# Patient Record
Sex: FEMALE | Race: WHITE | NOT HISPANIC OR LATINO | Employment: FULL TIME | URBAN - METROPOLITAN AREA
[De-identification: names, ages, dates, MRNs, and addresses within clinical notes are randomized per-mention and may not be internally consistent; named-entity substitution may affect disease eponyms.]

---

## 2023-04-28 ENCOUNTER — HOSPITAL ENCOUNTER (INPATIENT)
Facility: HOSPITAL | Age: 43
LOS: 7 days | Discharge: HOME | DRG: 674 | End: 2023-05-05
Attending: EMERGENCY MEDICINE | Admitting: STUDENT IN AN ORGANIZED HEALTH CARE EDUCATION/TRAINING PROGRAM
Payer: MEDICAID

## 2023-04-28 DIAGNOSIS — N17.9 ACUTE RENAL FAILURE SUPERIMPOSED ON STAGE 5 CHRONIC KIDNEY DISEASE, NOT ON CHRONIC DIALYSIS: ICD-10-CM

## 2023-04-28 DIAGNOSIS — D64.9 SYMPTOMATIC ANEMIA: ICD-10-CM

## 2023-04-28 DIAGNOSIS — N18.5 ACUTE RENAL FAILURE SUPERIMPOSED ON STAGE 5 CHRONIC KIDNEY DISEASE, NOT ON CHRONIC DIALYSIS: ICD-10-CM

## 2023-04-28 DIAGNOSIS — N19 UREMIA: Primary | ICD-10-CM

## 2023-04-28 DIAGNOSIS — R07.9 CHEST PAIN: ICD-10-CM

## 2023-04-28 DIAGNOSIS — E87.20 ACIDOSIS: ICD-10-CM

## 2023-04-28 DIAGNOSIS — R55 SYNCOPE: ICD-10-CM

## 2023-04-28 DIAGNOSIS — R31.0 GROSS HEMATURIA: ICD-10-CM

## 2023-04-28 DIAGNOSIS — D64.9 ANEMIA: ICD-10-CM

## 2023-04-28 PROBLEM — N18.9 CHRONIC KIDNEY DISEASE-MINERAL AND BONE DISORDER: Status: ACTIVE | Noted: 2023-04-28

## 2023-04-28 PROBLEM — E83.9 CHRONIC KIDNEY DISEASE-MINERAL AND BONE DISORDER: Status: ACTIVE | Noted: 2023-04-28

## 2023-04-28 PROBLEM — Q61.3 POLYCYSTIC KIDNEY DISEASE: Status: ACTIVE | Noted: 2023-04-28

## 2023-04-28 PROBLEM — I10 HYPERTENSION: Status: ACTIVE | Noted: 2023-04-28

## 2023-04-28 PROBLEM — R31.9 URINARY TRACT INFECTION WITH HEMATURIA: Status: ACTIVE | Noted: 2023-04-28

## 2023-04-28 PROBLEM — R31.9 URINARY TRACT INFECTION WITH HEMATURIA: Status: RESOLVED | Noted: 2023-04-28 | Resolved: 2023-04-28

## 2023-04-28 PROBLEM — N39.0 URINARY TRACT INFECTION WITH HEMATURIA: Status: ACTIVE | Noted: 2023-04-28

## 2023-04-28 PROBLEM — N39.0 URINARY TRACT INFECTION WITH HEMATURIA: Status: RESOLVED | Noted: 2023-04-28 | Resolved: 2023-04-28

## 2023-04-28 PROBLEM — M89.9 CHRONIC KIDNEY DISEASE-MINERAL AND BONE DISORDER: Status: ACTIVE | Noted: 2023-04-28

## 2023-04-28 PROBLEM — Q61.2 AUTOSOMAL DOMINANT POLYCYSTIC KIDNEY DISEASE: Status: ACTIVE | Noted: 2023-04-28

## 2023-04-28 LAB
ABO + RH BLD: NORMAL
ALBUMIN SERPL BCP-MCNC: 2.7 G/DL (ref 3.5–5.2)
ALBUMIN SERPL BCP-MCNC: 2.9 G/DL (ref 3.5–5.2)
ALBUMIN SERPL BCP-MCNC: 3.1 G/DL (ref 3.5–5.2)
ALBUMIN/CREAT UR: 6773.3 UG/MG (ref 0–30)
ALLENS TEST: ABNORMAL
ALP SERPL-CCNC: 65 U/L (ref 55–135)
ALP SERPL-CCNC: 67 U/L (ref 55–135)
ALP SERPL-CCNC: 71 U/L (ref 55–135)
ALT SERPL W/O P-5'-P-CCNC: 6 U/L (ref 10–44)
ALT SERPL W/O P-5'-P-CCNC: 7 U/L (ref 10–44)
ALT SERPL W/O P-5'-P-CCNC: 8 U/L (ref 10–44)
ANION GAP SERPL CALC-SCNC: 23 MMOL/L (ref 8–16)
ANION GAP SERPL CALC-SCNC: 24 MMOL/L (ref 8–16)
ANION GAP SERPL CALC-SCNC: 26 MMOL/L (ref 8–16)
ANISOCYTOSIS BLD QL SMEAR: SLIGHT
AST SERPL-CCNC: 11 U/L (ref 10–40)
AST SERPL-CCNC: 8 U/L (ref 10–40)
AST SERPL-CCNC: 9 U/L (ref 10–40)
B-HCG UR QL: NEGATIVE
BACTERIA #/AREA URNS AUTO: ABNORMAL /HPF
BASOPHILS # BLD AUTO: 0.02 K/UL (ref 0–0.2)
BASOPHILS NFR BLD: 0.2 % (ref 0–1.9)
BILIRUB SERPL-MCNC: 0.4 MG/DL (ref 0.1–1)
BILIRUB SERPL-MCNC: 0.4 MG/DL (ref 0.1–1)
BILIRUB SERPL-MCNC: 0.5 MG/DL (ref 0.1–1)
BILIRUB UR QL STRIP: NEGATIVE
BLD GP AB SCN CELLS X3 SERPL QL: NORMAL
BNP SERPL-MCNC: 108 PG/ML (ref 0–99)
BUN SERPL-MCNC: 179 MG/DL (ref 6–20)
BUN SERPL-MCNC: 181 MG/DL (ref 6–20)
BUN SERPL-MCNC: 191 MG/DL (ref 6–20)
CALCIUM SERPL-MCNC: 7.1 MG/DL (ref 8.7–10.5)
CALCIUM SERPL-MCNC: 7.3 MG/DL (ref 8.7–10.5)
CALCIUM SERPL-MCNC: 7.3 MG/DL (ref 8.7–10.5)
CHLORIDE SERPL-SCNC: 103 MMOL/L (ref 95–110)
CHLORIDE SERPL-SCNC: 103 MMOL/L (ref 95–110)
CHLORIDE SERPL-SCNC: 99 MMOL/L (ref 95–110)
CLARITY UR REFRACT.AUTO: ABNORMAL
CO2 SERPL-SCNC: 10 MMOL/L (ref 23–29)
CO2 SERPL-SCNC: 9 MMOL/L (ref 23–29)
CO2 SERPL-SCNC: 9 MMOL/L (ref 23–29)
COLOR UR AUTO: ABNORMAL
CREAT SERPL-MCNC: 16.3 MG/DL (ref 0.5–1.4)
CREAT SERPL-MCNC: 16.5 MG/DL (ref 0.5–1.4)
CREAT SERPL-MCNC: 17.8 MG/DL (ref 0.5–1.4)
CREAT UR-MCNC: 45 MG/DL (ref 15–325)
CTP QC/QA: YES
DELSYS: ABNORMAL
DIFFERENTIAL METHOD: ABNORMAL
EOSINOPHIL # BLD AUTO: 0 K/UL (ref 0–0.5)
EOSINOPHIL NFR BLD: 0 % (ref 0–8)
EOSINOPHIL NFR BLD: 0.1 % (ref 0–8)
EOSINOPHIL NFR BLD: 0.3 % (ref 0–8)
ERYTHROCYTE [DISTWIDTH] IN BLOOD BY AUTOMATED COUNT: 15.4 % (ref 11.5–14.5)
ERYTHROCYTE [DISTWIDTH] IN BLOOD BY AUTOMATED COUNT: 16 % (ref 11.5–14.5)
ERYTHROCYTE [DISTWIDTH] IN BLOOD BY AUTOMATED COUNT: 16.7 % (ref 11.5–14.5)
EST. GFR  (NO RACE VARIABLE): 2.3 ML/MIN/1.73 M^2
EST. GFR  (NO RACE VARIABLE): 2.5 ML/MIN/1.73 M^2
EST. GFR  (NO RACE VARIABLE): 2.5 ML/MIN/1.73 M^2
FERRITIN SERPL-MCNC: 81 NG/ML (ref 20–300)
FOLATE SERPL-MCNC: 5.3 NG/ML (ref 4–24)
GLUCOSE SERPL-MCNC: 158 MG/DL (ref 70–110)
GLUCOSE SERPL-MCNC: 82 MG/DL (ref 70–110)
GLUCOSE SERPL-MCNC: 85 MG/DL (ref 70–110)
GLUCOSE UR QL STRIP: NEGATIVE
GRAM STN SPEC: NORMAL
GRAM STN SPEC: NORMAL
HCO3 UR-SCNC: 9.2 MMOL/L (ref 24–28)
HCT VFR BLD AUTO: 14.5 % (ref 37–48.5)
HCT VFR BLD AUTO: 17.4 % (ref 37–48.5)
HCT VFR BLD AUTO: 19 % (ref 37–48.5)
HCV AB SERPL QL IA: NORMAL
HGB BLD-MCNC: 4.6 G/DL (ref 12–16)
HGB BLD-MCNC: 5.4 G/DL (ref 12–16)
HGB BLD-MCNC: 6.1 G/DL (ref 12–16)
HGB UR QL STRIP: ABNORMAL
HIV 1+2 AB+HIV1 P24 AG SERPL QL IA: NORMAL
HYALINE CASTS UR QL AUTO: 0 /LPF
HYPOCHROMIA BLD QL SMEAR: ABNORMAL
IMM GRANULOCYTES # BLD AUTO: 0.05 K/UL (ref 0–0.04)
IMM GRANULOCYTES # BLD AUTO: 0.1 K/UL (ref 0–0.04)
IMM GRANULOCYTES # BLD AUTO: 0.11 K/UL (ref 0–0.04)
IMM GRANULOCYTES NFR BLD AUTO: 0.4 % (ref 0–0.5)
IMM GRANULOCYTES NFR BLD AUTO: 1 % (ref 0–0.5)
IMM GRANULOCYTES NFR BLD AUTO: 1.1 % (ref 0–0.5)
INR PPP: 1 (ref 0.8–1.2)
IRON SERPL-MCNC: 28 UG/DL (ref 30–160)
KETONES UR QL STRIP: NEGATIVE
LACTATE SERPL-SCNC: 0.6 MMOL/L (ref 0.5–2.2)
LEUKOCYTE ESTERASE UR QL STRIP: ABNORMAL
LIPASE SERPL-CCNC: 107 U/L (ref 4–60)
LYMPHOCYTES # BLD AUTO: 0.9 K/UL (ref 1–4.8)
LYMPHOCYTES # BLD AUTO: 1 K/UL (ref 1–4.8)
LYMPHOCYTES # BLD AUTO: 1 K/UL (ref 1–4.8)
LYMPHOCYTES NFR BLD: 10 % (ref 18–48)
LYMPHOCYTES NFR BLD: 7.5 % (ref 18–48)
LYMPHOCYTES NFR BLD: 9.3 % (ref 18–48)
MAGNESIUM SERPL-MCNC: 2 MG/DL (ref 1.6–2.6)
MAGNESIUM SERPL-MCNC: 2.1 MG/DL (ref 1.6–2.6)
MCH RBC QN AUTO: 28 PG (ref 27–31)
MCH RBC QN AUTO: 28.9 PG (ref 27–31)
MCH RBC QN AUTO: 29.3 PG (ref 27–31)
MCHC RBC AUTO-ENTMCNC: 31 G/DL (ref 32–36)
MCHC RBC AUTO-ENTMCNC: 31.7 G/DL (ref 32–36)
MCHC RBC AUTO-ENTMCNC: 32.1 G/DL (ref 32–36)
MCV RBC AUTO: 88 FL (ref 82–98)
MCV RBC AUTO: 91 FL (ref 82–98)
MCV RBC AUTO: 93 FL (ref 82–98)
MICROALBUMIN UR DL<=1MG/L-MCNC: 3048 UG/ML
MICROSCOPIC COMMENT: ABNORMAL
MODE: ABNORMAL
MONOCYTES # BLD AUTO: 0.4 K/UL (ref 0.3–1)
MONOCYTES # BLD AUTO: 0.5 K/UL (ref 0.3–1)
MONOCYTES # BLD AUTO: 0.6 K/UL (ref 0.3–1)
MONOCYTES NFR BLD: 3.9 % (ref 4–15)
MONOCYTES NFR BLD: 5 % (ref 4–15)
MONOCYTES NFR BLD: 5.2 % (ref 4–15)
NEUTROPHILS # BLD AUTO: 10.3 K/UL (ref 1.8–7.7)
NEUTROPHILS # BLD AUTO: 8.1 K/UL (ref 1.8–7.7)
NEUTROPHILS # BLD AUTO: 8.8 K/UL (ref 1.8–7.7)
NEUTROPHILS NFR BLD: 83.7 % (ref 38–73)
NEUTROPHILS NFR BLD: 85.5 % (ref 38–73)
NEUTROPHILS NFR BLD: 86.4 % (ref 38–73)
NITRITE UR QL STRIP: NEGATIVE
NRBC BLD-RTO: 0 /100 WBC
OVALOCYTES BLD QL SMEAR: ABNORMAL
PCO2 BLDA: 25.7 MMHG (ref 35–45)
PH SMN: 7.16 [PH] (ref 7.35–7.45)
PH UR STRIP: 7 [PH] (ref 5–8)
PHOSPHATE SERPL-MCNC: >15 MG/DL (ref 2.7–4.5)
PLATELET # BLD AUTO: 230 K/UL (ref 150–450)
PLATELET # BLD AUTO: 236 K/UL (ref 150–450)
PLATELET # BLD AUTO: 264 K/UL (ref 150–450)
PMV BLD AUTO: 10.5 FL (ref 9.2–12.9)
PMV BLD AUTO: 10.5 FL (ref 9.2–12.9)
PMV BLD AUTO: 10.6 FL (ref 9.2–12.9)
PO2 BLDA: 29 MMHG (ref 40–60)
POC BE: -19 MMOL/L
POC SATURATED O2: 41 % (ref 95–100)
POC TCO2: 10 MMOL/L (ref 24–29)
POIKILOCYTOSIS BLD QL SMEAR: SLIGHT
POLYCHROMASIA BLD QL SMEAR: ABNORMAL
POTASSIUM SERPL-SCNC: 4.2 MMOL/L (ref 3.5–5.1)
POTASSIUM SERPL-SCNC: 4.3 MMOL/L (ref 3.5–5.1)
POTASSIUM SERPL-SCNC: 4.6 MMOL/L (ref 3.5–5.1)
PROT SERPL-MCNC: 6.2 G/DL (ref 6–8.4)
PROT SERPL-MCNC: 6.4 G/DL (ref 6–8.4)
PROT SERPL-MCNC: 7 G/DL (ref 6–8.4)
PROT UR QL STRIP: ABNORMAL
PROTHROMBIN TIME: 10.6 SEC (ref 9–12.5)
PTH-INTACT SERPL-MCNC: 1348.9 PG/ML (ref 9–77)
RBC # BLD AUTO: 1.64 M/UL (ref 4–5.4)
RBC # BLD AUTO: 1.87 M/UL (ref 4–5.4)
RBC # BLD AUTO: 2.08 M/UL (ref 4–5.4)
RBC #/AREA URNS AUTO: >100 /HPF (ref 0–4)
RH BLD: NORMAL
SAMPLE: ABNORMAL
SATURATED IRON: 15 % (ref 20–50)
SITE: ABNORMAL
SODIUM SERPL-SCNC: 134 MMOL/L (ref 136–145)
SODIUM SERPL-SCNC: 136 MMOL/L (ref 136–145)
SODIUM SERPL-SCNC: 136 MMOL/L (ref 136–145)
SP GR UR STRIP: 1.01 (ref 1–1.03)
SPECIMEN OUTDATE: NORMAL
TOTAL IRON BINDING CAPACITY: 191 UG/DL (ref 250–450)
TRANSFERRIN SERPL-MCNC: 129 MG/DL (ref 200–375)
TROPONIN I SERPL DL<=0.01 NG/ML-MCNC: 0.02 NG/ML (ref 0–0.03)
URN SPEC COLLECT METH UR: ABNORMAL
VIT B12 SERPL-MCNC: 378 PG/ML (ref 210–950)
WBC # BLD AUTO: 10.28 K/UL (ref 3.9–12.7)
WBC # BLD AUTO: 11.87 K/UL (ref 3.9–12.7)
WBC # BLD AUTO: 9.72 K/UL (ref 3.9–12.7)
WBC #/AREA URNS AUTO: 0 /HPF (ref 0–5)

## 2023-04-28 PROCEDURE — 25000003 PHARM REV CODE 250: Performed by: STUDENT IN AN ORGANIZED HEALTH CARE EDUCATION/TRAINING PROGRAM

## 2023-04-28 PROCEDURE — 86803 HEPATITIS C AB TEST: CPT | Performed by: PHYSICIAN ASSISTANT

## 2023-04-28 PROCEDURE — 99223 PR INITIAL HOSPITAL CARE,LEVL III: ICD-10-PCS | Mod: ,,, | Performed by: STUDENT IN AN ORGANIZED HEALTH CARE EDUCATION/TRAINING PROGRAM

## 2023-04-28 PROCEDURE — 81001 URINALYSIS AUTO W/SCOPE: CPT | Performed by: STUDENT IN AN ORGANIZED HEALTH CARE EDUCATION/TRAINING PROGRAM

## 2023-04-28 PROCEDURE — 86901 BLOOD TYPING SEROLOGIC RH(D): CPT | Performed by: STUDENT IN AN ORGANIZED HEALTH CARE EDUCATION/TRAINING PROGRAM

## 2023-04-28 PROCEDURE — 87040 BLOOD CULTURE FOR BACTERIA: CPT | Mod: 59

## 2023-04-28 PROCEDURE — 80100014 HC HEMODIALYSIS 1:1

## 2023-04-28 PROCEDURE — 82746 ASSAY OF FOLIC ACID SERUM: CPT

## 2023-04-28 PROCEDURE — 85025 COMPLETE CBC W/AUTO DIFF WBC: CPT | Mod: 91 | Performed by: STUDENT IN AN ORGANIZED HEALTH CARE EDUCATION/TRAINING PROGRAM

## 2023-04-28 PROCEDURE — 99900035 HC TECH TIME PER 15 MIN (STAT)

## 2023-04-28 PROCEDURE — 80053 COMPREHEN METABOLIC PANEL: CPT | Mod: 91

## 2023-04-28 PROCEDURE — 83735 ASSAY OF MAGNESIUM: CPT | Mod: 91 | Performed by: STUDENT IN AN ORGANIZED HEALTH CARE EDUCATION/TRAINING PROGRAM

## 2023-04-28 PROCEDURE — 93010 EKG 12-LEAD: ICD-10-PCS | Mod: ,,, | Performed by: INTERNAL MEDICINE

## 2023-04-28 PROCEDURE — 93010 ELECTROCARDIOGRAM REPORT: CPT | Mod: ,,, | Performed by: INTERNAL MEDICINE

## 2023-04-28 PROCEDURE — 86920 COMPATIBILITY TEST SPIN: CPT | Performed by: STUDENT IN AN ORGANIZED HEALTH CARE EDUCATION/TRAINING PROGRAM

## 2023-04-28 PROCEDURE — 20600001 HC STEP DOWN PRIVATE ROOM

## 2023-04-28 PROCEDURE — 83880 ASSAY OF NATRIURETIC PEPTIDE: CPT | Performed by: STUDENT IN AN ORGANIZED HEALTH CARE EDUCATION/TRAINING PROGRAM

## 2023-04-28 PROCEDURE — 82803 BLOOD GASES ANY COMBINATION: CPT

## 2023-04-28 PROCEDURE — 82728 ASSAY OF FERRITIN: CPT

## 2023-04-28 PROCEDURE — 84484 ASSAY OF TROPONIN QUANT: CPT | Performed by: STUDENT IN AN ORGANIZED HEALTH CARE EDUCATION/TRAINING PROGRAM

## 2023-04-28 PROCEDURE — 82570 ASSAY OF URINE CREATININE: CPT | Performed by: STUDENT IN AN ORGANIZED HEALTH CARE EDUCATION/TRAINING PROGRAM

## 2023-04-28 PROCEDURE — 99223 1ST HOSP IP/OBS HIGH 75: CPT | Mod: ,,, | Performed by: STUDENT IN AN ORGANIZED HEALTH CARE EDUCATION/TRAINING PROGRAM

## 2023-04-28 PROCEDURE — 86900 BLOOD TYPING SEROLOGIC ABO: CPT | Performed by: STUDENT IN AN ORGANIZED HEALTH CARE EDUCATION/TRAINING PROGRAM

## 2023-04-28 PROCEDURE — 36556 PR INSERT NON-TUNNEL CV CATH 5+ YRS OLD: ICD-10-PCS | Mod: ,,, | Performed by: EMERGENCY MEDICINE

## 2023-04-28 PROCEDURE — 83735 ASSAY OF MAGNESIUM: CPT

## 2023-04-28 PROCEDURE — 93005 ELECTROCARDIOGRAM TRACING: CPT

## 2023-04-28 PROCEDURE — 96375 TX/PRO/DX INJ NEW DRUG ADDON: CPT

## 2023-04-28 PROCEDURE — 87205 SMEAR GRAM STAIN: CPT | Performed by: STUDENT IN AN ORGANIZED HEALTH CARE EDUCATION/TRAINING PROGRAM

## 2023-04-28 PROCEDURE — 99291 CRITICAL CARE FIRST HOUR: CPT | Mod: 25,,, | Performed by: EMERGENCY MEDICINE

## 2023-04-28 PROCEDURE — 25000003 PHARM REV CODE 250

## 2023-04-28 PROCEDURE — 36556 INSERT NON-TUNNEL CV CATH: CPT | Mod: ,,, | Performed by: EMERGENCY MEDICINE

## 2023-04-28 PROCEDURE — 96361 HYDRATE IV INFUSION ADD-ON: CPT

## 2023-04-28 PROCEDURE — 83605 ASSAY OF LACTIC ACID: CPT

## 2023-04-28 PROCEDURE — 83970 ASSAY OF PARATHORMONE: CPT | Performed by: STUDENT IN AN ORGANIZED HEALTH CARE EDUCATION/TRAINING PROGRAM

## 2023-04-28 PROCEDURE — 85025 COMPLETE CBC W/AUTO DIFF WBC: CPT | Performed by: STUDENT IN AN ORGANIZED HEALTH CARE EDUCATION/TRAINING PROGRAM

## 2023-04-28 PROCEDURE — 99291 PR CRITICAL CARE, E/M 30-74 MINUTES: ICD-10-PCS | Mod: 25,,, | Performed by: EMERGENCY MEDICINE

## 2023-04-28 PROCEDURE — P9016 RBC LEUKOCYTES REDUCED: HCPCS | Performed by: STUDENT IN AN ORGANIZED HEALTH CARE EDUCATION/TRAINING PROGRAM

## 2023-04-28 PROCEDURE — 84100 ASSAY OF PHOSPHORUS: CPT

## 2023-04-28 PROCEDURE — 99291 CRITICAL CARE FIRST HOUR: CPT

## 2023-04-28 PROCEDURE — 80053 COMPREHEN METABOLIC PANEL: CPT | Mod: 91 | Performed by: STUDENT IN AN ORGANIZED HEALTH CARE EDUCATION/TRAINING PROGRAM

## 2023-04-28 PROCEDURE — 96374 THER/PROPH/DIAG INJ IV PUSH: CPT

## 2023-04-28 PROCEDURE — 94761 N-INVAS EAR/PLS OXIMETRY MLT: CPT

## 2023-04-28 PROCEDURE — 82607 VITAMIN B-12: CPT

## 2023-04-28 PROCEDURE — 99223 PR INITIAL HOSPITAL CARE,LEVL III: ICD-10-PCS | Mod: ,,, | Performed by: INTERNAL MEDICINE

## 2023-04-28 PROCEDURE — 99223 1ST HOSP IP/OBS HIGH 75: CPT | Mod: ,,, | Performed by: INTERNAL MEDICINE

## 2023-04-28 PROCEDURE — 76937 PR  US GUIDE, VASCULAR ACCESS: ICD-10-PCS | Mod: 26,,, | Performed by: EMERGENCY MEDICINE

## 2023-04-28 PROCEDURE — P9021 RED BLOOD CELLS UNIT: HCPCS | Performed by: STUDENT IN AN ORGANIZED HEALTH CARE EDUCATION/TRAINING PROGRAM

## 2023-04-28 PROCEDURE — 63600175 PHARM REV CODE 636 W HCPCS: Performed by: EMERGENCY MEDICINE

## 2023-04-28 PROCEDURE — 83690 ASSAY OF LIPASE: CPT | Performed by: STUDENT IN AN ORGANIZED HEALTH CARE EDUCATION/TRAINING PROGRAM

## 2023-04-28 PROCEDURE — 87389 HIV-1 AG W/HIV-1&-2 AB AG IA: CPT | Performed by: PHYSICIAN ASSISTANT

## 2023-04-28 PROCEDURE — 36430 TRANSFUSION BLD/BLD COMPNT: CPT

## 2023-04-28 PROCEDURE — 85610 PROTHROMBIN TIME: CPT

## 2023-04-28 PROCEDURE — 81025 URINE PREGNANCY TEST: CPT | Performed by: STUDENT IN AN ORGANIZED HEALTH CARE EDUCATION/TRAINING PROGRAM

## 2023-04-28 PROCEDURE — 76937 US GUIDE VASCULAR ACCESS: CPT | Mod: 26,,, | Performed by: EMERGENCY MEDICINE

## 2023-04-28 PROCEDURE — 84466 ASSAY OF TRANSFERRIN: CPT

## 2023-04-28 PROCEDURE — 63600175 PHARM REV CODE 636 W HCPCS: Performed by: STUDENT IN AN ORGANIZED HEALTH CARE EDUCATION/TRAINING PROGRAM

## 2023-04-28 RX ORDER — TALC
6 POWDER (GRAM) TOPICAL NIGHTLY PRN
Status: DISCONTINUED | OUTPATIENT
Start: 2023-04-28 | End: 2023-05-05 | Stop reason: HOSPADM

## 2023-04-28 RX ORDER — NALOXONE HCL 0.4 MG/ML
0.02 VIAL (ML) INJECTION
Status: DISCONTINUED | OUTPATIENT
Start: 2023-04-28 | End: 2023-05-05 | Stop reason: HOSPADM

## 2023-04-28 RX ORDER — FENTANYL CITRATE 50 UG/ML
25 INJECTION, SOLUTION INTRAMUSCULAR; INTRAVENOUS ONCE AS NEEDED
Status: COMPLETED | OUTPATIENT
Start: 2023-04-28 | End: 2023-04-28

## 2023-04-28 RX ORDER — ACETAMINOPHEN 325 MG/1
650 TABLET ORAL EVERY 6 HOURS PRN
Status: CANCELLED | OUTPATIENT
Start: 2023-04-28

## 2023-04-28 RX ORDER — MUPIROCIN 20 MG/G
OINTMENT TOPICAL 2 TIMES DAILY
Status: DISPENSED | OUTPATIENT
Start: 2023-04-28 | End: 2023-05-03

## 2023-04-28 RX ORDER — LIDOCAINE 50 MG/G
2 PATCH TOPICAL
Status: DISCONTINUED | OUTPATIENT
Start: 2023-04-28 | End: 2023-05-05 | Stop reason: HOSPADM

## 2023-04-28 RX ORDER — ACETAMINOPHEN 325 MG/1
650 TABLET ORAL EVERY 4 HOURS PRN
Status: DISCONTINUED | OUTPATIENT
Start: 2023-04-28 | End: 2023-04-28

## 2023-04-28 RX ORDER — HYDROCODONE BITARTRATE AND ACETAMINOPHEN 500; 5 MG/1; MG/1
TABLET ORAL
Status: DISCONTINUED | OUTPATIENT
Start: 2023-04-28 | End: 2023-05-01

## 2023-04-28 RX ORDER — ACETAMINOPHEN 325 MG/1
650 TABLET ORAL EVERY 6 HOURS PRN
Status: DISCONTINUED | OUTPATIENT
Start: 2023-04-28 | End: 2023-05-05 | Stop reason: HOSPADM

## 2023-04-28 RX ORDER — ACETAMINOPHEN 500 MG
TABLET ORAL DAILY PRN
COMMUNITY
End: 2023-10-06

## 2023-04-28 RX ORDER — DEXTROSE 40 %
15 GEL (GRAM) ORAL
Status: DISCONTINUED | OUTPATIENT
Start: 2023-04-28 | End: 2023-05-05 | Stop reason: HOSPADM

## 2023-04-28 RX ORDER — ONDANSETRON HYDROCHLORIDE 4 MG/5ML
4 SOLUTION ORAL ONCE
Status: DISCONTINUED | OUTPATIENT
Start: 2023-04-28 | End: 2023-04-28

## 2023-04-28 RX ORDER — DEXTROSE 40 %
30 GEL (GRAM) ORAL
Status: DISCONTINUED | OUTPATIENT
Start: 2023-04-28 | End: 2023-05-05 | Stop reason: HOSPADM

## 2023-04-28 RX ORDER — SODIUM CHLORIDE 9 MG/ML
INJECTION, SOLUTION INTRAVENOUS ONCE
Status: DISCONTINUED | OUTPATIENT
Start: 2023-04-28 | End: 2023-05-01

## 2023-04-28 RX ORDER — FENTANYL CITRATE 50 UG/ML
25 INJECTION, SOLUTION INTRAMUSCULAR; INTRAVENOUS
Status: COMPLETED | OUTPATIENT
Start: 2023-04-28 | End: 2023-04-28

## 2023-04-28 RX ORDER — LIDOCAINE HYDROCHLORIDE 10 MG/ML
10 INJECTION, SOLUTION EPIDURAL; INFILTRATION; INTRACAUDAL; PERINEURAL
Status: COMPLETED | OUTPATIENT
Start: 2023-04-28 | End: 2023-04-28

## 2023-04-28 RX ORDER — ONDANSETRON 2 MG/ML
4 INJECTION INTRAMUSCULAR; INTRAVENOUS
Status: COMPLETED | OUTPATIENT
Start: 2023-04-28 | End: 2023-04-28

## 2023-04-28 RX ORDER — FAMOTIDINE 10 MG/ML
20 INJECTION INTRAVENOUS
Status: COMPLETED | OUTPATIENT
Start: 2023-04-28 | End: 2023-04-28

## 2023-04-28 RX ORDER — GLUCAGON 1 MG
1 KIT INJECTION
Status: DISCONTINUED | OUTPATIENT
Start: 2023-04-28 | End: 2023-05-05 | Stop reason: HOSPADM

## 2023-04-28 RX ORDER — SEVELAMER CARBONATE 800 MG/1
1600 TABLET, FILM COATED ORAL
Status: DISCONTINUED | OUTPATIENT
Start: 2023-04-28 | End: 2023-05-05 | Stop reason: HOSPADM

## 2023-04-28 RX ORDER — SODIUM CHLORIDE 0.9 % (FLUSH) 0.9 %
10 SYRINGE (ML) INJECTION EVERY 12 HOURS PRN
Status: DISCONTINUED | OUTPATIENT
Start: 2023-04-28 | End: 2023-05-05 | Stop reason: HOSPADM

## 2023-04-28 RX ADMIN — ACETAMINOPHEN 650 MG: 325 TABLET ORAL at 06:04

## 2023-04-28 RX ADMIN — FENTANYL CITRATE 25 MCG: 50 INJECTION INTRAMUSCULAR; INTRAVENOUS at 01:04

## 2023-04-28 RX ADMIN — MUPIROCIN: 20 OINTMENT TOPICAL at 06:04

## 2023-04-28 RX ADMIN — SEVELAMER CARBONATE 1600 MG: 800 TABLET, FILM COATED ORAL at 06:04

## 2023-04-28 RX ADMIN — MUPIROCIN: 20 OINTMENT TOPICAL at 09:04

## 2023-04-28 RX ADMIN — LIDOCAINE 2 PATCH: 50 PATCH TOPICAL at 06:04

## 2023-04-28 RX ADMIN — SODIUM CHLORIDE 1000 ML: 9 INJECTION, SOLUTION INTRAVENOUS at 06:04

## 2023-04-28 RX ADMIN — FENTANYL CITRATE 25 MCG: 50 INJECTION, SOLUTION INTRAMUSCULAR; INTRAVENOUS at 10:04

## 2023-04-28 RX ADMIN — LIDOCAINE HYDROCHLORIDE 100 MG: 10 INJECTION, SOLUTION EPIDURAL; INFILTRATION; INTRACAUDAL at 09:04

## 2023-04-28 RX ADMIN — FAMOTIDINE 20 MG: 10 INJECTION, SOLUTION INTRAVENOUS at 09:04

## 2023-04-28 RX ADMIN — ONDANSETRON 4 MG: 2 INJECTION INTRAMUSCULAR; INTRAVENOUS at 08:04

## 2023-04-28 NOTE — SUBJECTIVE & OBJECTIVE
No past medical history on file.    No past surgical history on file.    Review of patient's allergies indicates:   Allergen Reactions    Aspirin Hives    Penicillins Hives       No current facility-administered medications on file prior to encounter.     No current outpatient medications on file prior to encounter.     Family History    None       Tobacco Use    Smoking status: Not on file    Smokeless tobacco: Not on file   Substance and Sexual Activity    Alcohol use: Not on file    Drug use: Not on file    Sexual activity: Not on file     Review of Systems   Constitutional:  Positive for activity change and fatigue. Negative for chills and fever.   HENT:  Negative for hearing loss, mouth sores, nosebleeds, sneezing and trouble swallowing.    Eyes:  Negative for photophobia and visual disturbance.   Respiratory:  Positive for shortness of breath. Negative for cough.    Cardiovascular:  Positive for chest pain and leg swelling. Negative for palpitations.   Gastrointestinal:  Positive for abdominal distention, abdominal pain, nausea and vomiting. Negative for blood in stool and constipation.   Genitourinary:  Positive for flank pain, genital sores and hematuria. Negative for decreased urine volume, difficulty urinating, dysuria and frequency.   Musculoskeletal:  Positive for neck pain.   Skin:  Negative for wound.   Neurological:  Positive for weakness. Negative for dizziness, speech difficulty and headaches.   Psychiatric/Behavioral:  Negative for agitation, confusion and sleep disturbance.    Objective:     Vital Signs (Most Recent):  Temp: 97.9 °F (36.6 °C) (04/28/23 1336)  Pulse: 75 (04/28/23 1336)  Resp: 18 (04/28/23 1336)  BP: 121/69 (04/28/23 1336)  SpO2: 100 % (04/28/23 1336) Vital Signs (24h Range):  Temp:  [97.6 °F (36.4 °C)-98.4 °F (36.9 °C)] 97.9 °F (36.6 °C)  Pulse:  [69-98] 75  Resp:  [12-26] 18  SpO2:  [96 %-100 %] 100 %  BP: (119-133)/(67-89) 121/69     Weight: 90.7 kg (200 lb)  Body mass index is  27.89 kg/m².    Physical Exam  Vitals and nursing note reviewed. Exam conducted with a chaperone present.   Constitutional:       General: She is not in acute distress.     Appearance: Normal appearance. She is ill-appearing.   HENT:      Head: Normocephalic and atraumatic.      Nose: Nose normal.      Mouth/Throat:      Mouth: Mucous membranes are moist.   Eyes:      General: No scleral icterus.     Extraocular Movements: Extraocular movements intact.      Conjunctiva/sclera: Conjunctivae normal.   Cardiovascular:      Rate and Rhythm: Normal rate and regular rhythm.      Pulses: Normal pulses.      Heart sounds: Normal heart sounds.   Pulmonary:      Effort: Pulmonary effort is normal.      Breath sounds: Wheezing present.   Abdominal:      General: There is distension.      Tenderness: There is abdominal tenderness. There is right CVA tenderness and left CVA tenderness. There is no guarding.   Genitourinary:     Labia:         Left: Lesion (small nonbleeding cyst) present.    Musculoskeletal:      Cervical back: Normal range of motion.      Right lower leg: No edema.      Left lower leg: No edema.   Skin:     General: Skin is warm.   Neurological:      General: No focal deficit present.      Mental Status: She is alert and oriented to person, place, and time.      Motor: Weakness present.   Psychiatric:         Mood and Affect: Mood normal.         Behavior: Behavior normal.         Thought Content: Thought content normal.         Judgment: Judgment normal.           Significant Labs: All pertinent labs within the past 24 hours have been reviewed.  Recent Lab Results  (Last 5 results in the past 24 hours)        04/28/23  0937   04/28/23  0932   04/28/23  0752   04/28/23  0750   04/28/23  0647        Unit Blood Type Code       1700  [P]                1700  [P]                1700  [P]                1700  [P]         Unit Expiration       [id number]  [P]                [id number]  [P]                 [id number]  [P]                [id number]  [P]         Unit Blood Type       B NEG  [P]                B NEG  [P]                B NEG  [P]                B NEG  [P]         Albumin         3.1       Alkaline Phosphatase         71       Allens Test     N/A           ALT         8       Anion Gap         26       Aniso         Slight       Appearance, UA   Ex.Turbid             AST         9       Bacteria, UA   Occasional             Baso #         0.02       Basophil %         0.2       Bilirubin (UA)   Negative             BILIRUBIN TOTAL         0.4  Comment: For infants and newborns, interpretation of results should be based  on gestational age, weight and in agreement with clinical  observations.    Premature Infant recommended reference ranges:  Up to 24 hours.............<8.0 mg/dL  Up to 48 hours............<12.0 mg/dL  3-5 days..................<15.0 mg/dL  6-29 days.................<15.0 mg/dL         BNP         108  Comment: Values of less than 100 pg/ml are consistent with non-CHF populations.       Site     Other           BUN         191       Calcium         7.3       Chloride         99       CO2         9  Comment: *Critical value notification by [facility] with confirmation of receipt to   Patrick HARRINGTON  Date 4-28-23Time 7:33AM         CODING SYSTEM       [id number]  [P]                HOTD332  [P]                [id number]  [P]                [id number]  [P]         Color, UA   Red             Creatinine         17.8       CROSSMATCH INTERPRETATION       Compatible  [P]                Compatible  [P]                Compatible  [P]                Compatible  [P]         DelSys     Room Air           Differential Method         Automated       DISPENSE STATUS       ISSUED  [P]                ISSUED  [P]                CROSSMATCHED  [P]                CROSSMATCHED  [P]         eGFR         2.3       Eos #         0.0       Eosinophil %         0.3       Glucose         85       Glucose, UA   Negative              Gran # (ANC)         10.3       Gran %         86.4       Group & Rh       B NEG         Hematocrit         14.5  Comment: Syed De Oliveira MD acknowledged and accepted results on test(s) HGB/HCT   via secure chat.   by [facility] 04/28/2023 07:49         Hemoglobin         4.6  Comment: Syed De Oliveira MD acknowledged and accepted results on test(s) HGB/HCT   via secure chat.   by [facility] 04/28/2023 07:49         Hepatitis C Ab         Non-reactive       HIV 1/2 Ag/Ab         Non-reactive       Hyaline Casts, UA   0             Hypo         Occasional       Immature Grans (Abs)         0.05  Comment: Mild elevation in immature granulocytes is non specific and   can be seen in a variety of conditions including stress response,   acute inflammation, trauma and pregnancy. Correlation with other   laboratory and clinical findings is essential.         Immature Granulocytes         0.4       INDIRECT DWIGHT       NEG         Ketones, UA   Negative             Leukocytes, UA   2+             Lipase         107       Lymph #         0.9       Lymph %         7.5       Magnesium         2.1       MCH         28.0       MCHC         31.7       MCV         88       Microscopic Comment   SEE COMMENT  Comment: Other formed elements not mentioned in the report are not   present in the microscopic examination.                Mode     SPONT           Mono #         0.6       Mono %         5.2       MPV         10.6       NITRITE UA   Negative             nRBC         0       Occult Blood UA   3+             Ovalocytes         Occasional       pH, UA   7.0             Platelets         264       POC BE     -19           POC HCO3     9.2           POC PCO2     25.7           POC PH     7.161           POC PO2     29           POC SATURATED O2     41           POC TCO2     10           Poikilocytosis         Slight       Poly         Occasional       Potassium         4.2       Preg Test, Ur Negative               Product Code        Y8979C43  [P]                W2439T13  [P]                R7997P81  [P]                P8614J56  [P]         PROTEIN TOTAL         7.0       Protein, UA   2+  Comment: Recommend a 24 hour urine protein or a urine   protein/creatinine ratio if globulin induced proteinuria is  clinically suspected.                Acceptable Yes               RBC         1.64       RBC, UA   >100             RDW         15.4       Rh Type       NEG         Sample     VENOUS           Sodium         134       Specific [place], UA   1.010             Specimen Outdate       05/01/2023 23:59         Specimen UA   Urine, Clean Catch             Troponin I         0.018  Comment: The reference interval for Troponin I represents the 99th percentile   cutoff   for our facility and is consistent with 3rd generation assay   performance.         UNIT NUMBER       [id number]  [P]                [id number]  [P]                [id number]  [P]                [id number]  [P]         WBC, UA   0             WBC         11.87                               [P] - Preliminary Result               Significant Imaging: I have reviewed all pertinent imaging results/findings within the past 24 hours.

## 2023-04-28 NOTE — H&P
[facility] Dept  Hospital Medicine  History & Physical    Patient Name: Ade Silva  MRN: [id number]  Patient Class: IP- Inpatient  Admission Date: 4/28/2023  Attending Physician: Leo Quiroz MD   Primary Care Provider: No primary care provider on file.         Patient information was obtained from patient, past medical records and ER records.     Subjective:     Principal Problem:Anemia    Chief Complaint:   Chief Complaint   Patient presents with    Hematuria     X 2 weeks with chest pain, nausea, leg cramps, and weakness. PMH CKD, not on dialysis yet.         HPI: Ade Silva is a 43 yr old female with autosomal dominant polycystic kidney disease, CKD V, recurrent UTIs presented with hematuria and nausea/vomiting. Patient is new to [place], recently moved from [place] to live with her mother here a few weeks ago. She followed with Nephrology at [facility] and PCP (records in care everywhere). She initially started having hematuria 2 weeks ago, which is not new for her. Been told in the past when one of her renal cysts ruptures she has intermittent hematuria. Typically resolves in a few days, however this time it did not. She is still able to make a good amount of urine with her kidney disease. This morning presented to ED because she was having persistent nausea and vomiting. She endorses increased warmth when she urinates recently, which is consistent with past UTI that she has had. She also has emesis with the nausea/vomiting that is associated with chest discomfort she also reports exertional weakness, she is unable to walk across room. Has been consistently smoking cigarettes for about 28 years, has not used alcohol since age 27, smokes marijuana, but denies any other drug use. Only home medication she takes is metoprolol, but recently stopped taking it.    She presented to the ED afebrile and HDS on RA. CBC notable for Hgb 4.6 (10.5 in 2022 per care everywhere), Hct 14.5 with unknown baseline. CMP  notable for Na 134, Agap 26, , Cr 17.8, GFR 2.3. Lipase 107. . Troponin wnl. UA with 2+ protein, 3+ occult blood, 2+ leukocytes, and >100 RBC. VBG pH 7.16 and pCO2 25.7, BE -19. EKG NSR with prolonged qtc (516). CT Abd Pelvis showed enlarged kidneys consistent with findings of polycystic kidney disease.    Admitted to hospital medicine acute on chronic renal failure and anemia       Past Medical History: Autosomal Dominant Polycystic Kidney Disease, CKD 5, Basilar artery aneurysm, Hypertension     Past Surgical History: No significant past surgical history    Review of patient's allergies indicates:   Allergen Reactions    Aspirin Hives    Penicillins Hives       Family History    No significant family history       Tobacco Use    Smoking status: 28 pack years    Smokeless tobacco: No   Substance and Sexual Activity    Alcohol use: No    Drug use: Marijuana    Sexual activity: No       Review of Systems   Constitutional:  Positive for activity change and fatigue. Negative for chills and fever.   HENT:  Negative for hearing loss, mouth sores, nosebleeds, sneezing and trouble swallowing.    Eyes:  Negative for photophobia and visual disturbance.   Respiratory:  Positive for shortness of breath. Negative for cough.    Cardiovascular:  Positive for chest pain and leg swelling. Negative for palpitations.   Gastrointestinal:  Positive for abdominal distention, abdominal pain, nausea and vomiting. Negative for blood in stool and constipation.   Genitourinary:  Positive for flank pain, genital sores and hematuria. Negative for decreased urine volume, difficulty urinating, dysuria and frequency.   Musculoskeletal:  Positive for neck pain.   Skin:  Negative for wound.   Neurological:  Positive for weakness. Negative for dizziness, speech difficulty and headaches.   Psychiatric/Behavioral:  Negative for agitation, confusion and sleep disturbance.    Objective:     Vital Signs (Most Recent):  Temp: 97.9 °F  (36.6 °C) (04/28/23 1336)  Pulse: 75 (04/28/23 1336)  Resp: 18 (04/28/23 1336)  BP: 121/69 (04/28/23 1336)  SpO2: 100 % (04/28/23 1336) Vital Signs (24h Range):  Temp:  [97.6 °F (36.4 °C)-98.4 °F (36.9 °C)] 97.9 °F (36.6 °C)  Pulse:  [69-98] 75  Resp:  [12-26] 18  SpO2:  [96 %-100 %] 100 %  BP: (119-133)/(67-89) 121/69     Weight: 90.7 kg (200 lb)  Body mass index is 27.89 kg/m².    Physical Exam  Vitals and nursing note reviewed. Exam conducted with a chaperone present.   Constitutional:       General: She is not in acute distress.     Appearance: Normal appearance. She is ill-appearing.   HENT:      Head: Normocephalic and atraumatic.      Nose: Nose normal.      Mouth/Throat:      Mouth: Mucous membranes are moist.   Eyes:      General: No scleral icterus.     Extraocular Movements: Extraocular movements intact.      Conjunctiva/sclera: Conjunctivae normal.   Cardiovascular:      Rate and Rhythm: Normal rate and regular rhythm.      Pulses: Normal pulses.      Heart sounds: Normal heart sounds.   Pulmonary:      Effort: Pulmonary effort is normal.      Breath sounds: Wheezing present.   Abdominal:      General: There is distension.      Tenderness: There is abdominal tenderness. There is right CVA tenderness and left CVA tenderness. There is no guarding.   Genitourinary:     Labia:         Left: Lesion (small nonbleeding cyst) present.    Musculoskeletal:      Cervical back: Normal range of motion.      Right lower leg: No edema.      Left lower leg: No edema.   Skin:     General: Skin is warm.   Neurological:      General: No focal deficit present.      Mental Status: She is alert and oriented to person, place, and time.      Motor: Weakness present.   Psychiatric:         Mood and Affect: Mood normal.         Behavior: Behavior normal.         Thought Content: Thought content normal.         Judgment: Judgment normal.           Significant Labs: All pertinent labs within the past 24 hours have been  reviewed.  Recent Lab Results  (Last 5 results in the past 24 hours)        04/28/23  0937   04/28/23  0932   04/28/23  0752   04/28/23  0750   04/28/23  0647        Unit Blood Type Code       1700  [P]                1700  [P]                1700  [P]                1700  [P]         Unit Expiration       [id number]  [P]                [id number]  [P]                [id number]  [P]                [id number]  [P]         Unit Blood Type       B NEG  [P]                B NEG  [P]                B NEG  [P]                B NEG  [P]         Albumin         3.1       Alkaline Phosphatase         71       Allens Test     N/A           ALT         8       Anion Gap         26       Aniso         Slight       Appearance, UA   Ex.Turbid             AST         9       Bacteria, UA   Occasional             Baso #         0.02       Basophil %         0.2       Bilirubin (UA)   Negative             BILIRUBIN TOTAL         0.4  Comment: For infants and newborns, interpretation of results should be based  on gestational age, weight and in agreement with clinical  observations.    Premature Infant recommended reference ranges:  Up to 24 hours.............<8.0 mg/dL  Up to 48 hours............<12.0 mg/dL  3-5 days..................<15.0 mg/dL  6-29 days.................<15.0 mg/dL         BNP         108  Comment: Values of less than 100 pg/ml are consistent with non-CHF populations.       Site     Other           BUN         191       Calcium         7.3       Chloride         99       CO2         9  Comment: *Critical value notification by [facility] with confirmation of receipt to   Patrick HARRINGTON  Date 4-28-23Time 7:33AM         CODING SYSTEM       [id number]  [P]                [id number]  [P]                [id number]  [P]                [id number]  [P]         Color, UA   Red             Creatinine         17.8       CROSSMATCH INTERPRETATION       Compatible  [P]                Compatible  [P]                Compatible  [P]                 Compatible  [P]         DelSys     Room Air           Differential Method         Automated       DISPENSE STATUS       ISSUED  [P]                ISSUED  [P]                CROSSMATCHED  [P]                CROSSMATCHED  [P]         eGFR         2.3       Eos #         0.0       Eosinophil %         0.3       Glucose         85       Glucose, UA   Negative             Gran # (ANC)         10.3       Gran %         86.4       Group & Rh       B NEG         Hematocrit         14.5  Comment: Syed De Oliveira MD acknowledged and accepted results on test(s) HGB/HCT   via secure chat.   by [facility] 04/28/2023 07:49         Hemoglobin         4.6  Comment: Syed De Oliveira MD acknowledged and accepted results on test(s) HGB/HCT   via secure chat.   by [facility] 04/28/2023 07:49         Hepatitis C Ab         Non-reactive       HIV 1/2 Ag/Ab         Non-reactive       Hyaline Casts, UA   0             Hypo         Occasional       Immature Grans (Abs)         0.05  Comment: Mild elevation in immature granulocytes is non specific and   can be seen in a variety of conditions including stress response,   acute inflammation, trauma and pregnancy. Correlation with other   laboratory and clinical findings is essential.         Immature Granulocytes         0.4       INDIRECT DWIGHT       NEG         Ketones, UA   Negative             Leukocytes, UA   2+             Lipase         107       Lymph #         0.9       Lymph %         7.5       Magnesium         2.1       MCH         28.0       MCHC         31.7       MCV         88       Microscopic Comment   SEE COMMENT  Comment: Other formed elements not mentioned in the report are not   present in the microscopic examination.                Mode     SPONT           Mono #         0.6       Mono %         5.2       MPV         10.6       NITRITE UA   Negative             nRBC         0       Occult Blood UA   3+             Ovalocytes         Occasional       pH, UA   7.0              Platelets         264       POC BE     -19           POC HCO3     9.2           POC PCO2     25.7           POC PH     7.161           POC PO2     29           POC SATURATED O2     41           POC TCO2     10           Poikilocytosis         Slight       Poly         Occasional       Potassium         4.2       Preg Test, Ur Negative               Product Code       X0777A50  [P]                [id number]  [P]                A0314M60  [P]                W2635C89  [P]         PROTEIN TOTAL         7.0       Protein, UA   2+  Comment: Recommend a 24 hour urine protein or a urine   protein/creatinine ratio if globulin induced proteinuria is  clinically suspected.                Acceptable Yes               RBC         1.64       RBC, UA   >100             RDW         15.4       Rh Type       NEG         Sample     VENOUS           Sodium         134       Specific [place], UA   1.010             Specimen Outdate       05/01/2023 23:59         Specimen UA   Urine, Clean Catch             Troponin I         0.018  Comment: The reference interval for Troponin I represents the 99th percentile   cutoff   for our facility and is consistent with 3rd generation assay   performance.         UNIT NUMBER       [id number]  [P]                [id number]  [P]                [id number]  [P]                [id number]  [P]         WBC, UA   0             WBC         11.87                               [P] - Preliminary Result               Significant Imaging: I have reviewed all pertinent imaging results/findings within the past 24 hours.    Assessment/Plan:     * Anemia  Admit with hemoglobin 4.6 Baseline unknown, last Hgb per care everywhere in 5/2022 10.3  3u pRBC ordered in ED    Likely secondary to CKD; CT abdomen in ED without signs of bleeding.   Patient has chronic intermittent hematuria from PCKD  Denies melena, hematochezia    Lab Results   Component Value Date    HGB 4.6 (LL) 04/28/2023     -  Nephrology following with plan to start EPO; appreciate recs  - CBC q8hr  - Iron studies, vitamin b12, folate, peripheral smear pending  - Transfuse hgb <7          Basilar artery aneurysm  Due to hx of Polycystic kidney disease   Followed with Neurosurgery at [facility] prior to moving to [place]. Last noted 8 mm in April 2016      Hypertension  Patient reports taking metoprolol and being off/on antihypertensives for some time  Normotensive on admission  Will consider anti hypertensive's if clinically indicated      Chest pain  Patient reports few days of chest pain and LUEVANO. Denies radiation, diaphoresis. Resolved in ED after famotidine and fentanyl.     - Troponin in ED wnl  - EKG without signs of acute ischemia in ED      CKD (chronic kidney disease) stage 5, GFR less than 15 ml/min  Creatinine 17.8 on admit, last was 8 in May 2022 via care everywhere.     Plan:   Lab Results   Component Value Date    CREATININE 17.8 (H) 04/28/2023     - Nephrology following; appreciate recs  - Nephrology planning for HD tonight; trialysis line placed in ED  - Avoid nephrotoxic agents such as NSAIDs, gadolinium and IV radiocontrast.  - Renally dose meds to current GFR.  - Maintain MAP > 65.        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 Was previously followed by Nephrology at [facility] in [place]. Findings consistent with diagnosis on CT abdomen pelvis in ED.    - Nephrology following; appreciate recs  - RP u/s to further characterize findings on CT Abd/Pelvis        VTE Risk Mitigation (From admission, onward)         Ordered     IP VTE LOW RISK PATIENT  Once         04/28/23 1248     Place sequential compression device  Until discontinued         04/28/23 1248                           Ish Veronica DO  Department of Hospital Medicine  [facility] Dept

## 2023-04-28 NOTE — HPI
Ade Silva is a 43 yr old female with autosomal dominant polycystic kidney disease, CKD V, recurrent UTIs presented with hematuria and nausea/vomiting. Patient is new to [place], recently moved from [place] to live with her mother here a few weeks ago. She followed with Nephrology at [facility] and PCP (records in care everywhere). She initially started having hematuria 2 weeks ago, which is not new for her. Been told in the past when one of her renal cysts ruptures she has intermittent hematuria. Typically resolves in a few days, however this time it did not. She is still able to make a good amount of urine with her kidney disease. This morning presented to ED because she was having persistent nausea and vomiting. She endorses increased warmth when she urinates recently, which is consistent with past UTI that she has had. She also has emesis with the nausea/vomiting that is associated with chest discomfort she also reports exertional weakness, she is unable to walk across room. Has been consistently smoking cigarettes for about 28 years, has not used alcohol since age 27, smokes marijuana, but denies any other drug use. Only home medication she takes is metoprolol, but recently stopped taking it.    She presented to the ED afebrile and HDS on RA. CBC notable for Hgb 4.6 (10.5 in 2022 per care everywhere), Hct 14.5 with unknown baseline. CMP notable for Na 134, Agap 26, , Cr 17.8, GFR 2.3. Lipase 107. . Troponin wnl. UA with 2+ protein, 3+ occult blood, 2+ leukocytes, and >100 RBC. VBG pH 7.16 and pCO2 25.7, BE -19. EKG NSR with prolonged qtc (516). CT Abd Pelvis showed enlarged kidneys consistent with findings of polycystic kidney disease.    Admitted to hospital medicine acute on chronic renal failure and anemia

## 2023-04-28 NOTE — Clinical Note
Diagnosis: Chest pain [912942]   Admitting Provider:: MIK OROSCO [[id number]]   Future Attending Provider: MIK OROSCO [[id number]]   Reason for IP Medical Treatment  (Clinical interventions that can only be accomplished in the IP setting? ) :: Uremia   I certify that Inpatient services for greater than or equal to 2 midnights are medically necessary:: Yes   Plans for Post-Acute care--if anticipated (pick the single best option):: A. No post acute care anticipated at this time   Special Needs:: Dialysis - Hemodialysis [12]

## 2023-04-28 NOTE — ED TRIAGE NOTES
Pt c/o hematuria and weakness that began around a week ago. Pt states she normally ambulates without assistance at home. Pt also reporting nausea and vomitting. Pt states she took Zofran at home yesterday. Hx CKD without dialysis.

## 2023-04-28 NOTE — ASSESSMENT & PLAN NOTE
Calcium   Date Value Ref Range Status   04/28/2023 7.3 (L) 8.7 - 10.5 mg/dL Final     Please obtain a PTH, phos

## 2023-04-28 NOTE — LETTER
May 5, 2023                 [facility]  [address] [place]  [address]  Phone: [phone]  Fax: [phone] May 5, 2023     Patient: Ade Silva   YOB: 1980       To Whom It May Concern:    Ade Silva was admitted to the hospital on 4/28/2023  5:55 AM and discharged on 5/5/2023 18:14 .  She may return to work on 5/06/2023 . If you have any questions or concerns, please don't hesitate to call Dr. Bencel office at [phone].      Sincerely,        Iggy Bear RN  Department of Hospital Medicine

## 2023-04-28 NOTE — ASSESSMENT & PLAN NOTE
On admission hemoglobin of 4.6  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 CT abdomen without acute findings and on admission received 3 PRBCs    Iron stores adequate from PRBCs  Will need EPO initiation, 8k to start

## 2023-04-28 NOTE — ED NOTES
Assumed care of pt. Lying comfortably in bed w/ eyes open. Connected to monitor. NS infusing. Side rails up x2. AAOX4. NADN.

## 2023-04-28 NOTE — PROVIDER PROGRESS NOTES - EMERGENCY DEPT.
"Encounter Date: 4/28/2023    ED Physician Progress Notes          ED Resident HAND-OFF NOTE:  2:11 PM 4/28/2023  Ade Silva is a 43 y.o. female who presented to the ED on 4/28/2023 and has been managed by Dr. De Oliveira, who reports patient C/O hematuria. I assumed care of patient from off-going ED physician team at 2:11 PM pending repeat CBC.      On my exam, I appreciate:  /69 (BP Location: Right arm, Patient Position: Lying)   Pulse 75   Temp 97.9 °F (36.6 °C) (Oral)   Resp 18   Ht 5' 11" (1.803 m)   Wt 90.7 kg (200 lb)   SpO2 100%   BMI 27.89 kg/m²         Disposition: I anticipate patient will be admitted  I have discussed and counseled Ade Silva regarding exam, results, diagnosis, treatment, and plan.  ______________________  Dilan Ambrose MD   Emergency Medicine Resident  2:11 PM 4/28/2023      UPDATE:         Clinical Impression  :  Chest pain    "

## 2023-04-28 NOTE — MEDICAL/APP STUDENT
[facility] Medicine Student   History and Physical  Networked reference to record PCT   04/28/2023  2:35 PM    SUBJECTIVE:     Chief Complaint:  Hematuria    History of Present Illness:  Ms. Ade Silva is a 43 y.o. female with a PMH of Autosomal Dominant PCKD with brain aneurysm, and recurrent UTI's who presents with fatigue, weakness, nausea/vomiting, and leg cramps, as well as noting blood in her urine. Pt had been experiencing these symptoms for 2 weeks. Came to ED after work today after symptoms progressed and claims she almost passed out at work.     She denies any fevers. She also reports lower abdominal pain. She reports several episodes of nonbloody emesis associated with chest discomfort. She also reports exertional weakness and trouble walking across a room. Also reports trouble emptying her bladder and occasional leakage. She reports that she has had past episodes of blood in her urine that usually resolve after a few days. Believes they were due to rupture of renal cysts. Reports previously taking Metoprolol for hypertension but stop ~6 months ago.     Social hx - lives with her mother, recently moved to [facility] in early April from [place]. Previously had PCP and nephrology at [facility] in [place]. 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 Denies alcohol use. Smokes 1 ppd for 28 years. Denies illicit drug use.     ED course - Ill-appearing and uncomfortable on arrival. Labs notable for Hb 4.6, , Cr 17.8, pH 7.161, CO2 9, Lipase 107, Phos > 15. Nephrology consulted. Patient will undergo dialysis this evening.     Review of Systems   Constitutional:  Positive for malaise/fatigue.   HENT: Negative.     Eyes: Negative.    Respiratory:  Positive for wheezing.    Cardiovascular: Negative.    Gastrointestinal:  Positive for nausea and vomiting.   Genitourinary:  Positive for dysuria, flank pain and hematuria.   Musculoskeletal:  Positive for myalgias.   Skin: Negative.     Neurological: Negative.    Endo/Heme/Allergies: Negative.    Psychiatric/Behavioral: Negative.         HISTORY:     Past Medical History:   Diagnosis Date    Autosomal dominant polycystic kidney disease     Hypertension        History reviewed. No pertinent surgical history.    History reviewed. No pertinent family history.    Social History     Socioeconomic History    Marital status: Single   Tobacco Use    Smoking status: Every Day     Packs/day: 1.00     Years: 28.00     Pack years: 28.00     Types: Cigarettes   Substance and Sexual Activity    Alcohol use: Not Currently         MEDICATIONS & ALLERGIES:     No current facility-administered medications on file prior to encounter.     No current outpatient medications on file prior to encounter.       Review of patient's allergies indicates:   Allergen Reactions    Aspirin Hives    Penicillins Hives       OBJECTIVE:     Vital Signs Recent:  Temp: 97.7 °F (36.5 °C) (04/28/23 1401)  Pulse: 72 (04/28/23 1401)  Resp: 18 (04/28/23 1401)  BP: 125/68 (04/28/23 1401)  SpO2: 100 % (04/28/23 1401)  Oxygen Documentation:                Device (Oxygen Therapy): room air         Vital Signs Range (Last 24H):  Temp:  [97.6 °F (36.4 °C)-98.4 °F (36.9 °C)]   Pulse:  [69-98]   Resp:  [12-26]   BP: (119-133)/(67-89)   SpO2:  [96 %-100 %]        Weight:  Body mass index is 27.89 kg/m².  Wt Readings from Last 3 Encounters:   04/28/23 90.7 kg (200 lb)        Physical Exam  Constitutional:       Appearance: She is ill-appearing.   HENT:      Right Ear: External ear normal.      Left Ear: External ear normal.      Nose: Nose normal.   Cardiovascular:      Rate and Rhythm: Normal rate and regular rhythm.      Pulses: Normal pulses.      Heart sounds: Normal heart sounds.   Pulmonary:      Effort: Pulmonary effort is normal.      Breath sounds: Wheezing present.   Abdominal:      General: There is distension.      Tenderness: There is abdominal tenderness.   Musculoskeletal:          General: Normal range of motion.      Cervical back: Normal range of motion.   Skin:     General: Skin is warm and dry.   Neurological:      Mental Status: She is alert and oriented to person, place, and time. Mental status is at baseline.   Psychiatric:         Mood and Affect: Mood normal.         Behavior: Behavior normal.          Sodium (mmol/L)   Date Value   04/28/2023 134 (L)     Potassium (mmol/L)   Date Value   04/28/2023 4.2     Chloride (mmol/L)   Date Value   04/28/2023 99     CO2 (mmol/L)   Date Value   04/28/2023 9 (LL)     BUN (mg/dL)   Date Value   04/28/2023 191 (H)     Creatinine (mg/dL)   Date Value   04/28/2023 17.8 (H)     Glucose (mg/dL)   Date Value   04/28/2023 85     Calcium (mg/dL)   Date Value   04/28/2023 7.3 (L)     Magnesium (mg/dL)   Date Value   04/28/2023 2.1     Alkaline Phosphatase (U/L)   Date Value   04/28/2023 71     ALT (U/L)   Date Value   04/28/2023 8 (L)     AST (U/L)   Date Value   04/28/2023 9 (L)     Albumin (g/dL)   Date Value   04/28/2023 3.1 (L)     Total Protein (g/dL)   Date Value   04/28/2023 7.0     Total Bilirubin (mg/dL)   Date Value   04/28/2023 0.4       WBC (K/uL)   Date Value   04/28/2023 10.28   04/28/2023 11.87     Hemoglobin (g/dL)   Date Value   04/28/2023 5.4 (LL)   04/28/2023 4.6 (LL)     Platelets (K/uL)   Date Value   04/28/2023 236   04/28/2023 264       Diagnostic Results:      ASSESSMENT -- PLAN:   Ms. Ade Silva is a 43 y.o. female with a PMH of Autosomal Dominant PCKD with brain aneurysm, and recurrent UTI's who presents with fatigue, weakness, nausea/vomiting, and leg cramps, and hematuria, being treated for acute renal failure.    Active Hospital Problems    Diagnosis  POA    *Anemia [D64.9]  Yes    Polycystic kidney disease [Q61.3]  Not Applicable    CKD (chronic kidney disease) stage 5, GFR less than 15 ml/min [N18.5]  Yes    Chronic kidney disease-mineral and bone disorder [N18.9, E83.9, M89.9]  Yes    Chest pain [R07.9]  Unknown       Resolved Hospital Problems    Diagnosis Date Resolved POA    Urinary tract infection with hematuria [N39.0, R31.9] 04/28/2023 Unknown      PCKD  Presented with acute renal failure, , Cr 17.8, pH 7.161.    - iHD will start this evening 4/28 for 2 hours, second session tomorrow 4/29.  - Maintain MAP > 65, Hb > 7  - Monitor input/output  - CMP daily  - Avoid nephrotoxic agents, renally dose meds    Severe metabolic acidosis  - Consult nephrology, possibly start bicarb    Hyperphosphatemia  - Start sevalmer     Anemia  Hemoglobin of 4.6 on admission. Likely anemia of chronic disease and secondary to CKD.     - 2 units of PRBCs  - Nephrology consulted  - Need EPO initiation per nephrology, 4k to start    Dysuria  Pt reported on admission having difficulty emptying her bladder as well a noticing occasional leakage.    -US of bladder

## 2023-04-28 NOTE — ED NOTES
Dialysis contacted for ETA on when pt will be dialyzed; report she is scheduled for after 6 this evening.

## 2023-04-28 NOTE — HPI
43 year old female with a history of polycystic kidney disease, HTN, berry aneurysm sp coiling presents with nausea/fatigue and found to have a hemoglobin of 4.6, BUN of 191 with a CO2 of 6 with pH 7.161, alert and oriented. She recently moved from [place] where she last saw her nephrology prior a year ago. During that visit, she states that they had talked about dialysis, but she admits that she did not seek further care afterwards. Having come to [place] to be closer to family, she has applied for medicare and was seeking to establish here, however developed nausea/fatigue and presented to the ED.    She has been off of any medications for the last 6 months. She states that her whole paternal family had polycystic kidney disease and that her father had a kidney transplant that had failed in the past    Nephrology consulted for metabolic derangement

## 2023-04-28 NOTE — Clinical Note
The catheter was repositioned into the right atrium.  And catheter was tunneled under the skin in the right upper chest.

## 2023-04-28 NOTE — ASSESSMENT & PLAN NOTE
43 year old female presents with severe metabolic derangements in setting of CKD 5 from polycystic kidney disease    Consented for iHD and will start HD today 2 hours with R300 filter  Plan for iHD second session 4/29  Will need permacath placement  -Keep MAP > 65  -Keep hemoglobin > 7  -Strict ins and outs  -Avoid nephrotoxic agents if possible  -Avoid drastic hemodynamic changes if possible

## 2023-04-28 NOTE — PHARMACY MED REC
"  Admission Medication History     The home medication history was taken by Mert Kern.    You may go to "Admission" then "Reconcile Home Medications" tabs to review and/or act upon these items.     The home medication list has been updated by the Pharmacy department.   Please read ALL comments highlighted in yellow.   Please address this information as you see fit.    Feel free to contact us if you have any questions or require assistance.      Medications listed below were obtained from: Patient/family  Current Outpatient Medications on File Prior to Encounter   Medication Sig    acetaminophen (TYLENOL) 500 MG tablet Take 1,000-1,500 mg by mouth daily as needed for Pain.      calcium carbonate (TUMS ORAL)   Take 2 tablets by mouth daily as needed (Heartburn).    loratadine (CLARITIN ORAL) Take 1 tablet by mouth daily as needed (Allergies).      phenazopyridine HCl (AZO STANDARD ORAL) Take 2 tablets by mouth 2 (two) times daily as needed (UTI prevention).               Mert Kern  EXT [id number]                .        "

## 2023-04-28 NOTE — CARE UPDATE
RAPID RESPONSE NURSE PROACTIVE ROUNDING NOTE       Time of Visit: 13:15    Admit Date: 2023  LOS: 0  Code Status: Full Code   Date of Visit: 2023  : 1980  Age: 43 y.o.  Sex: female  Race: White  Bed: ED :   MRN: [id number]  Was the patient discharged from an ICU this admission? No   Was the patient discharged from a PACU within last 24 hours? No   Did the patient receive conscious sedation/general anesthesia in last 24 hours? No  Was the patient in the ED within the past 24 hours? No  Was the patient on NIPPV within the past 24 hours? No   Attending Physician: Leo Quiroz MD  Primary Service: Hospitalist   Time spent at the bedside: < 15 min    SITUATION    Notified by charge RN via phone call.  Reason for alert: anemia  Called to evaluate the patient for Circulatory    BACKGROUND     Why is the patient in the hospital?: Anemia    Patient has a past medical history of Autosomal dominant polycystic kidney disease and Hypertension.    Last Vitals:  Temp: 97.7 °F (36.5 °C) ( 1401)  Pulse: 72 ( 1401)  Resp: 18 ( 1401)  BP: 125/68 ( 1401)  SpO2: 100 % ( 1401)    24 Hours Vitals Range:  Temp:  [97.6 °F (36.4 °C)-98.4 °F (36.9 °C)]   Pulse:  [69-98]   Resp:  [12-26]   BP: (119-133)/(67-89)   SpO2:  [96 %-100 %]     Labs:  Recent Labs     23  0647 23  1329   WBC 11.87 10.   HGB 4.6* 5.4*   HCT 14.5* 17.4*    236       Recent Labs     23  0647   *   K 4.2   CL 99   CO2 9*   CREATININE 17.8*   GLU 85   MG 2.1        Recent Labs     23  0752   PH 7.161*   PCO2 25.7*   PO2 29*   HCO3 9.2*   POCSATURATED 41*   BE -19        ASSESSMENT    Physical Exam  Vitals and nursing note reviewed.   Constitutional:       General: She is awake.      Appearance: She is ill-appearing.      Comments: No overt signs of bleeding. VSS. /81. Receiving 2nd RBC   Cardiovascular:      Rate and Rhythm: Normal rate.   Pulmonary:      Effort: Pulmonary effort is  normal.   Skin:     Coloration: Skin is pale.   Neurological:      Mental Status: She is alert. Mental status is at baseline.       INTERVENTIONS    The patient was seen for Medical problem. Staff concerns included critical lab abnormality. The following interventions were performed: continuous pulse ox monitoring continued, continuous cardiac monitoring continued, and blood transfusion.    RECOMMENDATIONS    Patient is hemodynamically stable to transfer to SD unit.   Continue to monitor per unit protocol. Trend CBC.   Nephrology following.     PROVIDER ESCALATION    Yes/No  No    Orders received and case discussed with NA.    Disposition: Remain in room ED7.    FOLLOW-UP    Charge Dulce MOE  updated on plan of care. Instructed to call the Rapid Response Nurse, Rocio Gibbons RN at [phone] for additional questions or concerns.

## 2023-04-28 NOTE — ASSESSMENT & PLAN NOTE
Due to hx of Polycystic kidney disease   [place] [facility] [place]. Last noted 8 mm in April 2016

## 2023-04-28 NOTE — ASSESSMENT & PLAN NOTE
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 Was previously followed by Nephrology at [facility] in [place]. Findings consistent with diagnosis on CT abdomen pelvis in ED.    - Nephrology following; appreciate recs  - RP u/s to further characterize findings on CT Abd/Pelvis

## 2023-04-28 NOTE — CONSULTS
[facility] - Emergency Dept  Nephrology  Consult Note    Patient Name: Ade Silva  MRN: [id number]  Admission Date: 4/28/2023  Hospital Length of Stay: 0 days  Attending Provider: Leo Quiroz MD   Primary Care Physician: No primary care provider on file.  Principal Problem:<principal problem not specified>    Inpatient consult to Nephrology  Consult performed by: Babatunde Palacios MD  Consult ordered by: Syed De Oliveira MD        Subjective:     HPI: 43 year old female with a history of polycystic kidney disease, HTN, berry aneurysm sp coiling presents with nausea/fatigue and found to have a hemoglobin of 4.6, BUN of 191 with a CO2 of 6 with pH 7.161, alert and oriented. She recently moved from [place] where she last saw her nephrology prior a year ago. During that visit, she states that they had talked about dialysis, but she admits that she did not seek further care afterwards. Having come to [place] to be closer to family, she has applied for medicare and was seeking to establish here, however developed nausea/fatigue and presented to the ED.    She has been off of any medications for the last 6 months. She states that her whole paternal family had polycystic kidney disease and that her father had a kidney transplant that had failed in the past    Nephrology consulted for metabolic derangement      No past medical history on file.    No past surgical history on file.    Review of patient's allergies indicates:   Allergen Reactions    Aspirin Hives    Penicillins Hives     Current Facility-Administered Medications   Medication Frequency    0.9%  NaCl infusion (for blood administration) Q24H PRN    0.9%  NaCl infusion Once    acetaminophen tablet 650 mg Q4H PRN    dextrose 10% bolus 125 mL 125 mL PRN    dextrose 10% bolus 250 mL 250 mL PRN    dextrose 40 % gel 15,000 mg PRN    dextrose 40 % gel 30,000 mg PRN    glucagon (human recombinant) injection 1 mg PRN    LIDOcaine (PF) 10 mg/ml (1%) injection 100 mg  ED 1 Time    melatonin tablet 6 mg Nightly PRN    mupirocin 2 % ointment BID    naloxone 0.4 mg/mL injection 0.02 mg PRN    sodium chloride 0.9% bolus 250 mL 250 mL PRN    sodium chloride 0.9% flush 10 mL Q12H PRN     No current outpatient medications on file.     Family History    None       Tobacco Use    Smoking status: Not on file    Smokeless tobacco: Not on file   Substance and Sexual Activity    Alcohol use: Not on file    Drug use: Not on file    Sexual activity: Not on file     Review of Systems   Constitutional:  Positive for fatigue. Negative for chills and fever.   HENT:  Negative for rhinorrhea and sore throat.    Eyes:  Negative for pain and visual disturbance.   Respiratory:  Negative for cough and shortness of breath.    Cardiovascular:  Negative for chest pain and palpitations.   Gastrointestinal:  Positive for nausea. Negative for abdominal pain, constipation and diarrhea.   Endocrine: Negative for cold intolerance, heat intolerance and polyuria.   Genitourinary:  Negative for dysuria and flank pain.   Musculoskeletal:  Negative for back pain, gait problem and joint swelling.   Allergic/Immunologic: Negative for immunocompromised state.   Neurological:  Negative for light-headedness, numbness and headaches.   Objective:     Vital Signs (Most Recent):  Temp: 97.6 °F (36.4 °C) (04/28/23 1036)  Pulse: 76 (04/28/23 1236)  Resp: 18 (04/28/23 1314)  BP: 133/81 (04/28/23 1236)  SpO2: 100 % (04/28/23 1236) Vital Signs (24h Range):  Temp:  [97.6 °F (36.4 °C)-98.4 °F (36.9 °C)] 97.6 °F (36.4 °C)  Pulse:  [69-98] 76  Resp:  [12-26] 18  SpO2:  [96 %-100 %] 100 %  BP: (119-133)/(67-89) 133/81     Weight: 90.7 kg (200 lb) (04/28/23 0546)  Body mass index is 27.89 kg/m².  Body surface area is 2.13 meters squared.    No intake/output data recorded.    Physical Exam  Constitutional:       Appearance: She is well-developed.   HENT:      Head: Normocephalic and atraumatic.   Eyes:      Pupils: Pupils are equal,  round, and reactive to light.   Cardiovascular:      Rate and Rhythm: Normal rate and regular rhythm.      Heart sounds: Normal heart sounds.   Pulmonary:      Effort: Pulmonary effort is normal.      Breath sounds: Normal breath sounds.   Abdominal:      General: Bowel sounds are normal.      Palpations: Abdomen is soft. There is mass.      Tenderness: There is no abdominal tenderness.   Musculoskeletal:         General: Normal range of motion.      Cervical back: Normal range of motion and neck supple.   Skin:     General: Skin is warm and dry.      Capillary Refill: Capillary refill takes less than 2 seconds.   Neurological:      Mental Status: She is alert and oriented to person, place, and time.       Significant Labs:  All labs within the past 24 hours have been reviewed.    Significant Imaging:  Labs: Reviewed    Assessment/Plan:     Renal/  Chronic kidney disease-mineral and bone disorder  Calcium   Date Value Ref Range Status   04/28/2023 7.3 (L) 8.7 - 10.5 mg/dL Final     Please obtain a PTH, phos    CKD (chronic kidney disease) stage 5, GFR less than 15 ml/min  43 year old female presents with severe metabolic derangements in setting of CKD 5 from polycystic kidney disease    Consented for iHD and will start HD today 2 hours with R300 filter  Plan for iHD second session 4/29  Will need permacath placement  -Keep MAP > 65  -Keep hemoglobin > 7  -Strict ins and outs  -Avoid nephrotoxic agents if possible  -Avoid drastic hemodynamic changes if possible      Polycystic kidney disease  History of polycystic kidney disease with hx of berry aneurysm        Oncology  Anemia  On admission hemoglobin of 4.6  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 CT abdomen without acute findings and on admission received 3 PRBCs    Iron stores adequate from PRBCs  Will need EPO initiation, 4k to start        Thank you for your consult. I will follow-up with patient. Please contact us if you  have any additional questions.    Babatunde Palacios MD  Nephrology  [facility] - Emergency Dept

## 2023-04-28 NOTE — ASSESSMENT & PLAN NOTE
Admit with hemoglobin 4.6 Baseline unknown, last Hgb per care everywhere in 5/2022 10.3  3u pRBC ordered in ED    Likely secondary to CKD; CT abdomen in ED without signs of bleeding.   Patient has chronic intermittent hematuria from PCKD  Denies melena, hematochezia    Lab Results   Component Value Date    HGB 4.6 (LL) 04/28/2023     - Nephrology following with plan to start EPO; appreciate recs  - CBC q8hr  - Iron studies, vitamin b12, folate, peripheral smear pending  - Transfuse hgb <7

## 2023-04-28 NOTE — Clinical Note
Pre-existing dual lumen catheter was removed from the right internal jugular vein and manual pressure was applied.

## 2023-04-28 NOTE — ED PROVIDER NOTES
Encounter Date: 4/28/2023       History     Chief Complaint   Patient presents with    Hematuria     X 2 weeks with chest pain, nausea, leg cramps, and weakness. PMH CKD, not on dialysis yet.      43 y.o. female with polycystic kidney disease, CKD, recurrent UTIs presents for blood in her urine, nausea/vomiting, and weakness.  Patient's symptoms have been present for the past 2 weeks.  She has noticed dark red hematuria and has also had lower abdominal pain.  Patient has had several episodes of nonbloody emesis.  Her emesis is associated with chest discomfort she also reports exertional weakness, she is unable to walk across room.    The history is provided by the patient and medical records.   Review of patient's allergies indicates:   Allergen Reactions    Aspirin Hives    Penicillins Hives     Past Medical History:   Diagnosis Date    Autosomal dominant polycystic kidney disease     Hypertension      Past Surgical History:   Procedure Laterality Date    NO PAST SURGERIES       History reviewed. No pertinent family history.  Social History     Tobacco Use    Smoking status: Every Day     Packs/day: 1.00     Years: 28.00     Pack years: 28.00     Types: Cigarettes   Substance Use Topics    Alcohol use: Not Currently     Review of Systems   Reason unable to perform ROS: See HPI for relevant ROS.     Physical Exam     Initial Vitals [04/28/23 0546]   BP Pulse Resp Temp SpO2   120/72 98 16 98.4 °F (36.9 °C) 97 %      MAP       --         Physical Exam    Nursing note and vitals reviewed.  Constitutional:   Alert, uncomfortable appearing, speaking full sentences   HENT:   Head: Normocephalic and atraumatic.   Eyes: Conjunctivae are normal. No scleral icterus.   Cardiovascular:  Normal rate, regular rhythm and intact distal pulses.           Pulmonary/Chest: Breath sounds normal. No stridor. No respiratory distress.   Abdominal:   Abdomen soft and nondistended, moderate lower abdominal tenderness and tenderness to  percussion   Musculoskeletal:         General: No tenderness or edema.     Neurological: She is alert and oriented to person, place, and time.   Skin: Skin is warm and dry. No rash noted.       ED Course   Central Line    Date/Time: 4/28/2023 2:45 PM  Performed by: Syed De Oliveira MD  Authorized by: Aleyda Mccord MD     Location procedure was performed:  [facility] EMERGENCY DEPARTMENT  Consent Done ?:  Yes  Time out complete?: Verified correct patient, procedure, equipment, staff, and site/side    Indications:  Hemodialysis  Anesthesia:  Local infiltration  Local anesthetic:  Lidocaine 1% without epinephrine  Anesthetic total (ml):  8  Preparation:  Skin prepped with ChloraPrep  Skin prep agent dried: Skin prep agent completely dried prior to procedure    Sterile barriers: All five maximal sterile barriers used - gloves, gown, cap, mask and large sterile sheet    Hand hygiene: Hand hygiene performed immediately prior to central venous catheter insertion    Location:  Right internal jugular  Catheter type:  Dialysis  Catheter size:  13 Fr  Ultrasound guidance: Yes    Vessel Caliber:  Large   patent  Comprressibility:  Normal  Needle advanced into vessel with real time ultrasound guidance.    Guidewire confirmed in vessel.    Image recorded and saved.    Steril sheath on probe.    Sterile gel used.  Manometry: No    Number of attempts:  1  Securement:  Line sutured, chlorhexidine patch, sterile dressing applied and blood return through all ports  XRay:  Placement verified by x-ray, no pneumothorax on x-ray, tip termination and successful placement  Adverse Events:  NoneTermination Site: superior vena cava  Labs Reviewed   CBC W/ AUTO DIFFERENTIAL - Abnormal; Notable for the following components:       Result Value    RBC 1.64 (*)     Hemoglobin 4.6 (*)     Hematocrit 14.5 (*)     MCHC 31.7 (*)     RDW 15.4 (*)     Gran # (ANC) 10.3 (*)     Immature Grans (Abs) 0.05 (*)     Lymph # 0.9 (*)     Gran % 86.4 (*)     Lymph % 7.5  (*)     All other components within normal limits    Narrative:     Syed De Oliveira MD acknowledged and accepted results on test(s) HGB/HCT   via secure chat.   by TLA 04/28/2023 07:49   COMPREHENSIVE METABOLIC PANEL - Abnormal; Notable for the following components:    Sodium 134 (*)     CO2 9 (*)      (*)     Creatinine 17.8 (*)     Calcium 7.3 (*)     Albumin 3.1 (*)     AST 9 (*)     ALT 8 (*)     Anion Gap 26 (*)     eGFR 2.3 (*)     All other components within normal limits   LIPASE - Abnormal; Notable for the following components:    Lipase 107 (*)     All other components within normal limits   URINALYSIS, REFLEX TO URINE CULTURE - Abnormal; Notable for the following components:    Color, UA Red (*)     Protein, UA 2+ (*)     Occult Blood UA 3+ (*)     Leukocytes, UA 2+ (*)     All other components within normal limits    Narrative:     Specimen Source->Urine   B-TYPE NATRIURETIC PEPTIDE - Abnormal; Notable for the following components:     (*)     All other components within normal limits   URINALYSIS MICROSCOPIC - Abnormal; Notable for the following components:    RBC, UA >100 (*)     All other components within normal limits    Narrative:     Specimen Source->Urine   CBC W/ AUTO DIFFERENTIAL - Abnormal; Notable for the following components:    RBC 1.87 (*)     Hemoglobin 5.4 (*)     Hematocrit 17.4 (*)     MCHC 31.0 (*)     RDW 16.7 (*)     Immature Granulocytes 1.1 (*)     Gran # (ANC) 8.8 (*)     Immature Grans (Abs) 0.11 (*)     Gran % 85.5 (*)     Lymph % 9.3 (*)     Mono % 3.9 (*)     All other components within normal limits    Narrative:     H&H critical result(s) called and verbal readback obtained from Marie Nolan RN  by JC8 04/28/2023 14:14   ISTAT PROCEDURE - Abnormal; Notable for the following components:    POC PH 7.161 (*)     POC PCO2 25.7 (*)     POC PO2 29 (*)     POC HCO3 9.2 (*)     POC SATURATED O2 41 (*)     POC TCO2 10 (*)     All other components within normal  limits   GRAM STAIN   GRAM STAIN    Narrative:     ADD ON GRAM AS PER DR. KENISHA JACOBS 12:55  04/28/2023    CULTURE, BLOOD   CULTURE, BLOOD   HIV 1 / 2 ANTIBODY    Narrative:     Release to patient->Immediate   HEPATITIS C ANTIBODY    Narrative:     Release to patient->Immediate   TROPONIN I   MAGNESIUM   LACTIC ACID, PLASMA   PROTIME-INR   PTH, INTACT   MICROALBUMIN / CREATININE RATIO URINE   COMPREHENSIVE METABOLIC PANEL   MAGNESIUM   PHOSPHORUS   IRON AND TIBC   FERRITIN   VITAMIN B12   FOLATE   CBC W/ AUTO DIFFERENTIAL   POCT URINE PREGNANCY   TYPE & SCREEN   RH TYPING   ISTAT CHEM8   PREPARE RBC SOFT     EKG Readings: (Independently Interpreted)   Sinus rhythm, regular, narrow complex, rate of 96, prolonged QT (500), no STEMI, no previous available for comparison   ECG Results              EKG 12-lead (Final result)  Result time 04/28/23 11:55:00      Final result by Interface, Lab In [facility] (04/28/23 11:55:00)                   Narrative:    Test Reason : R07.9,    Vent. Rate : 086 BPM     Atrial Rate : 086 BPM     P-R Int : 140 ms          QRS Dur : 078 ms      QT Int : 432 ms       P-R-T Axes : 084 047 064 degrees     QTc Int : 516 ms    Normal sinus rhythm  Possible Left atrial enlargement  Prolonged QT  Abnormal ECG  When compared with ECG of 28-APR-2023 05:49,  No significant change was found  Confirmed by Dorothea Olivera MD ([id number]) on 4/28/2023 11:54:55 AM    Referred By: CHATO   SELF           Confirmed By:Dorothea Olivera MD                                     EKG 12-lead (Final result)  Result time 04/28/23 11:55:40      Final result by Interface, Lab In [facility] (04/28/23 11:55:40)                   Narrative:    Test Reason : R07.9,    Vent. Rate : 096 BPM     Atrial Rate : 096 BPM     P-R Int : 134 ms          QRS Dur : 080 ms      QT Int : 396 ms       P-R-T Axes : 082 041 053 degrees     QTc Int : 500 ms    Normal sinus rhythm  Biatrial enlargement  Prolonged QT  Abnormal ECG  When compared  with ECG of 09-JAN-2007 05:13,  Sinus rhythm has replaced Electronic ventricular pacemaker  Confirmed by Dorothea Olivera MD ([id number]) on 4/28/2023 11:55:28 AM    Referred By: [email or web address]   SELF           Confirmed By:Dorothea Olivera MD                                  Imaging Results              X-Ray Chest AP Portable (Final result)  Result time 04/28/23 12:46:10      Final result by George Gupta MD (04/28/23 12:46:10)                   Impression:      No significant intrathoracic abnormality, with no detrimental interval change in the appearance of the chest since 04/28/2023 at 07:36 observed.  No post procedure pneumothorax, with vascular catheter placement as above.      Electronically signed by: George Gupta MD  Date:    04/28/2023  Time:    12:46               Narrative:    EXAMINATION:  XR CHEST AP PORTABLE    CLINICAL HISTORY:  s/p R IJ trialysis line placement;    TECHNIQUE:  One view    COMPARISON:  Comparison is made to 04/28/2023 at 07:36.    FINDINGS:  Vascular catheter is now seen, entering from a right jugular approach and having its tip in the superior vena cava.  No pneumothorax.  Heart size remains normal, as does the appearance of the pulmonary vascularity.  Lung zones continue clear, and are free of significant airspace consolidation or volume loss.  No pleural fluid.  No hilar or mediastinal mass lesion.                                       CT Abdomen Pelvis  Without Contrast (Final result)  Result time 04/28/23 10:24:54      Final result by Partha Flood Jr., MD (04/28/23 10:24:54)                   Impression:      1. Enlarged kidneys with innumerable cystic foci throughout, nearly all of which demonstrate fluid-level attenuation however several demonstrate intermediate-level attenuation and/or punctate calcifications.  Findings are consistent with reported polycystic kidney disease.  Recommend retroperitoneal ultrasound for additional characterization of intermediate-attenuating foci.  2.  Renal parenchyma is incompletely evaluated secondary to lack of intravenous contrast and innumerable cystic foci. Bladder is incompletely evaluated secondary to lack of intravenous contrast and distension.  Retroperitoneal ultrasound will also help characterize in setting of concern for pyelonephritis/urinary tract infection.  3. Periaortic lymphadenopathy.  Finding is nonspecific and may be reactive.  4. Numerous hypodensities throughout the hepatic parenchyma consistent with polycystic disease.  5. Approximately 3.9 x 2.7 cm soft tissue attenuation left labia.  Correlation with physical exam will be helpful.    Electronically signed by resident: Washington Neri  Date:    04/28/2023  Time:    09:51    Electronically signed by: Partha Flood MD  Date:    04/28/2023  Time:    10:24               Narrative:    EXAMINATION:  CT ABDOMEN PELVIS WITHOUT CONTRAST    CLINICAL HISTORY:  Abdominal pain, acute, nonlocalized;    TECHNIQUE:  Low dose axial images, sagittal and coronal reformations were obtained from the lung bases to the pubic symphysis without intravenous contrast.   Oral contrast was not administered.    COMPARISON:  None.    FINDINGS:  LUNG BASES & MEDIASTINUM (limited):  Visualized heart is normal in size.  No pericardial effusion.  No pleural effusion.    HEPATOBILIARY: Liver is normal in size.  Numerous scattered well-demarcated hypodensities with the largest on the right measuring 3.7 cm (axial series 2, image 23) and the largest on the left measuring 4.6 cm (axial series 2, image 48).No biliary ductal dilatation.Normal gallbladder.    SPLEEN:  No splenomegaly.    PANCREAS:  No focal masses or ductal dilatation.    ADRENALS:  No adrenal nodules.    KIDNEYS/URETERS: Enlarged kidneys bilaterally measuring 21.3 cm on the right and 22.2 cm in the left with innumerable hypodense foci throughout the renal parenchyma nearly all of which demonstrate fluid-level attenuation however several demonstrate  "indeterminate-level attenuation (e.g., axial series 2, image 123) and/or contained punctate calcifications (e.g., axial series 2, image 92).  No hydronephrosis.  Ureters are difficult to completely visualize however the visualized portions are decompressed and unremarkable.    PELVIC ORGANS/BLADDER:  Bladder is decompressed.  Uterus is unremarkable.  No adnexal masses.    PERITONEUM / RETROPERITONEUM:  No free air. No free fluid.    LYMPH NODES:  Periaortic lymphadenopathy measuring up to 1.4 cm (axial series 2, image 79).    VESSELS:  Aorta tapers normally.    GI TRACT: Distal esophagus and stomach are unremarkable.  Small bowel is normal in caliber without inflammation or obstruction.  Normal appendix.  No colonic distension or wall thickening.    BONES AND SOFT TISSUES: Approximater 3.9 x 2.7 cm soft tissue attenuation in the left labia.  Levocurvature of the lumbar spine.  Degenerative change at L5-S1.  No acute fractures.  No aggressive osseous lesions.                                       X-Ray Chest AP Portable (Final result)  Result time 04/28/23 07:58:23      Final result by Asad Stokes MD (04/28/23 07:58:23)                   Impression:      No acute cardiopulmonary finding identified on this single view.      Electronically signed by: Asad Stokes MD  Date:    04/28/2023  Time:    07:58               Narrative:    EXAMINATION:  XR CHEST AP PORTABLE    CLINICAL HISTORY:  Provided history is "  Chest pain, unspecified".    TECHNIQUE:  One view of the chest.    COMPARISON:  None.    FINDINGS:  Cardiac wires overlie the chest.  Cardiomediastinal silhouette is magnified by portable technique but not felt to be significantly enlarged.  Lung volumes are relatively low, accentuating basilar markings.  No confluent area of consolidation.  No large pleural effusion.  No pneumothorax.                                       Medications   0.9%  NaCl infusion (for blood administration) (has no administration " in time range)   mupirocin 2 % ointment (has no administration in time range)   0.9%  NaCl infusion (has no administration in time range)   sodium chloride 0.9% bolus 250 mL 250 mL (has no administration in time range)   sodium chloride 0.9% flush 10 mL (has no administration in time range)   naloxone 0.4 mg/mL injection 0.02 mg (has no administration in time range)   glucagon (human recombinant) injection 1 mg (has no administration in time range)   dextrose 10% bolus 125 mL 125 mL (has no administration in time range)   dextrose 10% bolus 250 mL 250 mL (has no administration in time range)   dextrose 40 % gel 15,000 mg (has no administration in time range)   dextrose 40 % gel 30,000 mg (has no administration in time range)   acetaminophen tablet 650 mg (has no administration in time range)   melatonin tablet 6 mg (has no administration in time range)   epoetin kranthi-epbx injection 4,540 Units (has no administration in time range)   famotidine (PF) injection 20 mg (20 mg Intravenous Given 4/28/23 0900)   ondansetron injection 4 mg (4 mg Intravenous Given 4/28/23 0834)   LIDOcaine (PF) 10 mg/ml (1%) injection 100 mg (100 mg Infiltration Given by Provider 4/28/23 0945)   fentaNYL 50 mcg/mL injection 25 mcg (25 mcg Intravenous Given 4/28/23 1014)   fentaNYL 50 mcg/mL injection 25 mcg (25 mcg Intravenous Given 4/28/23 1314)     Medical Decision Making:   History:   Old Medical Records: I decided to obtain old medical records.  Old Records Summarized: records from clinic visits and records from previous admission(s).  Initial Assessment:   43 y.o. female with polycystic kidney disease, CKD, recurrent UTIs presents for blood in her urine, nausea/vomiting, and weakness  Patient is hemodynamically stable and alert on arrival  EKG without ischemic changes  Differentials include metabolic derangement, THEODORE, UTI, pyelonephritis, gastritis, esophagitis, pneumonia, ACS, kidney stone, symptomatic anemia  Patient with multiple  symptoms including vomiting, abdominal pain, generalized weakness, concerning for metabolic derangement in context of CKD  Patient does have a history of a cerebral aneurysm, Patient is alert and oriented, no focal deficits, no headache or neck pain  Patient reports some hematuria, however there is no evidence of hemodynamically significant bleeding on history and exam  Independently Interpreted Test(s):   I have ordered and independently interpreted EKG Reading(s) - see prior notes  Clinical Tests:   Lab Tests: Ordered and Reviewed  Radiological Study: Ordered and Reviewed  Medical Tests: Ordered and Reviewed  Other:   I have discussed this case with another health care provider.       <> Summary of the Discussion: Discussed with Nephrology and critical care           ED Course as of 04/28/23 1448   Fri Apr 28, 2023   0748 X-Ray Chest AP Portable [AA]   0814 Hemoglobin(!!): 4.6 [OK]   0814 BUN(!): 191 [OK]   0814 Creatinine(!): 17.8 [OK]   0814 CO2(!!): 9 [OK]   0814 POC PH(!): 7.161 [OK]   0815 Lipase(!): 107 [OK]   0843 Patient found to be markedly uremic and acidemic.  Discussed with Nephrology who will initiate dialysis [OK]   1406 Patient admitted to hospital medicine [OK]      ED Course User Index  [AA] Aleyda Mccord MD  [OK] Syed De Oliveira MD                 Clinical Impression:   Final diagnoses:  [R07.9] Chest pain  [N19] Uremia (Primary)  [D64.9] Symptomatic anemia  [E87.20] Acidosis        ED Disposition Condition    Admit Stable                Syed De Oliveira MD  Resident  04/28/23 1273

## 2023-04-28 NOTE — ASSESSMENT & PLAN NOTE
Patient reports taking metoprolol and being off/on antihypertensives for some time  Normotensive on admission  Will consider anti hypertensive's if clinically indicated     Z Plasty Text: The lesion was extirpated to the level of the fat with a #15 scalpel blade.  Given the location of the defect, shape of the defect and the proximity to free margins a Z-plasty was deemed most appropriate for repair.  Using a sterile surgical marker, the appropriate transposition arms of the Z-plasty were drawn incorporating the defect and placing the expected incisions within the relaxed skin tension lines where possible.    The area thus outlined was incised deep to adipose tissue with a #15 scalpel blade.  The skin margins were undermined to an appropriate distance in all directions utilizing iris scissors.  The opposing transposition arms were then transposed into place in opposite direction and anchored with interrupted buried subcutaneous sutures.

## 2023-04-28 NOTE — Clinical Note
The right chest and right neck was prepped. The site was prepped with ChloraPrep and Betadine. The patient was draped. The patient was positioned supine.

## 2023-04-28 NOTE — ASSESSMENT & PLAN NOTE
Creatinine 17.8 on admit, last was 8 in May 2022 via care everywhere.     Plan:   Lab Results   Component Value Date    CREATININE 17.8 (H) 04/28/2023     - Nephrology following; appreciate recs  - Nephrology planning for HD tonight; trialysis line placed in ED  - Avoid nephrotoxic agents such as NSAIDs, gadolinium and IV radiocontrast.  - Renally dose meds to current GFR.  - Maintain MAP > 65.

## 2023-04-28 NOTE — Clinical Note
The catheter was secured in place in the right atrium.  And sutured in the right internal jugular vein.

## 2023-04-28 NOTE — SUBJECTIVE & OBJECTIVE
No past medical history on file.    No past surgical history on file.    Review of patient's allergies indicates:   Allergen Reactions    Aspirin Hives    Penicillins Hives     Current Facility-Administered Medications   Medication Frequency    0.9%  NaCl infusion (for blood administration) Q24H PRN    0.9%  NaCl infusion Once    acetaminophen tablet 650 mg Q4H PRN    dextrose 10% bolus 125 mL 125 mL PRN    dextrose 10% bolus 250 mL 250 mL PRN    dextrose 40 % gel 15,000 mg PRN    dextrose 40 % gel 30,000 mg PRN    glucagon (human recombinant) injection 1 mg PRN    LIDOcaine (PF) 10 mg/ml (1%) injection 100 mg ED 1 Time    melatonin tablet 6 mg Nightly PRN    mupirocin 2 % ointment BID    naloxone 0.4 mg/mL injection 0.02 mg PRN    sodium chloride 0.9% bolus 250 mL 250 mL PRN    sodium chloride 0.9% flush 10 mL Q12H PRN     No current outpatient medications on file.     Family History    None       Tobacco Use    Smoking status: Not on file    Smokeless tobacco: Not on file   Substance and Sexual Activity    Alcohol use: Not on file    Drug use: Not on file    Sexual activity: Not on file     Review of Systems   Constitutional:  Positive for fatigue. Negative for chills and fever.   HENT:  Negative for rhinorrhea and sore throat.    Eyes:  Negative for pain and visual disturbance.   Respiratory:  Negative for cough and shortness of breath.    Cardiovascular:  Negative for chest pain and palpitations.   Gastrointestinal:  Positive for nausea. Negative for abdominal pain, constipation and diarrhea.   Endocrine: Negative for cold intolerance, heat intolerance and polyuria.   Genitourinary:  Negative for dysuria and flank pain.   Musculoskeletal:  Negative for back pain, gait problem and joint swelling.   Allergic/Immunologic: Negative for immunocompromised state.   Neurological:  Negative for light-headedness, numbness and headaches.   Objective:     Vital Signs (Most Recent):  Temp: 97.6 °F (36.4 °C) (04/28/23  1036)  Pulse: 76 (04/28/23 1236)  Resp: 18 (04/28/23 1314)  BP: 133/81 (04/28/23 1236)  SpO2: 100 % (04/28/23 1236) Vital Signs (24h Range):  Temp:  [97.6 °F (36.4 °C)-98.4 °F (36.9 °C)] 97.6 °F (36.4 °C)  Pulse:  [69-98] 76  Resp:  [12-26] 18  SpO2:  [96 %-100 %] 100 %  BP: (119-133)/(67-89) 133/81     Weight: 90.7 kg (200 lb) (04/28/23 0546)  Body mass index is 27.89 kg/m².  Body surface area is 2.13 meters squared.    No intake/output data recorded.    Physical Exam  Constitutional:       Appearance: She is well-developed.   HENT:      Head: Normocephalic and atraumatic.   Eyes:      Pupils: Pupils are equal, round, and reactive to light.   Cardiovascular:      Rate and Rhythm: Normal rate and regular rhythm.      Heart sounds: Normal heart sounds.   Pulmonary:      Effort: Pulmonary effort is normal.      Breath sounds: Normal breath sounds.   Abdominal:      General: Bowel sounds are normal.      Palpations: Abdomen is soft. There is mass.      Tenderness: There is no abdominal tenderness.   Musculoskeletal:         General: Normal range of motion.      Cervical back: Normal range of motion and neck supple.   Skin:     General: Skin is warm and dry.      Capillary Refill: Capillary refill takes less than 2 seconds.   Neurological:      Mental Status: She is alert and oriented to person, place, and time.       Significant Labs:  All labs within the past 24 hours have been reviewed.    Significant Imaging:  Labs: Reviewed

## 2023-04-28 NOTE — ASSESSMENT & PLAN NOTE
Patient reports few days of chest pain and LUEVANO. Denies radiation, diaphoresis. Resolved in ED after famotidine and fentanyl.     - Troponin in ED wnl  - EKG without signs of acute ischemia in ED

## 2023-04-28 NOTE — ASSESSMENT & PLAN NOTE
On admission hemoglobin of 4.6  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 CT abdomen without acute findings and on admission received 3 PRBCs    Iron stores adequate from PRBCs  Will need EPO initiation, 4k to start

## 2023-04-28 NOTE — PROVIDER PROGRESS NOTES - EMERGENCY DEPT.
Encounter Date: 4/28/2023    ED Physician Progress Notes          Physician Attestation Statement for Resident:  As the supervising MD   Physician Attestation Statement: I have personally seen and examined this patient.       I agree with the history unless otherwise noted.     As the supervising MD I agree with the PE unless otherwise noted.      I have reviewed and agree with the residents interpretation of the following unless otherwise noted:   I have personally reviewed and interpreted the patients laboratory studies:  Remarkable for metabolic acidemia, creatinine 17, anion gap 26, potassium within normal limits, hemoglobin for  I have personally reviewed and interpreted imaging studies:  Chest x-ray without evidence of pulmonary edema on my evaluation  I have personally reviewed and interpreted the patient's EKG:  Sinus tachycardia without peaked T-waves, no ischemia, no old for comparison      I have also reviewed the following:   old records at this facility:  Patient has not previously been treated in our system, patient states she has a history of polycystic kidney disease and was previously treated in [place]    As the supervising MD I agree with the treatment, course, plan, and disposition unless otherwise noted.    Critical Care   Date: 04/28/2023  Performed by: Aleyda Mccord MD   Authorized by: Aleyda Mccord MD      Total critical care time (exclusive of procedural time) : 45 minutes, exclusive of separately billable procedures and treating other patients and teaching time.    Critical care was necessary to treat or prevent imminent or life-threatening deterioration of the following conditions:  Acidemia, acute renal failure    Due to a high probability of clinically significant, life threatening deterioration, the patient required my highest level of preparedness to intervene emergently and I personally spent this critical care time directly and personally managing the patient. This critical care  time included obtaining a history; examining the patient; pulse oximetry; ordering and review of studies; arranging urgent treatment with development of a management plan; evaluation of patient's response to treatment; frequent reassessment, documentation, and, discussions with other providers, patient.

## 2023-04-29 PROBLEM — N17.9 ACUTE RENAL FAILURE SUPERIMPOSED ON STAGE 5 CHRONIC KIDNEY DISEASE, NOT ON CHRONIC DIALYSIS: Status: ACTIVE | Noted: 2023-04-29

## 2023-04-29 PROBLEM — R31.0 GROSS HEMATURIA: Status: ACTIVE | Noted: 2023-04-29

## 2023-04-29 PROBLEM — R31.9 HEMATURIA: Status: ACTIVE | Noted: 2023-04-29

## 2023-04-29 PROBLEM — N18.5 ACUTE RENAL FAILURE SUPERIMPOSED ON STAGE 5 CHRONIC KIDNEY DISEASE, NOT ON CHRONIC DIALYSIS: Status: ACTIVE | Noted: 2023-04-29

## 2023-04-29 LAB
ALBUMIN SERPL BCP-MCNC: 2.7 G/DL (ref 3.5–5.2)
ALP SERPL-CCNC: 67 U/L (ref 55–135)
ALP SERPL-CCNC: 72 U/L (ref 55–135)
ALP SERPL-CCNC: 77 U/L (ref 55–135)
ALT SERPL W/O P-5'-P-CCNC: 6 U/L (ref 10–44)
ALT SERPL W/O P-5'-P-CCNC: 6 U/L (ref 10–44)
ALT SERPL W/O P-5'-P-CCNC: 7 U/L (ref 10–44)
ANION GAP SERPL CALC-SCNC: 15 MMOL/L (ref 8–16)
ANION GAP SERPL CALC-SCNC: 15 MMOL/L (ref 8–16)
ANION GAP SERPL CALC-SCNC: 16 MMOL/L (ref 8–16)
AST SERPL-CCNC: 7 U/L (ref 10–40)
AST SERPL-CCNC: 7 U/L (ref 10–40)
AST SERPL-CCNC: 8 U/L (ref 10–40)
BASOPHILS # BLD AUTO: 0.02 K/UL (ref 0–0.2)
BASOPHILS # BLD AUTO: 0.02 K/UL (ref 0–0.2)
BASOPHILS # BLD AUTO: 0.04 K/UL (ref 0–0.2)
BASOPHILS NFR BLD: 0.3 % (ref 0–1.9)
BASOPHILS NFR BLD: 0.3 % (ref 0–1.9)
BASOPHILS NFR BLD: 0.4 % (ref 0–1.9)
BILIRUB SERPL-MCNC: 0.5 MG/DL (ref 0.1–1)
BILIRUB SERPL-MCNC: 0.6 MG/DL (ref 0.1–1)
BILIRUB SERPL-MCNC: 0.7 MG/DL (ref 0.1–1)
BUN SERPL-MCNC: 109 MG/DL (ref 6–20)
BUN SERPL-MCNC: 112 MG/DL (ref 6–20)
BUN SERPL-MCNC: 115 MG/DL (ref 6–20)
CALCIUM SERPL-MCNC: 7.4 MG/DL (ref 8.7–10.5)
CALCIUM SERPL-MCNC: 7.5 MG/DL (ref 8.7–10.5)
CALCIUM SERPL-MCNC: 7.5 MG/DL (ref 8.7–10.5)
CHLORIDE SERPL-SCNC: 106 MMOL/L (ref 95–110)
CHLORIDE SERPL-SCNC: 106 MMOL/L (ref 95–110)
CHLORIDE SERPL-SCNC: 107 MMOL/L (ref 95–110)
CO2 SERPL-SCNC: 16 MMOL/L (ref 23–29)
CO2 SERPL-SCNC: 16 MMOL/L (ref 23–29)
CO2 SERPL-SCNC: 18 MMOL/L (ref 23–29)
CREAT SERPL-MCNC: 10.6 MG/DL (ref 0.5–1.4)
CREAT SERPL-MCNC: 11.2 MG/DL (ref 0.5–1.4)
CREAT SERPL-MCNC: 9.9 MG/DL (ref 0.5–1.4)
DIFFERENTIAL METHOD: ABNORMAL
EOSINOPHIL # BLD AUTO: 0 K/UL (ref 0–0.5)
EOSINOPHIL # BLD AUTO: 0 K/UL (ref 0–0.5)
EOSINOPHIL # BLD AUTO: 0.1 K/UL (ref 0–0.5)
EOSINOPHIL NFR BLD: 0.3 % (ref 0–8)
EOSINOPHIL NFR BLD: 0.4 % (ref 0–8)
EOSINOPHIL NFR BLD: 0.7 % (ref 0–8)
ERYTHROCYTE [DISTWIDTH] IN BLOOD BY AUTOMATED COUNT: 15.4 % (ref 11.5–14.5)
ERYTHROCYTE [DISTWIDTH] IN BLOOD BY AUTOMATED COUNT: 15.5 % (ref 11.5–14.5)
ERYTHROCYTE [DISTWIDTH] IN BLOOD BY AUTOMATED COUNT: 15.6 % (ref 11.5–14.5)
EST. GFR  (NO RACE VARIABLE): 4 ML/MIN/1.73 M^2
EST. GFR  (NO RACE VARIABLE): 4.3 ML/MIN/1.73 M^2
EST. GFR  (NO RACE VARIABLE): 4.6 ML/MIN/1.73 M^2
GLUCOSE SERPL-MCNC: 109 MG/DL (ref 70–110)
GLUCOSE SERPL-MCNC: 112 MG/DL (ref 70–110)
GLUCOSE SERPL-MCNC: 86 MG/DL (ref 70–110)
HCT VFR BLD AUTO: 21.1 % (ref 37–48.5)
HCT VFR BLD AUTO: 22.9 % (ref 37–48.5)
HCT VFR BLD AUTO: 23.7 % (ref 37–48.5)
HGB BLD-MCNC: 7.1 G/DL (ref 12–16)
HGB BLD-MCNC: 7.5 G/DL (ref 12–16)
HGB BLD-MCNC: 7.9 G/DL (ref 12–16)
IMM GRANULOCYTES # BLD AUTO: 0.03 K/UL (ref 0–0.04)
IMM GRANULOCYTES # BLD AUTO: 0.04 K/UL (ref 0–0.04)
IMM GRANULOCYTES # BLD AUTO: 0.06 K/UL (ref 0–0.04)
IMM GRANULOCYTES NFR BLD AUTO: 0.4 % (ref 0–0.5)
IMM GRANULOCYTES NFR BLD AUTO: 0.6 % (ref 0–0.5)
IMM GRANULOCYTES NFR BLD AUTO: 0.7 % (ref 0–0.5)
LYMPHOCYTES # BLD AUTO: 0.4 K/UL (ref 1–4.8)
LYMPHOCYTES # BLD AUTO: 0.7 K/UL (ref 1–4.8)
LYMPHOCYTES # BLD AUTO: 0.8 K/UL (ref 1–4.8)
LYMPHOCYTES NFR BLD: 6.2 % (ref 18–48)
LYMPHOCYTES NFR BLD: 8.8 % (ref 18–48)
LYMPHOCYTES NFR BLD: 9.4 % (ref 18–48)
MAGNESIUM SERPL-MCNC: 1.8 MG/DL (ref 1.6–2.6)
MCH RBC QN AUTO: 28.3 PG (ref 27–31)
MCH RBC QN AUTO: 28.7 PG (ref 27–31)
MCH RBC QN AUTO: 29.3 PG (ref 27–31)
MCHC RBC AUTO-ENTMCNC: 32.8 G/DL (ref 32–36)
MCHC RBC AUTO-ENTMCNC: 33.3 G/DL (ref 32–36)
MCHC RBC AUTO-ENTMCNC: 33.6 G/DL (ref 32–36)
MCV RBC AUTO: 86 FL (ref 82–98)
MCV RBC AUTO: 86 FL (ref 82–98)
MCV RBC AUTO: 87 FL (ref 82–98)
MONOCYTES # BLD AUTO: 0.3 K/UL (ref 0.3–1)
MONOCYTES # BLD AUTO: 0.4 K/UL (ref 0.3–1)
MONOCYTES # BLD AUTO: 0.7 K/UL (ref 0.3–1)
MONOCYTES NFR BLD: 4.5 % (ref 4–15)
MONOCYTES NFR BLD: 6.5 % (ref 4–15)
MONOCYTES NFR BLD: 7.5 % (ref 4–15)
NEUTROPHILS # BLD AUTO: 5.9 K/UL (ref 1.8–7.7)
NEUTROPHILS # BLD AUTO: 6 K/UL (ref 1.8–7.7)
NEUTROPHILS # BLD AUTO: 7.4 K/UL (ref 1.8–7.7)
NEUTROPHILS NFR BLD: 82.2 % (ref 38–73)
NEUTROPHILS NFR BLD: 84.5 % (ref 38–73)
NEUTROPHILS NFR BLD: 86.3 % (ref 38–73)
NRBC BLD-RTO: 0 /100 WBC
PHOSPHATE SERPL-MCNC: 7.7 MG/DL (ref 2.7–4.5)
PLATELET # BLD AUTO: 197 K/UL (ref 150–450)
PLATELET # BLD AUTO: 205 K/UL (ref 150–450)
PLATELET # BLD AUTO: 216 K/UL (ref 150–450)
PMV BLD AUTO: 10.1 FL (ref 9.2–12.9)
PMV BLD AUTO: 10.3 FL (ref 9.2–12.9)
PMV BLD AUTO: 9.6 FL (ref 9.2–12.9)
POTASSIUM SERPL-SCNC: 3.3 MMOL/L (ref 3.5–5.1)
POTASSIUM SERPL-SCNC: 3.8 MMOL/L (ref 3.5–5.1)
POTASSIUM SERPL-SCNC: 4 MMOL/L (ref 3.5–5.1)
PROT SERPL-MCNC: 6 G/DL (ref 6–8.4)
PROT SERPL-MCNC: 6.1 G/DL (ref 6–8.4)
PROT SERPL-MCNC: 6.3 G/DL (ref 6–8.4)
RBC # BLD AUTO: 2.42 M/UL (ref 4–5.4)
RBC # BLD AUTO: 2.65 M/UL (ref 4–5.4)
RBC # BLD AUTO: 2.75 M/UL (ref 4–5.4)
SODIUM SERPL-SCNC: 138 MMOL/L (ref 136–145)
SODIUM SERPL-SCNC: 138 MMOL/L (ref 136–145)
SODIUM SERPL-SCNC: 139 MMOL/L (ref 136–145)
WBC # BLD AUTO: 6.82 K/UL (ref 3.9–12.7)
WBC # BLD AUTO: 7.05 K/UL (ref 3.9–12.7)
WBC # BLD AUTO: 8.96 K/UL (ref 3.9–12.7)

## 2023-04-29 PROCEDURE — 85025 COMPLETE CBC W/AUTO DIFF WBC: CPT | Mod: 91 | Performed by: STUDENT IN AN ORGANIZED HEALTH CARE EDUCATION/TRAINING PROGRAM

## 2023-04-29 PROCEDURE — 85025 COMPLETE CBC W/AUTO DIFF WBC: CPT | Mod: 91

## 2023-04-29 PROCEDURE — 25000003 PHARM REV CODE 250: Performed by: STUDENT IN AN ORGANIZED HEALTH CARE EDUCATION/TRAINING PROGRAM

## 2023-04-29 PROCEDURE — 80053 COMPREHEN METABOLIC PANEL: CPT | Mod: 91

## 2023-04-29 PROCEDURE — 84100 ASSAY OF PHOSPHORUS: CPT

## 2023-04-29 PROCEDURE — 80053 COMPREHEN METABOLIC PANEL: CPT

## 2023-04-29 PROCEDURE — 99233 PR SUBSEQUENT HOSPITAL CARE,LEVL III: ICD-10-PCS | Mod: ,,, | Performed by: STUDENT IN AN ORGANIZED HEALTH CARE EDUCATION/TRAINING PROGRAM

## 2023-04-29 PROCEDURE — 20600001 HC STEP DOWN PRIVATE ROOM

## 2023-04-29 PROCEDURE — 99222 1ST HOSP IP/OBS MODERATE 55: CPT | Mod: ,,, | Performed by: UROLOGY

## 2023-04-29 PROCEDURE — 25000003 PHARM REV CODE 250

## 2023-04-29 PROCEDURE — 99222 PR INITIAL HOSPITAL CARE,LEVL II: ICD-10-PCS | Mod: ,,, | Performed by: UROLOGY

## 2023-04-29 PROCEDURE — 83735 ASSAY OF MAGNESIUM: CPT

## 2023-04-29 PROCEDURE — 99233 SBSQ HOSP IP/OBS HIGH 50: CPT | Mod: ,,, | Performed by: STUDENT IN AN ORGANIZED HEALTH CARE EDUCATION/TRAINING PROGRAM

## 2023-04-29 PROCEDURE — 36415 COLL VENOUS BLD VENIPUNCTURE: CPT

## 2023-04-29 RX ORDER — POTASSIUM CHLORIDE 750 MG/1
30 CAPSULE, EXTENDED RELEASE ORAL ONCE
Status: COMPLETED | OUTPATIENT
Start: 2023-04-29 | End: 2023-04-29

## 2023-04-29 RX ORDER — SIMETHICONE 80 MG
1 TABLET,CHEWABLE ORAL 3 TIMES DAILY PRN
Status: DISCONTINUED | OUTPATIENT
Start: 2023-04-29 | End: 2023-05-05 | Stop reason: HOSPADM

## 2023-04-29 RX ADMIN — SIMETHICONE 80 MG: 80 TABLET, CHEWABLE ORAL at 08:04

## 2023-04-29 RX ADMIN — SEVELAMER CARBONATE 1600 MG: 800 TABLET, FILM COATED ORAL at 06:04

## 2023-04-29 RX ADMIN — SEVELAMER CARBONATE 1600 MG: 800 TABLET, FILM COATED ORAL at 08:04

## 2023-04-29 RX ADMIN — SEVELAMER CARBONATE 1600 MG: 800 TABLET, FILM COATED ORAL at 12:04

## 2023-04-29 RX ADMIN — POTASSIUM CHLORIDE 30 MEQ: 10 CAPSULE, COATED, EXTENDED RELEASE ORAL at 06:04

## 2023-04-29 RX ADMIN — Medication 6 MG: at 01:04

## 2023-04-29 RX ADMIN — ACETAMINOPHEN 650 MG: 325 TABLET ORAL at 01:04

## 2023-04-29 RX ADMIN — LIDOCAINE 2 PATCH: 50 PATCH TOPICAL at 06:04

## 2023-04-29 NOTE — ASSESSMENT & PLAN NOTE
Ade Silva is a 43 y.o. female with PCKD who presents with gross hematuria.     - Consult d/w Staff Urologist  - Strict I's/O's  - Trend Cr, avoid nephrotoxic agents, and dose medications to patient's current GFR  - Gross hematuria likely ruptured renal cysts, though patient will need a formal gross hematuria w/u and f/u of her renal cysts at [facility]  - No indications for urologic interventions at this time  - Will f/u PVR to ensure she is emptying her bladder well  - If her hgb continues to drop, recommend consideration of possible CTA and IR ablation of renal cyst if shown to have persistent bleeding, though not recommenced at this time given clinical stability   - All remaining care per primary team

## 2023-04-29 NOTE — ASSESSMENT & PLAN NOTE
Creatinine 17.8 on admit, last was 8 in May 2022 via care everywhere.     Plan:   Lab Results   Component Value Date    CREATININE 10.6 (H) 04/29/2023     - Nephrology following; appreciate recs  - Nephrology planning for HD tonight; trialysis line placed in ED  - Avoid nephrotoxic agents such as NSAIDs, gadolinium and IV radiocontrast.  - Renally dose meds to current GFR.  - Maintain MAP > 65.

## 2023-04-29 NOTE — ASSESSMENT & PLAN NOTE
Patient reports taking metoprolol and being off/on antihypertensives for some time  Normotensive on admission  Will consider anti hypertensive's if clinically indicated

## 2023-04-29 NOTE — ASSESSMENT & PLAN NOTE
Admit with hemoglobin 4.6 Baseline unknown, last Hgb per care everywhere in 5/2022 10.3  3u pRBC ordered in ED  Likely secondary to CKD and hematuria; CT abdomen in ED without signs of bleeding.   Patient has chronic intermittent hematuria from PCKD, although has not resolved the past 2 weeks  Gross hematuria continuing in hospital (photo in media tab)  Denies melena, hematochezia    Lab Results   Component Value Date    HGB 7.9 (L) 04/29/2023     - Nephrology following with plan to start EPO; appreciate recs  - Urology consulted for hematuria; appreciate recs  - CBC q8hr  - Transfuse hgb <7

## 2023-04-29 NOTE — ASSESSMENT & PLAN NOTE
"See "Acute renal failure superimposed on stage 5 chronic kidney disease, not on chronic dialysis"  "

## 2023-04-29 NOTE — ASSESSMENT & PLAN NOTE
Patient reports few days of chest pain and LUEVANO. Denies radiation, diaphoresis. Resolved in ED after famotidine and fentanyl. Further improved after blood transfusion complete.    - Troponin in ED wnl  - EKG without signs of acute ischemia in ED

## 2023-04-29 NOTE — MEDICAL/APP STUDENT
Hospital Medicine Student   Progress Note  Networked reference to record PCT     Admit Date: 4/28/2023  Hospital Day: 1 04/29/2023  1:00 PM    History of Present Illness:  Ms. Ade Silva is a 43 y.o. female with a PMH of Autosomal Dominant PCKD with brain aneurysm, and recurrent UTI's who presents with fatigue, weakness, nausea/vomiting, and leg cramps, as well as noting blood in her urine. Pt had been experiencing these symptoms for 2 weeks. Came to ED after work today after symptoms progressed and claims she almost passed out at work.      She denies any fevers. She also reports lower abdominal pain. She reports several episodes of nonbloody emesis associated with chest discomfort. She also reports exertional weakness and trouble walking across a room. Also reports trouble emptying her bladder and occasional leakage. She reports that she has had past episodes of blood in her urine that usually resolve after a few days. Believes they were due to rupture of renal cysts. Reports previously taking Metoprolol for hypertension but stop ~6 months ago.      Social hx - lives with her mother, recently moved to [facility] in early April from [place]. Previously had PCP and nephrology at [facility] in [place]. 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 Denies alcohol use. Smokes 1 ppd for 28 years. Denies illicit drug use.      ED course - Ill-appearing and uncomfortable on arrival. Labs notable for Hb 4.6, , Cr 17.8, pH 7.161, CO2 9, Lipase 107, Phos > 15. Nephrology consulted. Patient will undergo dialysis this evening.        SUBJECTIVE:   Ms. Ade Silva is a 43 y.o. female with a PMH of Autosomal Dominant PCKD with brain aneurysm, and recurrent UTI's who presents with fatigue, weakness, nausea/vomiting, and leg cramps, as well as noting blood in her urine.    Interval history:  NAEON. Admitted to hospital medicine on 4/28. Underwent hemodialysis on 4/28. Pt reports continuing hematuria  and severe left flank pain.     Review of Systems   Constitutional: Negative.    HENT: Negative.     Eyes: Negative.    Respiratory:  Positive for wheezing.    Cardiovascular: Negative.    Gastrointestinal:  Positive for nausea.   Genitourinary:  Positive for dysuria and hematuria.   Musculoskeletal: Negative.    Skin: Negative.    Neurological: Negative.    Endo/Heme/Allergies: Negative.    Psychiatric/Behavioral: Negative.       Please refer to the H&P for past medical, family, and social history.    OBJECTIVE:     Vital Signs Recent:  Temp: 99 °F (37.2 °C) (04/29/23 1237)  Pulse: 85 (04/29/23 1237)  Resp: 16 (04/29/23 1237)  BP: 117/72 (04/29/23 1237)  SpO2: (!) 93 % (04/29/23 1237)  Oxygen Documentation:                Device (Oxygen Therapy): room air         Vital Signs Range (Last 24H):  Temp:  [97.7 °F (36.5 °C)-99 °F (37.2 °C)]   Pulse:  [56-85]   Resp:  [16-20]   BP: ()/(58-80)   SpO2:  [93 %-100 %]        I & O (Last 24H):  Intake/Output Summary (Last 24 hours) at 4/29/2023 1300  Last data filed at 4/29/2023 0844  Gross per 24 hour   Intake 1427.58 ml   Output 1400 ml   Net 27.58 ml        Physical Exam:  Physical Exam  Constitutional:       Appearance: She is ill-appearing.   HENT:      Head: Normocephalic.      Right Ear: External ear normal.      Left Ear: External ear normal.      Mouth/Throat:      Mouth: Mucous membranes are moist.   Cardiovascular:      Rate and Rhythm: Normal rate and regular rhythm.      Pulses: Normal pulses.      Heart sounds: Normal heart sounds.   Pulmonary:      Effort: Pulmonary effort is normal.      Breath sounds: Wheezing present.   Abdominal:      General: There is distension.      Tenderness: There is abdominal tenderness.   Musculoskeletal:         General: Normal range of motion.   Skin:     General: Skin is warm and dry.   Neurological:      Mental Status: She is alert and oriented to person, place, and time. Mental status is at baseline.   Psychiatric:          Mood and Affect: Mood normal.         Behavior: Behavior normal.       Labs:   Recent Labs   Lab 04/28/23  0647 04/28/23  1329 04/28/23  1640 04/29/23  0040 04/29/23  0527 04/29/23  0903   * 136 136 139  --  138   K 4.2 4.6 4.3 3.3*  --  4.0   CL 99 103 103 106  --  107   CO2 9* 9* 10* 18*  --  16*   * 181* 179* 115*  --  109*   CREATININE 17.8* 16.3* 16.5* 9.9*  --  10.6*   GLU 85 82 158* 112*  --  86   CALCIUM 7.3* 7.3* 7.1* 7.5*  --  7.4*   MG 2.1 2.0  --   --  1.8  --    PHOS  --  >15.0*  --   --  7.7*  --      Recent Labs   Lab 04/28/23  1329 04/28/23  1640 04/29/23  0040 04/29/23  0903   ALKPHOS 67 65 77 67   ALT 6* 7* 6* 7*   AST 8* 11 7* 7*   ALBUMIN 2.9* 2.7* 2.7* 2.7*   PROT 6.4 6.2 6.1 6.0   BILITOT 0.4 0.5 0.7 0.6   INR 1.0  --   --   --      Recent Labs   Lab 04/28/23  [facility] 04/29/23  0040 04/29/23  1047   WBC 9.72 7.05 6.82   HGB 6.1* 7.1* 7.9*   HCT 19.0* 21.1* 23.7*    197 205       Diagnostic Results:  Bladder/Kidney US - Bilateral enlarged kidneys, most parenchyma replaced by cysts. 3 solid heterogenous masses in left kidney w/ internal vascularity concerning for renal masses.     Scheduled Meds:   sodium chloride 0.9%   Intravenous Once    epoetin [id number]  50 Units/kg Subcutaneous Once    LIDOcaine  2 patch Transdermal Q24H    mupirocin   Nasal BID    sevelamer carbonate  1,600 mg Oral TID WM     Continuous Infusions:  PRN Meds:sodium chloride, acetaminophen, dextrose 10%, dextrose 10%, dextrose, dextrose, glucagon (human recombinant), melatonin, naloxone, sodium chloride 0.9%, sodium chloride 0.9%    ASSESSMENT/PLAN:   Ms. Ade Silva is a 43 y.o. female with a PMH of Autosomal Dominant PCKD with brain aneurysm, and recurrent UTI's who presents with fatigue, weakness, nausea/vomiting, and leg cramps, as well as noting blood in her urine.    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  8 mm (4/19/2016)          Resolved Hospital Problems    Diagnosis Date Resolved POA    Urinary tract infection with hematuria [N39.0, R31.9] 04/28/2023 Unknown     Acute renal failure  Presented with acute renal failure, , Cr 17.8, pH 7.161.     - iHD will start this evening 4/28 for 2 hours, second session tomorrow 4/29.  - Maintain MAP > 65, Hb > 7  - Monitor input/output  - CMP daily  - Avoid nephrotoxic agents, renally dose meds     Hyperphosphatemia  - On sevalmer 1600 mg tid     Anemia  Hemoglobin of 4.6 on admission. Likely anemia of chronic disease and secondary to CKD. Improved to 7.1 on 4/29 am.      - 3 units of PRBCs on 4/28  - Nephrology consulted  - Initiate EPO 4540 units on 4/29  - CBC Q8H  - Will consult nephro/urology if Hb does not improve for possible bleeding     Renal masses  4/28 renal US showed 3 left sided heterogenous renal masses.     -Consulted urology   -Consider MRI per radiology recommendation for further investigation    Hypertension  Patient reports taking metoprolol and being off/on antihypertensives for some time  Normotensive on admission  Will consider anti hypertensive's if clinically indicated       Chest pain  Patient reports few days of chest pain and LUEVANO. Denies radiation, diaphoresis. Resolved in ED after famotidine and fentanyl.      - Troponin in ED wnl  - EKG without signs of acute ischemia in ED    Dysuria  Pt reported on admission having difficulty emptying her bladder as well a noticing occasional leakage.     -US 4/28 showed bladder partially distended but unremarkable  appearance    PCKD  Hx of autosomal dominant PCKD with berry aneurysm. Previously followed by nephrology at [facility] in [place]. Findings consistent with diagnoses on CT abdomen pelvis in ED.    -Nephrology following  -US results above

## 2023-04-29 NOTE — PLAN OF CARE
Problem: Adult Inpatient Plan of Care  Goal: Plan of Care Review  Outcome: Ongoing, Progressing  Goal: Patient-Specific Goal (Individualized)  Outcome: Ongoing, Progressing     Problem: Device-Related Complication Risk (Hemodialysis)  Goal: Safe, Effective Therapy Delivery  Outcome: Ongoing, Progressing     Problem: Hemodynamic Instability (Hemodialysis)  Goal: Effective Tissue Perfusion  Outcome: Ongoing, Progressing     Problem: Infection (Hemodialysis)  Goal: Absence of Infection Signs and Symptoms  Outcome: Ongoing, Progressing

## 2023-04-29 NOTE — PROGRESS NOTES
Hemodialysis treatment completed. Blood returned. Dialyzed patient for 2 hours with no fluid removal as per MD orders. Patient tolerated treatment well.    Right temporary IJ CVC saline locked, lumens capped.    Report given to primary nurse

## 2023-04-29 NOTE — HPI
Ade Silva is a 43 yr old female with autosomal dominant polycystic kidney disease, CKD V, recurrent UTIs presented with hematuria and nausea/vomiting. Patient is new to [place], recently moved from [place] to live with her mother. Urology is consulted for gross hematuria with concerning lesions of CT scan within the left kidney. At bedside, patient is AFVSS, WBC 6.8, Hgb 7.9 from 4 (s/p 3u pRBC), THEODORE (baseline Cr 4), now on HD. CT and WAYLON show severe PCKD bilaterally with concerning lesions for possible solid components on the left kidney, hydronephrosis not obvious. Patient denies fmhx of  malignancies and states she does have a 28py hx of smoking.

## 2023-04-29 NOTE — PROGRESS NOTES
[facility] - Intensive Care ([address] Medicine  Progress Note    Patient Name: Ade Silva  MRN: [id number]  Patient Class: IP- Inpatient   Admission Date: 4/28/2023  Length of Stay: 1 days  Attending Physician: Leo Quiroz MD  Primary Care Provider: Primary Doctor No        Subjective:     Principal Problem:Acute renal failure superimposed on stage 5 chronic kidney disease, not on chronic dialysis        HPI:  Ade Silva is a 43 yr old female with autosomal dominant polycystic kidney disease, CKD V, recurrent UTIs presented with hematuria and nausea/vomiting. Patient is new to [place], recently moved from [place] to live with her mother here a few weeks ago. She followed with Nephrology at [facility] and PCP (records in care everywhere). She initially started having hematuria 2 weeks ago, which is not new for her. Been told in the past when one of her renal cysts ruptures she has intermittent hematuria. Typically resolves in a few days, however this time it did not. She is still able to make a good amount of urine with her kidney disease. This morning presented to ED because she was having persistent nausea and vomiting. She endorses increased warmth when she urinates recently, which is consistent with past UTI that she has had. She also has emesis with the nausea/vomiting that is associated with chest discomfort she also reports exertional weakness, she is unable to walk across room. Has been consistently smoking cigarettes for about 28 years, has not used alcohol since age 27, smokes marijuana, but denies any other drug use. Only home medication she takes is metoprolol, but recently stopped taking it.    She presented to the ED afebrile and HDS on RA. CBC notable for Hgb 4.6 (10.5 in 2022 per care everywhere), Hct 14.5 with unknown baseline. CMP notable for Na 134, Agap 26, , Cr 17.8, GFR 2.3. Lipase 107. . Troponin wnl. UA with 2+ protein, 3+ occult blood, 2+ leukocytes, and >100  "RBC. VBG pH 7.16 and pCO2 25.7, BE -19. EKG NSR with prolonged qtc (516). CT Abd Pelvis showed enlarged kidneys consistent with findings of polycystic kidney disease.    Admitted to hospital medicine acute on chronic renal failure and anemia       Overview/Hospital Course:  Admitted to hospital medicine for acute kidney failure on CKD not on chronic dialysis. Hgb on admission 4.6 likely due to CKD and 2 weeks of gross hematuria prior to presentation. Received 3u pRBC with appropriate response on CBC. Right IJV trialysis placed. Nephrology consulted. Electrolytes and uremia improved. RPUS showed enlarged polycystic kidneys with majority of the normal renal parenchyma replaced by simple and minimally complex cysts as well as 3 solid heterogeneous appearing lesions within the left kidney with internal vascularity. Urology was consulted for findings on RPUS and continued gross dark hematuria while in hospital.       Past Medical History:   Diagnosis Date    Autosomal dominant polycystic kidney disease     Hypertension        Past Surgical History:   Procedure Laterality Date    NO PAST SURGERIES         Review of patient's allergies indicates:   Allergen Reactions    Aspirin Hives    Nickel     Penicillins Hives    Adhesive Rash     Adhesive/silk tape (type found on band aides). Can only use "Paper Tape"    Butalbital-acetaminophen-caff Nausea And Vomiting and Other (See Comments)     Dizziness (made pt feel sick)    Latex, natural rubber Rash       No current facility-administered medications on file prior to encounter.     Current Outpatient Medications on File Prior to Encounter   Medication Sig    acetaminophen (TYLENOL) 500 MG tablet Take 1,000-1,500 mg by mouth daily as needed for Pain.    calcium carbonate (TUMS ORAL) Take 2 tablets by mouth daily as needed (Heartburn).    loratadine (CLARITIN ORAL) Take 1 tablet by mouth daily as needed (Allergies).    phenazopyridine HCl (AZO STANDARD ORAL) Take 2 " tablets by mouth 2 (two) times daily as needed (UTI prevention).     Family History    None       Tobacco Use    Smoking status: Every Day     Packs/day: 1.00     Years: 28.00     Pack years: 28.00     Types: Cigarettes    Smokeless tobacco: Not on file   Substance and Sexual Activity    Alcohol use: Not Currently    Drug use: Not on file    Sexual activity: Not on file     Review of Systems   Constitutional:  Positive for activity change and fatigue. Negative for chills and fever.   HENT:  Negative for hearing loss, mouth sores, nosebleeds, sneezing and trouble swallowing.    Eyes:  Negative for photophobia and visual disturbance.   Respiratory:  Positive for shortness of breath. Negative for cough.    Cardiovascular:  Positive for chest pain and leg swelling. Negative for palpitations.   Gastrointestinal:  Positive for abdominal distention, abdominal pain, nausea and vomiting. Negative for blood in stool and constipation.   Genitourinary:  Positive for flank pain, genital sores and hematuria. Negative for decreased urine volume, difficulty urinating, dysuria and frequency.   Musculoskeletal:  Positive for neck pain.   Skin:  Negative for wound.   Neurological:  Positive for weakness. Negative for dizziness, speech difficulty and headaches.   Psychiatric/Behavioral:  Negative for agitation, confusion and sleep disturbance.    Objective:     Vital Signs (Most Recent):  Temp: 99 °F (37.2 °C) (04/29/23 1237)  Pulse: 85 (04/29/23 1237)  Resp: 16 (04/29/23 1237)  BP: 117/72 (04/29/23 1237)  SpO2: (!) 93 % (04/29/23 1237) Vital Signs (24h Range):  Temp:  [97.7 °F (36.5 °C)-99 °F (37.2 °C)] 99 °F (37.2 °C)  Pulse:  [56-85] 85  Resp:  [16-20] 16  SpO2:  [93 %-100 %] 93 %  BP: ()/(58-80) 117/72     Weight: 90.7 kg (200 lb)  Body mass index is 28.7 kg/m².    Physical Exam  Vitals and nursing note reviewed. Exam conducted with a chaperone present.   Constitutional:       General: She is not in acute distress.      Appearance: Normal appearance. She is ill-appearing.   HENT:      Head: Normocephalic and atraumatic.      Nose: Nose normal.      Mouth/Throat:      Mouth: Mucous membranes are moist.   Eyes:      General: No scleral icterus.     Extraocular Movements: Extraocular movements intact.      Conjunctiva/sclera: Conjunctivae normal.   Cardiovascular:      Rate and Rhythm: Normal rate and regular rhythm.      Pulses: Normal pulses.      Heart sounds: Normal heart sounds.   Pulmonary:      Effort: Pulmonary effort is normal.      Breath sounds: Wheezing present.   Abdominal:      General: There is distension.      Tenderness: There is abdominal tenderness. There is right CVA tenderness and left CVA tenderness. There is no guarding.   Genitourinary:     Labia:         Left: Lesion (small nonbleeding cyst) present.       Comments: Photo of hematuria in chart 4/28  Musculoskeletal:      Cervical back: Normal range of motion.      Right lower leg: No edema.      Left lower leg: No edema.   Skin:     General: Skin is warm.   Neurological:      General: No focal deficit present.      Mental Status: She is alert and oriented to person, place, and time.   Psychiatric:         Mood and Affect: Mood normal.         Behavior: Behavior normal.         Thought Content: Thought content normal.         Judgment: Judgment normal.           Significant Labs: All pertinent labs within the past 24 hours have been reviewed.  Recent Lab Results  (Last 5 results in the past 24 hours)        04/29/23  1047   04/29/23  0903   04/29/23  0527   04/29/23  0040   04/28/23  1644        Albumin   2.7     2.7         Alkaline Phosphatase   67     77         ALT   7     6         Anion Gap   15     15         AST   7     7         Baso # 0.02       0.02         Basophil % 0.3       0.3         BILIRUBIN TOTAL   0.6  Comment: For infants and newborns, interpretation of results should be based  on gestational age, weight and in agreement with  clinical  observations.    Premature Infant recommended reference ranges:  Up to 24 hours.............<8.0 mg/dL  Up to 48 hours............<12.0 mg/dL  3-5 days..................<15.0 mg/dL  6-29 days.................<15.0 mg/dL       0.7  Comment: For infants and newborns, interpretation of results should be based  on gestational age, weight and in agreement with clinical  observations.    Premature Infant recommended reference ranges:  Up to 24 hours.............<8.0 mg/dL  Up to 48 hours............<12.0 mg/dL  3-5 days..................<15.0 mg/dL  6-29 days.................<15.0 mg/dL           BUN   109     115         Calcium   7.4     7.5         Chloride   107     106         CO2   16     18         Creatinine   10.6     9.9         Creatinine, Urine         45.0       Differential Method Automated       Automated         eGFR   4.3     4.6         Eos # 0.0       0.1         Eosinophil % 0.3       0.7         Glucose   86     112         Gran # (ANC) 5.9       6.0         Gran % 86.3       84.5         Hematocrit 23.7       21.1         Hemoglobin 7.9       7.1         Immature Grans (Abs) 0.03  Comment: Mild elevation in immature granulocytes is non specific and   can be seen in a variety of conditions including stress response,   acute inflammation, trauma and pregnancy. Correlation with other   laboratory and clinical findings is essential.         0.04  Comment: Mild elevation in immature granulocytes is non specific and   can be seen in a variety of conditions including stress response,   acute inflammation, trauma and pregnancy. Correlation with other   laboratory and clinical findings is essential.           Immature Granulocytes 0.4       0.6         Lymph # 0.4       0.7         Lymph % 6.2       9.4         Magnesium     1.8           MCH 28.7       29.3         MCHC 33.3       33.6         MCV 86       87         MICROALB/CREAT RATIO         6773.3       Urine Microalbumin         3048.0        Mono # 0.4       0.3         Mono % 6.5       4.5         MPV 10.3       10.1         nRBC 0       0         Phosphorus     7.7           Platelets 205       197         Potassium   4.0     3.3         PROTEIN TOTAL   6.0     6.1         RBC 2.75       2.42         RDW 15.5       15.4         Sodium   138     139         WBC 6.82       7.05                                Significant Imaging: I have reviewed all pertinent imaging results/findings within the past 24 hours.      Assessment/Plan:      * Acute renal failure superimposed on stage 5 chronic kidney disease, not on chronic dialysis    Creatinine 17.8 on admit, last was 8 in May 2022 via care everywhere.     Plan:   Lab Results   Component Value Date    CREATININE 10.6 (H) 04/29/2023     - Nephrology following; appreciate recs  - Nephrology planning for HD tonight; trialysis line placed in ED  - Avoid nephrotoxic agents such as NSAIDs, gadolinium and IV radiocontrast.  - Renally dose meds to current GFR.  - Maintain MAP > 65.        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 Last noted 8 mm in April 2016      Hypertension  Patient reports taking metoprolol and being off/on antihypertensives for some time  Normotensive on admission  Will consider anti hypertensive's if clinically indicated      Chest pain  Patient reports few days of chest pain and LUEVANO. Denies radiation, diaphoresis. Resolved in ED after famotidine and fentanyl. Further improved after blood transfusion complete.    - Troponin in ED wnl  - EKG without signs of acute ischemia in ED      Anemia  Admit with hemoglobin 4.6 Baseline unknown, last Hgb per care everywhere in 5/2022 10.3  3u pRBC ordered in ED  Likely secondary to CKD and hematuria; CT abdomen in ED without signs of bleeding.   Patient has chronic intermittent hematuria from PCKD, although has not resolved the past  "2 weeks  Gross hematuria continuing in hospital (photo in media tab)  Denies melena, hematochezia    Lab Results   Component Value Date    HGB 7.9 (L) 04/29/2023     - Nephrology following with plan to start EPO; appreciate recs  - Urology consulted for hematuria; appreciate recs  - CBC q8hr  - Transfuse hgb <7          CKD (chronic kidney disease) stage 5, GFR less than 15 ml/min  See "Acute renal failure superimposed on stage 5 chronic kidney disease, not on chronic dialysis"    Polycystic kidney disease  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 Was previously followed by Nephrology at [facility] in [place]. Findings consistent with diagnosis on CT abdomen pelvis in ED.  RP u/s with enlarged polycystic kidneys with majority of the normal renal parenchyma replaced by simple and minimally complex cysts.There are 3 solid heterogeneous appearing lesions within the left kidney with internal vascularity    - Urology consulted; appreciate recs  - Nephrology following; appreciate recs      VTE Risk Mitigation (From admission, onward)         Ordered     IP VTE LOW RISK PATIENT  Once         04/28/23 1248     Place sequential compression device  Until discontinued         04/28/23 1248                Discharge Planning   NAVJOT: 5/11/2023     Code Status: Full Code   Is the patient medically ready for discharge?: No    Reason for patient still in hospital (select all that apply): Patient trending condition and Consult recommendations                     Ish Veronica DO  Department of Hospital Medicine   [facility] - Intensive Care ([place])    "

## 2023-04-29 NOTE — ASSESSMENT & PLAN NOTE
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 Was previously followed by Nephrology at [facility] in [place]. Findings consistent with diagnosis on CT abdomen pelvis in ED.  RP u/s with enlarged polycystic kidneys with majority of the normal renal parenchyma replaced by simple and minimally complex cysts.There are 3 solid heterogeneous appearing lesions within the left kidney with internal vascularity    - Urology consulted; appreciate recs  - Nephrology following; appreciate recs

## 2023-04-29 NOTE — CONSULTS
Food & Nutrition  Education    Diet Education: Renal diet  Time Spent: 10 minutes   Learners: Patient       Nutrition Education provided with handouts: Stage 5 Kidney Disease Nutrition Therapy       Comments: Pt reports knowing about renal diet, but not following the diet strictly. Protein, sodium, phosphorus, potassium, and calcium reviewed. Reviewed protein amounts/serving size on and off dialysis. Foods recommended and not recommended discussed.   All questions and concerns answered.       Follow-Up: Yes    Please Re-consult as needed        Thanks!

## 2023-04-29 NOTE — SUBJECTIVE & OBJECTIVE
"Past Medical History:   Diagnosis Date    Autosomal dominant polycystic kidney disease     Hypertension        Past Surgical History:   Procedure Laterality Date    NO PAST SURGERIES         Review of patient's allergies indicates:   Allergen Reactions    Aspirin Hives    Nickel     Penicillins Hives    Adhesive Rash     Adhesive/silk tape (type found on band aides). Can only use "Paper Tape"    Butalbital-acetaminophen-caff Nausea And Vomiting and Other (See Comments)     Dizziness (made pt feel sick)    Latex, natural rubber Rash       Family History    None         Tobacco Use    Smoking status: Every Day     Packs/day: 1.00     Years: 28.00     Pack years: 28.00     Types: Cigarettes    Smokeless tobacco: Not on file   Substance and Sexual Activity    Alcohol use: Not Currently    Drug use: Not on file    Sexual activity: Not on file       Review of Systems   Constitutional:  Negative for chills and fever.   HENT:  Negative for sore throat and trouble swallowing.    Eyes:  Negative for pain and discharge.   Respiratory:  Negative for shortness of breath and wheezing.    Cardiovascular:  Negative for chest pain and palpitations.   Gastrointestinal:  Negative for abdominal pain, diarrhea, nausea and vomiting.   Genitourinary:  Positive for dysuria, flank pain and hematuria. Negative for difficulty urinating, pelvic pain and urgency.        As per HPI   Musculoskeletal:  Negative for back pain and joint swelling.   Skin:  Negative for rash and wound.   Neurological:  Negative for seizures, weakness and headaches.   Psychiatric/Behavioral:  Negative for hallucinations. The patient is not nervous/anxious.      Objective:     Temp:  [97.7 °F (36.5 °C)-99 °F (37.2 °C)] 99 °F (37.2 °C)  Pulse:  [56-85] 85  Resp:  [16-20] 16  SpO2:  [93 %-100 %] 93 %  BP: ()/(58-80) 117/72     Body mass index is 28.7 kg/m².    Date 04/29/23 0700 - 04/30/23 0659   Shift 0785-2102 9194-3797 4774-5401 24 Hour Total   INTAKE   P.O. 60  "  60   I.V.(mL/kg) 10(0.1)   10(0.1)   Shift Total(mL/kg) 70(0.8)   70(0.8)   OUTPUT   Urine(mL/kg/hr) 250   250   Shift Total(mL/kg) 250(2.8)   250(2.8)   Weight (kg) 90.7 90.7 90.7 90.7     Bladder Scan Volume (mL): (S) 593 mL (04/28/23 1630)    Drains       Drain  Duration                  Hemodialysis Catheter right internal jugular -- days                    Physical Exam  Vitals and nursing note reviewed.   Constitutional:       General: She is not in acute distress.  HENT:      Head: Atraumatic.      Nose: Nose normal.   Eyes:      Extraocular Movements: Extraocular movements intact.   Cardiovascular:      Rate and Rhythm: Normal rate.   Pulmonary:      Effort: Pulmonary effort is normal.   Abdominal:      General: Abdomen is flat. There is no distension.      Tenderness: There is no abdominal tenderness. There is no right CVA tenderness or left CVA tenderness.   Musculoskeletal:         General: Normal range of motion.      Cervical back: Normal range of motion.   Skin:     Coloration: Skin is not jaundiced.   Neurological:      General: No focal deficit present.      Mental Status: She is alert and oriented to person, place, and time.   Psychiatric:         Mood and Affect: Mood normal.         Behavior: Behavior normal.       Significant Labs:    BMP:  Recent Labs   Lab 04/28/23  1640 04/29/23  0040 04/29/23  0903    139 138   K 4.3 3.3* 4.0    106 107   CO2 10* 18* 16*   * 115* 109*   CREATININE 16.5* 9.9* 10.6*   CALCIUM 7.1* 7.5* 7.4*       CBC:  Recent Labs   Lab 04/28/23  1640 04/29/23  0040 04/29/23  1047   WBC 9.72 7.05 6.82   HGB 6.1* 7.1* 7.9*   HCT 19.0* 21.1* 23.7*    197 205       All pertinent labs results from the past 24 hours have been reviewed.    Significant Imaging:  All pertinent imaging results/findings from the past 24 hours have been reviewed.

## 2023-04-29 NOTE — NURSING
Patient resting in bed.  Urine dark red blood and one small clot.  Post void residual 0mL.  Urology aware.  Wedge, heat packs, lidocaine, and positioning provided to help ease pain.  Patient declines pain medications at this time but will reconsider later.  Labs improved.  Call bell within reach, belongings within reach, safety protocols in place.

## 2023-04-29 NOTE — PROGRESS NOTES
Bedside hemodialysis treatment started per MD order via Right temporary IJ CVC accessed using aseptic technique

## 2023-04-29 NOTE — ASSESSMENT & PLAN NOTE
Due to hx of Polycystic kidney disease   Followed with Neurosurgery at [facility] prior to moving to [place]. Last noted 8 mm in April 2016

## 2023-04-29 NOTE — NURSING
Nurses Note -- 4 Eyes      4/28/2023   7:37 PM      Skin assessed during: Admit      [x] No Altered Skin Integrity Present    []Prevention Measures Documented      [] Yes- Altered Skin Integrity Present or Discovered   [] LDA Added if Not in Epic (Describe Wound)   [] New Altered Skin Integrity was Present on Admit and Documented in LDA   [] Wound Image Taken    Wound Care Consulted? No    Attending Nurse:  Janina Mueller RN     Second RN/Staff Member:  Ирина PATTERSON RN

## 2023-04-29 NOTE — HOSPITAL COURSE
Admitted to hospital medicine for acute kidney failure on CKD not on chronic dialysis. Hgb on admission 4.6 likely due to CKD and 2 weeks of gross hematuria prior to presentation. Received 3u pRBC with appropriate response on CBC. Right IJV trialysis placed. Nephrology consulted. Electrolytes and uremia improved. RPUS showed enlarged polycystic kidneys with majority of the normal renal parenchyma replaced by simple and minimally complex cysts as well as 3 solid heterogeneous appearing lesions within the left kidney with internal vascularity. Urology was consulted for findings on RPUS and continued gross dark hematuria while in hospital. CTA did not show any extravasation. IR consulted for possible embolization, cannot empirically embolize. Attempted trial of DDVAP in setting of uremia without reduction in hematuria. Hematuria continued, transfused for Hgb <7 on 5/2. Urology felt that continued hematuria was not cause of anemia. Recommended outpatient hematuria workup at [facility] on discharge. Interventional Nephrology consulted for tunneled HD cath. HD chair was set up by .

## 2023-04-29 NOTE — SUBJECTIVE & OBJECTIVE
"Past Medical History:   Diagnosis Date    Autosomal dominant polycystic kidney disease     Hypertension        Past Surgical History:   Procedure Laterality Date    NO PAST SURGERIES         Review of patient's allergies indicates:   Allergen Reactions    Aspirin Hives    Nickel     Penicillins Hives    Adhesive Rash     Adhesive/silk tape (type found on band aides). Can only use "Paper Tape"    Butalbital-acetaminophen-caff Nausea And Vomiting and Other (See Comments)     Dizziness (made pt feel sick)    Latex, natural rubber Rash       No current facility-administered medications on file prior to encounter.     Current Outpatient Medications on File Prior to Encounter   Medication Sig    acetaminophen (TYLENOL) 500 MG tablet Take 1,000-1,500 mg by mouth daily as needed for Pain.    calcium carbonate (TUMS ORAL) Take 2 tablets by mouth daily as needed (Heartburn).    loratadine (CLARITIN ORAL) Take 1 tablet by mouth daily as needed (Allergies).    phenazopyridine HCl (AZO STANDARD ORAL) Take 2 tablets by mouth 2 (two) times daily as needed (UTI prevention).     Family History    None       Tobacco Use    Smoking status: Every Day     Packs/day: 1.00     Years: 28.00     Pack years: 28.00     Types: Cigarettes    Smokeless tobacco: Not on file   Substance and Sexual Activity    Alcohol use: Not Currently    Drug use: Not on file    Sexual activity: Not on file     Review of Systems   Constitutional:  Positive for activity change and fatigue. Negative for chills and fever.   HENT:  Negative for hearing loss, mouth sores, nosebleeds, sneezing and trouble swallowing.    Eyes:  Negative for photophobia and visual disturbance.   Respiratory:  Positive for shortness of breath. Negative for cough.    Cardiovascular:  Positive for chest pain and leg swelling. Negative for palpitations.   Gastrointestinal:  Positive for abdominal distention, abdominal pain, nausea and vomiting. Negative for blood in stool and constipation. "   Genitourinary:  Positive for flank pain, genital sores and hematuria. Negative for decreased urine volume, difficulty urinating, dysuria and frequency.   Musculoskeletal:  Positive for neck pain.   Skin:  Negative for wound.   Neurological:  Positive for weakness. Negative for dizziness, speech difficulty and headaches.   Psychiatric/Behavioral:  Negative for agitation, confusion and sleep disturbance.    Objective:     Vital Signs (Most Recent):  Temp: 99 °F (37.2 °C) (04/29/23 1237)  Pulse: 85 (04/29/23 1237)  Resp: 16 (04/29/23 1237)  BP: 117/72 (04/29/23 1237)  SpO2: (!) 93 % (04/29/23 1237) Vital Signs (24h Range):  Temp:  [97.7 °F (36.5 °C)-99 °F (37.2 °C)] 99 °F (37.2 °C)  Pulse:  [56-85] 85  Resp:  [16-20] 16  SpO2:  [93 %-100 %] 93 %  BP: ()/(58-80) 117/72     Weight: 90.7 kg (200 lb)  Body mass index is 28.7 kg/m².    Physical Exam  Vitals and nursing note reviewed. Exam conducted with a chaperone present.   Constitutional:       General: She is not in acute distress.     Appearance: Normal appearance. She is ill-appearing.   HENT:      Head: Normocephalic and atraumatic.      Nose: Nose normal.      Mouth/Throat:      Mouth: Mucous membranes are moist.   Eyes:      General: No scleral icterus.     Extraocular Movements: Extraocular movements intact.      Conjunctiva/sclera: Conjunctivae normal.   Cardiovascular:      Rate and Rhythm: Normal rate and regular rhythm.      Pulses: Normal pulses.      Heart sounds: Normal heart sounds.   Pulmonary:      Effort: Pulmonary effort is normal.      Breath sounds: Wheezing present.   Abdominal:      General: There is distension.      Tenderness: There is abdominal tenderness. There is right CVA tenderness and left CVA tenderness. There is no guarding.   Genitourinary:     Labia:         Left: Lesion (small nonbleeding cyst) present.       Comments: Photo of hematuria in chart 4/28  Musculoskeletal:      Cervical back: Normal range of motion.      Right  lower leg: No edema.      Left lower leg: No edema.   Skin:     General: Skin is warm.   Neurological:      General: No focal deficit present.      Mental Status: She is alert and oriented to person, place, and time.   Psychiatric:         Mood and Affect: Mood normal.         Behavior: Behavior normal.         Thought Content: Thought content normal.         Judgment: Judgment normal.           Significant Labs: All pertinent labs within the past 24 hours have been reviewed.  Recent Lab Results  (Last 5 results in the past 24 hours)        04/29/23  1047   04/29/23  0903   04/29/23  0527   04/29/23  0040   04/28/23  1644        Albumin   2.7     2.7         Alkaline Phosphatase   67     77         ALT   7     6         Anion Gap   15     15         AST   7     7         Baso # 0.02       0.02         Basophil % 0.3       0.3         BILIRUBIN TOTAL   0.6  Comment: For infants and newborns, interpretation of results should be based  on gestational age, weight and in agreement with clinical  observations.    Premature Infant recommended reference ranges:  Up to 24 hours.............<8.0 mg/dL  Up to 48 hours............<12.0 mg/dL  3-5 days..................<15.0 mg/dL  6-29 days.................<15.0 mg/dL       0.7  Comment: For infants and newborns, interpretation of results should be based  on gestational age, weight and in agreement with clinical  observations.    Premature Infant recommended reference ranges:  Up to 24 hours.............<8.0 mg/dL  Up to 48 hours............<12.0 mg/dL  3-5 days..................<15.0 mg/dL  6-29 days.................<15.0 mg/dL           BUN   109     115         Calcium   7.4     7.5         Chloride   107     106         CO2   16     18         Creatinine   10.6     9.9         Creatinine, Urine         45.0       Differential Method Automated       Automated         eGFR   4.3     4.6         Eos # 0.0       0.1         Eosinophil % 0.3       0.7         Glucose   86      112         Gran # (ANC) 5.9       6.0         Gran % 86.3       84.5         Hematocrit 23.7       21.1         Hemoglobin 7.9       7.1         Immature Grans (Abs) 0.03  Comment: Mild elevation in immature granulocytes is non specific and   can be seen in a variety of conditions including stress response,   acute inflammation, trauma and pregnancy. Correlation with other   laboratory and clinical findings is essential.         0.04  Comment: Mild elevation in immature granulocytes is non specific and   can be seen in a variety of conditions including stress response,   acute inflammation, trauma and pregnancy. Correlation with other   laboratory and clinical findings is essential.           Immature Granulocytes 0.4       0.6         Lymph # 0.4       0.7         Lymph % 6.2       9.4         Magnesium     1.8           MCH 28.7       29.3         MCHC 33.3       33.6         MCV 86       87         MICROALB/CREAT RATIO         6773.3       Urine Microalbumin         3048.0       Mono # 0.4       0.3         Mono % 6.5       4.5         MPV 10.3       10.1         nRBC 0       0         Phosphorus     7.7           Platelets 205       197         Potassium   4.0     3.3         PROTEIN TOTAL   6.0     6.1         RBC 2.75       2.42         RDW 15.5       15.4         Sodium   138     139         WBC 6.82       7.05                                Significant Imaging: I have reviewed all pertinent imaging results/findings within the past 24 hours.

## 2023-04-29 NOTE — CONSULTS
"[facility] ([place] [id number])  Urology  Consult Note    Patient Name: Ade Silva  MRN: [id number]  Admission Date: 4/28/2023  Hospital Length of Stay: 1   Code Status: Full Code   Attending Provider: Leo Quiroz MD   Consulting Provider: Jaskaran Yates MD  Primary Care Physician: Primary Doctor No  Principal Problem:Acute renal failure superimposed on stage 5 chronic kidney disease, not on chronic dialysis    Inpatient consult to Urology  Consult performed by: Jaskaran Yates MD  Consult ordered by: Ish Veronica DO          Subjective:     HPI:  Ade Silva is a 43 yr old female with autosomal dominant polycystic kidney disease, CKD V, recurrent UTIs presented with hematuria and nausea/vomiting. Patient is new to [place], recently moved from [place] to live with her mother. Urology is consulted for gross hematuria with concerning lesions of CT scan within the left kidney. At bedside, patient is AFVSS, WBC 6.8, Hgb 7.9 from 4 (s/p 3u pRBC), THEODORE (baseline Cr 4), now on HD. CT and WAYLON show severe PCKD bilaterally with concerning lesions for possible solid components on the left kidney, hydronephrosis not obvious. Patient denies fmhx of  malignancies and states she does have a 28py hx of smoking.       Past Medical History:   Diagnosis Date    Autosomal dominant polycystic kidney disease     Hypertension        Past Surgical History:   Procedure Laterality Date    NO PAST SURGERIES         Review of patient's allergies indicates:   Allergen Reactions    Aspirin Hives    Nickel     Penicillins Hives    Adhesive Rash     Adhesive/silk tape (type found on band aides). Can only use "Paper Tape"    Butalbital-acetaminophen-caff Nausea And Vomiting and Other (See Comments)     Dizziness (made pt feel sick)    Latex, natural rubber Rash       Family History    None         Tobacco Use    Smoking status: Every Day     Packs/day: 1.00     Years: 28.00     Pack years: 28.00     Types: " Cigarettes    Smokeless tobacco: Not on file   Substance and Sexual Activity    Alcohol use: Not Currently    Drug use: Not on file    Sexual activity: Not on file       Review of Systems   Constitutional:  Negative for chills and fever.   HENT:  Negative for sore throat and trouble swallowing.    Eyes:  Negative for pain and discharge.   Respiratory:  Negative for shortness of breath and wheezing.    Cardiovascular:  Negative for chest pain and palpitations.   Gastrointestinal:  Negative for abdominal pain, diarrhea, nausea and vomiting.   Genitourinary:  Positive for dysuria, flank pain and hematuria. Negative for difficulty urinating, pelvic pain and urgency.        As per HPI   Musculoskeletal:  Negative for back pain and joint swelling.   Skin:  Negative for rash and wound.   Neurological:  Negative for seizures, weakness and headaches.   Psychiatric/Behavioral:  Negative for hallucinations. The patient is not nervous/anxious.      Objective:     Temp:  [97.7 °F (36.5 °C)-99 °F (37.2 °C)] 99 °F (37.2 °C)  Pulse:  [56-85] 85  Resp:  [16-20] 16  SpO2:  [93 %-100 %] 93 %  BP: ()/(58-80) 117/72     Body mass index is 28.7 kg/m².    Date 04/29/23 0700 - 04/30/23 0659   Shift 4526-4574 2321-8835 8384-0328 24 Hour Total   INTAKE   P.O. 60   60   I.V.(mL/kg) 10(0.1)   10(0.1)   Shift Total(mL/kg) 70(0.8)   70(0.8)   OUTPUT   Urine(mL/kg/hr) 250   250   Shift Total(mL/kg) 250(2.8)   250(2.8)   Weight (kg) 90.7 90.7 90.7 90.7     Bladder Scan Volume (mL): (S) 593 mL (04/28/23 1630)    Drains       Drain  Duration                  Hemodialysis Catheter right internal jugular -- days                    Physical Exam  Vitals and nursing note reviewed.   Constitutional:       General: She is not in acute distress.  HENT:      Head: Atraumatic.      Nose: Nose normal.   Eyes:      Extraocular Movements: Extraocular movements intact.   Cardiovascular:      Rate and Rhythm: Normal rate.   Pulmonary:      Effort:  Pulmonary effort is normal.   Abdominal:      General: Abdomen is flat. There is no distension.      Tenderness: There is no abdominal tenderness. There is no right CVA tenderness or left CVA tenderness.   Musculoskeletal:         General: Normal range of motion.      Cervical back: Normal range of motion.   Skin:     Coloration: Skin is not jaundiced.   Neurological:      General: No focal deficit present.      Mental Status: She is alert and oriented to person, place, and time.   Psychiatric:         Mood and Affect: Mood normal.         Behavior: Behavior normal.       Significant Labs:    BMP:  Recent Labs   Lab 04/28/23  1640 04/29/23  0040 04/29/23  0903    139 138   K 4.3 3.3* 4.0    106 107   CO2 10* 18* 16*   * 115* 109*   CREATININE 16.5* 9.9* 10.6*   CALCIUM 7.1* 7.5* 7.4*       CBC:  Recent Labs   Lab 04/28/23  1640 04/29/23  0040 04/29/23  1047   WBC 9.72 7.05 6.82   HGB 6.1* 7.1* 7.9*   HCT 19.0* 21.1* 23.7*    197 205       All pertinent labs results from the past 24 hours have been reviewed.    Significant Imaging:  All pertinent imaging results/findings from the past 24 hours have been reviewed.                      Assessment and Plan:     Ever Silva is a 43 y.o. female with PCKD who presents with gross hematuria.     - Consult d/w Staff Urologist  - Strict I's/O's  - Trend Cr, avoid nephrotoxic agents, and dose medications to patient's current GFR  - Gross hematuria likely ruptured renal cysts, though patient will need a formal gross hematuria w/u and f/u of her renal cysts at [facility]  - No indications for urologic interventions at this time  - Will f/u PVR to ensure she is emptying her bladder well  - If her hgb continues to drop, recommend consideration of possible CTA and IR ablation of renal cyst if shown to have persistent bleeding, though not recommenced at this time given clinical stability   - All remaining care per primary team          VTE Risk  Mitigation (From admission, onward)         Ordered     IP VTE LOW RISK PATIENT  Once         04/28/23 1248     Place sequential compression device  Until discontinued         04/28/23 1248                Thank you for your consult. I will sign off. Please contact us if you have any additional questions.    Jaskaran Yates MD  Urology  [facility] - Intensive Care ([place]

## 2023-04-29 NOTE — NURSING
Patient is a 43yr old female admitted due to anemia. Patient has a history of CKD. Due to decreasing kidney function patient had a R.IJ Trialysis placed for planned HD.   Patient received 2units of PRBCs in the ED and is receiving 1 unit currently.

## 2023-04-29 NOTE — PLAN OF CARE
Client is alert and oriented x 3 to person, place and time. Vitals stable throughout shift. Patient complained of pain to bilateral kidney areas. Rating it a 7 on pain scale 1-10. She states that the lidocain patch wore off after 12 hours and would like a new patch every 12 hours or something else besides tylenol for pain. Tylenol brought pain level down to only 5. MD Pj Mueller on call aware. Heating packs given to patient to help ease pain. Patient received dialysis last night. K level 3.3 and hemoglobin level is 7.1 On call MD negron notified and not orders given for replacement at this time. No other significant changes noted.     Problem: Adult Inpatient Plan of Care  Goal: Plan of Care Review  Outcome: Ongoing, Progressing  Goal: Patient-Specific Goal (Individualized)  Outcome: Ongoing, Progressing  Goal: Absence of Hospital-Acquired Illness or Injury  Outcome: Ongoing, Progressing  Goal: Optimal Comfort and Wellbeing  Outcome: Ongoing, Progressing  Goal: Readiness for Transition of Care  Outcome: Ongoing, Progressing     Problem: Device-Related Complication Risk (Hemodialysis)  Goal: Safe, Effective Therapy Delivery  Outcome: Ongoing, Progressing     Problem: Hemodynamic Instability (Hemodialysis)  Goal: Effective Tissue Perfusion  Outcome: Ongoing, Progressing     Problem: Infection (Hemodialysis)  Goal: Absence of Infection Signs and Symptoms  Outcome: Ongoing, Progressing     Problem: Infection  Goal: Absence of Infection Signs and Symptoms  Outcome: Ongoing, Progressing

## 2023-04-30 LAB
ALBUMIN SERPL BCP-MCNC: 2.5 G/DL (ref 3.5–5.2)
ALP SERPL-CCNC: 63 U/L (ref 55–135)
ALP SERPL-CCNC: 65 U/L (ref 55–135)
ALP SERPL-CCNC: 71 U/L (ref 55–135)
ALT SERPL W/O P-5'-P-CCNC: 5 U/L (ref 10–44)
ALT SERPL W/O P-5'-P-CCNC: 6 U/L (ref 10–44)
ALT SERPL W/O P-5'-P-CCNC: 6 U/L (ref 10–44)
ANION GAP SERPL CALC-SCNC: 14 MMOL/L (ref 8–16)
ANION GAP SERPL CALC-SCNC: 15 MMOL/L (ref 8–16)
ANION GAP SERPL CALC-SCNC: 15 MMOL/L (ref 8–16)
AST SERPL-CCNC: 5 U/L (ref 10–40)
AST SERPL-CCNC: 6 U/L (ref 10–40)
AST SERPL-CCNC: 6 U/L (ref 10–40)
BASOPHILS # BLD AUTO: 0.02 K/UL (ref 0–0.2)
BASOPHILS # BLD AUTO: 0.02 K/UL (ref 0–0.2)
BASOPHILS # BLD AUTO: 0.04 K/UL (ref 0–0.2)
BASOPHILS NFR BLD: 0.3 % (ref 0–1.9)
BASOPHILS NFR BLD: 0.3 % (ref 0–1.9)
BASOPHILS NFR BLD: 0.6 % (ref 0–1.9)
BILIRUB SERPL-MCNC: 0.4 MG/DL (ref 0.1–1)
BILIRUB SERPL-MCNC: 0.5 MG/DL (ref 0.1–1)
BILIRUB SERPL-MCNC: 0.7 MG/DL (ref 0.1–1)
BLD PROD TYP BPU: NORMAL
BLOOD UNIT EXPIRATION DATE: NORMAL
BLOOD UNIT TYPE CODE: 1700
BLOOD UNIT TYPE: NORMAL
BUN SERPL-MCNC: 113 MG/DL (ref 6–20)
BUN SERPL-MCNC: 115 MG/DL (ref 6–20)
BUN SERPL-MCNC: 116 MG/DL (ref 6–20)
CALCIUM SERPL-MCNC: 7.3 MG/DL (ref 8.7–10.5)
CALCIUM SERPL-MCNC: 7.4 MG/DL (ref 8.7–10.5)
CALCIUM SERPL-MCNC: 7.4 MG/DL (ref 8.7–10.5)
CHLORIDE SERPL-SCNC: 105 MMOL/L (ref 95–110)
CHLORIDE SERPL-SCNC: 106 MMOL/L (ref 95–110)
CHLORIDE SERPL-SCNC: 107 MMOL/L (ref 95–110)
CO2 SERPL-SCNC: 16 MMOL/L (ref 23–29)
CO2 SERPL-SCNC: 17 MMOL/L (ref 23–29)
CO2 SERPL-SCNC: 17 MMOL/L (ref 23–29)
CODING SYSTEM: NORMAL
CREAT SERPL-MCNC: 11.3 MG/DL (ref 0.5–1.4)
CROSSMATCH INTERPRETATION: NORMAL
DIFFERENTIAL METHOD: ABNORMAL
DISPENSE STATUS: NORMAL
EOSINOPHIL # BLD AUTO: 0 K/UL (ref 0–0.5)
EOSINOPHIL # BLD AUTO: 0 K/UL (ref 0–0.5)
EOSINOPHIL # BLD AUTO: 0.1 K/UL (ref 0–0.5)
EOSINOPHIL NFR BLD: 0.4 % (ref 0–8)
EOSINOPHIL NFR BLD: 0.5 % (ref 0–8)
EOSINOPHIL NFR BLD: 1.4 % (ref 0–8)
ERYTHROCYTE [DISTWIDTH] IN BLOOD BY AUTOMATED COUNT: 15.6 % (ref 11.5–14.5)
ERYTHROCYTE [DISTWIDTH] IN BLOOD BY AUTOMATED COUNT: 15.7 % (ref 11.5–14.5)
ERYTHROCYTE [DISTWIDTH] IN BLOOD BY AUTOMATED COUNT: 16 % (ref 11.5–14.5)
EST. GFR  (NO RACE VARIABLE): 4 ML/MIN/1.73 M^2
GLUCOSE SERPL-MCNC: 161 MG/DL (ref 70–110)
GLUCOSE SERPL-MCNC: 88 MG/DL (ref 70–110)
GLUCOSE SERPL-MCNC: 92 MG/DL (ref 70–110)
HCT VFR BLD AUTO: 21.8 % (ref 37–48.5)
HCT VFR BLD AUTO: 24.6 % (ref 37–48.5)
HCT VFR BLD AUTO: 25.1 % (ref 37–48.5)
HGB BLD-MCNC: 7 G/DL (ref 12–16)
HGB BLD-MCNC: 8.2 G/DL (ref 12–16)
HGB BLD-MCNC: 8.3 G/DL (ref 12–16)
IMM GRANULOCYTES # BLD AUTO: 0.03 K/UL (ref 0–0.04)
IMM GRANULOCYTES NFR BLD AUTO: 0.4 % (ref 0–0.5)
IMM GRANULOCYTES NFR BLD AUTO: 0.4 % (ref 0–0.5)
IMM GRANULOCYTES NFR BLD AUTO: 0.5 % (ref 0–0.5)
LYMPHOCYTES # BLD AUTO: 0.4 K/UL (ref 1–4.8)
LYMPHOCYTES # BLD AUTO: 0.5 K/UL (ref 1–4.8)
LYMPHOCYTES # BLD AUTO: 0.6 K/UL (ref 1–4.8)
LYMPHOCYTES NFR BLD: 7.2 % (ref 18–48)
LYMPHOCYTES NFR BLD: 7.3 % (ref 18–48)
LYMPHOCYTES NFR BLD: 8.8 % (ref 18–48)
MAGNESIUM SERPL-MCNC: 1.8 MG/DL (ref 1.6–2.6)
MCH RBC QN AUTO: 28.6 PG (ref 27–31)
MCH RBC QN AUTO: 28.6 PG (ref 27–31)
MCH RBC QN AUTO: 28.7 PG (ref 27–31)
MCHC RBC AUTO-ENTMCNC: 32.1 G/DL (ref 32–36)
MCHC RBC AUTO-ENTMCNC: 33.1 G/DL (ref 32–36)
MCHC RBC AUTO-ENTMCNC: 33.3 G/DL (ref 32–36)
MCV RBC AUTO: 86 FL (ref 82–98)
MCV RBC AUTO: 87 FL (ref 82–98)
MCV RBC AUTO: 89 FL (ref 82–98)
MONOCYTES # BLD AUTO: 0.4 K/UL (ref 0.3–1)
MONOCYTES # BLD AUTO: 0.6 K/UL (ref 0.3–1)
MONOCYTES # BLD AUTO: 0.7 K/UL (ref 0.3–1)
MONOCYTES NFR BLD: 6.1 % (ref 4–15)
MONOCYTES NFR BLD: 9 % (ref 4–15)
MONOCYTES NFR BLD: 9.3 % (ref 4–15)
NEUTROPHILS # BLD AUTO: 5.1 K/UL (ref 1.8–7.7)
NEUTROPHILS # BLD AUTO: 5.7 K/UL (ref 1.8–7.7)
NEUTROPHILS # BLD AUTO: 5.8 K/UL (ref 1.8–7.7)
NEUTROPHILS NFR BLD: 80.8 % (ref 38–73)
NEUTROPHILS NFR BLD: 81.3 % (ref 38–73)
NEUTROPHILS NFR BLD: 85.4 % (ref 38–73)
NRBC BLD-RTO: 0 /100 WBC
NUM UNITS TRANS PACKED RBC: NORMAL
PHOSPHATE SERPL-MCNC: 8.5 MG/DL (ref 2.7–4.5)
PLATELET # BLD AUTO: 187 K/UL (ref 150–450)
PLATELET # BLD AUTO: 203 K/UL (ref 150–450)
PLATELET # BLD AUTO: 220 K/UL (ref 150–450)
PMV BLD AUTO: 10 FL (ref 9.2–12.9)
PMV BLD AUTO: 10.2 FL (ref 9.2–12.9)
PMV BLD AUTO: 9.6 FL (ref 9.2–12.9)
POTASSIUM SERPL-SCNC: 3.8 MMOL/L (ref 3.5–5.1)
POTASSIUM SERPL-SCNC: 3.9 MMOL/L (ref 3.5–5.1)
POTASSIUM SERPL-SCNC: 4.2 MMOL/L (ref 3.5–5.1)
PROT SERPL-MCNC: 5.9 G/DL (ref 6–8.4)
PROT SERPL-MCNC: 5.9 G/DL (ref 6–8.4)
PROT SERPL-MCNC: 6.1 G/DL (ref 6–8.4)
RBC # BLD AUTO: 2.45 M/UL (ref 4–5.4)
RBC # BLD AUTO: 2.86 M/UL (ref 4–5.4)
RBC # BLD AUTO: 2.9 M/UL (ref 4–5.4)
SODIUM SERPL-SCNC: 136 MMOL/L (ref 136–145)
SODIUM SERPL-SCNC: 137 MMOL/L (ref 136–145)
SODIUM SERPL-SCNC: 139 MMOL/L (ref 136–145)
TRANS ERYTHROCYTES VOL PATIENT: NORMAL ML
WBC # BLD AUTO: 5.94 K/UL (ref 3.9–12.7)
WBC # BLD AUTO: 7.01 K/UL (ref 3.9–12.7)
WBC # BLD AUTO: 7.13 K/UL (ref 3.9–12.7)

## 2023-04-30 PROCEDURE — 83735 ASSAY OF MAGNESIUM: CPT

## 2023-04-30 PROCEDURE — 25500020 PHARM REV CODE 255: Performed by: STUDENT IN AN ORGANIZED HEALTH CARE EDUCATION/TRAINING PROGRAM

## 2023-04-30 PROCEDURE — 80053 COMPREHEN METABOLIC PANEL: CPT | Mod: 91

## 2023-04-30 PROCEDURE — 25000003 PHARM REV CODE 250: Performed by: STUDENT IN AN ORGANIZED HEALTH CARE EDUCATION/TRAINING PROGRAM

## 2023-04-30 PROCEDURE — 84100 ASSAY OF PHOSPHORUS: CPT

## 2023-04-30 PROCEDURE — 80053 COMPREHEN METABOLIC PANEL: CPT

## 2023-04-30 PROCEDURE — P9016 RBC LEUKOCYTES REDUCED: HCPCS | Performed by: STUDENT IN AN ORGANIZED HEALTH CARE EDUCATION/TRAINING PROGRAM

## 2023-04-30 PROCEDURE — 97161 PT EVAL LOW COMPLEX 20 MIN: CPT

## 2023-04-30 PROCEDURE — 85025 COMPLETE CBC W/AUTO DIFF WBC: CPT | Mod: 91

## 2023-04-30 PROCEDURE — 99233 PR SUBSEQUENT HOSPITAL CARE,LEVL III: ICD-10-PCS | Mod: ,,, | Performed by: STUDENT IN AN ORGANIZED HEALTH CARE EDUCATION/TRAINING PROGRAM

## 2023-04-30 PROCEDURE — 25000003 PHARM REV CODE 250

## 2023-04-30 PROCEDURE — 20600001 HC STEP DOWN PRIVATE ROOM

## 2023-04-30 PROCEDURE — 99233 SBSQ HOSP IP/OBS HIGH 50: CPT | Mod: ,,, | Performed by: STUDENT IN AN ORGANIZED HEALTH CARE EDUCATION/TRAINING PROGRAM

## 2023-04-30 RX ORDER — POLYETHYLENE GLYCOL 3350 17 G/17G
17 POWDER, FOR SOLUTION ORAL DAILY
Status: DISCONTINUED | OUTPATIENT
Start: 2023-04-30 | End: 2023-05-04

## 2023-04-30 RX ORDER — HYDROCODONE BITARTRATE AND ACETAMINOPHEN 500; 5 MG/1; MG/1
TABLET ORAL
Status: DISCONTINUED | OUTPATIENT
Start: 2023-04-30 | End: 2023-05-01

## 2023-04-30 RX ORDER — AMOXICILLIN 250 MG
1 CAPSULE ORAL 2 TIMES DAILY
Status: DISCONTINUED | OUTPATIENT
Start: 2023-04-30 | End: 2023-05-05 | Stop reason: HOSPADM

## 2023-04-30 RX ADMIN — SENNOSIDES AND DOCUSATE SODIUM 1 TABLET: 8.6; 5 TABLET ORAL at 01:04

## 2023-04-30 RX ADMIN — POLYETHYLENE GLYCOL 3350 17 G: 17 POWDER, FOR SOLUTION ORAL at 01:04

## 2023-04-30 RX ADMIN — SIMETHICONE 80 MG: 80 TABLET, CHEWABLE ORAL at 07:04

## 2023-04-30 RX ADMIN — LIDOCAINE 2 PATCH: 50 PATCH TOPICAL at 06:04

## 2023-04-30 RX ADMIN — SENNOSIDES AND DOCUSATE SODIUM 1 TABLET: 8.6; 5 TABLET ORAL at 08:04

## 2023-04-30 RX ADMIN — SEVELAMER CARBONATE 1600 MG: 800 TABLET, FILM COATED ORAL at 05:04

## 2023-04-30 RX ADMIN — IOHEXOL 75 ML: 350 INJECTION, SOLUTION INTRAVENOUS at 04:04

## 2023-04-30 RX ADMIN — SEVELAMER CARBONATE 1600 MG: 800 TABLET, FILM COATED ORAL at 07:04

## 2023-04-30 NOTE — ASSESSMENT & PLAN NOTE
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 Was previously followed by Nephrology at [facility] in [place]. Findings consistent with diagnosis on CT abdomen pelvis in ED.  RP u/s with enlarged polycystic kidneys with majority of the normal renal parenchyma replaced by simple and minimally complex cysts.There are 3 solid heterogeneous appearing lesions within the left kidney with internal vascularity    - Urology consulted; appreciate recs  - Nephrology following; appreciate recs  - IR consulted for possible embolization. Will perform CTA triple phase of abdomen/pelvis

## 2023-04-30 NOTE — SUBJECTIVE & OBJECTIVE
Interval History: Hematuria persists and patient required a unit of blood. Hemodynamically stable.     Review of Systems   Constitutional:  Positive for activity change and fatigue. Negative for chills and fever.   HENT:  Negative for congestion and sore throat.    Eyes:  Negative for photophobia and visual disturbance.   Respiratory:  Negative for cough and shortness of breath.    Cardiovascular:  Negative for chest pain, palpitations and leg swelling.   Gastrointestinal:  Positive for abdominal distention and abdominal pain. Negative for blood in stool, constipation, nausea and vomiting.   Genitourinary:  Positive for flank pain and hematuria. Negative for decreased urine volume, difficulty urinating, dysuria and frequency.   Musculoskeletal:  Positive for neck pain.   Skin:  Negative for wound.   Neurological:  Negative for dizziness and headaches.   Objective:     Vital Signs (Most Recent):  Temp: 98.6 °F (37 °C) (04/30/23 1201)  Pulse: 77 (04/30/23 1201)  Resp: 19 (04/30/23 1201)  BP: 108/65 (04/30/23 1201)  SpO2: 98 % (04/30/23 1201) Vital Signs (24h Range):  Temp:  [96.4 °F (35.8 °C)-98.6 °F (37 °C)] 98.6 °F (37 °C)  Pulse:  [72-90] 77  Resp:  [16-20] 19  SpO2:  [94 %-100 %] 98 %  BP: (105-124)/(57-76) 108/65     Weight: 90.7 kg (200 lb)  Body mass index is 28.7 kg/m².    Intake/Output Summary (Last 24 hours) at 4/30/2023 1408  Last data filed at 4/30/2023 0615  Gross per 24 hour   Intake --   Output 600 ml   Net -600 ml      Physical Exam  Vitals and nursing note reviewed.   Constitutional:       General: She is not in acute distress.     Appearance: Normal appearance. She is not ill-appearing.   HENT:      Head: Normocephalic and atraumatic.      Mouth/Throat:      Mouth: Mucous membranes are moist.   Eyes:      General: No scleral icterus.     Extraocular Movements: Extraocular movements intact.   Cardiovascular:      Rate and Rhythm: Normal rate and regular rhythm.      Pulses: Normal pulses.      Heart  sounds: Normal heart sounds.   Pulmonary:      Effort: Pulmonary effort is normal.      Breath sounds: Wheezing present.   Abdominal:      General: There is distension.      Tenderness: There is abdominal tenderness. There is right CVA tenderness and left CVA tenderness. There is no guarding.   Musculoskeletal:      Right lower leg: No edema.      Left lower leg: No edema.   Skin:     General: Skin is warm and dry.   Neurological:      General: No focal deficit present.      Mental Status: She is alert and oriented to person, place, and time.       Significant Labs: All pertinent labs within the past 24 hours have been reviewed.  CBC:   Recent Labs   Lab 04/29/23 1811 04/30/23  0007 04/30/23  0957   WBC 8.96 7.13 5.94   HGB 7.5* 7.0* 8.3*   HCT 22.9* 21.8* 25.1*    220 187     CMP:   Recent Labs   Lab 04/29/23 1811 04/30/23  0007 04/30/23  0957    137 139   K 3.8 3.9 3.8    106 107   CO2 16* 17* 17*    88 161*   * 115* 113*   CREATININE 11.2* 11.3* 11.3*   CALCIUM 7.5* 7.4* 7.4*   PROT 6.3 5.9* 5.9*   ALBUMIN 2.7* 2.5* 2.5*   BILITOT 0.5 0.5 0.7   ALKPHOS 72 65 63   AST 8* 6* 5*   ALT 6* 5* 6*   ANIONGAP 16 14 15       Significant Imaging: I have reviewed all pertinent imaging results/findings within the past 24 hours.

## 2023-04-30 NOTE — NURSING
Patient producing dark red urine, complaining of left flank pain.  Wedge, heat packs, lidocaine, and positioning provided to help ease pain.  Patient resting in bed, belongings within reach, call bell within reach, safety protocols in place.

## 2023-04-30 NOTE — CONSULTS
Vascular and Interventional Radiology Consult      Date: 4/30/2023   Primary team: Networked reference to record PCT , Leo Quiroz MD   Room/bed: 18036/37395 A    Reason for Consult:   Hematuria    History of Present Illness:  Ade Silva is a 43 y.o. female, admitted with hematuria. Patient with history of polycystic kidney disease. IR evaluation requested.     Past Medical History:  Past Medical History:   Diagnosis Date    Autosomal dominant polycystic kidney disease     Hypertension        Past Surgical History:  Past Surgical History:   Procedure Laterality Date    NO PAST SURGERIES            Social History:  Social History     Tobacco Use    Smoking status: Every Day     Packs/day: 1.00     Years: 28.00     Pack years: 28.00     Types: Cigarettes   Substance Use Topics    Alcohol use: Not Currently        Home Medications:   Prior to Admission medications    Medication Sig Start Date End Date Taking? Authorizing Provider   acetaminophen (TYLENOL) 500 MG tablet Take 1,000-1,500 mg by mouth daily as needed for Pain.   Yes Historical Provider   calcium carbonate (TUMS ORAL) Take 2 tablets by mouth daily as needed (Heartburn).   Yes Historical Provider   loratadine (CLARITIN ORAL) Take 1 tablet by mouth daily as needed (Allergies).   Yes Historical Provider   phenazopyridine HCl (AZO STANDARD ORAL) Take 2 tablets by mouth 2 (two) times daily as needed (UTI prevention).   Yes Historical Provider       Inpatient Medications:    Current Facility-Administered Medications:     0.9%  NaCl infusion (for blood administration), , Intravenous, Q24H PRN, Syed De Oliveira MD    0.9%  NaCl infusion (for blood administration), , Intravenous, Q24H PRN, Cortney Ribera MD    0.9%  NaCl infusion, , Intravenous, Once, Babatunde Palacios MD    acetaminophen tablet 650 mg, 650 mg, Oral, Q6H PRN, Papo Metz MD, 650 mg at 04/29/23 0151    dextrose 10% bolus 125 mL 125 mL, 12.5 g, Intravenous, PRN, Ish Veronica DO     "dextrose 10% bolus 250 mL 250 mL, 25 g, Intravenous, PRN, Ish Veronica DO    dextrose 40 % gel 15,000 mg, 15 g, Oral, PRN, Ish Veronica DO    dextrose 40 % gel 30,000 mg, 30 g, Oral, PRN, Ish Veronica DO    epoetin kranthi-epbx injection 4,540 Units, 50 Units/kg, Subcutaneous, Once, Babatunde Palacios MD    glucagon (human recombinant) injection 1 mg, 1 mg, Intramuscular, PRN, Ish Veronica DO    LIDOcaine 5 % patch 2 patch, 2 patch, Transdermal, Q24H, Ish Veronica DO, 2 patch at 04/29/23 1803    melatonin tablet 6 mg, 6 mg, Oral, Nightly PRN, Ish Veronica DO, 6 mg at 04/29/23 0151    mupirocin 2 % ointment, , Nasal, BID, Babatunde Palacios MD, Given at 04/28/23 0709    naloxone 0.4 mg/mL injection 0.02 mg, 0.02 mg, Intravenous, PRN, Ish Veronica DO    polyethylene glycol packet 17 g, 17 g, Oral, Daily, Ramonita Baldwin DO    senna-docusate 8.6-50 mg per tablet 1 tablet, 1 tablet, Oral, BID, Ramonita M Cubides, DO    sevelamer carbonate tablet 1,600 mg, 1,600 mg, Oral, TID WM, Ramonita M Cubides, DO, 1,600 mg at 04/30/23 0740    simethicone chewable tablet 80 mg, 1 tablet, Oral, TID PRN, Cortney Ribera MD, 80 mg at 04/30/23 0740    sodium chloride 0.9% bolus 250 mL 250 mL, 250 mL, Intravenous, PRN, Babatunde Palacios MD    sodium chloride 0.9% flush 10 mL, 10 mL, Intravenous, Q12H PRN, Ish Veronica DO     Anticoagulants/Antiplatelets:   no anticoagulation    Allergies:   Review of patient's allergies indicates:   Allergen Reactions    Aspirin Hives    Nickel     Penicillins Hives    Adhesive Rash     Adhesive/silk tape (type found on band aides). Can only use "Paper Tape"    Butalbital-acetaminophen-caff Nausea And Vomiting and Other (See Comments)     Dizziness (made pt feel sick)    Latex, natural rubber Rash       Review of Systems:   As documented in primary provider H&P.    Vital Signs:  Temp: 98.6 °F (37 °C) (04/30/23 1201)  Pulse: 77 (04/30/23 1201)  Resp: 19 (04/30/23 1201)  BP: 108/65 (04/30/23 1201)  SpO2: 98 " % (04/30/23 1201)    Temp:  [96.4 °F (35.8 °C)-99 °F (37.2 °C)]   Pulse:  [72-90]   Resp:  [16-20]   BP: (105-124)/(57-76)   SpO2:  [93 %-100 %]      Laboratory:  Lab Results   Component Value Date    INR 1.0 04/28/2023       Lab Results   Component Value Date    WBC 5.94 04/30/2023    HGB 8.3 (L) 04/30/2023    HCT 25.1 (L) 04/30/2023    MCV 87 04/30/2023     04/30/2023      Lab Results   Component Value Date     (H) 04/30/2023     04/30/2023    K 3.8 04/30/2023     04/30/2023    CO2 17 (L) 04/30/2023     (H) 04/30/2023    CREATININE 11.3 (H) 04/30/2023    CALCIUM 7.4 (L) 04/30/2023    MG 1.8 04/30/2023    ALT 6 (L) 04/30/2023    AST 5 (L) 04/30/2023    ALBUMIN 2.5 (L) 04/30/2023    BILITOT 0.7 04/30/2023       Imaging:   Pending      ASSESSMENT/PLAN/RECOMMENDATIONS:   43F with history of polycystic kidney disease presenting with hematuria. Remains clinically stable per documentation.    Recommend triple phase CTA abdomen/pelvis to potentially identify a source of bleeding. Discussed with primary team Dr. Quiroz.    Francesca Dietz MD  Fellow, Dept. Of Interventional Radiology  [facility]

## 2023-04-30 NOTE — ASSESSMENT & PLAN NOTE
Admit with hemoglobin 4.6 Baseline unknown, last Hgb per care everywhere in 5/2022 10.3  3u pRBC ordered in ED  Likely secondary to CKD and hematuria; CT abdomen in ED without signs of bleeding.   Patient has chronic intermittent hematuria from PCKD, although has not resolved the past 2 weeks  Gross hematuria continuing in hospital (photo in media tab)  Denies melena, hematochezia    Lab Results   Component Value Date    HGB 8.3 (L) 04/30/2023     - Nephrology following with plan to start EPO; appreciate recs  - Urology and IR consulted for hematuria  - CBC q8hr  - Transfuse hgb <7

## 2023-04-30 NOTE — PLAN OF CARE
Problem: Physical Therapy  Goal: Physical Therapy Goal  Description: Pt tolerated PT session well.  Pt is functioning at their baseline and IND a this time and does not require skilled PT at this time.  Orders D/C.     All needs met, all questions answered.    Outcome: Met

## 2023-04-30 NOTE — PROGRESS NOTES
[facility] - Intensive Care ([address] Medicine  Progress Note    Patient Name: Ade Silva  MRN: [id number]  Patient Class: IP- Inpatient   Admission Date: 4/28/2023  Length of Stay: 2 days  Attending Physician: Leo Quiroz MD  Primary Care Provider: Primary Doctor No        Subjective:     Principal Problem:Acute renal failure superimposed on stage 5 chronic kidney disease, not on chronic dialysis        HPI:  Ade Silva is a 43 yr old female with autosomal dominant polycystic kidney disease, CKD V, recurrent UTIs presented with hematuria and nausea/vomiting. Patient is new to [place], recently moved from [place] to live with her mother here a few weeks ago. She followed with Nephrology at [facility] and PCP (records in care everywhere). She initially started having hematuria 2 weeks ago, which is not new for her. Been told in the past when one of her renal cysts ruptures she has intermittent hematuria. Typically resolves in a few days, however this time it did not. She is still able to make a good amount of urine with her kidney disease. This morning presented to ED because she was having persistent nausea and vomiting. She endorses increased warmth when she urinates recently, which is consistent with past UTI that she has had. She also has emesis with the nausea/vomiting that is associated with chest discomfort she also reports exertional weakness, she is unable to walk across room. Has been consistently smoking cigarettes for about 28 years, has not used alcohol since age 27, smokes marijuana, but denies any other drug use. Only home medication she takes is metoprolol, but recently stopped taking it.    She presented to the ED afebrile and HDS on RA. CBC notable for Hgb 4.6 (10.5 in 2022 per care everywhere), Hct 14.5 with unknown baseline. CMP notable for Na 134, Agap 26, , Cr 17.8, GFR 2.3. Lipase 107. . Troponin wnl. UA with 2+ protein, 3+ occult blood, 2+ leukocytes, and >100  RBC. VBG pH 7.16 and pCO2 25.7, BE -19. EKG NSR with prolonged qtc (516). CT Abd Pelvis showed enlarged kidneys consistent with findings of polycystic kidney disease.    Admitted to hospital medicine acute on chronic renal failure and anemia       Overview/Hospital Course:  Admitted to hospital medicine for acute kidney failure on CKD not on chronic dialysis. Hgb on admission 4.6 likely due to CKD and 2 weeks of gross hematuria prior to presentation. Received 3u pRBC with appropriate response on CBC. Right IJV trialysis placed. Nephrology consulted. Electrolytes and uremia improved. RPUS showed enlarged polycystic kidneys with majority of the normal renal parenchyma replaced by simple and minimally complex cysts as well as 3 solid heterogeneous appearing lesions within the left kidney with internal vascularity. Urology was consulted for findings on RPUS and continued gross dark hematuria while in hospital.       Interval History: Hematuria persists and patient required a unit of blood. Hemodynamically stable.     Review of Systems   Constitutional:  Positive for activity change and fatigue. Negative for chills and fever.   HENT:  Negative for congestion and sore throat.    Eyes:  Negative for photophobia and visual disturbance.   Respiratory:  Negative for cough and shortness of breath.    Cardiovascular:  Negative for chest pain, palpitations and leg swelling.   Gastrointestinal:  Positive for abdominal distention and abdominal pain. Negative for blood in stool, constipation, nausea and vomiting.   Genitourinary:  Positive for flank pain and hematuria. Negative for decreased urine volume, difficulty urinating, dysuria and frequency.   Musculoskeletal:  Positive for neck pain.   Skin:  Negative for wound.   Neurological:  Negative for dizziness and headaches.   Objective:     Vital Signs (Most Recent):  Temp: 98.6 °F (37 °C) (04/30/23 1201)  Pulse: 77 (04/30/23 1201)  Resp: 19 (04/30/23 1201)  BP: 108/65 (04/30/23  1201)  SpO2: 98 % (04/30/23 1201) Vital Signs (24h Range):  Temp:  [96.4 °F (35.8 °C)-98.6 °F (37 °C)] 98.6 °F (37 °C)  Pulse:  [72-90] 77  Resp:  [16-20] 19  SpO2:  [94 %-100 %] 98 %  BP: (105-124)/(57-76) 108/65     Weight: 90.7 kg (200 lb)  Body mass index is 28.7 kg/m².    Intake/Output Summary (Last 24 hours) at 4/30/2023 1408  Last data filed at 4/30/2023 0615  Gross per 24 hour   Intake --   Output 600 ml   Net -600 ml      Physical Exam  Vitals and nursing note reviewed.   Constitutional:       General: She is not in acute distress.     Appearance: Normal appearance. She is not ill-appearing.   HENT:      Head: Normocephalic and atraumatic.      Mouth/Throat:      Mouth: Mucous membranes are moist.   Eyes:      General: No scleral icterus.     Extraocular Movements: Extraocular movements intact.   Cardiovascular:      Rate and Rhythm: Normal rate and regular rhythm.      Pulses: Normal pulses.      Heart sounds: Normal heart sounds.   Pulmonary:      Effort: Pulmonary effort is normal.      Breath sounds: Wheezing present.   Abdominal:      General: There is distension.      Tenderness: There is abdominal tenderness. There is right CVA tenderness and left CVA tenderness. There is no guarding.   Musculoskeletal:      Right lower leg: No edema.      Left lower leg: No edema.   Skin:     General: Skin is warm and dry.   Neurological:      General: No focal deficit present.      Mental Status: She is alert and oriented to person, place, and time.       Significant Labs: All pertinent labs within the past 24 hours have been reviewed.  CBC:   Recent Labs   Lab 04/29/23  1811 04/30/23  0007 04/30/23  0957   WBC 8.96 7.13 5.94   HGB 7.5* 7.0* 8.3*   HCT 22.9* 21.8* 25.1*    220 187     CMP:   Recent Labs   Lab 04/29/23  1811 04/30/23  0007 04/30/23  0957    137 139   K 3.8 3.9 3.8    106 107   CO2 16* 17* 17*    88 161*   * 115* 113*   CREATININE 11.2* 11.3* 11.3*   CALCIUM 7.5* 7.4*  7.4*   PROT 6.3 5.9* 5.9*   ALBUMIN 2.7* 2.5* 2.5*   BILITOT 0.5 0.5 0.7   ALKPHOS 72 65 63   AST 8* 6* 5*   ALT 6* 5* 6*   ANIONGAP 16 14 15       Significant Imaging: I have reviewed all pertinent imaging results/findings within the past 24 hours.      Assessment/Plan:      * Acute renal failure superimposed on stage 5 chronic kidney disease, not on chronic dialysis    Creatinine 17.8 on admit, last was 8 in May 2022 via care everywhere.     Plan:   Lab Results   Component Value Date    CREATININE 10.6 (H) 04/29/2023     - Nephrology following; appreciate recs  - Nephrology planning for HD tonight; trialysis line placed in ED  - Avoid nephrotoxic agents such as NSAIDs, gadolinium and IV radiocontrast.  - Renally dose meds to current GFR.  - Maintain MAP > 65.        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 [place]. Last noted 8 mm in April 2016      Hypertension  Patient reports taking metoprolol and being off/on antihypertensives for some time  Normotensive on admission  Will consider anti hypertensive's if clinically indicated      Chest pain  Patient reports few days of chest pain and LUEVANO. Denies radiation, diaphoresis. Resolved in ED after famotidine and fentanyl. Further improved after blood transfusion complete.    - Troponin in ED wnl  - EKG without signs of acute ischemia in ED      Anemia  Admit with hemoglobin 4.6 Baseline unknown, last Hgb per care everywhere in 5/2022 10.3  3u pRBC ordered in ED  Likely secondary to CKD and hematuria; CT abdomen in ED without signs of bleeding.   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 [place] previously [place] [facility] [place] Findings consistent with diagnosis on CT abdomen pelvis in ED.  RP u/s with enlarged polycystic kidneys with majority of the normal renal parenchyma replaced by simple and minimally complex cysts.There are 3 solid heterogeneous appearing lesions within the left kidney with internal vascularity    - Urology consulted; appreciate recs  - Nephrology following; appreciate recs  - IR consulted for possible embolization. Will perform CTA triple phase of abdomen/pelvis      VTE Risk Mitigation (From admission, onward)         Ordered     IP VTE LOW RISK PATIENT  Once         04/28/23 1248     Place sequential compression device  Until discontinued         04/28/23 1248                Discharge Planning   NAVJOT: 5/11/2023     Code Status: Full Code   Is the patient medically ready for discharge?: No    Reason for patient still in hospital (select all that apply): Treatment and Consult recommendations                     Ramonita Baldwin DO  Department of Hospital Medicine   [facility] - Intensive Care ([facility]    "

## 2023-04-30 NOTE — PT/OT/SLP EVAL
Physical Therapy  Evaluation    and Discharge Note    Patient Name:  Ade Silva   MRN:  [id number]    Recommendations:     Discharge Recommendations:  other (see comments) (home)   Discharge Equipment Recommendations: none   Barriers to discharge: None    Assessment:     Ade Silva is a 43 y.o. female admitted with a medical diagnosis of Acute renal failure superimposed on stage 5 chronic kidney disease, not on chronic dialysis.  At this time, patient is functioning at their prior level of function and does not require further acute PT services.     Recent Surgery: * No surgery found *      Plan:     During this hospitalization, patient does not require further acute PT services.  Please re-consult if situation changes.      Subjective     Chief Complaint: decreased tolerance to functional mobility  Patient/Family Comments/goals: Return home  Pain/Comfort:  Pain Rating 1: 0/10    Patients cultural, spiritual, Faith conflicts given the current situation: no    Living Environment:  Patient lives with their mother in apartment, 3 steps with No hand rail, tub/shower.   Pt utilizes No AD for ambulation of all distances.    Prior to admission, patient was independent with ADLs.   DME owned: shower chair.   DME not currently used: n/a.   Upon discharge, patient will have assistance from family with 24/7 assist.     Objective:     Communicated with nursing prior to session.  Patient found HOB elevated with peripheral IV  upon PT entry to room.    General Precautions: Standard, fall   Orthopedic Precautions:N/A   Braces: N/A   Respiratory Status:        Exams:  Cognitive Exam:  Patient is oriented to Person, Place, Time, and Situation  RLE ROM: WFL  RLE Strength: WFL  LLE ROM: WFL  LLE Strength: WFL  Postural Exam:  Patient presented with the following abnormalities:    -       Rounded shoulders  -       Forward head  Sensation:    -       Intact    Functional Mobility:  Bed Mobility:  Rolling Right:  IND  Scooting: IND  Supine to Sit: IND  Sit to Supine: IND  Head of bed position: HOB elevated    Transfers:  Sit to Stand: IND with No AD    Gait: Patient ambulated 360' with No AD and IND. Patient demonstrates steady gait. All lines remained intact throughout ambulation trial.  Performs dynamic head turns with mobility    Balance: good    Therapeutic Activities:  Patient educated on role of acute care PT and PT POC, safety while in hospital including calling nurse for mobility, call light usage, benefits of out of bed mobility, breathing technique, fall risk, bed mobility , transfers, gait technique, positioning, posture, risks of prolonged bed rest, possible discharge disposition , and benefits of continued PT by explanation and demonstration.    Patient demonstrates good understanding of education provided this day.   Whiteboard updated    Therapeutic Exercises:  n/a    AM-PAC 6 CLICK MOBILITY  Total Score:24     Patient left HOB elevated with all lines intact, call button in reach, and RN notified.    GOALS:   Multidisciplinary Problems       Physical Therapy Goals       Not on file              Multidisciplinary Problems (Resolved)          Problem: Physical Therapy    Goal Priority Disciplines Outcome Goal Variances Interventions   Physical Therapy Goal   (Resolved)     PT, PT/OT Met     Description: Pt tolerated PT session well.  Pt is functioning at their baseline and IND a this time and does not require skilled PT at this time.  Orders D/C.     All needs met, all questions answered.                         History:     Past Medical History:   Diagnosis Date    Autosomal dominant polycystic kidney disease     Hypertension        Past Surgical History:   Procedure Laterality Date    NO PAST SURGERIES         Time Tracking:     PT Received On: 04/30/23  PT Start Time: 1053     PT Stop Time: 1101  PT Total Time (min): 8 min     Billable Minutes: Evaluation 8    04/30/2023

## 2023-04-30 NOTE — PLAN OF CARE
Problem: Adult Inpatient Plan of Care  Goal: Plan of Care Review  Outcome: Ongoing, Progressing  Goal: Patient-Specific Goal (Individualized)  Outcome: Ongoing, Progressing  Goal: Optimal Comfort and Wellbeing  Outcome: Ongoing, Progressing     Problem: Device-Related Complication Risk (Hemodialysis)  Goal: Safe, Effective Therapy Delivery  Outcome: Ongoing, Progressing     Problem: Hemodynamic Instability (Hemodialysis)  Goal: Effective Tissue Perfusion  Outcome: Ongoing, Progressing     Problem: Infection (Hemodialysis)  Goal: Absence of Infection Signs and Symptoms  Outcome: Ongoing, Progressing     Problem: Infection  Goal: Absence of Infection Signs and Symptoms  Outcome: Ongoing, Progressing     Problem: Fluid and Electrolyte Imbalance (Acute Kidney Injury/Impairment)  Goal: Fluid and Electrolyte Balance  Outcome: Ongoing, Progressing

## 2023-05-01 PROBLEM — Q61.3 POLYCYSTIC KIDNEY DISEASE: Chronic | Status: ACTIVE | Noted: 2023-04-28

## 2023-05-01 LAB
ABO + RH BLD: NORMAL
ALBUMIN SERPL BCP-MCNC: 2.4 G/DL (ref 3.5–5.2)
ALBUMIN SERPL BCP-MCNC: 2.5 G/DL (ref 3.5–5.2)
ALBUMIN SERPL BCP-MCNC: 2.6 G/DL (ref 3.5–5.2)
ALP SERPL-CCNC: 67 U/L (ref 55–135)
ALP SERPL-CCNC: 68 U/L (ref 55–135)
ALP SERPL-CCNC: 70 U/L (ref 55–135)
ALT SERPL W/O P-5'-P-CCNC: 5 U/L (ref 10–44)
ALT SERPL W/O P-5'-P-CCNC: 7 U/L (ref 10–44)
ALT SERPL W/O P-5'-P-CCNC: <5 U/L (ref 10–44)
ANION GAP SERPL CALC-SCNC: 11 MMOL/L (ref 8–16)
ANION GAP SERPL CALC-SCNC: 14 MMOL/L (ref 8–16)
ANION GAP SERPL CALC-SCNC: 16 MMOL/L (ref 8–16)
AST SERPL-CCNC: 6 U/L (ref 10–40)
AST SERPL-CCNC: 8 U/L (ref 10–40)
AST SERPL-CCNC: 8 U/L (ref 10–40)
BASOPHILS # BLD AUTO: 0.02 K/UL (ref 0–0.2)
BASOPHILS # BLD AUTO: 0.03 K/UL (ref 0–0.2)
BASOPHILS # BLD AUTO: 0.04 K/UL (ref 0–0.2)
BASOPHILS NFR BLD: 0.4 % (ref 0–1.9)
BASOPHILS NFR BLD: 0.6 % (ref 0–1.9)
BASOPHILS NFR BLD: 0.6 % (ref 0–1.9)
BILIRUB SERPL-MCNC: 0.4 MG/DL (ref 0.1–1)
BILIRUB SERPL-MCNC: 0.4 MG/DL (ref 0.1–1)
BILIRUB SERPL-MCNC: 0.5 MG/DL (ref 0.1–1)
BLD GP AB SCN CELLS X3 SERPL QL: NORMAL
BUN SERPL-MCNC: 109 MG/DL (ref 6–20)
BUN SERPL-MCNC: 110 MG/DL (ref 6–20)
BUN SERPL-MCNC: 77 MG/DL (ref 6–20)
CALCIUM SERPL-MCNC: 7.2 MG/DL (ref 8.7–10.5)
CALCIUM SERPL-MCNC: 7.3 MG/DL (ref 8.7–10.5)
CALCIUM SERPL-MCNC: 7.5 MG/DL (ref 8.7–10.5)
CHLORIDE SERPL-SCNC: 100 MMOL/L (ref 95–110)
CHLORIDE SERPL-SCNC: 103 MMOL/L (ref 95–110)
CHLORIDE SERPL-SCNC: 105 MMOL/L (ref 95–110)
CO2 SERPL-SCNC: 16 MMOL/L (ref 23–29)
CO2 SERPL-SCNC: 18 MMOL/L (ref 23–29)
CO2 SERPL-SCNC: 26 MMOL/L (ref 23–29)
CREAT SERPL-MCNC: 11.8 MG/DL (ref 0.5–1.4)
CREAT SERPL-MCNC: 11.8 MG/DL (ref 0.5–1.4)
CREAT SERPL-MCNC: 8.5 MG/DL (ref 0.5–1.4)
DIFFERENTIAL METHOD: ABNORMAL
EOSINOPHIL # BLD AUTO: 0.1 K/UL (ref 0–0.5)
EOSINOPHIL NFR BLD: 1.1 % (ref 0–8)
EOSINOPHIL NFR BLD: 1.1 % (ref 0–8)
EOSINOPHIL NFR BLD: 1.7 % (ref 0–8)
ERYTHROCYTE [DISTWIDTH] IN BLOOD BY AUTOMATED COUNT: 15.9 % (ref 11.5–14.5)
ERYTHROCYTE [DISTWIDTH] IN BLOOD BY AUTOMATED COUNT: 16.2 % (ref 11.5–14.5)
ERYTHROCYTE [DISTWIDTH] IN BLOOD BY AUTOMATED COUNT: 16.3 % (ref 11.5–14.5)
EST. GFR  (NO RACE VARIABLE): 3.8 ML/MIN/1.73 M^2
EST. GFR  (NO RACE VARIABLE): 3.8 ML/MIN/1.73 M^2
EST. GFR  (NO RACE VARIABLE): 5.6 ML/MIN/1.73 M^2
GLUCOSE SERPL-MCNC: 113 MG/DL (ref 70–110)
GLUCOSE SERPL-MCNC: 76 MG/DL (ref 70–110)
GLUCOSE SERPL-MCNC: 91 MG/DL (ref 70–110)
HCT VFR BLD AUTO: 22.2 % (ref 37–48.5)
HCT VFR BLD AUTO: 22.6 % (ref 37–48.5)
HCT VFR BLD AUTO: 25 % (ref 37–48.5)
HGB BLD-MCNC: 7.2 G/DL (ref 12–16)
HGB BLD-MCNC: 7.4 G/DL (ref 12–16)
HGB BLD-MCNC: 8.3 G/DL (ref 12–16)
IMM GRANULOCYTES # BLD AUTO: 0.02 K/UL (ref 0–0.04)
IMM GRANULOCYTES # BLD AUTO: 0.03 K/UL (ref 0–0.04)
IMM GRANULOCYTES # BLD AUTO: 0.04 K/UL (ref 0–0.04)
IMM GRANULOCYTES NFR BLD AUTO: 0.4 % (ref 0–0.5)
IMM GRANULOCYTES NFR BLD AUTO: 0.5 % (ref 0–0.5)
IMM GRANULOCYTES NFR BLD AUTO: 0.8 % (ref 0–0.5)
LYMPHOCYTES # BLD AUTO: 0.5 K/UL (ref 1–4.8)
LYMPHOCYTES # BLD AUTO: 0.5 K/UL (ref 1–4.8)
LYMPHOCYTES # BLD AUTO: 0.7 K/UL (ref 1–4.8)
LYMPHOCYTES NFR BLD: 10.8 % (ref 18–48)
LYMPHOCYTES NFR BLD: 9.1 % (ref 18–48)
LYMPHOCYTES NFR BLD: 9.9 % (ref 18–48)
MAGNESIUM SERPL-MCNC: 1.6 MG/DL (ref 1.6–2.6)
MCH RBC QN AUTO: 28.5 PG (ref 27–31)
MCH RBC QN AUTO: 28.6 PG (ref 27–31)
MCH RBC QN AUTO: 28.7 PG (ref 27–31)
MCHC RBC AUTO-ENTMCNC: 32.4 G/DL (ref 32–36)
MCHC RBC AUTO-ENTMCNC: 32.7 G/DL (ref 32–36)
MCHC RBC AUTO-ENTMCNC: 33.2 G/DL (ref 32–36)
MCV RBC AUTO: 86 FL (ref 82–98)
MCV RBC AUTO: 88 FL (ref 82–98)
MCV RBC AUTO: 88 FL (ref 82–98)
MONOCYTES # BLD AUTO: 0.6 K/UL (ref 0.3–1)
MONOCYTES # BLD AUTO: 0.7 K/UL (ref 0.3–1)
MONOCYTES # BLD AUTO: 0.7 K/UL (ref 0.3–1)
MONOCYTES NFR BLD: 11 % (ref 4–15)
MONOCYTES NFR BLD: 11.6 % (ref 4–15)
MONOCYTES NFR BLD: 12.5 % (ref 4–15)
NEUTROPHILS # BLD AUTO: 4 K/UL (ref 1.8–7.7)
NEUTROPHILS # BLD AUTO: 4.2 K/UL (ref 1.8–7.7)
NEUTROPHILS # BLD AUTO: 4.8 K/UL (ref 1.8–7.7)
NEUTROPHILS NFR BLD: 75.3 % (ref 38–73)
NEUTROPHILS NFR BLD: 75.4 % (ref 38–73)
NEUTROPHILS NFR BLD: 77.2 % (ref 38–73)
NRBC BLD-RTO: 0 /100 WBC
PHOSPHATE SERPL-MCNC: 8.5 MG/DL (ref 2.7–4.5)
PLATELET # BLD AUTO: 175 K/UL (ref 150–450)
PLATELET # BLD AUTO: 182 K/UL (ref 150–450)
PLATELET # BLD AUTO: 212 K/UL (ref 150–450)
PMV BLD AUTO: 9.5 FL (ref 9.2–12.9)
PMV BLD AUTO: 9.6 FL (ref 9.2–12.9)
PMV BLD AUTO: 9.8 FL (ref 9.2–12.9)
POTASSIUM SERPL-SCNC: 4.2 MMOL/L (ref 3.5–5.1)
POTASSIUM SERPL-SCNC: 4.4 MMOL/L (ref 3.5–5.1)
POTASSIUM SERPL-SCNC: 4.6 MMOL/L (ref 3.5–5.1)
PROT SERPL-MCNC: 5.8 G/DL (ref 6–8.4)
PROT SERPL-MCNC: 6 G/DL (ref 6–8.4)
PROT SERPL-MCNC: 6.3 G/DL (ref 6–8.4)
RBC # BLD AUTO: 2.53 M/UL (ref 4–5.4)
RBC # BLD AUTO: 2.58 M/UL (ref 4–5.4)
RBC # BLD AUTO: 2.9 M/UL (ref 4–5.4)
SODIUM SERPL-SCNC: 135 MMOL/L (ref 136–145)
SODIUM SERPL-SCNC: 137 MMOL/L (ref 136–145)
SODIUM SERPL-SCNC: 137 MMOL/L (ref 136–145)
SPECIMEN OUTDATE: NORMAL
WBC # BLD AUTO: 5.3 K/UL (ref 3.9–12.7)
WBC # BLD AUTO: 5.47 K/UL (ref 3.9–12.7)
WBC # BLD AUTO: 6.36 K/UL (ref 3.9–12.7)

## 2023-05-01 PROCEDURE — 63600175 PHARM REV CODE 636 W HCPCS: Mod: JG | Performed by: STUDENT IN AN ORGANIZED HEALTH CARE EDUCATION/TRAINING PROGRAM

## 2023-05-01 PROCEDURE — 83735 ASSAY OF MAGNESIUM: CPT

## 2023-05-01 PROCEDURE — 90935 PR HEMODIALYSIS, ONE EVALUATION: ICD-10-PCS | Mod: ,,, | Performed by: INTERNAL MEDICINE

## 2023-05-01 PROCEDURE — 80053 COMPREHEN METABOLIC PANEL: CPT

## 2023-05-01 PROCEDURE — 20600001 HC STEP DOWN PRIVATE ROOM

## 2023-05-01 PROCEDURE — 25000003 PHARM REV CODE 250

## 2023-05-01 PROCEDURE — 90935 HEMODIALYSIS ONE EVALUATION: CPT | Mod: ,,, | Performed by: INTERNAL MEDICINE

## 2023-05-01 PROCEDURE — 25000003 PHARM REV CODE 250: Performed by: STUDENT IN AN ORGANIZED HEALTH CARE EDUCATION/TRAINING PROGRAM

## 2023-05-01 PROCEDURE — 85025 COMPLETE CBC W/AUTO DIFF WBC: CPT | Mod: 91

## 2023-05-01 PROCEDURE — 84100 ASSAY OF PHOSPHORUS: CPT

## 2023-05-01 PROCEDURE — 86920 COMPATIBILITY TEST SPIN: CPT

## 2023-05-01 PROCEDURE — 86900 BLOOD TYPING SEROLOGIC ABO: CPT

## 2023-05-01 PROCEDURE — 36415 COLL VENOUS BLD VENIPUNCTURE: CPT | Performed by: STUDENT IN AN ORGANIZED HEALTH CARE EDUCATION/TRAINING PROGRAM

## 2023-05-01 PROCEDURE — 99233 PR SUBSEQUENT HOSPITAL CARE,LEVL III: ICD-10-PCS | Mod: ,,, | Performed by: STUDENT IN AN ORGANIZED HEALTH CARE EDUCATION/TRAINING PROGRAM

## 2023-05-01 PROCEDURE — 99233 SBSQ HOSP IP/OBS HIGH 50: CPT | Mod: ,,, | Performed by: STUDENT IN AN ORGANIZED HEALTH CARE EDUCATION/TRAINING PROGRAM

## 2023-05-01 PROCEDURE — 87340 HEPATITIS B SURFACE AG IA: CPT | Performed by: STUDENT IN AN ORGANIZED HEALTH CARE EDUCATION/TRAINING PROGRAM

## 2023-05-01 PROCEDURE — 80053 COMPREHEN METABOLIC PANEL: CPT | Mod: 91

## 2023-05-01 PROCEDURE — 36415 COLL VENOUS BLD VENIPUNCTURE: CPT

## 2023-05-01 PROCEDURE — 90935 HEMODIALYSIS ONE EVALUATION: CPT

## 2023-05-01 RX ORDER — SODIUM CHLORIDE 9 MG/ML
INJECTION, SOLUTION INTRAVENOUS ONCE
Status: COMPLETED | OUTPATIENT
Start: 2023-05-01 | End: 2023-05-01

## 2023-05-01 RX ORDER — LACTULOSE 10 G/15ML
200 SOLUTION ORAL; RECTAL ONCE
Status: DISCONTINUED | OUTPATIENT
Start: 2023-05-01 | End: 2023-05-01

## 2023-05-01 RX ORDER — DESMOPRESSIN ACETATE 4 UG/ML
27 INJECTION, SOLUTION INTRAVENOUS; SUBCUTANEOUS ONCE
Status: DISCONTINUED | OUTPATIENT
Start: 2023-05-01 | End: 2023-05-01

## 2023-05-01 RX ADMIN — SEVELAMER CARBONATE 1600 MG: 800 TABLET, FILM COATED ORAL at 04:05

## 2023-05-01 RX ADMIN — SENNOSIDES AND DOCUSATE SODIUM 1 TABLET: 8.6; 5 TABLET ORAL at 08:05

## 2023-05-01 RX ADMIN — SEVELAMER CARBONATE 1600 MG: 800 TABLET, FILM COATED ORAL at 02:05

## 2023-05-01 RX ADMIN — LIDOCAINE 1 PATCH: 50 PATCH TOPICAL at 06:05

## 2023-05-01 RX ADMIN — DESMOPRESSIN ACETATE 27 MCG: 4 SOLUTION INTRAVENOUS at 05:05

## 2023-05-01 RX ADMIN — SENNOSIDES AND DOCUSATE SODIUM 1 TABLET: 8.6; 5 TABLET ORAL at 10:05

## 2023-05-01 RX ADMIN — SEVELAMER CARBONATE 1600 MG: 800 TABLET, FILM COATED ORAL at 08:05

## 2023-05-01 RX ADMIN — POLYETHYLENE GLYCOL 3350 17 G: 17 POWDER, FOR SOLUTION ORAL at 08:05

## 2023-05-01 RX ADMIN — Medication 6 MG: at 10:05

## 2023-05-01 RX ADMIN — SODIUM CHLORIDE: 9 INJECTION, SOLUTION INTRAVENOUS at 12:05

## 2023-05-01 NOTE — ASSESSMENT & PLAN NOTE
Admit with hemoglobin 4.6 Baseline unknown, last Hgb per care everywhere in 5/2022 10.3  3u pRBC ordered in ED  Likely secondary to CKD and hematuria; CT abdomen in ED without signs of bleeding.   Patient has chronic intermittent hematuria from PCKD, although has not resolved the past 2 weeks  Gross hematuria continuing in hospital (photo in media tab)  Denies melena, hematochezia    Lab Results   Component Value Date    HGB 8.3 (L) 05/01/2023     - Nephrology following with plan to start EPO; appreciate recs  - Urology and IR consulted for hematuria  - CBC q8hr  - Transfuse hgb <7

## 2023-05-01 NOTE — PLAN OF CARE
SW attempted to complete a discharge assessment, however, Pt not present in the room. SW will attempt again at a later time.    REE Parsons LMSW  [facility]  [id number]

## 2023-05-01 NOTE — SUBJECTIVE & OBJECTIVE
Interval History: Hematuria persists with drop in Hgb today. CTA with contrast did not show extravasation.     Review of Systems   Constitutional:  Positive for activity change and fatigue. Negative for chills and fever.   HENT:  Negative for congestion and sore throat.    Eyes:  Negative for photophobia and visual disturbance.   Respiratory:  Negative for cough and shortness of breath.    Cardiovascular:  Negative for chest pain, palpitations and leg swelling.   Gastrointestinal:  Positive for abdominal distention and abdominal pain. Negative for blood in stool, constipation, nausea and vomiting.   Genitourinary:  Positive for flank pain and hematuria. Negative for decreased urine volume, difficulty urinating, dysuria and frequency.   Musculoskeletal:  Positive for neck pain.   Skin:  Negative for wound.   Neurological:  Negative for dizziness and headaches.   Objective:     Vital Signs (Most Recent):  Temp: 98.9 °F (37.2 °C) (05/01/23 0759)  Pulse: 82 (05/01/23 1315)  Resp: 18 (05/01/23 0759)  BP: 110/63 (05/01/23 1315)  SpO2: 97 % (05/01/23 0759) Vital Signs (24h Range):  Temp:  [98.1 °F (36.7 °C)-98.9 °F (37.2 °C)] 98.9 °F (37.2 °C)  Pulse:  [80-87] 82  Resp:  [17-18] 18  SpO2:  [94 %-100 %] 97 %  BP: (100-122)/(56-77) 110/63     Weight: 90.7 kg (200 lb)  Body mass index is 28.7 kg/m².  No intake or output data in the 24 hours ending 05/01/23 1330     Physical Exam  Vitals and nursing note reviewed.   Constitutional:       General: She is not in acute distress.     Appearance: Normal appearance. She is not ill-appearing.   HENT:      Head: Normocephalic and atraumatic.      Mouth/Throat:      Mouth: Mucous membranes are moist.   Eyes:      General: No scleral icterus.     Extraocular Movements: Extraocular movements intact.   Cardiovascular:      Rate and Rhythm: Normal rate and regular rhythm.      Pulses: Normal pulses.      Heart sounds: Normal heart sounds.   Pulmonary:      Effort: Pulmonary effort is  normal.      Breath sounds: Wheezing present.   Abdominal:      General: There is distension.      Tenderness: There is abdominal tenderness. There is right CVA tenderness and left CVA tenderness. There is no guarding.   Musculoskeletal:      Right lower leg: No edema.      Left lower leg: No edema.   Skin:     General: Skin is warm and dry.   Neurological:      General: No focal deficit present.      Mental Status: She is alert and oriented to person, place, and time.       Significant Labs: All pertinent labs within the past 24 hours have been reviewed.  CBC:   Recent Labs   Lab 04/30/23 1704 05/01/23 0013 05/01/23  0843   WBC 7.01 5.47 6.36   HGB 8.2* 7.4* 8.3*   HCT 24.6* 22.6* 25.0*    182 212       CMP:   Recent Labs   Lab 04/30/23 1704 05/01/23 0013 05/01/23  0843    137 135*   K 4.2 4.4 4.6    105 103   CO2 16* 18* 16*   GLU 92 91 76   * 109* 110*   CREATININE 11.3* 11.8* 11.8*   CALCIUM 7.3* 7.2* 7.3*   PROT 6.1 5.8* 6.3   ALBUMIN 2.5* 2.4* 2.6*   BILITOT 0.4 0.4 0.5   ALKPHOS 71 70 67   AST 6* 6* 8*   ALT 6* 5* <5*   ANIONGAP 15 14 16         Significant Imaging: I have reviewed all pertinent imaging results/findings within the past 24 hours.

## 2023-05-01 NOTE — PLAN OF CARE
Problem: Occupational Therapy  Goal: Occupational Therapy Goal  Description: No goals set 2/2 pt performing at baseline.     Outcome: Met

## 2023-05-01 NOTE — NURSING
2 hours HD tx completed. 1 L of fluid removed. Patient tolerated well. Blood returned. Catheter locked with NS. Clamped and capped. Report given to REE Hernandez RN.

## 2023-05-01 NOTE — PROGRESS NOTES
[facility] ([address] Medicine  Progress Note    Patient Name: Ade Silva  MRN: [id number]  Patient Class: IP- Inpatient   Admission Date: 4/28/2023  Length of Stay: 3 days  Attending Physician: Leo Quiroz MD  Primary Care Provider: Primary Doctor No        Subjective:     Principal Problem:Acute renal failure superimposed on stage 5 chronic kidney disease, not on chronic dialysis        HPI:  Ade Silva is a 43 yr old female with autosomal dominant polycystic kidney disease, CKD V, recurrent UTIs presented with hematuria and nausea/vomiting. Patient is new to [place], recently moved from [place] to live with her mother here a few weeks ago. She followed with Nephrology at [facility] [place] and PCP (records in care everywhere). She initially started having hematuria 2 weeks ago, which is not new for her. Been told in the past when one of her renal cysts ruptures she has intermittent hematuria. Typically resolves in a few days, however this time it did not. She is still able to make a good amount of urine with her kidney disease. This morning presented to ED because she was having persistent nausea and vomiting. She endorses increased warmth when she urinates recently, which is consistent with past UTI that she has had. She also has emesis with the nausea/vomiting that is associated with chest discomfort she also reports exertional weakness, she is unable to walk across room. Has been consistently smoking cigarettes for about 28 years, has not used alcohol since age 27, smokes marijuana, but denies any other drug use. Only home medication she takes is metoprolol, but recently stopped taking it.    She presented to the ED afebrile and HDS on RA. CBC notable for Hgb 4.6 (10.5 in 2022 per care everywhere), Hct 14.5 with unknown baseline. CMP notable for Na 134, Agap 26, , Cr 17.8, GFR 2.3. Lipase 107. . Troponin wnl. UA with 2+ protein, 3+ occult blood, 2+ leukocytes, and >100  RBC. VBG pH 7.16 and pCO2 25.7, BE -19. EKG NSR with prolonged qtc (516). CT Abd Pelvis showed enlarged kidneys consistent with findings of polycystic kidney disease.    Admitted to hospital medicine acute on chronic renal failure and anemia       Overview/Hospital Course:  Admitted to hospital medicine for acute kidney failure on CKD not on chronic dialysis. Hgb on admission 4.6 likely due to CKD and 2 weeks of gross hematuria prior to presentation. Received 3u pRBC with appropriate response on CBC. Right IJV trialysis placed. Nephrology consulted. Electrolytes and uremia improved. RPUS showed enlarged polycystic kidneys with majority of the normal renal parenchyma replaced by simple and minimally complex cysts as well as 3 solid heterogeneous appearing lesions within the left kidney with internal vascularity. Urology was consulted for findings on RPUS and continued gross dark hematuria while in hospital. CTA did not show any extravasation. IR consulted for possible embolization.       Interval History: Hematuria persists with drop in Hgb today. CTA with contrast did not show extravasation.     Review of Systems   Constitutional:  Positive for activity change and fatigue. Negative for chills and fever.   HENT:  Negative for congestion and sore throat.    Eyes:  Negative for photophobia and visual disturbance.   Respiratory:  Negative for cough and shortness of breath.    Cardiovascular:  Negative for chest pain, palpitations and leg swelling.   Gastrointestinal:  Positive for abdominal distention and abdominal pain. Negative for blood in stool, constipation, nausea and vomiting.   Genitourinary:  Positive for flank pain and hematuria. Negative for decreased urine volume, difficulty urinating, dysuria and frequency.   Musculoskeletal:  Positive for neck pain.   Skin:  Negative for wound.   Neurological:  Negative for dizziness and headaches.   Objective:     Vital Signs (Most Recent):  Temp: 98.9 °F (37.2 °C)  (05/01/23 0759)  Pulse: 82 (05/01/23 1315)  Resp: 18 (05/01/23 0759)  BP: 110/63 (05/01/23 1315)  SpO2: 97 % (05/01/23 0759) Vital Signs (24h Range):  Temp:  [98.1 °F (36.7 °C)-98.9 °F (37.2 °C)] 98.9 °F (37.2 °C)  Pulse:  [80-87] 82  Resp:  [17-18] 18  SpO2:  [94 %-100 %] 97 %  BP: (100-122)/(56-77) 110/63     Weight: 90.7 kg (200 lb)  Body mass index is 28.7 kg/m².  No intake or output data in the 24 hours ending 05/01/23 1330     Physical Exam  Vitals and nursing note reviewed.   Constitutional:       General: She is not in acute distress.     Appearance: Normal appearance. She is not ill-appearing.   HENT:      Head: Normocephalic and atraumatic.      Mouth/Throat:      Mouth: Mucous membranes are moist.   Eyes:      General: No scleral icterus.     Extraocular Movements: Extraocular movements intact.   Cardiovascular:      Rate and Rhythm: Normal rate and regular rhythm.      Pulses: Normal pulses.      Heart sounds: Normal heart sounds.   Pulmonary:      Effort: Pulmonary effort is normal.      Breath sounds: Wheezing present.   Abdominal:      General: There is distension.      Tenderness: There is abdominal tenderness. There is right CVA tenderness and left CVA tenderness. There is no guarding.   Musculoskeletal:      Right lower leg: No edema.      Left lower leg: No edema.   Skin:     General: Skin is warm and dry.   Neurological:      General: No focal deficit present.      Mental Status: She is alert and oriented to person, place, and time.       Significant Labs: All pertinent labs within the past 24 hours have been reviewed.  CBC:   Recent Labs   Lab 04/30/23  1704 05/01/23  0013 05/01/23  0843   WBC 7.01 5.47 6.36   HGB 8.2* 7.4* 8.3*   HCT 24.6* 22.6* 25.0*    182 212       CMP:   Recent Labs   Lab 04/30/23  1704 05/01/23  0013 05/01/23  0843    137 135*   K 4.2 4.4 4.6    105 103   CO2 16* 18* 16*   GLU 92 91 76   * 109* 110*   CREATININE 11.3* 11.8* 11.8*   CALCIUM 7.3*  7.2* 7.3*   PROT 6.1 5.8* 6.3   ALBUMIN 2.5* 2.4* 2.6*   BILITOT 0.4 0.4 0.5   ALKPHOS 71 70 67   AST 6* 6* 8*   ALT 6* 5* <5*   ANIONGAP 15 14 16         Significant Imaging: I have reviewed all pertinent imaging results/findings within the past 24 hours.      Assessment/Plan:      * Acute renal failure superimposed on stage 5 chronic kidney disease, not on chronic dialysis    Creatinine 17.8 on admit, last was 8 in May 2022 via care everywhere.     Plan:   Lab Results   Component Value Date    CREATININE 10.6 (H) 04/29/2023     - Nephrology following; appreciate recs  - Dialysis per Nephrology; trialysis line placed in ED  - Avoid nephrotoxic agents such as NSAIDs, gadolinium and IV radiocontrast.  - Renally dose meds to current GFR.  - Maintain MAP > 65.        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 Last noted 8 mm in April 2016      Hypertension  Patient reports taking metoprolol and being off/on antihypertensives for some time  Normotensive on admission  Will consider anti hypertensive's if clinically indicated      Chest pain  Patient reports few days of chest pain and LUEVANO. Denies radiation, diaphoresis. Resolved in ED after famotidine and fentanyl. Further improved after blood transfusion complete.    - Troponin in ED wnl  - EKG without signs of acute ischemia in ED      Anemia  Admit with hemoglobin 4.6 Baseline unknown, last Hgb per care everywhere in 5/2022 10.3  3u pRBC ordered in ED  Likely secondary to CKD and hematuria; CT abdomen in ED without signs of bleeding.   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 Was previously [place] [facility] [place]. Findings consistent with diagnosis on CT abdomen pelvis in ED.  RP u/s with enlarged polycystic kidneys with majority of the normal renal parenchyma replaced by simple and minimally complex cysts.There are 3 solid heterogeneous appearing lesions within the left kidney with internal vascularity  CTA triple phase of abdomen pelvis completed with no evidence of contrast extravasation to suggest active bleeding    - Urology consulted; appreciate recs  - Nephrology following; appreciate recs  - IR consulted for possible embolization      VTE Risk Mitigation (From admission, onward)         Ordered     IP VTE LOW RISK PATIENT  Once         04/28/23 1248     Place sequential compression device  Until discontinued         04/28/23 1248                Discharge Planning   NAVJOT: 5/11/2023     Code Status: Full Code   Is the patient medically ready for discharge?: No    Reason for patient still in hospital (select all that apply): Patient trending condition, Treatment, Consult recommendations and Pending disposition                     Ish Veronica DO  Department of Hospital Medicine   [facility] - Intensive Care ([place] [address])    "

## 2023-05-01 NOTE — ASSESSMENT & PLAN NOTE
Calcium   Date Value Ref Range Status   05/01/2023 7.3 (L) 8.7 - 10.5 mg/dL Final     Phosphorus   Date Value Ref Range Status   05/01/2023 8.5 (H) 2.7 - 4.5 mg/dL Final     PTH, Intact   Date Value Ref Range Status   04/28/2023 1,348.9 (H) 9.0 - 77.0 pg/mL Final     Start calcitrol .25

## 2023-05-01 NOTE — ASSESSMENT & PLAN NOTE
43 year old female presents with severe metabolic derangements in setting of CKD 5 from polycystic kidney disease      Plan for iHD second session 5/1: 2 hours  Will need permacath placement  -Keep MAP > 65  -Keep hemoglobin > 7  -Strict ins and outs  -Avoid nephrotoxic agents if possible  -Avoid drastic hemodynamic changes if possible

## 2023-05-01 NOTE — PROGRESS NOTES
"[facility] - [facility] ([place] [address])  Nephrology  Progress Note    Patient Name: Ade Silva  MRN: [id number]  Admission Date: 4/28/2023  Hospital Length of Stay: 3 days  Attending Provider: Leo Quiroz MD   Primary Care Physician: Primary Doctor No  Principal Problem:Acute renal failure superimposed on stage 5 chronic kidney disease, not on chronic dialysis    Subjective:     HPI: 43 year old female with a history of polycystic kidney disease, HTN, berry aneurysm sp coiling presents with nausea/fatigue and found to have a hemoglobin of 4.6, BUN of 191 with a CO2 of 6 with pH 7.161, alert and oriented. She recently moved from [place] where she last saw her nephrology prior a year ago. During that visit, she states that they had talked about dialysis, but she admits that she did not seek further care afterwards. Having come to [place] to be closer to family, she has applied for medicare and was seeking to establish here, however developed nausea/fatigue and presented to the ED.    She has been off of any medications for the last 6 months. She states that her whole paternal family had polycystic kidney disease and that her father had a kidney transplant that had failed in the past    Nephrology consulted for metabolic derangement      Interval History: continues to have hematuria, urology following  2nd session of HD today    Review of patient's allergies indicates:   Allergen Reactions    Aspirin Hives    Nickel     Penicillins Hives    Adhesive Rash     Adhesive/silk tape (type found on band aides). Can only use "Paper Tape"    Butalbital-acetaminophen-caff Nausea And Vomiting and Other (See Comments)     Dizziness (made pt feel sick)    Latex, natural rubber Rash     Current Facility-Administered Medications   Medication Frequency    0.9%  NaCl infusion Once    acetaminophen tablet 650 mg Q6H PRN    dextrose 10% bolus 125 mL 125 mL PRN    dextrose 10% bolus 250 mL 250 mL PRN    " dextrose 40 % gel 15,000 mg PRN    dextrose 40 % gel 30,000 mg PRN    epoetin kranthi-epbx injection 4,540 Units Once    glucagon (human recombinant) injection 1 mg PRN    LIDOcaine 5 % patch 2 patch Q24H    melatonin tablet 6 mg Nightly PRN    mupirocin 2 % ointment BID    naloxone 0.4 mg/mL injection 0.02 mg PRN    polyethylene glycol packet 17 g Daily    senna-docusate 8.6-50 mg per tablet 1 tablet BID    sevelamer carbonate tablet 1,600 mg TID WM    simethicone chewable tablet 80 mg TID PRN    sodium chloride 0.9% bolus 250 mL 250 mL PRN    sodium chloride 0.9% flush 10 mL Q12H PRN       Objective:     Vital Signs (Most Recent):  Temp: 98.9 °F (37.2 °C) (05/01/23 0759)  Pulse: 83 (05/01/23 1330)  Resp: 18 (05/01/23 0759)  BP: 115/71 (05/01/23 1330)  SpO2: 97 % (05/01/23 0759) Vital Signs (24h Range):  Temp:  [98.1 °F (36.7 °C)-98.9 °F (37.2 °C)] 98.9 °F (37.2 °C)  Pulse:  [80-87] 83  Resp:  [17-18] 18  SpO2:  [94 %-100 %] 97 %  BP: (100-122)/(56-77) 115/71     Weight: 90.7 kg (200 lb) (04/29/23 0006)  Body mass index is 28.7 kg/m².  Body surface area is 2.12 meters squared.    I/O last 3 completed shifts:  In: 240 [P.O.:240]  Out: 650 [Urine:650]    Physical Exam  Constitutional:       Appearance: She is well-developed.   HENT:      Head: Normocephalic and atraumatic.   Eyes:      Pupils: Pupils are equal, round, and reactive to light.   Cardiovascular:      Rate and Rhythm: Normal rate and regular rhythm.      Heart sounds: Normal heart sounds.   Pulmonary:      Effort: Pulmonary effort is normal.      Breath sounds: Normal breath sounds.   Abdominal:      General: Bowel sounds are normal.      Palpations: Abdomen is soft. There is mass.      Tenderness: There is no abdominal tenderness.   Musculoskeletal:         General: Normal range of motion.      Cervical back: Normal range of motion and neck supple.   Skin:     General: Skin is warm and dry.      Capillary Refill: Capillary refill takes less  than 2 seconds.   Neurological:      Mental Status: She is alert and oriented to person, place, and time.       Significant Labs:  All labs within the past 24 hours have been reviewed.     Significant Imaging:  Labs: Reviewed    Assessment/Plan:     Renal/  Chronic kidney disease-mineral and bone disorder  Calcium   Date Value Ref Range Status   05/01/2023 7.3 (L) 8.7 - 10.5 mg/dL Final     Phosphorus   Date Value Ref Range Status   05/01/2023 8.5 (H) 2.7 - 4.5 mg/dL Final     PTH, Intact   Date Value Ref Range Status   04/28/2023 1,348.9 (H) 9.0 - 77.0 pg/mL Final     Start calcitrol .25     CKD (chronic kidney disease) stage 5, GFR less than 15 ml/min  43 year old female presents with severe metabolic derangements in setting of CKD 5 from polycystic kidney disease      Plan for iHD second session 5/1: 2 hours  Will need permacath placement  -Keep MAP > 65  -Keep hemoglobin > 7  -Strict ins and outs  -Avoid nephrotoxic agents if possible  -Avoid drastic hemodynamic changes if possible      Polycystic kidney disease  History of polycystic kidney disease with hx of berry aneurysm        Oncology  Anemia  On admission hemoglobin of 4.6  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 CT abdomen without acute findings and on admission received 3 PRBCs    Iron stores adequate from PRBCs  Will need EPO initiation, 4k to start        Thank you for your consult. I will follow-up with patient. Please contact us if you have any additional questions.    Babatunde Palacios MD  Nephrology  [facility] - Intensive Care ([place]

## 2023-05-01 NOTE — PT/OT/SLP PROGRESS
Occupational Therapy Screen      Patient Name:  Ade Silva   MRN:  [id number]    2:58 PM Pt screened for OT needs and reports no anticipated difficulty for completing ADLs upon return home, reports no difficulty toileting or ambulating while in hospital, and reports no decreased endurance or anticipated need for DME at this time. Pt with no further acute OT needs; recommending d/c home with no DME. Please reconsult if pt experiences decline in functional abilities. D/C OT    5/1/2023

## 2023-05-01 NOTE — SUBJECTIVE & OBJECTIVE
"Interval History: continues to have hematuria, urology following  2nd session of HD today    Review of patient's allergies indicates:   Allergen Reactions    Aspirin Hives    Nickel     Penicillins Hives    Adhesive Rash     Adhesive/silk tape (type found on band aides). Can only use "Paper Tape"    Butalbital-acetaminophen-caff Nausea And Vomiting and Other (See Comments)     Dizziness (made pt feel sick)    Latex, natural rubber Rash     Current Facility-Administered Medications   Medication Frequency    0.9%  NaCl infusion Once    acetaminophen tablet 650 mg Q6H PRN    dextrose 10% bolus 125 mL 125 mL PRN    dextrose 10% bolus 250 mL 250 mL PRN    dextrose 40 % gel 15,000 mg PRN    dextrose 40 % gel 30,000 mg PRN    epoetin kranthi-epbx injection 4,540 Units Once    glucagon (human recombinant) injection 1 mg PRN    LIDOcaine 5 % patch 2 patch Q24H    melatonin tablet 6 mg Nightly PRN    mupirocin 2 % ointment BID    naloxone 0.4 mg/mL injection 0.02 mg PRN    polyethylene glycol packet 17 g Daily    senna-docusate 8.6-50 mg per tablet 1 tablet BID    sevelamer carbonate tablet 1,600 mg TID WM    simethicone chewable tablet 80 mg TID PRN    sodium chloride 0.9% bolus 250 mL 250 mL PRN    sodium chloride 0.9% flush 10 mL Q12H PRN       Objective:     Vital Signs (Most Recent):  Temp: 98.9 °F (37.2 °C) (05/01/23 0759)  Pulse: 83 (05/01/23 1330)  Resp: 18 (05/01/23 0759)  BP: 115/71 (05/01/23 1330)  SpO2: 97 % (05/01/23 0759) Vital Signs (24h Range):  Temp:  [98.1 °F (36.7 °C)-98.9 °F (37.2 °C)] 98.9 °F (37.2 °C)  Pulse:  [80-87] 83  Resp:  [17-18] 18  SpO2:  [94 %-100 %] 97 %  BP: (100-122)/(56-77) 115/71     Weight: 90.7 kg (200 lb) (04/29/23 0006)  Body mass index is 28.7 kg/m².  Body surface area is 2.12 meters squared.    I/O last 3 completed shifts:  In: 240 [P.O.:240]  Out: 650 [Urine:650]    Physical Exam  Constitutional:       Appearance: She is well-developed.   HENT:      Head: Normocephalic and atraumatic. "   Eyes:      Pupils: Pupils are equal, round, and reactive to light.   Cardiovascular:      Rate and Rhythm: Normal rate and regular rhythm.      Heart sounds: Normal heart sounds.   Pulmonary:      Effort: Pulmonary effort is normal.      Breath sounds: Normal breath sounds.   Abdominal:      General: Bowel sounds are normal.      Palpations: Abdomen is soft. There is mass.      Tenderness: There is no abdominal tenderness.   Musculoskeletal:         General: Normal range of motion.      Cervical back: Normal range of motion and neck supple.   Skin:     General: Skin is warm and dry.      Capillary Refill: Capillary refill takes less than 2 seconds.   Neurological:      Mental Status: She is alert and oriented to person, place, and time.       Significant Labs:  All labs within the past 24 hours have been reviewed.     Significant Imaging:  Labs: Reviewed

## 2023-05-01 NOTE — NURSING
Report received from REE Hernandez RN. Patient arrived to [facility] via bed. AAOx4. HD tx initiated via RIJ catheter.

## 2023-05-01 NOTE — ASSESSMENT & PLAN NOTE
Due to hx of Polycystic kidney disease   Followed with Neurosurgery at [facility] [place] prior to moving to [place]. Last noted 8 mm in April 2016

## 2023-05-01 NOTE — PLAN OF CARE
Problem: Adult Inpatient Plan of Care  Goal: Plan of Care Review  Outcome: Ongoing, Progressing  Goal: Patient-Specific Goal (Individualized)  Outcome: Ongoing, Progressing  Goal: Optimal Comfort and Wellbeing  Outcome: Ongoing, Progressing     Problem: Hemodynamic Instability (Hemodialysis)  Goal: Effective Tissue Perfusion  Outcome: Ongoing, Progressing     Problem: Infection (Hemodialysis)  Goal: Absence of Infection Signs and Symptoms  Outcome: Ongoing, Progressing     Problem: Infection  Goal: Absence of Infection Signs and Symptoms  Outcome: Ongoing, Progressing     Problem: Oral Intake Inadequate (Acute Kidney Injury/Impairment)  Goal: Optimal Nutrition Intake  Outcome: Ongoing, Progressing

## 2023-05-01 NOTE — ASSESSMENT & PLAN NOTE
Creatinine 17.8 on admit, last was 8 in May 2022 via care everywhere.     Plan:   Lab Results   Component Value Date    CREATININE 10.6 (H) 04/29/2023     - Nephrology following; appreciate recs  - Dialysis per Nephrology; trialysis line placed in ED  - Avoid nephrotoxic agents such as NSAIDs, gadolinium and IV radiocontrast.  - Renally dose meds to current GFR.  - Maintain MAP > 65.

## 2023-05-01 NOTE — ASSESSMENT & PLAN NOTE
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 Was previously [place] [facility] [place]. Findings consistent with diagnosis on CT abdomen pelvis in ED.  RP u/s with enlarged polycystic kidneys with majority of the normal renal parenchyma replaced by simple and minimally complex cysts.There are 3 solid heterogeneous appearing lesions within the left kidney with internal vascularity  CTA triple phase of abdomen pelvis completed with no evidence of contrast extravasation to suggest active bleeding    - Urology consulted; appreciate recs  - Nephrology following; appreciate recs  - IR consulted for possible embolization

## 2023-05-02 ENCOUNTER — TELEPHONE (OUTPATIENT)
Dept: UROLOGY | Facility: CLINIC | Age: 43
End: 2023-05-02
Payer: MEDICAID

## 2023-05-02 LAB
ALBUMIN SERPL BCP-MCNC: 2.5 G/DL (ref 3.5–5.2)
ALP SERPL-CCNC: 60 U/L (ref 55–135)
ALT SERPL W/O P-5'-P-CCNC: 6 U/L (ref 10–44)
ANION GAP SERPL CALC-SCNC: 12 MMOL/L (ref 8–16)
AST SERPL-CCNC: 8 U/L (ref 10–40)
BASOPHILS # BLD AUTO: 0.02 K/UL (ref 0–0.2)
BASOPHILS # BLD AUTO: 0.03 K/UL (ref 0–0.2)
BASOPHILS NFR BLD: 0.3 % (ref 0–1.9)
BASOPHILS NFR BLD: 0.5 % (ref 0–1.9)
BILIRUB SERPL-MCNC: 0.5 MG/DL (ref 0.1–1)
BLD PROD TYP BPU: NORMAL
BLOOD UNIT EXPIRATION DATE: NORMAL
BLOOD UNIT TYPE CODE: 1700
BLOOD UNIT TYPE: NORMAL
BUN SERPL-MCNC: 90 MG/DL (ref 6–20)
CALCIUM SERPL-MCNC: 7.3 MG/DL (ref 8.7–10.5)
CHLORIDE SERPL-SCNC: 99 MMOL/L (ref 95–110)
CO2 SERPL-SCNC: 24 MMOL/L (ref 23–29)
CODING SYSTEM: NORMAL
CREAT SERPL-MCNC: 9.5 MG/DL (ref 0.5–1.4)
CROSSMATCH INTERPRETATION: NORMAL
DIFFERENTIAL METHOD: ABNORMAL
DIFFERENTIAL METHOD: ABNORMAL
DISPENSE STATUS: NORMAL
EOSINOPHIL # BLD AUTO: 0.1 K/UL (ref 0–0.5)
EOSINOPHIL # BLD AUTO: 0.2 K/UL (ref 0–0.5)
EOSINOPHIL NFR BLD: 1.9 % (ref 0–8)
EOSINOPHIL NFR BLD: 2.4 % (ref 0–8)
ERYTHROCYTE [DISTWIDTH] IN BLOOD BY AUTOMATED COUNT: 15.4 % (ref 11.5–14.5)
ERYTHROCYTE [DISTWIDTH] IN BLOOD BY AUTOMATED COUNT: 16.2 % (ref 11.5–14.5)
EST. GFR  (NO RACE VARIABLE): 4.9 ML/MIN/1.73 M^2
GLUCOSE SERPL-MCNC: 83 MG/DL (ref 70–110)
HBV SURFACE AG SERPL QL IA: NORMAL
HCT VFR BLD AUTO: 21.2 % (ref 37–48.5)
HCT VFR BLD AUTO: 24.2 % (ref 37–48.5)
HGB BLD-MCNC: 6.9 G/DL (ref 12–16)
HGB BLD-MCNC: 7.7 G/DL (ref 12–16)
IMM GRANULOCYTES # BLD AUTO: 0.03 K/UL (ref 0–0.04)
IMM GRANULOCYTES # BLD AUTO: 0.04 K/UL (ref 0–0.04)
IMM GRANULOCYTES NFR BLD AUTO: 0.5 % (ref 0–0.5)
IMM GRANULOCYTES NFR BLD AUTO: 0.6 % (ref 0–0.5)
LYMPHOCYTES # BLD AUTO: 0.7 K/UL (ref 1–4.8)
LYMPHOCYTES # BLD AUTO: 0.7 K/UL (ref 1–4.8)
LYMPHOCYTES NFR BLD: 11.3 % (ref 18–48)
LYMPHOCYTES NFR BLD: 12.2 % (ref 18–48)
MAGNESIUM SERPL-MCNC: 1.7 MG/DL (ref 1.6–2.6)
MCH RBC QN AUTO: 28.6 PG (ref 27–31)
MCH RBC QN AUTO: 29.1 PG (ref 27–31)
MCHC RBC AUTO-ENTMCNC: 31.8 G/DL (ref 32–36)
MCHC RBC AUTO-ENTMCNC: 32.5 G/DL (ref 32–36)
MCV RBC AUTO: 90 FL (ref 82–98)
MCV RBC AUTO: 90 FL (ref 82–98)
MONOCYTES # BLD AUTO: 0.6 K/UL (ref 0.3–1)
MONOCYTES # BLD AUTO: 0.7 K/UL (ref 0.3–1)
MONOCYTES NFR BLD: 10.2 % (ref 4–15)
MONOCYTES NFR BLD: 12.6 % (ref 4–15)
NEUTROPHILS # BLD AUTO: 4.3 K/UL (ref 1.8–7.7)
NEUTROPHILS # BLD AUTO: 4.7 K/UL (ref 1.8–7.7)
NEUTROPHILS NFR BLD: 72.3 % (ref 38–73)
NEUTROPHILS NFR BLD: 75.2 % (ref 38–73)
NRBC BLD-RTO: 0 /100 WBC
NRBC BLD-RTO: 0 /100 WBC
PHOSPHATE SERPL-MCNC: 6.3 MG/DL (ref 2.7–4.5)
PLATELET # BLD AUTO: 158 K/UL (ref 150–450)
PLATELET # BLD AUTO: 177 K/UL (ref 150–450)
PMV BLD AUTO: 10 FL (ref 9.2–12.9)
PMV BLD AUTO: 9.5 FL (ref 9.2–12.9)
POTASSIUM SERPL-SCNC: 4.4 MMOL/L (ref 3.5–5.1)
PROT SERPL-MCNC: 5.8 G/DL (ref 6–8.4)
RBC # BLD AUTO: 2.37 M/UL (ref 4–5.4)
RBC # BLD AUTO: 2.69 M/UL (ref 4–5.4)
SODIUM SERPL-SCNC: 135 MMOL/L (ref 136–145)
TRANS ERYTHROCYTES VOL PATIENT: NORMAL ML
WBC # BLD AUTO: 5.88 K/UL (ref 3.9–12.7)
WBC # BLD AUTO: 6.27 K/UL (ref 3.9–12.7)

## 2023-05-02 PROCEDURE — 20600001 HC STEP DOWN PRIVATE ROOM

## 2023-05-02 PROCEDURE — 85025 COMPLETE CBC W/AUTO DIFF WBC: CPT | Mod: 91

## 2023-05-02 PROCEDURE — 84100 ASSAY OF PHOSPHORUS: CPT

## 2023-05-02 PROCEDURE — 99233 PR SUBSEQUENT HOSPITAL CARE,LEVL III: ICD-10-PCS | Mod: ,,, | Performed by: STUDENT IN AN ORGANIZED HEALTH CARE EDUCATION/TRAINING PROGRAM

## 2023-05-02 PROCEDURE — 86706 HEP B SURFACE ANTIBODY: CPT | Mod: 91

## 2023-05-02 PROCEDURE — 25000003 PHARM REV CODE 250: Performed by: STUDENT IN AN ORGANIZED HEALTH CARE EDUCATION/TRAINING PROGRAM

## 2023-05-02 PROCEDURE — 86704 HEP B CORE ANTIBODY TOTAL: CPT

## 2023-05-02 PROCEDURE — P9021 RED BLOOD CELLS UNIT: HCPCS

## 2023-05-02 PROCEDURE — 99233 SBSQ HOSP IP/OBS HIGH 50: CPT | Mod: ,,, | Performed by: STUDENT IN AN ORGANIZED HEALTH CARE EDUCATION/TRAINING PROGRAM

## 2023-05-02 PROCEDURE — 80053 COMPREHEN METABOLIC PANEL: CPT

## 2023-05-02 PROCEDURE — 25000003 PHARM REV CODE 250

## 2023-05-02 PROCEDURE — 83735 ASSAY OF MAGNESIUM: CPT

## 2023-05-02 PROCEDURE — 86705 HEP B CORE ANTIBODY IGM: CPT

## 2023-05-02 PROCEDURE — 36415 COLL VENOUS BLD VENIPUNCTURE: CPT

## 2023-05-02 RX ORDER — SODIUM CHLORIDE 9 MG/ML
INJECTION, SOLUTION INTRAVENOUS CONTINUOUS
Status: CANCELLED | OUTPATIENT
Start: 2023-05-02 | End: 2023-05-02

## 2023-05-02 RX ORDER — HYDROCODONE BITARTRATE AND ACETAMINOPHEN 500; 5 MG/1; MG/1
TABLET ORAL
Status: DISCONTINUED | OUTPATIENT
Start: 2023-05-02 | End: 2023-05-05 | Stop reason: HOSPADM

## 2023-05-02 RX ORDER — SODIUM CHLORIDE 0.9 % (FLUSH) 0.9 %
10 SYRINGE (ML) INJECTION
Status: DISCONTINUED | OUTPATIENT
Start: 2023-05-02 | End: 2023-05-05 | Stop reason: HOSPADM

## 2023-05-02 RX ADMIN — LIDOCAINE 2 PATCH: 50 PATCH TOPICAL at 08:05

## 2023-05-02 RX ADMIN — SENNOSIDES AND DOCUSATE SODIUM 1 TABLET: 8.6; 5 TABLET ORAL at 08:05

## 2023-05-02 RX ADMIN — MUPIROCIN: 20 OINTMENT TOPICAL at 08:05

## 2023-05-02 RX ADMIN — ACETAMINOPHEN 650 MG: 325 TABLET ORAL at 06:05

## 2023-05-02 RX ADMIN — SEVELAMER CARBONATE 1600 MG: 800 TABLET, FILM COATED ORAL at 12:05

## 2023-05-02 RX ADMIN — POLYETHYLENE GLYCOL 3350 17 G: 17 POWDER, FOR SOLUTION ORAL at 08:05

## 2023-05-02 RX ADMIN — SEVELAMER CARBONATE 1600 MG: 800 TABLET, FILM COATED ORAL at 08:05

## 2023-05-02 RX ADMIN — SEVELAMER CARBONATE 1600 MG: 800 TABLET, FILM COATED ORAL at 04:05

## 2023-05-02 NOTE — PLAN OF CARE
Problem: Device-Related Complication Risk (Hemodialysis)  Goal: Safe, Effective Therapy Delivery  Outcome: Ongoing, Progressing     Problem: Fluid and Electrolyte Imbalance (Acute Kidney Injury/Impairment)  Goal: Fluid and Electrolyte Balance  Outcome: Ongoing, Progressing     Problem: Oral Intake Inadequate (Acute Kidney Injury/Impairment)  Goal: Optimal Nutrition Intake  Outcome: Ongoing, Progressing     Problem: Renal Function Impairment (Acute Kidney Injury/Impairment)  Goal: Effective Renal Function  Outcome: Ongoing, Progressing

## 2023-05-02 NOTE — PROGRESS NOTES
[facility] ([address] Medicine  Progress Note    Patient Name: Ade Silva  MRN: [id number]  Patient Class: IP- Inpatient   Admission Date: 4/28/2023  Length of Stay: 4 days  Attending Physician: Leo Quiroz MD  Primary Care Provider: Primary Doctor No        Subjective:     Principal Problem:Acute renal failure superimposed on stage 5 chronic kidney disease, not on chronic dialysis        HPI:  Ade Silva is a 43 yr old female with autosomal dominant polycystic kidney disease, CKD V, recurrent UTIs presented with hematuria and nausea/vomiting. Patient is new to [place], recently moved from [place] to live with her mother here a few weeks ago. She followed with Nephrology at [facility] [place] and PCP (records in care everywhere). She initially started having hematuria 2 weeks ago, which is not new for her. Been told in the past when one of her renal cysts ruptures she has intermittent hematuria. Typically resolves in a few days, however this time it did not. She is still able to make a good amount of urine with her kidney disease. This morning presented to ED because she was having persistent nausea and vomiting. She endorses increased warmth when she urinates recently, which is consistent with past UTI that she has had. She also has emesis with the nausea/vomiting that is associated with chest discomfort she also reports exertional weakness, she is unable to walk across room. Has been consistently smoking cigarettes for about 28 years, has not used alcohol since age 27, smokes marijuana, but denies any other drug use. Only home medication she takes is metoprolol, but recently stopped taking it.    She presented to the ED afebrile and HDS on RA. CBC notable for Hgb 4.6 (10.5 in 2022 per care everywhere), Hct 14.5 with unknown baseline. CMP notable for Na 134, Agap 26, , Cr 17.8, GFR 2.3. Lipase 107. . Troponin wnl. UA with 2+ protein, 3+ occult blood, 2+ leukocytes, and >100  RBC. VBG pH 7.16 and pCO2 25.7, BE -19. EKG NSR with prolonged qtc (516). CT Abd Pelvis showed enlarged kidneys consistent with findings of polycystic kidney disease.    Admitted to hospital medicine acute on chronic renal failure and anemia       Overview/Hospital Course:  Admitted to hospital medicine for acute kidney failure on CKD not on chronic dialysis. Hgb on admission 4.6 likely due to CKD and 2 weeks of gross hematuria prior to presentation. Received 3u pRBC with appropriate response on CBC. Right IJV trialysis placed. Nephrology consulted. Electrolytes and uremia improved. RPUS showed enlarged polycystic kidneys with majority of the normal renal parenchyma replaced by simple and minimally complex cysts as well as 3 solid heterogeneous appearing lesions within the left kidney with internal vascularity. Urology was consulted for findings on RPUS and continued gross dark hematuria while in hospital. CTA did not show any extravasation. IR consulted for possible embolization, cannot empirically embolize. Attempted trial of DDVAP in setting of uremia without reduction in hematuria.       Interval History: Hematuria persists with Hgb <7 this AM.   275ml prior to going to restroom, 0 residual urine retention on bladder scan. Dark red no clots present      Review of Systems   Constitutional:  Positive for activity change and fatigue. Negative for chills and fever.   HENT:  Negative for congestion and sore throat.    Eyes:  Negative for photophobia and visual disturbance.   Respiratory:  Negative for cough and shortness of breath.    Cardiovascular:  Negative for chest pain, palpitations and leg swelling.   Gastrointestinal:  Positive for abdominal distention and abdominal pain. Negative for blood in stool, constipation, nausea and vomiting.   Genitourinary:  Positive for flank pain and hematuria. Negative for decreased urine volume, difficulty urinating, dysuria and frequency.   Musculoskeletal:  Positive for neck  pain.   Skin:  Negative for wound.   Neurological:  Negative for dizziness and headaches.   Objective:     Vital Signs (Most Recent):  Temp: 98.1 °F (36.7 °C) (05/02/23 1133)  Pulse: 64 (05/02/23 1133)  Resp: 18 (05/02/23 1133)  BP: 109/67 (05/02/23 1133)  SpO2: 100 % (05/02/23 1133) Vital Signs (24h Range):  Temp:  [97.2 °F (36.2 °C)-99.8 °F (37.7 °C)] 98.1 °F (36.7 °C)  Pulse:  [64-97] 64  Resp:  [17-18] 18  SpO2:  [94 %-100 %] 100 %  BP: ()/(54-76) 109/67     Weight: 90.7 kg (200 lb)  Body mass index is 28.7 kg/m².    Intake/Output Summary (Last 24 hours) at 5/2/2023 1205  Last data filed at 5/2/2023 1133  Gross per 24 hour   Intake 806.25 ml   Output 2000 ml   Net -1193.75 ml        Physical Exam  Vitals and nursing note reviewed.   Constitutional:       General: She is not in acute distress.     Appearance: Normal appearance. She is not ill-appearing.   HENT:      Head: Normocephalic and atraumatic.      Mouth/Throat:      Mouth: Mucous membranes are moist.   Eyes:      General: No scleral icterus.     Extraocular Movements: Extraocular movements intact.   Cardiovascular:      Rate and Rhythm: Normal rate and regular rhythm.      Pulses: Normal pulses.      Heart sounds: Normal heart sounds.   Pulmonary:      Effort: Pulmonary effort is normal.      Breath sounds: Wheezing present.   Abdominal:      General: There is distension.      Tenderness: There is abdominal tenderness. There is right CVA tenderness and left CVA tenderness. There is no guarding.   Musculoskeletal:      Right lower leg: No edema.      Left lower leg: No edema.   Skin:     General: Skin is warm and dry.   Neurological:      General: No focal deficit present.      Mental Status: She is alert and oriented to person, place, and time.       Significant Labs: All pertinent labs within the past 24 hours have been reviewed.  CBC:   Recent Labs   Lab 05/01/23  0843 05/01/23 2036 05/02/23  0518   WBC 6.36 5.30 5.88   HGB 8.3* 7.2* 6.9*   HCT  25.0* 22.2* 21.2*    175 177       CMP:   Recent Labs   Lab 05/01/23  0843 05/01/23 2036 05/02/23  0518   * 137 135*   K 4.6 4.2 4.4    100 99   CO2 16* 26 24   GLU 76 113* 83   * 77* 90*   CREATININE 11.8* 8.5* 9.5*   CALCIUM 7.3* 7.5* 7.3*   PROT 6.3 6.0 5.8*   ALBUMIN 2.6* 2.5* 2.5*   BILITOT 0.5 0.4 0.5   ALKPHOS 67 68 60   AST 8* 8* 8*   ALT <5* 7* 6*   ANIONGAP 16 11 12         Significant Imaging: I have reviewed all pertinent imaging results/findings within the past 24 hours.      Assessment/Plan:      * Acute renal failure superimposed on stage 5 chronic kidney disease, not on chronic dialysis    Creatinine 17.8 on admit, last was 8 in May 2022 via care everywhere.     Plan:   Lab Results   Component Value Date    CREATININE 10.6 (H) 04/29/2023     - Nephrology following; appreciate recs  - Dialysis per Nephrology; trialysis line placed in ED  - Avoid nephrotoxic agents such as NSAIDs, gadolinium and IV radiocontrast.  - Renally dose meds to current GFR.  - Maintain MAP > 65.        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 Last noted 8 mm in April 2016      Hypertension  Patient reports taking metoprolol and being off/on antihypertensives for some time  Normotensive on admission  Will consider anti hypertensive's if clinically indicated      Chest pain  Patient reports few days of chest pain and LUEVANO. Denies radiation, diaphoresis. Resolved in ED after famotidine and fentanyl. Further improved after blood transfusion complete.    - Troponin in ED wnl  - EKG without signs of acute ischemia in ED      Anemia  Admit with hemoglobin 4.6 Baseline unknown, last Hgb per care everywhere in 5/2022 10.3  3u pRBC ordered in ED  Likely secondary to CKD and hematuria; CT abdomen in ED without signs of bleeding.   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 Was previously followed by [place]phrology at [facility] [place] in [place]. Findings consistent with diagnosis on CT abdomen pelvis in ED.  RP u/s with enlarged polycystic kidneys with majority of the normal renal parenchyma replaced by simple and minimally complex cysts.There are 3 solid heterogeneous appearing lesions within the left kidney with internal vascularity  CTA triple phase of abdomen pelvis completed with no evidence of contrast extravasation to suggest active bleeding    - Urology consulted; appreciate recs  - Nephrology following; appreciate recs        VTE Risk Mitigation (From admission, onward)           Ordered     IP VTE LOW RISK PATIENT  Once         04/28/23 1248     Place sequential compression device  Until discontinued         04/28/23 1248                    Discharge Planning   NAVJOT: 5/6/2023     Code Status: Full Code   Is the patient medically ready for discharge?: No    Reason for patient still in hospital (select all that apply): Patient trending condition, Treatment and Consult recommendations                     Ish Veronica DO  Department of Hospital Medicine   [facility] - Int[facility] Care ([place] [id number])    "

## 2023-05-02 NOTE — ASSESSMENT & PLAN NOTE
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 Was previously followed by Nephrology at [facility] [place] in [place]. Findings consistent with diagnosis on CT abdomen pelvis in ED.  RP u/s with enlarged polycystic kidneys with majority of the normal renal parenchyma replaced by simple and minimally complex cysts.There are 3 solid heterogeneous appearing lesions within the left kidney with internal vascularity  CTA triple phase of abdomen pelvis completed with no evidence of contrast extravasation to suggest active bleeding    - Urology consulted; appreciate recs  - Nephrology following; appreciate recs

## 2023-05-02 NOTE — SUBJECTIVE & OBJECTIVE
Interval History: Hematuria persists with Hgb <7 this AM.     Review of Systems   Constitutional:  Positive for activity change and fatigue. Negative for chills and fever.   HENT:  Negative for congestion and sore throat.    Eyes:  Negative for photophobia and visual disturbance.   Respiratory:  Negative for cough and shortness of breath.    Cardiovascular:  Negative for chest pain, palpitations and leg swelling.   Gastrointestinal:  Positive for abdominal distention and abdominal pain. Negative for blood in stool, constipation, nausea and vomiting.   Genitourinary:  Positive for flank pain and hematuria. Negative for decreased urine volume, difficulty urinating, dysuria and frequency.   Musculoskeletal:  Positive for neck pain.   Skin:  Negative for wound.   Neurological:  Negative for dizziness and headaches.   Objective:     Vital Signs (Most Recent):  Temp: 98.1 °F (36.7 °C) (05/02/23 1133)  Pulse: 64 (05/02/23 1133)  Resp: 18 (05/02/23 1133)  BP: 109/67 (05/02/23 1133)  SpO2: 100 % (05/02/23 1133) Vital Signs (24h Range):  Temp:  [97.2 °F (36.2 °C)-99.8 °F (37.7 °C)] 98.1 °F (36.7 °C)  Pulse:  [64-97] 64  Resp:  [17-18] 18  SpO2:  [94 %-100 %] 100 %  BP: ()/(54-76) 109/67     Weight: 90.7 kg (200 lb)  Body mass index is 28.7 kg/m².    Intake/Output Summary (Last 24 hours) at 5/2/2023 1205  Last data filed at 5/2/2023 1133  Gross per 24 hour   Intake 806.25 ml   Output 2000 ml   Net -1193.75 ml        Physical Exam  Vitals and nursing note reviewed.   Constitutional:       General: She is not in acute distress.     Appearance: Normal appearance. She is not ill-appearing.   HENT:      Head: Normocephalic and atraumatic.      Mouth/Throat:      Mouth: Mucous membranes are moist.   Eyes:      General: No scleral icterus.     Extraocular Movements: Extraocular movements intact.   Cardiovascular:      Rate and Rhythm: Normal rate and regular rhythm.      Pulses: Normal pulses.      Heart sounds: Normal heart  sounds.   Pulmonary:      Effort: Pulmonary effort is normal.      Breath sounds: Wheezing present.   Abdominal:      General: There is distension.      Tenderness: There is abdominal tenderness. There is right CVA tenderness and left CVA tenderness. There is no guarding.   Musculoskeletal:      Right lower leg: No edema.      Left lower leg: No edema.   Skin:     General: Skin is warm and dry.   Neurological:      General: No focal deficit present.      Mental Status: She is alert and oriented to person, place, and time.       Significant Labs: All pertinent labs within the past 24 hours have been reviewed.  CBC:   Recent Labs   Lab 05/01/23 0843 05/01/23 2036 05/02/23 0518   WBC 6.36 5.30 5.88   HGB 8.3* 7.2* 6.9*   HCT 25.0* 22.2* 21.2*    175 177       CMP:   Recent Labs   Lab 05/01/23 0843 05/01/23 2036 05/02/23 0518   * 137 135*   K 4.6 4.2 4.4    100 99   CO2 16* 26 24   GLU 76 113* 83   * 77* 90*   CREATININE 11.8* 8.5* 9.5*   CALCIUM 7.3* 7.5* 7.3*   PROT 6.3 6.0 5.8*   ALBUMIN 2.6* 2.5* 2.5*   BILITOT 0.5 0.4 0.5   ALKPHOS 67 68 60   AST 8* 8* 8*   ALT <5* 7* 6*   ANIONGAP 16 11 12         Significant Imaging: I have reviewed all pertinent imaging results/findings within the past 24 hours.

## 2023-05-02 NOTE — H&P (VIEW-ONLY)
Chief Complaint   Patient presents with    Hematuria     X 2 weeks with chest pain, nausea, leg cramps, and weakness. PMH CKD, not on dialysis yet.    ADPKD with THEODORE on CKD V started on HD. For cuffed tunneled HD cath. Insertion.    HPI:    I have seen Ms. Poole today for insertion of cuffed tunneled HD cath. She is a known case of ADPKD with strong family history of CKD and dialysis. She developed kari hematuria for the past 3 weeks and she developed decline in her renal function. She was started on HD with Rt. IJ Trialysis. I have explained the procedure and all the complications including bleeding, pain and infection. I have answered all the questions. She agreed and signed the consent. A bed side ultrasound was performed which showed bilateral patent Internal jugular veins.   She will need blood transfusion before the procedure to keep her Hb > 7 gm/l. Please send PTT and repeat the CBC early morning tomorrow. She needs to stay NPO after midnight. Will try to add her to the cardiac cath. lab in Am.         Past Medical History:   Diagnosis Date    Autosomal dominant polycystic kidney disease     Hypertension        Past Surgical History:   Procedure Laterality Date    NO PAST SURGERIES         History reviewed. No pertinent family history.    Social History     Socioeconomic History    Marital status: Single   Tobacco Use    Smoking status: Every Day     Packs/day: 1.00     Years: 28.00     Pack years: 28.00     Types: Cigarettes   Substance and Sexual Activity    Alcohol use: Not Currently     Social Determinants of Health     Financial Resource Strain: Low Risk     Difficulty of Paying Living Expenses: Not hard at all   Food Insecurity: No Food Insecurity    Worried About Running Out of Food in the Last Year: Never true    Ran Out of Food in the Last Year: Never true   Transportation Needs: No Transportation Needs    Lack of Transportation (Medical): No    Lack of Transportation (Non-Medical): No   Physical  Activity: Sufficiently Active    Days of Exercise per Week: 5 days    Minutes of Exercise per Session: 30 min   Stress: Stress Concern Present    Feeling of Stress : To some extent   Social Connections: Socially Isolated    Frequency of Communication with Friends and Family: Never    Frequency of Social Gatherings with Friends and Family: Never    Attends [employer] Services: Never    Active Member of Clubs or Organizations: No    Attends Club or Organization Meetings: Never    Marital Status: Never    Housing Stability: Unknown    Unable to Pay for Housing in the Last Year: No    Unstable Housing in the Last Year: No       Current Facility-Administered Medications   Medication Dose Route Frequency Provider Last Rate Last Admin    0.9%  NaCl infusion (for blood administration)   Intravenous Q24H PRN Ish Veronica DO        acetaminophen tablet 650 mg  650 mg Oral Q6H PRN Papo Metz MD   650 mg at 05/02/23 0612    dextrose 10% bolus 125 mL 125 mL  12.5 g Intravenous PRN Ish Veronica, DO        dextrose 10% bolus 250 mL 250 mL  25 g Intravenous PRN Ish Veronica, DO        dextrose 40 % gel 15,000 mg  15 g Oral PRN Ish Veronica, DO        dextrose 40 % gel 30,000 mg  30 g Oral PRN Ish Veronica, DO        epoetin kranthi-epbx injection 4,540 Units  50 Units/kg Subcutaneous Once Babatunde Palacios MD        glucagon (human recombinant) injection 1 mg  1 mg Intramuscular PRN Ish Veronica DO        LIDOcaine 5 % patch 2 patch  2 patch Transdermal Q24H Ish Veronica DO   1 patch at 05/01/23 1835    melatonin tablet 6 mg  6 mg Oral Nightly PRN Ish Veronica DO   6 mg at 05/01/23 2233    mupirocin 2 % ointment   Nasal BID Babatunde Palacios MD   Given at 05/02/23 0839    naloxone 0.4 mg/mL injection 0.02 mg  0.02 mg Intravenous PRN Ish Veronica DO        polyethylene glycol packet 17 g  17 g Oral Daily Ramonita GIOVANNA Baldwin, DO   17 g at 05/02/23 0839    senna-docusate 8.6-50 mg per tablet 1 tablet  1  "tablet Oral BID Ramonita Baldwin DO   1 tablet at 05/02/23 0839    sevelamer carbonate tablet 1,600 mg  1,600 mg Oral TID WM Ramonita Baldwin DO   1,600 mg at 05/02/23 1221    simethicone chewable tablet 80 mg  1 tablet Oral TID PRN Cortney Ribera MD   80 mg at 04/30/23 0740    sodium chloride 0.9% bolus 250 mL 250 mL  250 mL Intravenous PRN Babatunde Palacios MD        sodium chloride 0.9% flush 10 mL  10 mL Intravenous Q12H PRN Ish Veronica DO        sodium chloride 0.9% flush 10 mL  10 mL Intravenous PRN Priya Grimm MD           [unfilled]    Review of patient's allergies indicates:   Allergen Reactions    Aspirin Hives    Nickel     Penicillins Hives    Adhesive Rash     Adhesive/silk tape (type found on band aides). Can only use "Paper Tape"    Butalbital-acetaminophen-caff Nausea And Vomiting and Other (See Comments)     Dizziness (made pt feel sick)    Latex, natural rubber Rash        Review of Systems   Constitutional: Negative.    HENT: Negative.     Eyes: Negative.    Respiratory: Negative.     Cardiovascular: Negative.    Gastrointestinal: Negative.    Genitourinary:  Positive for hematuria.   Musculoskeletal: Negative.    Skin: Negative.           Vitals:    05/02/23 0845 05/02/23 0930 05/02/23 1133 05/02/23 1233   BP: (!) 93/57 100/60 109/67 98/65   BP Location:   Right arm    Patient Position:   Lying    Pulse: 73 77 64 66   Resp: 18 18 18 19   Temp: 98.1 °F (36.7 °C) 97.9 °F (36.6 °C) 98.1 °F (36.7 °C) 98.1 °F (36.7 °C)   TempSrc: Oral Oral Oral Oral   SpO2: 98% 97% 100% 99%   Weight:       Height:            Physical Exam  Constitutional:       Appearance: Normal appearance.   HENT:      Head: Normocephalic.   Eyes:      Pupils: Pupils are equal, round, and reactive to light.   Cardiovascular:      Rate and Rhythm: Normal rate.      Heart sounds: Normal heart sounds.   Pulmonary:      Effort: Pulmonary effort is normal.   Musculoskeletal:      Cervical back: Neck supple.   Neurological:      Mental Status: " She is alert.        Problem List Items Addressed This Visit          Unprioritized    * (Principal) Acute renal failure superimposed on stage 5 chronic kidney disease, not on chronic dialysis    Anemia    Chest pain    Gross hematuria     Other Visit Diagnoses       Uremia    -  Primary    Symptomatic anemia        Acidosis               Labs:  CBC  PTT.    Impression and plan:    THEODORE with CKD V started on HD. Will plan to place a Rt. IJ cuffed tunneled HD catheter tomorrow. Please keep NPO after midnight, transfuse one packed RBC to keep Hb > 7gm/dl. Send blood for PTT today.    Acute persistent hematuria with Hb 4.6 on admission. Will need urgent urology consult.     ADPKD with strong family history of CKD and dialysis.          LIANA ZHANG.Isaiah. MD. SERAFIN. FACP.  , [employer]  Nephrology Consultant. [employer] [facility].   [address]. [address]th [place] [address].    email: [email or web address]  Tel: Office: [phone]      Rt. IJ.                 Lt IJ

## 2023-05-02 NOTE — TELEPHONE ENCOUNTER
I spoke with patient's mother and advised her to exspect a phone call from [facility] trying to schedule a appt for her daughter.

## 2023-05-02 NOTE — NURSING
A&Ox4.  Dialysis today.  Dark red urine produced.  c/o left flank pain.  wedge, heat packs, lidocaine patches, and positioning for pain control.  Patient hesitant to take medication for pain. BM today - enema declined by patient with doctor's  'ok'.  Patient expresses plan to utilize walking, coffee, and relaxation techniques to avoid hospital-constipation.

## 2023-05-02 NOTE — CONSULTS
Chief Complaint   Patient presents with    Hematuria     X 2 weeks with chest pain, nausea, leg cramps, and weakness. PMH CKD, not on dialysis yet.    ADPKD with THEODORE on CKD V started on HD. For cuffed tunneled HD cath. Insertion.    HPI:    I have seen Ms. Poole today for insertion of cuffed tunneled HD cath. She is a known case of ADPKD with strong family history of CKD and dialysis. She developed kari hematuria for the past 3 weeks and she developed decline in her renal function. She was started on HD with Rt. IJ Trialysis. I have explained the procedure and all the complications including bleeding, pain and infection. I have answered all the questions. She agreed and signed the consent. A bed side ultrasound was performed which showed bilateral patent Internal jugular veins.   She will need blood transfusion before the procedure to keep her Hb > 7 gm/l. Please send PTT and repeat the CBC early morning tomorrow. She needs to stay NPO after midnight. Will try to add her to the cardiac cath. lab in Am.         Past Medical History:   Diagnosis Date    Autosomal dominant polycystic kidney disease     Hypertension        Past Surgical History:   Procedure Laterality Date    NO PAST SURGERIES         History reviewed. No pertinent family history.    Social History     Socioeconomic History    Marital status: Single   Tobacco Use    Smoking status: Every Day     Packs/day: 1.00     Years: 28.00     Pack years: 28.00     Types: Cigarettes   Substance and Sexual Activity    Alcohol use: Not Currently     Social Determinants of Health     Financial Resource Strain: Low Risk     Difficulty of Paying Living Expenses: Not hard at all   Food Insecurity: No Food Insecurity    Worried About Running Out of Food in the Last Year: Never true    Ran Out of Food in the Last Year: Never true   Transportation Needs: No Transportation Needs    Lack of Transportation (Medical): No    Lack of Transportation (Non-Medical): No   Physical  Activity: Sufficiently Active    Days of Exercise per Week: 5 days    Minutes of Exercise per Session: 30 min   Stress: Stress Concern Present    Feeling of Stress : To some extent   Social Connections: Socially Isolated    Frequency of Communication with Friends and Family: Never    Frequency of Social Gatherings with Friends and Family: Never    Attends Latter day Services: Never    Active Member of Clubs or Organizations: No    Attends Club or Organization Meetings: Never    Marital Status: Never    Housing Stability: Unknown    Unable to Pay for Housing in the Last Year: No    Unstable Housing in the Last Year: No       Current Facility-Administered Medications   Medication Dose Route Frequency Provider Last Rate Last Admin    0.9%  NaCl infusion (for blood administration)   Intravenous Q24H PRN Ish Veronica DO        acetaminophen tablet 650 mg  650 mg Oral Q6H PRN Papo Metz MD   650 mg at 05/02/23 0612    dextrose 10% bolus 125 mL 125 mL  12.5 g Intravenous PRN Ish Veronica, DO        dextrose 10% bolus 250 mL 250 mL  25 g Intravenous PRN Ish Veronica, DO        dextrose 40 % gel 15,000 mg  15 g Oral PRN Ish Veronica, DO        dextrose 40 % gel 30,000 mg  30 g Oral PRN Ish Veronica, DO        epoetin kranthi-epbx injection 4,540 Units  50 Units/kg Subcutaneous Once Babatunde Palacios MD        glucagon (human recombinant) injection 1 mg  1 mg Intramuscular PRN Ish Veronica DO        LIDOcaine 5 % patch 2 patch  2 patch Transdermal Q24H Ish Veronica DO   1 patch at 05/01/23 1835    melatonin tablet 6 mg  6 mg Oral Nightly PRN Ish Veronica DO   6 mg at 05/01/23 2233    mupirocin 2 % ointment   Nasal BID Babatunde Palacios MD   Given at 05/02/23 0839    naloxone 0.4 mg/mL injection 0.02 mg  0.02 mg Intravenous PRN Ish Veronica DO        polyethylene glycol packet 17 g  17 g Oral Daily Ramonita GIOVANNA Baldwin, DO   17 g at 05/02/23 0839    senna-docusate 8.6-50 mg per tablet 1 tablet  1  "tablet Oral BID Ramonita Baldwin DO   1 tablet at 05/02/23 0839    sevelamer carbonate tablet 1,600 mg  1,600 mg Oral TID WM Ramonita Baldwin DO   1,600 mg at 05/02/23 1221    simethicone chewable tablet 80 mg  1 tablet Oral TID PRN Cortney Ribera MD   80 mg at 04/30/23 0740    sodium chloride 0.9% bolus 250 mL 250 mL  250 mL Intravenous PRN Babatunde Palacios MD        sodium chloride 0.9% flush 10 mL  10 mL Intravenous Q12H PRN Ish Veronica DO        sodium chloride 0.9% flush 10 mL  10 mL Intravenous PRN Priya Grimm MD           [unfilled]    Review of patient's allergies indicates:   Allergen Reactions    Aspirin Hives    Nickel     Penicillins Hives    Adhesive Rash     Adhesive/silk tape (type found on band aides). Can only use "Paper Tape"    Butalbital-acetaminophen-caff Nausea And Vomiting and Other (See Comments)     Dizziness (made pt feel sick)    Latex, natural rubber Rash        Review of Systems   Constitutional: Negative.    HENT: Negative.     Eyes: Negative.    Respiratory: Negative.     Cardiovascular: Negative.    Gastrointestinal: Negative.    Genitourinary:  Positive for hematuria.   Musculoskeletal: Negative.    Skin: Negative.           Vitals:    05/02/23 0845 05/02/23 0930 05/02/23 1133 05/02/23 1233   BP: (!) 93/57 100/60 109/67 98/65   BP Location:   Right arm    Patient Position:   Lying    Pulse: 73 77 64 66   Resp: 18 18 18 19   Temp: 98.1 °F (36.7 °C) 97.9 °F (36.6 °C) 98.1 °F (36.7 °C) 98.1 °F (36.7 °C)   TempSrc: Oral Oral Oral Oral   SpO2: 98% 97% 100% 99%   Weight:       Height:            Physical Exam  Constitutional:       Appearance: Normal appearance.   HENT:      Head: Normocephalic.   Eyes:      Pupils: Pupils are equal, round, and reactive to light.   Cardiovascular:      Rate and Rhythm: Normal rate.      Heart sounds: Normal heart sounds.   Pulmonary:      Effort: Pulmonary effort is normal.   Musculoskeletal:      Cervical back: Neck supple.   Neurological:      Mental Status: " She is alert.        Problem List Items Addressed This Visit          Unprioritized    * (Principal) Acute renal failure superimposed on stage 5 chronic kidney disease, not on chronic dialysis    Anemia    Chest pain    Gross hematuria     Other Visit Diagnoses       Uremia    -  Primary    Symptomatic anemia        Acidosis               Labs:  CBC  PTT.    Impression and plan:    THEODORE with CKD V started on HD. Will plan to place a Rt. IJ cuffed tunneled HD catheter tomorrow. Please keep NPO after midnight, transfuse one packed RBC to keep Hb > 7gm/dl. Send blood for PTT today.    Acute persistent hematuria with Hb 4.6 on admission. Will need urgent urology consult.     ADPKD with strong family history of CKD and dialysis.          LIANA ZHANG.Isaiah. MD. SERAFIN. FACP.  , [employer] / [employer] ([place]  Nephrology Consultant. [facility].   [address]. 5th [place] [address].    email: [email or web address].  Tel: Office: [phone]      Rt. IJ.                 Lt IJ

## 2023-05-02 NOTE — NURSING
2200: Patient still having blood in urine with no clots noted. Pictures were taken of the urine after desmopressin administration and saved under media in patient's chart.   Patient c/o left flank pain.    0605: Patient voided 300 cc of dark red bloody urine with no clots. Picture taken and saved under media.  Monitoring H&H.   Vital signs remained stable overnight.

## 2023-05-02 NOTE — ASSESSMENT & PLAN NOTE
Due to hx of Polycystic kidney disease   Followed with Neurosurgery at [facility] prior to moving to [place]. Last noted 8 mm in April 2016

## 2023-05-02 NOTE — PLAN OF CARE
[facility] - [facility] ([place] [address])  Initial Discharge Assessment       Primary Care Provider: Primary Doctor No    Admission Diagnosis: Acidosis [E87.20]  Uremia [N19]  Chest pain [R07.9]  Symptomatic anemia [D64.9]    Admission Date: 4/28/2023  Expected Discharge Date: 5/6/2023    Discharge Barriers Identified: None    Payor: MEDICAID / Plan: HEALTHY BLUE [place] / Product Type: Managed Medicaid /     Extended Emergency Contact Information  Primary Emergency Contact: Lang Silva  Mobile Phone: [phone]  Relation: Mother    Discharge Plan A: Home with family  Discharge Plan B: Home Health    No Pharmacies Listed    Initial Assessment (most recent)       Adult Discharge Assessment - 05/02/23 0800          Discharge Assessment    Assessment Type Discharge Planning Assessment     Source of Information patient     When was your last doctors appointment? --   need PCP    Reason For Admission Acute renal failure superimposed on stage 5 chronic kidney disease, not on chronic dialysis     People in Home parent(s)     Facility Arrived From: [facility] (Mother)   [phone] (Mobile     Prior to hospitilization cognitive status: Alert/Oriented     Current cognitive status: Alert/Oriented     Equipment Currently Used at Home none     Readmission within 30 days? No     Patient currently being followed by outpatient case management? No     Do you currently have service(s) that help you manage your care at home? No     Do you take prescription medications? Yes     Do you have prescription coverage? Yes     Coverage MEDICAID - [place]     Do you have any problems affording any of your prescribed medications? No     Is the patient taking medications as prescribed? yes     Who is going to help you get home at discharge? ShiraRochellejusta (Mother)   [phone] (Mobile     How do you get to doctors appointments? family or friend will provide;car, drives self     Are you on dialysis? Yes     Do you  take coumadin? No     Discharge Plan A Home with family     Discharge Plan B Home Health     DME Needed Upon Discharge  other (see comments)   TBD    Discharge Plan discussed with: Patient     Discharge Barriers Identified None        Physical Activity    On average, how many days per week do you engage in moderate to strenuous exercise (like a brisk walk)? 5 days     On average, how many minutes do you engage in exercise at this level? 30 min        Financial Resource Strain    How hard is it for you to pay for the very basics like food, housing, medical care, and heating? Not hard at all        Housing Stability    In the last 12 months, was there a time when you were not able to pay the mortgage or rent on time? No     In the last 12 months, was there a time when you did not have a steady place to sleep or slept in a shelter (including now)? No        Transportation Needs    In the past 12 months, has lack of transportation kept you from medical appointments or from getting medications? No     In the past 12 months, has lack of transportation kept you from meetings, work, or from getting things needed for daily living? No        Food Insecurity    Within the past 12 months, you worried that your food would run out before you got the money to buy more. Never true     Within the past 12 months, the food you bought just didn't last and you didn't have money to get more. Never true        Stress    Do you feel stress - tense, restless, nervous, or anxious, or unable to sleep at night because your mind is troubled all the time - these days? To some extent        Social Connections    In a typical week, how many times do you talk on the phone with family, friends, or neighbors? Never     How often do you get together with friends or relatives? Never     How often do you attend Nondenominational or Adventist services? Never     Do you belong to any clubs or organizations such as Nondenominational groups, unions, fraternal or athletic groups,  or school groups? No     How often do you attend meetings of the clubs or organizations you belong to? Never     Are you , , , , never , or living with a partner? Never         Alcohol Use    Q1: How often do you have a drink containing alcohol? Monthly or less     Q2: How many drinks containing alcohol do you have on a typical day when you are drinking? 1 or 2     Q3: How often do you have six or more drinks on one occasion? Never                        CM met with patient in room at bedside and patient admits living with her mother and being independent. Patient denies HME. DME, in home equipment or home oxygen.  Patient is new dialysis patient.  R IJ trialysis in place and Dialysis SW is arranging for HD chair.  Patient denies BTs.   Patient does drive and her mother will help after discharge and patient mother will  at time of discharge. Patient denies needing HH services ad Care at Home.   CM will cont to monitor and assess needs throughout hospitalization.      Patient contact information and  request for SSID.       Jessica Mustafa RN  Case Management  [facility]  [phone]

## 2023-05-02 NOTE — PLAN OF CARE
Problem: Adult Inpatient Plan of Care  Goal: Plan of Care Review  Outcome: Ongoing, Progressing  Goal: Patient-Specific Goal (Individualized)  Outcome: Ongoing, Progressing  Goal: Absence of Hospital-Acquired Illness or Injury  Outcome: Ongoing, Progressing  Goal: Optimal Comfort and Wellbeing  Outcome: Ongoing, Progressing  Goal: Readiness for Transition of Care  Outcome: Ongoing, Progressing     Problem: Device-Related Complication Risk (Hemodialysis)  Goal: Safe, Effective Therapy Delivery  Outcome: Ongoing, Progressing     Problem: Hemodynamic Instability (Hemodialysis)  Goal: Effective Tissue Perfusion  Outcome: Ongoing, Progressing     Problem: Infection (Hemodialysis)  Goal: Absence of Infection Signs and Symptoms  Outcome: Ongoing, Progressing     Problem: Infection  Goal: Absence of Infection Signs and Symptoms  Outcome: Ongoing, Progressing     Problem: Fluid and Electrolyte Imbalance (Acute Kidney Injury/Impairment)  Goal: Fluid and Electrolyte Balance  Outcome: Ongoing, Progressing     Problem: Oral Intake Inadequate (Acute Kidney Injury/Impairment)  Goal: Optimal Nutrition Intake  Outcome: Ongoing, Progressing     Problem: Renal Function Impairment (Acute Kidney Injury/Impairment)  Goal: Effective Renal Function  Outcome: Ongoing, Progressing     Patient receiving unit of blood this morning, no complaints

## 2023-05-02 NOTE — ASSESSMENT & PLAN NOTE
Admit with hemoglobin 4.6 Baseline unknown, last Hgb per care everywhere in 5/2022 10.3  3u pRBC ordered in ED  Likely secondary to CKD and hematuria; CT abdomen in ED without signs of bleeding.   Patient has chronic intermittent hematuria from PCKD, although has not resolved the past 2 weeks  Gross hematuria continuing in hospital (photo in media tab)  Denies melena, hematochezia    Lab Results   Component Value Date    HGB 6.9 (L) 05/02/2023     - Nephrology following with plan to start EPO; appreciate recs  - Urology and IR consulted for hematuria  - CBC q8hr  - Transfuse hgb <7

## 2023-05-03 LAB
ALBUMIN SERPL BCP-MCNC: 2.6 G/DL (ref 3.5–5.2)
ALP SERPL-CCNC: 69 U/L (ref 55–135)
ALT SERPL W/O P-5'-P-CCNC: 6 U/L (ref 10–44)
ANION GAP SERPL CALC-SCNC: 15 MMOL/L (ref 8–16)
APTT PPP: 24.2 SEC (ref 21–32)
AST SERPL-CCNC: 8 U/L (ref 10–40)
B-HCG UR QL: NEGATIVE
BACTERIA BLD CULT: NORMAL
BACTERIA BLD CULT: NORMAL
BASOPHILS # BLD AUTO: 0.02 K/UL (ref 0–0.2)
BASOPHILS # BLD AUTO: 0.02 K/UL (ref 0–0.2)
BASOPHILS NFR BLD: 0.3 % (ref 0–1.9)
BASOPHILS NFR BLD: 0.3 % (ref 0–1.9)
BILIRUB SERPL-MCNC: 0.5 MG/DL (ref 0.1–1)
BUN SERPL-MCNC: 109 MG/DL (ref 6–20)
CALCIUM SERPL-MCNC: 7.6 MG/DL (ref 8.7–10.5)
CHLORIDE SERPL-SCNC: 98 MMOL/L (ref 95–110)
CO2 SERPL-SCNC: 21 MMOL/L (ref 23–29)
CREAT SERPL-MCNC: 10.5 MG/DL (ref 0.5–1.4)
CTP QC/QA: YES
DIFFERENTIAL METHOD: ABNORMAL
DIFFERENTIAL METHOD: ABNORMAL
EOSINOPHIL # BLD AUTO: 0.1 K/UL (ref 0–0.5)
EOSINOPHIL # BLD AUTO: 0.1 K/UL (ref 0–0.5)
EOSINOPHIL NFR BLD: 1.7 % (ref 0–8)
EOSINOPHIL NFR BLD: 1.9 % (ref 0–8)
ERYTHROCYTE [DISTWIDTH] IN BLOOD BY AUTOMATED COUNT: 15.1 % (ref 11.5–14.5)
ERYTHROCYTE [DISTWIDTH] IN BLOOD BY AUTOMATED COUNT: 15.2 % (ref 11.5–14.5)
EST. GFR  (NO RACE VARIABLE): 4.3 ML/MIN/1.73 M^2
GLUCOSE SERPL-MCNC: 79 MG/DL (ref 70–110)
HBV CORE AB SERPL QL IA: REACTIVE
HBV CORE IGM SERPL QL IA: ABNORMAL
HBV SURFACE AB SER-ACNC: 595.27 MIU/ML
HBV SURFACE AB SER-ACNC: REACTIVE M[IU]/ML
HCT VFR BLD AUTO: 24.3 % (ref 37–48.5)
HCT VFR BLD AUTO: 25 % (ref 37–48.5)
HGB BLD-MCNC: 7.9 G/DL (ref 12–16)
HGB BLD-MCNC: 7.9 G/DL (ref 12–16)
IMM GRANULOCYTES # BLD AUTO: 0.02 K/UL (ref 0–0.04)
IMM GRANULOCYTES # BLD AUTO: 0.03 K/UL (ref 0–0.04)
IMM GRANULOCYTES NFR BLD AUTO: 0.3 % (ref 0–0.5)
IMM GRANULOCYTES NFR BLD AUTO: 0.5 % (ref 0–0.5)
LYMPHOCYTES # BLD AUTO: 0.8 K/UL (ref 1–4.8)
LYMPHOCYTES # BLD AUTO: 0.8 K/UL (ref 1–4.8)
LYMPHOCYTES NFR BLD: 11.5 % (ref 18–48)
LYMPHOCYTES NFR BLD: 11.6 % (ref 18–48)
MAGNESIUM SERPL-MCNC: 1.8 MG/DL (ref 1.6–2.6)
MCH RBC QN AUTO: 28.3 PG (ref 27–31)
MCH RBC QN AUTO: 28.9 PG (ref 27–31)
MCHC RBC AUTO-ENTMCNC: 31.6 G/DL (ref 32–36)
MCHC RBC AUTO-ENTMCNC: 32.5 G/DL (ref 32–36)
MCV RBC AUTO: 89 FL (ref 82–98)
MCV RBC AUTO: 90 FL (ref 82–98)
MONOCYTES # BLD AUTO: 0.6 K/UL (ref 0.3–1)
MONOCYTES # BLD AUTO: 0.6 K/UL (ref 0.3–1)
MONOCYTES NFR BLD: 8.7 % (ref 4–15)
MONOCYTES NFR BLD: 9.9 % (ref 4–15)
NEUTROPHILS # BLD AUTO: 4.9 K/UL (ref 1.8–7.7)
NEUTROPHILS # BLD AUTO: 5.4 K/UL (ref 1.8–7.7)
NEUTROPHILS NFR BLD: 75.8 % (ref 38–73)
NEUTROPHILS NFR BLD: 77.5 % (ref 38–73)
NRBC BLD-RTO: 0 /100 WBC
NRBC BLD-RTO: 0 /100 WBC
PHOSPHATE SERPL-MCNC: 7.8 MG/DL (ref 2.7–4.5)
PLATELET # BLD AUTO: 165 K/UL (ref 150–450)
PLATELET # BLD AUTO: 176 K/UL (ref 150–450)
PMV BLD AUTO: 9.9 FL (ref 9.2–12.9)
PMV BLD AUTO: 9.9 FL (ref 9.2–12.9)
POTASSIUM SERPL-SCNC: 4.7 MMOL/L (ref 3.5–5.1)
PROT SERPL-MCNC: 6.1 G/DL (ref 6–8.4)
RBC # BLD AUTO: 2.73 M/UL (ref 4–5.4)
RBC # BLD AUTO: 2.79 M/UL (ref 4–5.4)
SODIUM SERPL-SCNC: 134 MMOL/L (ref 136–145)
WBC # BLD AUTO: 6.46 K/UL (ref 3.9–12.7)
WBC # BLD AUTO: 7.02 K/UL (ref 3.9–12.7)

## 2023-05-03 PROCEDURE — C1750 CATH, HEMODIALYSIS,LONG-TERM: HCPCS | Performed by: INTERNAL MEDICINE

## 2023-05-03 PROCEDURE — 36558 INSERT TUNNELED CV CATH: CPT | Mod: RT,,, | Performed by: INTERNAL MEDICINE

## 2023-05-03 PROCEDURE — 36558 INSERT TUNNELED CV CATH: CPT | Mod: RT | Performed by: INTERNAL MEDICINE

## 2023-05-03 PROCEDURE — 84100 ASSAY OF PHOSPHORUS: CPT

## 2023-05-03 PROCEDURE — 99233 SBSQ HOSP IP/OBS HIGH 50: CPT | Mod: ,,, | Performed by: STUDENT IN AN ORGANIZED HEALTH CARE EDUCATION/TRAINING PROGRAM

## 2023-05-03 PROCEDURE — 76937 US GUIDE VASCULAR ACCESS: CPT | Performed by: INTERNAL MEDICINE

## 2023-05-03 PROCEDURE — 77001 FLUOROGUIDE FOR VEIN DEVICE: CPT | Performed by: INTERNAL MEDICINE

## 2023-05-03 PROCEDURE — 80053 COMPREHEN METABOLIC PANEL: CPT

## 2023-05-03 PROCEDURE — 25000003 PHARM REV CODE 250

## 2023-05-03 PROCEDURE — 25000003 PHARM REV CODE 250: Performed by: STUDENT IN AN ORGANIZED HEALTH CARE EDUCATION/TRAINING PROGRAM

## 2023-05-03 PROCEDURE — 36589 REMOVAL TUNNELED CV CATH: CPT | Mod: RT | Performed by: INTERNAL MEDICINE

## 2023-05-03 PROCEDURE — 87517 HEPATITIS B DNA QUANT: CPT

## 2023-05-03 PROCEDURE — 36415 COLL VENOUS BLD VENIPUNCTURE: CPT

## 2023-05-03 PROCEDURE — 99152 MOD SED SAME PHYS/QHP 5/>YRS: CPT | Performed by: INTERNAL MEDICINE

## 2023-05-03 PROCEDURE — 63600175 PHARM REV CODE 636 W HCPCS: Performed by: INTERNAL MEDICINE

## 2023-05-03 PROCEDURE — C1769 GUIDE WIRE: HCPCS | Performed by: INTERNAL MEDICINE

## 2023-05-03 PROCEDURE — 20600001 HC STEP DOWN PRIVATE ROOM

## 2023-05-03 PROCEDURE — 36558 PR INSERT TUNNELED CV CATH W/O PORT OR PUMP: ICD-10-PCS | Mod: RT,,, | Performed by: INTERNAL MEDICINE

## 2023-05-03 PROCEDURE — 81025 URINE PREGNANCY TEST: CPT | Performed by: INTERNAL MEDICINE

## 2023-05-03 PROCEDURE — 76937 PR  US GUIDE, VASCULAR ACCESS: ICD-10-PCS | Mod: 26,,, | Performed by: INTERNAL MEDICINE

## 2023-05-03 PROCEDURE — 83735 ASSAY OF MAGNESIUM: CPT

## 2023-05-03 PROCEDURE — 77001 CHG FLUOROGUIDE CNTRL VEN ACCESS,PLACE,REPLACE,REMOVE: ICD-10-PCS | Mod: 26,,, | Performed by: INTERNAL MEDICINE

## 2023-05-03 PROCEDURE — 76937 US GUIDE VASCULAR ACCESS: CPT | Mod: 26,,, | Performed by: INTERNAL MEDICINE

## 2023-05-03 PROCEDURE — 86480 TB TEST CELL IMMUN MEASURE: CPT

## 2023-05-03 PROCEDURE — 85730 THROMBOPLASTIN TIME PARTIAL: CPT | Performed by: INTERNAL MEDICINE

## 2023-05-03 PROCEDURE — 99233 PR SUBSEQUENT HOSPITAL CARE,LEVL III: ICD-10-PCS | Mod: ,,, | Performed by: STUDENT IN AN ORGANIZED HEALTH CARE EDUCATION/TRAINING PROGRAM

## 2023-05-03 PROCEDURE — 36415 COLL VENOUS BLD VENIPUNCTURE: CPT | Performed by: INTERNAL MEDICINE

## 2023-05-03 PROCEDURE — 77001 FLUOROGUIDE FOR VEIN DEVICE: CPT | Mod: 26,,, | Performed by: INTERNAL MEDICINE

## 2023-05-03 PROCEDURE — 85025 COMPLETE CBC W/AUTO DIFF WBC: CPT | Mod: 91

## 2023-05-03 PROCEDURE — 99153 MOD SED SAME PHYS/QHP EA: CPT | Performed by: INTERNAL MEDICINE

## 2023-05-03 DEVICE — 14F X 28CM PRE-CURVED14F X 28CM SPLIT CATH®III CATHETER SET (CUFF 23CM FROM TIP)SET (CUF
Type: IMPLANTABLE DEVICE | Site: HEART | Status: FUNCTIONAL
Brand: SPLIT-CATH®III

## 2023-05-03 RX ORDER — MIDAZOLAM HYDROCHLORIDE 1 MG/ML
INJECTION, SOLUTION INTRAMUSCULAR; INTRAVENOUS
Status: DISCONTINUED | OUTPATIENT
Start: 2023-05-03 | End: 2023-05-03 | Stop reason: HOSPADM

## 2023-05-03 RX ORDER — HEPARIN SODIUM 1000 [USP'U]/ML
INJECTION, SOLUTION INTRAVENOUS; SUBCUTANEOUS
Status: DISCONTINUED | OUTPATIENT
Start: 2023-05-03 | End: 2023-05-03 | Stop reason: HOSPADM

## 2023-05-03 RX ORDER — ACETAMINOPHEN 10 MG/ML
INJECTION, SOLUTION INTRAVENOUS
Status: DISCONTINUED | OUTPATIENT
Start: 2023-05-03 | End: 2023-05-05

## 2023-05-03 RX ORDER — OXYCODONE HYDROCHLORIDE 5 MG/1
5 TABLET ORAL EVERY 8 HOURS PRN
Status: DISCONTINUED | OUTPATIENT
Start: 2023-05-03 | End: 2023-05-05 | Stop reason: HOSPADM

## 2023-05-03 RX ORDER — FENTANYL CITRATE 50 UG/ML
INJECTION, SOLUTION INTRAMUSCULAR; INTRAVENOUS
Status: DISCONTINUED | OUTPATIENT
Start: 2023-05-03 | End: 2023-05-03 | Stop reason: HOSPADM

## 2023-05-03 RX ADMIN — SEVELAMER CARBONATE 1600 MG: 800 TABLET, FILM COATED ORAL at 01:05

## 2023-05-03 RX ADMIN — SEVELAMER CARBONATE 1600 MG: 800 TABLET, FILM COATED ORAL at 04:05

## 2023-05-03 RX ADMIN — ACETAMINOPHEN 650 MG: 325 TABLET ORAL at 04:05

## 2023-05-03 RX ADMIN — ACETAMINOPHEN 650 MG: 325 TABLET ORAL at 10:05

## 2023-05-03 RX ADMIN — LIDOCAINE 2 PATCH: 50 PATCH TOPICAL at 07:05

## 2023-05-03 RX ADMIN — OXYCODONE HYDROCHLORIDE 5 MG: 5 TABLET ORAL at 05:05

## 2023-05-03 NOTE — ASSESSMENT & PLAN NOTE
Admit with hemoglobin 4.6 Baseline unknown, last Hgb per care everywhere in 5/2022 10.3  3u pRBC ordered in ED  Likely secondary to CKD and hematuria; CT abdomen in ED without signs of bleeding.   Patient has chronic intermittent hematuria from PCKD, although has not resolved the past 2 weeks  Gross hematuria continuing in hospital (photo in media tab)  Denies melena, hematochezia    Lab Results   Component Value Date    HGB 7.9 (L) 05/03/2023     - Nephrology following with plan to start EPO; appreciate recs  - Urology and IR consulted for hematuria with no indication for inpatient intervention  - CBC q8hr  - Transfuse hgb <7

## 2023-05-03 NOTE — PATIENT CARE CONFERENCE
HEMODIALYSIS CATHETER CARE  Patients' instruction list      Hemodialysis catheter is one of the major vascular access to provide hemodialysis. Infection is one of its common complication. To maintain a safe and long-term use of your catheter without the requirement of replacement and exchange please follow the care instruction for your catheter:    Avoid getting water on the catheter. It is highly recommended to keep the catheter dry. During bathing use only sponging with towel around the catheter area. Lower part of the body can be washed but avoid splashing water over the catheter.     Avoid scratching or touching the skin close to the catheter.     It is recommended to change the dressing during dialysis sessions only. If you find your catheter dressing is wet or not clean, please call your dialysis unit to arrange exchange as soon as possible.     If you notice pain, redness or discharge from the exit site of the catheter please call your dialysis unit or your Nephrologist immediately to arrange examining the catheter and excluding any early signs of infection.     Your dialysis catheter should only be used for dialysis. Only your dialysis nurse can use the catheter. Do not accept any other health care personnel to use your hemodialysis catheter for any reason. This catheter is not intended to be used for medications, IV fluid or taking blood for labs.     The more strict you are in following the instructions the less will be the risk of infection and complications with your dialysis catheter.    We would like to wish you a great health and we will be glad to answer any question you have.      please communicate with [facility] or use [facility] to send us your questions.       LIANA ZHANG.Isaiah. MD. SERAFIN. CESAR.  , [employer] / [employer] ([place]  Nephrology Consultant. [facility].   [address]  [facility]. [address]th  [place] [address].    email: [email or web address].  Tel: Office: [phone]

## 2023-05-03 NOTE — ASSESSMENT & PLAN NOTE
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 Was previously followed by Nephrology at [facility] in [place]. Findings consistent with diagnosis on CT abdomen pelvis in ED.  RP u/s with enlarged polycystic kidneys with majority of the normal renal parenchyma replaced by simple and minimally complex cysts.There are 3 solid heterogeneous appearing lesions within the left kidney with internal vascularity  CTA triple phase of abdomen pelvis completed with no evidence of contrast extravasation to suggest active bleeding    - Urology consulted; appreciate recs  - Nephrology following; appreciate recs

## 2023-05-03 NOTE — OP NOTE
[facility] Cath Lab  Operative Note    Date of Procedure: 5/3/2023     Procedure: Procedure(s) (LRB):  Insertion, Catheter, Central Venous, Hemodialysis (Right)  REMOVAL, CATHETER, HEMODIALYSIS     Ade Silva  1980  [id number]    Pre-op Diagnosis: Acute renal failure superimposed on stage 5 chronic kidney disease, not on chronic dialysis [N17.9, N18.5]   Post-op Diagnosis: Acute renal failure superimposed on stage 5 chronic kidney disease, not on chronic dialysis [N17.9, N18.5]       RT. IJ cuffed tunneled HD catheter under local anesthesia with sedation under anesthesia. With real time ultrasound and fluoroscopy.     The patient was placed supine.    The Rt. IJ was localized with US.    The skin was sterilized with Chlorhexidine.    2% Xylocaine with Epinephrine was given to anesthetize the skin and subcutaneous tissues.    The Rt. IJ was punctured under real time ultrasound.  A guide wire was inserted and left.     The tunneled tract was anesthetized with 2% xylocaine with Epinephrine.    The catheter was tunneled under the skin.   The sheath was introduced in the SVC under fluoroscopy after  multiple sequential dilatations performed over the guide wire.   The catheter is inserted inside the sheath after removing the guidewire and the dilater.   The catheter position is confirmed with the fluoroscopy with the tip in the Rt. Atrium. The curve is checked and no angulation in the tract was noticed.    Flushing and blood suction were checked from both catheter ports.    1000 IU/ml Heparin was inserted according to the catheter dead space.   The wound was sutured.    No bleeding was noticed after observation.   Biopatch was placed over the wound and covered with Tegaderm.    Pressure dressing with rolled up 4 X4 gauze placed over the tunnel.    The procedure was smooth with no complications.        Complications: No  Condition: Good  FOLLOWUP: In patient.       The procedure is done under real time fluoroscopy.      The tip is in the  Rt. Atrium.    There was no bleeding.     You can use the catheter for dialysis after the observation period.            Removal of Rt J Trialysis catheter:   The patient was placed supine.    The skin was sterilized with Chlorhexidine.    The catheter was pulled.    Pressure was applied for 5 min.               Biopatch and Tegaderm were applied at the exit site.    Pressure dressing with rolled up 4 X4 over the tunnel was placed.    The procedure was smooth.        Complications: No  Condition: Good  FOLLOWUP:  In patient        LIANA ZHANG.Isaiah. MD. SERAFIN. FACP.  , [employer] / [employer] ([place]  Nephrology Consultant. [facility].   [address] [place] [address]. 5th [place] [address].    email: [email or web address]  Tel: Office: [phone]

## 2023-05-03 NOTE — MEDICAL/APP STUDENT
[facility] Medicine Student   Progress Note  [facility] 4    Admit Date: 4/28/2023  Hospital Day: 5  05/03/2023  10:44 AM    SUBJECTIVE:   Ms. Ade Silva is a 43 y.o. female with a relevant medical history of autosomal dominant polycystic kidney disease, CKD V, recurrent UTIs who is being followed up for Acute renal failure superimposed on stage 5 chronic kidney disease, not on chronic dialysis.    Interval history: NAEO. Pt continued to have hematuria. Will be getting cuffed tunneled HD cath today per nephrology   Review of Systems   Constitutional:  Negative for chills and fever.   Respiratory:  Negative for cough and shortness of breath.    Cardiovascular:  Negative for chest pain, palpitations and leg swelling.   Gastrointestinal:  Positive for abdominal pain. Negative for nausea and vomiting.   Genitourinary:  Positive for flank pain and hematuria.   Neurological:  Negative for dizziness and headaches.     Please refer to the H&P for past medical, family, and social history.    OBJECTIVE:     Vital Signs Recent:  Temp: 98.6 °F (37 °C) (05/03/23 0745)  Pulse: 79 (05/03/23 0745)  Resp: 18 (05/03/23 0745)  BP: 108/64 (05/03/23 0745)  SpO2: 96 % (05/03/23 0745)  Oxygen Documentation:    Flow (L/min): 2           Device (Oxygen Therapy): nasal cannula         Vital Signs Range (Last 24H):  Temp:  [98.1 °F (36.7 °C)-98.7 °F (37.1 °C)]   Pulse:  [64-95]   Resp:  [18-19]   BP: ()/(64-72)   SpO2:  [95 %-100 %]        I & O (Last 24H):  Intake/Output Summary (Last 24 hours) at 5/3/2023 1044  Last data filed at 5/3/2023 0606  Gross per 24 hour   Intake 706.25 ml   Output 925 ml   Net -218.75 ml        Physical Exam:  Physical Exam  Constitutional:       Appearance: Normal appearance.   Cardiovascular:      Rate and Rhythm: Normal rate and regular rhythm.      Pulses: Normal pulses.      Heart sounds: Normal heart sounds.   Pulmonary:      Effort: Pulmonary effort is normal.      Breath sounds: Normal breath  sounds.   Abdominal:      General: Abdomen is flat. There is distension.      Tenderness: There is abdominal tenderness.   Neurological:      Mental Status: She is alert.       Labs:   Recent Labs   Lab 05/01/23  0513 05/01/23  0843 05/01/23 2036 05/02/23 0518 05/03/23 0457   NA  --    < > 137 135* 134*   K  --    < > 4.2 4.4 4.7   CL  --    < > 100 99 98   CO2  --    < > 26 24 21*   BUN  --    < > 77* 90* 109*   CREATININE  --    < > 8.5* 9.5* 10.5*   GLU  --    < > 113* 83 79   CALCIUM  --    < > 7.5* 7.3* 7.6*   MG 1.6  --   --  1.7 1.8   PHOS 8.5*  --   --  6.3* 7.8*    < > = values in this interval not displayed.     Recent Labs   Lab 04/28/23  1329 04/28/23  1640 05/01/23 2036 05/02/23 0518 05/03/23 0457   ALKPHOS 67   < > 68 60 69   ALT 6*   < > 7* 6* 6*   AST 8*   < > 8* 8* 8*   ALBUMIN 2.9*   < > 2.5* 2.5* 2.6*   PROT 6.4   < > 6.0 5.8* 6.1   BILITOT 0.4   < > 0.4 0.5 0.5   INR 1.0  --   --   --   --     < > = values in this interval not displayed.     Recent Labs   Lab 05/02/23 0518 05/02/23  1904 05/03/23 0457   WBC 5.88 6.27 6.46   HGB 6.9* 7.7* 7.9*   HCT 21.2* 24.2* 25.0*    158 176       Diagnostic Results:  Hep B IgM Grayzone, HepB Core total Ab reactive, HepB Surface Ab 595.27  HepBsAg non-reactive    Pending bcx    Scheduled Meds:   epoetin kranthi-epbx  50 Units/kg Subcutaneous Once    LIDOcaine  2 patch Transdermal Q24H    polyethylene glycol  17 g Oral Daily    senna-docusate 8.6-50 mg  1 tablet Oral BID    sevelamer carbonate  1,600 mg Oral TID WM     Continuous Infusions:   acetaminophen       PRN Meds:sodium chloride, acetaminophen, acetaminophen, dextrose 10%, dextrose 10%, dextrose, dextrose, glucagon (human recombinant), melatonin, midazolam, naloxone, simethicone, sodium chloride 0.9%, sodium chloride 0.9%, sodium chloride 0.9%    ASSESSMENT/PLAN:   Ms. Ade Silva is a 43 y.o. female with a relevant medical history of autosomal dominant polycystic kidney disease, CKD V,  recurrent UTIs who is being followed up for Acute renal failure superimposed on stage 5 chronic kidney disease, not on chronic dialysis.    Active Hospital Problems    Diagnosis  POA    *Acute renal failure superimposed on stage 5 chronic kidney disease, not on chronic dialysis [N17.9, N18.5]  Unknown    Gross hematuria [R31.0]  Unknown    Hematuria [R31.9]  Yes    Polycystic kidney disease [Q61.3]  Not Applicable     Chronic    CKD (chronic kidney disease) stage 5, GFR less than 15 ml/min [N18.5]  Yes    Chronic kidney disease-mineral and bone disorder [N18.9, E83.9, M89.9]  Yes    Anemia [D64.9]  Yes    Chest pain [R07.9]  Unknown    Autosomal dominant polycystic kidney disease [Q61.2]  Not Applicable    Hypertension [I10]  Unknown    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  8 mm (4/19/2016)          Resolved Hospital Problems    Diagnosis Date Resolved POA    Urinary tract infection with hematuria [N39.0, R31.9] 04/28/2023 Unknown     *A/C Renal Failure superimposed on stage 5 CKD, not on dialysis   Cr 17.8 on admit, Cr 8 in May 2022   Cr is now improved to 10.5   trialysis line placed in ED     Plan:   - Nephrology will be putting in a R internal jugular cuffed tunneled HD cath today 5/3/2023  - PTT and CBC early morning   - SW is in the process of setting up HD chair for pt   - Avoid nephrotoxic agents such as NSAIDs, gadolinium and IV radiocontrast.  - Renally dose meds to current GFR.  - Maintain MAP > 65.    Gross hematuria  -see Polycystic Kidney Disease     Anemia  -Admit with hemoglobin 4.6 Baseline unknown, last Hgb per care everywhere in 5/2022 10.3. 3u pRBC ordered in ED  - Likely secondary to CKD and hematuria; CT abdomen in ED without signs of bleeding.   -Patient has chronic intermittent hematuria from PCKD, although has not resolved the past 2 weeks  Gross hematuria continuing in hospital (photo in media tab)  -Denies melena,  "hematochezia  -latest Hgb 7.9 5/3/23     Plan:   - Nephrology following with plan to start EPO; appreciate recs  - Urology and IR consulted for hematuria with no indication for inpatient intervention  - CBC q8hr  - Transfuse hgb <7      Hepatitis B  -HBsAg nonreactive, Anti HBc IgM grayzone, Anti Hbc Total Ab reactive, Anti HBs reactive 595.27    Plan:   -results likely suggests past resolved Hep B infection, will check quantitative Hep B DNA PCR due to grayzone anti Hbc to rule out active infection     CKD (chronic kidney disease) stage 5, GFR less than 15 ml/min  -See "Acute renal failure superimposed on stage 5 chronic kidney disease, not on chronic dialysis"     Polycystic kidney disease  -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 Was previously followed by Nephrology at [facility] in [place]. Findings consistent with diagnosis on CT abdomen pelvis in ED.  -RP u/s with enlarged polycystic kidneys with majority of the normal renal parenchyma replaced by simple and minimally complex cysts.There are 3 solid heterogeneous appearing lesions within the left kidney with internal vascularity  -CTA triple phase of abdomen pelvis completed with no evidence of contrast extravasation to suggest active bleeding    Plan:    -Urology consulted; appreciate recs  -Nephrology following; appreciate recs      Basilar artery aneurysm  -Due to hx of Polycystic kidney disease   -Followed with Neurosurgery at [facility] prior to moving to [place]. Last noted 8 mm in April 2016     Hypertension  -Patient reports taking metoprolol and being off/on antihypertensives for some time  -Normotensive on admission    Plan:   -Will consider anti hypertensive's if clinically indicated    Chest pain  -Patient reports few days of chest pain and LUEVANO. Denies radiation, diaphoresis. Resolved in ED after famotidine and fentanyl. Further improved after blood transfusion complete.  -Troponin in ED wnl  -EKG without signs of acute ischemia " in ED      Discharge planning:   Pt will need permacath placement and outpatient dialysis chair via SW prior to d/c

## 2023-05-03 NOTE — ASSESSMENT & PLAN NOTE
Creatinine 17.8 on admit, last was 8 in May 2022 via care everywhere.     Plan:   Lab Results   Component Value Date    CREATININE 10.6 (H) 04/29/2023     - Nephrology following; appreciate recs  - Dialysis per Nephrology; trialysis line placed in ED  - Tunneled cath placement with Interventional Nephrology 5/2/23  - Social work in process of setting up outpatient HD chair on discharge  - Avoid nephrotoxic agents such as NSAIDs, gadolinium and IV radiocontrast.  - Renally dose meds to current GFR.  - Maintain MAP > 65.

## 2023-05-03 NOTE — NURSING
Patient remained afebrile overnight. Urine output monitor. Last urine out Put this morning was a combination of yellow urine and blood. Picture attached.  Blood still in urine.   Patient remained NPO after midnight for dialysis catheter placement.   H&H stable this morning.         Media Information    File Link    Scan on 5/3/2023  6:04 AM by Chasidy Jones RN: 350 cc of serosanguinous looking urine        Key Information    Document ID File Type Document Type Description   [id number] Image Photographs 350 cc of serosanguinous looking urine     Import Information    Attached At Date Time User Dept   Encounter Level 5/3/2023  6:04 AM Chasidy Jones RN [facility] 14wt     Encounter    Hospital Encounter on 4/28/23

## 2023-05-03 NOTE — SUBJECTIVE & OBJECTIVE
Interval History: Hematuria persists, photos in media tab. Appropriate response to transfusion yday. PTT wnl, will get tunneled HD cath today with Interventional Nephrology.    Review of Systems   Constitutional:  Positive for activity change and fatigue. Negative for chills and fever.   HENT:  Negative for congestion and sore throat.    Eyes:  Negative for photophobia and visual disturbance.   Respiratory:  Negative for cough and shortness of breath.    Cardiovascular:  Negative for chest pain, palpitations and leg swelling.   Gastrointestinal:  Positive for abdominal distention and abdominal pain. Negative for blood in stool, constipation, nausea and vomiting.   Genitourinary:  Positive for flank pain and hematuria. Negative for decreased urine volume, difficulty urinating, dysuria and frequency.   Musculoskeletal:  Positive for neck pain.   Skin:  Negative for wound.   Neurological:  Negative for dizziness and headaches.   Objective:     Vital Signs (Most Recent):  Temp: 98.6 °F (37 °C) (05/03/23 0745)  Pulse: 79 (05/03/23 0745)  Resp: 18 (05/03/23 0745)  BP: 108/64 (05/03/23 0745)  SpO2: 96 % (05/03/23 0745) Vital Signs (24h Range):  Temp:  [97.9 °F (36.6 °C)-98.7 °F (37.1 °C)] 98.6 °F (37 °C)  Pulse:  [64-95] 79  Resp:  [18-19] 18  SpO2:  [95 %-100 %] 96 %  BP: ()/(60-72) 108/64     Weight: 90.7 kg (200 lb)  Body mass index is 28.7 kg/m².    Intake/Output Summary (Last 24 hours) at 5/3/2023 0858  Last data filed at 5/3/2023 0606  Gross per 24 hour   Intake 706.25 ml   Output 925 ml   Net -218.75 ml        Physical Exam  Vitals and nursing note reviewed.   Constitutional:       General: She is not in acute distress.     Appearance: Normal appearance. She is not ill-appearing.   HENT:      Head: Normocephalic and atraumatic.      Mouth/Throat:      Mouth: Mucous membranes are moist.   Eyes:      General: No scleral icterus.     Extraocular Movements: Extraocular movements intact.   Cardiovascular:      Rate  and Rhythm: Normal rate and regular rhythm.      Pulses: Normal pulses.      Heart sounds: Normal heart sounds.   Pulmonary:      Effort: Pulmonary effort is normal.      Breath sounds: Wheezing present.   Abdominal:      General: There is distension.      Tenderness: There is abdominal tenderness. There is right CVA tenderness and left CVA tenderness. There is no guarding.   Musculoskeletal:      Right lower leg: No edema.      Left lower leg: No edema.   Skin:     General: Skin is warm and dry.   Neurological:      General: No focal deficit present.      Mental Status: She is alert and oriented to person, place, and time.       Significant Labs: All pertinent labs within the past 24 hours have been reviewed.  CBC:   Recent Labs   Lab 05/02/23 0518 05/02/23  1904 05/03/23  0457   WBC 5.88 6.27 6.46   HGB 6.9* 7.7* 7.9*   HCT 21.2* 24.2* 25.0*    158 176       CMP:   Recent Labs   Lab 05/01/23 2036 05/02/23 0518 05/03/23  0457    135* 134*   K 4.2 4.4 4.7    99 98   CO2 26 24 21*   * 83 79   BUN 77* 90* 109*   CREATININE 8.5* 9.5* 10.5*   CALCIUM 7.5* 7.3* 7.6*   PROT 6.0 5.8* 6.1   ALBUMIN 2.5* 2.5* 2.6*   BILITOT 0.4 0.5 0.5   ALKPHOS 68 60 69   AST 8* 8* 8*   ALT 7* 6* 6*   ANIONGAP 11 12 15         Significant Imaging: I have reviewed all pertinent imaging results/findings within the past 24 hours.

## 2023-05-03 NOTE — NURSING
Patient wheeled off unit on stretcher for tunneled cath placement for HD. She has been NPO past midnight. No complaints no distress noted. Consents on chart prior to transfer to procedural area.

## 2023-05-03 NOTE — PLAN OF CARE
SW received information to set up HD chair for the patient.    Jessica Mustafa RN  Case Management  [facility]  [phone]

## 2023-05-03 NOTE — PLAN OF CARE
Problem: Hemodynamic Instability (Hemodialysis)  Goal: Effective Tissue Perfusion  Outcome: Ongoing, Progressing     Problem: Fluid and Electrolyte Imbalance (Acute Kidney Injury/Impairment)  Goal: Fluid and Electrolyte Balance  Outcome: Ongoing, Progressing     Problem: Renal Function Impairment (Acute Kidney Injury/Impairment)  Goal: Effective Renal Function  Outcome: Ongoing, Progressing

## 2023-05-03 NOTE — INTERVAL H&P NOTE
The patient has been examined and the H&P has been reviewed:    I concur with the findings and no changes have occurred since H&P was written.    Procedure risks, benefits and alternative options discussed and understood by patient/family.          Active Hospital Problems    Diagnosis  POA    *Acute renal failure superimposed on stage 5 chronic kidney disease, not on chronic dialysis [N17.9, N18.5]  Unknown    Gross hematuria [R31.0]  Unknown    Hematuria [R31.9]  Yes    Polycystic kidney disease [Q61.3]  Not Applicable     Chronic    CKD (chronic kidney disease) stage 5, GFR less than 15 ml/min [N18.5]  Yes    Chronic kidney disease-mineral and bone disorder [N18.9, E83.9, M89.9]  Yes    Anemia [D64.9]  Yes    Chest pain [R07.9]  Unknown    Autosomal dominant polycystic kidney disease [Q61.2]  Not Applicable    Hypertension [I10]  Unknown    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  8 mm (4/19/2016)          Resolved Hospital Problems    Diagnosis Date Resolved POA    Urinary tract infection with hematuria [N39.0, R31.9] 04/28/2023 Unknown

## 2023-05-03 NOTE — PROGRESS NOTES
[facility] Medicine  Progress Note    Patient Name: Ade Silva  MRN: [id number]  Patient Class: IP- Inpatient   Admission Date: 4/28/2023  Length of Stay: 5 days  Attending Physician: Leo Quiroz MD  Primary Care Provider: Primary Doctor No        Subjective:     Principal Problem:Acute renal failure superimposed on stage 5 chronic kidney disease, not on chronic dialysis        HPI:  Ade Silva is a 43 yr old female with autosomal dominant polycystic kidney disease, CKD V, recurrent UTIs presented with hematuria and nausea/vomiting. Patient is new to [place], recently moved from [place] to live with her mother here a few weeks ago. She followed with Nephrology at [facility] [place] [place] and PCP (records in care everywhere). She initially started having hematuria 2 weeks ago, which is not new for her. Been told in the past when one of her renal cysts ruptures she has intermittent hematuria. Typically resolves in a few days, however this time it did not. She is still able to make a good amount of urine with her kidney disease. This morning presented to ED because she was having persistent nausea and vomiting. She endorses increased warmth when she urinates recently, which is consistent with past UTI that she has had. She also has emesis with the nausea/vomiting that is associated with chest discomfort she also reports exertional weakness, she is unable to walk across room. Has been consistently smoking cigarettes for about 28 years, has not used alcohol since age 27, smokes marijuana, but denies any other drug use. Only home medication she takes is metoprolol, but recently stopped taking it.    She presented to the ED afebrile and HDS on RA. CBC notable for Hgb 4.6 (10.5 in 2022 per care everywhere), Hct 14.5 with unknown baseline. CMP notable for Na 134, Agap 26, , Cr 17.8, GFR 2.3. Lipase 107. . Troponin wnl. UA with 2+ protein, 3+ occult blood, 2+ leukocytes, and >100 RBC. VBG pH 7.16 and pCO2  25.7, BE -19. EKG NSR with prolonged qtc (516). CT Abd Pelvis showed enlarged kidneys consistent with findings of polycystic kidney disease.    Admitted to hospital medicine acute on chronic renal failure and anemia       Overview/Hospital Course:  Admitted to hospital medicine for acute kidney failure on CKD not on chronic dialysis. Hgb on admission 4.6 likely due to CKD and 2 weeks of gross hematuria prior to presentation. Received 3u pRBC with appropriate response on CBC. Right IJV trialysis placed. Nephrology consulted. Electrolytes and uremia improved. RPUS showed enlarged polycystic kidneys with majority of the normal renal parenchyma replaced by simple and minimally complex cysts as well as 3 solid heterogeneous appearing lesions within the left kidney with internal vascularity. Urology was consulted for findings on RPUS and continued gross dark hematuria while in hospital. CTA did not show any extravasation. IR consulted for possible embolization, cannot empirically embolize. Attempted trial of DDVAP in setting of uremia without reduction in hematuria. Hematuria continued, transfused for Hgb <7 on 5/2. Urology felt that continued hematuria was not cause of anemia. Recommended outpatient hematuria workup at [facility] on discharge. Interventional Nephrology consulted for tunneled HD cath. SW working on outpatient HD chair.      Interval History: Hematuria persists, photos in media tab. Appropriate response to transfusion yday. PTT wnl, will get tunneled HD cath today with Interventional Nephrology.    Review of Systems   Constitutional:  Positive for activity change and fatigue. Negative for chills and fever.   HENT:  Negative for congestion and sore throat.    Eyes:  Negative for photophobia and visual disturbance.   Respiratory:  Negative for cough and shortness of breath.    Cardiovascular:  Negative for chest pain, palpitations and leg swelling.   Gastrointestinal:  Positive for abdominal distention and  abdominal pain. Negative for blood in stool, constipation, nausea and vomiting.   Genitourinary:  Positive for flank pain and hematuria. Negative for decreased urine volume, difficulty urinating, dysuria and frequency.   Musculoskeletal:  Positive for neck pain.   Skin:  Negative for wound.   Neurological:  Negative for dizziness and headaches.   Objective:     Vital Signs (Most Recent):  Temp: 98.6 °F (37 °C) (05/03/23 0745)  Pulse: 79 (05/03/23 0745)  Resp: 18 (05/03/23 0745)  BP: 108/64 (05/03/23 0745)  SpO2: 96 % (05/03/23 0745) Vital Signs (24h Range):  Temp:  [97.9 °F (36.6 °C)-98.7 °F (37.1 °C)] 98.6 °F (37 °C)  Pulse:  [64-95] 79  Resp:  [18-19] 18  SpO2:  [95 %-100 %] 96 %  BP: ()/(60-72) 108/64     Weight: 90.7 kg (200 lb)  Body mass index is 28.7 kg/m².    Intake/Output Summary (Last 24 hours) at 5/3/2023 0858  Last data filed at 5/3/2023 0606  Gross per 24 hour   Intake 706.25 ml   Output 925 ml   Net -218.75 ml        Physical Exam  Vitals and nursing note reviewed.   Constitutional:       General: She is not in acute distress.     Appearance: Normal appearance. She is not ill-appearing.   HENT:      Head: Normocephalic and atraumatic.      Mouth/Throat:      Mouth: Mucous membranes are moist.   Eyes:      General: No scleral icterus.     Extraocular Movements: Extraocular movements intact.   Cardiovascular:      Rate and Rhythm: Normal rate and regular rhythm.      Pulses: Normal pulses.      Heart sounds: Normal heart sounds.   Pulmonary:      Effort: Pulmonary effort is normal.      Breath sounds: Wheezing present.   Abdominal:      General: There is distension.      Tenderness: There is abdominal tenderness. There is right CVA tenderness and left CVA tenderness. There is no guarding.   Musculoskeletal:      Right lower leg: No edema.      Left lower leg: No edema.   Skin:     General: Skin is warm and dry.   Neurological:      General: No focal deficit present.      Mental Status: She is alert  and oriented to person, place, and time.       Significant Labs: All pertinent labs within the past 24 hours have been reviewed.  CBC:   Recent Labs   Lab 05/02/23 0518 05/02/23  1904 05/03/23 0457   WBC 5.88 6.27 6.46   HGB 6.9* 7.7* 7.9*   HCT 21.2* 24.2* 25.0*    158 176       CMP:   Recent Labs   Lab 05/01/23 2036 05/02/23 0518 05/03/23 0457    135* 134*   K 4.2 4.4 4.7    99 98   CO2 26 24 21*   * 83 79   BUN 77* 90* 109*   CREATININE 8.5* 9.5* 10.5*   CALCIUM 7.5* 7.3* 7.6*   PROT 6.0 5.8* 6.1   ALBUMIN 2.5* 2.5* 2.6*   BILITOT 0.4 0.5 0.5   ALKPHOS 68 60 69   AST 8* 8* 8*   ALT 7* 6* 6*   ANIONGAP 11 12 15         Significant Imaging: I have reviewed all pertinent imaging results/findings within the past 24 hours.      Assessment/Plan:      * Acute renal failure superimposed on stage 5 chronic kidney disease, not on chronic dialysis    Creatinine 17.8 on admit, last was 8 in May 2022 via care everywhere.     Plan:   Lab Results   Component Value Date    CREATININE 10.6 (H) 04/29/2023     - Nephrology following; appreciate recs  - Dialysis per Nephrology; trialysis line placed in ED  - Tunneled cath placement with Interventional Nephrology 5/2/23  - Social work in process of setting up outpatient HD chair on discharge  - Avoid nephrotoxic agents such as NSAIDs, gadolinium and IV radiocontrast.  - Renally dose meds to current GFR.  - Maintain MAP > 65.        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 Last noted 8 mm in April 2016      Hypertension  Patient reports taking metoprolol and being off/on antihypertensives for some time  Normotensive on admission  Will consider anti hypertensive's if clinically indicated      Chest pain  Patient reports few days of chest pain and LUEVANO. Denies radiation, diaphoresis. Resolved in ED after famotidine  "and fentanyl. Further improved after blood transfusion complete.    - Troponin in ED wnl  - EKG without signs of acute ischemia in ED      Anemia  Admit with hemoglobin 4.6 Baseline unknown, last Hgb per care everywhere in 5/2022 10.3  3u pRBC ordered in ED  Likely secondary to CKD and hematuria; CT abdomen in ED without signs of bleeding.   Patient has chronic intermittent hematuria from PCKD, although has not resolved the past 2 weeks  Gross hematuria continuing in hospital (photo in media tab)  Denies melena, hematochezia    Lab Results   Component Value Date    HGB 7.9 (L) 05/03/2023     - Nephrology following with plan to start EPO; appreciate recs  - Urology and IR consulted for hematuria with no indication for inpatient intervention  - CBC q8hr  - Transfuse hgb <7          CKD (chronic kidney disease) stage 5, GFR less than 15 ml/min  See "Acute renal failure superimposed on stage 5 chronic kidney disease, not on chronic dialysis"    Polycystic kidney disease  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 Was previously followed by Nephrology at [facility] in [place]. Findings consistent with diagnosis on CT abdomen pelvis in ED.  RP u/s with enlarged polycystic kidneys with majority of the normal renal parenchyma replaced by simple and minimally complex cysts.There are 3 solid heterogeneous appearing lesions within the left kidney with internal vascularity  CTA triple phase of abdomen pelvis completed with no evidence of contrast extravasation to suggest active bleeding    - Urology consulted; appreciate recs  - Nephrology following; appreciate recs        VTE Risk Mitigation (From admission, onward)         Ordered     IP VTE LOW RISK PATIENT  Once         04/28/23 1248     Place sequential compression device  Until discontinued         04/28/23 1248                Discharge Planning   NAVJOT: 5/9/2023     Code Status: Full Code   Is the patient medically ready for discharge?: No    Reason for " patient still in hospital (select all that apply): Patient trending condition and Treatment  Discharge Plan A: Home with family   Discharge Delays: None known at this time              Ish Veronica DO  Department of Hospital Medicine   [facility]     Biopsy Type: H and E

## 2023-05-03 NOTE — ASSESSMENT & PLAN NOTE
Due to hx of Polycystic kidney disease   Followed with Neurosurgery at [facility] [place] prior to moving to [place]. Last noted 8 mm in April 2016

## 2023-05-03 NOTE — PLAN OF CARE
1000  Patient is getting a tunneled HD cath today and medical team is ordering a TB Gold, a lab needing for the scheduling a HD chair.      11:06 AM  CHW to schedule an appointment with a PCP.      Jessica Mustafa RN  Case Management  [facility]  [phone]

## 2023-05-04 DIAGNOSIS — N18.5 CKD (CHRONIC KIDNEY DISEASE) STAGE 5, GFR LESS THAN 15 ML/MIN: Primary | ICD-10-CM

## 2023-05-04 LAB
ABO + RH BLD: NORMAL
ALBUMIN SERPL BCP-MCNC: 2.7 G/DL (ref 3.5–5.2)
ALP SERPL-CCNC: 77 U/L (ref 55–135)
ALT SERPL W/O P-5'-P-CCNC: 7 U/L (ref 10–44)
ANION GAP SERPL CALC-SCNC: 18 MMOL/L (ref 8–16)
AST SERPL-CCNC: 8 U/L (ref 10–40)
BASOPHILS # BLD AUTO: 0.02 K/UL (ref 0–0.2)
BASOPHILS # BLD AUTO: 0.03 K/UL (ref 0–0.2)
BASOPHILS NFR BLD: 0.3 % (ref 0–1.9)
BASOPHILS NFR BLD: 0.4 % (ref 0–1.9)
BILIRUB SERPL-MCNC: 0.4 MG/DL (ref 0.1–1)
BLD GP AB SCN CELLS X3 SERPL QL: NORMAL
BUN SERPL-MCNC: 116 MG/DL (ref 6–20)
CALCIUM SERPL-MCNC: 8 MG/DL (ref 8.7–10.5)
CHLORIDE SERPL-SCNC: 97 MMOL/L (ref 95–110)
CO2 SERPL-SCNC: 19 MMOL/L (ref 23–29)
CREAT SERPL-MCNC: 11.8 MG/DL (ref 0.5–1.4)
DIFFERENTIAL METHOD: ABNORMAL
DIFFERENTIAL METHOD: ABNORMAL
EOSINOPHIL # BLD AUTO: 0.1 K/UL (ref 0–0.5)
EOSINOPHIL # BLD AUTO: 0.1 K/UL (ref 0–0.5)
EOSINOPHIL NFR BLD: 0.7 % (ref 0–8)
EOSINOPHIL NFR BLD: 1.6 % (ref 0–8)
ERYTHROCYTE [DISTWIDTH] IN BLOOD BY AUTOMATED COUNT: 14.6 % (ref 11.5–14.5)
ERYTHROCYTE [DISTWIDTH] IN BLOOD BY AUTOMATED COUNT: 14.8 % (ref 11.5–14.5)
EST. GFR  (NO RACE VARIABLE): 3.8 ML/MIN/1.73 M^2
GAMMA INTERFERON BACKGROUND BLD IA-ACNC: 0.02 IU/ML
GLUCOSE SERPL-MCNC: 70 MG/DL (ref 70–110)
HCT VFR BLD AUTO: 24.2 % (ref 37–48.5)
HCT VFR BLD AUTO: 26.1 % (ref 37–48.5)
HGB BLD-MCNC: 7.9 G/DL (ref 12–16)
HGB BLD-MCNC: 8.4 G/DL (ref 12–16)
IMM GRANULOCYTES # BLD AUTO: 0.02 K/UL (ref 0–0.04)
IMM GRANULOCYTES # BLD AUTO: 0.02 K/UL (ref 0–0.04)
IMM GRANULOCYTES NFR BLD AUTO: 0.3 % (ref 0–0.5)
IMM GRANULOCYTES NFR BLD AUTO: 0.3 % (ref 0–0.5)
LYMPHOCYTES # BLD AUTO: 0.8 K/UL (ref 1–4.8)
LYMPHOCYTES # BLD AUTO: 0.9 K/UL (ref 1–4.8)
LYMPHOCYTES NFR BLD: 11.1 % (ref 18–48)
LYMPHOCYTES NFR BLD: 12.5 % (ref 18–48)
M TB IFN-G CD4+ BCKGRND COR BLD-ACNC: 0.01 IU/ML
MCH RBC QN AUTO: 29 PG (ref 27–31)
MCH RBC QN AUTO: 29.2 PG (ref 27–31)
MCHC RBC AUTO-ENTMCNC: 32.2 G/DL (ref 32–36)
MCHC RBC AUTO-ENTMCNC: 32.6 G/DL (ref 32–36)
MCV RBC AUTO: 89 FL (ref 82–98)
MCV RBC AUTO: 91 FL (ref 82–98)
MITOGEN IGNF BCKGRD COR BLD-ACNC: 9.98 IU/ML
MONOCYTES # BLD AUTO: 0.5 K/UL (ref 0.3–1)
MONOCYTES # BLD AUTO: 0.5 K/UL (ref 0.3–1)
MONOCYTES NFR BLD: 6.8 % (ref 4–15)
MONOCYTES NFR BLD: 6.9 % (ref 4–15)
NEUTROPHILS # BLD AUTO: 5.7 K/UL (ref 1.8–7.7)
NEUTROPHILS # BLD AUTO: 5.9 K/UL (ref 1.8–7.7)
NEUTROPHILS NFR BLD: 78.3 % (ref 38–73)
NEUTROPHILS NFR BLD: 80.8 % (ref 38–73)
NRBC BLD-RTO: 0 /100 WBC
NRBC BLD-RTO: 0 /100 WBC
PHOSPHATE SERPL-MCNC: 10.7 MG/DL (ref 2.7–4.5)
PLATELET # BLD AUTO: 144 K/UL (ref 150–450)
PLATELET # BLD AUTO: 170 K/UL (ref 150–450)
PMV BLD AUTO: 9.7 FL (ref 9.2–12.9)
PMV BLD AUTO: 9.8 FL (ref 9.2–12.9)
POTASSIUM SERPL-SCNC: 4.7 MMOL/L (ref 3.5–5.1)
PROT SERPL-MCNC: 6.4 G/DL (ref 6–8.4)
RBC # BLD AUTO: 2.72 M/UL (ref 4–5.4)
RBC # BLD AUTO: 2.88 M/UL (ref 4–5.4)
SODIUM SERPL-SCNC: 134 MMOL/L (ref 136–145)
SPECIMEN OUTDATE: NORMAL
TB GOLD PLUS: NEGATIVE
TB2 - NIL: 0.01 IU/ML
WBC # BLD AUTO: 7.04 K/UL (ref 3.9–12.7)
WBC # BLD AUTO: 7.53 K/UL (ref 3.9–12.7)

## 2023-05-04 PROCEDURE — 20600001 HC STEP DOWN PRIVATE ROOM

## 2023-05-04 PROCEDURE — 93005 ELECTROCARDIOGRAM TRACING: CPT

## 2023-05-04 PROCEDURE — 63600175 PHARM REV CODE 636 W HCPCS: Performed by: STUDENT IN AN ORGANIZED HEALTH CARE EDUCATION/TRAINING PROGRAM

## 2023-05-04 PROCEDURE — 25000003 PHARM REV CODE 250: Performed by: STUDENT IN AN ORGANIZED HEALTH CARE EDUCATION/TRAINING PROGRAM

## 2023-05-04 PROCEDURE — 80053 COMPREHEN METABOLIC PANEL: CPT

## 2023-05-04 PROCEDURE — 85025 COMPLETE CBC W/AUTO DIFF WBC: CPT | Mod: 91

## 2023-05-04 PROCEDURE — 86900 BLOOD TYPING SEROLOGIC ABO: CPT

## 2023-05-04 PROCEDURE — 25000003 PHARM REV CODE 250

## 2023-05-04 PROCEDURE — 93010 ELECTROCARDIOGRAM REPORT: CPT | Mod: ,,, | Performed by: INTERNAL MEDICINE

## 2023-05-04 PROCEDURE — 93010 EKG 12-LEAD: ICD-10-PCS | Mod: ,,, | Performed by: INTERNAL MEDICINE

## 2023-05-04 PROCEDURE — 90935 PR HEMODIALYSIS, ONE EVALUATION: ICD-10-PCS | Mod: ,,, | Performed by: INTERNAL MEDICINE

## 2023-05-04 PROCEDURE — 99232 PR SUBSEQUENT HOSPITAL CARE,LEVL II: ICD-10-PCS | Mod: ,,, | Performed by: INTERNAL MEDICINE

## 2023-05-04 PROCEDURE — 90935 HEMODIALYSIS ONE EVALUATION: CPT

## 2023-05-04 PROCEDURE — 90935 HEMODIALYSIS ONE EVALUATION: CPT | Mod: ,,, | Performed by: INTERNAL MEDICINE

## 2023-05-04 PROCEDURE — 36415 COLL VENOUS BLD VENIPUNCTURE: CPT

## 2023-05-04 PROCEDURE — 84100 ASSAY OF PHOSPHORUS: CPT

## 2023-05-04 PROCEDURE — 99232 SBSQ HOSP IP/OBS MODERATE 35: CPT | Mod: ,,, | Performed by: INTERNAL MEDICINE

## 2023-05-04 RX ORDER — SODIUM CHLORIDE 9 MG/ML
INJECTION, SOLUTION INTRAVENOUS ONCE
Status: COMPLETED | OUTPATIENT
Start: 2023-05-04 | End: 2023-05-04

## 2023-05-04 RX ORDER — HEPARIN SODIUM 1000 [USP'U]/ML
1000 INJECTION, SOLUTION INTRAVENOUS; SUBCUTANEOUS
Status: DISCONTINUED | OUTPATIENT
Start: 2023-05-04 | End: 2023-05-05 | Stop reason: HOSPADM

## 2023-05-04 RX ORDER — POLYETHYLENE GLYCOL 3350 17 G/17G
17 POWDER, FOR SOLUTION ORAL 2 TIMES DAILY PRN
Status: DISCONTINUED | OUTPATIENT
Start: 2023-05-04 | End: 2023-05-05 | Stop reason: HOSPADM

## 2023-05-04 RX ORDER — MUPIROCIN 20 MG/G
OINTMENT TOPICAL 2 TIMES DAILY
Status: DISCONTINUED | OUTPATIENT
Start: 2023-05-04 | End: 2023-05-05 | Stop reason: HOSPADM

## 2023-05-04 RX ADMIN — SEVELAMER CARBONATE 1600 MG: 800 TABLET, FILM COATED ORAL at 08:05

## 2023-05-04 RX ADMIN — ACETAMINOPHEN 650 MG: 325 TABLET ORAL at 07:05

## 2023-05-04 RX ADMIN — SODIUM CHLORIDE: 9 INJECTION, SOLUTION INTRAVENOUS at 08:05

## 2023-05-04 RX ADMIN — SEVELAMER CARBONATE 1600 MG: 800 TABLET, FILM COATED ORAL at 01:05

## 2023-05-04 RX ADMIN — OXYCODONE HYDROCHLORIDE 5 MG: 5 TABLET ORAL at 02:05

## 2023-05-04 RX ADMIN — HEPARIN SODIUM 1000 UNITS: 1000 INJECTION, SOLUTION INTRAVENOUS; SUBCUTANEOUS at 11:05

## 2023-05-04 RX ADMIN — LIDOCAINE 2 PATCH: 50 PATCH TOPICAL at 06:05

## 2023-05-04 RX ADMIN — SEVELAMER CARBONATE 1600 MG: 800 TABLET, FILM COATED ORAL at 06:05

## 2023-05-04 NOTE — ASSESSMENT & PLAN NOTE
Due to hx of Polycystic kidney disease   Followed with Neurosurgery at [facility] [place] prior to moving to [place]. Last noted 8 mm in April 2016

## 2023-05-04 NOTE — PLAN OF CARE
Problem: Hemodynamic Instability (Hemodialysis)  Goal: Effective Tissue Perfusion  Outcome: Ongoing, Progressing     Dialysis tx ended to the right perm cath, heparin locked, capped, sterile dressing changed.    Pt received an add 500 ml fluid bolus  after tx ended (verbal order from Dr. Palacios) refer to previous note. Pt denies complaint    Net fluid removed: 484 ml (after receiving fluid bolus)    Stat ekg performed, primary team at bedside    Report given to nurse Rodriguez

## 2023-05-04 NOTE — NURSING
As pt was leaving the tx floor, she became nonverbal, gazed stare (<10s), pt placed back into tx bed, b/p taken. Pt currently aaox4,stable b/p. Primary team notified

## 2023-05-04 NOTE — ASSESSMENT & PLAN NOTE
43 year old female presents with severe metabolic derangements in setting of CKD 5 from polycystic kidney disease    Dialysis initiated 4/28  permacath placed 5/3      -iHD today  -placed SW consult to assist with dialysis center placement  -please obtain CXR, hep B ag, ab and quant core  -Keep MAP > 65  -Keep hemoglobin > 7  -Strict ins and outs  -Avoid nephrotoxic agents if possible  -Avoid drastic hemodynamic changes if possible

## 2023-05-04 NOTE — NURSING
Dialysis tx started to the right IJ dialysis cath per orders placed by Dr. PARDEEP Palacios:    Order Questions    Question Answer   Antibiotics on HD? No   Duration of Treatment 3 hours   Dialyzer Revaclear 300   Dialysate Temperature (C) 36.5    mL/min    mL/min   K+ 2 MEQ/L   Ca++ Calcium per Protocol   Na+ 138 MEQ/   Bicarb 30 meq   Access Other (please specify)   Fluid Removal (L) 2L   Fluid Removal Instructions maintain SBP > 90 mmHG   Dialysate Bath Solution Protocol (DO NOT MODIFY ANSWER) [email or web address] Dialysate Bath Solution Algorithm (formatted with date).pdf

## 2023-05-04 NOTE — PROGRESS NOTES
[facility] - Intensive Care ([address] Medicine  Progress Note    Patient Name: Ade Silva  MRN: [id number]  Patient Class: IP- Inpatient   Admission Date: 4/28/2023  Length of Stay: 6 days  Attending Physician: Viki Torrez MD  Primary Care Provider: Primary Doctor No        Subjective:     Principal Problem:Acute renal failure superimposed on stage 5 chronic kidney disease, not on chronic dialysis        HPI:  Ade Silva is a 43 yr old female with autosomal dominant polycystic kidney disease, CKD V, recurrent UTIs presented with hematuria and nausea/vomiting. Patient is new to [place], recently moved from [place] to live with her mother here a few weeks ago. She followed with Nephrology at [facility] [place] and PCP (records in care everywhere). She initially started having hematuria 2 weeks ago, which is not new for her. Been told in the past when one of her renal cysts ruptures she has intermittent hematuria. Typically resolves in a few days, however this time it did not. She is still able to make a good amount of urine with her kidney disease. This morning presented to ED because she was having persistent nausea and vomiting. She endorses increased warmth when she urinates recently, which is consistent with past UTI that she has had. She also has emesis with the nausea/vomiting that is associated with chest discomfort she also reports exertional weakness, she is unable to walk across room. Has been consistently smoking cigarettes for about 28 years, has not used alcohol since age 27, smokes marijuana, but denies any other drug use. Only home medication she takes is metoprolol, but recently stopped taking it.    She presented to the ED afebrile and HDS on RA. CBC notable for Hgb 4.6 (10.5 in 2022 per care everywhere), Hct 14.5 with unknown baseline. CMP notable for Na 134, Agap 26, , Cr 17.8, GFR 2.3. Lipase 107. . Troponin wnl. UA with 2+ protein, 3+ occult blood, 2+ leukocytes, and >100  RBC. VBG pH 7.16 and pCO2 25.7, BE -19. EKG NSR with prolonged qtc (516). CT Abd Pelvis showed enlarged kidneys consistent with findings of polycystic kidney disease.    Admitted to hospital medicine acute on chronic renal failure and anemia       Overview/Hospital Course:  Admitted to hospital medicine for acute kidney failure on CKD not on chronic dialysis. Hgb on admission 4.6 likely due to CKD and 2 weeks of gross hematuria prior to presentation. Received 3u pRBC with appropriate response on CBC. Right IJV trialysis placed. Nephrology consulted. Electrolytes and uremia improved. RPUS showed enlarged polycystic kidneys with majority of the normal renal parenchyma replaced by simple and minimally complex cysts as well as 3 solid heterogeneous appearing lesions within the left kidney with internal vascularity. Urology was consulted for findings on RPUS and continued gross dark hematuria while in hospital. CTA did not show any extravasation. IR consulted for possible embolization, cannot empirically embolize. Attempted trial of DDVAP in setting of uremia without reduction in hematuria. Hematuria continued, transfused for Hgb <7 on 5/2. Urology felt that continued hematuria was not cause of anemia. Recommended outpatient hematuria workup at [facility] on discharge. Interventional Nephrology consulted for tunneled HD cath. SW working on outpatient HD chair.      Interval History: Patient doing well this morningon exam during dialysis. Called to dialysis unit for syncopal episode after completion. Per RN at bedside patient was completed with dialysis and transferred to wheelchair, attempted to push wheels of chair forward and had brief LOC. AOX4, neuro intact, HDS, EKG and orthostatics ordered. 500 cc replacement bolus ordered by nephro.    Review of Systems   Constitutional:  Positive for activity change and fatigue. Negative for chills and fever.   HENT:  Negative for congestion and sore throat.    Eyes:  Negative for  photophobia and visual disturbance.   Respiratory:  Negative for cough and shortness of breath.    Cardiovascular:  Negative for chest pain, palpitations and leg swelling.   Gastrointestinal:  Positive for abdominal distention and abdominal pain. Negative for blood in stool, constipation, nausea and vomiting.   Genitourinary:  Positive for flank pain and hematuria. Negative for decreased urine volume, difficulty urinating, dysuria and frequency.   Musculoskeletal:  Positive for neck pain.   Skin:  Negative for wound.   Neurological:  Positive for syncope. Negative for dizziness, weakness and headaches.   Objective:     Vital Signs (Most Recent):  Temp: 98.6 °F (37 °C) (05/04/23 1145)  Pulse: 77 (05/04/23 1145)  Resp: (!) 71 (05/04/23 1245)  BP: 112/64 (05/04/23 1245)  SpO2: 96 % (05/04/23 0805) Vital Signs (24h Range):  Temp:  [97.8 °F (36.6 °C)-98.6 °F (37 °C)] 98.6 °F (37 °C)  Pulse:  [66-99] 77  Resp:  [15-71] 71  SpO2:  [93 %-97 %] 96 %  BP: ()/(52-75) 112/64     Weight: 90.7 kg (200 lb)  Body mass index is 28.7 kg/m².    Intake/Output Summary (Last 24 hours) at 5/4/2023 1255  Last data filed at 5/4/2023 1221  Gross per 24 hour   Intake 1118 ml   Output 1784 ml   Net -666 ml        Physical Exam  Vitals and nursing note reviewed.   Constitutional:       General: She is not in acute distress.     Appearance: Normal appearance. She is not ill-appearing.   HENT:      Head: Normocephalic and atraumatic.      Mouth/Throat:      Mouth: Mucous membranes are moist.   Eyes:      General: No scleral icterus.     Extraocular Movements: Extraocular movements intact.      Pupils: Pupils are equal, round, and reactive to light.   Cardiovascular:      Rate and Rhythm: Normal rate and regular rhythm.      Pulses: Normal pulses.      Heart sounds: Normal heart sounds.   Pulmonary:      Effort: Pulmonary effort is normal.   Abdominal:      General: There is distension.      Tenderness: There is abdominal tenderness. There  is right CVA tenderness and left CVA tenderness. There is no guarding.   Musculoskeletal:      Right lower leg: No edema.      Left lower leg: No edema.   Skin:     General: Skin is warm and dry.   Neurological:      Mental Status: She is alert and oriented to person, place, and time.      Motor: No weakness.   Psychiatric:         Mood and Affect: Mood normal.         Behavior: Behavior normal.         Thought Content: Thought content normal.         Judgment: Judgment normal.       Significant Labs: All pertinent labs within the past 24 hours have been reviewed.  CBC:   Recent Labs   Lab 05/03/23 0457 05/03/23 2019 05/04/23 0534   WBC 6.46 7.02 7.53   HGB 7.9* 7.9* 8.4*   HCT 25.0* 24.3* 26.1*    165 170       CMP:   Recent Labs   Lab 05/03/23 0457 05/04/23 0534   * 134*   K 4.7 4.7   CL 98 97   CO2 21* 19*   GLU 79 70   * 116*   CREATININE 10.5* 11.8*   CALCIUM 7.6* 8.0*   PROT 6.1 6.4   ALBUMIN 2.6* 2.7*   BILITOT 0.5 0.4   ALKPHOS 69 77   AST 8* 8*   ALT 6* 7*   ANIONGAP 15 18*         Significant Imaging: I have reviewed all pertinent imaging results/findings within the past 24 hours.      Assessment/Plan:      * Acute renal failure superimposed on stage 5 chronic kidney disease, not on chronic dialysis    Creatinine 17.8 on admit, last was 8 in May 2022 via care everywhere.     Plan:   Lab Results   Component Value Date    CREATININE 10.6 (H) 04/29/2023     - Nephrology following; appreciate recs  - Dialysis per Nephrology; trialysis line placed in ED  - Tunneled cath placement with Interventional Nephrology 5/2/23  - Social work in process of setting up outpatient HD chair on discharge  - Avoid nephrotoxic agents such as NSAIDs, gadolinium and IV radiocontrast.  - Renally dose meds to current GFR.  - Maintain MAP > 65.        Gross hematuria  See Polycystic kidney disease      Basilar artery aneurysm  Due to hx of Polycystic kidney disease   [place] [facility] [place] to [place]. Last noted 8 mm in April 2016      Hypertension  Patient reports taking metoprolol and being off/on antihypertensives for some time  Normotensive on admission  Will consider anti hypertensive's if clinically indicated      Chest pain  Patient reports few days of chest pain and LUEVANO. Denies radiation, diaphoresis. Resolved in ED after famotidine and fentanyl. Further improved after blood transfusion complete.    - Troponin in ED wnl  - EKG without signs of acute ischemia in ED      Anemia  Admit with hemoglobin 4.6 Baseline unknown, last Hgb per care everywhere in 5/2022 10.3  3u pRBC ordered in ED  Likely secondary to CKD and hematuria; CT abdomen in ED without signs of bleeding.   Patient has chronic intermittent hematuria from PCKD, although has not resolved the past 2 weeks  Gross hematuria continuing in hospital (photo in media tab)  Denies melena, hematochezia    Lab Results   Component Value Date    HGB 8.4 (L) 05/04/2023     - Nephrology following with plan to start EPO; appreciate recs  - Urology and IR consulted for hematuria with no indication for inpatient intervention  - CBC q8hr  - Transfuse hgb <7          CKD (chronic kidney disease) stage 5, GFR less than 15 ml/min  See "Acute renal failure superimposed on stage 5 chronic kidney disease, not on chronic dialysis"    Polycystic kidney disease  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 Was previously followed [place] [facility] [place] Findings consistent with diagnosis on CT abdomen pelvis in ED.  RP u/s with enlarged polycystic kidneys with majority of the normal renal parenchyma replaced by simple and minimally complex cysts.There are 3 solid heterogeneous appearing lesions within the left kidney with internal vascularity  CTA triple phase of abdomen pelvis completed with no evidence of contrast extravasation to suggest active bleeding    - Urology consulted; appreciate recs  - Nephrology " following; appreciate recs        VTE Risk Mitigation (From admission, onward)         Ordered     heparin (porcine) injection 1,000 Units  As needed (PRN)         05/04/23 1042     IP VTE LOW RISK PATIENT  Once         04/28/23 1248     Place sequential compression device  Until discontinued         04/28/23 1248                Discharge Planning   NAVJOT: 5/7/2023     Code Status: Full Code   Is the patient medically ready for discharge?: No    Reason for patient still in hospital (select all that apply): Consult recommendations and Other (specify) dialysis chair  Discharge Plan A: Home with family   Discharge Delays: None known at this time              Ish Veronica DO  Department of Hospital Medicine   [facility] - Intensive Care ([place]-)

## 2023-05-04 NOTE — PROGRESS NOTES
"[facility] - [facility] ([place] [address])  Nephrology  Progress Note    Patient Name: Ade Silva  MRN: [id number]  Admission Date: 4/28/2023  Hospital Length of Stay: 6 days  Attending Provider: Viki Torrez MD   Primary Care Physician: Primary Doctor No  Principal Problem:Acute renal failure superimposed on stage 5 chronic kidney disease, not on chronic dialysis    Subjective:     HPI: 43 year old female with a history of polycystic kidney disease, HTN, berry aneurysm sp coiling presents with nausea/fatigue and found to have a hemoglobin of 4.6, BUN of 191 with a CO2 of 6 with pH 7.161, alert and oriented. She recently moved from [place] where she last saw her nephrology prior a year ago. During that visit, she states that they had talked about dialysis, but she admits that she did not seek further care afterwards. Having come to [place] to be closer to family, she has applied for medicare and was seeking to establish here, however developed nausea/fatigue and presented to the ED.    She has been off of any medications for the last 6 months. She states that her whole paternal family had polycystic kidney disease and that her father had a kidney transplant that had failed in the past    Nephrology consulted for metabolic derangement      Interval History: tolerated permacath placement yesterday. iHD today    Review of patient's allergies indicates:   Allergen Reactions    Aspirin Hives    Nickel     Penicillins Hives    Adhesive Rash     Adhesive/silk tape (type found on band aides). Can only use "Paper Tape"    Butalbital-acetaminophen-caff Nausea And Vomiting and Other (See Comments)     Dizziness (made pt feel sick)    Latex, natural rubber Rash     Current Facility-Administered Medications   Medication Frequency    0.9%  NaCl infusion (for blood administration) Q24H PRN    0.9%  NaCl infusion Once    acetaminophen 1,000 mg/100 mL (10 mg/mL) injection Continuous PRN    acetaminophen " tablet 650 mg Q6H PRN    dextrose 10% bolus 125 mL 125 mL PRN    dextrose 10% bolus 250 mL 250 mL PRN    dextrose 40 % gel 15,000 mg PRN    dextrose 40 % gel 30,000 mg PRN    epoetin kranthi-epbx injection 4,540 Units Once    glucagon (human recombinant) injection 1 mg PRN    heparin (porcine) injection 1,000 Units PRN    LIDOcaine 5 % patch 2 patch Q24H    melatonin tablet 6 mg Nightly PRN    mupirocin 2 % ointment BID    naloxone 0.4 mg/mL injection 0.02 mg PRN    oxyCODONE immediate release tablet 5 mg Q8H PRN    polyethylene glycol packet 17 g BID PRN    senna-docusate 8.6-50 mg per tablet 1 tablet BID    sevelamer carbonate tablet 1,600 mg TID WM    simethicone chewable tablet 80 mg TID PRN    sodium chloride 0.9% bolus 250 mL 250 mL PRN    sodium chloride 0.9% bolus 250 mL 250 mL PRN    sodium chloride 0.9% flush 10 mL Q12H PRN    sodium chloride 0.9% flush 10 mL PRN       Objective:     Vital Signs (Most Recent):  Temp: 97.9 °F (36.6 °C) (05/04/23 0814)  Pulse: 71 (05/04/23 1059)  Resp: 15 (05/04/23 0814)  BP: (!) 84/54 (05/04/23 1059)  SpO2: 96 % (05/04/23 0805)   Vital Signs (24h Range):  Temp:  [97.8 °F (36.6 °C)-98.2 °F (36.8 °C)] 97.9 °F (36.6 °C)  Pulse:  [66-99] 71  Resp:  [15-19] 15  SpO2:  [93 %-97 %] 96 %  BP: ()/(52-75) 84/54     Weight: 90.7 kg (200 lb) (04/29/23 0006)  Body mass index is 28.7 kg/m².  Body surface area is 2.12 meters squared.    I/O last 3 completed shifts:  In: 340 [P.O.:340]  Out: 650 [Urine:650]     Physical Exam  Constitutional:       Appearance: She is well-developed.   HENT:      Head: Normocephalic and atraumatic.   Eyes:      Pupils: Pupils are equal, round, and reactive to light.   Cardiovascular:      Rate and Rhythm: Normal rate and regular rhythm.      Heart sounds: Normal heart sounds.   Pulmonary:      Effort: Pulmonary effort is normal.      Breath sounds: Normal breath sounds.   Abdominal:      General: Bowel sounds are normal.       Palpations: Abdomen is soft. There is mass.      Tenderness: There is no abdominal tenderness.   Musculoskeletal:         General: Normal range of motion.      Cervical back: Normal range of motion and neck supple.   Skin:     General: Skin is warm and dry.      Capillary Refill: Capillary refill takes less than 2 seconds.   Neurological:      Mental Status: She is alert and oriented to person, place, and time.        Significant Labs:  All labs within the past 24 hours have been reviewed.     Significant Imaging:  Labs: Reviewed    Assessment/Plan:     Renal/  Chronic kidney disease-mineral and bone disorder  Calcium   Date Value Ref Range Status   05/04/2023 8.0 (L) 8.7 - 10.5 mg/dL Final     Phosphorus   Date Value Ref Range Status   05/04/2023 10.7 (HH) 2.7 - 4.5 mg/dL Final     Comment:     *Critical value notification by MKSANDRA with confirmation of receipt to   JESSICA DUENAS RN at Date 5/04/23 _Time 8:18 AM       PTH, Intact   Date Value Ref Range Status   04/28/2023 1,348.9 (H) 9.0 - 77.0 pg/mL Final     Calcitrol .25   Continue sevelamer      CKD (chronic kidney disease) stage 5, GFR less than 15 ml/min  43 year old female presents with severe metabolic derangements in setting of CKD 5 from polycystic kidney disease    Dialysis initiated 4/28  permacath placed 5/3      -iHD today  -placed SW consult to assist with dialysis center placement  -please obtain CXR, hep B ag, ab and quant core  -Keep MAP > 65  -Keep hemoglobin > 7  -Strict ins and outs  -Avoid nephrotoxic agents if possible  -Avoid drastic hemodynamic changes if possible      Polycystic kidney disease  History of polycystic kidney disease with hx of berry aneurysm        Oncology  Anemia  On admission hemoglobin of 4.6  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 CT abdomen without acute findings and on admission received 3 PRBCs    Iron stores adequate from PRBCs  Cont EPO        Thank you for your consult. I will  follow-up with patient. Please contact us if you have any additional questions.    Babatunde Palacios MD  Nephrology  [facility] - Intensive Care ([place])

## 2023-05-04 NOTE — PLAN OF CARE
Problem: Adult Inpatient Plan of Care  Goal: Plan of Care Review  Outcome: Ongoing, Progressing  Goal: Patient-Specific Goal (Individualized)  Outcome: Ongoing, Progressing  Goal: Absence of Hospital-Acquired Illness or Injury  Outcome: Ongoing, Progressing  Goal: Optimal Comfort and Wellbeing  Outcome: Ongoing, Progressing  Goal: Readiness for Transition of Care  Outcome: Ongoing, Progressing     Problem: Device-Related Complication Risk (Hemodialysis)  Goal: Safe, Effective Therapy Delivery  Outcome: Ongoing, Progressing     Problem: Hemodynamic Instability (Hemodialysis)  Goal: Effective Tissue Perfusion  Outcome: Ongoing, Progressing     Problem: Infection (Hemodialysis)  Goal: Absence of Infection Signs and Symptoms  Outcome: Ongoing, Progressing     Problem: Infection  Goal: Absence of Infection Signs and Symptoms  Outcome: Ongoing, Progressing     Problem: Fluid and Electrolyte Imbalance (Acute Kidney Injury/Impairment)  Goal: Fluid and Electrolyte Balance  Outcome: Ongoing, Progressing     Problem: Oral Intake Inadequate (Acute Kidney Injury/Impairment)  Goal: Optimal Nutrition Intake  Outcome: Ongoing, Progressing     Problem: Renal Function Impairment (Acute Kidney Injury/Impairment)  Goal: Effective Renal Function  Outcome: Ongoing, Progressing

## 2023-05-04 NOTE — ASSESSMENT & PLAN NOTE
On admission hemoglobin of 4.6  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 CT abdomen without acute findings and on admission received 3 PRBCs    Iron stores adequate from PRBCs  Cont EPO

## 2023-05-04 NOTE — PLAN OF CARE
CM met with patient at bedside and patient spoke with Jessica FAUST [[employer] representative # [phone] and scheduled [employer] Referral Received- Appt is @ [facility] facility on 5/11/23 @ 8am.      Jessica Mustafa RN  Case Management  [facility]  [phone]

## 2023-05-04 NOTE — MEDICAL/APP STUDENT
[facility] Medicine Student   Progress Note  [facility]    Admit Date: 4/28/2023  Hospital Day: 6  05/04/2023  2:47 PM    SUBJECTIVE:   Ms. Ade Silva is a 43 y.o. female with a relevant medical history of autosomal dominant polycystic kidney disease, CKD V, recurrent UTIs who is being followed up for Acute renal failure superimposed on stage 5 chronic kidney disease, not on chronic dialysis.    Interval history: Pt continues to have hematuria. Pt received permacath placement for HD yesterday. Received HD today. RN reported that after completion of dialysis pt was transferred to wheelchair and had episode of LOC while attempting to push wheels of chair forward. Pt given 500cc replacement per nephro    Review of Systems   Constitutional:  Positive for malaise/fatigue. Negative for chills and fever.   Respiratory:  Negative for cough and shortness of breath.    Cardiovascular:  Negative for chest pain, palpitations and leg swelling.   Gastrointestinal:  Positive for abdominal pain. Negative for nausea and vomiting.   Genitourinary:  Positive for flank pain and hematuria.   Musculoskeletal:  Positive for neck pain.   Neurological:  Positive for loss of consciousness.     Please refer to the H&P for past medical, family, and social history.    OBJECTIVE:     Vital Signs Recent:  Temp: 98.4 °F (36.9 °C) (05/04/23 1315)  Pulse: 98 (05/04/23 1315)  Resp: 18 (05/04/23 1315)  BP: 127/74 (05/04/23 1315)  SpO2: 100 % (05/04/23 1315)  Oxygen Documentation:    Flow (L/min): 2           Device (Oxygen Therapy): room air         Vital Signs Range (Last 24H):  Temp:  [97.8 °F (36.6 °C)-98.6 °F (37 °C)]   Pulse:  [66-99]   Resp:  [15-71]   BP: ()/(52-75)   SpO2:  [93 %-100 %]        I & O (Last 24H):  Intake/Output Summary (Last 24 hours) at 5/4/2023 1447  Last data filed at 5/4/2023 1221  Gross per 24 hour   Intake 1418 ml   Output 1784 ml   Net -366 ml        Physical Exam:  Physical Exam  Constitutional:        Appearance: Normal appearance.   HENT:      Head: Normocephalic and atraumatic.   Eyes:      Extraocular Movements: Extraocular movements intact.      Conjunctiva/sclera: Conjunctivae normal.      Pupils: Pupils are equal, round, and reactive to light.   Cardiovascular:      Rate and Rhythm: Normal rate and regular rhythm.      Pulses: Normal pulses.      Heart sounds: Normal heart sounds.   Pulmonary:      Effort: Pulmonary effort is normal.      Breath sounds: Normal breath sounds.   Abdominal:      General: There is distension.      Palpations: There is mass.      Tenderness: There is abdominal tenderness.   Musculoskeletal:      Right lower leg: No edema.      Left lower leg: No edema.   Skin:     General: Skin is warm and dry.   Neurological:      General: No focal deficit present.      Mental Status: She is oriented to person, place, and time. Mental status is at baseline.   Psychiatric:         Mood and Affect: Mood normal.       Labs:   Recent Labs   Lab 05/02/23 0518 05/03/23 0457 05/04/23  0534   * 134* 134*   K 4.4 4.7 4.7   CL 99 98 97   CO2 24 21* 19*   BUN 90* 109* 116*   CREATININE 9.5* 10.5* 11.8*   GLU 83 79 70   CALCIUM 7.3* 7.6* 8.0*   MG 1.7 1.8  --    PHOS 6.3* 7.8* 10.7*     Recent Labs   Lab 04/28/23  1329 04/28/23  1640 05/02/23 0518 05/03/23 0457 05/04/23  0534   ALKPHOS 67   < > 60 69 77   ALT 6*   < > 6* 6* 7*   AST 8*   < > 8* 8* 8*   ALBUMIN 2.9*   < > 2.5* 2.6* 2.7*   PROT 6.4   < > 5.8* 6.1 6.4   BILITOT 0.4   < > 0.5 0.5 0.4   INR 1.0  --   --   --   --     < > = values in this interval not displayed.     Recent Labs   Lab 05/03/23 0457 05/03/23 2019 05/04/23  0534   WBC 6.46 7.02 7.53   HGB 7.9* 7.9* 8.4*   HCT 25.0* 24.3* 26.1*    165 170       Diagnostic Results:  EKG taken post-syncopal episode:   Normal sinus rhythm   Biatrial enlargement   Tiny inferior Qs   Abnormal ECG   When compared with ECG of 28-APR-2023 06:24,   No significant change was  found    Scheduled Meds:   epoetin kranthi-epbx  50 Units/kg Subcutaneous Once    LIDOcaine  2 patch Transdermal Q24H    mupirocin   Nasal BID    senna-docusate 8.6-50 mg  1 tablet Oral BID    sevelamer carbonate  1,600 mg Oral TID WM     Continuous Infusions:   acetaminophen       PRN Meds:sodium chloride, acetaminophen, acetaminophen, dextrose 10%, dextrose 10%, dextrose, dextrose, glucagon (human recombinant), heparin (porcine), melatonin, naloxone, oxyCODONE, polyethylene glycol, simethicone, sodium chloride 0.9%, sodium chloride 0.9%, sodium chloride 0.9%, sodium chloride 0.9%    ASSESSMENT/PLAN:   Ms. Ade Silva is a 43 y.o. female with a relevant medical history of autosomal dominant polycystic kidney disease, CKD V, recurrent UTIs  who is being followed up for Acute renal failure superimposed on stage 5 chronic kidney disease, not on chronic dialysis.    Active Hospital Problems    Diagnosis  POA    *Acute renal failure superimposed on stage 5 chronic kidney disease, not on chronic dialysis [N17.9, N18.5]  Unknown    Gross hematuria [R31.0]  Unknown    Hematuria [R31.9]  Yes    Polycystic kidney disease [Q61.3]  Not Applicable     Chronic    CKD (chronic kidney disease) stage 5, GFR less than 15 ml/min [N18.5]  Yes    Chronic kidney disease-mineral and bone disorder [N18.9, E83.9, M89.9]  Yes    Anemia [D64.9]  Yes    Chest pain [R07.9]  Unknown    Autosomal dominant polycystic kidney disease [Q61.2]  Not Applicable    Hypertension [I10]  Unknown    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  8 mm (4/19/2016)          Resolved Hospital Problems    Diagnosis Date Resolved POA    Urinary tract infection with hematuria [N39.0, R31.9] 04/28/2023 Unknown     * Acute renal failure superimposed on stage 5 chronic kidney disease, not on chronic dialysis  -Cr 17.8 on admission, now improved to 11.8   -received IJV trialysis in ED   -Tunneled cath  "placement with Interventional Nephrology 5/3/23  -Received HD today   - SW is in the process of setting up HD chair for pt   - Avoid nephrotoxic agents such as NSAIDs, gadolinium and IV radiocontrast.  - Renally dose meds to current GFR.  - Maintain MAP > 65.    Hyperphosphatemia  -Phosphate 10.7    Plan:   -started on sevelamer   -will recheck phosphate post HD    Gross hematuria  -see Polycystic Kidney Disease      Anemia  -Admit with hemoglobin 4.6 Baseline unknown, last Hgb per care everywhere in 5/2022 10.3. 3u pRBC ordered in ED  - Likely secondary to CKD and hematuria; CT abdomen in ED without signs of bleeding.   -Patient has chronic intermittent hematuria from PCKD, although has not resolved the past 2 weeks  Gross hematuria continuing in hospital (photo in media tab)  -Denies melena, hematochezia  -latest Hgb 7.9 5/3/23      Plan:   - Nephrology following with plan to start EPO  - Urology and IR consulted for hematuria with no indication for inpatient intervention  - CBC q8hr  - Transfuse hgb <7       Hepatitis B  -HBsAg nonreactive, Anti HBc IgM grayzone, Anti Hbc Total Ab reactive, Anti HBs reactive 595.27     Plan:   -results likely suggests past resolved Hep B infection, will check quantitative Hep B DNA PCR due to grayzone anti Hbc to rule out active infection      CKD (chronic kidney disease) stage 5, GFR less than 15 ml/min  -See "Acute renal failure superimposed on stage 5 chronic kidney disease, not on chronic dialysis"     Polycystic kidney disease  -Hx of autosomal dominant polycystic kidney disease with berry aneurysm. [place] previously [place] [facility] [place] Findings consistent with diagnosis on CT abdomen pelvis in ED.  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 Last noted 8 mm in April 2016     Hypertension  -Patient reports taking metoprolol and being off/on antihypertensives for some time  -Normotensive on admission     Plan:   -Will consider anti hypertensive's if clinically indicated     Chest pain  -Patient reports few days of chest pain and LUEVANO. Denies radiation, diaphoresis. Resolved in ED after famotidine and fentanyl. Further improved after blood transfusion complete.  -Troponin in ED wnl  -EKG without signs of acute ischemia in ED        Discharge planning: pt will be d/c to home when medically stable and outpatient dialysis chair obtained

## 2023-05-04 NOTE — ASSESSMENT & PLAN NOTE
Admit with hemoglobin 4.6 Baseline unknown, last Hgb per care everywhere in 5/2022 10.3  3u pRBC ordered in ED  Likely secondary to CKD and hematuria; CT abdomen in ED without signs of bleeding.   Patient has chronic intermittent hematuria from PCKD, although has not resolved the past 2 weeks  Gross hematuria continuing in hospital (photo in media tab)  Denies melena, hematochezia    Lab Results   Component Value Date    HGB 8.4 (L) 05/04/2023     - Nephrology following with plan to start EPO; appreciate recs  - Urology and IR consulted for hematuria with no indication for inpatient intervention  - CBC q8hr  - Transfuse hgb <7

## 2023-05-04 NOTE — NURSING
Patient  left the unit at around 0806horus for  dialysis.LINDA . AAO*4 . Transported by  wheelchair.

## 2023-05-04 NOTE — ASSESSMENT & PLAN NOTE
Calcium   Date Value Ref Range Status   05/04/2023 8.0 (L) 8.7 - 10.5 mg/dL Final     Phosphorus   Date Value Ref Range Status   05/04/2023 10.7 (HH) 2.7 - 4.5 mg/dL Final     Comment:     *Critical value notification by MKSANDRA with confirmation of receipt to   JESSICA DUENAS RN at Date 5/04/23 _Time 8:18 AM       PTH, Intact   Date Value Ref Range Status   04/28/2023 1,348.9 (H) 9.0 - 77.0 pg/mL Final     Calcitrol .25   Continue sevelamer

## 2023-05-04 NOTE — SUBJECTIVE & OBJECTIVE
"Interval History: tolerated permacath placement yesterday. iHD today    Review of patient's allergies indicates:   Allergen Reactions    Aspirin Hives    Nickel     Penicillins Hives    Adhesive Rash     Adhesive/silk tape (type found on band aides). Can only use "Paper Tape"    Butalbital-acetaminophen-caff Nausea And Vomiting and Other (See Comments)     Dizziness (made pt feel sick)    Latex, natural rubber Rash     Current Facility-Administered Medications   Medication Frequency    0.9%  NaCl infusion (for blood administration) Q24H PRN    0.9%  NaCl infusion Once    acetaminophen 1,000 mg/100 mL (10 mg/mL) injection Continuous PRN    acetaminophen tablet 650 mg Q6H PRN    dextrose 10% bolus 125 mL 125 mL PRN    dextrose 10% bolus 250 mL 250 mL PRN    dextrose 40 % gel 15,000 mg PRN    dextrose 40 % gel 30,000 mg PRN    epoetin kranthi-epbx injection 4,540 Units Once    glucagon (human recombinant) injection 1 mg PRN    heparin (porcine) injection 1,000 Units PRN    LIDOcaine 5 % patch 2 patch Q24H    melatonin tablet 6 mg Nightly PRN    mupirocin 2 % ointment BID    naloxone 0.4 mg/mL injection 0.02 mg PRN    oxyCODONE immediate release tablet 5 mg Q8H PRN    polyethylene glycol packet 17 g BID PRN    senna-docusate 8.6-50 mg per tablet 1 tablet BID    sevelamer carbonate tablet 1,600 mg TID WM    simethicone chewable tablet 80 mg TID PRN    sodium chloride 0.9% bolus 250 mL 250 mL PRN    sodium chloride 0.9% bolus 250 mL 250 mL PRN    sodium chloride 0.9% flush 10 mL Q12H PRN    sodium chloride 0.9% flush 10 mL PRN       Objective:     Vital Signs (Most Recent):  Temp: 97.9 °F (36.6 °C) (05/04/23 0814)  Pulse: 71 (05/04/23 1059)  Resp: 15 (05/04/23 0814)  BP: (!) 84/54 (05/04/23 1059)  SpO2: 96 % (05/04/23 0805)   Vital Signs (24h Range):  Temp:  [97.8 °F (36.6 °C)-98.2 °F (36.8 °C)] 97.9 °F (36.6 °C)  Pulse:  [66-99] 71  Resp:  [15-19] 15  SpO2:  [93 %-97 %] 96 %  BP: ()/(52-75) 84/54     Weight: 90.7 kg " (200 lb) (04/29/23 0006)  Body mass index is 28.7 kg/m².  Body surface area is 2.12 meters squared.    I/O last 3 completed shifts:  In: 340 [P.O.:340]  Out: 650 [Urine:650]     Physical Exam  Constitutional:       Appearance: She is well-developed.   HENT:      Head: Normocephalic and atraumatic.   Eyes:      Pupils: Pupils are equal, round, and reactive to light.   Cardiovascular:      Rate and Rhythm: Normal rate and regular rhythm.      Heart sounds: Normal heart sounds.   Pulmonary:      Effort: Pulmonary effort is normal.      Breath sounds: Normal breath sounds.   Abdominal:      General: Bowel sounds are normal.      Palpations: Abdomen is soft. There is mass.      Tenderness: There is no abdominal tenderness.   Musculoskeletal:         General: Normal range of motion.      Cervical back: Normal range of motion and neck supple.   Skin:     General: Skin is warm and dry.      Capillary Refill: Capillary refill takes less than 2 seconds.   Neurological:      Mental Status: She is alert and oriented to person, place, and time.        Significant Labs:  All labs within the past 24 hours have been reviewed.     Significant Imaging:  Labs: Reviewed

## 2023-05-04 NOTE — SUBJECTIVE & OBJECTIVE
Interval History: Patient doing well this morningon exam during dialysis. Called to dialysis unit for syncopal episode after completion. Per RN at bedside patient was completed with dialysis and transferred to wheelchair, attempted to push wheels of chair forward and had brief LOC. AOX4, neuro intact, HDS, EKG and orthostatics ordered. 500 cc replacement bolus ordered by nephro.    Review of Systems   Constitutional:  Positive for activity change and fatigue. Negative for chills and fever.   HENT:  Negative for congestion and sore throat.    Eyes:  Negative for photophobia and visual disturbance.   Respiratory:  Negative for cough and shortness of breath.    Cardiovascular:  Negative for chest pain, palpitations and leg swelling.   Gastrointestinal:  Positive for abdominal distention and abdominal pain. Negative for blood in stool, constipation, nausea and vomiting.   Genitourinary:  Positive for flank pain and hematuria. Negative for decreased urine volume, difficulty urinating, dysuria and frequency.   Musculoskeletal:  Positive for neck pain.   Skin:  Negative for wound.   Neurological:  Positive for syncope. Negative for dizziness, weakness and headaches.   Objective:     Vital Signs (Most Recent):  Temp: 98.6 °F (37 °C) (05/04/23 1145)  Pulse: 77 (05/04/23 1145)  Resp: (!) 71 (05/04/23 1245)  BP: 112/64 (05/04/23 1245)  SpO2: 96 % (05/04/23 0805) Vital Signs (24h Range):  Temp:  [97.8 °F (36.6 °C)-98.6 °F (37 °C)] 98.6 °F (37 °C)  Pulse:  [66-99] 77  Resp:  [15-71] 71  SpO2:  [93 %-97 %] 96 %  BP: ()/(52-75) 112/64     Weight: 90.7 kg (200 lb)  Body mass index is 28.7 kg/m².    Intake/Output Summary (Last 24 hours) at 5/4/2023 1255  Last data filed at 5/4/2023 1221  Gross per 24 hour   Intake 1118 ml   Output 1784 ml   Net -666 ml        Physical Exam  Vitals and nursing note reviewed.   Constitutional:       General: She is not in acute distress.     Appearance: Normal appearance. She is not  ill-appearing.   HENT:      Head: Normocephalic and atraumatic.      Mouth/Throat:      Mouth: Mucous membranes are moist.   Eyes:      General: No scleral icterus.     Extraocular Movements: Extraocular movements intact.      Pupils: Pupils are equal, round, and reactive to light.   Cardiovascular:      Rate and Rhythm: Normal rate and regular rhythm.      Pulses: Normal pulses.      Heart sounds: Normal heart sounds.   Pulmonary:      Effort: Pulmonary effort is normal.   Abdominal:      General: There is distension.      Tenderness: There is abdominal tenderness. There is right CVA tenderness and left CVA tenderness. There is no guarding.   Musculoskeletal:      Right lower leg: No edema.      Left lower leg: No edema.   Skin:     General: Skin is warm and dry.   Neurological:      Mental Status: She is alert and oriented to person, place, and time.      Motor: No weakness.   Psychiatric:         Mood and Affect: Mood normal.         Behavior: Behavior normal.         Thought Content: Thought content normal.         Judgment: Judgment normal.       Significant Labs: All pertinent labs within the past 24 hours have been reviewed.  CBC:   Recent Labs   Lab 05/03/23 0457 05/03/23 2019 05/04/23  0534   WBC 6.46 7.02 7.53   HGB 7.9* 7.9* 8.4*   HCT 25.0* 24.3* 26.1*    165 170       CMP:   Recent Labs   Lab 05/03/23 0457 05/04/23  0534   * 134*   K 4.7 4.7   CL 98 97   CO2 21* 19*   GLU 79 70   * 116*   CREATININE 10.5* 11.8*   CALCIUM 7.6* 8.0*   PROT 6.1 6.4   ALBUMIN 2.6* 2.7*   BILITOT 0.5 0.4   ALKPHOS 69 77   AST 8* 8*   ALT 6* 7*   ANIONGAP 15 18*         Significant Imaging: I have reviewed all pertinent imaging results/findings within the past 24 hours.

## 2023-05-04 NOTE — ASSESSMENT & PLAN NOTE
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 Was previously followed by Nephrology at [facility] in [place]. Findings consistent with diagnosis on CT abdomen pelvis in ED.  RP u/s with enlarged polycystic kidneys with majority of the normal renal parenchyma replaced by simple and minimally complex cysts.There are 3 solid heterogeneous appearing lesions within the left kidney with internal vascularity  CTA triple phase of abdomen pelvis completed with no evidence of contrast extravasation to suggest active bleeding    - Urology consulted; appreciate recs  - Nephrology following; appreciate recs

## 2023-05-04 NOTE — NURSING
Patient is AAO*4 . Had bladder movement. Walked to the bathroom with stand by assistance . Acceptance to care . Complains of  pain , PRN meds given . Call light within reach , safety precaution maintained. Will continue monitoring.

## 2023-05-05 VITALS
BODY MASS INDEX: 28.62 KG/M2 | WEIGHT: 199.94 LBS | HEIGHT: 70 IN | TEMPERATURE: 98 F | DIASTOLIC BLOOD PRESSURE: 63 MMHG | SYSTOLIC BLOOD PRESSURE: 111 MMHG | OXYGEN SATURATION: 100 % | RESPIRATION RATE: 16 BRPM | HEART RATE: 70 BPM

## 2023-05-05 LAB
ALBUMIN SERPL BCP-MCNC: 2.7 G/DL (ref 3.5–5.2)
ALP SERPL-CCNC: 74 U/L (ref 55–135)
ALT SERPL W/O P-5'-P-CCNC: 7 U/L (ref 10–44)
ANION GAP SERPL CALC-SCNC: 13 MMOL/L (ref 8–16)
AST SERPL-CCNC: 9 U/L (ref 10–40)
BASOPHILS # BLD AUTO: 0.03 K/UL (ref 0–0.2)
BASOPHILS NFR BLD: 0.4 % (ref 0–1.9)
BILIRUB SERPL-MCNC: 0.4 MG/DL (ref 0.1–1)
BUN SERPL-MCNC: 74 MG/DL (ref 6–20)
CALCIUM SERPL-MCNC: 8.4 MG/DL (ref 8.7–10.5)
CHLORIDE SERPL-SCNC: 102 MMOL/L (ref 95–110)
CO2 SERPL-SCNC: 20 MMOL/L (ref 23–29)
CREAT SERPL-MCNC: 7.6 MG/DL (ref 0.5–1.4)
DIFFERENTIAL METHOD: ABNORMAL
EOSINOPHIL # BLD AUTO: 0.1 K/UL (ref 0–0.5)
EOSINOPHIL NFR BLD: 0.7 % (ref 0–8)
ERYTHROCYTE [DISTWIDTH] IN BLOOD BY AUTOMATED COUNT: 14.6 % (ref 11.5–14.5)
EST. GFR  (NO RACE VARIABLE): 6.4 ML/MIN/1.73 M^2
GLUCOSE SERPL-MCNC: 84 MG/DL (ref 70–110)
HBV DNA SERPL NAA+PROBE-ACNC: <10 IU/ML
HBV DNA SERPL NAA+PROBE-LOG IU: <1 LOG (10) IU/ML
HBV DNA SERPL QL NAA+PROBE: NOT DETECTED
HCT VFR BLD AUTO: 25.7 % (ref 37–48.5)
HGB BLD-MCNC: 8.1 G/DL (ref 12–16)
IMM GRANULOCYTES # BLD AUTO: 0.02 K/UL (ref 0–0.04)
IMM GRANULOCYTES NFR BLD AUTO: 0.2 % (ref 0–0.5)
LYMPHOCYTES # BLD AUTO: 1 K/UL (ref 1–4.8)
LYMPHOCYTES NFR BLD: 12 % (ref 18–48)
MCH RBC QN AUTO: 28.3 PG (ref 27–31)
MCHC RBC AUTO-ENTMCNC: 31.5 G/DL (ref 32–36)
MCV RBC AUTO: 90 FL (ref 82–98)
MONOCYTES # BLD AUTO: 0.5 K/UL (ref 0.3–1)
MONOCYTES NFR BLD: 6.3 % (ref 4–15)
NEUTROPHILS # BLD AUTO: 6.5 K/UL (ref 1.8–7.7)
NEUTROPHILS NFR BLD: 80.4 % (ref 38–73)
NRBC BLD-RTO: 0 /100 WBC
PHOSPHATE SERPL-MCNC: 7.5 MG/DL (ref 2.7–4.5)
PLATELET # BLD AUTO: 153 K/UL (ref 150–450)
PMV BLD AUTO: 9.6 FL (ref 9.2–12.9)
POTASSIUM SERPL-SCNC: 4.3 MMOL/L (ref 3.5–5.1)
PROT SERPL-MCNC: 6.4 G/DL (ref 6–8.4)
RBC # BLD AUTO: 2.86 M/UL (ref 4–5.4)
SODIUM SERPL-SCNC: 135 MMOL/L (ref 136–145)
WBC # BLD AUTO: 8.06 K/UL (ref 3.9–12.7)

## 2023-05-05 PROCEDURE — 99239 PR HOSPITAL DISCHARGE DAY,>30 MIN: ICD-10-PCS | Mod: ,,, | Performed by: INTERNAL MEDICINE

## 2023-05-05 PROCEDURE — 80053 COMPREHEN METABOLIC PANEL: CPT

## 2023-05-05 PROCEDURE — 63600175 PHARM REV CODE 636 W HCPCS: Performed by: STUDENT IN AN ORGANIZED HEALTH CARE EDUCATION/TRAINING PROGRAM

## 2023-05-05 PROCEDURE — 99233 PR SUBSEQUENT HOSPITAL CARE,LEVL III: ICD-10-PCS | Mod: ,,, | Performed by: INTERNAL MEDICINE

## 2023-05-05 PROCEDURE — 85025 COMPLETE CBC W/AUTO DIFF WBC: CPT

## 2023-05-05 PROCEDURE — 25000003 PHARM REV CODE 250: Performed by: STUDENT IN AN ORGANIZED HEALTH CARE EDUCATION/TRAINING PROGRAM

## 2023-05-05 PROCEDURE — 25000003 PHARM REV CODE 250

## 2023-05-05 PROCEDURE — 99233 SBSQ HOSP IP/OBS HIGH 50: CPT | Mod: ,,, | Performed by: INTERNAL MEDICINE

## 2023-05-05 PROCEDURE — 99239 HOSP IP/OBS DSCHRG MGMT >30: CPT | Mod: ,,, | Performed by: INTERNAL MEDICINE

## 2023-05-05 PROCEDURE — 84100 ASSAY OF PHOSPHORUS: CPT

## 2023-05-05 PROCEDURE — 90935 HEMODIALYSIS ONE EVALUATION: CPT

## 2023-05-05 RX ORDER — ONDANSETRON 4 MG/1
4 TABLET, ORALLY DISINTEGRATING ORAL EVERY 8 HOURS PRN
Status: DISCONTINUED | OUTPATIENT
Start: 2023-05-05 | End: 2023-05-05 | Stop reason: HOSPADM

## 2023-05-05 RX ORDER — SODIUM CHLORIDE 9 MG/ML
INJECTION, SOLUTION INTRAVENOUS ONCE
Status: COMPLETED | OUTPATIENT
Start: 2023-05-05 | End: 2023-05-05

## 2023-05-05 RX ORDER — SEVELAMER CARBONATE 800 MG/1
1600 TABLET, FILM COATED ORAL
Qty: 180 TABLET | Refills: 11 | Status: ON HOLD | OUTPATIENT
Start: 2023-05-05 | End: 2023-10-05 | Stop reason: SDUPTHER

## 2023-05-05 RX ORDER — CETIRIZINE HYDROCHLORIDE 5 MG/1
5 TABLET ORAL DAILY
Status: DISCONTINUED | OUTPATIENT
Start: 2023-05-05 | End: 2023-05-05 | Stop reason: HOSPADM

## 2023-05-05 RX ORDER — ONDANSETRON 4 MG/1
4 TABLET, FILM COATED ORAL EVERY 6 HOURS
Qty: 20 TABLET | Refills: 0 | Status: SHIPPED | OUTPATIENT
Start: 2023-05-05

## 2023-05-05 RX ADMIN — ACETAMINOPHEN 650 MG: 325 TABLET ORAL at 08:05

## 2023-05-05 RX ADMIN — CETIRIZINE HYDROCHLORIDE 5 MG: 5 TABLET, FILM COATED ORAL at 11:05

## 2023-05-05 RX ADMIN — SEVELAMER CARBONATE 1600 MG: 800 TABLET, FILM COATED ORAL at 08:05

## 2023-05-05 RX ADMIN — ONDANSETRON 4 MG: 4 TABLET, ORALLY DISINTEGRATING ORAL at 02:05

## 2023-05-05 RX ADMIN — HEPARIN SODIUM 1000 UNITS: 1000 INJECTION, SOLUTION INTRAVENOUS; SUBCUTANEOUS at 02:05

## 2023-05-05 RX ADMIN — SEVELAMER CARBONATE 1600 MG: 800 TABLET, FILM COATED ORAL at 12:05

## 2023-05-05 RX ADMIN — SODIUM CHLORIDE: 9 INJECTION, SOLUTION INTRAVENOUS at 01:05

## 2023-05-05 NOTE — Clinical Note
16 Hospital Follow Up with Jordan Christiansen MD  Tuesday May 16, 2023 9:40 AM  Please arrive approximately 15 minutes before your scheduled    [facility] [place] [facility]  [address]   [facility] [address]  [phone]

## 2023-05-05 NOTE — NURSING
Report received from REE Bear RN. Patient arrived to [facility] via wheelchair. AAOx4. HD tx initiated via RIJ catheter.

## 2023-05-05 NOTE — MEDICAL/APP STUDENT
[facility] Medicine Student   Progress Note  [facility] HOSP MED 4    Admit Date: 4/28/2023  Hospital Day: 7  05/05/2023  1:31 PM    SUBJECTIVE:   Ms. Ade Silva is a 43 y.o. female with a relevant medical history of autosomal dominant polycystic kidney disease, CKD V, recurrent UTIs who is being followed up for Acute renal failure superimposed on stage 5 chronic kidney disease, not on chronic dialysis.    Interval history: NAEO.   -No weakness or neurological sx after post-HD syncopal episode yesterday.   -Received acetaminophen for ongoing tooth pain 2/2 to poor dentition. She is also experiencing some mild sore throat with congestion and associated sinus pain but says this is also ongoing and usually relieved at home by non-drowsy claritin, given cetirizine for this. Continues to have hematuria.     Review of Systems   Constitutional:  Negative for chills and fever.   Respiratory:  Positive for sputum production (whitish). Negative for hemoptysis and shortness of breath.    Cardiovascular:  Negative for chest pain and palpitations.   Gastrointestinal:  Negative for abdominal pain, nausea and vomiting.   Genitourinary:  Positive for flank pain and hematuria.   Musculoskeletal:  Negative for neck pain.   Neurological:  Negative for dizziness, focal weakness, weakness and headaches.     Please refer to the H&P for past medical, family, and social history.    OBJECTIVE:     Vital Signs Recent:  Temp: 98.1 °F (36.7 °C) (05/05/23 1230)  Pulse: 64 (05/05/23 1230)  Resp: 16 (05/05/23 1230)  BP: 102/69 (05/05/23 1230)  SpO2: 100 % (05/05/23 1230)  Oxygen Documentation:    Flow (L/min): 2           Device (Oxygen Therapy): room air         Vital Signs Range (Last 24H):  Temp:  [97.8 °F (36.6 °C)-99 °F (37.2 °C)]   Pulse:  [64-99]   Resp:  [16-18]   BP: ()/(57-69)   SpO2:  [93 %-100 %]        I & O (Last 24H):  Intake/Output Summary (Last 24 hours) at 5/5/2023 1331  Last data filed at 5/5/2023 0730  Gross per 24 hour    Intake 680 ml   Output 400 ml   Net 280 ml        Physical Exam:  Physical Exam  Constitutional:       Appearance: Normal appearance.   HENT:      Head: Normocephalic and atraumatic.      Nose: Nose normal.      Mouth/Throat:      Mouth: Mucous membranes are moist.      Pharynx: Oropharynx is clear.   Eyes:      Extraocular Movements: Extraocular movements intact.      Pupils: Pupils are equal, round, and reactive to light.   Cardiovascular:      Rate and Rhythm: Normal rate and regular rhythm.      Pulses: Normal pulses.      Heart sounds: Normal heart sounds.   Pulmonary:      Effort: Pulmonary effort is normal.      Breath sounds: Normal breath sounds.   Abdominal:      General: Bowel sounds are normal. There is distension.      Palpations: There is mass.      Tenderness: There is abdominal tenderness (reduced TTP).   Musculoskeletal:      Right lower leg: No edema.      Left lower leg: No edema.   Neurological:      General: No focal deficit present.      Mental Status: She is alert and oriented to person, place, and time. Mental status is at baseline.      Motor: No weakness.   Psychiatric:         Mood and Affect: Mood normal.       Labs:   Recent Labs   Lab 05/03/23 0457 05/04/23 0534 05/05/23 0512   * 134* 135*   K 4.7 4.7 4.3   CL 98 97 102   CO2 21* 19* 20*   * 116* 74*   CREATININE 10.5* 11.8* 7.6*   GLU 79 70 84   CALCIUM 7.6* 8.0* 8.4*   MG 1.8  --   --    PHOS 7.8* 10.7* 7.5*     Recent Labs   Lab 05/03/23 0457 05/04/23 0534 05/05/23 0512   ALKPHOS 69 77 74   ALT 6* 7* 7*   AST 8* 8* 9*   ALBUMIN 2.6* 2.7* 2.7*   PROT 6.1 6.4 6.4   BILITOT 0.5 0.4 0.4     Recent Labs   Lab 05/04/23 0534 05/04/23 1928 05/05/23 0512   WBC 7.53 7.04 8.06   HGB 8.4* 7.9* 8.1*   HCT 26.1* 24.2* 25.7*    144* 153     Results:   Hep B Quant Neg     Scheduled Meds:   sodium chloride 0.9%   Intravenous Once    cetirizine  5 mg Oral Daily    epoetin kranthi-epbx  50 Units/kg Subcutaneous Once     LIDOcaine  2 patch Transdermal Q24H    mupirocin   Nasal BID    senna-docusate 8.6-50 mg  1 tablet Oral BID    sevelamer carbonate  1,600 mg Oral TID WM     Continuous Infusions:  PRN Meds:sodium chloride, acetaminophen, dextrose 10%, dextrose 10%, dextrose, dextrose, glucagon (human recombinant), heparin (porcine), melatonin, naloxone, oxyCODONE, polyethylene glycol, simethicone, sodium chloride 0.9%, sodium chloride 0.9%, sodium chloride 0.9%, sodium chloride 0.9%, sodium chloride 0.9%    ASSESSMENT/PLAN:   Ms. Ade Silva is a 43 y.o. female with a relevant medical history of autosomal dominant polycystic kidney disease, CKD V, recurrent UTIs who is being followed up for Acute renal failure superimposed on stage 5 chronic kidney disease, not on chronic dialysis.    Active Hospital Problems    Diagnosis  POA    *Acute renal failure superimposed on stage 5 chronic kidney disease, not on chronic dialysis [N17.9, N18.5]  Unknown    Gross hematuria [R31.0]  Unknown    Hematuria [R31.9]  Yes    Polycystic kidney disease [Q61.3]  Not Applicable     Chronic    CKD (chronic kidney disease) stage 5, GFR less than 15 ml/min [N18.5]  Yes    Chronic kidney disease-mineral and bone disorder [N18.9, E83.9, M89.9]  Yes    Anemia [D64.9]  Yes    Chest pain [R07.9]  Unknown    Autosomal dominant polycystic kidney disease [Q61.2]  Not Applicable    Hypertension [I10]  Unknown    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  8 mm (4/19/2016)          Resolved Hospital Problems    Diagnosis Date Resolved POA    Urinary tract infection with hematuria [N39.0, R31.9] 04/28/2023 Unknown     * Acute renal failure superimposed on stage 5 chronic kidney disease, not on chronic dialysis  -Cr 17.8 on admission, now improved to 7.6   -received IJV trialysis in ED   -Tunneled cath placement with Interventional Nephrology 5/3/23  -Received HD 5/4/23 and 5/5/23   - Hep B quant  "negative, SW obtained outpatient HD chair for Monday     Plan:   -Can d/c after completing HD   -f/u outpatient nephrology and outpatient HD   -Avoid nephrotoxic agents such as NSAIDs, gadolinium and IV radiocontrast.     Hyperphosphatemia  -Phosphate 10.7 reduced to 7.5 w/ selevalmer     Plan:   -continue sevelamer carbonate 2 800mg tablets 3x daily      Gross hematuria  -see Polycystic Kidney Disease      Anemia  -Admit with hemoglobin 4.6 Baseline unknown, last Hgb per care everywhere in 5/2022 10.3. 3u pRBC ordered in ED  - Likely secondary to CKD and hematuria; CT abdomen in ED without signs of bleeding.   -Patient has chronic intermittent hematuria from PCKD, although has not resolved the past 2 weeks  Gross hematuria continuing in hospital (photo in media tab)  -Denies melena, hematochezia  -latest Hgb 7.9 5/3/23      Plan:   - f/u Nephrology outpatient for EPO  - f/u Urology outpatient       Hepatitis B  -HBsAg nonreactive, Anti HBc IgM grayzone, Anti Hbc Total Ab reactive, Anti HBs reactive 595.27  -Hep B PCR Quant Negative, no active infection      CKD (chronic kidney disease) stage 5, GFR less than 15 ml/min  -See "Acute renal failure superimposed on stage 5 chronic kidney disease, not on chronic dialysis"     Polycystic kidney disease  -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 Was previously followed by [place]rology [place] [facility] [place]. Findings consistent with diagnosis on CT abdomen pelvis in ED.  -RP u/s with enlarged polycystic kidneys with majority of the normal renal parenchyma replaced by simple and minimally complex cysts.There are 3 solid heterogeneous appearing lesions within the left kidney with internal vascularity  -CTA triple phase of abdomen pelvis completed with no evidence of contrast extravasation to suggest active bleeding     Plan:    -f/u outpatient Urology   -f/u outpatient Nephrology      Basilar artery aneurysm  -Due to hx of Polycystic kidney disease "   -[facility] to moving to [place]. Last noted 8 mm in April 2016     Hypertension  -Patient reports taking metoprolol and being off/on antihypertensives for some time  -Normotensive on admission     Plan:   -Will consider anti hypertensive's if clinically indicated     Chest pain  -Patient reports few days of chest pain and LUEVANO. Denies radiation, diaphoresis. Resolved in ED after famotidine and fentanyl. Further improved after blood transfusion complete.  -Troponin in ED wnl  -EKG without signs of acute ischemia in ED    Discharge planning:   Pt may be d/c to home, SW has obtained outpt HD chair, Nephrology does not have plans for HD over weekend

## 2023-05-05 NOTE — PLAN OF CARE
Information provided to patient for HD set up    Dialysis appointment:  Appointment location:  [address] [place]  P: [phone]  Appointment time:  05/08/2023 at 2:15pm   Ade Silva Please arrive at 2:00pm to sign paperwork    Chair time:  M/W/F @ 2:15P    Jessica Mustafa RN  Case Management  [facility]  [phone]

## 2023-05-05 NOTE — PLAN OF CARE
Recommendations     1. Continue Renal diet, add Novasource ONS BID if PO intake declines.   2. RD to monitor & follow-up.     Goals: Meet % EEN, EPN by RD f/u date  Nutrition Goal Status: new  Communication of RD Recs: reviewed with RN

## 2023-05-05 NOTE — DISCHARGE INSTRUCTIONS
Make appointment with a primary care doctor, urology and nephrology.  Your next dialysis session is on Monday (5/8) at 2:15pm at [facility]  We started sevelamer as a new medication for your high phosphorus.     INSTRUCTIONS FOR YOUR CATHETER  Hemodialysis catheter is one of the major vascular access to provide hemodialysis. Infection is one of its common complication. To maintain a safe and long-term use of your catheter without the requirement of replacement and exchange please follow the care instruction for your catheter:     Avoid getting water on the catheter. It is highly recommended to keep the catheter dry. During bathing use only sponging with towel around the catheter area. Lower part of the body can be washed but avoid splashing water over the catheter.      Avoid scratching or touching the skin close to the catheter.      It is recommended to change the dressing during dialysis sessions only. If you find your catheter dressing is wet or not clean, please call your dialysis unit to arrange exchange as soon as possible.      If you notice pain, redness or discharge from the exit site of the catheter please call your dialysis unit or your Nephrologist immediately to arrange examining the catheter and excluding any early signs of infection.      Your dialysis catheter should only be used for dialysis. Only your dialysis nurse can use the catheter. Do not accept any other health care personnel to use your hemodialysis catheter for any reason. This catheter is not intended to be used for medications, IV fluid or taking blood for labs.      The more strict you are in following the instructions the less will be the risk of infection and complications with your dialysis catheter.     We would like to wish you a great health and we will be glad to answer any question you have.       please communicate with [facility] or use [facility] to send us your questions.       Information provided to  patient for HD set up     Dialysis appointment:  Appointment location:  [address] [place]  P: [phone]  Appointment time:  05/08/2023 at 2:15pm   Ade Silva Please arrive at 2:00pm to sign paperwork     Chair time:  M/W/F @ 2:15P

## 2023-05-05 NOTE — SUBJECTIVE & OBJECTIVE
"Interval History: iHD again today for metabolic clearance    Review of patient's allergies indicates:   Allergen Reactions    Aspirin Hives    Nickel     Penicillins Hives    Adhesive Rash     Adhesive/silk tape (type found on band aides). Can only use "Paper Tape"    Butalbital-acetaminophen-caff Nausea And Vomiting and Other (See Comments)     Dizziness (made pt feel sick)    Latex, natural rubber Rash     Current Facility-Administered Medications   Medication Frequency    0.9%  NaCl infusion (for blood administration) Q24H PRN    0.9%  NaCl infusion Once    acetaminophen tablet 650 mg Q6H PRN    cetirizine tablet 5 mg Daily    dextrose 10% bolus 125 mL 125 mL PRN    dextrose 10% bolus 250 mL 250 mL PRN    dextrose 40 % gel 15,000 mg PRN    dextrose 40 % gel 30,000 mg PRN    epoetin kranthi-epbx injection 4,540 Units Once    glucagon (human recombinant) injection 1 mg PRN    heparin (porcine) injection 1,000 Units PRN    LIDOcaine 5 % patch 2 patch Q24H    melatonin tablet 6 mg Nightly PRN    mupirocin 2 % ointment BID    naloxone 0.4 mg/mL injection 0.02 mg PRN    oxyCODONE immediate release tablet 5 mg Q8H PRN    polyethylene glycol packet 17 g BID PRN    senna-docusate 8.6-50 mg per tablet 1 tablet BID    sevelamer carbonate tablet 1,600 mg TID WM    simethicone chewable tablet 80 mg TID PRN    sodium chloride 0.9% bolus 250 mL 250 mL PRN    sodium chloride 0.9% bolus 250 mL 250 mL PRN    sodium chloride 0.9% bolus 250 mL 250 mL PRN    sodium chloride 0.9% flush 10 mL Q12H PRN    sodium chloride 0.9% flush 10 mL PRN       Objective:     Vital Signs (Most Recent):  Temp: 99 °F (37.2 °C) (05/05/23 0915)  Pulse: 85 (05/05/23 0915)  Resp: 16 (05/05/23 0915)  BP: 103/61 (05/05/23 0915)  SpO2: 95 % (05/05/23 0915) Vital Signs (24h Range):  Temp:  [97.8 °F (36.6 °C)-99 °F (37.2 °C)] 99 °F (37.2 °C)  Pulse:  [67-99] 85  Resp:  [16-71] 16  SpO2:  [93 %-100 %] 95 %  BP: ()/(54-74) 103/61     Weight: 90.7 kg (199 lb " 15.3 oz) (05/05/23 0904)  Body mass index is 28.69 kg/m².  Body surface area is 2.12 meters squared.    I/O last 3 completed shifts:  In: 1868 [P.O.:568; I.V.:800; Other:500]  Out: 2484 [Urine:700; Other:1784]     Physical Exam  Constitutional:       Appearance: She is well-developed.   HENT:      Head: Normocephalic and atraumatic.   Eyes:      Pupils: Pupils are equal, round, and reactive to light.   Cardiovascular:      Rate and Rhythm: Normal rate and regular rhythm.      Heart sounds: Normal heart sounds.      Comments: TDC  Pulmonary:      Effort: Pulmonary effort is normal.      Breath sounds: Normal breath sounds.   Abdominal:      General: Bowel sounds are normal.      Palpations: Abdomen is soft. There is mass.      Tenderness: There is no abdominal tenderness.   Musculoskeletal:         General: Normal range of motion.      Cervical back: Normal range of motion and neck supple.   Skin:     General: Skin is warm and dry.      Capillary Refill: Capillary refill takes less than 2 seconds.   Neurological:      Mental Status: She is alert and oriented to person, place, and time.        Significant Labs:  All labs within the past 24 hours have been reviewed.     Significant Imaging:  Labs: Reviewed

## 2023-05-05 NOTE — NURSING
3.5 hours HD tx completed. No fluid removed. Patient tolerated well. Blood returned. Catheter locked with Heparin. Clamped and capped. Report given to REE Bear RN.

## 2023-05-05 NOTE — PROGRESS NOTES
"[facility] - Int[facility] ([facility]-)  Adult Nutrition  Progress Note    SUMMARY       Recommendations    1. Continue Renal diet, add Novasource ONS BID if PO intake declines.   2. RD to monitor & follow-up.    Goals: Meet % EEN, EPN by RD f/u date  Nutrition Goal Status: new  Communication of RD Recs: reviewed with RN    Assessment and Plan    Nutrition Problem:  Altered nutrition related lab values     Related to (etiology):   CKD    Signs and Symptoms (as evidenced by):   Elevated Creat, Decreased GFR    Interventions(treatment strategy):  Collaboration of nutrition care w/ other providers    Nutrition Diagnosis Status:   New    Reason for Assessment    Reason For Assessment: RD follow-up  Diagnosis: other (see comments) (ARF)  Relevant Medical History: CKD5  Interdisciplinary Rounds: did not attend    General Information Comments: Pt tolerating diet w/ good appetite. Pt received HD yesterday; Renal diet education complete 4/29. Fair appetite PTA & stable weight. Appears nourished w/ no indicators of malnutrition. Pending discharge today.  Nutrition Discharge Planning: Adequate PO intake    Nutrition/Diet History    Spiritual, Cultural Beliefs, Restorationist Practices, Values that Affect Care: no  Factors Affecting Nutritional Intake: None identified at this time    Anthropometrics    Temp: 98.8 °F (37.1 °C)  Height Method: Stated  Height: 5' 10" (177.8 cm)  Height (inches): 70 in  Weight Method: Standard Scale  Weight: 90.7 kg (199 lb 15.3 oz)  Weight (lb): 199.96 lb  Ideal Body Weight (IBW), Female: 150 lb  % Ideal Body Weight, Female (lb): 133.31 %  BMI (Calculated): 28.7  BMI Grade: 25 - 29.9 - overweight    Lab/Procedures/Meds    Pertinent Labs Reviewed: reviewed  Pertinent Labs Comments: Creat 7.6, GFR 6.4, P 7.5  Pertinent Medications Reviewed: reviewed    Estimated/Assessed Needs    Weight Used For Calorie Calculations: 90.7 kg (199 lb 15.3 oz)    Energy Calorie Requirements (kcal): 1982 " kcal/d  Energy Need Method: [place] (1.2 PAL)    Protein Requirements: 100 g/d (1.1 g/kg)  Weight Used For Protein Calculations: 90.7 kg (199 lb 15.3 oz)    Estimated Fluid Requirement Method: other (see comments) (Per MD or 1 mL/kcal)  RDA Method (mL): 1982    Nutrition Prescription Ordered    Current Diet Order: Renal    Evaluation of Received Nutrient/Fluid Intake    I/O: -3.5L since admit    Comments: LBM: 5/3    Tolerance: tolerating    Nutrition Risk    Level of Risk/Frequency of Follow-up:  (1x/week)     Monitor and Evaluation    Food and Nutrient Intake: energy intake, food and beverage intake  Food and Nutrient Adminstration: diet order  Physical Activity and Function: nutrition-related ADLs and IADLs  Anthropometric Measurements: weight, weight change  Biochemical Data, Medical Tests and Procedures: inflammatory profile, lipid profile, glucose/endocrine profile, gastrointestinal profile, electrolyte and renal panel  Nutrition-Focused Physical Findings: overall appearance     Nutrition Follow-Up    RD Follow-up?: Yes

## 2023-05-05 NOTE — ASSESSMENT & PLAN NOTE
Calcium   Date Value Ref Range Status   05/05/2023 8.4 (L) 8.7 - 10.5 mg/dL Final     Phosphorus   Date Value Ref Range Status   05/05/2023 7.5 (H) 2.7 - 4.5 mg/dL Final     PTH, Intact   Date Value Ref Range Status   04/28/2023 1,348.9 (H) 9.0 - 77.0 pg/mL Final     Calcitrol .25   Continue sevelamer

## 2023-05-05 NOTE — DISCHARGE SUMMARY
[facility] - Intensive Care ([place] [address])  [facility] Medicine  Discharge Summary      Patient Name: Ade Silva  MRN: [id number]  TYLER: [id number]  Patient Class: IP- Inpatient  Admission Date: 4/28/2023  Hospital Length of Stay: 7 days  Discharge Date and Time:  05/05/2023 2:05 PM  Attending Physician: Viki Torrez MD   Discharging Provider: Ramonita Baldwin DO  Primary Care Provider: Primary Doctor [facility] Medicine Team: [facility] Ramonita Baldwin DO  Primary Care Team: [facility]    HPI:   Ade Silva is a 43 yr old female with autosomal dominant polycystic kidney disease, CKD V, recurrent UTIs presented with hematuria and nausea/vomiting. Patient is new to [place], recently moved from [place] to live with her mother here a few weeks ago. She followed with Nephrology at [facility] and PCP (records in care everywhere). She initially started having hematuria 2 weeks ago, which is not new for her. Been told in the past when one of her renal cysts ruptures she has intermittent hematuria. Typically resolves in a few days, however this time it did not. She is still able to make a good amount of urine with her kidney disease. This morning presented to ED because she was having persistent nausea and vomiting. She endorses increased warmth when she urinates recently, which is consistent with past UTI that she has had. She also has emesis with the nausea/vomiting that is associated with chest discomfort she also reports exertional weakness, she is unable to walk across room. Has been consistently smoking cigarettes for about 28 years, has not used alcohol since age 27, smokes marijuana, but denies any other drug use. Only home medication she takes is metoprolol, but recently stopped taking it.    She presented to the ED afebrile and HDS on RA. CBC notable for Hgb 4.6 (10.5 in 2022 per care everywhere), Hct 14.5 with unknown baseline. CMP notable for Na 134, Agap 26, , Cr 17.8, GFR 2.3. Lipase 107. BNP  108. Troponin wnl. UA with 2+ protein, 3+ occult blood, 2+ leukocytes, and >100 RBC. VBG pH 7.16 and pCO2 25.7, BE -19. EKG NSR with prolonged qtc (516). CT Abd Pelvis showed enlarged kidneys consistent with findings of polycystic kidney disease.    Admitted to hospital medicine acute on chronic renal failure and anemia       Procedure(s) (LRB):  Insertion, Catheter, Central Venous, Hemodialysis (Right)  REMOVAL, CATHETER, HEMODIALYSIS      Hospital Course:   Admitted to hospital medicine for acute kidney failure on CKD not on chronic dialysis. Hgb on admission 4.6 likely due to CKD and 2 weeks of gross hematuria prior to presentation. Received 3u pRBC with appropriate response on CBC. Right IJV trialysis placed. Nephrology consulted. Electrolytes and uremia improved. RPUS showed enlarged polycystic kidneys with majority of the normal renal parenchyma replaced by simple and minimally complex cysts as well as 3 solid heterogeneous appearing lesions within the left kidney with internal vascularity. Urology was consulted for findings on RPUS and continued gross dark hematuria while in hospital. CTA did not show any extravasation. IR consulted for possible embolization, cannot empirically embolize. Attempted trial of DDVAP in setting of uremia without reduction in hematuria. Hematuria continued, transfused for Hgb <7 on 5/2. Urology felt that continued hematuria was not cause of anemia. Recommended outpatient hematuria workup at [facility] on discharge. Interventional Nephrology consulted for tunneled HD cath. HD chair was set up by .      Vitals:    05/05/23 1230   BP: 102/69   Pulse: 64   Resp: 16   Temp: 98.1 °F (36.7 °C)       Physical Exam  Vitals and nursing note reviewed.   Constitutional:       General: She is not in acute distress.     Appearance: Normal appearance. She is not ill-appearing.   HENT:      Head: Normocephalic and atraumatic.      Mouth/Throat:      Mouth: Mucous membranes are moist.   Eyes:       General: No scleral icterus.     Extraocular Movements: Extraocular movements intact.      Pupils: Pupils are equal, round, and reactive to light.   Cardiovascular:      Rate and Rhythm: Normal rate and regular rhythm.      Pulses: Normal pulses.      Heart sounds: Normal heart sounds.   Pulmonary:      Effort: Pulmonary effort is normal.   Abdominal:      General: There is distension.      Tenderness: There is abdominal tenderness. There is right CVA tenderness and left CVA tenderness. There is no guarding.   Musculoskeletal:      Right lower leg: No edema.      Left lower leg: No edema.   Skin:     General: Skin is warm and dry.   Neurological:      Mental Status: She is alert and oriented to person, place, and time.      Motor: No weakness.   Psychiatric:         Mood and Affect: Mood normal.         Behavior: Behavior normal.         Thought Content: Thought content normal.         Judgment: Judgment normal.         Goals of Care Treatment Preferences:  Code Status: Full Code      Consults:   Consults (From admission, onward)        Status Ordering Provider     IP consult to Interventional Nephrology  Once        Provider:  (Not yet assigned)    Completed KENISHA JACOBS     Inpatient consult to Social Work  Once        Provider:  (Not yet assigned)    Acknowledged KENISHA JACOBS     Inpatient consult to Interventional Radiology  Once        Provider:  (Not yet assigned)    Completed GREGORY MOLINA     Inpatient consult to Urology  Once        Provider:  (Not yet assigned)    Completed KENISHA JACOBS     Case Management/  Once        Provider:  (Not yet assigned)    Acknowledged KENISHA JACOBS     Inpatient consult to Registered Dietitian/Nutritionist  Once        Provider:  (Not yet assigned)    Completed KENISHA JACOBS     Inpatient consult to Nephrology  Once        Provider:  (Not yet assigned)    Completed BRONSON FRANCOIS          No new Assessment & Plan notes have been filed  under this hospital service since the last note was generated.  Service: Hospital Medicine    Final Active Diagnoses:    Diagnosis Date Noted POA    PRINCIPAL PROBLEM:  Acute renal failure superimposed on stage 5 chronic kidney disease, not on chronic dialysis [N17.9, N18.5] 04/29/2023 Unknown    Gross hematuria [R31.0] 04/29/2023 Unknown    Hematuria [R31.9] 04/29/2023 Yes    Polycystic kidney disease [Q61.3] 04/28/2023 Not Applicable     Chronic    CKD (chronic kidney disease) stage 5, GFR less than 15 ml/min [N18.5] 04/28/2023 Yes    Chronic kidney disease-mineral and bone disorder [N18.9, E83.9, M89.9] 04/28/2023 Yes    Anemia [D64.9] 04/28/2023 Yes    Chest pain [R07.9] 04/28/2023 Unknown    Autosomal dominant polycystic kidney disease [Q61.2] 04/28/2023 Not Applicable    Hypertension [I10] 04/28/2023 Unknown    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    Specialties: Nephrology, Colon and Rectal Surgery, Urology  Contact information:  [address] [facility] [facility] [address]  [phone]             Primary Doctor No .                     Patient Instructions:      Ambulatory referral/consult to Urology   Standing Status: Future   Referral Priority: Routine Referral Type: Consultation   Referral Reason: Specialty Services Required   Requested Specialty: Urology   Number of Visits Requested: 1     Ambulatory referral/consult to Nephrology   Standing Status: Future   Referral Priority: Routine Referral Type: Consultation   Referral Reason: Specialty Services Required   Requested Specialty: Nephrology   Number of Visits Requested: 1     Diet renal      Notify your health care provider if you experience any of the following:  increased confusion or weakness     Notify your health care provider if you experience any of the following:  persistent dizziness, light-headedness, or visual disturbances     Notify your health care provider if you experience any of the following:  worsening rash     Notify your health care provider if you experience any of the following:  severe persistent headache     Notify your health care provider if you experience any of the following:  difficulty breathing or increased cough     Notify your health care provider if you experience any of the following:  redness, tenderness, or signs of infection (pain, swelling, redness, odor or green/yellow discharge around incision site)     Notify your health care provider if you experience any of the following:  severe uncontrolled pain     Notify your health care provider if you experience any of the following:  persistent nausea and vomiting or diarrhea     Notify your health care provider if you experience any of the following:  temperature >100.4     Activity as tolerated       Significant Diagnostic Studies: Labs:   CMP   Recent Labs   Lab 05/04/23  0534 05/05/23  0512   * 135*   K 4.7 4.3   CL 97 102   CO2 19* 20*   GLU 70 84   * 74*   CREATININE 11.8* 7.6*   CALCIUM 8.0* 8.4*   PROT 6.4 6.4   ALBUMIN 2.7* 2.7*   BILITOT 0.4 0.4   ALKPHOS 77 74   AST 8* 9*   ALT 7* 7*   ANIONGAP 18* 13    and CBC   Recent Labs   Lab 05/04/23  0534 05/04/23  1928 05/05/23  0512   WBC 7.53 7.04 8.06   HGB 8.4* 7.9* 8.1*   HCT 26.1* 24.2* 25.7*    144* 153     Radiology:  CT ABDOMEN PELVIS WITHOUT CONTRAST:   Results for orders placed or performed during the hospital encounter of 04/28/23   CT Abdomen Pelvis  Without Contrast    Narrative    EXAMINATION:  CT ABDOMEN PELVIS WITHOUT CONTRAST    CLINICAL HISTORY:  Abdominal pain, acute, nonlocalized;    TECHNIQUE:  Low dose axial images,  sagittal and coronal reformations were obtained from the lung bases to the pubic symphysis without intravenous contrast.   Oral contrast was not administered.    COMPARISON:  None.    FINDINGS:  LUNG BASES & MEDIASTINUM (limited):  Visualized heart is normal in size.  No pericardial effusion.  No pleural effusion.    HEPATOBILIARY: Liver is normal in size.  Numerous scattered well-demarcated hypodensities with the largest on the right measuring 3.7 cm (axial series 2, image 23) and the largest on the left measuring 4.6 cm (axial series 2, image 48).No biliary ductal dilatation.Normal gallbladder.    SPLEEN:  No splenomegaly.    PANCREAS:  No focal masses or ductal dilatation.    ADRENALS:  No adrenal nodules.    KIDNEYS/URETERS: Enlarged kidneys bilaterally measuring 21.3 cm on the right and 22.2 cm in the left with innumerable hypodense foci throughout the renal parenchyma nearly all of which demonstrate fluid-level attenuation however several demonstrate indeterminate-level attenuation (e.g., axial series 2, image 123) and/or contained punctate calcifications (e.g., axial series 2, image 92).  No hydronephrosis.  Ureters are difficult to completely visualize however the visualized portions are decompressed and unremarkable.    PELVIC ORGANS/BLADDER:  Bladder is decompressed.  Uterus is unremarkable.  No adnexal masses.    PERITONEUM / RETROPERITONEUM:  No free air. No free fluid.    LYMPH NODES:  Periaortic lymphadenopathy measuring up to 1.4 cm (axial series 2, image 79).    VESSELS:  Aorta tapers normally.    GI TRACT: Distal esophagus and stomach are unremarkable.  Small bowel is normal in caliber without inflammation or obstruction.  Normal appendix.  No colonic distension or wall thickening.    BONES AND SOFT TISSUES: Approximater 3.9 x 2.7 cm soft tissue attenuation in the left labia.  Levocurvature of the lumbar spine.  Degenerative change at L5-S1.  No acute fractures.  No aggressive osseous lesions.       Impression    1. Enlarged kidneys with innumerable cystic foci throughout, nearly all of which demonstrate fluid-level attenuation however several demonstrate intermediate-level attenuation and/or punctate calcifications.  Findings are consistent with reported polycystic kidney disease.  Recommend retroperitoneal ultrasound for additional characterization of intermediate-attenuating foci.  2. Renal parenchyma is incompletely evaluated secondary to lack of intravenous contrast and innumerable cystic foci. Bladder is incompletely evaluated secondary to lack of intravenous contrast and distension.  Retroperitoneal ultrasound will also help characterize in setting of concern for pyelonephritis/urinary tract infection.  3. Periaortic lymphadenopathy.  Finding is nonspecific and may be reactive.  4. Numerous hypodensities throughout the hepatic parenchyma consistent with polycystic disease.  5. Approximately 3.9 x 2.7 cm soft tissue attenuation left labia.  Correlation with physical exam will be helpful.    Electronically signed by resident: Washington Neri  Date:    04/28/2023  Time:    09:51    Electronically signed by: Partha Flood MD  Date:    04/28/2023  Time:    10:24       Pending Diagnostic Studies:     None         Medications:  Reconciled Home Medications:      Medication List      START taking these medications    sevelamer carbonate 800 mg Tab  Commonly known as: RENVELA  Take 2 tablets (1,600 mg total) by mouth 3 (three) times daily with meals.        CONTINUE taking these medications    acetaminophen 500 MG tablet  Commonly known as: TYLENOL  Take 1,000-1,500 mg by mouth daily as needed for Pain.     CLARITIN ORAL  Take 1 tablet by mouth daily as needed (Allergies).     TUMS ORAL  Take 2 tablets by mouth daily as needed (Heartburn).        STOP taking these medications    AZO STANDARD ORAL            Indwelling Lines/Drains at time of discharge:   Lines/Drains/Airways     Central Venous Catheter Line   Duration           Percutaneous Central Line Insertion/Assessment - Triple Lumen  04/28/23 1000 Internal Jugular Right 7 days    Tunneled Central Line Insertion/Assessment - Double Lumen  05/03/23 1101 Atrial Right 2 days                Time spent on the discharge of patient: 35 minutes         Ramonita Baldwin DO  Department of Hospital Medicine  [facility] ([place]

## 2023-05-05 NOTE — PLAN OF CARE
SW spoke with Kimberly at [facility] pt admissions. Pt has confirmed chair time for MWF at 1415 at [facility].  Pt to arrive at 1400 on 5/8/2023 to complete paperwork.    Information communicated to pt's care team.    Siena Smith, TAHIRA  [facility]  [phone]

## 2023-05-05 NOTE — PROGRESS NOTES
"[facility] - Intensive Care ([place] [address])  Nephrology  Progress Note    Patient Name: Ade Silva  MRN: [id number]  Admission Date: 4/28/2023  Hospital Length of Stay: 7 days  Attending Provider: Viki Torrez MD   Primary Care Physician: Primary Doctor No  Principal Problem:Acute renal failure superimposed on stage 5 chronic kidney disease, not on chronic dialysis    Subjective:     HPI: 43 year old female with a history of polycystic kidney disease, HTN, berry aneurysm sp coiling presents with nausea/fatigue and found to have a hemoglobin of 4.6, BUN of 191 with a CO2 of 6 with pH 7.161, alert and oriented. She recently moved from [place] where she last saw her nephrology prior a year ago. During that visit, she states that they had talked about dialysis, but she admits that she did not seek further care afterwards. Having come to [place] to be closer to family, she has applied for medicare and was seeking to establish here, however developed nausea/fatigue and presented to the ED.    She has been off of any medications for the last 6 months. She states that her whole paternal family had polycystic kidney disease and that her father had a kidney transplant that had failed in the past    Nephrology consulted for metabolic derangement      Interval History: iHD again today for metabolic clearance    Review of patient's allergies indicates:   Allergen Reactions    Aspirin Hives    Nickel     Penicillins Hives    Adhesive Rash     Adhesive/silk tape (type found on band aides). Can only use "Paper Tape"    Butalbital-acetaminophen-caff Nausea And Vomiting and Other (See Comments)     Dizziness (made pt feel sick)    Latex, natural rubber Rash     Current Facility-Administered Medications   Medication Frequency    0.9%  NaCl infusion (for blood administration) Q24H PRN    0.9%  NaCl infusion Once    acetaminophen tablet 650 mg Q6H PRN    cetirizine tablet 5 mg Daily    dextrose 10% bolus 125 " mL 125 mL PRN    dextrose 10% bolus 250 mL 250 mL PRN    dextrose 40 % gel 15,000 mg PRN    dextrose 40 % gel 30,000 mg PRN    epoetin kranthi-epbx injection 4,540 Units Once    glucagon (human recombinant) injection 1 mg PRN    heparin (porcine) injection 1,000 Units PRN    LIDOcaine 5 % patch 2 patch Q24H    melatonin tablet 6 mg Nightly PRN    mupirocin 2 % ointment BID    naloxone 0.4 mg/mL injection 0.02 mg PRN    oxyCODONE immediate release tablet 5 mg Q8H PRN    polyethylene glycol packet 17 g BID PRN    senna-docusate 8.6-50 mg per tablet 1 tablet BID    sevelamer carbonate tablet 1,600 mg TID WM    simethicone chewable tablet 80 mg TID PRN    sodium chloride 0.9% bolus 250 mL 250 mL PRN    sodium chloride 0.9% bolus 250 mL 250 mL PRN    sodium chloride 0.9% bolus 250 mL 250 mL PRN    sodium chloride 0.9% flush 10 mL Q12H PRN    sodium chloride 0.9% flush 10 mL PRN       Objective:     Vital Signs (Most Recent):  Temp: 99 °F (37.2 °C) (05/05/23 0915)  Pulse: 85 (05/05/23 0915)  Resp: 16 (05/05/23 0915)  BP: 103/61 (05/05/23 0915)  SpO2: 95 % (05/05/23 0915) Vital Signs (24h Range):  Temp:  [97.8 °F (36.6 °C)-99 °F (37.2 °C)] 99 °F (37.2 °C)  Pulse:  [67-99] 85  Resp:  [16-71] 16  SpO2:  [93 %-100 %] 95 %  BP: ()/(54-74) 103/61     Weight: 90.7 kg (199 lb 15.3 oz) (05/05/23 0904)  Body mass index is 28.69 kg/m².  Body surface area is 2.12 meters squared.    I/O last 3 completed shifts:  In: 1868 [P.O.:568; I.V.:800; Other:500]  Out: 2484 [Urine:700; Other:1784]     Physical Exam  Constitutional:       Appearance: She is well-developed.   HENT:      Head: Normocephalic and atraumatic.   Eyes:      Pupils: Pupils are equal, round, and reactive to light.   Cardiovascular:      Rate and Rhythm: Normal rate and regular rhythm.      Heart sounds: Normal heart sounds.      Comments: TDC  Pulmonary:      Effort: Pulmonary effort is normal.      Breath sounds: Normal breath sounds.   Abdominal:       General: Bowel sounds are normal.      Palpations: Abdomen is soft. There is mass.      Tenderness: There is no abdominal tenderness.   Musculoskeletal:         General: Normal range of motion.      Cervical back: Normal range of motion and neck supple.   Skin:     General: Skin is warm and dry.      Capillary Refill: Capillary refill takes less than 2 seconds.   Neurological:      Mental Status: She is alert and oriented to person, place, and time.        Significant Labs:  All labs within the past 24 hours have been reviewed.     Significant Imaging:  Labs: Reviewed    Assessment/Plan:     Renal/  Chronic kidney disease-mineral and bone disorder  Calcium   Date Value Ref Range Status   05/05/2023 8.4 (L) 8.7 - 10.5 mg/dL Final     Phosphorus   Date Value Ref Range Status   05/05/2023 7.5 (H) 2.7 - 4.5 mg/dL Final     PTH, Intact   Date Value Ref Range Status   04/28/2023 1,348.9 (H) 9.0 - 77.0 pg/mL Final     Calcitrol .25   Continue sevelamer      CKD (chronic kidney disease) stage 5, GFR less than 15 ml/min  43 year old female presents with severe metabolic derangements in setting of CKD 5 from polycystic kidney disease    Dialysis initiated 4/28  permacath placed 5/3      -iHD today  -placed SW consult to assist with dialysis center placement  -please obtain CXR, hep B ag, ab and quant core  -Keep MAP > 65  -Keep hemoglobin > 7  -Strict ins and outs  -Avoid nephrotoxic agents if possible  -Avoid drastic hemodynamic changes if possible      Polycystic kidney disease  History of polycystic kidney disease with hx of berry aneurysm        Oncology  Anemia  On admission hemoglobin of 4.6  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 CT abdomen without acute findings and on admission received 3 PRBCs    Iron stores adequate from PRBCs  Cont EPO        Thank you for your consult. I will follow-up with patient. Please contact us if you have any additional questions.    Babatunde Palacios,  MD  Nephrology  [facility] ([place]

## 2023-05-05 NOTE — PLAN OF CARE
05/05/23 1446   Post-Acute Status   Post-Acute Authorization Other   Other Status Community Services   Hospital Resources/Appts/Education Provided Appointments scheduled and added to AVS   Discharge Delays None known at this time   Discharge Plan   Discharge Plan A Home with family   Discharge Plan B Home       1015 AM  CM met with patient this morning after huddle and patient and CM discussed discharge planning.  Patient discharge home with family and no post acute needs at this time.  Patient mother to  for transport home.     2:50 PM  CM to speak with assigned nurse to giver her dialysis HD chair time and provided other community services for food pantry.  Patient to  call and schedule appointment for SNAP benefits.  SSI appointment scheduled for 5/11/23.    2:52 PM  Patients PCP appointment scheduled and added to AVS along with HD chair/time.    Jessica Mustafa RN  Case Management  [facility]  [phone]

## 2023-05-08 ENCOUNTER — PATIENT OUTREACH (OUTPATIENT)
Dept: ADMINISTRATIVE | Facility: CLINIC | Age: 43
End: 2023-05-08
Payer: MEDICAID

## 2023-05-08 NOTE — PROGRESS NOTES
C3 nurse attempted to contact Ade Silva for a TCC post hospital discharge follow up call. No answer. No voicemail available. The patient has a scheduled HOS appointment with Jordan Christiansen MD on 05/16/23 @ 3609.

## 2023-05-08 NOTE — PLAN OF CARE
[facility] - Int[facility] ([facility]  Discharge Final Note    Primary Care Provider: Primary Doctor No    Expected Discharge Date: 5/5/2023    Final Discharge Note (most recent)       Final Note - 05/08/23 0727          Final Note    Assessment Type Final Discharge Note     Anticipated Discharge Disposition Home or Self Care with Planned Readmission     What phone number can be called within the next 1-3 days to see how you are doing after discharge? [phone]        Post-Acute Status    Post-Acute Authorization Other     Coverage MEDICAID [place]     Other Status Community Services     Discharge Delays None known at this time                                Contact [facility] - Nephrology/Colon & Rectal/Urology   Specialty: Nephrology, Colon and Rectal Surgery, Urology    [address] [facility] [address]   Phone: [phone]       Next Steps: Follow up    No, Primary Doctor   Relationship: PCP - General        Next Steps: Follow up          Patient dc'd home with family and provided HD chair time and days for dialysis.  Patient provided resources for food and information for applying for SNAP benefits.  SSID appointment scheduled for 5/11/23.  Patient did not need HME.   Patient mother to  an transport home.  CHW to schedule PCP appointment.     The patient is being d/c 5/5/23  with no social service needs identified at this time.      Jessica Mustafa RN  Case Management  [facility]  [phone]

## 2023-05-08 NOTE — CARE UPDATE
[facility] Hospital Follow Up with Jordan Christiansen MD  Tuesday May 16, 2023 9:40 AM       [facility] [place] [facility]  [address]   [facility] [address]  [phone]

## 2023-05-09 NOTE — PROGRESS NOTES
C3 nurse spoke with Ade Silva for a TCC post hospital discharge follow up call. The patient has a scheduled [facility] appointment with Jordan Christiansen MD on 05/16/23 @ 6159.

## 2023-05-13 ENCOUNTER — PATIENT MESSAGE (OUTPATIENT)
Dept: PRIMARY CARE CLINIC | Facility: CLINIC | Age: 43
End: 2023-05-13
Payer: MEDICAID

## 2023-05-16 ENCOUNTER — OFFICE VISIT (OUTPATIENT)
Dept: PRIMARY CARE CLINIC | Facility: CLINIC | Age: 43
End: 2023-05-16
Payer: MEDICAID

## 2023-05-16 VITALS
HEIGHT: 70 IN | WEIGHT: 159.38 LBS | OXYGEN SATURATION: 100 % | HEART RATE: 112 BPM | SYSTOLIC BLOOD PRESSURE: 114 MMHG | DIASTOLIC BLOOD PRESSURE: 65 MMHG | BODY MASS INDEX: 22.82 KG/M2

## 2023-05-16 DIAGNOSIS — R31.9 HEMATURIA, UNSPECIFIED TYPE: ICD-10-CM

## 2023-05-16 DIAGNOSIS — Q61.3 POLYCYSTIC KIDNEY DISEASE: Primary | Chronic | ICD-10-CM

## 2023-05-16 DIAGNOSIS — N18.5 CKD (CHRONIC KIDNEY DISEASE) STAGE 5, GFR LESS THAN 15 ML/MIN: ICD-10-CM

## 2023-05-16 PROCEDURE — 99213 OFFICE O/P EST LOW 20 MIN: CPT | Mod: PBBFAC,PN | Performed by: FAMILY MEDICINE

## 2023-05-16 PROCEDURE — 1159F MED LIST DOCD IN RCRD: CPT | Mod: CPTII,,, | Performed by: FAMILY MEDICINE

## 2023-05-16 PROCEDURE — 1159F PR MEDICATION LIST DOCUMENTED IN MEDICAL RECORD: ICD-10-PCS | Mod: CPTII,,, | Performed by: FAMILY MEDICINE

## 2023-05-16 PROCEDURE — 3062F POS MACROALBUMINURIA REV: CPT | Mod: CPTII,,, | Performed by: FAMILY MEDICINE

## 2023-05-16 PROCEDURE — 3074F PR MOST RECENT SYSTOLIC BLOOD PRESSURE < 130 MM HG: ICD-10-PCS | Mod: CPTII,,, | Performed by: FAMILY MEDICINE

## 2023-05-16 PROCEDURE — 3062F PR POS MACROALBUMINURIA RESULT DOCUMENTED/REVIEW: ICD-10-PCS | Mod: CPTII,,, | Performed by: FAMILY MEDICINE

## 2023-05-16 PROCEDURE — 99203 OFFICE O/P NEW LOW 30 MIN: CPT | Mod: S$PBB,,, | Performed by: FAMILY MEDICINE

## 2023-05-16 PROCEDURE — 3074F SYST BP LT 130 MM HG: CPT | Mod: CPTII,,, | Performed by: FAMILY MEDICINE

## 2023-05-16 PROCEDURE — 3078F PR MOST RECENT DIASTOLIC BLOOD PRESSURE < 80 MM HG: ICD-10-PCS | Mod: CPTII,,, | Performed by: FAMILY MEDICINE

## 2023-05-16 PROCEDURE — 99203 PR OFFICE/OUTPT VISIT, NEW, LEVL III, 30-44 MIN: ICD-10-PCS | Mod: S$PBB,,, | Performed by: FAMILY MEDICINE

## 2023-05-16 PROCEDURE — 99999 PR PBB SHADOW E&M-EST. PATIENT-LVL III: ICD-10-PCS | Mod: PBBFAC,,, | Performed by: FAMILY MEDICINE

## 2023-05-16 PROCEDURE — 3078F DIAST BP <80 MM HG: CPT | Mod: CPTII,,, | Performed by: FAMILY MEDICINE

## 2023-05-16 PROCEDURE — 1111F PR DISCHARGE MEDS RECONCILED W/ CURRENT OUTPATIENT MED LIST: ICD-10-PCS | Mod: CPTII,,, | Performed by: FAMILY MEDICINE

## 2023-05-16 PROCEDURE — 3066F NEPHROPATHY DOC TX: CPT | Mod: CPTII,,, | Performed by: FAMILY MEDICINE

## 2023-05-16 PROCEDURE — 3008F PR BODY MASS INDEX (BMI) DOCUMENTED: ICD-10-PCS | Mod: CPTII,,, | Performed by: FAMILY MEDICINE

## 2023-05-16 PROCEDURE — 3008F BODY MASS INDEX DOCD: CPT | Mod: CPTII,,, | Performed by: FAMILY MEDICINE

## 2023-05-16 PROCEDURE — 99999 PR PBB SHADOW E&M-EST. PATIENT-LVL III: CPT | Mod: PBBFAC,,, | Performed by: FAMILY MEDICINE

## 2023-05-16 PROCEDURE — 1111F DSCHRG MED/CURRENT MED MERGE: CPT | Mod: CPTII,,, | Performed by: FAMILY MEDICINE

## 2023-05-16 PROCEDURE — 3066F PR DOCUMENTATION OF TREATMENT FOR NEPHROPATHY: ICD-10-PCS | Mod: CPTII,,, | Performed by: FAMILY MEDICINE

## 2023-05-16 RX ORDER — LIDOCAINE AND PRILOCAINE 25; 25 MG/G; MG/G
CREAM TOPICAL
Qty: 30 G | Refills: 0 | Status: SHIPPED | OUTPATIENT
Start: 2023-05-16 | End: 2023-10-12 | Stop reason: ALTCHOICE

## 2023-05-16 RX ORDER — CHOLECALCIFEROL (VITAMIN D3) 25 MCG
1000 TABLET ORAL DAILY
COMMUNITY
End: 2023-07-18

## 2023-05-16 NOTE — PROGRESS NOTES
Clinic Note  5/16/2023      Subjective:       Patient ID:  Ade is a 43 y.o. female being seen for an new visit.      Chief Complaint: Hospitals in [place] F/u     Hospital follow up-patient with history of polycystic kidney disease, hypertension, basilar aneurysm status post coiling, chronic anemia who is recently hospitalized for altered mental status and hematuria.  Underwent emergent dialysis and blood transfusions.  Patient had a percutaneous R central line placement and then replaced with a tunneled central line on 05/03/2023.  Patient still having tenderness and pain in that area, stitches are still in place since 05/03/2023 from the percutaneous right central line.  Patient getting dialysis Monday Wednesday Friday          No family history on file.  Social History     Socioeconomic History    Marital status: Single   Tobacco Use    Smoking status: Every Day     Packs/day: 1.00     Years: 28.00     Pack years: 28.00     Types: Cigarettes   Substance and Sexual Activity    Alcohol use: Not Currently     Social Determinants of Health     Financial Resource Strain: Low Risk     Difficulty of Paying Living Expenses: Not hard at all   Food Insecurity: No Food Insecurity    Worried About Running Out of Food in the Last Year: Never true    Ran Out of Food in the Last Year: Never true   Transportation Needs: No Transportation Needs    Lack of Transportation (Medical): No    Lack of Transportation (Non-Medical): No   Physical Activity: Sufficiently Active    Days of Exercise per Week: 5 days    Minutes of Exercise per Session: 30 min   Stress: Stress Concern Present    Feeling of Stress : To some extent   Social Connections: Socially Isolated    Frequency of Communication with Friends and Family: Never    Frequency of Social Gatherings with Friends and Family: Never    Attends Worship Services: Never    Active Member of Clubs or Organizations: No    Attends Club or Organization Meetings: Never    Marital Status: Never     Housing Stability: Unknown    Unable to Pay for Housing in the Last Year: No    Unstable Housing in the Last Year: No     Past Surgical History:   Procedure Laterality Date    INSERTION OF TUNNELED CENTRAL VENOUS HEMODIALYSIS CATHETER Right 5/3/2023    Procedure: Insertion, Catheter, Central Venous, Hemodialysis;  Surgeon: Priya Grimm MD;  Location: [facility] CATH LAB;  Service: Interventional Nephrology;  Laterality: Right;    NO PAST SURGERIES      REMOVAL OF HEMODIALYSIS CATHETER  5/3/2023    Procedure: REMOVAL, CATHETER, HEMODIALYSIS;  Surgeon: Priya Grimm MD;  Location: [facility] CATH LAB;  Service: Interventional Nephrology;;     Medication List with Changes/Refills   Current Medications    ACETAMINOPHEN (TYLENOL) 500 MG TABLET    Take 1,000-1,500 mg by mouth daily as needed for Pain.    LORATADINE (CLARITIN ORAL)    Take 1 tablet by mouth daily as needed (Allergies).    ONDANSETRON (ZOFRAN) 4 MG TABLET    Take 1 tablet (4 mg total) by mouth every 6 (six) hours.    SEVELAMER CARBONATE (RENVELA) 800 MG TAB    Take 2 tablets (1,600 mg total) by mouth 3 (three) times daily with meals.    VITAMIN D (VITAMIN D3) 1000 UNITS TAB    Take 1,000 Units by mouth once daily.     Patient Active Problem List   Diagnosis    Polycystic kidney disease    CKD (chronic kidney disease) stage 5, GFR less than 15 ml/min    Chronic kidney disease-mineral and bone disorder    Anemia    Chest pain    Autosomal dominant polycystic kidney disease    Hypertension    Basilar artery aneurysm    Gross hematuria    Acute renal failure superimposed on stage 5 chronic kidney disease, not on chronic dialysis    Hematuria     Review of Systems   Constitutional:  Negative for chills, fever, malaise/fatigue and weight loss.   HENT:  Negative for congestion, sinus pain and sore throat.    Respiratory:  Negative for cough, shortness of breath and wheezing.    Cardiovascular:  Negative for chest pain and palpitations.   Gastrointestinal:  Negative for  "constipation, diarrhea, nausea and vomiting.   Genitourinary:  Negative for dysuria, frequency and urgency.   Musculoskeletal:  Negative for myalgias.   Skin:  Negative for rash.   Neurological:  Negative for headaches.       Objective:      /65 (BP Location: Left arm, Patient Position: Sitting, BP Method: Small (Automatic))   Pulse (!) 112   Ht 5' 10" (1.778 m)   Wt 72.3 kg (159 lb 6.3 oz)   LMP 01/01/2023 (Approximate)   SpO2 100%   BMI 22.87 kg/m²   Estimated body mass index is 22.87 kg/m² as calculated from the following:    Height as of this encounter: 5' 10" (1.778 m).    Weight as of this encounter: 72.3 kg (159 lb 6.3 oz).  Physical Exam  Vitals reviewed.   Constitutional:       General: She is not in acute distress.     Appearance: She is not diaphoretic.   HENT:      Head: Normocephalic and atraumatic.   Eyes:      Conjunctiva/sclera: Conjunctivae normal.   Cardiovascular:      Rate and Rhythm: Normal rate and regular rhythm.      Heart sounds: Normal heart sounds.   Pulmonary:      Effort: Pulmonary effort is normal. No respiratory distress.      Breath sounds: Normal breath sounds. No wheezing.   Abdominal:      General: Bowel sounds are normal.      Palpations: Abdomen is soft.   Musculoskeletal:         General: Normal range of motion.      Cervical back: Normal range of motion.   Skin:     General: Skin is warm and dry.      Findings: No erythema or rash.          Neurological:      Mental Status: She is alert and oriented to person, place, and time.   Psychiatric:         Mood and Affect: Mood and affect normal.         Behavior: Behavior normal.         Thought Content: Thought content normal.         Judgment: Judgment normal.         Assessment and Plan:     1. Polycystic kidney disease / CKD (chronic kidney disease) stage 5, GFR less than 15 ml/min  - patient having tenderness of the skin nearby the tunneled catheter, no evidence of infection.  Will give topical lidocaine for pain  - " continue follow-up with Nephrology, continue dialysis Monday Wednesday Friday.  - attempted suture removal but skin  upon removal of 1 of the stitches, new stitch placed.  Discussed with patient to get stitches removed in 1-2 weeks  - LIDOcaine-prilocaine (EMLA) cream; Apply topically as needed (pain).  Dispense: 30 g; Refill: 0    3. Hematuria, unspecified type  - Urinalysis  - urology appointment scheduled         Follow up:   No follow-ups on file.     Other Orders Placed This Visit:  No orders of the defined types were placed in this encounter.          Jordan Christiansen MD        This note is dictated on M*Modal word recognition program.  There are word recognition mistakes that are occasionally missed on review.

## 2023-05-27 ENCOUNTER — HOSPITAL ENCOUNTER (OUTPATIENT)
Dept: RADIOLOGY | Facility: HOSPITAL | Age: 43
Discharge: HOME | End: 2023-05-27
Attending: NURSE PRACTITIONER
Payer: MEDICAID

## 2023-05-27 DIAGNOSIS — R18.8 OTHER ASCITES: ICD-10-CM

## 2023-05-27 PROCEDURE — 76705 ECHO EXAM OF ABDOMEN: CPT | Mod: 26,,, | Performed by: STUDENT IN AN ORGANIZED HEALTH CARE EDUCATION/TRAINING PROGRAM

## 2023-05-27 PROCEDURE — 76705 ECHO EXAM OF ABDOMEN: CPT | Mod: TC

## 2023-05-27 PROCEDURE — 76705 US ABDOMEN LIMITED: ICD-10-PCS | Mod: 26,,, | Performed by: STUDENT IN AN ORGANIZED HEALTH CARE EDUCATION/TRAINING PROGRAM

## 2023-06-08 ENCOUNTER — TELEPHONE (OUTPATIENT)
Dept: UROLOGY | Facility: CLINIC | Age: 43
End: 2023-06-08
Payer: MEDICAID

## 2023-06-08 NOTE — TELEPHONE ENCOUNTER
I spoke with patient and advised her that Urology in the [facility] system doesn't take medicaid except [place]. I sent Guera Clements at [facility] her records per 's-Urology Resident written request. And that patient will receive a phone call with a appointment offer. Patient agreed.

## 2023-06-08 NOTE — TELEPHONE ENCOUNTER
----- Message from Samara Linton sent at 6/8/2023 10:42 AM CDT -----  Contact: Kadeem  Type:  Needs Medical Advice    Who Called: Nurse  Symptoms (please be specific): nurse Kadeem from Urology at [facility] needs a call back when you get this    Would the patient rather a call back or a response via [facility]? call  Best Call Back Number: [phone]  Additional Information:

## 2023-06-08 NOTE — TELEPHONE ENCOUNTER
Spoke to Jia Quan at [facility] and she advised that she will be removing pt for the following reasons: Pt is medicaid and should not have been on schedule and also a referral was sent in for pt to see someone at [facility]. I voiced my understanding and confirmed.

## 2023-08-11 DIAGNOSIS — N18.5 CKD (CHRONIC KIDNEY DISEASE) STAGE 5, GFR LESS THAN 15 ML/MIN: Primary | ICD-10-CM

## 2023-08-16 ENCOUNTER — TELEPHONE (OUTPATIENT)
Dept: TRANSPLANT | Facility: CLINIC | Age: 43
End: 2023-08-16

## 2023-08-16 NOTE — TELEPHONE ENCOUNTER
----- Message from Bishnu Burdick Jr., MD sent at 8/16/2023  2:09 PM CDT -----  Native polycystic kidneys are huge. Vessels look good.  Ok to move forward    DS  ----- Message -----  From: Abadie, Michelle, RN  Sent: 8/15/2023   4:21 PM CDT  To: Bishnu Burdick Jr., MD    Hi Dr Burdick,    Patient has been referred for possible kidney transplant evaluation. Please review her CTSCAN to determine if patient can move forward.    Thank you,  Jovita

## 2023-08-23 ENCOUNTER — TELEPHONE (OUTPATIENT)
Dept: TRANSPLANT | Facility: CLINIC | Age: 43
End: 2023-08-23

## 2023-08-24 ENCOUNTER — HOSPITAL ENCOUNTER (OUTPATIENT)
Dept: VASCULAR SURGERY | Facility: CLINIC | Age: 43
Discharge: HOME | End: 2023-08-24
Attending: SURGERY
Payer: MEDICAID

## 2023-08-24 ENCOUNTER — INITIAL CONSULT (OUTPATIENT)
Dept: VASCULAR SURGERY | Facility: CLINIC | Age: 43
End: 2023-08-24
Attending: SURGERY
Payer: MEDICAID

## 2023-08-24 VITALS
DIASTOLIC BLOOD PRESSURE: 73 MMHG | TEMPERATURE: 99 F | WEIGHT: 165.38 LBS | SYSTOLIC BLOOD PRESSURE: 130 MMHG | HEIGHT: 71 IN | HEART RATE: 62 BPM | BODY MASS INDEX: 23.15 KG/M2

## 2023-08-24 DIAGNOSIS — N18.5 CKD (CHRONIC KIDNEY DISEASE) STAGE 5, GFR LESS THAN 15 ML/MIN: ICD-10-CM

## 2023-08-24 DIAGNOSIS — N18.6 ESRD (END STAGE RENAL DISEASE): Primary | ICD-10-CM

## 2023-08-24 DIAGNOSIS — N18.6 ESRD (END STAGE RENAL DISEASE): ICD-10-CM

## 2023-08-24 PROCEDURE — 93970 EXTREMITY STUDY: CPT | Mod: 26,S$PBB,NTX, | Performed by: SURGERY

## 2023-08-24 PROCEDURE — 99203 PR OFFICE/OUTPT VISIT, NEW, LEVL III, 30-44 MIN: ICD-10-PCS | Mod: S$PBB,NTX,, | Performed by: SURGERY

## 2023-08-24 PROCEDURE — 99999 PR PBB SHADOW E&M-EST. PATIENT-LVL IV: ICD-10-PCS | Mod: PBBFAC,TXP,, | Performed by: SURGERY

## 2023-08-24 PROCEDURE — 99203 OFFICE O/P NEW LOW 30 MIN: CPT | Mod: S$PBB,NTX,, | Performed by: SURGERY

## 2023-08-24 PROCEDURE — 93970 EXTREMITY STUDY: CPT | Mod: PBBFAC,NTX | Performed by: SURGERY

## 2023-08-24 PROCEDURE — 99214 OFFICE O/P EST MOD 30 MIN: CPT | Mod: PBBFAC,25,TXP | Performed by: SURGERY

## 2023-08-24 PROCEDURE — 99999 PR PBB SHADOW E&M-EST. PATIENT-LVL IV: CPT | Mod: PBBFAC,TXP,, | Performed by: SURGERY

## 2023-08-24 PROCEDURE — 93970 PR US DUPLEX, UPPER OR LOWER EXT VENOUS,COMPLETE BILAT: ICD-10-PCS | Mod: 26,S$PBB,NTX, | Performed by: SURGERY

## 2023-08-24 NOTE — H&P (VIEW-ONLY)
VASCULAR SURGERY CLINIC NOTE    Patient ID: Ade Silva is a 43 y.o. female.    I. HISTORY     Chief Complaint: AV ACCESS    HPI: This is a 43 y.o. female who is here today for a new patient initial appointment. Here to discuss permanent AV access creation. left hand dominant. Currently ESRD on HD via tunneled HD catheter in right IJ. Pertinent medical history includes AD polycystic kidney disease which has caused his renal failure. Has never had permanent AV access in the past. Does not take anticoagulation.     Past Medical History:   Diagnosis Date    Autosomal dominant polycystic kidney disease     Hypertension         Past Surgical History:   Procedure Laterality Date    INSERTION OF TUNNELED CENTRAL VENOUS HEMODIALYSIS CATHETER Right 5/3/2023    Procedure: Insertion, Catheter, Central Venous, Hemodialysis;  Surgeon: Priya Grimm MD;  Location: [facility] CATH LAB;  Service: Interventional Nephrology;  Laterality: Right;    NO PAST SURGERIES      REMOVAL OF HEMODIALYSIS CATHETER  5/3/2023    Procedure: REMOVAL, CATHETER, HEMODIALYSIS;  Surgeon: Priya Grimm MD;  Location: [facility] CATH LAB;  Service: Interventional Nephrology;;       Social History     Occupational History    Not on file   Tobacco Use    Smoking status: Every Day     Current packs/day: 1.00     Average packs/day: 1 pack/day for 28.0 years (28.0 ttl pk-yrs)     Types: Cigarettes    Smokeless tobacco: Not on file   Substance and Sexual Activity    Alcohol use: Not Currently    Drug use: Not on file    Sexual activity: Not on file       Review of Systems   Constitutional: Negative for weight loss.   HENT:  Negative for ear pain and nosebleeds.    Eyes:  Negative for discharge and pain.   Cardiovascular:  Negative for chest pain and palpitations.   Respiratory:  Negative for cough, shortness of breath and wheezing.    Endocrine: Negative for cold intolerance, heat intolerance and polyphagia.   Hematologic/Lymphatic: Negative for adenopathy. Does not  bruise/bleed easily.   Skin:  Negative for itching and rash.   Musculoskeletal:  Negative for joint swelling and muscle cramps.   Gastrointestinal:  Negative for abdominal pain, diarrhea, nausea and vomiting.   Genitourinary:  Negative for dysuria and flank pain.   Neurological:  Negative for numbness and seizures.       Current Medications Reviewed    II. PHYSICAL EXAM     Physical Exam  Constitutional:       General: She is not in acute distress.     Appearance: Normal appearance. She is not ill-appearing or diaphoretic.   HENT:      Head: Normocephalic and atraumatic.   Eyes:      General: No scleral icterus.        Right eye: No discharge.         Left eye: No discharge.      Extraocular Movements: Extraocular movements intact.      Conjunctiva/sclera: Conjunctivae normal.   Cardiovascular:      Rate and Rhythm: Normal rate and regular rhythm.      Pulses: Normal pulses.      Comments: 2+ radial pulses bilaterally  Pulmonary:      Effort: Pulmonary effort is normal. No respiratory distress.   Musculoskeletal:         General: Normal range of motion.   Skin:     General: Skin is warm and dry.   Neurological:      General: No focal deficit present.      Mental Status: She is alert and oriented to person, place, and time.   Psychiatric:         Mood and Affect: Mood normal.         Behavior: Behavior normal.         III. ASSESSMENT & PLAN (MEDICAL DECISION MAKING)     1. ESRD (end stage renal disease)        Imaging Results: (I have personally reviewed the images/studies and provided my interpretation below)  Vein Mapping 8/24/23: right arm cephalic vein 4.4-3.4 mm from shoulder to AC fossa         Assessment/Diagnosis and Plan:    43 y.o. female here for permanent AV access creation. Vein mapping shows adequate cephalic vein on right arm. We discussed risks (steal syndrome, wound infection, nerve injury, failure to mature, death), benefits (permanent AV access, avoidance of catheter and associated infection), and  alternatives (peritoneal dialysis, tunneled HD catheter) to the procedure and the patient expressed full understanding. The patient had no questions about the procedure at the end of our visit.     -Will plan for right upper extremity brachiocephalic AVF  on 9/14/23.    MELISSA Loera II, MD, [facility]  Vascular Surgery  [facility]

## 2023-08-24 NOTE — PROGRESS NOTES
VASCULAR SURGERY CLINIC NOTE    Patient ID: Ade Silva is a 43 y.o. female.    I. HISTORY     Chief Complaint: AV ACCESS    HPI: This is a 43 y.o. female who is here today for a new patient initial appointment. Here to discuss permanent AV access creation. left hand dominant. Currently ESRD on HD via tunneled HD catheter in right IJ. Pertinent medical history includes AD polycystic kidney disease which has caused his renal failure. Has never had permanent AV access in the past. Does not take anticoagulation.     Past Medical History:   Diagnosis Date    Autosomal dominant polycystic kidney disease     Hypertension         Past Surgical History:   Procedure Laterality Date    INSERTION OF TUNNELED CENTRAL VENOUS HEMODIALYSIS CATHETER Right 5/3/2023    Procedure: Insertion, Catheter, Central Venous, Hemodialysis;  Surgeon: Priya Grimm MD;  Location: [facility] CATH LAB;  Service: Interventional Nephrology;  Laterality: Right;    NO PAST SURGERIES      REMOVAL OF HEMODIALYSIS CATHETER  5/3/2023    Procedure: REMOVAL, CATHETER, HEMODIALYSIS;  Surgeon: Priya Grimm MD;  Location: [facility] CATH LAB;  Service: Interventional Nephrology;;       Social History     Occupational History    Not on file   Tobacco Use    Smoking status: Every Day     Current packs/day: 1.00     Average packs/day: 1 pack/day for 28.0 years (28.0 ttl pk-yrs)     Types: Cigarettes    Smokeless tobacco: Not on file   Substance and Sexual Activity    Alcohol use: Not Currently    Drug use: Not on file    Sexual activity: Not on file       Review of Systems   Constitutional: Negative for weight loss.   HENT:  Negative for ear pain and nosebleeds.    Eyes:  Negative for discharge and pain.   Cardiovascular:  Negative for chest pain and palpitations.   Respiratory:  Negative for cough, shortness of breath and wheezing.    Endocrine: Negative for cold intolerance, heat intolerance and polyphagia.   Hematologic/Lymphatic: Negative for adenopathy. Does not  bruise/bleed easily.   Skin:  Negative for itching and rash.   Musculoskeletal:  Negative for joint swelling and muscle cramps.   Gastrointestinal:  Negative for abdominal pain, diarrhea, nausea and vomiting.   Genitourinary:  Negative for dysuria and flank pain.   Neurological:  Negative for numbness and seizures.       Current Medications Reviewed    II. PHYSICAL EXAM     Physical Exam  Constitutional:       General: She is not in acute distress.     Appearance: Normal appearance. She is not ill-appearing or diaphoretic.   HENT:      Head: Normocephalic and atraumatic.   Eyes:      General: No scleral icterus.        Right eye: No discharge.         Left eye: No discharge.      Extraocular Movements: Extraocular movements intact.      Conjunctiva/sclera: Conjunctivae normal.   Cardiovascular:      Rate and Rhythm: Normal rate and regular rhythm.      Pulses: Normal pulses.      Comments: 2+ radial pulses bilaterally  Pulmonary:      Effort: Pulmonary effort is normal. No respiratory distress.   Musculoskeletal:         General: Normal range of motion.   Skin:     General: Skin is warm and dry.   Neurological:      General: No focal deficit present.      Mental Status: She is alert and oriented to person, place, and time.   Psychiatric:         Mood and Affect: Mood normal.         Behavior: Behavior normal.         III. ASSESSMENT & PLAN (MEDICAL DECISION MAKING)     1. ESRD (end stage renal disease)        Imaging Results: (I have personally reviewed the images/studies and provided my interpretation below)  Vein Mapping 8/24/23: right arm cephalic vein 4.4-3.4 mm from shoulder to AC fossa         Assessment/Diagnosis and Plan:    43 y.o. female here for permanent AV access creation. Vein mapping shows adequate cephalic vein on right arm. We discussed risks (steal syndrome, wound infection, nerve injury, failure to mature, death), benefits (permanent AV access, avoidance of catheter and associated infection), and  alternatives (peritoneal dialysis, tunneled HD catheter) to the procedure and the patient expressed full understanding. The patient had no questions about the procedure at the end of our visit.     -Will plan for right upper extremity brachiocephalic AVF  on 9/14/23.    MELISSA Loera II, MD, [facility]  Vascular Surgery  [facility]

## 2023-08-31 ENCOUNTER — TELEPHONE (OUTPATIENT)
Dept: TRANSPLANT | Facility: CLINIC | Age: 43
End: 2023-08-31

## 2023-09-01 DIAGNOSIS — Z76.82 ORGAN TRANSPLANT CANDIDATE: Primary | ICD-10-CM

## 2023-09-04 PROBLEM — N17.9 ACUTE RENAL FAILURE SUPERIMPOSED ON STAGE 5 CHRONIC KIDNEY DISEASE, NOT ON CHRONIC DIALYSIS: Status: RESOLVED | Noted: 2023-04-29 | Resolved: 2023-09-04

## 2023-09-04 PROBLEM — N18.5 ACUTE RENAL FAILURE SUPERIMPOSED ON STAGE 5 CHRONIC KIDNEY DISEASE, NOT ON CHRONIC DIALYSIS: Status: RESOLVED | Noted: 2023-04-29 | Resolved: 2023-09-04

## 2023-09-13 ENCOUNTER — TELEPHONE (OUTPATIENT)
Dept: VASCULAR SURGERY | Facility: CLINIC | Age: 43
End: 2023-09-13

## 2023-09-14 ENCOUNTER — HOSPITAL ENCOUNTER (OUTPATIENT)
Facility: HOSPITAL | Age: 43
Discharge: HOME | End: 2023-09-14
Attending: SURGERY | Admitting: SURGERY
Payer: MEDICARE

## 2023-09-14 ENCOUNTER — ANESTHESIA EVENT (OUTPATIENT)
Dept: SURGERY | Facility: HOSPITAL | Age: 43
End: 2023-09-14
Payer: MEDICAID

## 2023-09-14 ENCOUNTER — ANESTHESIA (OUTPATIENT)
Dept: SURGERY | Facility: HOSPITAL | Age: 43
End: 2023-09-14
Payer: MEDICAID

## 2023-09-14 VITALS
RESPIRATION RATE: 20 BRPM | TEMPERATURE: 98 F | DIASTOLIC BLOOD PRESSURE: 60 MMHG | SYSTOLIC BLOOD PRESSURE: 102 MMHG | WEIGHT: 165 LBS | OXYGEN SATURATION: 100 % | BODY MASS INDEX: 23.1 KG/M2 | HEIGHT: 71 IN | HEART RATE: 89 BPM

## 2023-09-14 DIAGNOSIS — Z98.890 S/P ARTERIOVENOUS (AV) FISTULA CREATION: ICD-10-CM

## 2023-09-14 LAB
ALBUMIN SERPL BCP-MCNC: 3.8 G/DL (ref 3.5–5.2)
ALP SERPL-CCNC: 99 U/L (ref 55–135)
ALT SERPL W/O P-5'-P-CCNC: 12 U/L (ref 10–44)
ANION GAP SERPL CALC-SCNC: 14 MMOL/L (ref 8–16)
AST SERPL-CCNC: 9 U/L (ref 10–40)
B-HCG UR QL: NEGATIVE
BASOPHILS # BLD AUTO: 0.04 K/UL (ref 0–0.2)
BASOPHILS NFR BLD: 0.6 % (ref 0–1.9)
BILIRUB SERPL-MCNC: 0.5 MG/DL (ref 0.1–1)
BUN SERPL-MCNC: 44 MG/DL (ref 6–20)
CALCIUM SERPL-MCNC: 8.7 MG/DL (ref 8.7–10.5)
CHLORIDE SERPL-SCNC: 105 MMOL/L (ref 95–110)
CO2 SERPL-SCNC: 22 MMOL/L (ref 23–29)
CREAT SERPL-MCNC: 7.3 MG/DL (ref 0.5–1.4)
CTP QC/QA: YES
DIFFERENTIAL METHOD: ABNORMAL
EOSINOPHIL # BLD AUTO: 0.1 K/UL (ref 0–0.5)
EOSINOPHIL NFR BLD: 1.1 % (ref 0–8)
ERYTHROCYTE [DISTWIDTH] IN BLOOD BY AUTOMATED COUNT: 15.7 % (ref 11.5–14.5)
EST. GFR  (NO RACE VARIABLE): 6.7 ML/MIN/1.73 M^2
GLUCOSE SERPL-MCNC: 70 MG/DL (ref 70–110)
HCT VFR BLD AUTO: 41.7 % (ref 37–48.5)
HGB BLD-MCNC: 12.8 G/DL (ref 12–16)
IMM GRANULOCYTES # BLD AUTO: 0.02 K/UL (ref 0–0.04)
IMM GRANULOCYTES NFR BLD AUTO: 0.3 % (ref 0–0.5)
LYMPHOCYTES # BLD AUTO: 1.3 K/UL (ref 1–4.8)
LYMPHOCYTES NFR BLD: 19.8 % (ref 18–48)
MCH RBC QN AUTO: 28 PG (ref 27–31)
MCHC RBC AUTO-ENTMCNC: 30.7 G/DL (ref 32–36)
MCV RBC AUTO: 91 FL (ref 82–98)
MONOCYTES # BLD AUTO: 0.4 K/UL (ref 0.3–1)
MONOCYTES NFR BLD: 6.5 % (ref 4–15)
NEUTROPHILS # BLD AUTO: 4.5 K/UL (ref 1.8–7.7)
NEUTROPHILS NFR BLD: 71.7 % (ref 38–73)
NRBC BLD-RTO: 0 /100 WBC
PLATELET # BLD AUTO: 119 K/UL (ref 150–450)
PMV BLD AUTO: 10.7 FL (ref 9.2–12.9)
POTASSIUM SERPL-SCNC: 4.4 MMOL/L (ref 3.5–5.1)
POTASSIUM SERPL-SCNC: 4.4 MMOL/L (ref 3.5–5.1)
PROT SERPL-MCNC: 7.5 G/DL (ref 6–8.4)
RBC # BLD AUTO: 4.57 M/UL (ref 4–5.4)
SODIUM SERPL-SCNC: 141 MMOL/L (ref 136–145)
WBC # BLD AUTO: 6.32 K/UL (ref 3.9–12.7)

## 2023-09-14 PROCEDURE — 27201037 HC PRESSURE MONITORING SET UP: Mod: NTX

## 2023-09-14 PROCEDURE — 37000008 HC ANESTHESIA 1ST 15 MINUTES: Mod: NTX | Performed by: SURGERY

## 2023-09-14 PROCEDURE — D9220A PRA ANESTHESIA: Mod: CRNA,NTX,, | Performed by: NURSE ANESTHETIST, CERTIFIED REGISTERED

## 2023-09-14 PROCEDURE — 85025 COMPLETE CBC W/AUTO DIFF WBC: CPT | Mod: TXP | Performed by: ANESTHESIOLOGY

## 2023-09-14 PROCEDURE — 71000015 HC POSTOP RECOV 1ST HR: Mod: TXP | Performed by: SURGERY

## 2023-09-14 PROCEDURE — 64415 R SUPRACLAVICULAR SINGLE INJECTION: ICD-10-PCS | Mod: 59,RT,NTX, | Performed by: ANESTHESIOLOGY

## 2023-09-14 PROCEDURE — 63600175 PHARM REV CODE 636 W HCPCS: Mod: NTX | Performed by: NURSE ANESTHETIST, CERTIFIED REGISTERED

## 2023-09-14 PROCEDURE — 25000003 PHARM REV CODE 250: Mod: TXP | Performed by: NURSE ANESTHETIST, CERTIFIED REGISTERED

## 2023-09-14 PROCEDURE — 37000009 HC ANESTHESIA EA ADD 15 MINS: Mod: TXP | Performed by: SURGERY

## 2023-09-14 PROCEDURE — 36821 PR ANASTOMOSIS,AV,ANY SITE: ICD-10-PCS | Mod: NTX,,, | Performed by: SURGERY

## 2023-09-14 PROCEDURE — D9220A PRA ANESTHESIA: Mod: ANES,NTX,, | Performed by: ANESTHESIOLOGY

## 2023-09-14 PROCEDURE — 63600175 PHARM REV CODE 636 W HCPCS: Mod: NTX | Performed by: ANESTHESIOLOGY

## 2023-09-14 PROCEDURE — 25000003 PHARM REV CODE 250: Mod: NTX

## 2023-09-14 PROCEDURE — 36821 AV FUSION DIRECT ANY SITE: CPT | Mod: NTX,,, | Performed by: SURGERY

## 2023-09-14 PROCEDURE — D9220A PRA ANESTHESIA: ICD-10-PCS | Mod: ANES,NTX,, | Performed by: ANESTHESIOLOGY

## 2023-09-14 PROCEDURE — 63600175 PHARM REV CODE 636 W HCPCS: Mod: TXP

## 2023-09-14 PROCEDURE — 81025 URINE PREGNANCY TEST: CPT | Mod: TXP | Performed by: SURGERY

## 2023-09-14 PROCEDURE — 63600175 PHARM REV CODE 636 W HCPCS: Mod: NTX | Performed by: SURGERY

## 2023-09-14 PROCEDURE — 25000003 PHARM REV CODE 250: Mod: TXP | Performed by: SURGERY

## 2023-09-14 PROCEDURE — 36000707: Mod: TXP | Performed by: SURGERY

## 2023-09-14 PROCEDURE — D9220A PRA ANESTHESIA: ICD-10-PCS | Mod: CRNA,NTX,, | Performed by: NURSE ANESTHETIST, CERTIFIED REGISTERED

## 2023-09-14 PROCEDURE — 64415 NJX AA&/STRD BRCH PLXS IMG: CPT | Mod: 59,RT,NTX, | Performed by: ANESTHESIOLOGY

## 2023-09-14 PROCEDURE — 36000706: Mod: NTX | Performed by: SURGERY

## 2023-09-14 PROCEDURE — 80053 COMPREHEN METABOLIC PANEL: CPT | Mod: NTX | Performed by: ANESTHESIOLOGY

## 2023-09-14 PROCEDURE — 71000044 HC DOSC ROUTINE RECOVERY FIRST HOUR: Mod: NTX | Performed by: SURGERY

## 2023-09-14 RX ORDER — VASOPRESSIN 20 [USP'U]/ML
INJECTION, SOLUTION INTRAMUSCULAR; SUBCUTANEOUS
Status: DISCONTINUED | OUTPATIENT
Start: 2023-09-14 | End: 2023-09-14

## 2023-09-14 RX ORDER — ONDANSETRON 8 MG/1
8 TABLET, ORALLY DISINTEGRATING ORAL EVERY 8 HOURS PRN
Status: ACTIVE | OUTPATIENT
Start: 2023-09-14

## 2023-09-14 RX ORDER — HEPARIN SODIUM 1000 [USP'U]/ML
INJECTION, SOLUTION INTRAVENOUS; SUBCUTANEOUS
Status: DISCONTINUED | OUTPATIENT
Start: 2023-09-14 | End: 2023-09-14 | Stop reason: HOSPADM

## 2023-09-14 RX ORDER — SODIUM CHLORIDE 9 MG/ML
INJECTION, SOLUTION INTRAVENOUS CONTINUOUS
Status: ACTIVE | OUTPATIENT
Start: 2023-09-14

## 2023-09-14 RX ORDER — EPHEDRINE SULFATE 50 MG/ML
INJECTION, SOLUTION INTRAVENOUS
Status: DISCONTINUED | OUTPATIENT
Start: 2023-09-14 | End: 2023-09-14

## 2023-09-14 RX ORDER — DIPHENHYDRAMINE HYDROCHLORIDE 50 MG/ML
INJECTION INTRAMUSCULAR; INTRAVENOUS
Status: DISCONTINUED | OUTPATIENT
Start: 2023-09-14 | End: 2023-09-14

## 2023-09-14 RX ORDER — MIDAZOLAM HYDROCHLORIDE 1 MG/ML
INJECTION INTRAMUSCULAR; INTRAVENOUS
Status: DISCONTINUED | OUTPATIENT
Start: 2023-09-14 | End: 2023-09-14

## 2023-09-14 RX ORDER — DEXMEDETOMIDINE HYDROCHLORIDE 100 UG/ML
INJECTION, SOLUTION INTRAVENOUS
Status: DISCONTINUED | OUTPATIENT
Start: 2023-09-14 | End: 2023-09-14

## 2023-09-14 RX ORDER — LIDOCAINE HYDROCHLORIDE 20 MG/ML
INJECTION, SOLUTION EPIDURAL; INFILTRATION; INTRACAUDAL; PERINEURAL
Status: DISCONTINUED | OUTPATIENT
Start: 2023-09-14 | End: 2023-09-14

## 2023-09-14 RX ORDER — OXYCODONE HYDROCHLORIDE 5 MG/1
5 TABLET ORAL EVERY 6 HOURS PRN
Qty: 10 TABLET | Refills: 0 | Status: SHIPPED | OUTPATIENT
Start: 2023-09-14 | End: 2023-10-12 | Stop reason: ALTCHOICE

## 2023-09-14 RX ORDER — KETAMINE HCL IN 0.9 % NACL 50 MG/5 ML
SYRINGE (ML) INTRAVENOUS
Status: DISCONTINUED | OUTPATIENT
Start: 2023-09-14 | End: 2023-09-14

## 2023-09-14 RX ORDER — FENTANYL CITRATE 50 UG/ML
25 INJECTION, SOLUTION INTRAMUSCULAR; INTRAVENOUS EVERY 5 MIN PRN
Status: DISCONTINUED | OUTPATIENT
Start: 2023-09-14 | End: 2023-09-14 | Stop reason: HOSPADM

## 2023-09-14 RX ORDER — HALOPERIDOL 5 MG/ML
0.5 INJECTION INTRAMUSCULAR EVERY 10 MIN PRN
Status: DISCONTINUED | OUTPATIENT
Start: 2023-09-14 | End: 2023-09-14 | Stop reason: HOSPADM

## 2023-09-14 RX ORDER — PHENYLEPHRINE HYDROCHLORIDE 10 MG/ML
INJECTION INTRAVENOUS
Status: DISCONTINUED | OUTPATIENT
Start: 2023-09-14 | End: 2023-09-14

## 2023-09-14 RX ORDER — ONDANSETRON 2 MG/ML
INJECTION INTRAMUSCULAR; INTRAVENOUS
Status: DISCONTINUED | OUTPATIENT
Start: 2023-09-14 | End: 2023-09-14

## 2023-09-14 RX ORDER — PROPOFOL 10 MG/ML
VIAL (ML) INTRAVENOUS CONTINUOUS PRN
Status: DISCONTINUED | OUTPATIENT
Start: 2023-09-14 | End: 2023-09-14

## 2023-09-14 RX ORDER — HEPARIN SODIUM 1000 [USP'U]/ML
INJECTION, SOLUTION INTRAVENOUS; SUBCUTANEOUS
Status: DISCONTINUED | OUTPATIENT
Start: 2023-09-14 | End: 2023-09-14

## 2023-09-14 RX ORDER — LIDOCAINE HYDROCHLORIDE 10 MG/ML
INJECTION INFILTRATION; PERINEURAL
Status: DISCONTINUED | OUTPATIENT
Start: 2023-09-14 | End: 2023-09-14 | Stop reason: HOSPADM

## 2023-09-14 RX ORDER — KETAMINE HYDROCHLORIDE 10 MG/ML
INJECTION, SOLUTION INTRAMUSCULAR; INTRAVENOUS
Status: DISCONTINUED | OUTPATIENT
Start: 2023-09-14 | End: 2023-09-14

## 2023-09-14 RX ORDER — SODIUM CHLORIDE 0.9 % (FLUSH) 0.9 %
10 SYRINGE (ML) INJECTION
Status: DISCONTINUED | OUTPATIENT
Start: 2023-09-14 | End: 2023-09-14 | Stop reason: HOSPADM

## 2023-09-14 RX ORDER — FENTANYL CITRATE 50 UG/ML
INJECTION, SOLUTION INTRAMUSCULAR; INTRAVENOUS
Status: DISCONTINUED | OUTPATIENT
Start: 2023-09-14 | End: 2023-09-14

## 2023-09-14 RX ORDER — PROPOFOL 10 MG/ML
VIAL (ML) INTRAVENOUS
Status: DISCONTINUED | OUTPATIENT
Start: 2023-09-14 | End: 2023-09-14

## 2023-09-14 RX ORDER — DEXAMETHASONE SODIUM PHOSPHATE 4 MG/ML
INJECTION, SOLUTION INTRA-ARTICULAR; INTRALESIONAL; INTRAMUSCULAR; INTRAVENOUS; SOFT TISSUE
Status: DISCONTINUED | OUTPATIENT
Start: 2023-09-14 | End: 2023-09-14

## 2023-09-14 RX ADMIN — EPHEDRINE SULFATE 5 MG: 50 INJECTION INTRAVENOUS at 08:09

## 2023-09-14 RX ADMIN — MEPIVACAINE HYDROCHLORIDE 30 ML: 15 INJECTION, SOLUTION EPIDURAL; INFILTRATION at 07:09

## 2023-09-14 RX ADMIN — VASOPRESSIN 1 UNITS: 20 INJECTION INTRAVENOUS at 07:09

## 2023-09-14 RX ADMIN — SODIUM CHLORIDE: 0.9 INJECTION, SOLUTION INTRAVENOUS at 07:09

## 2023-09-14 RX ADMIN — FENTANYL CITRATE 50 MCG: 50 INJECTION, SOLUTION INTRAMUSCULAR; INTRAVENOUS at 07:09

## 2023-09-14 RX ADMIN — LIDOCAINE HYDROCHLORIDE 40 MG: 20 INJECTION, SOLUTION EPIDURAL; INFILTRATION; INTRACAUDAL; PERINEURAL at 07:09

## 2023-09-14 RX ADMIN — DIPHENHYDRAMINE HYDROCHLORIDE 25 MG: 50 INJECTION, SOLUTION INTRAMUSCULAR; INTRAVENOUS at 08:09

## 2023-09-14 RX ADMIN — EPHEDRINE SULFATE 5 MG: 50 INJECTION INTRAVENOUS at 09:09

## 2023-09-14 RX ADMIN — Medication 30 MG: at 07:09

## 2023-09-14 RX ADMIN — VASOPRESSIN 1 UNITS: 20 INJECTION INTRAVENOUS at 08:09

## 2023-09-14 RX ADMIN — CEFAZOLIN 2 G: 2 INJECTION, POWDER, FOR SOLUTION INTRAMUSCULAR; INTRAVENOUS at 08:09

## 2023-09-14 RX ADMIN — PHENYLEPHRINE HYDROCHLORIDE 100 MCG: 10 INJECTION INTRAVENOUS at 09:09

## 2023-09-14 RX ADMIN — DEXMEDETOMIDINE 8 MCG: 100 INJECTION, SOLUTION, CONCENTRATE INTRAVENOUS at 07:09

## 2023-09-14 RX ADMIN — DEXAMETHASONE SODIUM PHOSPHATE 4 MG: 4 INJECTION, SOLUTION INTRAMUSCULAR; INTRAVENOUS at 07:09

## 2023-09-14 RX ADMIN — PHENYLEPHRINE HYDROCHLORIDE 100 MCG: 10 INJECTION INTRAVENOUS at 08:09

## 2023-09-14 RX ADMIN — PROPOFOL 125 MCG/KG/MIN: 10 INJECTION, EMULSION INTRAVENOUS at 07:09

## 2023-09-14 RX ADMIN — MIDAZOLAM HYDROCHLORIDE 2 MG: 2 INJECTION, SOLUTION INTRAMUSCULAR; INTRAVENOUS at 07:09

## 2023-09-14 RX ADMIN — PHENYLEPHRINE HYDROCHLORIDE 100 MCG: 10 INJECTION INTRAVENOUS at 07:09

## 2023-09-14 RX ADMIN — ONDANSETRON 4 MG: 2 INJECTION INTRAMUSCULAR; INTRAVENOUS at 07:09

## 2023-09-14 RX ADMIN — HEPARIN SODIUM 3000 UNITS: 1000 INJECTION, SOLUTION INTRAVENOUS; SUBCUTANEOUS at 08:09

## 2023-09-14 RX ADMIN — PROPOFOL 20 MG: 10 INJECTION, EMULSION INTRAVENOUS at 07:09

## 2023-09-14 NOTE — ANESTHESIA POSTPROCEDURE EVALUATION
Anesthesia Post Evaluation    Patient: Ade Silva    Procedure(s) Performed: Procedure(s) (LRB):  CREATION, AV FISTULA, radial cephalic (Right)    Final Anesthesia Type: general      Patient location during evaluation: PACU  Patient participation: Yes- Able to Participate  Level of consciousness: awake and alert  Post-procedure vital signs: reviewed and stable  Pain management: adequate  Airway patency: patent    PONV status at discharge: No PONV  Anesthetic complications: no      Cardiovascular status: blood pressure returned to baseline  Respiratory status: unassisted, spontaneous ventilation and room air  Hydration status: euvolemic  Follow-up not needed.          Vitals Value Taken Time   /60 09/14/23 1100   Temp 36.6 °C (97.9 °F) 09/14/23 1045   Pulse 89 09/14/23 1100   Resp 20 09/14/23 1100   SpO2 100 % 09/14/23 1100         No case tracking events are documented in the log.      Pain/Geovanna Score: Geovanna Score: 10 (9/14/2023 10:30 AM)

## 2023-09-14 NOTE — DISCHARGE INSTRUCTIONS
VASCULAR SURGERY DISCHARGE INSTRUCTIONS    Woundcare:  - Take your incision dressing off 2 days after your surgery and gently rinse your incision with soap and water daily. Pad the incision dry afterward  - If you have a dialysis catheter in place, keep your catheter dressing clean and dry  - If you do not have a catheter, take a full shower daily beginning 2 days after the surgery. Allow soap and water to run over your incision. Pad the incision dry afterward  - When resting or sleeping, try to keep your arm elevated to shoulder level on pillows to reduce swelling  - If you notice clear drainage from your incision, you can apply dry gauze daily and secure in place with tape or gentle elastic wrap    Activity:  - Avoid prolonged exertion of the affected arm  - Avoid keeping your arm down below your chest for prolonged periods of time (this could lead to increased swelling)  - No heavy lifting with the affected arm  - Sleep with your arm elevated on pillows at night to reduce swelling  -- No swimming in pools, lakes, [employer] etc. for 6 weeks after your surgery    Diet:  -Resume your pre-operative home diet    Follow up:  -Refer to follow up instructions     Call Vascular Surgery Office at [phone] if you experience:  -Increased redness, warmth, tenderness, or draining pus at your incision(s)  -Worsening fevers, chills, nausea/vomiting  -Pain, weakness, coldness, or numbness in your hand  -Uncontrolled pain  -Your call will be returned within 24 hours and further instructions will be provided    Go to ER/Urgent Care if you experience:  -Worsening shortness of breath or chest pain

## 2023-09-14 NOTE — ANESTHESIA PREPROCEDURE EVALUATION
"                                                                                                             09/14/2023  Pre-operative evaluation for Procedure(s) (LRB):  CREATION, AV FISTULA (Right)    Ade Silva is a 43 y.o. female     Patient Active Problem List   Diagnosis    Polycystic kidney disease    CKD (chronic kidney disease) stage 5, GFR less than 15 ml/min    Chronic kidney disease-mineral and bone disorder    Anemia    Chest pain    Autosomal dominant polycystic kidney disease    Hypertension    Basilar artery aneurysm    Gross hematuria    Hematuria       Review of patient's allergies indicates:   Allergen Reactions    Aspirin Hives    Nickel     Penicillins Hives    Adhesive Rash     Adhesive/silk tape (type found on band aides). Can only use "Paper Tape"    Butalbital-acetaminophen-caff Nausea And Vomiting and Other (See Comments)     Dizziness (made pt feel sick)    Latex, natural rubber Rash       No current facility-administered medications on file prior to encounter.     Current Outpatient Medications on File Prior to Encounter   Medication Sig Dispense Refill    acetaminophen (TYLENOL) 500 MG tablet Take 1,000-1,500 mg by mouth daily as needed for Pain.      cinacalcet (SENSIPAR) 30 MG Tab Take 1 tablet (30 mg total) by mouth once daily with largest meal of the day 90 tablet 5    LIDOcaine-prilocaine (EMLA) cream Apply topically as needed (pain). 30 g 0    loratadine (CLARITIN ORAL) Take 1 tablet by mouth daily as needed (Allergies).      ondansetron (ZOFRAN) 4 MG tablet Take 1 tablet (4 mg total) by mouth every 6 (six) hours. 20 tablet 0    sevelamer carbonate (RENVELA) 800 mg Tab Take 2 tablets (1,600 mg total) by mouth 3 (three) times daily with meals. (Patient taking differently: Take 800 mg by mouth 3 (three) times daily with meals. Take 4 tablets with all meals and snacks.) 180 tablet 11    sevelamer carbonate (RENVELA) 800 mg Tab Take 4 tablets with each meal " "and snack 480 tablet 11    sodium zirconium cyclosilicate (LOKELMA) 10 gram packet Take 1 packet (10 g total) by mouth 2 (two) times a day. Mix entire contents of packet(s) into drinking glass containing 3 tablespoons of water; stir well and drink immediately. Add water and repeat until no powder remains to receive entire dose. 30 packet 3    sodium zirconium cyclosilicate (LOKELMA) 10 gram packet Take 1 packet (10 g total) by mouth 2 (two) times a day. Mix entire contents of packet(s) into drinking glass containing 3 tablespoons of water; stir well and drink immediately. Add water and repeat until no powder remains to receive entire dose. 60 packet 3       Past Surgical History:   Procedure Laterality Date    INSERTION OF TUNNELED CENTRAL VENOUS HEMODIALYSIS CATHETER Right 5/3/2023    Procedure: Insertion, Catheter, Central Venous, Hemodialysis;  Surgeon: Priya Grimm MD;  Location: [facility] CATH LAB;  Service: Interventional Nephrology;  Laterality: Right;    NO PAST SURGERIES      REMOVAL OF HEMODIALYSIS CATHETER  5/3/2023    Procedure: REMOVAL, CATHETER, HEMODIALYSIS;  Surgeon: Priya Grimm MD;  Location: [facility] CATH LAB;  Service: Interventional Nephrology;;     CBC: No results for input(s): "WBC", "RBC", "HGB", "HCT", "PLT", "MCV", "MCH", "MCHC" in the last 72 hours.    CMP: No results for input(s): "NA", "K", "CL", "CO2", "BUN", "CREATININE", "GLU", "MG", "PHOS", "CALCIUM", "ALBUMIN", "PROT", "ALKPHOS", "ALT", "AST", "BILITOT" in the last 72 hours.    INR  No results for input(s): "PT", "INR", "PROTIME", "APTT" in the last 72 hours.    BP Readings from Last 3 Encounters:   23 108/70   23 102/64   23 98/64         Diagnostic Studies:      EKD Echo:  No results found for this or any previous visit.        Pre-op Assessment    I have reviewed the Patient Summary Reports.     I have reviewed the Nursing Notes. I have reviewed the NPO Status.      Review of " Systems  Hematology/Oncology:     Oncology Normal    -- Anemia:   EENT/Dental:EENT/Dental Normal   Cardiovascular:   Hypertension PVD (hx of basilar a. aneurysm)    Pulmonary:  Pulmonary Normal    Renal/:   Chronic Renal Disease, ESRD, CKD    Musculoskeletal:  Musculoskeletal Normal    Endocrine:  Endocrine Normal        Physical Exam  General: Well nourished, Cooperative, Alert and Oriented    Airway:  Mallampati: II   Mouth Opening: Normal  TM Distance: Normal  Neck ROM: Normal ROM        Anesthesia Plan  Type of Anesthesia, risks & benefits discussed:    Anesthesia Type: Gen Natural Airway, Gen Supraglottic Airway, Gen ETT, Regional  Intra-op Monitoring Plan: Standard ASA Monitors  Post Op Pain Control Plan: multimodal analgesia, IV/PO Opioids PRN and peripheral nerve block  Induction:  IV  Informed Consent: Informed consent signed with the Patient and all parties understand the risks and agree with anesthesia plan.  All questions answered.   ASA Score: 3  Day of Surgery Review of History & Physical: H&P Update referred to the surgeon/provider.    Ready For Surgery From Anesthesia Perspective.     .

## 2023-09-14 NOTE — ANESTHESIA PROCEDURE NOTES
R supraclavicular single injection    Patient location during procedure: OR   Block not for primary anesthetic.  Reason for block: at surgeon's request and post-op pain management   Post-op Pain Location: right upper extremity   Start time: 9/14/2023 7:20 AM  Timeout: 9/14/2023 7:20 AM   End time: 9/14/2023 7:25 AM    Staffing  Authorizing Provider: Evan Elias MD  Performing Provider: Ceci Velasquez DO    Staffing  Performed by: Ceci Velasquez DO  Authorized by: Evan Elias MD    Preanesthetic Checklist  Completed: patient identified, IV checked, site marked, risks and benefits discussed, surgical consent, monitors and equipment checked, pre-op evaluation and timeout performed  Peripheral Block  Patient position: supine  Prep: ChloraPrep  Patient monitoring: heart rate, cardiac monitor, continuous pulse ox, continuous capnometry and frequent blood pressure checks  Block type: supraclavicular  Laterality: right  Injection technique: single shot  Needle  Needle type: Echogenic   Needle gauge: 22 G  Needle length: 2 in  Needle localization: anatomical landmarks and ultrasound guidance   -ultrasound image captured on disc.  Assessment  Injection assessment: negative aspiration, negative parasthesia and local visualized surrounding nerve  Paresthesia pain: none  Heart rate change: no  Slow fractionated injection: yes  Pain Tolerance: comfortable throughout block and no complaints  Medications:    Medications: mepivacaine (CARBOCAINE) injection 15 mg/mL (1.5%) - Perineural   30 mL - 9/14/2023 7:25:00 AM    Additional Notes  VSS.  See intra-op record for vitals.  Patient tolerated procedure well.

## 2023-09-14 NOTE — TRANSFER OF CARE
"Anesthesia Transfer of Care Note    Patient: Ade Silva    Procedure(s) Performed: Procedure(s) (LRB):  CREATION, AV FISTULA, radial cephalic (Right)    Patient location: PACU    Anesthesia Type: MAC    Transport from OR: Transported from OR on room air with adequate spontaneous ventilation    Post pain: adequate analgesia    Post assessment: no apparent anesthetic complications and tolerated procedure well    Post vital signs: stable    Level of consciousness: awake    Nausea/Vomiting: no nausea/vomiting    Complications: none    Transfer of care protocol was followed      Last vitals:   Visit Vitals  BP (!) 103/53 (BP Location: Left leg, Patient Position: Lying)   Pulse 75   Temp 36.5 °C (97.7 °F) (Temporal)   Resp 14   Ht 5' 11" (1.803 m)   Wt 74.8 kg (165 lb)   LMP 08/17/2023 (Approximate)   SpO2 100%   Breastfeeding No   BMI 23.01 kg/m²     "

## 2023-09-14 NOTE — BRIEF OP NOTE
[facility] - Surgery ([facility])  Brief Operative Note    Surgery Date: 9/14/2023     Surgeon(s) and Role:     * MELISSA Loera II, MD - Primary    Assisting Surgeon: None    Pre-op Diagnosis:  ESRD (end stage renal disease) [N18.6]    Post-op Diagnosis:  Post-Op Diagnosis Codes:     * ESRD (end stage renal disease) [N18.6]    Procedure(s) (LRB):  CREATION, AV FISTULA, radial cephalic (Right)    Anesthesia: Regional    Operative Findings:   Right radio-cephalic AV fistula created without apparent complication.     Estimated Blood Loss: <20cc         Specimens:   Specimen (24h ago, onward)      None              Discharge Note    OUTCOME: Patient tolerated treatment/procedure well without complication and is now ready for discharge.    DISPOSITION: Home or Self Care    FINAL DIAGNOSIS:  S/P arteriovenous (AV) fistula creation    FOLLOWUP: In clinic    DISCHARGE INSTRUCTIONS:    Discharge Procedure Orders   Diet Adult Regular     Notify your health care provider if you experience any of the following:  temperature >100.4     Notify your health care provider if you experience any of the following:  persistent nausea and vomiting or diarrhea     Notify your health care provider if you experience any of the following:  severe uncontrolled pain     Notify your health care provider if you experience any of the following:  redness, tenderness, or signs of infection (pain, swelling, redness, odor or green/yellow discharge around incision site)     Activity as tolerated   Order Comments: VASCULAR SURGERY DISCHARGE INSTRUCTIONS    Woundcare:  - Take your incision dressing off 2 days after your surgery and gently rinse your incision with soap and water daily. Pad the incision dry afterward  - If you have a dialysis catheter in place, keep your catheter dressing clean and dry  - If you do not have a catheter, take a full shower daily beginning 2 days after the surgery. Allow soap and water to run over your incision. Pad the  incision dry afterward  - When resting or sleeping, try to keep your arm elevated to shoulder level on pillows to reduce swelling  - If you notice clear drainage from your incision, you can apply dry gauze daily and secure in place with tape or gentle elastic wrap    Activity:  - Avoid prolonged exertion of the affected arm  - Avoid keeping your arm down below your chest for prolonged periods of time (this could lead to increased swelling)  - No heavy lifting with the affected arm  - Sleep with your arm elevated on pillows at night to reduce swelling  -- No swimming in pools, [employer], [employer] etc. for 6 weeks after your surgery    Diet:  -Resume your pre-operative home diet    Follow up:  -Refer to follow up instructions     Call Vascular Surgery Office at [phone] if you experience:  -Increased redness, warmth, tenderness, or draining pus at your incision(s)  -Worsening fevers, chills, nausea/vomiting  -Pain, weakness, coldness, or numbness in your hand  -Uncontrolled pain  -Your call will be returned within 24 hours and further instructions will be provided    Go to ER/Urgent Care if you experience:  -Worsening shortness of breath or chest pain        23

## 2023-09-14 NOTE — PLAN OF CARE
Patient discharged in stable condition with responsible adult.  Right arm placed in sling.  IV removed.  AVS reviewed and given to patient/family.  Verbalized an understanding of all information discussed.

## 2023-09-15 LAB
GLUCOSE SERPL-MCNC: 69 MG/DL (ref 70–110)
HCO3 UR-SCNC: 22.8 MMOL/L (ref 24–28)
HCT VFR BLD CALC: 38 %PCV (ref 36–54)
PCO2 BLDA: 48.1 MMHG (ref 35–45)
PH SMN: 7.28 [PH] (ref 7.35–7.45)
PO2 BLDA: 21 MMHG (ref 40–60)
POC BE: -4 MMOL/L
POC IONIZED CALCIUM: 1.11 MMOL/L (ref 1.06–1.42)
POC SATURATED O2: 28 % (ref 95–100)
POC TCO2: 24 MMOL/L (ref 24–29)
POTASSIUM BLD-SCNC: 4.2 MMOL/L (ref 3.5–5.1)
SAMPLE: ABNORMAL
SODIUM BLD-SCNC: 141 MMOL/L (ref 136–145)

## 2023-09-17 NOTE — OP NOTE
Operative Report    Date of Operation: 9/14/23    Pre-operative Diagnosis: ESRD    Post-operative Diagnosis: same    Attending Surgeon: MELISSA Loera II, MD    Resident/Fellow: Jackeline Barreto MD PGY3    Operation/Procedure Performed:  right radiocephalic arteriovenous fistula creation    Anesthesia: regional/MAC    Indications:  44yo F with CKD 4.  She had preoperative vein mapping suggesting adequate cephalic vein for brachiocephalic AV fistula creation.  She was brought to the operating room and a right arm regional block was performed.  Ultrasound performed in the operating room showed that the cephalic vein actually was 3 mm or greater all the way down to the wrist and the radial artery was greater than 2 mm.  Based on these findings we felt that it would be best to perform a radiocephalic AV fistula.    Procedure in Detail:  After informed consent was obtained the patient was taken to the OR in supine position. Pre-operative antibiotics were administered. Moderate sedation was administered by the anesthesia team. The right arm was prepped and draped in normal sterile fashion with chloraprep.  A time out was performed to confirm appropriate patient, site, positioning, and laterality. 1% lidocaine was injected in the subcutaneous tissues along the area of our planned incision. An longitudinal incision was made at the wrist. The incision was deepened with a combination of electrocautery, blunt dissection, and sharp dissection. The cephalic vein was identified on the lateral aspect of the incision and measured at least 3mm. It was encircled with a large vessel loop and dissected proximally and distally within the incision. Branches were ligated with 3-0 silk ties and small hand-applied clips. Next we dissected  medially within the incision and identified the radial artery. The artery was healthy and measured 2.5mm. The artery was dissected circumferentially and controlled proximally and distally with small vessel  loops. 3000 units of heparin were then administered intravenously. The vein was marked with a marker to for orientation to ensure no twisting occurred. A small bulldog clamp was then applied to the proximal cephalic vein. The distal cephalic vein was then clamped and divided and its distal end suture ligated with 3-0 silk suture. The bulldog clamp was released from the vein and the vein was flushed with heparinized saline. The vein flushed easily and distended well. The bulldog clamp was reapplied to the proximal vein and the distal end of the vein was spatulated. Clamps were applied distally and proximally to the radial artery and a 6mm longitudinal arteriotomy was made in the radial artery. The vein was sewn to the artery using a running 6-0 prolene suture and parachute technique. The artery was back bleed proximally and distally prior to completion of the anastamosis and had brisk flow with no thrombus visualized. The lumen was flushed with heparinized saline and the anastomosis was completed. The distal arterial clamp was released followed by the vein clamp and finally the proximal arterial clamp. No repair sutures were necessary. There was a palpable continuous thrill over the fistula. The fistula had a continuous doppler signal. No protamine was administered. The wound was thoroughly irrigated with saline irrigation. The deep dermis was closed with interrupted vicryl sutures and the skin closed with interrupted vertical mattress 3-0 nylon sutures. The incisions were dressed with 4x4 gauze secured in place with burn netting. All sponge needle and instrument counts were correct.    The patient tolerated the procedure well and was transported to the post-op area for recovery.    EBL: 50ml    Complications: none    MELISSA Loera II, MD, RPVI  Vascular Surgeon  [facility]

## 2023-09-19 DIAGNOSIS — N18.6 ESRD (END STAGE RENAL DISEASE): Primary | ICD-10-CM

## 2023-09-28 ENCOUNTER — OFFICE VISIT (OUTPATIENT)
Dept: VASCULAR SURGERY | Facility: CLINIC | Age: 43
End: 2023-09-28
Attending: SURGERY
Payer: MEDICAID

## 2023-09-28 VITALS
DIASTOLIC BLOOD PRESSURE: 77 MMHG | WEIGHT: 171.94 LBS | HEART RATE: 77 BPM | TEMPERATURE: 98 F | SYSTOLIC BLOOD PRESSURE: 119 MMHG | HEIGHT: 71 IN | BODY MASS INDEX: 24.07 KG/M2

## 2023-09-28 DIAGNOSIS — N18.6 ESRD (END STAGE RENAL DISEASE): Primary | ICD-10-CM

## 2023-09-28 DIAGNOSIS — Z99.2 ESRD ON HEMODIALYSIS: Primary | ICD-10-CM

## 2023-09-28 DIAGNOSIS — N18.6 ESRD ON HEMODIALYSIS: Primary | ICD-10-CM

## 2023-09-28 PROCEDURE — 3062F PR POS MACROALBUMINURIA RESULT DOCUMENTED/REVIEW: ICD-10-PCS | Mod: CPTII,NTX,, | Performed by: SURGERY

## 2023-09-28 PROCEDURE — 3074F SYST BP LT 130 MM HG: CPT | Mod: CPTII,NTX,, | Performed by: SURGERY

## 2023-09-28 PROCEDURE — 3074F PR MOST RECENT SYSTOLIC BLOOD PRESSURE < 130 MM HG: ICD-10-PCS | Mod: CPTII,NTX,, | Performed by: SURGERY

## 2023-09-28 PROCEDURE — 99213 OFFICE O/P EST LOW 20 MIN: CPT | Mod: PBBFAC,TXP | Performed by: SURGERY

## 2023-09-28 PROCEDURE — 99999 PR PBB SHADOW E&M-EST. PATIENT-LVL III: CPT | Mod: PBBFAC,TXP,, | Performed by: SURGERY

## 2023-09-28 PROCEDURE — 3066F NEPHROPATHY DOC TX: CPT | Mod: CPTII,NTX,, | Performed by: SURGERY

## 2023-09-28 PROCEDURE — 3066F PR DOCUMENTATION OF TREATMENT FOR NEPHROPATHY: ICD-10-PCS | Mod: CPTII,NTX,, | Performed by: SURGERY

## 2023-09-28 PROCEDURE — 3078F DIAST BP <80 MM HG: CPT | Mod: CPTII,NTX,, | Performed by: SURGERY

## 2023-09-28 PROCEDURE — 99024 POSTOP FOLLOW-UP VISIT: CPT | Mod: NTX,,, | Performed by: SURGERY

## 2023-09-28 PROCEDURE — 99999 PR PBB SHADOW E&M-EST. PATIENT-LVL III: ICD-10-PCS | Mod: PBBFAC,TXP,, | Performed by: SURGERY

## 2023-09-28 PROCEDURE — 3062F POS MACROALBUMINURIA REV: CPT | Mod: CPTII,NTX,, | Performed by: SURGERY

## 2023-09-28 PROCEDURE — 99024 PR POST-OP FOLLOW-UP VISIT: ICD-10-PCS | Mod: NTX,,, | Performed by: SURGERY

## 2023-09-28 PROCEDURE — 3078F PR MOST RECENT DIASTOLIC BLOOD PRESSURE < 80 MM HG: ICD-10-PCS | Mod: CPTII,NTX,, | Performed by: SURGERY

## 2023-09-28 NOTE — H&P (VIEW-ONLY)
VASCULAR SURGERY CLINIC NOTE    Patient ID: Ade Silva is a 43 y.o. female.    I. HISTORY     Chief Complaint: AV ACCESS    HPI: This is a 43 y.o. female who is here today for a established patient appointment. Here to discuss permanent AV access creation. left hand dominant.  She had right arm radiocephalic AV fistula created by me on 09/14/2023.  Reports some tingling to dorsal aspect of R thumb. Denies weakness, changes in color/temp.  She denies any issues at her incision.  Currently ESRD on HD via tunneled HD catheter in right IJ. Pertinent medical history includes AD polycystic kidney disease which has caused his renal failure.     Past Medical History:   Diagnosis Date    Autosomal dominant polycystic kidney disease     Hypertension         Past Surgical History:   Procedure Laterality Date    AV FISTULA PLACEMENT Right 9/14/2023    Procedure: CREATION, AV FISTULA, radial cephalic;  Surgeon: MELISSA Loera II, MD;  Location: [facility] OR [address] [place] [address];  Service: Vascular;  Laterality: Right;    INSERTION OF TUNNELED CENTRAL VENOUS HEMODIALYSIS CATHETER Right 5/3/2023    Procedure: Insertion, Catheter, Central Venous, Hemodialysis;  Surgeon: Priya Grimm MD;  Location: [facility] CATH LAB;  Service: Interventional Nephrology;  Laterality: Right;    NO PAST SURGERIES      REMOVAL OF HEMODIALYSIS CATHETER  5/3/2023    Procedure: REMOVAL, CATHETER, HEMODIALYSIS;  Surgeon: Priya Grimm MD;  Location: [facility] CATH LAB;  Service: Interventional Nephrology;;       Social History     Occupational History    Not on file   Tobacco Use    Smoking status: Every Day     Current packs/day: 1.00     Average packs/day: 1 pack/day for 28.0 years (28.0 ttl pk-yrs)     Types: Cigarettes    Smokeless tobacco: Not on file   Substance and Sexual Activity    Alcohol use: Not Currently    Drug use: Never    Sexual activity: Not Currently       Review of Systems   Constitutional: Negative for weight loss.   HENT:  Negative for ear pain and  nosebleeds.    Eyes:  Negative for discharge and pain.   Cardiovascular:  Negative for chest pain and palpitations.   Respiratory:  Negative for cough, shortness of breath and wheezing.    Endocrine: Negative for cold intolerance, heat intolerance and polyphagia.   Hematologic/Lymphatic: Negative for adenopathy. Does not bruise/bleed easily.   Skin:  Negative for itching and rash.   Musculoskeletal:  Negative for joint swelling and muscle cramps.   Gastrointestinal:  Negative for abdominal pain, diarrhea, nausea and vomiting.   Genitourinary:  Negative for dysuria and flank pain.   Neurological:  Negative for numbness and seizures.       Current Medications Reviewed    II. PHYSICAL EXAM     Physical Exam  Constitutional:       General: She is not in acute distress.     Appearance: Normal appearance. She is normal weight. She is not ill-appearing or diaphoretic.   Neck:      Comments: Tdc in place to [facility]  Cardiovascular:      Rate and Rhythm: Normal rate.      Comments: 2+ right radial pulse  No bruit heard with auscultation upper right wrist AV fistula site, no thrill, AVF is thrombosed  Pulmonary:      Effort: Pulmonary effort is normal.   Musculoskeletal:      Right lower leg: No edema.      Left lower leg: No edema.   Skin:     General: Skin is warm and dry.      Comments: Surgical incision over R lateral wrist with sutures in place, no erythema, no swelling, no drainage-- sutures removed in clinnic   Neurological:      General: No focal deficit present.      Mental Status: She is alert and oriented to person, place, and time. Mental status is at baseline.   Psychiatric:         Mood and Affect: Mood normal.         Behavior: Behavior normal.       III. ASSESSMENT & PLAN (MEDICAL DECISION MAKING)     1. ESRD on hemodialysis          Imaging Results: (I have personally reviewed the images/studies and provided my interpretation below)  Vein Mapping 8/24/23: right arm cephalic vein 4.4-3.4 mm from shoulder to AC  fossa         Assessment/Diagnosis and Plan:    43 y.o. female s/p radiocephalic AVF creation.  Unfortunately her fistula thrombosed.  We will plan for right arm brachiocephalic AV fistula creation in the coming weeks.  Risks, benefits, alternatives were explained to the patient who expressed full understanding and agreed to proceed.    -plan right arm brachiocephalic AVF creation next week    MELISSA Loera II, MD, [facility]  Vascular Surgery  [facility]

## 2023-10-02 ENCOUNTER — TELEPHONE (OUTPATIENT)
Dept: TRANSPLANT | Facility: CLINIC | Age: 43
End: 2023-10-02

## 2023-10-04 ENCOUNTER — TELEPHONE (OUTPATIENT)
Dept: VASCULAR SURGERY | Facility: CLINIC | Age: 43
End: 2023-10-04

## 2023-10-04 ENCOUNTER — ANESTHESIA EVENT (OUTPATIENT)
Dept: SURGERY | Facility: HOSPITAL | Age: 43
End: 2023-10-04
Payer: MEDICAID

## 2023-10-05 ENCOUNTER — ANESTHESIA (OUTPATIENT)
Dept: SURGERY | Facility: HOSPITAL | Age: 43
End: 2023-10-05
Payer: MEDICAID

## 2023-10-05 ENCOUNTER — HOSPITAL ENCOUNTER (OUTPATIENT)
Facility: HOSPITAL | Age: 43
Discharge: HOME | End: 2023-10-05
Attending: SURGERY | Admitting: SURGERY
Payer: MEDICAID

## 2023-10-05 VITALS
TEMPERATURE: 98 F | OXYGEN SATURATION: 95 % | DIASTOLIC BLOOD PRESSURE: 54 MMHG | SYSTOLIC BLOOD PRESSURE: 111 MMHG | HEART RATE: 70 BPM | RESPIRATION RATE: 12 BRPM | HEIGHT: 71 IN | BODY MASS INDEX: 23.8 KG/M2 | WEIGHT: 170 LBS

## 2023-10-05 DIAGNOSIS — Z98.890 S/P ARTERIOVENOUS (AV) FISTULA CREATION: ICD-10-CM

## 2023-10-05 DIAGNOSIS — N18.6 ESRD (END STAGE RENAL DISEASE): Primary | ICD-10-CM

## 2023-10-05 LAB
B-HCG UR QL: NEGATIVE
CTP QC/QA: YES

## 2023-10-05 PROCEDURE — D9220A PRA ANESTHESIA: ICD-10-PCS | Mod: NTX,,, | Performed by: NURSE ANESTHETIST, CERTIFIED REGISTERED

## 2023-10-05 PROCEDURE — 63600175 PHARM REV CODE 636 W HCPCS: Mod: NTX | Performed by: SURGERY

## 2023-10-05 PROCEDURE — 63600175 PHARM REV CODE 636 W HCPCS: Mod: TXP | Performed by: SURGERY

## 2023-10-05 PROCEDURE — 81025 URINE PREGNANCY TEST: CPT | Mod: NTX | Performed by: SURGERY

## 2023-10-05 PROCEDURE — 71000015 HC POSTOP RECOV 1ST HR: Mod: NTX | Performed by: SURGERY

## 2023-10-05 PROCEDURE — 36821 AV FUSION DIRECT ANY SITE: CPT | Mod: 79,RT,NTX, | Performed by: SURGERY

## 2023-10-05 PROCEDURE — 71000044 HC DOSC ROUTINE RECOVERY FIRST HOUR: Mod: NTX | Performed by: SURGERY

## 2023-10-05 PROCEDURE — 25000003 PHARM REV CODE 250: Mod: NTX

## 2023-10-05 PROCEDURE — 36821 PR ANASTOMOSIS,AV,ANY SITE: ICD-10-PCS | Mod: 79,RT,NTX, | Performed by: SURGERY

## 2023-10-05 PROCEDURE — 37000009 HC ANESTHESIA EA ADD 15 MINS: Mod: TXP | Performed by: SURGERY

## 2023-10-05 PROCEDURE — 36000707: Mod: NTX | Performed by: SURGERY

## 2023-10-05 PROCEDURE — 25000003 PHARM REV CODE 250: Mod: NTX | Performed by: NURSE ANESTHETIST, CERTIFIED REGISTERED

## 2023-10-05 PROCEDURE — 36000706: Mod: TXP | Performed by: SURGERY

## 2023-10-05 PROCEDURE — 37000008 HC ANESTHESIA 1ST 15 MINUTES: Mod: NTX | Performed by: SURGERY

## 2023-10-05 PROCEDURE — 27200750 HC INSULATED NEEDLE/ STIMUPLEX: Mod: TXP | Performed by: ANESTHESIOLOGY

## 2023-10-05 PROCEDURE — 63600175 PHARM REV CODE 636 W HCPCS: Mod: TXP | Performed by: NURSE ANESTHETIST, CERTIFIED REGISTERED

## 2023-10-05 PROCEDURE — D9220A PRA ANESTHESIA: Mod: NTX,,, | Performed by: NURSE ANESTHETIST, CERTIFIED REGISTERED

## 2023-10-05 RX ORDER — DEXAMETHASONE SODIUM PHOSPHATE 4 MG/ML
INJECTION, SOLUTION INTRA-ARTICULAR; INTRALESIONAL; INTRAMUSCULAR; INTRAVENOUS; SOFT TISSUE
Status: DISCONTINUED | OUTPATIENT
Start: 2023-10-05 | End: 2023-10-05

## 2023-10-05 RX ORDER — PHENYLEPHRINE HYDROCHLORIDE 10 MG/ML
INJECTION INTRAVENOUS
Status: DISCONTINUED | OUTPATIENT
Start: 2023-10-05 | End: 2023-10-05

## 2023-10-05 RX ORDER — VASOPRESSIN 20 [USP'U]/ML
INJECTION, SOLUTION INTRAMUSCULAR; SUBCUTANEOUS
Status: DISCONTINUED | OUTPATIENT
Start: 2023-10-05 | End: 2023-10-05

## 2023-10-05 RX ORDER — CEFAZOLIN SODIUM 1 G/3ML
INJECTION, POWDER, FOR SOLUTION INTRAMUSCULAR; INTRAVENOUS
Status: DISCONTINUED | OUTPATIENT
Start: 2023-10-05 | End: 2023-10-05

## 2023-10-05 RX ORDER — ONDANSETRON 2 MG/ML
INJECTION INTRAMUSCULAR; INTRAVENOUS
Status: DISCONTINUED | OUTPATIENT
Start: 2023-10-05 | End: 2023-10-05

## 2023-10-05 RX ORDER — PROPOFOL 10 MG/ML
VIAL (ML) INTRAVENOUS CONTINUOUS PRN
Status: DISCONTINUED | OUTPATIENT
Start: 2023-10-05 | End: 2023-10-05

## 2023-10-05 RX ORDER — SEVELAMER CARBONATE 800 MG/1
800 TABLET, FILM COATED ORAL
Qty: 90 TABLET | Refills: 11 | Status: SHIPPED | OUTPATIENT
Start: 2023-10-05 | End: 2023-10-12 | Stop reason: SDUPTHER

## 2023-10-05 RX ORDER — HEPARIN SODIUM 1000 [USP'U]/ML
INJECTION, SOLUTION INTRAVENOUS; SUBCUTANEOUS
Status: DISCONTINUED | OUTPATIENT
Start: 2023-10-05 | End: 2023-10-05 | Stop reason: HOSPADM

## 2023-10-05 RX ORDER — MIDAZOLAM HYDROCHLORIDE 1 MG/ML
INJECTION, SOLUTION INTRAMUSCULAR; INTRAVENOUS
Status: DISCONTINUED | OUTPATIENT
Start: 2023-10-05 | End: 2023-10-05

## 2023-10-05 RX ORDER — FENTANYL CITRATE 50 UG/ML
INJECTION, SOLUTION INTRAMUSCULAR; INTRAVENOUS
Status: DISCONTINUED | OUTPATIENT
Start: 2023-10-05 | End: 2023-10-05

## 2023-10-05 RX ORDER — HEPARIN SODIUM 1000 [USP'U]/ML
INJECTION, SOLUTION INTRAVENOUS; SUBCUTANEOUS
Status: DISCONTINUED | OUTPATIENT
Start: 2023-10-05 | End: 2023-10-05

## 2023-10-05 RX ORDER — SODIUM CHLORIDE 9 MG/ML
INJECTION, SOLUTION INTRAVENOUS CONTINUOUS
Status: DISCONTINUED | OUTPATIENT
Start: 2023-10-05 | End: 2023-10-05 | Stop reason: HOSPADM

## 2023-10-05 RX ORDER — HYDROCODONE BITARTRATE AND ACETAMINOPHEN 5; 325 MG/1; MG/1
1 TABLET ORAL EVERY 6 HOURS PRN
Qty: 15 TABLET | Refills: 0 | Status: SHIPPED | OUTPATIENT
Start: 2023-10-05 | End: 2024-01-09

## 2023-10-05 RX ORDER — LIDOCAINE HYDROCHLORIDE 20 MG/ML
INJECTION INTRAVENOUS
Status: DISCONTINUED | OUTPATIENT
Start: 2023-10-05 | End: 2023-10-05

## 2023-10-05 RX ORDER — KETAMINE HCL IN 0.9 % NACL 50 MG/5 ML
SYRINGE (ML) INTRAVENOUS
Status: DISCONTINUED | OUTPATIENT
Start: 2023-10-05 | End: 2023-10-05

## 2023-10-05 RX ORDER — LIDOCAINE HYDROCHLORIDE 10 MG/ML
1 INJECTION, SOLUTION EPIDURAL; INFILTRATION; INTRACAUDAL; PERINEURAL ONCE AS NEEDED
Status: DISCONTINUED | OUTPATIENT
Start: 2023-10-05 | End: 2023-10-05 | Stop reason: HOSPADM

## 2023-10-05 RX ORDER — CHOLECALCIFEROL (VITAMIN D3) 25 MCG
1000 TABLET ORAL DAILY
COMMUNITY

## 2023-10-05 RX ADMIN — CEFAZOLIN 2 G: 330 INJECTION, POWDER, FOR SOLUTION INTRAMUSCULAR; INTRAVENOUS at 07:10

## 2023-10-05 RX ADMIN — Medication 20 MG: at 07:10

## 2023-10-05 RX ADMIN — FENTANYL CITRATE 25 MCG: 50 INJECTION, SOLUTION INTRAMUSCULAR; INTRAVENOUS at 06:10

## 2023-10-05 RX ADMIN — VASOPRESSIN 2 UNITS: 20 INJECTION INTRAVENOUS at 07:10

## 2023-10-05 RX ADMIN — GLYCOPYRROLATE 0.2 MG: 0.2 INJECTION, SOLUTION INTRAMUSCULAR; INTRAVENOUS at 07:10

## 2023-10-05 RX ADMIN — SODIUM CHLORIDE: 0.9 INJECTION, SOLUTION INTRAVENOUS at 06:10

## 2023-10-05 RX ADMIN — HEPARIN SODIUM 3000 UNITS: 1000 INJECTION, SOLUTION INTRAVENOUS; SUBCUTANEOUS at 07:10

## 2023-10-05 RX ADMIN — DEXAMETHASONE SODIUM PHOSPHATE 4 MG: 4 INJECTION, SOLUTION INTRAMUSCULAR; INTRAVENOUS at 07:10

## 2023-10-05 RX ADMIN — LIDOCAINE HYDROCHLORIDE 60 MG: 20 INJECTION INTRAVENOUS at 07:10

## 2023-10-05 RX ADMIN — VASOPRESSIN 1 UNITS: 20 INJECTION INTRAVENOUS at 07:10

## 2023-10-05 RX ADMIN — PHENYLEPHRINE HYDROCHLORIDE 100 MCG: 10 INJECTION INTRAVENOUS at 07:10

## 2023-10-05 RX ADMIN — PROPOFOL 150 MCG/KG/MIN: 10 INJECTION, EMULSION INTRAVENOUS at 07:10

## 2023-10-05 RX ADMIN — Medication 10 MG: at 07:10

## 2023-10-05 RX ADMIN — SODIUM CHLORIDE: 9 INJECTION, SOLUTION INTRAVENOUS at 06:10

## 2023-10-05 RX ADMIN — FENTANYL CITRATE 25 MCG: 50 INJECTION, SOLUTION INTRAMUSCULAR; INTRAVENOUS at 08:10

## 2023-10-05 RX ADMIN — FENTANYL CITRATE 50 MCG: 50 INJECTION, SOLUTION INTRAMUSCULAR; INTRAVENOUS at 07:10

## 2023-10-05 RX ADMIN — MIDAZOLAM HYDROCHLORIDE 2 MG: 1 INJECTION, SOLUTION INTRAMUSCULAR; INTRAVENOUS at 06:10

## 2023-10-05 RX ADMIN — ONDANSETRON 4 MG: 2 INJECTION INTRAMUSCULAR; INTRAVENOUS at 07:10

## 2023-10-05 RX ADMIN — MEPIVACAINE HYDROCHLORIDE 30 ML: 20 INJECTION, SOLUTION EPIDURAL; INFILTRATION at 07:10

## 2023-10-05 NOTE — OP NOTE
Operative Report    Date of Operation: 10/5/23    Pre-operative Diagnosis: ESRD    Post-operative Diagnosis: same    Attending Surgeon: MELISSA Loera II, MD    Resident/Fellow: Jackeline Barreto MD PGY3    Operation/Procedure Performed:  right brachiocephalic arteriovenous fistula creation    Anesthesia: local/MAC    Indications:  42 yo F with ESRD in need of permanent HD access.    Procedure in Detail:  After informed consent was obtained the patient was taken to the OR in supine position. Pre-operative antibiotics were administered. regional anesthesia was administered by the anesthesia team. The right arm was prepped and draped in normal sterile fashion with chloraprep.  A time out was performed to confirm appropriate patient, site, positioning, and laterality. A transverse incision was made at the antecubital fossa. The incision was deepened with a combination of electrocautery, blunt dissection, and sharp dissection. The cephalic vein was identified on the lateral aspect of the incision and measured at least 4mm. It was encircled with a large vessel loop and dissected proximally and distally within the incision. Branches were ligated with 3-0 silk ties and small hand-applied clips. Next we dissected through the biceps aponeurosis and identified the brachial artery in the medial aspect of the incision. The artery was healthy and measured 4mm. The artery was dissected circumferentially and controlled proximally and distally with large vessel loops. 3000 units of heparin were then administered intravenously. A small bulldog clamp was then applied to the proximal cephalic vein. The distal cephalic vein was then clamped and divided and its distal end suture ligated with 3-0 silk suture. The bulldog clamp was released from the vein and the vein was flushed with heparinized saline. The vein flushed easily and distended well. The bulldog clamp was reapplied to the proximal vein and the distal end of the vein was  spatulated. Clamps were applied distally and proximally to the brachial artery and a 6mm longitudinal arteriotomy was made in the brachial artery. The vein was sewn to the artery using a running 6-0 prolene suture and parachute technique. The artery was back bleed proximally and distally prior to completion of the anastamosis and had brisk flow with no thrombus visualized. The lumen was flushed with heparinized saline and the anastomosis was completed. The distal arterial clamp was released followed by the vein clamp and finally the proximal arterial clamp. No repair sutures were necessary. There was a palpable continuous thrill over the fistula. The radial artery had a absent doppler signal. No protamine was administered. The wound was thoroughly irrigated with saline irrigation. The deep dermis was closed with interrupted vicryl sutures and 4-0 monocryl was used to close the skin. The incisions were dressed with 4x4 gauze secured in place with burn netting. All sponge needle and instrument counts were correct.    The patient tolerated the procedure well and was transported to the PACU for recovery.    EBL: 100ml    Complications: none    MELISSA Leora II, MD, RPVI  Vascular Surgeon  [facility]

## 2023-10-05 NOTE — ANESTHESIA PROCEDURE NOTES
Right Supraclavicular block    Patient location during procedure: OR    Reason for block: primary anesthetic    Diagnosis: Right Arm pain   Start time: 10/5/2023 7:00 AM  Timeout: 10/5/2023 6:59 AM   End time: 10/5/2023 7:08 AM    Staffing  Authorizing Provider: Valentin Klein MD  Performing Provider: Jason Juárez MD    Staffing  Performed by: Jason Juárez MD  Authorized by: Valentin Klein MD    Preanesthetic Checklist  Completed: patient identified, IV checked, site marked, risks and benefits discussed, surgical consent, monitors and equipment checked, pre-op evaluation and timeout performed  Peripheral Block  Patient position: supine  Prep: ChloraPrep  Patient monitoring: heart rate, cardiac monitor, continuous pulse ox, continuous capnometry and frequent blood pressure checks  Block type: supraclavicular  Laterality: right  Injection technique: single shot  Needle  Needle type: Stimuplex   Needle gauge: 22 G  Needle length: 2 in  Needle localization: anatomical landmarks and ultrasound guidance   -ultrasound image captured on disc.  Assessment  Injection assessment: negative aspiration, negative parasthesia and local visualized surrounding nerve  Paresthesia pain: none  Heart rate change: no  Slow fractionated injection: yes    Medications:    Medications: mepivacaine (CARBOCAINE) injection 20 mg/mL (2%) - Perineural   30 mL - 10/5/2023 7:07:00 AM    Additional Notes  Intercostal brachial field block performed with mepivacaine 1.5% 10ml for additional coverage.    VSS.  See anesthesia record.  Patient tolerated procedure well.

## 2023-10-05 NOTE — BRIEF OP NOTE
Davian Winter - Surgery ([facility])  Brief Operative Note    Surgery Date: 10/5/2023     Surgeon(s) and Role:     * MELISSA Loera II, MD - Primary     * Jackeline Barreto MD - Resident - Assisting      Pre-op Diagnosis:  ESRD (end stage renal disease) [N18.6]    Post-op Diagnosis:  Post-Op Diagnosis Codes:     * ESRD (end stage renal disease) [N18.6]    Procedure(s) (LRB):  CREATION, AV FISTULA - brachial (Right)    Anesthesia: Regional    Operative Findings:   Right brachio-cephalic AVF creation without apparent complication. Ulnar pulse on doppler intra-op after creation of fistula.     Estimated Blood Loss: <25cc         Specimens:   Specimen (24h ago, onward)      None              Discharge Note    OUTCOME: Patient tolerated treatment/procedure well without complication and is now ready for discharge.    DISPOSITION: Home or Self Care    FINAL DIAGNOSIS:  ESRD (end stage renal disease)    FOLLOWUP: In clinic    DISCHARGE INSTRUCTIONS:    Discharge Procedure Orders   Notify your health care provider if you experience any of the following:  temperature >100.4     Notify your health care provider if you experience any of the following:  persistent nausea and vomiting or diarrhea     Notify your health care provider if you experience any of the following:  severe uncontrolled pain     Notify your health care provider if you experience any of the following:  redness, tenderness, or signs of infection (pain, swelling, redness, odor or green/yellow discharge around incision site)     Notify your health care provider if you experience any of the following:  difficulty breathing or increased cough     Activity as tolerated   Order Comments: VASCULAR SURGERY DISCHARGE INSTRUCTIONS    Woundcare:  - Take your incision dressing off 2 days after your surgery and gently rinse your incision with soap and water daily. Pad the incision dry afterward  - If you have a dialysis catheter in place, keep your catheter dressing clean and  dry  - If you do not have a catheter, take a full shower daily beginning 2 days after the surgery. Allow soap and water to run over your incision. Pad the incision dry afterward  - When resting or sleeping, try to keep your arm elevated to shoulder level on pillows to reduce swelling  - If you notice clear drainage from your incision, you can apply dry gauze daily and secure in place with tape or gentle elastic wrap    Activity:  - Avoid prolonged exertion of the affected arm  - Avoid keeping your arm down below your chest for prolonged periods of time (this could lead to increased swelling)  - No heavy lifting with the affected arm  - Sleep with your arm elevated on pillows at night to reduce swelling  -- No swimming in pools, IQum[employer], [employer]enta etc. for 6 weeks after your surgery    Diet:  -Resume your pre-operative home diet    Follow up:  -Refer to follow up instructions     Call Vascular Surgery Office at [phone] if you experience:  -Increased redness, warmth, tenderness, or draining pus at your incision(s)  -Worsening fevers, chills, nausea/vomiting  -Pain, weakness, coldness, or numbness in your hand  -Uncontrolled pain  -Your call will be returned within 24 hours and further instructions will be provided    Go to ER/Urgent Care if you experience:  -Worsening shortness of breath or chest pain

## 2023-10-05 NOTE — ANESTHESIA PREPROCEDURE EVALUATION
10/05/2023  Ade Silva is a 43 y.o., female.      Pre-op Assessment    I have reviewed the Patient Summary Reports.     I have reviewed the Nursing Notes. I have reviewed the NPO Status.      Review of Systems  Hematology/Oncology:     Oncology Normal    -- Anemia:   EENT/Dental:EENT/Dental Normal   Cardiovascular:   Hypertension PVD (hx of basilar a. aneurysm)    Pulmonary:  Pulmonary Normal    Renal/:   Chronic Renal Disease, ESRD, CKD    Musculoskeletal:  Musculoskeletal Normal    Endocrine:  Endocrine Normal        Physical Exam  General: Well nourished, Cooperative, Alert and Oriented    Airway:  Mallampati: II   Mouth Opening: Normal  TM Distance: Normal  Neck ROM: Normal ROM    Dental:  Loose teeth  Extremely poor dentition. Several loose/damaged teeth.       Anesthesia Plan  Type of Anesthesia, risks & benefits discussed:    Anesthesia Type: Regional, MAC  Intra-op Monitoring Plan: Standard ASA Monitors  Post Op Pain Control Plan: IV/PO Opioids PRN, multimodal analgesia and peripheral nerve block  Induction:  IV  Informed Consent: Informed consent signed with the Patient and all parties understand the risks and agree with anesthesia plan.  All questions answered.   ASA Score: 3    Ready For Surgery From Anesthesia Perspective.     .

## 2023-10-05 NOTE — TRANSFER OF CARE
"Anesthesia Transfer of Care Note    Patient: Ade Silva    Procedure(s) Performed: Procedure(s) (LRB):  CREATION, AV FISTULA - brachial (Right)    Patient location: [facility]    Anesthesia Type: general    Transport from OR: Transported from OR on 6-10 L/min O2 by face mask with adequate spontaneous ventilation    Post pain: adequate analgesia    Post assessment: no apparent anesthetic complications and tolerated procedure well    Post vital signs: stable    Level of consciousness: alert and responds to stimulation    Nausea/Vomiting: no nausea/vomiting    Complications: none    Transfer of care protocol was followed      Last vitals:   Visit Vitals  /62 (BP Location: Left arm, Patient Position: Lying)   Pulse 67   Temp 36.3 °C (97.4 °F) (Oral)   Resp 14   Ht 5' 11" (1.803 m)   Wt 77.1 kg (170 lb)   LMP 09/15/2023 (Approximate)   SpO2 100%   Breastfeeding No   BMI 23.71 kg/m²     "

## 2023-10-05 NOTE — ANESTHESIA POSTPROCEDURE EVALUATION
Anesthesia Post Evaluation    Patient: Ade Silva    Procedure(s) Performed: Procedure(s) (LRB):  CREATION, AV FISTULA - brachial (Right)    Final Anesthesia Type: regional      Patient location during evaluation: PACU  Patient participation: Yes- Able to Participate  Level of consciousness: awake and alert  Post-procedure vital signs: reviewed and stable  Pain management: adequate  Airway patency: patent    PONV status at discharge: No PONV  Anesthetic complications: no      Cardiovascular status: blood pressure returned to baseline  Respiratory status: unassisted  Hydration status: euvolemic  Follow-up not needed.          Vitals Value Taken Time   /54 10/05/23 0947   Temp 36.8 °C (98.2 °F) 10/05/23 0911   Pulse 69 10/05/23 0958   Resp 12 10/05/23 0958   SpO2 100 % 10/05/23 0958   Vitals shown include unvalidated device data.      No case tracking events are documented in the log.      Pain/Geovanna Score: Geovanna Score: 9 (10/5/2023  9:11 AM)

## 2023-10-10 DIAGNOSIS — Z99.2 ESRD ON HEMODIALYSIS: ICD-10-CM

## 2023-10-10 DIAGNOSIS — N18.6 ESRD ON HEMODIALYSIS: ICD-10-CM

## 2023-10-10 DIAGNOSIS — N18.6 ESRD (END STAGE RENAL DISEASE): Primary | ICD-10-CM

## 2023-10-12 ENCOUNTER — DOCUMENTATION ONLY (OUTPATIENT)
Dept: TRANSPLANT | Facility: CLINIC | Age: 43
End: 2023-10-12

## 2023-10-12 ENCOUNTER — HOSPITAL ENCOUNTER (OUTPATIENT)
Dept: RADIOLOGY | Facility: HOSPITAL | Age: 43
Discharge: HOME | End: 2023-10-12
Attending: NURSE PRACTITIONER
Payer: MEDICARE

## 2023-10-12 ENCOUNTER — HOSPITAL ENCOUNTER (OUTPATIENT)
Dept: RADIOLOGY | Facility: HOSPITAL | Age: 43
Discharge: HOME | End: 2023-10-12
Attending: NURSE PRACTITIONER
Payer: MEDICAID

## 2023-10-12 ENCOUNTER — OFFICE VISIT (OUTPATIENT)
Dept: TRANSPLANT | Facility: CLINIC | Age: 43
End: 2023-10-12
Payer: MEDICAID

## 2023-10-12 VITALS
SYSTOLIC BLOOD PRESSURE: 123 MMHG | RESPIRATION RATE: 16 BRPM | HEIGHT: 68 IN | WEIGHT: 173.31 LBS | DIASTOLIC BLOOD PRESSURE: 73 MMHG | TEMPERATURE: 98 F | HEART RATE: 93 BPM | BODY MASS INDEX: 26.27 KG/M2 | OXYGEN SATURATION: 100 %

## 2023-10-12 DIAGNOSIS — Z76.82 ORGAN TRANSPLANT CANDIDATE: ICD-10-CM

## 2023-10-12 DIAGNOSIS — I72.5 BASILAR ARTERY ANEURYSM: ICD-10-CM

## 2023-10-12 DIAGNOSIS — Z71.85 IMMUNIZATION COUNSELING: ICD-10-CM

## 2023-10-12 DIAGNOSIS — Z99.2 ESRD ON HEMODIALYSIS: ICD-10-CM

## 2023-10-12 DIAGNOSIS — Q61.2 AUTOSOMAL DOMINANT POLYCYSTIC KIDNEY DISEASE: ICD-10-CM

## 2023-10-12 DIAGNOSIS — I15.0 RENOVASCULAR HYPERTENSION: ICD-10-CM

## 2023-10-12 DIAGNOSIS — Z01.818 PRE-TRANSPLANT EVALUATION FOR KIDNEY TRANSPLANT: Primary | ICD-10-CM

## 2023-10-12 DIAGNOSIS — N18.6 ESRD ON HEMODIALYSIS: ICD-10-CM

## 2023-10-12 PROCEDURE — 99204 PR OFFICE/OUTPT VISIT, NEW, LEVL IV, 45-59 MIN: ICD-10-PCS | Mod: S$PBB,TXP,, | Performed by: TRANSPLANT SURGERY

## 2023-10-12 PROCEDURE — 3074F SYST BP LT 130 MM HG: CPT | Mod: CPTII,TXP,, | Performed by: NURSE PRACTITIONER

## 2023-10-12 PROCEDURE — 99203 OFFICE O/P NEW LOW 30 MIN: CPT | Mod: S$PBB,TXP,, | Performed by: STUDENT IN AN ORGANIZED HEALTH CARE EDUCATION/TRAINING PROGRAM

## 2023-10-12 PROCEDURE — 3008F BODY MASS INDEX DOCD: CPT | Mod: CPTII,TXP,, | Performed by: NURSE PRACTITIONER

## 2023-10-12 PROCEDURE — 1159F MED LIST DOCD IN RCRD: CPT | Mod: CPTII,TXP,, | Performed by: NURSE PRACTITIONER

## 2023-10-12 PROCEDURE — 93978 VASCULAR STUDY: CPT | Mod: TC,TXP

## 2023-10-12 PROCEDURE — 99205 OFFICE O/P NEW HI 60 MIN: CPT | Mod: S$PBB,TXP,, | Performed by: NURSE PRACTITIONER

## 2023-10-12 PROCEDURE — 99215 OFFICE O/P EST HI 40 MIN: CPT | Mod: PBBFAC,25,TXP | Performed by: NURSE PRACTITIONER

## 2023-10-12 PROCEDURE — 93978 US DOPP ILIACS BILATERAL: ICD-10-PCS | Mod: 26,TXP,, | Performed by: RADIOLOGY

## 2023-10-12 PROCEDURE — 71046 X-RAY EXAM CHEST 2 VIEWS: CPT | Mod: 26,TXP,, | Performed by: RADIOLOGY

## 2023-10-12 PROCEDURE — 93978 VASCULAR STUDY: CPT | Mod: 26,TXP,, | Performed by: RADIOLOGY

## 2023-10-12 PROCEDURE — 99999 PR PBB SHADOW E&M-EST. PATIENT-LVL V: CPT | Mod: PBBFAC,TXP,, | Performed by: NURSE PRACTITIONER

## 2023-10-12 PROCEDURE — 99999 PR PBB SHADOW E&M-EST. PATIENT-LVL V: ICD-10-PCS | Mod: PBBFAC,TXP,, | Performed by: NURSE PRACTITIONER

## 2023-10-12 PROCEDURE — 99203 PR OFFICE/OUTPT VISIT, NEW, LEVL III, 30-44 MIN: ICD-10-PCS | Mod: S$PBB,TXP,, | Performed by: STUDENT IN AN ORGANIZED HEALTH CARE EDUCATION/TRAINING PROGRAM

## 2023-10-12 PROCEDURE — 3066F NEPHROPATHY DOC TX: CPT | Mod: CPTII,TXP,, | Performed by: NURSE PRACTITIONER

## 2023-10-12 PROCEDURE — 3078F DIAST BP <80 MM HG: CPT | Mod: CPTII,TXP,, | Performed by: NURSE PRACTITIONER

## 2023-10-12 PROCEDURE — 3078F PR MOST RECENT DIASTOLIC BLOOD PRESSURE < 80 MM HG: ICD-10-PCS | Mod: CPTII,TXP,, | Performed by: NURSE PRACTITIONER

## 2023-10-12 PROCEDURE — 3008F PR BODY MASS INDEX (BMI) DOCUMENTED: ICD-10-PCS | Mod: CPTII,TXP,, | Performed by: NURSE PRACTITIONER

## 2023-10-12 PROCEDURE — 3066F PR DOCUMENTATION OF TREATMENT FOR NEPHROPATHY: ICD-10-PCS | Mod: CPTII,TXP,, | Performed by: NURSE PRACTITIONER

## 2023-10-12 PROCEDURE — 3074F PR MOST RECENT SYSTOLIC BLOOD PRESSURE < 130 MM HG: ICD-10-PCS | Mod: CPTII,TXP,, | Performed by: NURSE PRACTITIONER

## 2023-10-12 PROCEDURE — 3062F PR POS MACROALBUMINURIA RESULT DOCUMENTED/REVIEW: ICD-10-PCS | Mod: CPTII,TXP,, | Performed by: NURSE PRACTITIONER

## 2023-10-12 PROCEDURE — 3062F POS MACROALBUMINURIA REV: CPT | Mod: CPTII,TXP,, | Performed by: NURSE PRACTITIONER

## 2023-10-12 PROCEDURE — 71046 X-RAY EXAM CHEST 2 VIEWS: CPT | Mod: TC,TXP

## 2023-10-12 PROCEDURE — 99204 OFFICE O/P NEW MOD 45 MIN: CPT | Mod: S$PBB,TXP,, | Performed by: TRANSPLANT SURGERY

## 2023-10-12 PROCEDURE — 1159F PR MEDICATION LIST DOCUMENTED IN MEDICAL RECORD: ICD-10-PCS | Mod: CPTII,TXP,, | Performed by: NURSE PRACTITIONER

## 2023-10-12 PROCEDURE — 71046 XR CHEST PA AND LATERAL: ICD-10-PCS | Mod: 26,TXP,, | Performed by: RADIOLOGY

## 2023-10-12 PROCEDURE — 99205 PR OFFICE/OUTPT VISIT, NEW, LEVL V, 60-74 MIN: ICD-10-PCS | Mod: S$PBB,TXP,, | Performed by: NURSE PRACTITIONER

## 2023-10-12 PROCEDURE — 1160F RVW MEDS BY RX/DR IN RCRD: CPT | Mod: CPTII,TXP,, | Performed by: NURSE PRACTITIONER

## 2023-10-12 PROCEDURE — 1160F PR REVIEW ALL MEDS BY PRESCRIBER/CLIN PHARMACIST DOCUMENTED: ICD-10-PCS | Mod: CPTII,TXP,, | Performed by: NURSE PRACTITIONER

## 2023-10-12 NOTE — PROGRESS NOTES
Transplant Surgery  Kidney Transplant Recipient Evaluation    Referring Physician: Priya Grimm  Current Nephrologist: Priya Grimm    Subjective:     Reason for Visit: evaluate transplant candidacy    History of Present Illness: Ade Silva is a 43 y.o. year old female undergoing transplant evaluation.    Dialysis History: Ade is on hemodialysis.  Minimal uop     Transplant History: N/A    Etiology of Renal Disease: Polycystic Kidneys (based on medical records from referral). She has back pain, she has nausea and vomiting that are fairly severe, she has hematuria when cysts pop, she has had some UTI's but no known cyst infections or sepsis. She has had brain aneurysm coiled.     External provider notes reviewed: Yes    Review of Systems  Objective:     Physical Exam:  Constitutional:   Vitals reviewed: yes   Well-nourished and well-groomed: yes  Eyes:   Sclerae icteric: no   Extraocular movements intact: yes  GI:    Bowel sounds normal: yes   Tenderness: no    If yes, quadrant/location: not applicable   Palpable masses: no    If yes, quadrant/location: not applicable   Hepatosplenomegaly: no   Ascites: no   Hernia: no    If yes, type/location: not applicable   Surgical scars: no    If yes, type/location: not applicable  Resp:   Effort normal: yes   Breath sounds normal: yes    CV:   Regular rate and rhythm: yes   Heart sounds normal: yes   Femoral pulses normal: yes   Extremities edematous: no  Skin:   Rashes or lesions present: no    If yes, describe:not applicable   Jaundice:: no    Musculoskeletal:   Gait normal: yes   Strength normal: yes  Psych:   Oriented to person, place, and time: yes   Affect and mood normal: yes    Additional comments:  large polycystic kidneys, below the iliac crest on the right, may limit space for a transplant, need a CT scan    Diagnostics:  The following labs have been reviewed: CBC  CMP  INR  The following radiology images have been independently reviewed and interpreted:  Pelvic Xray    Counseling: We provided Ade Silva with a group education session today.  We discussed kidney transplantation at length with her, including risks, potential complications, and alternatives in the management of her renal failure.  The discussion included complications related to anesthesia, bleeding, infection, primary nonfunction, and ATN.  I discussed the typical postoperative course, length of hospitalization, the need for long-term immunosuppression, and the need for long-term routine follow-up.  I discussed living-donor and -donor transplantation and the relative advantages and disadvantages of each.  I also discussed average waiting times for both living donation and  donation.  I discussed national and center-specific survival rates.  I also mentioned the potential benefit of multicenter listing to candidates listed with centers within more than one organ procurement organization.  All questions were answered.    Patient advised that it is recommended that all transplant candidates, and their close contacts and household members receive Covid vaccination.    Final determination of transplant candidacy will be made once evaluation is complete and reviewed by the Kidney & Kidney/Pancreas Selection Committee.    Coronavirus disease (COVID-19) caused by severe acute respiratory virus coronavirus 2 (SARS-C0V 2) is associated with increased mortality in solid organ transplant recipients (SOT) compared to non-transplant patients. Vaccine responses to vaccination are depressed against SARS-CoV2 compared to normal individuals but improve with third vaccination doses. Vaccination prior to SOT provides both the best opportunity for transplant candidates to develop protective immunity and to reduce the risk of serious COVID19 infections post transplantation. Organ transplant candidates at [facility] will be required to receive SARS-CoV-2 vaccination  prior to being listed with a an active status, whenever possible. Exceptions will be made for disability related reasons or for sincerely held Catholic beliefs.          Transplant Surgery - Candidacy   Assessment/Plan:   Ade Silva has end stage renal disease (ESRD) on dialysis. I see no surgical contraindication to placing a kidney transplant. Based on available information, Ade Silva is a suitable kidney transplant candidate.   large polycystic kidneys, below the iliac crest on the right, may limit space for a transplant, need a CT scan.  Additionally symptoms from PCKD are moderate, but enough to warrant nephrectomy     Additional testing to be completed according to the Written Order Guidelines for Adult Pre-kidney and Pancreas Transplant Evaluation (KI-02).  Interpretation of tests and discussion of patient management involves all members of the multidisciplinary transplant team.    Bishnu Burdick Jr, MD

## 2023-10-12 NOTE — PROGRESS NOTES
PRE-TRANSPLANT INFECTIOUS DISEASE CONSULT    Reason for Visit:  Pre-transplant evaluation  Referring Provider: Dr. Priya Grimm     History of Present Illness:    43 y.o. female with a history of HTN, AD-PCKD, ESRD on HD -- presents for pre-kidney transplant evaluation.    Infectious History:  Recent hospital admissions: No  Recent infections: No  Recent or current antibiotic use: No  History of recurrent infections *(sinus / pneumonia / UTI / SBP)*: Yes; recurring or freq UTIs during teen years; occasional fungal skin infections of chest / neck.  History of diabetic foot wound or bone/joint infection: No  Recent dental infections, issues or procedures: Yes; poor dentition, plans to see dentist soon.  History of chicken pox: No  History of shingles: No  History of STI: Trichomonas (2008, Tx'd)  History of COVID infection: No    History of Immunosuppression:  Prior chemotherapy / immunosuppression: No  Prior transplant: No  History of splenectomy: No    Tuberculosis:  Prior screening for latent TB: Yes  Prior diagnosis of latent TB: No  Risk factors for TB *known exposure, incarceration, homelessness*: Yes; detention in 2004, for 30d; 2005 for 30d; and lived in homeless shelter for about 2wks 2005/06.  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    Geographical exposures:  Currently lives in [place] with mother.  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  No  Tobacco: Yes; age 14; black-mild cigars x 5d; working on quitting.   Alcohol: No  Recreational drug use:  Yes; marijuana 1-2g daily  Sexual partners: historically male partners only,  "none in last 12mo.       Past Histories:   Past Medical History:   Diagnosis Date    Autosomal dominant polycystic kidney disease     Hypertension      Past Surgical History:   Procedure Laterality Date    AV FISTULA PLACEMENT Right 9/14/2023    Procedure: CREATION, AV FISTULA, radial cephalic;  Surgeon: MELISSA Loera II, MD;  Location: [facility] OR [address] [place] [address];  Service: Vascular;  Laterality: Right;    AV FISTULA PLACEMENT Right 10/5/2023    Procedure: CREATION, AV FISTULA - brachial;  Surgeon: MELISSA Loera II, MD;  [facility];  Service: Vascular;  Laterality: Right;  confirmed placement with doppler    INSERTION OF TUNNELED CENTRAL VENOUS HEMODIALYSIS CATHETER Right 5/3/2023    Procedure: Insertion, Catheter, Central Venous, Hemodialysis;  Surgeon: Priya Grimm MD;  Location: [facility] CATH LAB;  Service: Interventional Nephrology;  Laterality: Right;    NO PAST SURGERIES      REMOVAL OF HEMODIALYSIS CATHETER  5/3/2023    Procedure: REMOVAL, CATHETER, HEMODIALYSIS;  Surgeon: Priya Grimm MD;  Location: [facility] CATH LAB;  Service: Interventional Nephrology;;     No family history on file.  Social History     Tobacco Use    Smoking status: Every Day     Current packs/day: 1.00     Average packs/day: 1 pack/day for 28.0 years (28.0 ttl pk-yrs)     Types: Cigarettes   Substance Use Topics    Alcohol use: Not Currently    Drug use: Never     Review of patient's allergies indicates:   Allergen Reactions    Aspirin Hives     And vomiting    Penicillins Hives     Throat swelling    Adhesive Rash     Adhesive/silk tape (type found on band aides). Can only use "Paper Tape"    Butalbital-acetaminophen-caff Nausea And Vomiting and Other (See Comments)     Dizziness (made pt feel sick)    Latex, natural rubber Rash    Nickel Rash         Immunization History:  Received all childhood vaccines: Yes  All household members receive annual flu vaccine: No  All household members are up to date on COVID vaccine: " "No      Current antibiotics:  Antibiotics (From admission, onward)      None              Review of Systems  Review of Systems   Constitutional: Negative.   All other systems reviewed and are negative.         Objective  Physical Exam  Vitals reviewed.   Constitutional:       General: She is not in acute distress.     Appearance: Normal appearance. She is normal weight. She is not ill-appearing, toxic-appearing or diaphoretic.   HENT:      Nose: No congestion.      Mouth/Throat:      Comments: Poor dentition  Eyes:      General: No scleral icterus.     Conjunctiva/sclera: Conjunctivae normal.   Cardiovascular:      Rate and Rhythm: Normal rate.   Pulmonary:      Effort: No respiratory distress.   Musculoskeletal:         General: No swelling or tenderness.   Skin:     General: Skin is warm and dry.      Findings: No rash.   Neurological:      Mental Status: She is alert and oriented to person, place, and time. Mental status is at baseline.   Psychiatric:         Behavior: Behavior normal.         Thought Content: Thought content normal.           Labs:    CBC:   Lab Results   Component Value Date    WBC 4.76 10/12/2023    HGB 12.6 10/12/2023    HCT 40.6 10/12/2023    MCV 93 10/12/2023     (L) 10/12/2023    GRAN 3.0 10/12/2023    GRAN 62.9 10/12/2023    LYMPH 1.2 10/12/2023    LYMPH 25.2 10/12/2023    MONO 0.4 10/12/2023    MONO 9.2 10/12/2023    EOSINOPHIL 1.9 10/12/2023       Syphilis screening: No results found for: "RPR", "PRPQ", "FTAABS"     TB screening:   Lab Results   Component Value Date    TBGOLDPLUS Negative 05/03/2023       HIV screening:   Lab Results   Component Value Date    BSZ64ZLNP Non-reactive 10/12/2023       Strongyloides IgG: No results found for: "STRONGANTIGG"    Hepatitis Serologies:   Lab Results   Component Value Date    HEPAIGG Non-reactive 10/12/2023    HEPBCAB Reactive (A) 10/12/2023    HEPBSAB 743.02 10/12/2023    HEPBSAB Reactive 10/12/2023    HEPCAB Non-reactive 10/12/2023    " "    Varicella IgG: No results found for: "VARICELLAINT"      Immunization History   Administered Date(s) Administered    PPD Test 05/17/2023    Pneumococcal Polysaccharide - 23 Valent 05/19/2023          Assessment and Plan    1. Risks of Infection: Available serologies were reviewed. No unusual risks of infection or significant barriers to transplantation were identified from the infectious disease standpoint given the information available at this time.    - Acute infectious issues: None; poor dentition, recommend dental eval.   - Pending serologies: Quantiferon gold / T-spot, RPR, and Strongyloides IgG   - Please call if any pending serologic testing is positive.    2. Immunizations:  Based on the patient's immunization history and serologies, the following immunizations are recommended:  - Hepatitis A    Patient does not have immunity to hepatitis A    Vaccination ordered today: Yes   - Hepatitis B    Patient does have immunity to hepatitis B    Vaccination ordered today: No. Reason for not ordering: Immunity   - COVID    Current [facility] vaccination recommendations were discussed with the patient   - Annual high dose influenza     Vaccination ordered today: Yes   - Prevnar 20    Vaccination ordered today: Yes   - Tdap    Vaccination ordered today: Yes   - Shingrix    Vaccination ordered today: Yes    Recommended Pre-Transplant Immunization Schedule   Vaccine  0m 1m 2m 6m   Pneumococcal conjugate vaccine (Prevnar 20) X      Tetanus-diphtheria-pertussis (Tdap)* X      Hepatitis A Vaccine (Havrix)** X   X   Hepatitis B Vaccine (Heplisav)** X X     Influenza (annual) X      Zoster Recombinant Vaccine (Shingrix) X  X           *Administer booster every 10 years.       **Administer if no immunity demonstrated on serologies               Patient will receive vaccines at local pharmacy. A written prescription was provided for all vaccine doses.     3. Counseling:   I discussed with the patient the risk for increased " susceptibility to infections following transplantation including increased risk for infection right after transplant and if rejection should occur.  The patient has been counseled on the importance of vaccinations to decrease risk of infection and severe illness. Specific guidance has been provided to the patient regarding the patient's occupation, hobbies and activities to avoid future infectious complications.     4. Transplant Candidacy: Based on available information, there are no identified significant barriers to transplantation from an infectious disease standpoint.  Final determination of transplant candidacy will be made once evaluation is complete and reviewed by the Selection Committee.      Follow up with infectious disease as needed.       The total time for evaluation and management services performed on 10/12/2023 was greater than 30 minutes.

## 2023-10-12 NOTE — PROGRESS NOTES
PHARM.D. PRE-TRANSPLANT NOTE:    This patient's medication therapy was evaluated as part of her pre-transplant evaluation.      The following general pharmacologic concerns were noted: none    The following concerns for post-operative pain management were noted: none - pain meds for fistula surgery, taking APAP only    The following pharmacologic concerns related to HCV therapy were noted: none      This patient's medication profile was reviewed for considerations for DAA Hepatitis C therapy:    [x]  No current inducers of CYP 3A4 or PGP  [x]  No amiodarone on this patient's EMR profile in the last 24 months  [x]  No past or current atrial fibrillation on this patient's EMR profile       Current Outpatient Medications   Medication Sig Dispense Refill    acetaminophen (TYLENOL) 650 MG TbSR Take 1 tablet (650 mg total) by mouth every 8 (eight) hours. 90 tablet 0    cinacalcet (SENSIPAR) 30 MG Tab Take 1 tablet (30 mg total) by mouth once daily with largest meal of the day 90 tablet 5    HYDROcodone-acetaminophen (NORCO) 5-325 mg per tablet Take 1 tablet by mouth every 6 (six) hours as needed for Pain. 15 tablet 0    loratadine (CLARITIN ORAL) Take 1 tablet by mouth daily as needed (Allergies).      sevelamer carbonate (RENVELA) 800 mg Tab Take 4 tablets with each meal and snack 480 tablet 11    sodium zirconium cyclosilicate (LOKELMA) 10 gram packet Take 1 packet (10 g total) by mouth 2 (two) times a day. Mix entire contents of packet(s) into drinking glass containing 3 tablespoons of water; stir well and drink immediately. Add water and repeat until no powder remains to receive entire dose. (Patient taking differently: Take 10 g by mouth 2 (two) times a day. Mix entire contents of packet(s) into drinking glass containing 3 tablespoons of water; stir well and drink immediately. Add water and repeat until no powder remains to receive entire dose. Use on non dialysis days) 30 packet 3    vitamin D (VITAMIN D3) 1000  units Tab Take 1,000 Units by mouth once daily.      ondansetron (ZOFRAN) 4 MG tablet Take 1 tablet (4 mg total) by mouth every 6 (six) hours. (Patient not taking: Reported on 10/12/2023) 20 tablet 0     No current facility-administered medications for this visit.     Facility-Administered Medications Ordered in Other Visits   Medication Dose Route Frequency Provider Last Rate Last Admin    0.9%  NaCl infusion   Intravenous Continuous Mel Calvillo MD        ondansetron disintegrating tablet 8 mg  8 mg Oral Q8H PRN Mel Calvillo MD               I am available for consultation and can be contacted, as needed by the other members of the Transplant team.

## 2023-10-12 NOTE — PROGRESS NOTES
Transplant Nephrology  Kidney Transplant Recipient Evaluation    Referring Physician: Priya Grimm  Current Nephrologist: Priya Grimm    Subjective:   CC:  Initial evaluation of kidney transplant candidacy.    HPI:  Ms. Silva is a 43 y.o. year old White female who has presented to be evaluated as a potential kidney transplant recipient.  She has ESRD secondary to polycystic kidney disease.  Patient is currently on hemodialysis started on 5/8/2023. Patient is dialyzing on MWF schedule.  Patient reports that she is tolerating dialysis well.. She has a RUE AV fistula (maturing) and chest catheter for dialysis access.     Father also with PKD. She does report discomfort from kidneys as well as nausea. Has had hematuria in the past with cyst rupture. Had brain aneurysm coiled in 2016. Left kidney more bothersome than right.     Recently moved back to [place] from [place] to be closer to her mother, was previously following with nephrology up there. She remains active, no CP or SOB with exertion. Looks good, not frail.    Mother and brother have expressed interest in donation.    Working on quitting smoking, down to 2-3 cigarettes a day. Considering switching to a vape to get off cigarettes.     Denies MI, CVA, DVT/PE, or malignancy history.      Current Outpatient Medications   Medication Sig Dispense Refill    acetaminophen (TYLENOL) 650 MG TbSR Take 1 tablet (650 mg total) by mouth every 8 (eight) hours. 90 tablet 0    cinacalcet (SENSIPAR) 30 MG Tab Take 1 tablet (30 mg total) by mouth once daily with largest meal of the day 90 tablet 5    HYDROcodone-acetaminophen (NORCO) 5-325 mg per tablet Take 1 tablet by mouth every 6 (six) hours as needed for Pain. 15 tablet 0    loratadine (CLARITIN ORAL) Take 1 tablet by mouth daily as needed (Allergies).      sevelamer carbonate (RENVELA) 800 mg Tab Take 4 tablets with each meal and snack 480 tablet 11    sodium zirconium cyclosilicate (LOKELMA) 10 gram packet  Take 1 packet (10 g total) by mouth 2 (two) times a day. Mix entire contents of packet(s) into drinking glass containing 3 tablespoons of water; stir well and drink immediately. Add water and repeat until no powder remains to receive entire dose. (Patient taking differently: Take 10 g by mouth 2 (two) times a day. Mix entire contents of packet(s) into drinking glass containing 3 tablespoons of water; stir well and drink immediately. Add water and repeat until no powder remains to receive entire dose. Use on non dialysis days) 30 packet 3    vitamin D (VITAMIN D3) 1000 units Tab Take 1,000 Units by mouth once daily.      ondansetron (ZOFRAN) 4 MG tablet Take 1 tablet (4 mg total) by mouth every 6 (six) hours. (Patient not taking: Reported on 10/12/2023) 20 tablet 0     No current facility-administered medications for this visit.     Facility-Administered Medications Ordered in Other Visits   Medication Dose Route Frequency Provider Last Rate Last Admin    0.9%  NaCl infusion   Intravenous Continuous Mel Calvillo MD        ondansetron disintegrating tablet 8 mg  8 mg Oral Q8H PRN Mel Calvillo MD           Past Medical History:   Diagnosis Date    Autosomal dominant polycystic kidney disease     Encounter for blood transfusion     Hypertension      Past Medical and Surgical History: Ms. Silva  has a past medical history of Autosomal dominant polycystic kidney disease, Encounter for blood transfusion, and Hypertension.  She has a past surgical history that includes Insertion of tunneled central venous hemodialysis catheter (Right, 05/03/2023); Removal of hemodialysis catheter (05/03/2023); AV fistula placement (Right, 09/14/2023); AV fistula placement (Right, 10/05/2023); and Cerebral aneurysm repair.    Past Social and Family History: Ms. Silva reports that she has been smoking cigarettes. She has a 28.0 pack-year smoking history. She has never used smokeless tobacco. She reports that she does not currently use  "alcohol. She reports that she does not use drugs. Her family history includes Diabetes in her brother; Heart disease in her father; Polycystic kidney disease in her father; Stroke in her father.    Review of Systems   Constitutional:  Positive for fatigue. Negative for activity change, appetite change and fever.   HENT:  Negative for congestion, mouth sores and sore throat.    Eyes:  Negative for visual disturbance.   Respiratory:  Negative for cough, chest tightness and shortness of breath.    Cardiovascular:  Negative for chest pain, palpitations and leg swelling.   Gastrointestinal:  Positive for abdominal distention and nausea. Negative for constipation and diarrhea.   Genitourinary:  Positive for decreased urine volume. Negative for difficulty urinating, frequency and hematuria.        Making small amount of urine   Musculoskeletal:  Positive for back pain. Negative for arthralgias, gait problem and myalgias.   Skin:  Negative for wound.   Allergic/Immunologic: Negative for environmental allergies, food allergies and immunocompromised state.   Neurological:  Negative for dizziness, weakness and numbness.   Psychiatric/Behavioral:  Negative for sleep disturbance. The patient is not nervous/anxious.        Objective:   Blood pressure 123/73, pulse 93, temperature 97.6 °F (36.4 °C), temperature source Temporal, resp. rate 16, height 5' 8.27" (1.734 m), weight 78.6 kg (173 lb 4.5 oz), last menstrual period 09/15/2023, SpO2 100 %.body mass index is 26.14 kg/m².    Physical Exam  Vitals and nursing note reviewed.   Constitutional:       Appearance: Normal appearance.   HENT:      Head: Normocephalic.   Cardiovascular:      Rate and Rhythm: Normal rate and regular rhythm.      Heart sounds: Normal heart sounds.      Comments: Dialysis catheter, no erythema, edema, tenderness or drainage.   Pulmonary:      Effort: Pulmonary effort is normal.      Breath sounds: Normal breath sounds.   Abdominal:      General: Bowel " sounds are normal. There is no distension.      Palpations: Abdomen is soft.      Tenderness: There is no abdominal tenderness.   Musculoskeletal:         General: Normal range of motion.      Comments: RUE AV fistula + thrill    Skin:     General: Skin is warm and dry.   Neurological:      General: No focal deficit present.      Mental Status: She is alert.   Psychiatric:         Behavior: Behavior normal.         Labs:  Lab Results   Component Value Date    WBC 4.76 10/12/2023    HGB 12.6 10/12/2023    HCT 40.6 10/12/2023     10/12/2023    K 4.5 10/12/2023     10/12/2023    CO2 24 10/12/2023    BUN 51 (H) 10/12/2023    CREATININE 7.6 (H) 10/12/2023    EGFRNORACEVR 6.4 (A) 10/12/2023    CALCIUM 9.2 10/12/2023    PHOS 6.6 (H) 10/12/2023    MG 1.8 05/03/2023    ALBUMIN 4.1 10/12/2023    AST 17 10/12/2023    ALT 7 (L) 10/12/2023    .0 (H) 10/12/2023       Lab Results   Component Value Date    BILIRUBINUA Negative 06/16/2023    LIPASE 107 (H) 04/28/2023    PROTEINUA 2+ (A) 04/28/2023    NITRITE Negative 04/28/2023    RBCUA >100 (H) 04/28/2023    WBCUA 0 04/28/2023     Labs were reviewed with the patient.    Assessment:     1. Pre-transplant evaluation for kidney transplant    2. ESRD on hemodialysis    3. Immunization counseling    4. Autosomal dominant polycystic kidney disease    5. Renovascular hypertension    6. Basilar artery aneurysm    7. BMI 26.0-26.9,adult      Plan:   43 y.o. year old White female who has presented to be evaluated as a potential kidney transplant recipient.  She has ESRD secondary to polycystic kidney disease.  Patient is currently on hemodialysis started on 5/8/2023. Will need afternoon imaging, cardiac testing, pap smear, and MMG prior to selection.    Transplant Candidacy:   Based on available information, Ms. Silva is a suitable kidney transplant candidate.   Meets center eligibility for accepting HCV+ donor offer - Yes.  Patient educated on HCV+ donors. [facility]  willing to accept HCV+ donor offer - Yes   Patient is a candidate for KDPI > 85 kidney donor offer - Yes.  Final determination of transplant candidacy will be made once workup is complete and reviewed by the selection committee.    Patient advised that it is recommended that all transplant candidates, and their close contacts and household members receive Covid vaccination.    UNOS Patient Status  Functional Status: 90% - Able to carry on normal activity: minor symptoms of disease    Catherine Umanzor, NP

## 2023-10-12 NOTE — PROGRESS NOTES
INITIAL PATIENT EDUCATION NOTE    Ms. Ade Silva was seen in pre-kidney transplant clinic for evaluation for kidney, kidney/pancreas or pancreas only transplant.  The patient attended a an individual video education session that discussed/reviewed the following aspects of transplantation: evaluation including diagnostic and laboratory testing,( Chemistries, Hematology, Serologies including HIV and Hepatitis and HLA) required for transplantation and selection committee process, UNOS waitlist management/multiple listings, types of organs offered (KDPI < 85%, KDPI > 85%, PHS risk, DCD, HCV+, HIV+ for HIV+ recipients and enbloc/dual), financial aspects, surgical procedures, dietary instruction pre- and post-transplant, health maintenance pre- and post-transplant, post-transplant hospitalization and outpatient follow-up, potential to participate in a research protocol, and medication management and side effects.  A question and answer session was provided after the presentation.    The patient was seen by all members of the multi-disciplinary team to include: Nephrologist/KATE, Surgeon, , Transplant Coordinator, , Pharmacist and Dietician (if applicable).    The patient reviewed and signed all consents for evaluation which were witnessed and sent to scanning into the [facility] chart.    The patient was given an education book and plan for further evaluation based on her individual assessment.      Reviewed program requirement for complete COVID vaccination with documentation prior to listing.  COVID education information reviewed with patient. Patient encouraged to be up to date on all vaccinations.       The patient was informed that the transplant team would manage immediate post op pain. If the patient requires long term pain management, they will need to have that pain management addressed by their PCP or previous provider who wrote for long term pain medicines.    The patient was  encouraged to call with any questions or concerns.

## 2023-10-12 NOTE — LETTER
October 16, 2023        Priya Grimm  [address] [facility] [address]  Phone: [phone]  Fax: [phone]             [facility]- [address]  [address]  [place] [address]  Phone: [phone]   Patient: Ade Silva   MR Number: [id number]   YOB: 1980   Date of Visit: 10/12/2023       Dear Dr. Priya Grimm    Thank you for referring Ade Silva to me for evaluation. Attached you will find relevant portions of my assessment and plan of care.    If you have questions, please do not hesitate to call me. I look forward to following Ade Silva along with you.    Sincerely,    Catherine Umanzor, BOLA    Enclosure    If you would like to receive this communication electronically, please contact [email or web address] or [phone] to request [employer] access.    [employer] is a tool which provides read-only access to select patient information with whom you have a relationship. Its easy to use and provides real time access to review your patients record including encounter summaries, notes, results, and demographic information.    If you feel you have received this communication in error or would no longer like to receive these types of communications, please e-mail [email or web address]

## 2023-10-12 NOTE — PROGRESS NOTES
Nutritional assessment from dialysis unit in [facility]--no nutritional changes to plan needed at this time.  Pt to continue to follow-up with renal dietitians recommendations.

## 2023-10-19 ENCOUNTER — HOSPITAL ENCOUNTER (EMERGENCY)
Facility: HOSPITAL | Age: 43
Discharge: HOME | End: 2023-10-19
Attending: EMERGENCY MEDICINE
Payer: MEDICARE

## 2023-10-19 ENCOUNTER — HOSPITAL ENCOUNTER (OUTPATIENT)
Dept: VASCULAR SURGERY | Facility: CLINIC | Age: 43
Discharge: HOME | End: 2023-10-19
Attending: SURGERY
Payer: MEDICARE

## 2023-10-19 ENCOUNTER — OFFICE VISIT (OUTPATIENT)
Dept: VASCULAR SURGERY | Facility: CLINIC | Age: 43
End: 2023-10-19
Attending: SURGERY
Payer: MEDICAID

## 2023-10-19 VITALS
TEMPERATURE: 98 F | DIASTOLIC BLOOD PRESSURE: 57 MMHG | BODY MASS INDEX: 26.73 KG/M2 | SYSTOLIC BLOOD PRESSURE: 111 MMHG | WEIGHT: 176.38 LBS | HEART RATE: 77 BPM | HEIGHT: 68 IN

## 2023-10-19 VITALS
BODY MASS INDEX: 26.67 KG/M2 | DIASTOLIC BLOOD PRESSURE: 56 MMHG | RESPIRATION RATE: 16 BRPM | TEMPERATURE: 99 F | HEIGHT: 68 IN | SYSTOLIC BLOOD PRESSURE: 102 MMHG | OXYGEN SATURATION: 98 % | HEART RATE: 88 BPM | WEIGHT: 176 LBS

## 2023-10-19 DIAGNOSIS — S92.354A CLOSED NONDISPLACED FRACTURE OF FIFTH METATARSAL BONE OF RIGHT FOOT, INITIAL ENCOUNTER: Primary | ICD-10-CM

## 2023-10-19 DIAGNOSIS — N18.6 ESRD ON HEMODIALYSIS: Primary | ICD-10-CM

## 2023-10-19 DIAGNOSIS — Z99.2 ARTERIOVENOUS FISTULA FOR HEMODIALYSIS IN PLACE, PRIMARY: ICD-10-CM

## 2023-10-19 DIAGNOSIS — T14.90XA INJURY: ICD-10-CM

## 2023-10-19 DIAGNOSIS — N18.6 ESRD (END STAGE RENAL DISEASE): ICD-10-CM

## 2023-10-19 DIAGNOSIS — Z99.2 ESRD ON HEMODIALYSIS: Primary | ICD-10-CM

## 2023-10-19 PROCEDURE — 3078F DIAST BP <80 MM HG: CPT | Mod: CPTII,NTX,, | Performed by: SURGERY

## 2023-10-19 PROCEDURE — 99024 POSTOP FOLLOW-UP VISIT: CPT | Mod: NTX,,, | Performed by: SURGERY

## 2023-10-19 PROCEDURE — 99999 PR PBB SHADOW E&M-EST. PATIENT-LVL III: CPT | Mod: PBBFAC,TXP,, | Performed by: SURGERY

## 2023-10-19 PROCEDURE — 3066F PR DOCUMENTATION OF TREATMENT FOR NEPHROPATHY: ICD-10-PCS | Mod: CPTII,NTX,, | Performed by: SURGERY

## 2023-10-19 PROCEDURE — 3062F POS MACROALBUMINURIA REV: CPT | Mod: CPTII,NTX,, | Performed by: SURGERY

## 2023-10-19 PROCEDURE — 3062F PR POS MACROALBUMINURIA RESULT DOCUMENTED/REVIEW: ICD-10-PCS | Mod: CPTII,NTX,, | Performed by: SURGERY

## 2023-10-19 PROCEDURE — 3074F SYST BP LT 130 MM HG: CPT | Mod: CPTII,NTX,, | Performed by: SURGERY

## 2023-10-19 PROCEDURE — 3066F NEPHROPATHY DOC TX: CPT | Mod: CPTII,NTX,, | Performed by: SURGERY

## 2023-10-19 PROCEDURE — 93990 DOPPLER FLOW TESTING: CPT | Mod: PBBFAC,NTX | Performed by: SURGERY

## 2023-10-19 PROCEDURE — 3078F PR MOST RECENT DIASTOLIC BLOOD PRESSURE < 80 MM HG: ICD-10-PCS | Mod: CPTII,NTX,, | Performed by: SURGERY

## 2023-10-19 PROCEDURE — 99999 PR PBB SHADOW E&M-EST. PATIENT-LVL III: ICD-10-PCS | Mod: PBBFAC,TXP,, | Performed by: SURGERY

## 2023-10-19 PROCEDURE — 99024 PR POST-OP FOLLOW-UP VISIT: ICD-10-PCS | Mod: NTX,,, | Performed by: SURGERY

## 2023-10-19 PROCEDURE — 99213 OFFICE O/P EST LOW 20 MIN: CPT | Mod: PBBFAC,27,NTX | Performed by: SURGERY

## 2023-10-19 PROCEDURE — 93990 PR DUPLEX HEMODIALYSIS ACCESS: ICD-10-PCS | Mod: 26,S$PBB,TXP, | Performed by: SURGERY

## 2023-10-19 PROCEDURE — 99283 EMERGENCY DEPT VISIT LOW MDM: CPT | Mod: NTX

## 2023-10-19 PROCEDURE — 3074F PR MOST RECENT SYSTOLIC BLOOD PRESSURE < 130 MM HG: ICD-10-PCS | Mod: CPTII,NTX,, | Performed by: SURGERY

## 2023-10-19 PROCEDURE — 93990 DOPPLER FLOW TESTING: CPT | Mod: 26,S$PBB,TXP, | Performed by: SURGERY

## 2023-10-19 NOTE — ED PROVIDER NOTES
"Encounter Date: 10/19/2023       History     Chief Complaint   Patient presents with    Ankle Injury     "Twisted ankle" Saturday- states tender and swollen/ dialysis patient     43-year-old female history of POLYCYSTIC KIDNEY DISEASE, HYPERTENSION AND ESRD (mwf) presents emergency department due to right ankle pain after twisting it on Saturday.  Patient reports she was getting up from the sofa when she tripped over the rug.  She has been attempting to ambulate on it however with difficulty.  Patient also noticed bruising on the lateral aspect of her right foot.  She reports bruising however has improved.  She also has swelling but states it is improved.  She also endorses an improvement range of motion of the 5th digit of the toe.  However due to continued pain she decided to present today to rule out fracture.  MOST RECENT DIALYSIS APPOINTMENT IN [facility] YESTERDAY.  No numbness or paresthesias.  No open wounds.      Review of patient's allergies indicates:   Allergen Reactions    Aspirin Hives     And vomiting    Penicillins Hives     Throat swelling    Adhesive Rash     Adhesive/silk tape (type found on band aides). Can only use "Paper Tape"    Butalbital-acetaminophen-caff Nausea And Vomiting and Other (See Comments)     Dizziness (made pt feel sick)    Latex, natural rubber Rash    Nickel Rash     Past Medical History:   Diagnosis Date    Autosomal dominant polycystic kidney disease     Encounter for blood transfusion     Hypertension      Past Surgical History:   Procedure Laterality Date    AV FISTULA PLACEMENT Right 09/14/2023    Procedure: CREATION, AV FISTULA, radial cephalic;  Surgeon: MELISSA Loera II, MD;  Location: [facility] OR [address] [place] [address];  Service: Vascular;  Laterality: Right;    AV FISTULA PLACEMENT Right 10/05/2023    Procedure: CREATION, AV FISTULA - brachial;  Surgeon: MELISSA Loera II, MD;  Location: [facility] OR [address] [place] [address];  Service: Vascular;  Laterality: Right;  confirmed placement with " doppler    CEREBRAL ANEURYSM REPAIR      coiling    INSERTION OF TUNNELED CENTRAL VENOUS HEMODIALYSIS CATHETER Right 05/03/2023    Procedure: Insertion, Catheter, Central Venous, Hemodialysis;  Surgeon: Priya Grimm MD;  Location: [facility] CATH LAB;  Service: Interventional Nephrology;  Laterality: Right;    REMOVAL OF HEMODIALYSIS CATHETER  05/03/2023    Procedure: REMOVAL, CATHETER, HEMODIALYSIS;  Surgeon: Priya Grimm MD;  Location: [facility] CATH LAB;  Service: Interventional Nephrology;;     Family History   Problem Relation Age of Onset    Stroke Father     Heart disease Father     Polycystic kidney disease Father     Diabetes Brother      Social History     Tobacco Use    Smoking status: Every Day     Current packs/day: 1.00     Average packs/day: 1 pack/day for 28.0 years (28.0 ttl pk-yrs)     Types: Cigarettes    Smokeless tobacco: Never    Tobacco comments:     Down to 2-3 cigarettes/day    Substance Use Topics    Alcohol use: Not Currently    Drug use: Never     Review of Systems  SEE HPI  Physical Exam     Initial Vitals [10/19/23 1254]   BP Pulse Resp Temp SpO2   (!) 102/56 88 16 98.7 °F (37.1 °C) 98 %      MAP       --         Physical Exam    Vitals reviewed.  Constitutional: She appears well-developed and well-nourished.   HENT:   Head: Normocephalic and atraumatic.   Eyes: Conjunctivae and EOM are normal.   Neck:   Normal range of motion.  Cardiovascular:  Normal rate.           Pulmonary/Chest: No respiratory distress.   Abdominal: Abdomen is soft. She exhibits no distension.   Musculoskeletal:      Cervical back: Normal range of motion.      Comments: Right foot:  Tenderness along the lateral aspect of the right foot from the midfoot to distal area.  There is slight ecchymosis of the right foot overlying the 5th metatarsal bone.  Patient maintains plantar and dorsiflexion.  There is minimal edema in the lateral aspect of the right foot primarily.  DP 2+         Neurological: She is alert and oriented to  person, place, and time.   Skin: Skin is warm and dry.   Psychiatric: She has a normal mood and affect. Thought content normal.         ED Course   Procedures  Labs Reviewed - No data to display       Imaging Results              X-Ray Ankle Complete Right (Final result)  Result time 10/19/23 14:44:19      Final result by Lynette Freitas MD (10/19/23 14:44:19)                   Impression:      Fifth proximal metatarsal comminuted fractures with intra-articular extension.    Abnormal cortical irregularity and sclerosis at the medial talar dome concerning for prior osteochondral injury.      Electronically signed by: Lynette Freitas MD  Date:    10/19/2023  Time:    14:44               Narrative:    EXAMINATION:  XR FOOT COMPLETE 3 VIEW RIGHT; XR ANKLE COMPLETE 3 VIEW RIGHT    CLINICAL HISTORY:  . Injury, unspecified, initial encounter    TECHNIQUE:  AP, lateral, and oblique views of the right foot were performed.  Three views the ankle.    COMPARISON:  None.    FINDINGS:  Left foot: Normal alignment, normal mineralization.  Proximal 5th metatarsal comminuted fractures with intra-articular extension.  It is nondisplaced.  The remainder of the visualized osseous structures and soft tissues appear normal.    Left ankle: The tibiotalar joint alignment is normal.  There is abnormal sclerosis and irregularity at the medial talar dome, an area measuring 10 mm on the AP view, not well seen on the lateral view concerning for prior osteochondral injury.  No definite acute fracture identified.  No osseous lesions.  There is a small posterior calcaneal spur.                                       X-Ray Foot Complete Right (Final result)  Result time 10/19/23 14:44:19      Final result by Lynette Freitas MD (10/19/23 14:44:19)                   Impression:      Fifth proximal metatarsal comminuted fractures with intra-articular extension.    Abnormal cortical irregularity and sclerosis at the medial talar dome  concerning for prior osteochondral injury.      Electronically signed by: Lynette Freitas MD  Date:    10/19/2023  Time:    14:44               Narrative:    EXAMINATION:  XR FOOT COMPLETE 3 VIEW RIGHT; XR ANKLE COMPLETE 3 VIEW RIGHT    CLINICAL HISTORY:  . Injury, unspecified, initial encounter    TECHNIQUE:  AP, lateral, and oblique views of the right foot were performed.  Three views the ankle.    COMPARISON:  None.    FINDINGS:  Left foot: Normal alignment, normal mineralization.  Proximal 5th metatarsal comminuted fractures with intra-articular extension.  It is nondisplaced.  The remainder of the visualized osseous structures and soft tissues appear normal.    Left ankle: The tibiotalar joint alignment is normal.  There is abnormal sclerosis and irregularity at the medial talar dome, an area measuring 10 mm on the AP view, not well seen on the lateral view concerning for prior osteochondral injury.  No definite acute fracture identified.  No osseous lesions.  There is a small posterior calcaneal spur.                                       Medications - No data to display  Medical Decision Making  43-year-old female presents with traumatic right ankle/foot pain.  Considered ankle sprain lower suspicion for dislocation however fractures consideration based on physical exam findings.  X-ray revealed 5th proximal metatarsal fracture.  Patient given walking boot and crutches with outpatient referral to Orthopedic for follow up.  Pain is controlled while in emergency department.  Pt discussed with supervising physician                                 Clinical Impression:   Final diagnoses:  [T14.90XA] Injury  [S92.354A] Closed nondisplaced fracture of fifth metatarsal bone of right foot, initial encounter (Primary)        ED Disposition Condition    Discharge Stable          ED Prescriptions    None       Follow-up Information       Follow up With Specialties Details Why Contact Info Additional Information     [facility] 5th Fl Orthopedics Schedule an appointment as soon as possible for a visit   [address], [address]  [facility] [address] [phone] [facility] - Main Building, 5th Floor Please park in [facility] and take Atrium elevator    Rectal/Urology, [facility] - Nephrology/Colon & Nephrology, Colon and Rectal Surgery, Urology Schedule an appointment as soon as possible for a visit   [address] [facility] [address]  [phone]                Cinthya Watt, PA-C  10/19/23 2124

## 2023-10-19 NOTE — DISCHARGE INSTRUCTIONS
X-ray revealed  Fifth proximal metatarsal comminuted fractures with intra-articular extension.  1. You will wear a walking boot until follow up with orthopedic  2. You should not walk on this foot.  3. You can elevate, take Tylenol for pain  4. I have also placed a referral to Orthopedic for follow up

## 2023-10-19 NOTE — ED NOTES
"Ade Silva, a 43 y.o. female presents to the ED w/ complaint of tripped stubbed toe and twisted r ankle. Nadn aao x 4 resp even and unlabored. Will monitor.     Triage note:  Chief Complaint   Patient presents with    Ankle Injury     "Twisted ankle" Saturday- states tender and swollen/ dialysis patient     Review of patient's allergies indicates:   Allergen Reactions    Aspirin Hives     And vomiting    Penicillins Hives     Throat swelling    Adhesive Rash     Adhesive/silk tape (type found on band aides). Can only use "Paper Tape"    Butalbital-acetaminophen-caff Nausea And Vomiting and Other (See Comments)     Dizziness (made pt feel sick)    Latex, natural rubber Rash    Nickel Rash     Past Medical History:   Diagnosis Date    Autosomal dominant polycystic kidney disease     Encounter for blood transfusion     Hypertension        "

## 2023-10-19 NOTE — FIRST PROVIDER EVALUATION
" Emergency Department TeleTriage Encounter Note      CHIEF COMPLAINT    Chief Complaint   Patient presents with    Ankle Injury     "Twisted ankle" Saturday- states tender and swollen/ dialysis patient       VITAL SIGNS   Initial Vitals [10/19/23 1254]   BP Pulse Resp Temp SpO2   (!) 102/56 88 16 98.7 °F (37.1 °C) 98 %      MAP       --            ALLERGIES    Review of patient's allergies indicates:   Allergen Reactions    Aspirin Hives     And vomiting    Penicillins Hives     Throat swelling    Adhesive Rash     Adhesive/silk tape (type found on band aides). Can only use "Paper Tape"    Butalbital-acetaminophen-caff Nausea And Vomiting and Other (See Comments)     Dizziness (made pt feel sick)    Latex, natural rubber Rash    Nickel Rash       PROVIDER TRIAGE NOTE  Patient presents with complaint of right foot and ankle pain after a fall.      Phy:   Constitutional: well nourished, well developed, appearing stated age, NAD         Initial orders will be placed and care will be transferred to an alternate provider when patient is roomed for a full evaluation. Any additional orders and the final disposition will be determined by that provider.        ORDERS  Labs Reviewed - No data to display    ED Orders (720h ago, onward)      None              Virtual Visit Note: The provider triage portion of this emergency department evaluation and documentation was performed via [employer], a HIPAA-compliant telemedicine application, in concert with a tele-presenter in the room. A face to face patient evaluation with one of my colleagues will occur once the patient is placed in an emergency department room.      DISCLAIMER: This note was prepared with [employer] voice recognition transcription software. Garbled syntax, mangled pronouns, and other bizarre constructions may be attributed to that software system.    "

## 2023-10-20 ENCOUNTER — TELEPHONE (OUTPATIENT)
Dept: CARDIOLOGY | Facility: HOSPITAL | Age: 43
End: 2023-10-20

## 2023-10-20 DIAGNOSIS — Z76.82 ORGAN TRANSPLANT CANDIDATE: Primary | ICD-10-CM

## 2023-10-24 ENCOUNTER — HOSPITAL ENCOUNTER (OUTPATIENT)
Dept: CARDIOLOGY | Facility: HOSPITAL | Age: 43
Discharge: HOME | End: 2023-10-24
Attending: NURSE PRACTITIONER
Payer: MEDICAID

## 2023-10-24 ENCOUNTER — HOSPITAL ENCOUNTER (OUTPATIENT)
Dept: CARDIOLOGY | Facility: HOSPITAL | Age: 43
Discharge: HOME | End: 2023-10-24
Attending: NURSE PRACTITIONER
Payer: MEDICARE

## 2023-10-24 VITALS
SYSTOLIC BLOOD PRESSURE: 128 MMHG | HEART RATE: 62 BPM | DIASTOLIC BLOOD PRESSURE: 80 MMHG | BODY MASS INDEX: 26.22 KG/M2 | HEIGHT: 68 IN | WEIGHT: 173 LBS

## 2023-10-24 VITALS
BODY MASS INDEX: 26.67 KG/M2 | HEART RATE: 81 BPM | SYSTOLIC BLOOD PRESSURE: 130 MMHG | HEIGHT: 68 IN | WEIGHT: 176 LBS | DIASTOLIC BLOOD PRESSURE: 82 MMHG

## 2023-10-24 DIAGNOSIS — Z76.82 ORGAN TRANSPLANT CANDIDATE: ICD-10-CM

## 2023-10-24 LAB
ASCENDING AORTA: 3.11 CM
AV INDEX (PROSTH): 0.88
AV MEAN GRADIENT: 5 MMHG
AV PEAK GRADIENT: 8 MMHG
AV VALVE AREA BY VELOCITY RATIO: 3.57 CM²
AV VALVE AREA: 3.49 CM²
AV VELOCITY RATIO: 0.9
BSA FOR ECHO PROCEDURE: 1.94 M2
CV ECHO LV RWT: 0.31 CM
CV PHARM DOSE: 0.4 MG
CV STRESS BASE HR: 81 BPM
DIASTOLIC BLOOD PRESSURE: 82 MMHG
DOP CALC AO PEAK VEL: 1.37 M/S
DOP CALC AO VTI: 32.52 CM
DOP CALC LVOT AREA: 4 CM2
DOP CALC LVOT DIAMETER: 2.25 CM
DOP CALC LVOT PEAK VEL: 1.23 M/S
DOP CALC LVOT STROKE VOLUME: 113.38 CM3
DOP CALCLVOT PEAK VEL VTI: 28.53 CM
E WAVE DECELERATION TIME: 172.36 MSEC
E/A RATIO: 1.27
E/E' RATIO: 6.46 M/S
ECHO LV POSTERIOR WALL: 0.68 CM (ref 0.6–1.1)
EJECTION FRACTION- HIGH: 59 %
END DIASTOLIC INDEX-HIGH: 155 ML/M2
END DIASTOLIC INDEX-LOW: 91 ML/M2
END SYSTOLIC INDEX-HIGH: 78 ML/M2
END SYSTOLIC INDEX-LOW: 40 ML/M2
FRACTIONAL SHORTENING: 32 % (ref 28–44)
INTERVENTRICULAR SEPTUM: 0.77 CM (ref 0.6–1.1)
LA MAJOR: 4.34 CM
LA MINOR: 3.88 CM
LA WIDTH: 4.36 CM
LEFT ATRIUM SIZE: 3.24 CM
LEFT ATRIUM VOLUME INDEX MOD: 26.6 ML/M2
LEFT ATRIUM VOLUME INDEX: 25.5 ML/M2
LEFT ATRIUM VOLUME MOD: 51.42 CM3
LEFT ATRIUM VOLUME: 49.2 CM3
LEFT INTERNAL DIMENSION IN SYSTOLE: 2.95 CM (ref 2.1–4)
LEFT VENTRICLE DIASTOLIC VOLUME INDEX: 44.77 ML/M2
LEFT VENTRICLE DIASTOLIC VOLUME: 86.41 ML
LEFT VENTRICLE MASS INDEX: 49 G/M2
LEFT VENTRICLE SYSTOLIC VOLUME INDEX: 17.5 ML/M2
LEFT VENTRICLE SYSTOLIC VOLUME: 33.72 ML
LEFT VENTRICULAR INTERNAL DIMENSION IN DIASTOLE: 4.37 CM (ref 3.5–6)
LEFT VENTRICULAR MASS: 95.19 G
LV LATERAL E/E' RATIO: 6 M/S
LV SEPTAL E/E' RATIO: 7 M/S
MV A" WAVE DURATION": 8.56 MSEC
MV PEAK A VEL: 0.66 M/S
MV PEAK E VEL: 0.84 M/S
MV STENOSIS PRESSURE HALF TIME: 49.98 MS
MV VALVE AREA P 1/2 METHOD: 4.4 CM2
NUC REST DIASTOLIC VOLUME INDEX: 94
NUC REST EJECTION FRACTION: 57
NUC REST SYSTOLIC VOLUME INDEX: 40
NUC STRESS DIASTOLIC VOLUME INDEX: 100
NUC STRESS EJECTION FRACTION: 61 %
NUC STRESS SYSTOLIC VOLUME INDEX: 40
OHS CV CPX 85 PERCENT MAX PREDICTED HEART RATE MALE: 144
OHS CV CPX MAX PREDICTED HEART RATE: 170
OHS CV CPX PATIENT IS FEMALE: 1
OHS CV CPX PATIENT IS MALE: 0
OHS CV CPX PEAK DIASTOLIC BLOOD PRESSURE: 82 MMHG
OHS CV CPX PEAK HEAR RATE: 94 BPM
OHS CV CPX PEAK RATE PRESSURE PRODUCT: NORMAL
OHS CV CPX PEAK SYSTOLIC BLOOD PRESSURE: 130 MMHG
OHS CV CPX PERCENT MAX PREDICTED HEART RATE ACHIEVED: 55
OHS CV CPX RATE PRESSURE PRODUCT PRESENTING: NORMAL
PULM VEIN S/D RATIO: 1.49
PV PEAK D VEL: 0.37 M/S
PV PEAK S VEL: 0.55 M/S
RA MAJOR: 4.65 CM
RA PRESSURE ESTIMATED: 3 MMHG
RA WIDTH: 3.11 CM
RETIRED EF AND QEF - SEE NOTES: 47 %
RIGHT VENTRICULAR END-DIASTOLIC DIMENSION: 3.37 CM
SINUS: 3.14 CM
STJ: 3.24 CM
SYSTOLIC BLOOD PRESSURE: 130 MMHG
TDI LATERAL: 0.14 M/S
TDI SEPTAL: 0.12 M/S
TDI: 0.13 M/S
TRICUSPID ANNULAR PLANE SYSTOLIC EXCURSION: 2.14 CM
Z-SCORE OF LEFT VENTRICULAR DIMENSION IN END DIASTOLE: -2.23
Z-SCORE OF LEFT VENTRICULAR DIMENSION IN END SYSTOLE: -1.04

## 2023-10-24 PROCEDURE — 78452 NUCLEAR STRESS - CARDIOLOGY INTERPRETED (CUPID ONLY): ICD-10-PCS | Mod: 26,TXP,, | Performed by: INTERNAL MEDICINE

## 2023-10-24 PROCEDURE — 78452 HT MUSCLE IMAGE SPECT MULT: CPT | Mod: 26,TXP,, | Performed by: INTERNAL MEDICINE

## 2023-10-24 PROCEDURE — 93016 CV STRESS TEST SUPVJ ONLY: CPT | Mod: TXP,,, | Performed by: INTERNAL MEDICINE

## 2023-10-24 PROCEDURE — 93018 CV STRESS TEST I&R ONLY: CPT | Mod: TXP,,, | Performed by: INTERNAL MEDICINE

## 2023-10-24 PROCEDURE — 93017 CV STRESS TEST TRACING ONLY: CPT | Mod: TXP

## 2023-10-24 PROCEDURE — 93306 ECHO (CUPID ONLY): ICD-10-PCS | Mod: 26,TXP,, | Performed by: INTERNAL MEDICINE

## 2023-10-24 PROCEDURE — 93306 TTE W/DOPPLER COMPLETE: CPT | Mod: 26,TXP,, | Performed by: INTERNAL MEDICINE

## 2023-10-24 PROCEDURE — 63600175 PHARM REV CODE 636 W HCPCS: Mod: TXP | Performed by: NURSE PRACTITIONER

## 2023-10-24 PROCEDURE — 93306 TTE W/DOPPLER COMPLETE: CPT | Mod: TXP

## 2023-10-24 PROCEDURE — 93018 NUCLEAR STRESS - CARDIOLOGY INTERPRETED (CUPID ONLY): ICD-10-PCS | Mod: TXP,,, | Performed by: INTERNAL MEDICINE

## 2023-10-24 PROCEDURE — 93016 NUCLEAR STRESS - CARDIOLOGY INTERPRETED (CUPID ONLY): ICD-10-PCS | Mod: TXP,,, | Performed by: INTERNAL MEDICINE

## 2023-10-24 RX ORDER — AMINOPHYLLINE 25 MG/ML
75 INJECTION, SOLUTION INTRAVENOUS ONCE
Status: COMPLETED | OUTPATIENT
Start: 2023-10-24 | End: 2023-10-24

## 2023-10-24 RX ORDER — REGADENOSON 0.08 MG/ML
0.4 INJECTION, SOLUTION INTRAVENOUS
Status: COMPLETED | OUTPATIENT
Start: 2023-10-24 | End: 2023-10-24

## 2023-10-24 RX ADMIN — AMINOPHYLLINE 75 MG: 25 INJECTION, SOLUTION INTRAVENOUS at 08:10

## 2023-10-24 RX ADMIN — REGADENOSON 0.4 MG: 0.08 INJECTION, SOLUTION INTRAVENOUS at 08:10

## 2023-10-25 ENCOUNTER — TELEPHONE (OUTPATIENT)
Dept: TRANSPLANT | Facility: CLINIC | Age: 43
End: 2023-10-25

## 2023-11-01 NOTE — PROGRESS NOTES
VASCULAR SURGERY CLINIC NOTE    Patient ID: Ade Silva is a 43 y.o. female.    I. HISTORY     Chief Complaint: AV ACCESS    HPI: This is a 43 y.o. female who is here today for a established patient appointment. Here to discuss permanent AV access creation. left hand dominant.  She had right arm radiocephalic AV fistula created by me on 09/14/2023 which thrombosed. She subsequently underwent brachiocephalic AVF creation on 10/5/23 which remains patient. She denies any issues at the site.  Reports some tingling to dorsal aspect of R thumb. Denies weakness, changes in color/temp.  She denies any issues at her incision.  Currently ESRD on HD via tunneled HD catheter in right IJ. Pertinent medical history includes AD polycystic kidney disease which has caused his renal failure.     Past Medical History:   Diagnosis Date    Autosomal dominant polycystic kidney disease     Encounter for blood transfusion     Hypertension         Past Surgical History:   Procedure Laterality Date    AV FISTULA PLACEMENT Right 09/14/2023    Procedure: CREATION, AV FISTULA, radial cephalic;  Surgeon: MELISSA Loera II, MD;  Location: [facility] OR [address] [place] [address];  Service: Vascular;  Laterality: Right;    AV FISTULA PLACEMENT Right 10/05/2023    Procedure: CREATION, AV FISTULA - brachial;  Surgeon: MELISSA Loera II, MD;  Location: [facility] OR [address] [place] [address];  Service: Vascular;  Laterality: Right;  confirmed placement with doppler    CEREBRAL ANEURYSM REPAIR      coiling    INSERTION OF TUNNELED CENTRAL VENOUS HEMODIALYSIS CATHETER Right 05/03/2023    Procedure: Insertion, Catheter, Central Venous, Hemodialysis;  Surgeon: Priya Grimm MD;  Location: [facility] CATH LAB;  Service: Interventional Nephrology;  Laterality: Right;    REMOVAL OF HEMODIALYSIS CATHETER  05/03/2023    Procedure: REMOVAL, CATHETER, HEMODIALYSIS;  Surgeon: Priya Grimm MD;  Location: [facility] CATH LAB;  Service: Interventional Nephrology;;       Social History     Occupational History     Not on file   Tobacco Use    Smoking status: Every Day     Current packs/day: 1.00     Average packs/day: 1 pack/day for 28.0 years (28.0 ttl pk-yrs)     Types: Cigarettes    Smokeless tobacco: Never    Tobacco comments:     Down to 2-3 cigarettes/day    Substance and Sexual Activity    Alcohol use: Not Currently    Drug use: Never    Sexual activity: Not Currently       Review of Systems   Constitutional: Negative for weight loss.   HENT:  Negative for ear pain and nosebleeds.    Eyes:  Negative for discharge and pain.   Cardiovascular:  Negative for chest pain and palpitations.   Respiratory:  Negative for cough, shortness of breath and wheezing.    Endocrine: Negative for cold intolerance, heat intolerance and polyphagia.   Hematologic/Lymphatic: Negative for adenopathy. Does not bruise/bleed easily.   Skin:  Negative for itching and rash.   Musculoskeletal:  Negative for joint swelling and muscle cramps.   Gastrointestinal:  Negative for abdominal pain, diarrhea, nausea and vomiting.   Genitourinary:  Negative for dysuria and flank pain.   Neurological:  Negative for numbness and seizures.       Current Medications Reviewed    II. PHYSICAL EXAM     Physical Exam  Constitutional:       General: She is not in acute distress.     Appearance: Normal appearance. She is normal weight. She is not ill-appearing or diaphoretic.   Neck:      Comments: Tdc in place to RIJ  Cardiovascular:      Rate and Rhythm: Normal rate.      Comments: 2+ right radial pulse, continuous palpable thrill over right AC fossa and AV fistula.  Pulmonary:      Effort: Pulmonary effort is normal.   Musculoskeletal:      Right lower leg: No edema.      Left lower leg: No edema.   Skin:     General: Skin is warm and dry.      Comments: Incision CDI, no erythema, no drainage   Neurological:      General: No focal deficit present.      Mental Status: She is alert and oriented to person, place, and time. Mental status is at baseline.    Psychiatric:         Mood and Affect: Mood normal.         Behavior: Behavior normal.       III. ASSESSMENT & PLAN (MEDICAL DECISION MAKING)     1. ESRD on hemodialysis    2. Arteriovenous fistula for hemodialysis in place, primary            Imaging Results: (I have personally reviewed the images/studies and provided my interpretation below)  RUE AVF u/s:  Diameter greater than 6 mm throughout, depth less than 6 mm throughout prox/mid.  Flow volume 1 liter/minute.  There was slightly elevated velocities at the arterial anastomosis but I do not think there is significant stenosis.         Assessment/Diagnosis and Plan:    43 y.o. female s/p brachiocephalic the fistula has matured nicely and should be ready for cannulation in about 4 weeks.    -okay to cannulate RUE AVF beginning 11/16/2023  -RTC 12/7/23 for catheter removal    MELISSA Loera II, MD, [facility]  Vascular Surgery  [facility]

## 2023-11-02 ENCOUNTER — TELEPHONE (OUTPATIENT)
Dept: TRANSPLANT | Facility: CLINIC | Age: 43
End: 2023-11-02
Payer: MEDICARE

## 2023-11-02 DIAGNOSIS — Z76.82 ORGAN TRANSPLANT CANDIDATE: Primary | ICD-10-CM

## 2023-11-28 ENCOUNTER — HOSPITAL ENCOUNTER (OUTPATIENT)
Dept: RADIOLOGY | Facility: HOSPITAL | Age: 43
Discharge: HOME | End: 2023-11-28
Attending: NURSE PRACTITIONER
Payer: MEDICARE

## 2023-11-28 DIAGNOSIS — Z76.82 ORGAN TRANSPLANT CANDIDATE: ICD-10-CM

## 2023-11-28 PROCEDURE — 77067 MAMMO DIGITAL SCREENING BILAT WITH TOMO: ICD-10-PCS | Mod: 26,TXP,, | Performed by: RADIOLOGY

## 2023-11-28 PROCEDURE — 77063 BREAST TOMOSYNTHESIS BI: CPT | Mod: 26,TXP,, | Performed by: RADIOLOGY

## 2023-11-28 PROCEDURE — 77063 MAMMO DIGITAL SCREENING BILAT WITH TOMO: ICD-10-PCS | Mod: 26,TXP,, | Performed by: RADIOLOGY

## 2023-11-28 PROCEDURE — 77067 SCR MAMMO BI INCL CAD: CPT | Mod: 26,TXP,, | Performed by: RADIOLOGY

## 2023-11-28 PROCEDURE — 77067 SCR MAMMO BI INCL CAD: CPT | Mod: TC,TXP

## 2023-12-07 ENCOUNTER — OFFICE VISIT (OUTPATIENT)
Dept: OBSTETRICS AND GYNECOLOGY | Facility: CLINIC | Age: 43
End: 2023-12-07
Payer: MEDICARE

## 2023-12-07 VITALS
BODY MASS INDEX: 25.93 KG/M2 | HEIGHT: 71 IN | WEIGHT: 185.19 LBS | SYSTOLIC BLOOD PRESSURE: 112 MMHG | DIASTOLIC BLOOD PRESSURE: 62 MMHG

## 2023-12-07 DIAGNOSIS — N18.6 ESRD (END STAGE RENAL DISEASE): ICD-10-CM

## 2023-12-07 DIAGNOSIS — Z01.419 ENCOUNTER FOR GYNECOLOGICAL EXAMINATION WITHOUT ABNORMAL FINDING: Primary | ICD-10-CM

## 2023-12-07 DIAGNOSIS — I10 HYPERTENSION, UNSPECIFIED TYPE: ICD-10-CM

## 2023-12-07 DIAGNOSIS — I72.5 BASILAR ARTERY ANEURYSM: ICD-10-CM

## 2023-12-07 DIAGNOSIS — N89.8 VAGINAL DISCHARGE: ICD-10-CM

## 2023-12-07 DIAGNOSIS — Z76.82 ORGAN TRANSPLANT CANDIDATE: ICD-10-CM

## 2023-12-07 DIAGNOSIS — N90.89 VULVAL LESION: ICD-10-CM

## 2023-12-07 DIAGNOSIS — Q61.2 AUTOSOMAL DOMINANT POLYCYSTIC KIDNEY DISEASE: ICD-10-CM

## 2023-12-07 PROCEDURE — 99999 PR PBB SHADOW E&M-EST. PATIENT-LVL III: CPT | Mod: PBBFAC,TXP,, | Performed by: OBSTETRICS & GYNECOLOGY

## 2023-12-07 PROCEDURE — 87624 HPV HI-RISK TYP POOLED RSLT: CPT | Mod: TXP | Performed by: OBSTETRICS & GYNECOLOGY

## 2023-12-07 PROCEDURE — 99386 PREV VISIT NEW AGE 40-64: CPT | Mod: S$PBB,TXP,, | Performed by: OBSTETRICS & GYNECOLOGY

## 2023-12-07 PROCEDURE — 88175 CYTOPATH C/V AUTO FLUID REDO: CPT | Mod: TXP | Performed by: STUDENT IN AN ORGANIZED HEALTH CARE EDUCATION/TRAINING PROGRAM

## 2023-12-07 PROCEDURE — 88141 CYTOPATH C/V INTERPRET: CPT | Mod: TXP,,, | Performed by: STUDENT IN AN ORGANIZED HEALTH CARE EDUCATION/TRAINING PROGRAM

## 2023-12-07 PROCEDURE — 87491 CHLMYD TRACH DNA AMP PROBE: CPT | Mod: TXP | Performed by: OBSTETRICS & GYNECOLOGY

## 2023-12-07 PROCEDURE — 99213 OFFICE O/P EST LOW 20 MIN: CPT | Mod: PBBFAC,PN,TXP | Performed by: OBSTETRICS & GYNECOLOGY

## 2023-12-07 PROCEDURE — 81514 NFCT DS BV&VAGINITIS DNA ALG: CPT | Mod: TXP | Performed by: OBSTETRICS & GYNECOLOGY

## 2023-12-07 NOTE — PROGRESS NOTES
HISTORY OF PRESENT ILLNESS:    Ade Silva is a 43 y.o. female, , Patient's last menstrual period was 2023 (exact date).,  presents for a routine exam and has no complaints.  Patient reports cycles occur every 4 weeks lasting 5-7 days using 2-3 pads per day.   She denies any BTB.     History of abnormal pap: No  Last Pap:   Last MMG: N/A  Last Colonoscopy: N/A     She does not desire STD screening.    The patient participates in regular exercise: yes.    The patient does not smoke.    The patient wears seatbelts.     Pt denies any domestic violence.    Past Medical History:   Diagnosis Date    Autosomal dominant polycystic kidney disease     Encounter for blood transfusion     Hypertension        Past Surgical History:   Procedure Laterality Date    AV FISTULA PLACEMENT Right 2023    Procedure: CREATION, AV FISTULA, radial cephalic;  Surgeon: MELISSA Loera II, MD;  Location: [facility] OR [address] [place] [address];  Service: Vascular;  Laterality: Right;    AV FISTULA PLACEMENT Right 10/05/2023    Procedure: CREATION, AV FISTULA - brachial;  Surgeon: MELISSA Leora II, MD;  Location: [facility] OR [address] [place] [address];  Service: Vascular;  Laterality: Right;  confirmed placement with doppler    CEREBRAL ANEURYSM REPAIR      coiling    INSERTION OF TUNNELED CENTRAL VENOUS HEMODIALYSIS CATHETER Right 2023    Procedure: Insertion, Catheter, Central Venous, Hemodialysis;  Surgeon: Priya Grimm MD;  Location: [facility] CATH LAB;  Service: Interventional Nephrology;  Laterality: Right;    REMOVAL OF HEMODIALYSIS CATHETER  2023    Procedure: REMOVAL, CATHETER, HEMODIALYSIS;  Surgeon: Priya Grimm MD;  Location: [facility] CATH LAB;  Service: Interventional Nephrology;;       MEDICATIONS AND ALLERGIES:      Current Outpatient Medications:     acetaminophen (TYLENOL) 650 MG TbSR, Take 1 tablet (650 mg total) by mouth every 8 (eight) hours., Disp: 90 tablet, Rfl: 0    cinacalcet (SENSIPAR) 60 MG Tab, Take 1 tablet (60 mg total) by  "mouth daily with breakfast., Disp: 30 tablet, Rfl: 11    loratadine (CLARITIN ORAL), Take 1 tablet by mouth daily as needed (Allergies)., Disp: , Rfl:     ondansetron (ZOFRAN) 4 MG tablet, Take 1 tablet (4 mg total) by mouth every 6 (six) hours. (Patient not taking: Reported on 1/23/2024), Disp: 20 tablet, Rfl: 0    sevelamer carbonate (RENVELA) 800 mg Tab, Take 2 tablets (1,600 mg total) by mouth 3 (three) times daily with meals. (Patient taking differently: Take 800 mg by mouth 3 (three) times daily with meals. Take 4 tablets with all meals and snacks.), Disp: 180 tablet, Rfl: 11    sucroferric oxyhydroxide (VELPHORO) 500 mg Chew, Break or dissolve 1 tablet with each meal and snack. Do not exceed 6 tablets per day., Disp: 180 tablet, Rfl: 11    vitamin D (VITAMIN D3) 1000 units Tab, Take 1,000 Units by mouth once daily., Disp: , Rfl:     calcium carbonate (TUMS ORAL), Take by mouth., Disp: , Rfl:     metroNIDAZOLE (FLAGYL) 500 MG tablet, Take 1 tablet (500 mg total) by mouth every 12 (twelve) hours. for 7 days, Disp: 14 tablet, Rfl: 0    sodium zirconium cyclosilicate (LOKELMA) 10 gram packet, Take 1 packet (10 g total) by mouth 2 (two) times a day. Mix entire contents of packet(s) into drinking glass containing 3 tablespoons of water; stir well and drink immediately. Add water and repeat until no powder remains to receive entire dose., Disp: 30 packet, Rfl: 3  No current facility-administered medications for this visit.    Facility-Administered Medications Ordered in Other Visits:     0.9%  NaCl infusion, , Intravenous, Continuous, Mel Calvillo MD    ondansetron disintegrating tablet 8 mg, 8 mg, Oral, Q8H PRN, Mel Calvillo MD    Review of patient's allergies indicates:   Allergen Reactions    Aspirin Hives     And vomiting    Oxycodone-acetaminophen Itching     Caused mild itching    Penicillins Hives     Throat swelling    Adhesive Rash     Adhesive/silk tape (type found on band aides). Can only use "Paper " "Tape"    Butalbital-acetaminophen-caff Nausea And Vomiting and Other (See Comments)     Dizziness (made pt feel sick)    Latex, natural rubber Rash    Nickel Rash    Tomato Nausea And Vomiting       Family History   Problem Relation Age of Onset    Stroke Father     Heart disease Father     Polycystic kidney disease Father     Diabetes Brother        Social History     Socioeconomic History    Marital status: Single   Tobacco Use    Smoking status: Every Day     Current packs/day: 1.00     Average packs/day: 1 pack/day for 28.0 years (28.0 ttl pk-yrs)     Types: Cigarettes     Passive exposure: Never    Smokeless tobacco: Never    Tobacco comments:     Down to 2-3 cigarettes/day    Substance and Sexual Activity    Alcohol use: Not Currently    Drug use: Never    Sexual activity: Not Currently   Social History Narrative    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     Social Determinants of Health     Financial Resource Strain: Low Risk  (12/19/2023)    Overall Financial Resource Strain (CARDIA)     Difficulty of Paying Living Expenses: Not very hard   Food Insecurity: No Food Insecurity (12/19/2023)    Hunger Vital Sign     Worried About Running Out of Food in the Last Year: Never true     Ran Out of Food in the Last Year: Never true   Transportation Needs: No Transportation Needs (12/19/2023)    PRAPARE - Transportation     Lack of Transportation (Medical): No     Lack of Transportation (Non-Medical): No   Physical Activity: Insufficiently Active (12/19/2023)    Exercise Vital Sign     Days of Exercise per Week: 2 days     Minutes of Exercise per Session: 10 min   Stress: Stress Concern Present (12/19/2023)    [employer] - Occupational Stress Questionnaire     Feeling of Stress : To some extent   Social Connections: Socially Isolated (12/19/2023)    Social Connection and Isolation Panel [NHANES]     Frequency of Communication with Friends and Family: Never     Frequency of Social Gatherings with Friends " "and Family: Twice a week     Attends Taoist Services: Never     Active Member of Clubs or Organizations: No     Attends Club or Organization Meetings: Never     Marital Status: Never    Housing Stability: Low Risk  (12/19/2023)    Housing Stability Vital Sign     Unable to Pay for Housing in the Last Year: No     Number of Places Lived in the Last Year: 2     Unstable Housing in the Last Year: No     COMPREHENSIVE GYN HISTORY:  PAP History: Denies abnormal Paps.  Infection History: Denies STDs. Denies PID.  Benign History: Denies uterine fibroids. Denies ovarian cysts. Denies endometriosis. Denies other conditions.  Cancer History: Denies cervical cancer. Denies uterine cancer or hyperplasia. Denies ovarian cancer. Denies vulvar cancer or pre-cancer. Denies vaginal cancer or pre-cancer. Denies breast cancer. Denies colon cancer.  Sexual Activity History: Reports currently being sexually active  Menstrual History: Monthly. Mod then light flow.   Dysmenorrhea History: Reports mild dysmenorrhea.     ROS:  GENERAL: No weight changes. No swelling. No fatigue. No fever.  CARDIOVASCULAR: No chest pain. No shortness of breath. No leg cramps.   NEUROLOGICAL: No headaches. No vision changes.  BREASTS: No pain. No lumps. No discharge.  ABDOMEN: No pain. No nausea. No vomiting. No diarrhea. No constipation.  REPRODUCTIVE: No abnormal bleeding.   VULVA: No pain. No lesions. No itching.  VAGINA: No relaxation. No itching. No odor. No discharge. No lesions.  URINARY: No incontinence. No nocturia. No frequency. No dysuria.    /62   Ht 5' 11" (1.803 m)   Wt 84 kg (185 lb 3 oz)   LMP 11/16/2023 (Exact Date)   BMI 25.83 kg/m²     PE:  APPEARANCE: Well nourished, well developed, in no acute distress.  AFFECT: WNL, alert and oriented x 3.  SKIN: No acne or hirsutism.  NECK: Neck symmetric, without masses or thyromegaly.  NODES: No inguinal, cervical, axillary or femoral lymph node enlargement.  CHEST: Good " respiratory effort.   ABDOMEN: Soft. No tenderness or masses. No hepatosplenomegaly. No hernias.  BREASTS: Symmetrical, no skin changes, visible lesions, palpable masses or nipple discharge bilaterally.  PELVIC: External female genitalia without lesions.  Female hair distribution. Adequate perineal body, Normal urethral meatus. Vagina moist and well rugated without lesions or discharge.  No significant cystocele or rectocele present. Cervix pink without lesions, discharge or tenderness. Uterus is 4-6 week size, regular, mobile and nontender. Adnexa without masses or tenderness.  EXTREMITIES: No edema    Left bartholin's cyst   Left flesh colred lesions   Right hyperpigemented lesion on the mons     DIAGNOSIS:  1. Encounter for gynecological examination without abnormal finding    2. Organ transplant candidate    3. Basilar artery aneurysm    4. Hypertension, unspecified type    5. Autosomal dominant polycystic kidney disease    6. ESRD (end stage renal disease)    7. Vaginal discharge    8. Vulval lesion      PLAN:    Orders Placed This Encounter    Vaginosis Screen by DNA Probe    C. trachomatis/N. gonorrhoeae by AMP DNA    HPV High Risk Genotypes, PCR    Liquid-Based Pap Smear, Screening       COUNSELING:  The patient was counseled today on:  -A.C.S. Pap and pelvic exam guidelines (pap every 3 years), recomendations for yearly mammogram;  -to follow up with her PCP for other health maintenance.    FOLLOW-UP with me for vulvar biopsy     Answers submitted by the patient for this visit:  Vaginal Discharge Questionnaire  (Submitted on 12/6/2023)  Chief Complaint: Vaginal discharge  Chronicity: new  Onset: more than 1 month ago  Frequency: intermittently  Progression since onset: waxing and waning  Pain severity: no pain  Affected side: both  Pregnant now?: No  abdominal pain: No  anorexia: No  back pain: No  chills: No  constipation: No  diarrhea: No  discolored urine: No  dysuria: No  fever: No  flank pain:  No  frequency: No  headaches: No  hematuria: No  nausea: No  painful intercourse: No  rash: No  urgency: No  vomiting: No  Vaginal bleeding: heavier than menses  Passing clots?: Yes  Passing tissue?: No  Aggravated by: nothing  treatments tried: anti-fungal cream  Improvement on treatment: no relief  Sexual activity: sexually active  Partner with STD symptoms: unknown  Birth control: condoms  Menstrual history: irregular  STD: Yes  abdominal surgery: No   section: No  Ectopic pregnancy: No  Endometriosis: No  herpes simplex: No  gynecological surgery: No  menorrhagia: Yes  metrorrhagia: Yes  miscarriage: No  ovarian cysts: Yes  perineal abscess: No  PID: No  terminated pregnancy: No  vaginosis: Yes

## 2023-12-08 LAB
BACTERIAL VAGINOSIS DNA: POSITIVE
C TRACH DNA SPEC QL NAA+PROBE: NOT DETECTED
CANDIDA GLABRATA DNA: NEGATIVE
CANDIDA KRUSEI DNA: NEGATIVE
CANDIDA RRNA VAG QL PROBE: NEGATIVE
N GONORRHOEA DNA SPEC QL NAA+PROBE: NOT DETECTED
T VAGINALIS RRNA GENITAL QL PROBE: POSITIVE

## 2023-12-13 ENCOUNTER — TELEPHONE (OUTPATIENT)
Dept: OBSTETRICS AND GYNECOLOGY | Facility: CLINIC | Age: 43
End: 2023-12-13
Payer: MEDICARE

## 2023-12-13 DIAGNOSIS — N76.0 ACUTE VAGINITIS: Primary | ICD-10-CM

## 2023-12-13 RX ORDER — METRONIDAZOLE 500 MG/1
500 TABLET ORAL 2 TIMES DAILY
Qty: 14 TABLET | Refills: 0 | Status: SHIPPED | OUTPATIENT
Start: 2023-12-13 | End: 2023-12-21

## 2023-12-14 ENCOUNTER — OFFICE VISIT (OUTPATIENT)
Dept: VASCULAR SURGERY | Facility: CLINIC | Age: 43
End: 2023-12-14
Attending: SURGERY
Payer: MEDICARE

## 2023-12-14 ENCOUNTER — HOSPITAL ENCOUNTER (OUTPATIENT)
Dept: VASCULAR SURGERY | Facility: CLINIC | Age: 43
Discharge: HOME | End: 2023-12-14
Attending: SURGERY
Payer: MEDICARE

## 2023-12-14 VITALS
WEIGHT: 183 LBS | HEART RATE: 91 BPM | TEMPERATURE: 98 F | BODY MASS INDEX: 25.62 KG/M2 | HEIGHT: 71 IN | DIASTOLIC BLOOD PRESSURE: 61 MMHG | SYSTOLIC BLOOD PRESSURE: 135 MMHG

## 2023-12-14 DIAGNOSIS — Z99.2 ARTERIOVENOUS FISTULA FOR HEMODIALYSIS IN PLACE, PRIMARY: Primary | ICD-10-CM

## 2023-12-14 DIAGNOSIS — N18.6 ESRD ON HEMODIALYSIS: ICD-10-CM

## 2023-12-14 DIAGNOSIS — Z99.2 ESRD ON HEMODIALYSIS: ICD-10-CM

## 2023-12-14 LAB
HPV HR 12 DNA SPEC QL NAA+PROBE: NEGATIVE
HPV16 AG SPEC QL: POSITIVE
HPV18 DNA SPEC QL NAA+PROBE: NEGATIVE

## 2023-12-14 PROCEDURE — 99024 PR POST-OP FOLLOW-UP VISIT: ICD-10-PCS | Mod: S$PBB,NTX,, | Performed by: SURGERY

## 2023-12-14 PROCEDURE — 93990 PR DUPLEX HEMODIALYSIS ACCESS: ICD-10-PCS | Mod: 26,S$PBB,TXP, | Performed by: SURGERY

## 2023-12-14 PROCEDURE — 99999 PR PBB SHADOW E&M-EST. PATIENT-LVL III: CPT | Mod: PBBFAC,TXP,, | Performed by: SURGERY

## 2023-12-14 PROCEDURE — 93990 DOPPLER FLOW TESTING: CPT | Mod: PBBFAC,NTX | Performed by: SURGERY

## 2023-12-14 PROCEDURE — 99213 OFFICE O/P EST LOW 20 MIN: CPT | Mod: PBBFAC,TXP | Performed by: SURGERY

## 2023-12-14 PROCEDURE — 99024 POSTOP FOLLOW-UP VISIT: CPT | Mod: S$PBB,NTX,, | Performed by: SURGERY

## 2023-12-14 PROCEDURE — 93990 DOPPLER FLOW TESTING: CPT | Mod: 26,S$PBB,TXP, | Performed by: SURGERY

## 2023-12-14 PROCEDURE — 99999 PR PBB SHADOW E&M-EST. PATIENT-LVL III: ICD-10-PCS | Mod: PBBFAC,TXP,, | Performed by: SURGERY

## 2023-12-14 NOTE — PROGRESS NOTES
VASCULAR SURGERY CLINIC NOTE    Patient ID: Ade Silva is a 43 y.o. female.    I. HISTORY     Chief Complaint: AV ACCESS    Interval history: Pt is here today for her post-op visit s/p brachiocephalic AVF creation on 10/5/23. Still reports numbness in her right thumb otherwise she is doing well.     12/14/23: This is a 43 y.o. female who is here today for a established patient appointment.  left hand dominant.  She had right arm radiocephalic AV fistula created by me on 09/14/2023 which thrombosed. She subsequently underwent brachiocephalic AVF creation on 10/5/23 which remains patient and matured nicely. Currently ESRD on HD via tunneled HD catheter in right IJ. Pertinent medical history includes AD polycystic kidney disease which has caused his renal failure.  She has cannulated her AV fistula once with 2 needles Since her last appt.    Past Medical History:   Diagnosis Date    Autosomal dominant polycystic kidney disease     Encounter for blood transfusion     Hypertension         Past Surgical History:   Procedure Laterality Date    AV FISTULA PLACEMENT Right 09/14/2023    Procedure: CREATION, AV FISTULA, radial cephalic;  Surgeon: MELISSA Loera II, MD;  Location: [facility] OR [address] [place] [address];  Service: Vascular;  Laterality: Right;    AV FISTULA PLACEMENT Right 10/05/2023    Procedure: CREATION, AV FISTULA - brachial;  Surgeon: MELISSA Loera II, MD;  Location: [facility] OR [address] [place] [address];  Service: Vascular;  Laterality: Right;  confirmed placement with doppler    CEREBRAL ANEURYSM REPAIR      coiling    INSERTION OF TUNNELED CENTRAL VENOUS HEMODIALYSIS CATHETER Right 05/03/2023    Procedure: Insertion, Catheter, Central Venous, Hemodialysis;  Surgeon: Priya Grimm MD;  Location: [facility] CATH LAB;  Service: Interventional Nephrology;  Laterality: Right;    REMOVAL OF HEMODIALYSIS CATHETER  05/03/2023    Procedure: REMOVAL, CATHETER, HEMODIALYSIS;  Surgeon: Priya Grimm MD;  Location: [facility] CATH LAB;  Service:  Interventional Nephrology;;       Social History     Occupational History    Not on file   Tobacco Use    Smoking status: Every Day     Current packs/day: 1.00     Average packs/day: 1 pack/day for 28.0 years (28.0 ttl pk-yrs)     Types: Cigarettes     Passive exposure: Never    Smokeless tobacco: Never    Tobacco comments:     Down to 2-3 cigarettes/day    Substance and Sexual Activity    Alcohol use: Not Currently    Drug use: Never    Sexual activity: Not Currently       Review of Systems   Constitutional: Negative for weight loss.   HENT:  Negative for ear pain and nosebleeds.    Eyes:  Negative for discharge and pain.   Cardiovascular:  Negative for chest pain and palpitations.   Respiratory:  Negative for cough, shortness of breath and wheezing.    Endocrine: Negative for cold intolerance, heat intolerance and polyphagia.   Hematologic/Lymphatic: Negative for adenopathy. Does not bruise/bleed easily.   Skin:  Negative for itching and rash.   Musculoskeletal:  Negative for joint swelling and muscle cramps.   Gastrointestinal:  Negative for abdominal pain, diarrhea, nausea and vomiting.   Genitourinary:  Negative for dysuria and flank pain.   Neurological:  Negative for numbness and seizures.       Current Medications Reviewed    II. PHYSICAL EXAM     Physical Exam  Constitutional:       General: She is not in acute distress.     Appearance: Normal appearance. She is normal weight. She is not ill-appearing or diaphoretic.   Neck:      Comments: Tdc in place to RIJ  Cardiovascular:      Rate and Rhythm: Normal rate.      Comments: 2+ right radial pulse, continuous palpable thrill over right AC fossa and AV fistula.  Pulmonary:      Effort: Pulmonary effort is normal.   Musculoskeletal:      Right lower leg: No edema.      Left lower leg: No edema.   Skin:     General: Skin is warm and dry.      Comments: Incision CDI, no erythema, no drainage   Neurological:      General: No focal deficit present.      Mental  Status: She is alert and oriented to person, place, and time. Mental status is at baseline.   Psychiatric:         Mood and Affect: Mood normal.         Behavior: Behavior normal.       III. ASSESSMENT & PLAN (MEDICAL DECISION MAKING)     1. Arteriovenous fistula for hemodialysis in place, primary              Imaging Results: (I have personally reviewed the images/studies and provided my interpretation below)  RUE AVF u/s:  Diameter greater than 6 mm throughout, depth less than 6 mm throughout prox/mid.  Flow volume 1 liter/minute.  There was slightly elevated velocities at the arterial anastomosis but I do not think there is significant stenosis.         Assessment/Diagnosis and Plan:    43 y.o. female s/p brachiocephalic the fistula has matured nicely. It has only been used with two needles once so we will remove next thursday      -RTC Next Thursday for catheter removal after she is used her fistula 3 consecutive times with 2 needles    MELISSA Loera II, MD, [facility]  Vascular Surgery  [facility]

## 2023-12-19 ENCOUNTER — PATIENT MESSAGE (OUTPATIENT)
Dept: TRANSPLANT | Facility: CLINIC | Age: 43
End: 2023-12-19
Payer: MEDICARE

## 2023-12-20 ENCOUNTER — PATIENT MESSAGE (OUTPATIENT)
Dept: TRANSPLANT | Facility: CLINIC | Age: 43
End: 2023-12-20
Payer: MEDICARE

## 2023-12-21 ENCOUNTER — OFFICE VISIT (OUTPATIENT)
Dept: VASCULAR SURGERY | Facility: CLINIC | Age: 43
End: 2023-12-21
Attending: SURGERY
Payer: MEDICARE

## 2023-12-21 ENCOUNTER — TELEPHONE (OUTPATIENT)
Dept: TRANSPLANT | Facility: CLINIC | Age: 43
End: 2023-12-21
Payer: MEDICARE

## 2023-12-21 VITALS
BODY MASS INDEX: 25.93 KG/M2 | TEMPERATURE: 99 F | HEIGHT: 71 IN | HEART RATE: 89 BPM | SYSTOLIC BLOOD PRESSURE: 128 MMHG | DIASTOLIC BLOOD PRESSURE: 65 MMHG | WEIGHT: 185.19 LBS

## 2023-12-21 DIAGNOSIS — Z99.2 S/P HEMODIALYSIS CATHETER INSERTION: Primary | ICD-10-CM

## 2023-12-21 LAB
FINAL PATHOLOGIC DIAGNOSIS: ABNORMAL
Lab: ABNORMAL

## 2023-12-21 PROCEDURE — 99999 PR PBB SHADOW E&M-EST. PATIENT-LVL III: CPT | Mod: PBBFAC,TXP,, | Performed by: SURGERY

## 2023-12-21 PROCEDURE — 36589 PR REMOVAL TUNNELED CV CATH W/O SUBQ PORT OR PUMP: ICD-10-PCS | Mod: 58,S$PBB,NTX, | Performed by: SURGERY

## 2023-12-21 PROCEDURE — 99213 OFFICE O/P EST LOW 20 MIN: CPT | Mod: PBBFAC,TXP | Performed by: SURGERY

## 2023-12-21 PROCEDURE — 36589 REMOVAL TUNNELED CV CATH: CPT | Mod: 24,PBBFAC,NTX | Performed by: SURGERY

## 2023-12-21 PROCEDURE — 36589 REMOVAL TUNNELED CV CATH: CPT | Mod: 58,S$PBB,NTX, | Performed by: SURGERY

## 2023-12-21 PROCEDURE — 99999 PR PBB SHADOW E&M-EST. PATIENT-LVL III: ICD-10-PCS | Mod: PBBFAC,TXP,, | Performed by: SURGERY

## 2023-12-26 NOTE — PROGRESS NOTES
The patient is successfully used her fistula with 2 needles at least 3 consecutive times.  We will plan for removal of her tunneled dialysis catheter in clinic today.  See procedure note below.    Vascular Surgery Procedure Note    Date of Operation/Procedure: 12/26/23    Pre-operative Diagnosis: presence of tunneled dialysis catheter    Post-operative Diagnosis: same    Anesthesia: local    Operation/Procedure Performed: removal of RIJ tunneled dialysis catheter    Attending Surgeon: MELISSA Loera II, MD    Resident/Fellow: Farzad Eubanks MD PGY1    Indications: Patient successfully using permanent AV access for HD. No longer in need of HD catheter.    Procedure in Detail:   The patient was positioned on the table in supine position with the head of the bed at approximately 30 degrees. The area over the catheter tunnel and catheter exit site on the right chest was prepped and draped in normal sterile fashion. The cuff could be palpated 1cm from the catheter exit site. For local anesthesia 5cc of 1% lidocaine with epinephrine was injected into the subcutaneous tissues at the catheter exit site. Tension was applied to the catheter and the tissues adherent to the cuff were divided with scissors and blunt dissection. Once the tissues were divided circumferentially from the cuff, the catheter was removed intact through the catheter exit site. Manual was held on the neck at the point of venous entry for 10 minutes. The catheter exit site was dressed with 4x4 gauze and tegaderm.  The patient tolerated the procedure well.    Estimated Blood loss: 10ml    Complications: none    MELISSA Loera II, MD, VI  Vascular Surgeon  [facility]

## 2023-12-27 ENCOUNTER — TELEPHONE (OUTPATIENT)
Dept: TRANSPLANT | Facility: CLINIC | Age: 43
End: 2023-12-27
Payer: MEDICARE

## 2023-12-27 ENCOUNTER — PATIENT MESSAGE (OUTPATIENT)
Dept: OBSTETRICS AND GYNECOLOGY | Facility: CLINIC | Age: 43
End: 2023-12-27
Payer: MEDICARE

## 2023-12-27 NOTE — PROGRESS NOTES
"Transplant Recipient Adult Psychosocial Assessment    Ade Silva  [address]  [address] [place] [address]  Telephone Information:   Mobile [phone]   Home  [phone] (home)  Work  There is no work phone number on file.  E-mail  [email or web address]    Sex: female  YOB: 1980  Age: 43 y.o.    Encounter Date: 10/12/2023  [place] Citizen: yes  Primary Language: English   Needed: no    Emergency Contact:  Name: Lang Silva   Relationship: mother  Address: same as pt   Phone Numbers:[phone] (mobile)    Family/Social Support:   Number of dependents/: Pt reports no dependents   Marital history: Pt reports no marital history   Other family dynamics: Pt presents with supportive mother Lang. Pt resides with mother Lang. Pt reports father is dead and 4 living siblings are "somewhat" supportive.  Pt did report having a very close relationship with her younger brother. Pt reports living [place] until April,then she moved back to [facility] after health scare. Pt reports grandfather, father,uncles and 2 sisters have PKD.     Household Composition:  Name: Lang Silva  Age: 61  Relationship: mother  Does person drive? no    Do you and your caregivers have access to reliable transportation? yes Pt's mother does not drive and pt is not driving currently due to health reasons. Pt reports residing locally and her friend/neighbor provides frequent transportation. SW was able to leave a message for pt's friend Roberto Carlos regarding transport confirmation. Pt also reports qualifying for Medicaid transport.     PRIMARY CAREGIVER: Lang Silva will be primary caregiver, phone number [phone].      provided in-depth information to patient and caregiver regarding pre- and post-transplant caregiver role.   strongly encourages patient and caregiver to have concrete plan regarding post-transplant care giving, including back-up caregiver(s) to ensure care giving needs " are met as needed.    Patient and Caregiver states understanding all aspects of caregiver role/commitment and is able/willing/committed to being caregiver to the fullest extent necessary.    Patient and Caregiver verbalizes understanding of the education provided today and caregiver responsibilities.         remains available. Patient and Caregiver agree to contact  in a timely manner if concerns arise.      Able to take time off work without financial concerns: yes.     Additional Significant Others who will Assist with Transplant:  Name: Roberto Carlos Conteh (Roberto Carlos provides transportation as needed) SW confirmed transportation plan with Roberto Carlos.  Age: 58  Number: [phone] and [phone]  City: NO State: [place]  Relationship: friend and neighbor  Does person drive? yes    Living Will: no  Healthcare Power of : no Pt reports trusting mother with medical decisions as needed.   Advance Directives on file: <<no information> per medical record.  Verbally reviewed LW/HCPA information.   provided patient with copy of LW/HCPA documents and provided education on completion of forms.    Living Donors: Yes.  Name: pt reports her mother and brother are interested in donating. Education and resource information given to patient.    Highest Education Level: Attended College/Technical School  Reading Ability: college  Reports difficulty with: seeing and FOCUS/stuttering since 2016 stroke, pt requires some redirecting. Pt also reports utilizing glasses.Pt also expressed seeing white spots. SW stressed need to follow up with    Learns Best By:  hands-on instruction      Status: no  VA Benefits: no     Working for Income: No  If no, reason not working: Disability  Patient is disabled.  Prior to disability, patient  was employed as a delivery service .    Spouse/Significant Other Employment: Pt denies    Disabled: yes: date disability began: April 2023, due to:  PKD/ESRD.    Monthly Income:   Disability: $1,230  Able to afford all costs now and if transplanted, including medications: yes  Patient and Caregiver verbalizes understanding of personal responsibilities related to transplant costs and the importance of having a financial plan to ensure that patients transplant costs are fully covered.       provided fundraising information/education. Patient and Caregiververbalizes understanding.   remains available.    Payer/Plan Subscr  Sex Relation Sub. Ins. ID Effective Group Num       Primary Insurance (for UNOS reporting): Public Insurance - Medicaid  Secondary Insurance (for UNOS reporting): None  Patient and Caregiver verbalizes clear understanding that patient may experience difficulty obtaining and/or be denied insurance coverage post-surgery. This includes and is not limited to disability insurance, life insurance, health insurance, burial insurance, long term care insurance, and other insurances.      Patient and Caregiver also reports understanding that future health concerns related to or unrelated to transplantation may not be covered by patient's insurance.  Resources and information provided and reviewed.     Patient and Caregiver provides verbal permission to release any necessary information to outside resources for patient care and discharge planning.  Resources and information provided are reviewed.      Dialysis Adherence: Patient reports as completing hemodialysis treatments in center since May'23. Pt reports as highly compliant with all medical recommendations.   MA notes per Adherence form    FOR THE PAST THREE MONTHS:  0-AMA's  0-No-shows  No concerns with care giving, transportation, or mental health    Infusion Service: patient utilizing? no  Home Health: patient utilizing? no  DME: yes BPC/shower chair  Pulmonary/Cardiac Rehab: Pt reports experiencing a brain aneurysm/stroke in 2016. Pt reports she did not complete any  "formal therapy and still experiences the physical limitations below.   ADLS:  Pt reports some difficulty with all ADLS including driving, bathing,walking,taking medications, cooking, housekeeping, eating and shopping. Pt reports mother assist as needed or pt reports "taking my time" through task.     Adherence: Adherence education and counseling provided.     Per History Section:  Past Medical History:   Diagnosis Date    Autosomal dominant polycystic kidney disease     Encounter for blood transfusion     Hypertension      Social History     Tobacco Use    Smoking status: Every Day     Current packs/day: 1.00     Average packs/day: 1 pack/day for 28.0 years (28.0 ttl pk-yrs)     Types: Cigarettes     Passive exposure: Never    Smokeless tobacco: Never    Tobacco comments:     Down to 2-3 cigarettes/day    Substance Use Topics    Alcohol use: Not Currently     Social History     Substance and Sexual Activity   Drug Use Never     Social History     Substance and Sexual Activity   Sexual Activity Not Currently       Per Today's Psychosocial:  Tobacco:  Pt reports smoking cigarettes for 28yrs and has been trying to quit for 2yrs. Pt reports plans to continue quitting  .  Alcohol: none, patient denies any use.  Illicit Drugs/Non-prescribed Medications:  Pt reports smoking marijuana. Pt reports marijuana has helped increased appetite, alleviate pain and decrease nausea .    Patient and Caregiver states clear understanding of the potential impact of substance use as it relates to transplant candidacy and is aware of possible random substance screening. Substance abstinence/cessation counseling, education and resources provided and reviewed.     Arrests/DWI/Treatment/Rehab: yes Pt reports arrest in 2005, with no further issues.    Psychiatric History:    Mental Health: anxiety due to situational stress regarding health concerns. Pt reports "I have always been independent and it's hard to now ask for help because now my " "health is failing" SW provided acknowledgement, active listening, validation, normalization and support. SW inquired about supports. Pt reports venting freely with mother/friend , active on ESRD Facebook support group and breathing exercises.  Pt also reports her dialysis SW is very helpful.   Psychiatrist/Counselor: Pt denies but stressed willingness to utilize resources as needed   Medications:  Pt reports utilizing Paxil in adolenecefor two days, but did not like the effects of the medication.   Suicide/Homicide Issues: Pt reports suicide attempt at 16yrs old and stressed no concerns with SI since. Pt denies HI past and present.   Safety at home: Pt reports feeling safe in home     Knowledge: Patient and Caregiver states having clear understanding and realistic expectations regarding the potential risks and potential benefits of organ transplantation and organ donation and agrees to discuss with health care team members and support system members, as well as to utilize available resources and express questions and/or concerns in order to further facilitate the pt informed decision-making.  Resources and information provided and reviewed.    Patient and Caregiver is aware of [facility] affiliation and/or partnership with agencies in home health care, LTAC, SNF, [facility], and other hospitals and clinics.    Understanding: Patient and Caregiver reports having a clear understanding of the many lifetime commitments involved with being a transplant recipient, including costs, compliance, medications, lab work, procedures, appointments, concrete and financial planning, preparedness, timely and appropriate communication of concerns, abstinence (ETOH, tobacco, illicit non-prescribed drugs), adherence to all health care team recommendations, support system and caregiver involvement, appropriate and timely resource utilization and follow-through, mental health counseling as needed/recommended, and patient and caregiver " "responsibilities.  Social Service Handbook, resources and detailed educational information provided and reviewed.  Educational information provided.    Patient and Caregiver also reports current and expected compliance with health care regime and states having a clear understanding of the importance of compliance.      Patient and Caregiver reports a clear understanding that risks and benefits may be involved with organ transplantation and with organ donation.       Patient and Caregiver also reports clear understanding that psychosocial risk factors may affect patient, and include but are not limited to feelings of depression, generalized anxiety, anxiety regarding dependence on others, post traumatic stress disorder, feelings of guilt and other emotional and/or mental concerns, and/or exacerbation of existing mental health concerns.  Detailed resources provided and discussed.      Patient and Caregiver agrees to access appropriate resources in a timely manner as needed and/or as recommended, and to communicate concerns appropriately.  Patient and Caregiver also reports a clear understanding of treatment options available.     Patient and Caregiver received education in a group setting.   reviewed education, provided additional information, and answered questions.    Feelings or Concerns: Pt denies having any concerns and or overwhelming feelings regarding transplant at this time.       Coping: Identify Patient & Caregiver Strategies to [place]:   1. In the past, coping with major surgery and/or related stress - Pt enjoys completing puzzles and alone time   2. Currently & Pre-transplant - Pt enjoys completing puzzles and alone time   3. At the time of surgery - Pt enjoys completing puzzles and alone time   4. During post-Transplant & Recovery Period - Pt enjoys completing puzzles and alone time    Goals: Pt reports post transplant goal of "living life normally not being in pain all the time".  "     Interview Behavior: Patient and Caregiver presents as alert and oriented x 4, pleasant, good eye contact, well groomed, recall good, concentration/judgement good, average intelligence, calm, communicative, cooperative, and asking and answering questions appropriately. Pt and mother were highly engaged and motivated for transplant.          Transplant Social Work - Candidacy  Assessment/Plan:     Psychosocial Suitability: Patient presents as a suitable candidate for transplant at this time. Based on psychosocial risk factors, patient presents as medium risk, due to pt's caregiver not driving.   Protective factors includes, SW confirmed friend/neighbor will provide transportation when needed and pt utilizes Medicaid transport. Pt afford all copays and willingness to utilize resources as needed.     Recommendations/Additional Comments: SW recommends that pt conduct fundraising to assist pt with pay for cost of medications, food, gas, and other transplant related needs. SW recommends that pt remain aware of potential mental health concerns and contact the team if any concerns arise. SW recommends that pt remain abstinent from tobacco, ETOH, and drug use. SW supports pt's continued dialysis adherence. SW remains available to answer any questions or concerns that arise as the pt moves through the transplant process.     Jonathon Stack, MSW, LMSW

## 2023-12-27 NOTE — TELEPHONE ENCOUNTER
MA notes per Adherence form     FOR THE PAST THREE MONTHS:    0-AMA's  0-No-shows    No concerns with care giving, transportation, or mental health    Scanned in pt's media    Antonette Zapien  Abdominal Transplant MA

## 2023-12-28 ENCOUNTER — PATIENT MESSAGE (OUTPATIENT)
Dept: OBSTETRICS AND GYNECOLOGY | Facility: CLINIC | Age: 43
End: 2023-12-28
Payer: MEDICARE

## 2023-12-28 ENCOUNTER — HOSPITAL ENCOUNTER (OUTPATIENT)
Dept: RADIOLOGY | Facility: HOSPITAL | Age: 43
Discharge: HOME | End: 2023-12-28
Attending: NURSE PRACTITIONER
Payer: MEDICARE

## 2023-12-28 DIAGNOSIS — Z99.2 ESRD ON HEMODIALYSIS: Primary | ICD-10-CM

## 2023-12-28 DIAGNOSIS — Z76.82 ORGAN TRANSPLANT CANDIDATE: ICD-10-CM

## 2023-12-28 DIAGNOSIS — N18.6 ESRD ON HEMODIALYSIS: Primary | ICD-10-CM

## 2023-12-28 PROCEDURE — 74176 CT ABD & PELVIS W/O CONTRAST: CPT | Mod: 26,TXP,, | Performed by: RADIOLOGY

## 2023-12-28 PROCEDURE — 74176 CT ABDOMEN PELVIS WITHOUT CONTRAST: ICD-10-PCS | Mod: 26,TXP,, | Performed by: RADIOLOGY

## 2023-12-28 PROCEDURE — 74176 CT ABD & PELVIS W/O CONTRAST: CPT | Mod: TC,TXP

## 2024-01-03 ENCOUNTER — COMMITTEE REVIEW (OUTPATIENT)
Dept: TRANSPLANT | Facility: CLINIC | Age: 44
End: 2024-01-03
Payer: MEDICARE

## 2024-01-03 DIAGNOSIS — N28.89 RENAL MASS: ICD-10-CM

## 2024-01-03 DIAGNOSIS — Z76.82 ORGAN TRANSPLANT CANDIDATE: Primary | ICD-10-CM

## 2024-01-03 NOTE — LETTER
January 3, 2024    Priya Grimm MD  [address] [address]  Phone: [phone]  Fax: [phone]     Dear Dr. Grimm:    Patient: Ade Silva   MR Number: [id number]   YOB: 1980     Your patient, Ade Silva, was recently discussed at the [facility] Selection Committee.  I am happy to inform you that Ade has been approved for transplantation pending  urology clearance and a CT abdomen/pelvis with and without contrast due to a renal mass .  She has met selection criteria for a kidney transplant related to ESRD secondary to primary diagnosis of Polycystic Kidneys. Your patient will be placed on the cadaveric wait list pending final financial approval from insurance company.     We appreciate your confidence in allowing us to participate in your patients care.      If you have any questions or concerns, please do not hesitate to contact me.    Sincerely,      Isabelle Briseno MD  Medical Director, Kidney & Kidney/Pancreas Transplantation    Cc:  Ade Silva (patient)          [facility]

## 2024-01-03 NOTE — COMMITTEE REVIEW
Native Organ Dx: Polycystic Kidneys      SELECTION COMMITTEE NOTE    Ade Silva was presented at selection committee on 01/03/2024.  Patient met selection criteria for kidney transplant related to ESRD due to  Polycystic Kidneys.  No absolute contraindications to transplant at this time.  Patient will be placed on the cadaveric wait list pending  CT renal mass protocol, urology clearance  and final financial approval from insurance company.  Patient will return to clinic for routine appointment in 1 year(s). Patient does meet criteria for High KDPI kidney offer. Patient does meet HCV+ donor offer. Patient meets criteria for dual/enbloc. Planned immunosuppression Thymo.    CT and/pel reviewed in committee: ok from a surgical standpoint regarding size and space at current time.   Encourage living donation due to patient possibly need bilateral nephrectomies at the time of transplant.     Attempted call to patient, left message to contact coordinator.     Note written by Viry Thornton RN    ===============================================    I was present at the meeting and attest to the general consensus of the committee.   Mihir Chang Jr.

## 2024-01-05 DIAGNOSIS — Z99.2 ESRD ON HEMODIALYSIS: Primary | ICD-10-CM

## 2024-01-05 DIAGNOSIS — N18.6 ESRD ON HEMODIALYSIS: Primary | ICD-10-CM

## 2024-01-08 ENCOUNTER — HOSPITAL ENCOUNTER (OUTPATIENT)
Dept: VASCULAR SURGERY | Facility: CLINIC | Age: 44
Discharge: HOME | End: 2024-01-08
Attending: SURGERY
Payer: MEDICARE

## 2024-01-08 DIAGNOSIS — Z99.2 ESRD (END STAGE RENAL DISEASE) ON DIALYSIS: Primary | ICD-10-CM

## 2024-01-08 DIAGNOSIS — Z99.2 ESRD ON HEMODIALYSIS: ICD-10-CM

## 2024-01-08 DIAGNOSIS — N18.6 ESRD (END STAGE RENAL DISEASE) ON DIALYSIS: Primary | ICD-10-CM

## 2024-01-08 DIAGNOSIS — N18.6 ESRD ON HEMODIALYSIS: ICD-10-CM

## 2024-01-08 PROCEDURE — 93990 DOPPLER FLOW TESTING: CPT | Mod: PBBFAC,NTX | Performed by: SURGERY

## 2024-01-08 PROCEDURE — 93990 DOPPLER FLOW TESTING: CPT | Mod: 26,S$PBB,TXP, | Performed by: SURGERY

## 2024-01-09 ENCOUNTER — OFFICE VISIT (OUTPATIENT)
Dept: UROLOGY | Facility: CLINIC | Age: 44
End: 2024-01-09
Payer: MEDICARE

## 2024-01-09 ENCOUNTER — LAB VISIT (OUTPATIENT)
Dept: LAB | Facility: HOSPITAL | Age: 44
End: 2024-01-09
Payer: MEDICARE

## 2024-01-09 VITALS
SYSTOLIC BLOOD PRESSURE: 124 MMHG | HEART RATE: 85 BPM | WEIGHT: 194 LBS | HEIGHT: 71 IN | DIASTOLIC BLOOD PRESSURE: 75 MMHG | BODY MASS INDEX: 27.16 KG/M2

## 2024-01-09 DIAGNOSIS — N28.89 RENAL MASS: Primary | ICD-10-CM

## 2024-01-09 DIAGNOSIS — Z76.82 ORGAN TRANSPLANT CANDIDATE: ICD-10-CM

## 2024-01-09 DIAGNOSIS — N28.89 RENAL MASS: ICD-10-CM

## 2024-01-09 LAB
ALBUMIN SERPL BCP-MCNC: 3.9 G/DL (ref 3.5–5.2)
ALP SERPL-CCNC: 95 U/L (ref 55–135)
ALT SERPL W/O P-5'-P-CCNC: 7 U/L (ref 10–44)
ANION GAP SERPL CALC-SCNC: 13 MMOL/L (ref 8–16)
AST SERPL-CCNC: 7 U/L (ref 10–40)
BASOPHILS # BLD AUTO: 0.04 K/UL (ref 0–0.2)
BASOPHILS NFR BLD: 0.6 % (ref 0–1.9)
BILIRUB SERPL-MCNC: 0.6 MG/DL (ref 0.1–1)
BUN SERPL-MCNC: 62 MG/DL (ref 6–20)
CALCIUM SERPL-MCNC: 8.3 MG/DL (ref 8.7–10.5)
CHLORIDE SERPL-SCNC: 100 MMOL/L (ref 95–110)
CO2 SERPL-SCNC: 24 MMOL/L (ref 23–29)
CREAT SERPL-MCNC: 11.4 MG/DL (ref 0.5–1.4)
DIFFERENTIAL METHOD BLD: ABNORMAL
EOSINOPHIL # BLD AUTO: 0.1 K/UL (ref 0–0.5)
EOSINOPHIL NFR BLD: 1 % (ref 0–8)
ERYTHROCYTE [DISTWIDTH] IN BLOOD BY AUTOMATED COUNT: 13.8 % (ref 11.5–14.5)
EST. GFR  (NO RACE VARIABLE): 3.9 ML/MIN/1.73 M^2
GLUCOSE SERPL-MCNC: 73 MG/DL (ref 70–110)
HCT VFR BLD AUTO: 38.9 % (ref 37–48.5)
HGB BLD-MCNC: 12.3 G/DL (ref 12–16)
IMM GRANULOCYTES # BLD AUTO: 0.01 K/UL (ref 0–0.04)
IMM GRANULOCYTES NFR BLD AUTO: 0.2 % (ref 0–0.5)
LYMPHOCYTES # BLD AUTO: 1.5 K/UL (ref 1–4.8)
LYMPHOCYTES NFR BLD: 23.6 % (ref 18–48)
MCH RBC QN AUTO: 32.1 PG (ref 27–31)
MCHC RBC AUTO-ENTMCNC: 31.6 G/DL (ref 32–36)
MCV RBC AUTO: 102 FL (ref 82–98)
MONOCYTES # BLD AUTO: 0.4 K/UL (ref 0.3–1)
MONOCYTES NFR BLD: 6.9 % (ref 4–15)
NEUTROPHILS # BLD AUTO: 4.2 K/UL (ref 1.8–7.7)
NEUTROPHILS NFR BLD: 67.7 % (ref 38–73)
NRBC BLD-RTO: 0 /100 WBC
PLATELET # BLD AUTO: 181 K/UL (ref 150–450)
PMV BLD AUTO: 10.3 FL (ref 9.2–12.9)
POTASSIUM SERPL-SCNC: 4.2 MMOL/L (ref 3.5–5.1)
PROT SERPL-MCNC: 7.4 G/DL (ref 6–8.4)
RBC # BLD AUTO: 3.83 M/UL (ref 4–5.4)
SODIUM SERPL-SCNC: 137 MMOL/L (ref 136–145)
WBC # BLD AUTO: 6.23 K/UL (ref 3.9–12.7)

## 2024-01-09 PROCEDURE — 99204 OFFICE O/P NEW MOD 45 MIN: CPT | Mod: S$PBB,TXP,, | Performed by: UROLOGY

## 2024-01-09 PROCEDURE — 36415 COLL VENOUS BLD VENIPUNCTURE: CPT | Mod: TXP | Performed by: STUDENT IN AN ORGANIZED HEALTH CARE EDUCATION/TRAINING PROGRAM

## 2024-01-09 PROCEDURE — 80053 COMPREHEN METABOLIC PANEL: CPT | Mod: TXP | Performed by: STUDENT IN AN ORGANIZED HEALTH CARE EDUCATION/TRAINING PROGRAM

## 2024-01-09 PROCEDURE — 85025 COMPLETE CBC W/AUTO DIFF WBC: CPT | Mod: TXP | Performed by: STUDENT IN AN ORGANIZED HEALTH CARE EDUCATION/TRAINING PROGRAM

## 2024-01-09 PROCEDURE — 99999 PR PBB SHADOW E&M-EST. PATIENT-LVL IV: CPT | Mod: PBBFAC,TXP,, | Performed by: UROLOGY

## 2024-01-09 PROCEDURE — 99214 OFFICE O/P EST MOD 30 MIN: CPT | Mod: PBBFAC,TXP | Performed by: UROLOGY

## 2024-01-09 NOTE — PROGRESS NOTES
"Patient ID: Ade Silva is a 43 y.o. female.    Chief Complaint: Renal mass      HPI: Ade Silva is a 43 y.o. White female who presents today for evaluation and management of a renal mass.    The mass was found incidentally during transplant workup. Patient has a history of polycystic kidney disease and has been on dialysis since May 2023. She urinates 2-3 times a day.     The patient denies gross hematuria and/or constitutional symptoms attributable to her recently discovered renal mass.    The patient denies a personal and family history of kidney cancer.    The patient presents today to discuss how best to proceed with their left renal mass.    CTRSS 12/28/2023:   Polucystic kidney disease. There are some hyperatennuated cysts in the right kidney and a questionable mass in the left lower pole.     Has no previous abdominopelvic surgeries.           Review of patient's allergies indicates:   Allergen Reactions    Aspirin Hives     And vomiting    Oxycodone-acetaminophen Itching     Caused mild itching    Penicillins Hives     Throat swelling    Adhesive Rash     Adhesive/silk tape (type found on band aides). Can only use "Paper Tape"    Butalbital-acetaminophen-caff Nausea And Vomiting and Other (See Comments)     Dizziness (made pt feel sick)    Latex, natural rubber Rash    Nickel Rash       Current Outpatient Medications   Medication Sig Dispense Refill    acetaminophen (TYLENOL) 650 MG TbSR Take 1 tablet (650 mg total) by mouth every 8 (eight) hours. 90 tablet 0    cinacalcet (SENSIPAR) 60 MG Tab Take 1 tablet (60 mg total) by mouth daily with breakfast. 30 tablet 11    loratadine (CLARITIN ORAL) Take 1 tablet by mouth daily as needed (Allergies).      ondansetron (ZOFRAN) 4 MG tablet Take 1 tablet (4 mg total) by mouth every 6 (six) hours. 20 tablet 0    sevelamer carbonate (RENVELA) 800 mg Tab Take 2 tablets (1,600 mg total) by mouth 3 (three) times daily with meals. (Patient taking differently: " Take 800 mg by mouth 3 (three) times daily with meals. Take 4 tablets with all meals and snacks.) 180 tablet 11    sodium zirconium cyclosilicate (LOKELMA) 10 gram packet Take 1 packet (10 g total) by mouth 2 (two) times a day. Mix entire contents of packet(s) into drinking glass containing 3 tablespoons of water; stir well and drink immediately. Add water and repeat until no powder remains to receive entire dose. (Patient taking differently: Take 10 g by mouth 2 (two) times a day. Mix entire contents of packet(s) into drinking glass containing 3 tablespoons of water; stir well and drink immediately. Add water and repeat until no powder remains to receive entire dose. Use on non dialysis days) 30 packet 3    sucroferric oxyhydroxide (VELPHORO) 500 mg Chew Break or dissolve 1 tablet with each meal and snack. Do not exceed 6 tablets per day. 180 tablet 11    vitamin D (VITAMIN D3) 1000 units Tab Take 1,000 Units by mouth once daily.       No current facility-administered medications for this visit.     Facility-Administered Medications Ordered in Other Visits   Medication Dose Route Frequency Provider Last Rate Last Admin    0.9%  NaCl infusion   Intravenous Continuous Mel Calvillo MD        ondansetron disintegrating tablet 8 mg  8 mg Oral Q8H PRN Mel Calvillo MD           Past Medical History:   Diagnosis Date    Autosomal dominant polycystic kidney disease     Encounter for blood transfusion     Hypertension        Past Surgical History:   Procedure Laterality Date    AV FISTULA PLACEMENT Right 09/14/2023    Procedure: CREATION, AV FISTULA, radial cephalic;  Surgeon: MELISSA Loera II, MD;  Location: [facility] OR [address] [place] [address];  Service: Vascular;  Laterality: Right;    AV FISTULA PLACEMENT Right 10/05/2023    Procedure: CREATION, AV FISTULA - brachial;  Surgeon: MELISSA Loera II, MD;  Location: [facility] OR [address] [place] [address];  Service: Vascular;  Laterality: Right;  confirmed placement with doppler    CEREBRAL ANEURYSM  REPAIR      coiling    INSERTION OF TUNNELED CENTRAL VENOUS HEMODIALYSIS CATHETER Right 05/03/2023    Procedure: Insertion, Catheter, Central Venous, Hemodialysis;  Surgeon: Priya Grimm MD;  [place] [facility];  Service: Interventional Nephrology;  Laterality: Right;    REMOVAL OF HEMODIALYSIS CATHETER  05/03/2023    Procedure: REMOVAL, CATHETER, HEMODIALYSIS;  Surgeon: Priya Grimm MD;  [place] [facility];  Service: Interventional Nephrology;;       Family History   Problem Relation Age of Onset    Stroke Father     Heart disease Father     Polycystic kidney disease Father     Diabetes Brother          Review of Systems   Constitutional:  Negative for activity change.   HENT:  Negative for sore throat.    Eyes:  Negative for photophobia.   Respiratory:  Negative for shortness of breath.    Cardiovascular:  Negative for chest pain.   Gastrointestinal:  Negative for abdominal pain, nausea and vomiting.   Genitourinary:  Negative for dysuria, flank pain, frequency and hematuria.   Musculoskeletal:  Negative for arthralgias and joint swelling.   Skin:  Negative for pallor.   Neurological:  Negative for dizziness and headaches.     Objective:     Physical Exam  Constitutional:       General: She is not in acute distress.     Appearance: She is well-developed.   HENT:      Head: Normocephalic.   Cardiovascular:      Rate and Rhythm: Normal rate.   Pulmonary:      Effort: Pulmonary effort is normal. No respiratory distress.   Abdominal:      General: There is no distension.      Palpations: Abdomen is soft.      Tenderness: There is no abdominal tenderness.   Musculoskeletal:         General: Normal range of motion.      Cervical back: Normal range of motion.   Skin:     General: Skin is warm.   Neurological:      Mental Status: She is alert and oriented to person, place, and time.       Assessment:       1. Renal mass    2. Organ transplant candidate        Plan:     1. Renal mass    2. Organ transplant  candidate        No orders of the defined types were placed in this encounter.      - Long talk about renal mass and it's management.  Reviewed images.Discussed options including active surveillance, biopsy, minimally invasive techniques including HFRA, cryo. Discussed open and laparascopic surgical approaches. Discussed partial and radical nephrectomy. Discussed surgery preparation, surgery, recuperation, recovery, exercise restrictions. Discussed risks, benefits, and complications. Answered questions and addressed their concerns.  - We discussed a CT renal mass protocol to further evaluate the kidney.   - CXR.   - CMP  -will notify of results.       ESRD  -continue HD;     HTN:  -BP reviewed  -stable, continue meds and f/u with PCP      The patient was seen and examined with the resident physician.  All questions were answered.  The plan was discussed with the patient and I concur with the resident physician's documentation.

## 2024-01-17 ENCOUNTER — TELEPHONE (OUTPATIENT)
Dept: TRANSPLANT | Facility: CLINIC | Age: 44
End: 2024-01-17
Payer: MEDICARE

## 2024-01-23 ENCOUNTER — OFFICE VISIT (OUTPATIENT)
Dept: OBSTETRICS AND GYNECOLOGY | Facility: CLINIC | Age: 44
End: 2024-01-23
Payer: MEDICARE

## 2024-01-23 ENCOUNTER — HOSPITAL ENCOUNTER (OUTPATIENT)
Dept: RADIOLOGY | Facility: OTHER | Age: 44
Discharge: HOME | End: 2024-01-23
Attending: STUDENT IN AN ORGANIZED HEALTH CARE EDUCATION/TRAINING PROGRAM
Payer: MEDICARE

## 2024-01-23 VITALS
SYSTOLIC BLOOD PRESSURE: 132 MMHG | HEIGHT: 71 IN | BODY MASS INDEX: 27.72 KG/M2 | DIASTOLIC BLOOD PRESSURE: 84 MMHG | WEIGHT: 198 LBS

## 2024-01-23 DIAGNOSIS — N28.89 RENAL MASS: ICD-10-CM

## 2024-01-23 DIAGNOSIS — N89.8 VAGINAL ITCHING: ICD-10-CM

## 2024-01-23 DIAGNOSIS — Z86.19 HX OF TRICHOMONIASIS: Primary | ICD-10-CM

## 2024-01-23 PROCEDURE — 99212 OFFICE O/P EST SF 10 MIN: CPT | Mod: S$PBB,,,

## 2024-01-23 PROCEDURE — 99999 PR PBB SHADOW E&M-EST. PATIENT-LVL IV: CPT | Mod: PBBFAC,,,

## 2024-01-23 PROCEDURE — 25500020 PHARM REV CODE 255: Mod: TXP | Performed by: STUDENT IN AN ORGANIZED HEALTH CARE EDUCATION/TRAINING PROGRAM

## 2024-01-23 PROCEDURE — 99214 OFFICE O/P EST MOD 30 MIN: CPT | Mod: PBBFAC,25,TXP

## 2024-01-23 PROCEDURE — 71046 X-RAY EXAM CHEST 2 VIEWS: CPT | Mod: TC,FY,TXP

## 2024-01-23 PROCEDURE — 74170 CT ABD WO CNTRST FLWD CNTRST: CPT | Mod: TC,TXP

## 2024-01-23 PROCEDURE — 81514 NFCT DS BV&VAGINITIS DNA ALG: CPT | Mod: TXP

## 2024-01-23 PROCEDURE — 71046 X-RAY EXAM CHEST 2 VIEWS: CPT | Mod: 26,NTX,, | Performed by: RADIOLOGY

## 2024-01-23 PROCEDURE — 74170 CT ABD WO CNTRST FLWD CNTRST: CPT | Mod: 26,NTX,, | Performed by: RADIOLOGY

## 2024-01-23 RX ADMIN — IOHEXOL 100 ML: 350 INJECTION, SOLUTION INTRAVENOUS at 12:01

## 2024-01-23 NOTE — PROGRESS NOTES
CC: PATRICIA    HPI:  Ade Silva is a 43 y.o. female  presents for PATRICIA. Pt was positive for trich on 23. States she finished medication. Is no longer with partner. Has not had intercourse since. Does report some white discharge with itching. Denies odor.     Past Medical History:   Diagnosis Date    Autosomal dominant polycystic kidney disease     Encounter for blood transfusion     Hypertension      Past Surgical History:   Procedure Laterality Date    AV FISTULA PLACEMENT Right 2023    Procedure: CREATION, AV FISTULA, radial cephalic;  Surgeon: MELISSA Loera II, MD;  Location: [facility] OR [address] [place] [address];  Service: Vascular;  Laterality: Right;    AV FISTULA PLACEMENT Right 10/05/2023    Procedure: CREATION, AV FISTULA - brachial;  Surgeon: MELISSA Loera II, MD;  Location: [facility] OR [address] [place] [address];  Service: Vascular;  Laterality: Right;  confirmed placement with doppler    CEREBRAL ANEURYSM REPAIR      coiling    INSERTION OF TUNNELED CENTRAL VENOUS HEMODIALYSIS CATHETER Right 2023    Procedure: Insertion, Catheter, Central Venous, Hemodialysis;  Surgeon: Priya Grimm MD;  Location: [facility] CATH LAB;  Service: Interventional Nephrology;  Laterality: Right;    REMOVAL OF HEMODIALYSIS CATHETER  2023    Procedure: REMOVAL, CATHETER, HEMODIALYSIS;  Surgeon: Priya Grimm MD;  Location: [facility] CATH LAB;  Service: Interventional Nephrology;;     Social History     Tobacco Use    Smoking status: Every Day     Current packs/day: 1.00     Average packs/day: 1 pack/day for 28.0 years (28.0 ttl pk-yrs)     Types: Cigarettes     Passive exposure: Never    Smokeless tobacco: Never    Tobacco comments:     Down to 2-3 cigarettes/day    Substance Use Topics    Alcohol use: Not Currently    Drug use: Never     Family History   Problem Relation Age of Onset    Stroke Father     Heart disease Father     Polycystic kidney disease Father     Diabetes Brother      OB History    Para Term  AB Living   0 0  "0 0 0 0   SAB IAB Ectopic Multiple Live Births   0 0 0 0 0       /84   Ht 5' 11" (1.803 m)   Wt 89.8 kg (197 lb 15.6 oz)   LMP 01/07/2024 (Approximate)   BMI 27.61 kg/m²     ROS:  GENERAL: No fever, chills, fatigability or weight loss.  VULVAR: No pain, no lesions and no itching.  VAGINAL: No relaxation, no abnormal bleeding and no lesions. +itching and discharge   ABDOMEN: No abdominal pain. Denies nausea. Denies vomiting. No diarrhea. No constipation  BREAST: Denies pain. No lumps. No discharge.  URINARY: No incontinence, no nocturia, no frequency and no dysuria.  CARDIOVASCULAR: No chest pain. No shortness of breath. No leg cramps.  NEUROLOGICAL: No headaches. No vision changes.    PHYSICAL EXAM:  VULVA: normal appearing vulva with no masses, tenderness or lesions   VAGINA: normal appearing vagina with normal color. Thin white discharge, no lesions   CERVIX: normal appearing cervix without discharge or lesions     ASSESSMENT and PLAN:    ICD-10-CM ICD-9-CM    1. Hx of trichomoniasis  Z86.19 V12.09 Vaginosis Screen by DNA Probe      2. Vaginal itching  N89.8 698.1 Vaginosis Screen by DNA Probe        - AFFIRM collected     Patient was counseled today on vaginitis prevention including :  a. avoiding feminine products such as deoderant soaps, body wash, bubble bath, douches, scented toilet paper, deoderant tampons or pads, feminine wipes, chronic pad use, etc.  b. avoiding other vulvovaginal irritants such as long hot baths, humidity, tight, synthetic clothing, chlorine and sitting around in wet bathing suits  c. wearing cotton underwear, avoiding thong underwear and no underwear to bed  d. taking showers instead of baths and use a hair dryer on cool setting afterwards to dry  e. wearing cotton to exercise and shower immediately after exercise and change clothes  f. using polyurethane condoms without spermicide if sexually active and symptoms are triggered by intercourse    FOLLOW UP: PRN lack of " improvement.    Answers submitted by the patient for this visit:  Exposure to STD Questionnaire  (Submitted on 1/20/2024)  Chief Complaint: STD exposure  discharge: No  genital itching: Yes  genital rash: No  dyspareunia: No  genital lesions: Yes  pelvic pain: No  dysuria: No  Chronicity: new  Onset: more than 1 month ago  Progression since onset: gradually worsening  abdominal pain: No  anorexia: No  diaphoresis: No  fever: No  genital odor: Yes  rectal pain: No  sore throat: No  frequency: No  Improvement on treatment: mild

## 2024-01-24 LAB
BACTERIAL VAGINOSIS DNA: POSITIVE
CANDIDA GLABRATA DNA: NEGATIVE
CANDIDA KRUSEI DNA: NEGATIVE
CANDIDA RRNA VAG QL PROBE: NEGATIVE
T VAGINALIS RRNA GENITAL QL PROBE: NEGATIVE

## 2024-01-25 DIAGNOSIS — N76.0 BV (BACTERIAL VAGINOSIS): Primary | ICD-10-CM

## 2024-01-25 DIAGNOSIS — B96.89 BV (BACTERIAL VAGINOSIS): Primary | ICD-10-CM

## 2024-01-25 RX ORDER — METRONIDAZOLE 500 MG/1
500 TABLET ORAL EVERY 12 HOURS
Qty: 14 TABLET | Refills: 0 | Status: SHIPPED | OUTPATIENT
Start: 2024-01-25 | End: 2024-02-03

## 2024-01-29 DIAGNOSIS — Z76.82 ORGAN TRANSPLANT CANDIDATE: Primary | ICD-10-CM

## 2024-01-30 ENCOUNTER — PROCEDURE VISIT (OUTPATIENT)
Dept: OBSTETRICS AND GYNECOLOGY | Facility: CLINIC | Age: 44
End: 2024-01-30
Payer: MEDICARE

## 2024-01-30 VITALS
HEIGHT: 71 IN | BODY MASS INDEX: 27.06 KG/M2 | DIASTOLIC BLOOD PRESSURE: 83 MMHG | WEIGHT: 193.31 LBS | SYSTOLIC BLOOD PRESSURE: 115 MMHG

## 2024-01-30 DIAGNOSIS — Z98.890 S/P ARTERIOVENOUS (AV) FISTULA CREATION: ICD-10-CM

## 2024-01-30 DIAGNOSIS — N89.8 VAGINAL DISCHARGE: Primary | ICD-10-CM

## 2024-01-30 PROCEDURE — 99213 OFFICE O/P EST LOW 20 MIN: CPT | Mod: S$PBB,,, | Performed by: OBSTETRICS & GYNECOLOGY

## 2024-01-30 RX ORDER — TERCONAZOLE 4 MG/G
1 CREAM VAGINAL NIGHTLY
Qty: 45 G | Refills: 0 | Status: SHIPPED | OUTPATIENT
Start: 2024-01-30

## 2024-01-30 NOTE — PROGRESS NOTES
HISTORY OF PRESENT ILLNESS:    Ade Silva is a 43 y.o. female  Patient's last menstrual period was 2024 (approximate). presents today complaining of vaginal discharge.   On meds for BV, will send Terazol for yeast   Hyperpigmented area on labia majora and groin that need sampling.     Also with ASCUS cannot rule out high grade lesion     Past Medical History:   Diagnosis Date    Autosomal dominant polycystic kidney disease     Encounter for blood transfusion     Hypertension        Past Surgical History:   Procedure Laterality Date    AV FISTULA PLACEMENT Right 2023    Procedure: CREATION, AV FISTULA, radial cephalic;  Surgeon: MELISSA Loera II, MD;  Location: [facility] OR [address] [place] [address];  Service: Vascular;  Laterality: Right;    AV FISTULA PLACEMENT Right 10/05/2023    Procedure: CREATION, AV FISTULA - brachial;  Surgeon: MELISSA Lorea II, MD;  [facility];  Service: Vascular;  Laterality: Right;  confirmed placement with doppler    CEREBRAL ANEURYSM REPAIR      coiling    INSERTION OF TUNNELED CENTRAL VENOUS HEMODIALYSIS CATHETER Right 2023    Procedure: Insertion, Catheter, Central Venous, Hemodialysis;  Surgeon: Priya Grimm MD;  Location: [facility] CATH LAB;  Service: Interventional Nephrology;  Laterality: Right;    REMOVAL OF HEMODIALYSIS CATHETER  2023    Procedure: REMOVAL, CATHETER, HEMODIALYSIS;  Surgeon: Priya Grimm MD;  Location: [facility] CATH LAB;  Service: Interventional Nephrology;;       MEDICATIONS AND ALLERGIES:      Current Outpatient Medications:     acetaminophen (TYLENOL) 650 MG TbSR, Take 1 tablet (650 mg total) by mouth every 8 (eight) hours., Disp: 90 tablet, Rfl: 0    calcium carbonate (TUMS ORAL), Take by mouth., Disp: , Rfl:     cinacalcet (SENSIPAR) 60 MG Tab, Take 1 tablet (60 mg total) by mouth daily with breakfast., Disp: 30 tablet, Rfl: 11    loratadine (CLARITIN ORAL), Take 1 tablet by mouth daily as needed (Allergies)., Disp: , Rfl:      "metroNIDAZOLE (FLAGYL) 500 MG tablet, Take 1 tablet (500 mg total) by mouth every 12 (twelve) hours. for 7 days, Disp: 14 tablet, Rfl: 0    ondansetron (ZOFRAN) 4 MG tablet, Take 1 tablet (4 mg total) by mouth every 6 (six) hours., Disp: 20 tablet, Rfl: 0    sevelamer carbonate (RENVELA) 800 mg Tab, Take 2 tablets (1,600 mg total) by mouth 3 (three) times daily with meals. (Patient taking differently: Take 800 mg by mouth 3 (three) times daily with meals. Take 4 tablets with all meals and snacks.), Disp: 180 tablet, Rfl: 11    sodium zirconium cyclosilicate (LOKELMA) 10 gram packet, Take 1 packet (10 g total) by mouth 2 (two) times a day. Mix entire contents of packet(s) into drinking glass containing 3 tablespoons of water; stir well and drink immediately. Add water and repeat until no powder remains to receive entire dose., Disp: 30 packet, Rfl: 3    sucroferric oxyhydroxide (VELPHORO) 500 mg Chew, Break or dissolve 1 tablet with each meal and snack. Do not exceed 6 tablets per day., Disp: 180 tablet, Rfl: 11    vitamin D (VITAMIN D3) 1000 units Tab, Take 1,000 Units by mouth once daily., Disp: , Rfl:     terconazole (TERAZOL 7) 0.4 % Crea, Place 1 applicator vaginally every evening., Disp: 7 g, Rfl: 0  No current facility-administered medications for this visit.    Facility-Administered Medications Ordered in Other Visits:     0.9%  NaCl infusion, , Intravenous, Continuous, Mel Calvillo MD    ondansetron disintegrating tablet 8 mg, 8 mg, Oral, Q8H PRN, Mel Calvillo MD    Review of patient's allergies indicates:   Allergen Reactions    Aspirin Hives     And vomiting    Oxycodone-acetaminophen Itching     Caused mild itching    Penicillins Hives     Throat swelling    Adhesive Rash     Adhesive/silk tape (type found on band aides). Can only use "Paper Tape"    Butalbital-acetaminophen-caff Nausea And Vomiting and Other (See Comments)     Dizziness (made pt feel sick)    Latex, natural rubber Rash    Nickel " "Rash    Tomato Nausea And Vomiting       COMPREHENSIVE GYN HISTORY:  PAP History: Denies abnormal Paps.  Infection History: Denies STDs. Denies PID.  Benign History: Denies uterine fibroids. Denies ovarian cysts. Denies endometriosis. Denies other conditions.  Cancer History: Denies cervical cancer. Denies uterine cancer or hyperplasia. Denies ovarian cancer. Denies vulvar cancer or pre-cancer. Denies vaginal cancer or pre-cancer. Denies breast cancer. Denies colon cancer.  Sexual Activity History: Reports currently being sexually active  Menstrual History: Every 28 days, flows for 4 days. Light flow.  Dysmenorrhea History: Denies dysmenorrhea.  Contraception History:      ROS:  GENERAL: No fever or chills.  BREASTS: No pain. No lumps. No discharge.  ABDOMEN: No pain. No nausea. No vomiting. No diarrhea. No constipation.  REPRODUCTIVE: No abnormal bleeding.   VULVA: No pain. No lesions. No itching.  VAGINA: No relaxation. No itching. No odor. No discharge. No lesions.  URINARY: No incontinence. No nocturia. No frequency. No dysuria.    /83   Ht 5' 11" (1.803 m)   Wt 87.7 kg (193 lb 5.5 oz)   LMP 01/07/2024 (Approximate)   BMI 26.97 kg/m²     PE:  APPEARANCE: Well nourished, well developed, in no acute distress.  AFFECT: WNL, alert and oriented x 3.  ABDOMEN: Soft. No tenderness or masses. No hepatosplenomegaly. No hernias.  BREASTS: Symmetrical, no skin changes or visible lesions. No palpable masses, nipple discharge bilaterally.  PELVIC: Normal external female genitalia without lesions. Normal hair distribution. Adequate perineal body, normal urethral meatus. Vagina pink and well rugated without lesions or discharge. Cervix pink without lesions, discharge or tenderness. No significant cystocele or rectocele. Bimanual exam shows uterus to be 4-6 weeks size, regular, mobile and nontender. Adnexa without masses or tenderness.    PROCEDURES:    1. Vaginal discharge    2. S/P arteriovenous (AV) fistula " creation        PLAN:    Orders Placed This Encounter    terconazole (TERAZOL 7) 0.4 % Crea       COUNSELING:  The patient was counseled today on:    I spent a total of 15 minutes on the day of the visit. addressing problems separate from annual exam.  This includes face to face time and non-face to face time preparing to see the patient (eg, review of tests), Obtaining and/or reviewing separately obtained history, Documenting clinical information in the electronic or other health record, Independently interpreting resultsand communicating results to the patient/family/caregiver, or Care coordination.     FOLLOW-UP with me for vulvar biopsy in separate appointment for colposcopy

## 2024-02-01 ENCOUNTER — HOSPITAL ENCOUNTER (OUTPATIENT)
Dept: RADIOLOGY | Facility: HOSPITAL | Age: 44
Discharge: HOME | End: 2024-02-01
Attending: NURSE PRACTITIONER
Payer: MEDICARE

## 2024-02-01 ENCOUNTER — PATIENT MESSAGE (OUTPATIENT)
Dept: TRANSPLANT | Facility: CLINIC | Age: 44
End: 2024-02-01
Payer: MEDICARE

## 2024-02-01 DIAGNOSIS — Z76.82 ORGAN TRANSPLANT CANDIDATE: ICD-10-CM

## 2024-02-01 DIAGNOSIS — N28.89 RENAL MASS: ICD-10-CM

## 2024-02-06 ENCOUNTER — OFFICE VISIT (OUTPATIENT)
Dept: UROLOGY | Facility: CLINIC | Age: 44
End: 2024-02-06
Payer: MEDICARE

## 2024-02-06 VITALS
HEIGHT: 71 IN | SYSTOLIC BLOOD PRESSURE: 125 MMHG | DIASTOLIC BLOOD PRESSURE: 72 MMHG | WEIGHT: 198.63 LBS | BODY MASS INDEX: 27.81 KG/M2 | HEART RATE: 99 BPM

## 2024-02-06 DIAGNOSIS — N28.89 RENAL MASS: Primary | ICD-10-CM

## 2024-02-06 DIAGNOSIS — I10 PRIMARY HYPERTENSION: ICD-10-CM

## 2024-02-06 DIAGNOSIS — Z99.2 ESRD (END STAGE RENAL DISEASE) ON DIALYSIS: ICD-10-CM

## 2024-02-06 DIAGNOSIS — N18.6 ESRD (END STAGE RENAL DISEASE) ON DIALYSIS: ICD-10-CM

## 2024-02-06 DIAGNOSIS — N28.1 RENAL CYST: ICD-10-CM

## 2024-02-06 PROCEDURE — 99214 OFFICE O/P EST MOD 30 MIN: CPT | Mod: S$PBB,TXP,, | Performed by: UROLOGY

## 2024-02-06 PROCEDURE — 99999 PR PBB SHADOW E&M-EST. PATIENT-LVL III: CPT | Mod: PBBFAC,TXP,, | Performed by: UROLOGY

## 2024-02-06 PROCEDURE — 99213 OFFICE O/P EST LOW 20 MIN: CPT | Mod: PBBFAC,TXP | Performed by: UROLOGY

## 2024-02-06 NOTE — PROGRESS NOTES
"Patient ID: Ade Silva is a 43 y.o. female.    Chief Complaint: Renal mass      HPI: Ade Silva is a 43 y.o. White female who presents today for evaluation and management of a renal mass.    The mass was found incidentally during transplant workup. Patient has a history of polycystic kidney disease and has been on dialysis since May 2023. She urinates 2-3 times a day.     The patient denies gross hematuria and/or constitutional symptoms attributable to her recently discovered renal mass.    The patient denies a personal and family history of kidney cancer.    The patient presents today to discuss how best to proceed with their left renal mass.  Has no previous abdominopelvic surgeries.     CTRSS 12/28/2023:   Polucystic kidney disease. There are some hyperatennuated cysts in the right kidney and a questionable mass in the left lower pole.     CT scan of the abdomen w/wo from 1/23/24 was independently reviewed today and reveals no enhancing solid renal masses, polycystic kidney disease bilaterally.  CXR from 1/23/24 was independently reviewed today and reveals no acute findings.     Ultrasound of the kidneys from 4/28/23 was independently reviewed today and reveals possible multiple solid left renal masses.               Review of patient's allergies indicates:   Allergen Reactions    Aspirin Hives     And vomiting    Oxycodone-acetaminophen Itching     Caused mild itching    Penicillins Hives     Throat swelling    Adhesive Rash     Adhesive/silk tape (type found on band aides). Can only use "Paper Tape"    Butalbital-acetaminophen-caff Nausea And Vomiting and Other (See Comments)     Dizziness (made pt feel sick)    Latex, natural rubber Rash    Nickel Rash    Tomato Nausea And Vomiting       Current Outpatient Medications   Medication Sig Dispense Refill    acetaminophen (TYLENOL) 650 MG TbSR Take 1 tablet (650 mg total) by mouth every 8 (eight) hours. 90 tablet 0    calcium carbonate (TUMS ORAL) Take " by mouth.      cinacalcet (SENSIPAR) 60 MG Tab Take 1 tablet (60 mg total) by mouth daily with breakfast. 30 tablet 11    loratadine (CLARITIN ORAL) Take 1 tablet by mouth daily as needed (Allergies).      ondansetron (ZOFRAN) 4 MG tablet Take 1 tablet (4 mg total) by mouth every 6 (six) hours. 20 tablet 0    sevelamer carbonate (RENVELA) 800 mg Tab Take 2 tablets (1,600 mg total) by mouth 3 (three) times daily with meals. (Patient taking differently: Take 800 mg by mouth 3 (three) times daily with meals. Take 4 tablets with all meals and snacks.) 180 tablet 11    sodium zirconium cyclosilicate (LOKELMA) 10 gram packet Take 1 packet (10 g total) by mouth 2 (two) times a day. Mix entire contents of packet(s) into drinking glass containing 3 tablespoons of water; stir well and drink immediately. Add water and repeat until no powder remains to receive entire dose. 30 packet 3    sucroferric oxyhydroxide (VELPHORO) 500 mg Chew Break or dissolve 1 tablet with each meal and snack. Do not exceed 6 tablets per day. 180 tablet 11    terconazole (TERAZOL 7) 0.4 % Crea Place 1 applicator vaginally every evening. 45 g 0    vitamin D (VITAMIN D3) 1000 units Tab Take 1,000 Units by mouth once daily.       No current facility-administered medications for this visit.     Facility-Administered Medications Ordered in Other Visits   Medication Dose Route Frequency Provider Last Rate Last Admin    0.9%  NaCl infusion   Intravenous Continuous Mel Calvillo MD        ondansetron disintegrating tablet 8 mg  8 mg Oral Q8H PRN Mel Calvillo MD           Past Medical History:   Diagnosis Date    Autosomal dominant polycystic kidney disease     Encounter for blood transfusion     Hypertension        Past Surgical History:   Procedure Laterality Date    AV FISTULA PLACEMENT Right 09/14/2023    Procedure: CREATION, AV FISTULA, radial cephalic;  Surgeon: MELISSA Loera II, MD;  Location: [facility] OR [address] [place] [address];  Service: Vascular;  Laterality:  Right;    AV FISTULA PLACEMENT Right 10/05/2023    Procedure: CREATION, AV FISTULA - brachial;  Surgeon: MELISSA Loera II, MD;  Location: [facility] OR [address] [place] [address];  Service: Vascular;  Laterality: Right;  confirmed placement with doppler    CEREBRAL ANEURYSM REPAIR      coiling    INSERTION OF TUNNELED CENTRAL VENOUS HEMODIALYSIS CATHETER Right 05/03/2023    Procedure: Insertion, Catheter, Central Venous, Hemodialysis;  Surgeon: Priya Grimm MD;  Location: [facility] CATH LAB;  Service: Interventional Nephrology;  Laterality: Right;    REMOVAL OF HEMODIALYSIS CATHETER  05/03/2023    Procedure: REMOVAL, CATHETER, HEMODIALYSIS;  Surgeon: Priya Grimm MD;  Location: [facility] CATH LAB;  Service: Interventional Nephrology;;       Family History   Problem Relation Age of Onset    Stroke Father     Heart disease Father     Polycystic kidney disease Father     Diabetes Brother          Review of Systems   Constitutional:  Negative for activity change.   HENT:  Negative for sore throat.    Eyes:  Negative for photophobia.   Respiratory:  Negative for shortness of breath.    Cardiovascular:  Negative for chest pain.   Gastrointestinal:  Negative for abdominal pain, nausea and vomiting.   Genitourinary:  Negative for dysuria, flank pain, frequency and hematuria.   Musculoskeletal:  Negative for arthralgias and joint swelling.   Skin:  Negative for pallor.   Neurological:  Negative for dizziness and headaches.     Objective:     Physical Exam  Constitutional:       General: She is not in acute distress.     Appearance: She is well-developed.   HENT:      Head: Normocephalic.   Cardiovascular:      Rate and Rhythm: Normal rate.   Pulmonary:      Effort: Pulmonary effort is normal. No respiratory distress.   Abdominal:      General: There is no distension.      Palpations: Abdomen is soft.      Tenderness: There is no abdominal tenderness.   Musculoskeletal:         General: Normal range of motion.      Cervical back: Normal range of motion.    Skin:     General: Skin is warm.   Neurological:      Mental Status: She is alert and oriented to person, place, and time.       Assessment:       1. Renal mass    2. Renal cyst    3. ESRD (end stage renal disease) on dialysis    4. Primary hypertension          Plan:     1. Renal mass    2. Renal cyst    3. ESRD (end stage renal disease) on dialysis    4. Primary hypertension          Orders Placed This Encounter   Procedures    US Abdomen With Contrast 1st Lesion     Standing Status:   Future     Standing Expiration Date:   2/6/2025     Order Specific Question:   Reason for Exam:     Answer:   renal lesion     Order Specific Question:   May the Radiologist modify the order per protocol to meet the clinical needs of the patient?     Answer:   Yes     Order Specific Question:   Release to patient     Answer:   Immediate     Order Specific Question:   Is the patient pregnant?     Answer:   No       - Long talk about renal mass and it's management.  Reviewed images.Discussed options including active surveillance, biopsy, minimally invasive techniques including HFRA, cryo. Discussed open and laparascopic surgical approaches. Discussed partial and radical nephrectomy. Discussed surgery preparation, surgery, recuperation, recovery, exercise restrictions. Discussed risks, benefits, and complications. Answered questions and addressed their concerns.  - imaging reviewed as per HPI  - plan for contrasted left renal US=-----prior US concerning for solid renal masses but CT scans not obviously suggestive of RCC.   Will notify the patient of the results and next steps after contrasted renal US;      ESRD  -continue HD;     HTN:  -BP reviewed  -stable, continue meds and f/u with PCP      The patient was seen and examined with the resident physician.  All questions were answered.  The plan was discussed with the patient and I concur with the resident physician's documentation.

## 2024-02-09 DIAGNOSIS — N28.89 RENAL MASS: Primary | ICD-10-CM

## 2024-02-15 ENCOUNTER — HOSPITAL ENCOUNTER (OUTPATIENT)
Dept: RADIOLOGY | Facility: HOSPITAL | Age: 44
Discharge: HOME | End: 2024-02-15
Attending: UROLOGY
Payer: MEDICARE

## 2024-02-15 DIAGNOSIS — N28.89 RENAL MASS: ICD-10-CM

## 2024-02-15 PROCEDURE — 76770 US EXAM ABDO BACK WALL COMP: CPT | Mod: 26,TXP,, | Performed by: RADIOLOGY

## 2024-02-15 PROCEDURE — 76978 US TRGT DYN MBUBB 1ST LES: CPT | Mod: TC,TXP

## 2024-02-15 PROCEDURE — 76770 US EXAM ABDO BACK WALL COMP: CPT | Mod: TC,TXP

## 2024-02-15 PROCEDURE — 76978 US TRGT DYN MBUBB 1ST LES: CPT | Mod: 26,TXP,, | Performed by: RADIOLOGY

## 2024-02-15 NOTE — CARE UPDATE
Pt on stretcher and US in progress. SL 20g imitated and Lumason 2.5 cc given Per Dr. Diaz's orders. Pt tolerated well contrast and exam well. SL removed without s/s of bleeding or hematoma. Pt ambulated back to lobby with staff.

## 2024-02-26 ENCOUNTER — TELEPHONE (OUTPATIENT)
Dept: UROLOGY | Facility: CLINIC | Age: 44
End: 2024-02-26
Payer: MEDICARE

## 2024-03-05 ENCOUNTER — PROCEDURE VISIT (OUTPATIENT)
Dept: OBSTETRICS AND GYNECOLOGY | Facility: CLINIC | Age: 44
End: 2024-03-05
Payer: MEDICARE

## 2024-03-05 VITALS
BODY MASS INDEX: 27.59 KG/M2 | WEIGHT: 197.06 LBS | SYSTOLIC BLOOD PRESSURE: 109 MMHG | DIASTOLIC BLOOD PRESSURE: 78 MMHG | HEIGHT: 71 IN

## 2024-03-05 DIAGNOSIS — I10 HYPERTENSION, UNSPECIFIED TYPE: ICD-10-CM

## 2024-03-05 DIAGNOSIS — N18.6 ESRD (END STAGE RENAL DISEASE) ON DIALYSIS: ICD-10-CM

## 2024-03-05 DIAGNOSIS — Q61.2 AUTOSOMAL DOMINANT POLYCYSTIC KIDNEY DISEASE: ICD-10-CM

## 2024-03-05 DIAGNOSIS — E83.9 CHRONIC KIDNEY DISEASE-MINERAL AND BONE DISORDER: ICD-10-CM

## 2024-03-05 DIAGNOSIS — D64.9 ANEMIA, UNSPECIFIED TYPE: ICD-10-CM

## 2024-03-05 DIAGNOSIS — Z99.2 ESRD (END STAGE RENAL DISEASE) ON DIALYSIS: ICD-10-CM

## 2024-03-05 DIAGNOSIS — N92.0 MENORRHAGIA WITH REGULAR CYCLE: Primary | ICD-10-CM

## 2024-03-05 DIAGNOSIS — Q61.3 POLYCYSTIC KIDNEY DISEASE: Chronic | ICD-10-CM

## 2024-03-05 DIAGNOSIS — M89.9 CHRONIC KIDNEY DISEASE-MINERAL AND BONE DISORDER: ICD-10-CM

## 2024-03-05 DIAGNOSIS — N18.9 CHRONIC KIDNEY DISEASE-MINERAL AND BONE DISORDER: ICD-10-CM

## 2024-03-05 PROCEDURE — 99213 OFFICE O/P EST LOW 20 MIN: CPT | Mod: S$PBB,,, | Performed by: OBSTETRICS & GYNECOLOGY

## 2024-03-05 NOTE — PROGRESS NOTES
HISTORY OF PRESENT ILLNESS:    Ade Silva is a 43 y.o. female  Patient's last menstrual period was 2024. presents today for follow up     Last visits   - [facility] for Transplant list.     - [facility] for Trich   24 - Yeast infection, no biopsy     Menarche - 15, monthly lasting 6-10 days using 2-3 pads per day. Cycle stopped 2023 due to health concerns and restarted in 2023.   Since October monthly cycles lasting 5-7 days using 3-4 pads. In February began BTB with clots, no bleeding daily since 2024 using 2-3 tampons per day now.    CD # 14 today and patient states that cycles occasionally last 2 weeks     Colpo - Pap - Atypical squamous cells cannot exclude a High Grade Intraepithelial Lesion, + HPV 16  Vulvar biopsy - Left flesh colored lesions, Right hyperpigemented lesion on the mons   EMBX & US - Menometrorrhagia    Past Medical History:   Diagnosis Date    Autosomal dominant polycystic kidney disease     Encounter for blood transfusion     Hypertension        Past Surgical History:   Procedure Laterality Date    AV FISTULA PLACEMENT Right 2023    Procedure: CREATION, AV FISTULA, radial cephalic;  Surgeon: MELISSA Loera II, MD;  Location: [facility] OR [address] [place] [address];  Service: Vascular;  Laterality: Right;    AV FISTULA PLACEMENT Right 10/05/2023    Procedure: CREATION, AV FISTULA - brachial;  Surgeon: MELISSA Loera II, MD;  Location: [facility] OR [address] [place] [address];  Service: Vascular;  Laterality: Right;  confirmed placement with doppler    CEREBRAL ANEURYSM REPAIR      coiling    INSERTION OF TUNNELED CENTRAL VENOUS HEMODIALYSIS CATHETER Right 2023    Procedure: Insertion, Catheter, Central Venous, Hemodialysis;  Surgeon: Priya Grimm MD;  Location: [facility] CATH LAB;  Service: Interventional Nephrology;  Laterality: Right;    REMOVAL OF HEMODIALYSIS CATHETER  2023    Procedure: REMOVAL, CATHETER, HEMODIALYSIS;  Surgeon: Priya Grimm MD;  Location: [facility] CATH LAB;  Service:  Interventional Nephrology;;       MEDICATIONS AND ALLERGIES:      Current Outpatient Medications:     acetaminophen (TYLENOL) 650 MG TbSR, Take 1 tablet (650 mg total) by mouth every 8 (eight) hours., Disp: 90 tablet, Rfl: 0    calcium carbonate (TUMS ORAL), Take by mouth., Disp: , Rfl:     cinacalcet (SENSIPAR) 60 MG Tab, Take 1 tablet (60 mg total) by mouth daily with breakfast., Disp: 30 tablet, Rfl: 11    loratadine (CLARITIN ORAL), Take 1 tablet by mouth daily as needed (Allergies)., Disp: , Rfl:     ondansetron (ZOFRAN) 4 MG tablet, Take 1 tablet (4 mg total) by mouth every 6 (six) hours., Disp: 20 tablet, Rfl: 0    sevelamer carbonate (RENVELA) 800 mg Tab, Take 2 tablets (1,600 mg total) by mouth 3 (three) times daily with meals. (Patient taking differently: Take 800 mg by mouth 3 (three) times daily with meals. Take 4 tablets with all meals and snacks.), Disp: 180 tablet, Rfl: 11    sodium zirconium cyclosilicate (LOKELMA) 10 gram packet, Take 1 packet (10 g total) by mouth 2 (two) times a day. Mix entire contents of packet(s) into drinking glass containing 3 tablespoons of water; stir well and drink immediately. Add water and repeat until no powder remains to receive entire dose., Disp: 30 packet, Rfl: 3    sucroferric oxyhydroxide (VELPHORO) 500 mg Chew, Break or dissolve 1 tablet with each meal and snack. Do not exceed 6 tablets per day., Disp: 180 tablet, Rfl: 11    terconazole (TERAZOL 7) 0.4 % Crea, Place 1 applicator vaginally every evening., Disp: 45 g, Rfl: 0    vitamin D (VITAMIN D3) 1000 units Tab, Take 1,000 Units by mouth once daily., Disp: , Rfl:   No current facility-administered medications for this visit.    Facility-Administered Medications Ordered in Other Visits:     0.9%  NaCl infusion, , Intravenous, Continuous, Mel Calvillo MD    ondansetron disintegrating tablet 8 mg, 8 mg, Oral, Q8H PRN, Mel Calvillo MD    Review of patient's allergies indicates:   Allergen Reactions    Aspirin  "Hives     And vomiting    Oxycodone-acetaminophen Itching     Caused mild itching    Penicillins Hives     Throat swelling    Adhesive Rash     Adhesive/silk tape (type found on band aides). Can only use "Paper Tape"    Butalbital-acetaminophen-caff Nausea And Vomiting and Other (See Comments)     Dizziness (made pt feel sick)    Latex, natural rubber Rash    Nickel Rash    Tomato Nausea And Vomiting     COMPREHENSIVE GYN HISTORY:  PAP History: Denies abnormal Paps.  Infection History: Denies STDs. Denies PID.  Benign History: Denies uterine fibroids. Denies ovarian cysts. Denies endometriosis. Denies other conditions.  Cancer History: Denies cervical cancer. Denies uterine cancer or hyperplasia. Denies ovarian cancer. Denies vulvar cancer or pre-cancer. Denies vaginal cancer or pre-cancer. Denies breast cancer. Denies colon cancer.  Sexual Activity History: Reports currently being sexually active  Menstrual History: Every 28 days, flows for 4 days. Light flow.  Dysmenorrhea History: Denies dysmenorrhea.    ROS:  GENERAL: No fever or chills.  BREASTS: No pain. No lumps. No discharge.  ABDOMEN: No pain. No nausea. No vomiting. No diarrhea. No constipation.  REPRODUCTIVE: No abnormal bleeding.   VULVA: No pain. No lesions. No itching.  VAGINA: No relaxation. No itching. No odor. No discharge. No lesions.  URINARY: No incontinence. No nocturia. No frequency. No dysuria.    /78   Ht 5' 11" (1.803 m)   Wt 89.4 kg (197 lb 1.5 oz)   LMP 02/20/2024   BMI 27.49 kg/m²     PE:  APPEARANCE: Well nourished, well developed, in no acute distress.  AFFECT: WNL, alert and oriented x 3.  Deferred      1. Menorrhagia with regular cycle    2. Hypertension, unspecified type    3. Autosomal dominant polycystic kidney disease    4. Chronic kidney disease-mineral and bone disorder    5. ESRD (end stage renal disease) on dialysis    6. Polycystic kidney disease    7. Anemia, unspecified type      PLAN:    Orders Placed This " Encounter    US Pelvis Complete Non OB     COUNSELING:  The patient was counseled today on:    I spent a total of 15 minutes on the day of the visit. addressing problems separate from annual exam.  This includes face to face time and non-face to face time preparing to see the patient (eg, review of tests), Obtaining and/or reviewing separately obtained history, Documenting clinical information in the electronic or other health record, Independently interpreting resultsand communicating results to the patient/family/caregiver, or Care coordination.     FOLLOW-UP with me for endometrial biopsy, ultrasound, colposcopy and vulvar biopsy

## 2024-03-07 ENCOUNTER — HOSPITAL ENCOUNTER (OUTPATIENT)
Dept: RADIOLOGY | Facility: OTHER | Age: 44
Discharge: HOME | End: 2024-03-07
Attending: OBSTETRICS & GYNECOLOGY
Payer: MEDICARE

## 2024-03-07 DIAGNOSIS — N92.0 MENORRHAGIA WITH REGULAR CYCLE: ICD-10-CM

## 2024-03-07 PROCEDURE — 76856 US EXAM PELVIC COMPLETE: CPT | Mod: 26,NTX,, | Performed by: RADIOLOGY

## 2024-03-07 PROCEDURE — 76830 TRANSVAGINAL US NON-OB: CPT | Mod: 26,NTX,, | Performed by: RADIOLOGY

## 2024-03-07 PROCEDURE — 76856 US EXAM PELVIC COMPLETE: CPT | Mod: TC,NTX

## 2024-03-12 ENCOUNTER — PROCEDURE VISIT (OUTPATIENT)
Dept: OBSTETRICS AND GYNECOLOGY | Facility: CLINIC | Age: 44
End: 2024-03-12
Payer: MEDICARE

## 2024-03-12 VITALS
DIASTOLIC BLOOD PRESSURE: 80 MMHG | WEIGHT: 198.44 LBS | BODY MASS INDEX: 27.78 KG/M2 | SYSTOLIC BLOOD PRESSURE: 114 MMHG | HEIGHT: 71 IN

## 2024-03-12 DIAGNOSIS — N92.0 MENORRHAGIA WITH REGULAR CYCLE: Primary | ICD-10-CM

## 2024-03-12 DIAGNOSIS — R87.619 ABNORMAL CERVICAL PAPANICOLAOU SMEAR, UNSPECIFIED ABNORMAL PAP FINDING: ICD-10-CM

## 2024-03-12 LAB
B-HCG UR QL: NEGATIVE
CTP QC/QA: YES

## 2024-03-12 PROCEDURE — 57454 BX/CURETT OF CERVIX W/SCOPE: CPT | Mod: PBBFAC | Performed by: OBSTETRICS & GYNECOLOGY

## 2024-03-12 PROCEDURE — 88305 TISSUE EXAM BY PATHOLOGIST: CPT | Performed by: PATHOLOGY

## 2024-03-12 PROCEDURE — 88342 IMHCHEM/IMCYTCHM 1ST ANTB: CPT | Performed by: PATHOLOGY

## 2024-03-12 PROCEDURE — 58100 BIOPSY OF UTERUS LINING: CPT | Mod: PBBFAC | Performed by: OBSTETRICS & GYNECOLOGY

## 2024-03-12 PROCEDURE — 99999PBSHW POCT URINE PREGNANCY: Mod: PBBFAC,,,

## 2024-03-12 PROCEDURE — 88305 TISSUE EXAM BY PATHOLOGIST: CPT | Mod: 26,,, | Performed by: PATHOLOGY

## 2024-03-12 PROCEDURE — 81025 URINE PREGNANCY TEST: CPT | Mod: PBBFAC | Performed by: OBSTETRICS & GYNECOLOGY

## 2024-03-12 PROCEDURE — 58110 BX DONE W/COLPOSCOPY ADD-ON: CPT | Mod: S$PBB,,, | Performed by: OBSTETRICS & GYNECOLOGY

## 2024-03-12 PROCEDURE — 88342 IMHCHEM/IMCYTCHM 1ST ANTB: CPT | Mod: 26,,, | Performed by: PATHOLOGY

## 2024-03-15 LAB
FINAL PATHOLOGIC DIAGNOSIS: NORMAL
GROSS: NORMAL
Lab: NORMAL

## 2024-03-18 LAB
FINAL PATHOLOGIC DIAGNOSIS: NORMAL
GROSS: NORMAL
Lab: NORMAL

## 2024-03-18 NOTE — PROCEDURES
Biopsy (Gynecological)    Date/Time: 3/12/2024 10:00 AM    Performed by: Jazz Amador MD  Authorized by: Jazz Amador MD     Patient was prepped and draped in the normal sterile fashion.  Local anesthesia used?: No      Biopsy Location:  Uterus  Estimated blood loss (cc):  10   Patient tolerated the procedure well with no immediate complications.     Pelvic rest for 2 weeks. Bleeding, pain and fever precautions given.   Colposcopy    Date/Time: 3/12/2024 10:00 AM    Performed by: Jazz Amador MD  Authorized by: Jazz Amador MD    Consent obatined:  Prior to procedure the appropriate consent was completed and verified  Timeout:Immediately prior to procedure a time out was called to verify the correct patient, procedure, equipment, support staff and site/side marked as required  Prep:Patient was prepped and draped in the usual sterile fashion  Assistants?: No      Colposcopy Site:  Cervix  Position:  Supine  Acrowhite Lesion? Yes    Atypical Vessels: No    Transformation Zone Adequate?: Yes    Biopsy?: Yes         Location:  Cervix ((6 00))  ECC Performed?: Yes    LEEP Performed?: No     Patient tolerated the procedure well with no immediate complications.

## 2024-03-25 ENCOUNTER — PATIENT MESSAGE (OUTPATIENT)
Dept: OBSTETRICS AND GYNECOLOGY | Facility: CLINIC | Age: 44
End: 2024-03-25
Payer: MEDICARE

## 2024-03-26 ENCOUNTER — OFFICE VISIT (OUTPATIENT)
Dept: UROLOGY | Facility: CLINIC | Age: 44
End: 2024-03-26
Payer: MEDICARE

## 2024-03-26 VITALS
BODY MASS INDEX: 27.47 KG/M2 | SYSTOLIC BLOOD PRESSURE: 121 MMHG | WEIGHT: 196.19 LBS | HEIGHT: 71 IN | DIASTOLIC BLOOD PRESSURE: 82 MMHG | HEART RATE: 103 BPM

## 2024-03-26 DIAGNOSIS — N28.89 RENAL MASS: Primary | ICD-10-CM

## 2024-03-26 DIAGNOSIS — N18.6 ESRD (END STAGE RENAL DISEASE) ON DIALYSIS: ICD-10-CM

## 2024-03-26 DIAGNOSIS — I10 PRIMARY HYPERTENSION: ICD-10-CM

## 2024-03-26 DIAGNOSIS — Z99.2 ESRD (END STAGE RENAL DISEASE) ON DIALYSIS: ICD-10-CM

## 2024-03-26 PROCEDURE — 99999 PR PBB SHADOW E&M-EST. PATIENT-LVL III: CPT | Mod: PBBFAC,TXP,, | Performed by: UROLOGY

## 2024-03-26 PROCEDURE — 99214 OFFICE O/P EST MOD 30 MIN: CPT | Mod: S$PBB,NTX,, | Performed by: UROLOGY

## 2024-03-26 PROCEDURE — 99213 OFFICE O/P EST LOW 20 MIN: CPT | Mod: PBBFAC,TXP | Performed by: UROLOGY

## 2024-03-26 NOTE — PROGRESS NOTES
"Patient ID: Ade Silva is a 43 y.o. female.    Chief Complaint: Renal mass      HPI: Ade Silva is a 43 y.o. White female who presents today for evaluation and management of a renal mass.    The mass was found incidentally during transplant workup. Patient has a history of polycystic kidney disease and has been on dialysis since May 2023. She urinates 2-3 times a day.     The patient denies gross hematuria and/or constitutional symptoms attributable to her recently discovered renal mass.    The patient denies a personal and family history of kidney cancer.    The patient presents today to discuss how best to proceed with their left renal mass.  Has no previous abdominopelvic surgeries.     CTRSS 12/28/2023:   Polucystic kidney disease. There are some hyperatennuated cysts in the right kidney and a questionable mass in the left lower pole.     CT scan of the abdomen w/wo from 1/23/24 was independently reviewed today and reveals no enhancing solid renal masses, polycystic kidney disease bilaterally.  CXR from 1/23/24 was independently reviewed today and reveals no acute findings.     Ultrasound of the kidneys from 4/28/23 was independently reviewed today and reveals possible multiple solid left renal masses.     Ultrasound of the kidneys from 2/15/24 was independently reviewed today and reveals innumerable bilateral renal cysts, solid mass in left mid-lower pole, 2.6cm (down from 4.9cm previously).  Contrasted US: 2/15/24----solid renal mass, no evidence of enhancement---c/w complex renal cyst.     No past abdominal surgery.   No blood thinners.           Review of patient's allergies indicates:   Allergen Reactions    Aspirin Hives     And vomiting    Oxycodone-acetaminophen Itching     Caused mild itching    Penicillins Hives     Throat swelling    Adhesive Rash     Adhesive/silk tape (type found on band aides). Can only use "Paper Tape"    Butalbital-acetaminophen-caff Nausea And Vomiting and Other (See " Comments)     Dizziness (made pt feel sick)    Latex, natural rubber Rash    Nickel Rash    Tomato Nausea And Vomiting       Current Outpatient Medications   Medication Sig Dispense Refill    acetaminophen (TYLENOL) 650 MG TbSR Take 1 tablet (650 mg total) by mouth every 8 (eight) hours. 90 tablet 0    calcium carbonate (TUMS ORAL) Take by mouth.      cinacalcet (SENSIPAR) 60 MG Tab Take 1 tablet (60 mg total) by mouth daily with breakfast. 30 tablet 11    loratadine (CLARITIN ORAL) Take 1 tablet by mouth daily as needed (Allergies).      ondansetron (ZOFRAN) 4 MG tablet Take 1 tablet (4 mg total) by mouth every 6 (six) hours. 20 tablet 0    sevelamer carbonate (RENVELA) 800 mg Tab Take 2 tablets (1,600 mg total) by mouth 3 (three) times daily with meals. (Patient taking differently: Take 800 mg by mouth 3 (three) times daily with meals. Take 4 tablets with all meals and snacks.) 180 tablet 11    sodium zirconium cyclosilicate (LOKELMA) 10 gram packet Take 1 packet (10 g total) by mouth 2 (two) times a day. Mix entire contents of packet(s) into drinking glass containing 3 tablespoons of water; stir well and drink immediately. Add water and repeat until no powder remains to receive entire dose. 30 packet 3    sucroferric oxyhydroxide (VELPHORO) 500 mg Chew Break or dissolve 1 tablet with each meal and snack. Do not exceed 6 tablets per day. 180 tablet 11    terconazole (TERAZOL 7) 0.4 % Crea Place 1 applicator vaginally every evening. 45 g 0    vitamin D (VITAMIN D3) 1000 units Tab Take 1,000 Units by mouth once daily.       No current facility-administered medications for this visit.     Facility-Administered Medications Ordered in Other Visits   Medication Dose Route Frequency Provider Last Rate Last Admin    0.9%  NaCl infusion   Intravenous Continuous Mel Calvillo MD        ondansetron disintegrating tablet 8 mg  8 mg Oral Q8H PRN Mel Calvillo MD           Past Medical History:   Diagnosis Date    Autosomal  dominant polycystic kidney disease     Encounter for blood transfusion     Hypertension        Past Surgical History:   Procedure Laterality Date    AV FISTULA PLACEMENT Right 09/14/2023    Procedure: CREATION, AV FISTULA, radial cephalic;  Surgeon: MELISSA Loera II, MD;  [facility];  Service: Vascular;  Laterality: Right;    AV FISTULA PLACEMENT Right 10/05/2023    Procedure: CREATION, AV FISTULA - brachial;  Surgeon: MELISSA Loera II, MD;  [facility];  Service: Vascular;  Laterality: Right;  confirmed placement with doppler    CEREBRAL ANEURYSM REPAIR      coiling    INSERTION OF TUNNELED CENTRAL VENOUS HEMODIALYSIS CATHETER Right 05/03/2023    Procedure: Insertion, Catheter, Central Venous, Hemodialysis;  Surgeon: Priya Grimm MD;  Location: [facility] CATH LAB;  Service: Interventional Nephrology;  Laterality: Right;    REMOVAL OF HEMODIALYSIS CATHETER  05/03/2023    Procedure: REMOVAL, CATHETER, HEMODIALYSIS;  Surgeon: Priya Grimm MD;  Location: [facility] CATH LAB;  Service: Interventional Nephrology;;       Family History   Problem Relation Age of Onset    Stroke Father     Heart disease Father     Polycystic kidney disease Father     Diabetes Brother          Review of Systems   Constitutional:  Negative for activity change.   HENT:  Negative for sore throat.    Eyes:  Negative for photophobia.   Respiratory:  Negative for shortness of breath.    Cardiovascular:  Negative for chest pain.   Gastrointestinal:  Negative for abdominal pain, nausea and vomiting.   Genitourinary:  Negative for dysuria, flank pain, frequency and hematuria.   Musculoskeletal:  Negative for arthralgias and joint swelling.   Skin:  Negative for pallor.   Neurological:  Negative for dizziness and headaches.     Objective:     Physical Exam  Constitutional:       General: She is not in acute distress.     Appearance: She is well-developed.   HENT:      Head: Normocephalic.   Cardiovascular:      Rate and  Rhythm: Normal rate.   Pulmonary:      Effort: Pulmonary effort is normal. No respiratory distress.   Abdominal:      General: There is no distension.      Palpations: Abdomen is soft.      Tenderness: There is no abdominal tenderness.   Musculoskeletal:         General: Normal range of motion.      Cervical back: Normal range of motion.   Skin:     General: Skin is warm.   Neurological:      Mental Status: She is alert and oriented to person, place, and time.       Assessment:       1. Renal mass    2. ESRD (end stage renal disease) on dialysis    3. Primary hypertension            Plan:     1. Renal mass    2. ESRD (end stage renal disease) on dialysis    3. Primary hypertension            No orders of the defined types were placed in this encounter.      - Long talk about renal mass and it's management.  Reviewed images.Discussed options including active surveillance, biopsy, minimally invasive techniques including HFRA, cryo. Discussed open and laparascopic surgical approaches. Discussed partial and radical nephrectomy. Discussed surgery preparation, surgery, recuperation, recovery, exercise restrictions. Discussed risks, benefits, and complications. Answered questions and addressed their concerns.  - imaging reviewed as per HPI  - We discussed that the mass has shrunk on renal ultrasound and moreover this is no contrast enhancement on contrasted ultrasound imaging.   - The CT with contrast did not show significant contrast enhancement to suggest solid renal masses. CXR of the chest was negative at that time.   - All this is reassuring that the mass likely does not represent malignancy.    - We discussed repeat renal US in 6 months. Ok to proceed with renal transplant from  standpoint.          ESRD  -continue HD;     HTN:  -BP reviewed  -stable, continue meds and f/u with PCP        The patient was seen and examined with the resident physician.  All questions were answered.  The plan was discussed with the  patient and I concur with the resident physician's documentation.

## 2024-04-09 ENCOUNTER — PROCEDURE VISIT (OUTPATIENT)
Dept: OBSTETRICS AND GYNECOLOGY | Facility: CLINIC | Age: 44
End: 2024-04-09
Payer: MEDICARE

## 2024-04-09 VITALS
WEIGHT: 196.19 LBS | DIASTOLIC BLOOD PRESSURE: 60 MMHG | HEIGHT: 71 IN | BODY MASS INDEX: 27.47 KG/M2 | SYSTOLIC BLOOD PRESSURE: 100 MMHG

## 2024-04-09 DIAGNOSIS — A63.0 WARTS, GENITAL: ICD-10-CM

## 2024-04-09 DIAGNOSIS — N90.89 VULVAL LESION: ICD-10-CM

## 2024-04-09 DIAGNOSIS — N92.0 MENORRHAGIA WITH REGULAR CYCLE: Primary | ICD-10-CM

## 2024-04-09 PROCEDURE — 56605 BIOPSY OF VULVA/PERINEUM: CPT | Mod: PBBFAC | Performed by: OBSTETRICS & GYNECOLOGY

## 2024-04-09 PROCEDURE — 88305 TISSUE EXAM BY PATHOLOGIST: CPT | Mod: 26,,, | Performed by: PATHOLOGY

## 2024-04-09 PROCEDURE — 88305 TISSUE EXAM BY PATHOLOGIST: CPT | Performed by: PATHOLOGY

## 2024-04-09 PROCEDURE — 88342 IMHCHEM/IMCYTCHM 1ST ANTB: CPT | Mod: 26,,, | Performed by: PATHOLOGY

## 2024-04-09 PROCEDURE — 88342 IMHCHEM/IMCYTCHM 1ST ANTB: CPT | Performed by: PATHOLOGY

## 2024-04-16 LAB
FINAL PATHOLOGIC DIAGNOSIS: NORMAL
GROSS: NORMAL
Lab: NORMAL

## 2024-04-17 ENCOUNTER — PATIENT MESSAGE (OUTPATIENT)
Dept: OBSTETRICS AND GYNECOLOGY | Facility: CLINIC | Age: 44
End: 2024-04-17
Payer: MEDICARE

## 2024-04-18 ENCOUNTER — HOSPITAL ENCOUNTER (OUTPATIENT)
Dept: VASCULAR SURGERY | Facility: CLINIC | Age: 44
Discharge: HOME | End: 2024-04-18
Attending: SURGERY
Payer: MEDICARE

## 2024-04-18 ENCOUNTER — OFFICE VISIT (OUTPATIENT)
Dept: VASCULAR SURGERY | Facility: CLINIC | Age: 44
End: 2024-04-18
Attending: SURGERY
Payer: MEDICARE

## 2024-04-18 VITALS
WEIGHT: 200.63 LBS | HEIGHT: 71 IN | HEART RATE: 76 BPM | TEMPERATURE: 98 F | DIASTOLIC BLOOD PRESSURE: 63 MMHG | SYSTOLIC BLOOD PRESSURE: 111 MMHG | BODY MASS INDEX: 28.09 KG/M2

## 2024-04-18 DIAGNOSIS — Z99.2 ESRD ON HEMODIALYSIS: Primary | ICD-10-CM

## 2024-04-18 DIAGNOSIS — N18.6 ESRD ON HEMODIALYSIS: Primary | ICD-10-CM

## 2024-04-18 DIAGNOSIS — N18.6 ESRD ON HEMODIALYSIS: ICD-10-CM

## 2024-04-18 DIAGNOSIS — Z99.2 ESRD (END STAGE RENAL DISEASE) ON DIALYSIS: Primary | ICD-10-CM

## 2024-04-18 DIAGNOSIS — Z99.2 ESRD ON HEMODIALYSIS: ICD-10-CM

## 2024-04-18 DIAGNOSIS — Z01.818 PRE-OP EVALUATION: ICD-10-CM

## 2024-04-18 DIAGNOSIS — N18.6 ESRD (END STAGE RENAL DISEASE) ON DIALYSIS: Primary | ICD-10-CM

## 2024-04-18 PROCEDURE — 99213 OFFICE O/P EST LOW 20 MIN: CPT | Mod: S$PBB,NTX,, | Performed by: SURGERY

## 2024-04-18 PROCEDURE — 99999 PR PBB SHADOW E&M-EST. PATIENT-LVL III: CPT | Mod: PBBFAC,TXP,, | Performed by: SURGERY

## 2024-04-18 PROCEDURE — 93990 DOPPLER FLOW TESTING: CPT | Mod: PBBFAC,TXP | Performed by: SURGERY

## 2024-04-18 PROCEDURE — 99213 OFFICE O/P EST LOW 20 MIN: CPT | Mod: PBBFAC,25,TXP | Performed by: SURGERY

## 2024-04-18 PROCEDURE — 93990 DOPPLER FLOW TESTING: CPT | Mod: 26,S$PBB,TXP, | Performed by: SURGERY

## 2024-04-18 NOTE — PROGRESS NOTES
VASCULAR SURGERY CLINIC NOTE    Patient ID: Ade Silva is a 44 y.o. female.    I. HISTORY     Chief Complaint: AV ACCESS    Interval history: Pt is here today for her post-op visit s/p brachiocephalic AVF creation on 10/5/23. Still reports numbness in her right thumb otherwise she is doing well.     4/18/24: This is a 44 y.o. female who is here today for a established patient appointment. She is having HD on MWF, reports pain with needle insertion during and after the dialysis. Due to pain she has missed several sessions of HD. Also reports issues with flow intermittently requires needle re insertion. She underwent Fistulogram and PTA twice since clinic visit. (First one around 2-3 months and second one was around 3 weeks ago by Dr. Morfin)    12/14/23: This is a 44 y.o. female who is here today for a established patient appointment.  left hand dominant.  She had right arm radiocephalic AV fistula created by me on 09/14/2023 which thrombosed. She subsequently underwent brachiocephalic AVF creation on 10/5/23 which remains patient and matured nicely. Currently ESRD on HD via tunneled HD catheter in right IJ. Pertinent medical history includes AD polycystic kidney disease which has caused his renal failure. I removed her catheter months ago but She has had difficulty with cannulation lately. She is here to discuss.    Past Medical History:   Diagnosis Date    Autosomal dominant polycystic kidney disease     Encounter for blood transfusion     Hypertension         Past Surgical History:   Procedure Laterality Date    AV FISTULA PLACEMENT Right 09/14/2023    Procedure: CREATION, AV FISTULA, radial cephalic;  Surgeon: MELISSA Loera II, MD;  Location: [facility] OR [address] [place] [address];  Service: Vascular;  Laterality: Right;    AV FISTULA PLACEMENT Right 10/05/2023    Procedure: CREATION, AV FISTULA - brachial;  Surgeon: MELISSA Loera II, MD;  Location: [facility] OR [address] [place] [address];  Service: Vascular;  Laterality: Right;  confirmed  placement with doppler    CEREBRAL ANEURYSM REPAIR      coiling    INSERTION OF TUNNELED CENTRAL VENOUS HEMODIALYSIS CATHETER Right 05/03/2023    Procedure: Insertion, Catheter, Central Venous, Hemodialysis;  Surgeon: Priya Grimm MD;  Location: [facility] CATH LAB;  Service: Interventional Nephrology;  Laterality: Right;    REMOVAL OF HEMODIALYSIS CATHETER  05/03/2023    Procedure: REMOVAL, CATHETER, HEMODIALYSIS;  Surgeon: Priya Grimm MD;  Location: [facility] CATH LAB;  Service: Interventional Nephrology;;       Social History     Occupational History    Not on file   Tobacco Use    Smoking status: Every Day     Current packs/day: 1.00     Average packs/day: 1 pack/day for 28.0 years (28.0 ttl pk-yrs)     Types: Cigarettes     Passive exposure: Never    Smokeless tobacco: Never    Tobacco comments:     Down to 2-3 cigarettes/day    Substance and Sexual Activity    Alcohol use: Not Currently    Drug use: Never    Sexual activity: Not Currently       Review of Systems   Constitutional: Negative for weight loss.   HENT:  Negative for ear pain and nosebleeds.    Eyes:  Negative for discharge and pain.   Cardiovascular:  Negative for chest pain and palpitations.   Respiratory:  Negative for cough, shortness of breath and wheezing.    Endocrine: Negative for cold intolerance, heat intolerance and polyphagia.   Hematologic/Lymphatic: Negative for adenopathy. Does not bruise/bleed easily.   Skin:  Negative for itching and rash.   Musculoskeletal:  Negative for joint swelling and muscle cramps.        Intermittent pain over right UE AV Fistula   Gastrointestinal:  Negative for abdominal pain, diarrhea, nausea and vomiting.   Genitourinary:  Negative for dysuria and flank pain.   Neurological:  Negative for numbness and seizures.       Current Medications Reviewed    II. PHYSICAL EXAM     Physical Exam  Constitutional:       General: She is not in acute distress.     Appearance: Normal appearance. She is normal weight. She is not  ill-appearing or diaphoretic.   Cardiovascular:      Rate and Rhythm: Normal rate.      Comments: 2+ right radial pulse, continuous palpable thrill over right AC fossa and AV fistula.  Pulmonary:      Effort: Pulmonary effort is normal.   Musculoskeletal:      Right lower leg: No edema.      Left lower leg: No edema.   Skin:     General: Skin is warm and dry.   Neurological:      General: No focal deficit present.      Mental Status: She is alert and oriented to person, place, and time. Mental status is at baseline.   Psychiatric:         Mood and Affect: Mood normal.         Behavior: Behavior normal.       III. ASSESSMENT & PLAN (MEDICAL DECISION MAKING)       Imaging Results: (I have personally reviewed the images/studies and provided my interpretation below)  RUE AVF u/s 4/18/24: dilated branch running parallel with AVF. No evidence of stenosis with volume flow >2L/min.    Prior,  RUE AVF u/s:  Diameter greater than 6 mm throughout, depth less than 6 mm throughout prox/mid.  Flow volume 1 liter/minute.  There was slightly elevated velocities at the arterial anastomosis but I do not think there is significant stenosis.      Assessment/Diagnosis and Plan:    44 y.o. female s/p brachiocephalic the fistula creation 10/5/24. She has HD on MWF but difficulty with cannulation due to large parallel branch. Will plan fistulogram with embolization of side branch on 4/23/24. R, B, A explained to patient who expressed understanding and agreed to proceed.       MELISSA Loera II, MD, [facility]  Vascular Surgery  [facility]

## 2024-04-18 NOTE — H&P (VIEW-ONLY)
VASCULAR SURGERY CLINIC NOTE    Patient ID: Ade Silva is a 44 y.o. female.    I. HISTORY     Chief Complaint: AV ACCESS    Interval history: Pt is here today for her post-op visit s/p brachiocephalic AVF creation on 10/5/23. Still reports numbness in her right thumb otherwise she is doing well.     4/18/24: This is a 44 y.o. female who is here today for a established patient appointment. She is having HD on MWF, reports pain with needle insertion during and after the dialysis. Due to pain she has missed several sessions of HD. Also reports issues with flow intermittently requires needle re insertion. She underwent Fistulogram and PTA twice since clinic visit. (First one around 2-3 months and second one was around 3 weeks ago by Dr. Morfin)    12/14/23: This is a 44 y.o. female who is here today for a established patient appointment.  left hand dominant.  She had right arm radiocephalic AV fistula created by me on 09/14/2023 which thrombosed. She subsequently underwent brachiocephalic AVF creation on 10/5/23 which remains patient and matured nicely. Currently ESRD on HD via tunneled HD catheter in right IJ. Pertinent medical history includes AD polycystic kidney disease which has caused his renal failure. I removed her catheter months ago but She has had difficulty with cannulation lately. She is here to discuss.    Past Medical History:   Diagnosis Date    Autosomal dominant polycystic kidney disease     Encounter for blood transfusion     Hypertension         Past Surgical History:   Procedure Laterality Date    AV FISTULA PLACEMENT Right 09/14/2023    Procedure: CREATION, AV FISTULA, radial cephalic;  Surgeon: MELISSA Loera II, MD;  Location: [facility] OR [address] [place] [address];  Service: Vascular;  Laterality: Right;    AV FISTULA PLACEMENT Right 10/05/2023    Procedure: CREATION, AV FISTULA - brachial;  Surgeon: MELISSA Loera II, MD;  Location: [facility] OR [address] [place] [address];  Service: Vascular;  Laterality: Right;  confirmed  placement with doppler    CEREBRAL ANEURYSM REPAIR      coiling    INSERTION OF TUNNELED CENTRAL VENOUS HEMODIALYSIS CATHETER Right 05/03/2023    Procedure: Insertion, Catheter, Central Venous, Hemodialysis;  Surgeon: Priya Grimm MD;  Location: [facility] CATH LAB;  Service: Interventional Nephrology;  Laterality: Right;    REMOVAL OF HEMODIALYSIS CATHETER  05/03/2023    Procedure: REMOVAL, CATHETER, HEMODIALYSIS;  Surgeon: Priya Grimm MD;  Location: [facility] CATH LAB;  Service: Interventional Nephrology;;       Social History     Occupational History    Not on file   Tobacco Use    Smoking status: Every Day     Current packs/day: 1.00     Average packs/day: 1 pack/day for 28.0 years (28.0 ttl pk-yrs)     Types: Cigarettes     Passive exposure: Never    Smokeless tobacco: Never    Tobacco comments:     Down to 2-3 cigarettes/day    Substance and Sexual Activity    Alcohol use: Not Currently    Drug use: Never    Sexual activity: Not Currently       Review of Systems   Constitutional: Negative for weight loss.   HENT:  Negative for ear pain and nosebleeds.    Eyes:  Negative for discharge and pain.   Cardiovascular:  Negative for chest pain and palpitations.   Respiratory:  Negative for cough, shortness of breath and wheezing.    Endocrine: Negative for cold intolerance, heat intolerance and polyphagia.   Hematologic/Lymphatic: Negative for adenopathy. Does not bruise/bleed easily.   Skin:  Negative for itching and rash.   Musculoskeletal:  Negative for joint swelling and muscle cramps.        Intermittent pain over right UE AV Fistula   Gastrointestinal:  Negative for abdominal pain, diarrhea, nausea and vomiting.   Genitourinary:  Negative for dysuria and flank pain.   Neurological:  Negative for numbness and seizures.       Current Medications Reviewed    II. PHYSICAL EXAM     Physical Exam  Constitutional:       General: She is not in acute distress.     Appearance: Normal appearance. She is normal weight. She is not  ill-appearing or diaphoretic.   Cardiovascular:      Rate and Rhythm: Normal rate.      Comments: 2+ right radial pulse, continuous palpable thrill over right AC fossa and AV fistula.  Pulmonary:      Effort: Pulmonary effort is normal.   Musculoskeletal:      Right lower leg: No edema.      Left lower leg: No edema.   Skin:     General: Skin is warm and dry.   Neurological:      General: No focal deficit present.      Mental Status: She is alert and oriented to person, place, and time. Mental status is at baseline.   Psychiatric:         Mood and Affect: Mood normal.         Behavior: Behavior normal.       III. ASSESSMENT & PLAN (MEDICAL DECISION MAKING)       Imaging Results: (I have personally reviewed the images/studies and provided my interpretation below)  RUE AVF u/s 4/18/24: dilated branch running parallel with AVF. No evidence of stenosis with volume flow >2L/min.    Prior,  RUE AVF u/s:  Diameter greater than 6 mm throughout, depth less than 6 mm throughout prox/mid.  Flow volume 1 liter/minute.  There was slightly elevated velocities at the arterial anastomosis but I do not think there is significant stenosis.      Assessment/Diagnosis and Plan:    44 y.o. female s/p brachiocephalic the fistula creation 10/5/24. She has HD on MWF but difficulty with cannulation due to large parallel branch. Will plan fistulogram with embolization of side branch on 4/23/24. R, B, A explained to patient who expressed understanding and agreed to proceed.       MELISSA Loera II, MD, [facility]  Vascular Surgery  [facility]

## 2024-04-22 ENCOUNTER — TELEPHONE (OUTPATIENT)
Dept: VASCULAR SURGERY | Facility: CLINIC | Age: 44
End: 2024-04-22
Payer: MEDICARE

## 2024-04-22 ENCOUNTER — TELEPHONE (OUTPATIENT)
Dept: TRANSPLANT | Facility: CLINIC | Age: 44
End: 2024-04-22
Payer: MEDICARE

## 2024-04-22 NOTE — LETTER
2024    Ade Silva  [address] [place] [address] [place] [address]    Dear Ade Silva:  MRN: [id number]    Congratulations! On 2024, you were placed on  the waiting list for a  donor transplant.    Your candidacy for kidney transplant is based on the following criteria: ESRD due to  Polycystic Kidney Disease.    Your transplant coordinator while on the waiting list is Kandi Figueroa RN. They can be reached at [phone] or [phone] with any questions.      What to do now?    Ask your living donors to begin testing   Share our screening website with anyone interested: [email or web address]  Make sure donors have your name and date of birth  You will get transplanted much faster if you have a living donor    Have your blood sent to our Transplant Lab every month  If you are on dialysis - our Transplant Lab will work with your dialysis unit to send your blood every month  If you are not on dialysis   If you live near an [facility] lab, we will schedule you to have blood drawn every month  If you do not live near an [facility] lab, you will be sent blood kits in the mail. You will need to take a kit to your local lab or doctor to have your blood drawn every month and mail to the Transplant Lab.     Call us with ANY CHANGES  Phone numbers - we must be able to reach you anytime of the day or night when a kidney is available  Address  Insurance coverage  Dialysis unit or kidney doctor  [place]: if you have surgery, stay in the hospital, have to get blood, or have an infection    Review your Kidney/Kidney-Pancreas Transplant Guide   This will give you detailed information about what happens when  you are on the waiting list   you are called when a kidney is available    The [facility] has a transplant surgeon and physician available 365 days a year, 24 hours a day to coordinate organ acceptance, procurement, surgical placement and to address urgent patient care  issues.  You will be notified in writing of any changes to our Transplant Centers staffing plan that would impact your ability to receive a transplant.    Attached is a letter from the [employer] for Organ Sharing (UNOS). It describes the services and information offered to patients by [employer] and the Organ Procurement and Transplant Network. We look forward to working with you while on the waiting list.     We would like to inform you of an important OPTN (Organ Procurement and Transplant Network) Policy change that may affect the waiting time for some candidates on our waiting list.     Waiting time is important in identifying who receives offers for kidneys. A long wait time may increase your chance of getting an offer. Wait time is based on a test called eGFR that tells how well your kidneys are working. Wait time could also be based on how long you have been on dialysis. Government and health officials have changed the way this test is used. Before this year, hospitals used an eGFR that would include your race. For Black or  Americans, this eGFR could have shown that their kidneys were working better than they were.    Because of this change, we are looking at everyones record and assessing waiting time for people who are eligible. We will be reviewing everyones medical records and will contact you if you are eligible.     Who can I talk to if I have a question?  You can contact us if you have questions or send a message through [facility].     Please give us time to answer your questions. We are working on this for many patients.    How can I learn more about eGFR and this policy change?  Go to OPTN website > Patients > Kidney > FAQ: Understanding race-neutral eGFR calculations  Full URL: https://optn.transplant.hrsa.gov/patients/by-organ/kidney/understanding-the-proposal-to-require-race-neutral-egfr-calculations/  Call the Organ Procurement and Transplantation Network (OPTN) toll-free patient  services line at 1-746.670.4167    Congratulations,    Your Transplant Partner  [facility]   [facility] [place] [address]  [phone]  lh/enclosure  CC:  Priya Grimm MD          [facility]                                                The Organ Procurement and Transplantation Network   Toll-free patient services line: [phone]  Your resource for organ transplant information      Staffed 8:30 am - 5:00 pm ET Monday - Friday   Leave a message 24/7 to receive a call back    The Organ Procurement and Transplantation Network (OPTN) is the national transplant system. It makes the policies that decide how donated organs are matched to patients waiting for a transplant. The OPTN:    Makes sure donated organs get matched to people on the transplant waiting list  Tells people about the donation and transplant processes  Makes sure that the public knows about the need for more organ and tissue donations    The OPTN has a free patient services line that you can call to:  Get more information about:   o Organ donation and organ transplants   o Donation and transplant policies  Get an information kit with:   o A list of transplant hospitals   o Waiting list information  Talk about any questions you may have about your transplant hospital or organ procurement organization. The staff will do their best to help you or point you to others who may help.  Find out how you can volunteer with the OPTN and help shape transplant policy    The patient services line number is: 0-560-676-4965    Patient services line staff CANNOT answer questions about your own medical care, including:  Waiting list status  Test results  Medical records  You will need to call your transplant hospital for this information.    The following websites have more information about transplantation and donation:  OPTN: https://optn.transplant.hrsa.gov/  For potential living donors and  transplant recipients:   o Living with transplant: https://www.transplantliving.org/   o Living donation process: https://optn.transplant.hrsa.gov/living-donation/     o Financial assistance: https://www.livingdonorassistance.org/  Transplantation data: https://www.srtr.org/  Organ donation: https://www.organdonor.gov/    Volunteer with the OPTN: https://optn.transplant.hrsa.gov/get-involved

## 2024-04-22 NOTE — TELEPHONE ENCOUNTER
"KIDNEY WAIT LISTING NOTE    Date of Financial clearance to list: 24    SSN/[facility]:     Organ: Kidney    Last Name: Shira  First Name: Ade    : 1980       Gender: female        MRN#: [id number]                                   State of Permanent Residence: [address] [place] [address]  [place] [address]    Ethnicity: Not  or /a   Race:      White    CLINICAL INFORMATION   Candidate Medical Urgency Status: Active (1)  Number of Previous Kidney Transplants: 0  Number of Previous Solid Organ Transplants: 0  Did you enter number of previous kidney or other solid organ transplants? Yes  Is this Candidate a Prior Living Donor: No  (If yes, please generate letter to [employer] with patient's date of donation, recipient SSN, signed by Surgical Director after patient is listed in order to receive priority points).      ABO  ABO Blood Group:   B NEG     ABO Confirmation: (THESE DATES MUST BE PRIOR TO THE LIST DATE AND SUPPORTED BY SEPARATE LAB REPORTS)    Internal Results    Lab Results   Component Value Date    GROUPTRH B NEG 10/12/2023    GROUPTRH B NEG 2023     No results found for: "ABO"    External Results    ABO Date 1:    ABO Date 2  Are either of these ABO results based on External Labs? No  (If Yes, STOP and go to source document in Media Tab for verification).    VITALS  Height:  5' 8.27"   Weight:  173 lbs  (Use height from Transplant clinic visits only).  Did you enter height/weight? Yes    HLA    Class I:  Lab Results   Component Value Date    HZRZ1XL 2 10/12/2023    LKBP1GT XX 10/12/2023    LGVC8GE 62 10/12/2023    NUAW2CR 44 10/12/2023    IFTYC4OD 6 10/12/2023    CDECL9WF 4 10/12/2023    KSZQJ2VZ 10 10/12/2023    SGMYU5VA 7 10/12/2023       Class II:  Lab Results   Component Value Date    BWEYQE58DE 4 10/12/2023    WADWJV60FE XX 10/12/2023    LPZXJW716OG 53 10/12/2023    TSRVJJ0847 XX 10/12/2023    ZAMZM4BH 8 10/12/2023    TMPUF1ZG 7 10/12/2023       Tested for HLA Antibodies: " "Yes, antibodies detected     If result is "Positive" antibodies are detected     If result is "Negative or questionable" no antibodies detected    No results found for: "CIPRAS", "CIIPRAS"    DIALYSIS INFORMATION  Is patient Pre-Dialysis: No     GFR Information  Report GFR being used as the criteria for placement on the kidney list. If not, leave blank  GFR < or = 20 ml/min? n/a  If Yes, Specify value  ___   ml/min     Initial date GFR became 20 or less:   Is GFR obtained from an Outside lab Result? n/a  (If YES verify with source document scanned into media)    If patient on Dialysis:    Is candidate currently on dialysis for ESRD? Yes  If Yes,  Date Chronic Dialysis Started:   8/16/2023  (verify with source document in Media Tab)   Dialysis Unit Name: [facility] [address] [address]                        Physician Name:  Dr. Isabelle Abarca  NPI#: [id number]    DIABETES INFORMATION  Primary Native Kidney Diagnosis: Polycystic Kidneys  C-Peptide Value - No results found for: "CPEPTIDE"  Current Diabetes Status: None    FOR NON-KIDNEY DEPARTMENT USE ONLY:  Additional Organs Registered? none    Maximum Acceptable Number of HLA Mismatches  ABDR:     6      (0-6)               AB:               (0-4)  ADR:   _____  (0-4)              BDR: _____ (0-4)  A:        _____  (0-2)              B:      _____ (0-2)          DR: ______ (0-2)    Will Recipient Accept?   Accept HBcAB Positive Organ:            Yes  Accept HBV YOLANDA Positive Organ:        no  Accept HCV Antibody Positive Organ: yes  Accept HCV YOLANDA Positive Organ: yes    Dual Kidney and En Bloc Opt In : yes  Dual  Local:   yes  Dual Import:   yes  En Bloc Local:   yes  En Bloc Import: yes     Accept KDPI > 85: Single: no     Local: no     Import: no  Accept KDPI > 85: Dual: no     Local: no     Import: no    ### NURSE TO VERIFY CONSENT AND MAKE ANY NECESSARY CHANGES NEEDED IN UNET AT THE TIME OF VERIFICATION ###    Unacceptible " Antigens  If yes, list     Lab Results   Component Value Date    OK3GQDY A74 10/12/2023    CIABCLM WEAK A74 01/12/2024    CIIAB Negative 01/12/2024       ### DO NOT LIST IF ANTIGEN VALUE WEAK ###    eGFR Wait Time Modification    Based on Race White does patient qualify for lab review? No     If found, generate eGFR Wait Time Modification Form and scan into Media.   Send patient letter when wait time is granted.     Hep B Vaccine series completed: yes    Blood Type x2 was verified by myself and Sara Vieyra RN.  Blood type determination and reporting was completed according to the programs protocols and OPTN requirements.

## 2024-04-23 ENCOUNTER — TELEPHONE (OUTPATIENT)
Dept: TRANSPLANT | Facility: CLINIC | Age: 44
End: 2024-04-23
Payer: MEDICARE

## 2024-04-23 ENCOUNTER — PATIENT MESSAGE (OUTPATIENT)
Dept: TRANSPLANT | Facility: CLINIC | Age: 44
End: 2024-04-23
Payer: MEDICARE

## 2024-04-23 ENCOUNTER — HOSPITAL ENCOUNTER (OUTPATIENT)
Facility: HOSPITAL | Age: 44
Discharge: HOME | End: 2024-04-23
Attending: SURGERY | Admitting: SURGERY
Payer: MEDICARE

## 2024-04-23 ENCOUNTER — ANESTHESIA EVENT (OUTPATIENT)
Dept: SURGERY | Facility: HOSPITAL | Age: 44
End: 2024-04-23
Payer: MEDICARE

## 2024-04-23 ENCOUNTER — ANESTHESIA (OUTPATIENT)
Dept: SURGERY | Facility: HOSPITAL | Age: 44
End: 2024-04-23
Payer: MEDICARE

## 2024-04-23 VITALS
DIASTOLIC BLOOD PRESSURE: 53 MMHG | OXYGEN SATURATION: 96 % | TEMPERATURE: 98 F | HEIGHT: 71 IN | RESPIRATION RATE: 16 BRPM | SYSTOLIC BLOOD PRESSURE: 114 MMHG | BODY MASS INDEX: 28.09 KG/M2 | HEART RATE: 60 BPM | WEIGHT: 200.63 LBS

## 2024-04-23 DIAGNOSIS — Z76.82 ORGAN TRANSPLANT CANDIDATE: Primary | ICD-10-CM

## 2024-04-23 DIAGNOSIS — I77.0 AVF (ARTERIOVENOUS FISTULA): ICD-10-CM

## 2024-04-23 LAB — HCG INTACT+B SERPL-ACNC: <2.4 MIU/ML

## 2024-04-23 PROCEDURE — 25500020 PHARM REV CODE 255: Mod: TXP | Performed by: SURGERY

## 2024-04-23 PROCEDURE — 27201423 OPTIME MED/SURG SUP & DEVICES STERILE SUPPLY: Mod: TXP | Performed by: SURGERY

## 2024-04-23 PROCEDURE — 25000003 PHARM REV CODE 250: Mod: NTX | Performed by: SURGERY

## 2024-04-23 PROCEDURE — 37000008 HC ANESTHESIA 1ST 15 MINUTES: Mod: NTX | Performed by: SURGERY

## 2024-04-23 PROCEDURE — C1769 GUIDE WIRE: HCPCS | Mod: NTX | Performed by: SURGERY

## 2024-04-23 PROCEDURE — 71000044 HC DOSC ROUTINE RECOVERY FIRST HOUR: Mod: NTX | Performed by: SURGERY

## 2024-04-23 PROCEDURE — 25000003 PHARM REV CODE 250: Mod: NTX

## 2024-04-23 PROCEDURE — C1887 CATHETER, GUIDING: HCPCS | Mod: TXP | Performed by: SURGERY

## 2024-04-23 PROCEDURE — 36909 DIALYSIS CIRCUIT EMBOLJ: CPT | Mod: GC,NTX,, | Performed by: SURGERY

## 2024-04-23 PROCEDURE — 27800903 OPTIME MED/SURG SUP & DEVICES OTHER IMPLANTS: Mod: NTX | Performed by: SURGERY

## 2024-04-23 PROCEDURE — 63600175 PHARM REV CODE 636 W HCPCS: Mod: TXP

## 2024-04-23 PROCEDURE — 63600175 PHARM REV CODE 636 W HCPCS: Mod: TXP | Performed by: NURSE ANESTHETIST, CERTIFIED REGISTERED

## 2024-04-23 PROCEDURE — 71000015 HC POSTOP RECOV 1ST HR: Mod: TXP | Performed by: SURGERY

## 2024-04-23 PROCEDURE — 36901 INTRO CATH DIALYSIS CIRCUIT: CPT | Mod: GC,NTX,, | Performed by: SURGERY

## 2024-04-23 PROCEDURE — 36000706: Mod: TXP | Performed by: SURGERY

## 2024-04-23 PROCEDURE — D9220A PRA ANESTHESIA: Mod: ANES,NTX,, | Performed by: ANESTHESIOLOGY

## 2024-04-23 PROCEDURE — C1894 INTRO/SHEATH, NON-LASER: HCPCS | Mod: NTX | Performed by: SURGERY

## 2024-04-23 PROCEDURE — D9220A PRA ANESTHESIA: Mod: CRNA,NTX,, | Performed by: NURSE ANESTHETIST, CERTIFIED REGISTERED

## 2024-04-23 PROCEDURE — 84702 CHORIONIC GONADOTROPIN TEST: CPT | Mod: TXP | Performed by: SURGERY

## 2024-04-23 PROCEDURE — 36000707: Mod: TXP | Performed by: SURGERY

## 2024-04-23 PROCEDURE — 37000009 HC ANESTHESIA EA ADD 15 MINS: Mod: TXP | Performed by: SURGERY

## 2024-04-23 PROCEDURE — 25000003 PHARM REV CODE 250: Mod: TXP | Performed by: NURSE ANESTHETIST, CERTIFIED REGISTERED

## 2024-04-23 PROCEDURE — 71000045 HC DOSC ROUTINE RECOVERY EA ADD'L HR: Mod: TXP | Performed by: SURGERY

## 2024-04-23 PROCEDURE — 63600175 PHARM REV CODE 636 W HCPCS: Mod: TXP | Performed by: SURGERY

## 2024-04-23 DEVICE — IMPLANTABLE DEVICE: Type: IMPLANTABLE DEVICE | Site: ARM | Status: FUNCTIONAL

## 2024-04-23 RX ORDER — ONDANSETRON HYDROCHLORIDE 2 MG/ML
4 INJECTION, SOLUTION INTRAVENOUS ONCE AS NEEDED
Status: DISCONTINUED | OUTPATIENT
Start: 2024-04-23 | End: 2024-04-23 | Stop reason: HOSPADM

## 2024-04-23 RX ORDER — EPHEDRINE SULFATE 50 MG/ML
INJECTION, SOLUTION INTRAVENOUS
Status: DISCONTINUED | OUTPATIENT
Start: 2024-04-23 | End: 2024-04-23

## 2024-04-23 RX ORDER — SODIUM CHLORIDE 9 MG/ML
INJECTION, SOLUTION INTRAVENOUS CONTINUOUS
Status: DISCONTINUED | OUTPATIENT
Start: 2024-04-23 | End: 2024-04-23 | Stop reason: HOSPADM

## 2024-04-23 RX ORDER — DIPHENHYDRAMINE HYDROCHLORIDE 50 MG/ML
25 INJECTION INTRAMUSCULAR; INTRAVENOUS EVERY 6 HOURS PRN
Status: DISCONTINUED | OUTPATIENT
Start: 2024-04-23 | End: 2024-04-23 | Stop reason: HOSPADM

## 2024-04-23 RX ORDER — FENTANYL CITRATE 50 UG/ML
25 INJECTION, SOLUTION INTRAMUSCULAR; INTRAVENOUS EVERY 5 MIN PRN
Status: DISCONTINUED | OUTPATIENT
Start: 2024-04-23 | End: 2024-04-23 | Stop reason: HOSPADM

## 2024-04-23 RX ORDER — HYDROMORPHONE HYDROCHLORIDE 1 MG/ML
0.2 INJECTION, SOLUTION INTRAMUSCULAR; INTRAVENOUS; SUBCUTANEOUS EVERY 5 MIN PRN
Status: DISCONTINUED | OUTPATIENT
Start: 2024-04-23 | End: 2024-04-23 | Stop reason: HOSPADM

## 2024-04-23 RX ORDER — MIDAZOLAM HYDROCHLORIDE 1 MG/ML
INJECTION INTRAMUSCULAR; INTRAVENOUS
Status: DISCONTINUED | OUTPATIENT
Start: 2024-04-23 | End: 2024-04-23

## 2024-04-23 RX ORDER — HYDROGEN PEROXIDE 3 %
20 SOLUTION, NON-ORAL MISCELLANEOUS
COMMUNITY

## 2024-04-23 RX ORDER — HEPARIN SODIUM 1000 [USP'U]/ML
INJECTION, SOLUTION INTRAVENOUS; SUBCUTANEOUS
Status: DISCONTINUED | OUTPATIENT
Start: 2024-04-23 | End: 2024-04-23 | Stop reason: HOSPADM

## 2024-04-23 RX ORDER — DEXMEDETOMIDINE HYDROCHLORIDE 100 UG/ML
INJECTION, SOLUTION INTRAVENOUS
Status: DISCONTINUED | OUTPATIENT
Start: 2024-04-23 | End: 2024-04-23

## 2024-04-23 RX ORDER — FENTANYL CITRATE 50 UG/ML
INJECTION, SOLUTION INTRAMUSCULAR; INTRAVENOUS
Status: DISCONTINUED | OUTPATIENT
Start: 2024-04-23 | End: 2024-04-23

## 2024-04-23 RX ORDER — IODIXANOL 320 MG/ML
INJECTION, SOLUTION INTRAVASCULAR
Status: DISCONTINUED | OUTPATIENT
Start: 2024-04-23 | End: 2024-04-23 | Stop reason: HOSPADM

## 2024-04-23 RX ORDER — LIDOCAINE HYDROCHLORIDE 10 MG/ML
INJECTION, SOLUTION EPIDURAL; INFILTRATION; INTRACAUDAL; PERINEURAL
Status: DISCONTINUED | OUTPATIENT
Start: 2024-04-23 | End: 2024-04-23 | Stop reason: HOSPADM

## 2024-04-23 RX ORDER — HEPARIN SODIUM 1000 [USP'U]/ML
INJECTION, SOLUTION INTRAVENOUS; SUBCUTANEOUS
Status: DISCONTINUED | OUTPATIENT
Start: 2024-04-23 | End: 2024-04-23

## 2024-04-23 RX ADMIN — DEXMEDETOMIDINE 20 MCG: 100 INJECTION, SOLUTION, CONCENTRATE INTRAVENOUS at 02:04

## 2024-04-23 RX ADMIN — SODIUM CHLORIDE: 9 INJECTION, SOLUTION INTRAVENOUS at 02:04

## 2024-04-23 RX ADMIN — HEPARIN SODIUM 3000 UNITS: 1000 INJECTION, SOLUTION INTRAVENOUS; SUBCUTANEOUS at 02:04

## 2024-04-23 RX ADMIN — EPHEDRINE SULFATE 5 MG: 50 INJECTION INTRAVENOUS at 03:04

## 2024-04-23 RX ADMIN — FENTANYL CITRATE 100 MCG: 50 INJECTION, SOLUTION INTRAMUSCULAR; INTRAVENOUS at 02:04

## 2024-04-23 RX ADMIN — CEFAZOLIN 2 G: 2 INJECTION, POWDER, FOR SOLUTION INTRAMUSCULAR; INTRAVENOUS at 02:04

## 2024-04-23 RX ADMIN — MIDAZOLAM HYDROCHLORIDE 2 MG: 2 INJECTION, SOLUTION INTRAMUSCULAR; INTRAVENOUS at 02:04

## 2024-04-23 NOTE — ANESTHESIA PREPROCEDURE EVALUATION
04/23/2024  Ade Silva is a 44 y.o., female.  Patient Active Problem List   Diagnosis    Polycystic kidney disease    Chronic kidney disease-mineral and bone disorder    Anemia    Chest pain    Autosomal dominant polycystic kidney disease    Hypertension    Basilar artery aneurysm    Gross hematuria    Hematuria    S/P arteriovenous (AV) fistula creation    ESRD (end stage renal disease) on dialysis    Immunization counseling    Pre-transplant evaluation for kidney transplant    ESRD on hemodialysis           Pre-op Assessment          Review of Systems  Cardiovascular:     Hypertension                                  Hypertension         Renal/:  Chronic Renal Disease        Kidney Function/Disease                 Physical Exam    Airway:  No airway management difficulties anticipated  Dental:  No active dental issues noted  Chest/Lungs:  Clear to auscultation    Heart:  Rate: Normal  Rhythm: Regular Rhythm  Sounds: Normal        Anesthesia Plan  Type of Anesthesia, risks & benefits discussed:    Anesthesia Type: MAC  Informed Consent: Informed consent signed with the Patient and all parties understand the risks and agree with anesthesia plan.  All questions answered.   ASA Score: 4  Anesthesia Plan Notes: Chart reviewed. Patient seen and examined. Anesthesia plan discussed and questions answered. E-consent signed. Robel Berry MD    Ready For Surgery From Anesthesia Perspective.     .

## 2024-04-23 NOTE — PROGRESS NOTES
Lab called and stated potassium specimen hemolyzed and needs to recollected. Contacted Dr. Berry and he states he does not need a potassium level.

## 2024-04-23 NOTE — PROGRESS NOTES
TR Band  1710: Initial 3 ml of air removed from TR Band. No bleeding, no swelling noted.   1725: 3 ml of air removed from TR Band. No bleeding, no swelling noted.   1740: 3 ml of air removed from TR Band. No bleeding, no swelling noted.   1755: Pressure dressing placed on right wrist. Pt +2 radial pulse. Pt tolerated well.

## 2024-04-23 NOTE — TRANSFER OF CARE
"Anesthesia Transfer of Care Note    Patient: Ade Silva    Procedure(s) Performed: Procedure(s) (LRB):  Fistulogram (Right)  EMBOLIZATION, BLOOD VESSEL FISTULA (Right)    Patient location: [facility]    Anesthesia Type: MAC    Transport from OR: Transported from OR on 6-10 L/min O2 by face mask with adequate spontaneous ventilation    Post pain: adequate analgesia    Post assessment: no apparent anesthetic complications and tolerated procedure well    Post vital signs: stable    Level of consciousness: awake and alert    Nausea/Vomiting: no nausea/vomiting    Complications: none    Transfer of care protocol was followed    Last vitals: Visit Vitals  BP (!) 100/57 (BP Location: Left arm, Patient Position: Lying)   Pulse 68   Temp 36.7 °C (98 °F) (Oral)   Resp 18   Ht 5' 11" (1.803 m)   Wt 91 kg (200 lb 9.9 oz)   LMP 02/07/2024 (Approximate)   SpO2 99%   Breastfeeding No   BMI 27.98 kg/m²     "

## 2024-04-23 NOTE — BRIEF OP NOTE
[facility] - Surgery ([facility])  Brief Operative Note    Surgery Date: 4/23/2024     Surgeons and Role:     * MELISSA Loera II, MD - Primary     * Doron Romero MD - Resident - Assisting        Pre-op Diagnosis:  ESRD (end stage renal disease) on dialysis [N18.6, Z99.2]    Post-op Diagnosis:  Post-Op Diagnosis Codes:     * ESRD (end stage renal disease) on dialysis [N18.6, Z99.2]    Procedure(s) (LRB):  Fistulogram (Right)  EMBOLIZATION, BLOOD VESSEL FISTULA (Right)    Anesthesia: Local MAC    Operative Findings: Branch of fistula coiled. See op note.     Estimated Blood Loss: 5ml       Specimens:   Specimen (24h ago, onward)      None              Discharge Note    OUTCOME: Patient tolerated treatment/procedure well without complication and is now ready for discharge.    DISPOSITION: Home or Self Care    FINAL DIAGNOSIS:  ESRD    FOLLOWUP: In clinic    DISCHARGE INSTRUCTIONS:    Discharge Procedure Orders   Diet Adult Regular     Lifting restrictions   Order Comments: Activity as tolerated     No dressing needed     Notify your health care provider if you experience any of the following:  temperature >100.4     Notify your health care provider if you experience any of the following:  persistent nausea and vomiting or diarrhea     Notify your health care provider if you experience any of the following:  redness, tenderness, or signs of infection (pain, swelling, redness, odor or green/yellow discharge around incision site)     Notify your health care provider if you experience any of the following:  difficulty breathing or increased cough

## 2024-04-23 NOTE — PLAN OF CARE
Pt alert and orientated. Family/friend at bs. VSS. No distress noted. Pt denies N/V/D. Pt states they are ready for discharge.

## 2024-04-23 NOTE — H&P
VASCULAR SURGERY CLINIC NOTE     Patient ID: Ade Silva is a 44 y.o. female.     I. HISTORY      Chief Complaint: AV ACCESS     Interval history: Pt is here today for her post-op visit s/p brachiocephalic AVF creation on 10/5/23. Still reports numbness in her right thumb otherwise she is doing well.      4/18/24: This is a 44 y.o. female who is here today for a established patient appointment. She is having HD on MWF, reports pain with needle insertion during and after the dialysis. Due to pain she has missed several sessions of HD. Also reports issues with flow intermittently requires needle re insertion. She underwent Fistulogram and PTA twice since clinic visit. (First one around 2-3 months and second one was around 3 weeks ago by Dr. Morfin)     12/14/23: This is a 44 y.o. female who is here today for a established patient appointment.  left hand dominant.  She had right arm radiocephalic AV fistula created by me on 09/14/2023 which thrombosed. She subsequently underwent brachiocephalic AVF creation on 10/5/23 which remains patient and matured nicely. Currently ESRD on HD via tunneled HD catheter in right IJ. Pertinent medical history includes AD polycystic kidney disease which has caused his renal failure.  She has cannulated her AV fistula once with 2 needles Since her last appt.          Past Medical History:   Diagnosis Date    Autosomal dominant polycystic kidney disease      Encounter for blood transfusion      Hypertension                 Past Surgical History:   Procedure Laterality Date    AV FISTULA PLACEMENT Right 09/14/2023     Procedure: CREATION, AV FISTULA, radial cephalic;  Surgeon: MELISSA Loera II, MD;  Location: [facility] OR [address] [place] [address];  Service: Vascular;  Laterality: Right;    AV FISTULA PLACEMENT Right 10/05/2023     Procedure: CREATION, AV FISTULA - brachial;  Surgeon: MELISSA Loera II, MD;  Location: [facility] OR [address] [place] [address];  Service: Vascular;  Laterality: Right;  confirmed  placement with doppler    CEREBRAL ANEURYSM REPAIR         coiling    INSERTION OF TUNNELED CENTRAL VENOUS HEMODIALYSIS CATHETER Right 05/03/2023     Procedure: Insertion, Catheter, Central Venous, Hemodialysis;  Surgeon: Priya Grimm MD;  Location: [facility] CATH LAB;  Service: Interventional Nephrology;  Laterality: Right;    REMOVAL OF HEMODIALYSIS CATHETER   05/03/2023     Procedure: REMOVAL, CATHETER, HEMODIALYSIS;  Surgeon: Priya Grimm MD;  Location: [facility] CATH LAB;  Service: Interventional Nephrology;;         Social History            Occupational History    Not on file   Tobacco Use    Smoking status: Every Day       Current packs/day: 1.00       Average packs/day: 1 pack/day for 28.0 years (28.0 ttl pk-yrs)       Types: Cigarettes       Passive exposure: Never    Smokeless tobacco: Never    Tobacco comments:       Down to 2-3 cigarettes/day    Substance and Sexual Activity    Alcohol use: Not Currently    Drug use: Never    Sexual activity: Not Currently         Review of Systems   Constitutional: Negative for weight loss.   HENT:  Negative for ear pain and nosebleeds.    Eyes:  Negative for discharge and pain.   Cardiovascular:  Negative for chest pain and palpitations.   Respiratory:  Negative for cough, shortness of breath and wheezing.    Endocrine: Negative for cold intolerance, heat intolerance and polyphagia.   Hematologic/Lymphatic: Negative for adenopathy. Does not bruise/bleed easily.   Skin:  Negative for itching and rash.   Musculoskeletal:  Negative for joint swelling and muscle cramps.        Intermittent pain over right UE AV Fistula   Gastrointestinal:  Negative for abdominal pain, diarrhea, nausea and vomiting.   Genitourinary:  Negative for dysuria and flank pain.   Neurological:  Negative for numbness and seizures.         Current Medications Reviewed     II. PHYSICAL EXAM      Physical Exam  Constitutional:       General: She is not in acute distress.     Appearance: Normal appearance. She  is normal weight. She is not ill-appearing or diaphoretic.   Cardiovascular:      Rate and Rhythm: Normal rate.      Comments: 2+ right radial pulse, continuous palpable thrill over right AC fossa and AV fistula.  Pulmonary:      Effort: Pulmonary effort is normal.   Musculoskeletal:      Right lower leg: No edema.      Left lower leg: No edema.   Skin:     General: Skin is warm and dry.   Neurological:      General: No focal deficit present.      Mental Status: She is alert and oriented to person, place, and time. Mental status is at baseline.   Psychiatric:         Mood and Affect: Mood normal.         Behavior: Behavior normal.         III. ASSESSMENT & PLAN (MEDICAL DECISION MAKING)         Imaging Results: (I have personally reviewed the images/studies and provided my interpretation below)  RUE AVF u/s 4/18/24:      Prior,  RUE AVF u/s:  Diameter greater than 6 mm throughout, depth less than 6 mm throughout prox/mid.  Flow volume 1 liter/minute.  There was slightly elevated velocities at the arterial anastomosis but I do not think there is significant stenosis.                      Assessment/Diagnosis and Plan:     44 y.o. female s/p brachiocephalic the fistula creation 10/5/23.     She is here for right sided fistula gram with Dr. Loera. Consent obtained. Pt marked Proceed to OR.

## 2024-04-23 NOTE — ANESTHESIA POSTPROCEDURE EVALUATION
Anesthesia Post Evaluation    Patient: Ade Silva    Procedure(s) Performed: Procedure(s) (LRB):  Fistulogram (Right)  EMBOLIZATION, BLOOD VESSEL FISTULA (Right)    Final Anesthesia Type: MAC      Level of consciousness: awake and alert  Post-procedure vital signs: reviewed and stable  Pain control: Pain has been treated.  Airway patency: patent    PONV status: Absent or treated.  Anesthetic complications: no      Cardiovascular status: hemodynamically stable  Respiratory status: unassisted  Hydration status: euvolemic                Vitals Value Taken Time   /52 04/23/24 1601   Temp  04/23/24 1607   Pulse 61 04/23/24 1606   Resp 12 04/23/24 1606   SpO2 100 % 04/23/24 1606   Vitals shown include unfiled device data.      No case tracking events are documented in the log.      Pain/Geovanna Score: No data recorded

## 2024-04-25 DIAGNOSIS — N18.6 ESRD (END STAGE RENAL DISEASE) ON DIALYSIS: Primary | ICD-10-CM

## 2024-04-25 DIAGNOSIS — Z99.2 ESRD (END STAGE RENAL DISEASE) ON DIALYSIS: Primary | ICD-10-CM

## 2024-04-30 NOTE — OP NOTE
VASCULAR SURGERY OP NOTE    Date of Operation/Procedure: 4/23/24    Pre-operative Diagnosis: right arm AV fistula dysfunction    Post-operative Diagnosis: same    Anesthesia: local/MAC    Operation/Procedure Performed:   Right arm fistulogram  Embolization of AV fistula side branch with 8mm and 6mm coils    Attending Surgeon: MELISSA Loera II, MD    Resident/Fellow: Doron Romero MD PGY3    Indications: 45yo F with h/o R brachiocephalic AVF with difficulty with cannulation and large parallel branch identified on ultrasound.    Procedure in Detail:   After informed consent was obtained the patient was taken to the operating room in supine position. Appropriate hemodynamic monitors were put in place by the nurse. The right arm was prepped and draped in normal sterile fashion. A time-out was performed to confirm the appropriate patient, procedure, and laterality.  Using ultrasound guidance we obtained percutaneous retrograde access the right radial artery using a 4/5F radial sheath. The micropuncture sheath was advanced over the wire and a fistulogram was performed through the sheath. Fistologram showed aneurysmal dilation of the proximal fistula with a stent in place. There was a large branch coming off of the fistula on the lateral aspect stealing flow from the fistula. There was no evidence of stenosis in the remainder of the fistula. 3000 units of heparin was administered. A glide wire was then advanced through the sheath and into the fistula. We then were able to cannulate the branch with a glide catheter and glide wire. We then embolized the branch with two 8mm Mray coils and one 6mm Mray coil. WE then repeated the fistulogram which showed minimal flow through the branch and brisk flow through the fistula. We repeated imaging with the catheter parked in the proximal brachial artery to better visualize the proximal aneurysmal portion to plan any potential future intervention. The catheter and wire were then  removed from the sheath. The sheath was removed and TR band placed for hemostasis. The patient tolerated the procedure well and was transported to the post-op area for recovery.    Estimated Blood loss: 10ml    Complications: none    MELISSA Loera II, MD, VI  Vascular Surgeon  [facility]

## 2024-05-06 NOTE — PROCEDURES
Biopsy (Gynecological)    Date/Time: 4/9/2024 10:00 AM    Performed by: Jazz Amador MD  Authorized by: Jazz Amador MD    Consent obtained:  Prior to procedure the appropriate consent was completed and verified   Patient was prepped and draped in the normal sterile fashion.  Local anesthesia used?: Yes    Anesthesia:  Local infiltration  Local anesthetic:  Lidocaine 1% without epinephrine  Anesthetic total (ml):  5    Biopsy Location:  Vulva  Vulva:     # of lesions:  1  Estimated blood loss (cc):  10   Patient tolerated the procedure well with no immediate complications.     Pelvic rest for 2 weeks. Bleeding, pain and fever precautions given.

## 2024-05-09 ENCOUNTER — OFFICE VISIT (OUTPATIENT)
Dept: VASCULAR SURGERY | Facility: CLINIC | Age: 44
End: 2024-05-09
Attending: SURGERY
Payer: MEDICARE

## 2024-05-09 ENCOUNTER — HOSPITAL ENCOUNTER (OUTPATIENT)
Dept: VASCULAR SURGERY | Facility: CLINIC | Age: 44
Discharge: HOME | End: 2024-05-09
Attending: SURGERY
Payer: MEDICARE

## 2024-05-09 VITALS
BODY MASS INDEX: 27.78 KG/M2 | SYSTOLIC BLOOD PRESSURE: 112 MMHG | DIASTOLIC BLOOD PRESSURE: 70 MMHG | TEMPERATURE: 98 F | HEIGHT: 71 IN | WEIGHT: 198.44 LBS | HEART RATE: 70 BPM

## 2024-05-09 DIAGNOSIS — N18.6 ESRD (END STAGE RENAL DISEASE) ON DIALYSIS: Primary | ICD-10-CM

## 2024-05-09 DIAGNOSIS — Z99.2 ESRD (END STAGE RENAL DISEASE) ON DIALYSIS: ICD-10-CM

## 2024-05-09 DIAGNOSIS — N18.6 ESRD (END STAGE RENAL DISEASE) ON DIALYSIS: ICD-10-CM

## 2024-05-09 DIAGNOSIS — Z99.2 ESRD (END STAGE RENAL DISEASE) ON DIALYSIS: Primary | ICD-10-CM

## 2024-05-09 PROCEDURE — 99213 OFFICE O/P EST LOW 20 MIN: CPT | Mod: PBBFAC,25,TXP | Performed by: SURGERY

## 2024-05-09 PROCEDURE — 99999 PR PBB SHADOW E&M-EST. PATIENT-LVL III: CPT | Mod: PBBFAC,TXP,, | Performed by: SURGERY

## 2024-05-09 PROCEDURE — 93990 DOPPLER FLOW TESTING: CPT | Mod: 26,S$PBB,TXP, | Performed by: SURGERY

## 2024-05-09 PROCEDURE — 99213 OFFICE O/P EST LOW 20 MIN: CPT | Mod: S$PBB,NTX,, | Performed by: SURGERY

## 2024-05-09 PROCEDURE — 93990 DOPPLER FLOW TESTING: CPT | Mod: PBBFAC,NTX | Performed by: SURGERY

## 2024-05-18 NOTE — PROGRESS NOTES
VASCULAR SURGERY CLINIC NOTE    Patient ID: Ade Silva is a 44 y.o. female.    I. HISTORY     Chief Complaint: AV ACCESS    HPI: 43yo F with ESRD secondary to AD PKD. She has patent RUE brachiocephalic AVF and underwent embolization of large side branch with me a couple weeks ago. She has had improved cannulation since that time likely since they have not been cannulating the side branch. She says she gets cannulated in the proximal AVF adjacent to the AC fossa. She has stents placed by Dr. Morfin in the proximal AVF.      Past Medical History:   Diagnosis Date    Autosomal dominant polycystic kidney disease     Encounter for blood transfusion     Hypertension         Past Surgical History:   Procedure Laterality Date    AV FISTULA PLACEMENT Right 09/14/2023    Procedure: CREATION, AV FISTULA, radial cephalic;  Surgeon: MELISSA Loera II, MD;  [facility];  Service: Vascular;  Laterality: Right;    AV FISTULA PLACEMENT Right 10/05/2023    Procedure: CREATION, AV FISTULA - brachial;  Surgeon: MELISSA Loera II, MD;  Location: [facility] OR [facility] FLR;  Service: Vascular;  Laterality: Right;  confirmed placement with doppler    CEREBRAL ANEURYSM REPAIR      coiling    EMBOLIZATION Right 4/23/2024    Procedure: EMBOLIZATION, BLOOD VESSEL FISTULA;  Surgeon: MELISSA Loera II, MD;  Location: [facility] OR [facility] FLR;  Service: Vascular;  Laterality: Right;    FISTULOGRAM Right 4/23/2024    Procedure: Fistulogram;  Surgeon: MELISSA Loera II, MD;  Location: [facility] OR [facility] FLR;  Service: Vascular;  Laterality: Right;  CONTRAST: 68ML, FLUORO TIME: 17.9, mGy: 45.11, Gycm2: 5.9548    INSERTION OF TUNNELED CENTRAL VENOUS HEMODIALYSIS CATHETER Right 05/03/2023    Procedure: Insertion, Catheter, Central Venous, Hemodialysis;  Surgeon: Priya Grimm MD;  Location: [facility] CATH LAB;  Service: Interventional Nephrology;  Laterality: Right;    REMOVAL OF HEMODIALYSIS CATHETER  05/03/2023    Procedure: REMOVAL,  CATHETER, HEMODIALYSIS;  Surgeon: Priya Grimm MD;  Location: [facility] CATH LAB;  Service: Interventional Nephrology;;       Social History     Occupational History    Not on file   Tobacco Use    Smoking status: Every Day     Current packs/day: 1.00     Average packs/day: 1 pack/day for 28.0 years (28.0 ttl pk-yrs)     Types: Cigarettes     Passive exposure: Never    Smokeless tobacco: Never    Tobacco comments:     Down to 2-3 cigarettes/day    Substance and Sexual Activity    Alcohol use: Not Currently    Drug use: Never    Sexual activity: Not Currently       Review of Systems   Constitutional: Negative for weight loss.   HENT:  Negative for ear pain and nosebleeds.    Eyes:  Negative for discharge and pain.   Cardiovascular:  Negative for chest pain and palpitations.   Respiratory:  Negative for cough, shortness of breath and wheezing.    Endocrine: Negative for cold intolerance, heat intolerance and polyphagia.   Hematologic/Lymphatic: Negative for adenopathy. Does not bruise/bleed easily.   Skin:  Negative for itching and rash.   Musculoskeletal:  Negative for joint swelling and muscle cramps.   Gastrointestinal:  Negative for abdominal pain, diarrhea, nausea and vomiting.   Genitourinary:  Negative for dysuria and flank pain.   Neurological:  Negative for numbness and seizures.       Current Medications Reviewed    II. PHYSICAL EXAM     Physical Exam  Constitutional:       General: She is not in acute distress.     Appearance: Normal appearance. She is normal weight. She is not ill-appearing or diaphoretic.   Cardiovascular:      Rate and Rhythm: Normal rate.      Comments: 2+ right radial pulse, continuous palpable thrill over right AC fossa and AV fistula.  Pulmonary:      Effort: Pulmonary effort is normal.   Musculoskeletal:      Right lower leg: No edema.      Left lower leg: No edema.   Skin:     General: Skin is warm and dry.   Neurological:      General: No focal deficit present.      Mental Status: She  is alert and oriented to person, place, and time. Mental status is at baseline.   Psychiatric:         Mood and Affect: Mood normal.         Behavior: Behavior normal.       III. ASSESSMENT & PLAN (MEDICAL DECISION MAKING)       Imaging Results: (I have personally reviewed the images/studies and provided my interpretation below)  RUE AVF u/s 5/9/24: Patent AVF with volume flow 2.2L/min. Aneurysmal proximal segment of the fistula less than 3cm. Stents in place in proximal AVG. No evidence of stenosis.    Prior,  RUE AVF u/s:  Diameter greater than 6 mm throughout, depth less than 6 mm throughout prox/mid.  Flow volume 1 liter/minute.  There was slightly elevated velocities at the arterial anastomosis but I do not think there is significant stenosis.      Assessment/Diagnosis and Plan:    44 y.o. female s/p brachiocephalic the fistula creation 10/5/24. She has had improved cannulation since embolization of her large side branch. She has an aneurysmal segment of the fistula just beyond the arterial anastomosis and unfortunately had a stent placed in the proximal AVF by Dr. Morfin which I suspect is interfering with cannulation. I recommended that she cannulate above the level of the stent. If she is unable to cannulate successfully then she will likely need resection of tbe aneurysm and stents with graft interposition in the proximal fistula.     -instructed to cannulate in mid/upper arm AVG away from her stents/aneurysm  -RTC 3 months for surveillance u/s-- sooner if difficulty using the AVF    MELISSA Loera II, MD, [facility]  Vascular Surgery  [facility]

## 2024-05-20 DIAGNOSIS — N18.6 ESRD (END STAGE RENAL DISEASE) ON DIALYSIS: Primary | ICD-10-CM

## 2024-05-20 DIAGNOSIS — Z99.2 ESRD (END STAGE RENAL DISEASE) ON DIALYSIS: Primary | ICD-10-CM

## 2024-05-27 ENCOUNTER — PATIENT MESSAGE (OUTPATIENT)
Dept: VASCULAR SURGERY | Facility: CLINIC | Age: 44
End: 2024-05-27
Payer: MEDICARE

## 2024-05-30 ENCOUNTER — OFFICE VISIT (OUTPATIENT)
Dept: OBSTETRICS AND GYNECOLOGY | Facility: CLINIC | Age: 44
End: 2024-05-30
Payer: MEDICARE

## 2024-05-30 VITALS
SYSTOLIC BLOOD PRESSURE: 112 MMHG | HEIGHT: 71 IN | DIASTOLIC BLOOD PRESSURE: 70 MMHG | BODY MASS INDEX: 27.47 KG/M2 | WEIGHT: 196.19 LBS

## 2024-05-30 DIAGNOSIS — N90.89 VULVAL LESION: ICD-10-CM

## 2024-05-30 DIAGNOSIS — N92.0 MENORRHAGIA WITH REGULAR CYCLE: ICD-10-CM

## 2024-05-30 DIAGNOSIS — N92.6 IRREGULAR MENSTRUAL CYCLE: ICD-10-CM

## 2024-05-30 DIAGNOSIS — N87.9 CERVICAL DYSPLASIA: Primary | ICD-10-CM

## 2024-05-30 DIAGNOSIS — N84.0 ENDOMETRIAL POLYP: ICD-10-CM

## 2024-05-30 PROCEDURE — 99213 OFFICE O/P EST LOW 20 MIN: CPT | Mod: S$PBB,NTX,, | Performed by: OBSTETRICS & GYNECOLOGY

## 2024-05-30 PROCEDURE — 99215 OFFICE O/P EST HI 40 MIN: CPT | Mod: PBBFAC,PN,TXP | Performed by: OBSTETRICS & GYNECOLOGY

## 2024-05-30 PROCEDURE — 99999 PR PBB SHADOW E&M-EST. PATIENT-LVL V: CPT | Mod: PBBFAC,TXP,, | Performed by: OBSTETRICS & GYNECOLOGY

## 2024-05-30 NOTE — PROGRESS NOTES
HISTORY OF PRESENT ILLNESS:    Ade Silva is a 44 y.o. female  Patient's last menstrual period was 2024 (approximate). presents today for follow up.     Menarche - 15, monthly lasting 6-10 days using 2-3 pads per day. Cycle stopped 2023 due to health concerns and restarted in 2023.   Since October monthly cycles lasting 5-14 days using 3-4 pads. In February began BTB with clots, no bleeding daily since 2024 using 2-3 tampons per day now.    EMBX 3/12/2024 - Final Pathologic Diagnosis Fragments of proliferative endometrium with tubal metaplasia present.  One fragment shows prominent vessels and may represent benign polyp formation.  No atypical hyperplasia or malignancy identified.    Colpo - Pap - 2023 - Atypical squamous cells cannot exclude a High Grade Intraepithelial Lesion, + HPV 16  Final Pathologic Diagnosis   1. Cervical biopsy at 1 o'clock shows detached fragments of squamous epithelium with severe dysplasia (TAYLOR 3).  Immunostain for p16 shows full-thickness epithelial staining.  The control stains appropriately.  2. Cervical biopsy at 6 o'clock shows mild-to-moderate squamous dysplasia (CIN1-2).    3. Cervical biopsy at 9 o'clock shows a single detached fragment of squamous epithelium with severe dysplasia (TAYLOR 3).    4. Endocervical curettage shows fragments of benign endocervical glandular epithelium and detached fragments of squamous epithelium moderate dysplasia (TAYLOR 2).    Vulvar biopsy - Left flesh colored lesions, Right hyperpigemented lesion on the mons   Final Pathologic Diagnosis Vulvar biopsy shows a condyloma acuminatum.  Immunostain for p16 is positive.  The control stains appropriately.    Past Medical History:   Diagnosis Date    Autosomal dominant polycystic kidney disease     Brain aneurysm     Encounter for blood transfusion     Hypertension        Past Surgical History:   Procedure Laterality Date    AV FISTULA PLACEMENT Right 2023     Procedure: CREATION, AV FISTULA, radial cephalic;  Surgeon: MELISSA Loera II, MD;  Location: [facility] OR [facility] FLR;  Service: Vascular;  Laterality: Right;    AV FISTULA PLACEMENT Right 10/05/2023    Procedure: CREATION, AV FISTULA - brachial;  Surgeon: MELISSA Loera II, MD;  Location: [facility] OR [facility] FLR;  Service: Vascular;  Laterality: Right;  confirmed placement with doppler    CEREBRAL ANEURYSM REPAIR      coiling    EMBOLIZATION Right 4/23/2024    Procedure: EMBOLIZATION, BLOOD VESSEL FISTULA;  Surgeon: MELISSA Loera II, MD;  Location: [facility] OR [facility] FLR;  Service: Vascular;  Laterality: Right;    FISTULOGRAM Right 4/23/2024    Procedure: Fistulogram;  Surgeon: MELISSA Loera II, MD;  Location: [facility] OR [facility] FLR;  Service: Vascular;  Laterality: Right;  CONTRAST: 68ML, FLUORO TIME: 17.9, mGy: 45.11, Gycm2: 5.9548    INSERTION OF TUNNELED CENTRAL VENOUS HEMODIALYSIS CATHETER Right 05/03/2023    Procedure: Insertion, Catheter, Central Venous, Hemodialysis;  Surgeon: Priya Grimm MD;  Location: [facility] CATH LAB;  Service: Interventional Nephrology;  Laterality: Right;    REMOVAL OF HEMODIALYSIS CATHETER  05/03/2023    Procedure: REMOVAL, CATHETER, HEMODIALYSIS;  Surgeon: Priya Grimm MD;  Location: [facility] CATH LAB;  Service: Interventional Nephrology;;       MEDICATIONS AND ALLERGIES:      Current Outpatient Medications:     acetaminophen (TYLENOL) 650 MG TbSR, Take 1 tablet (650 mg total) by mouth every 8 (eight) hours., Disp: 90 tablet, Rfl: 0    loratadine (CLARITIN ORAL), Take 1 tablet by mouth daily as needed (Allergies)., Disp: , Rfl:     ondansetron (ZOFRAN) 4 MG tablet, Take 1 tablet (4 mg total) by mouth every 6 (six) hours., Disp: 20 tablet, Rfl: 0    sodium zirconium cyclosilicate (LOKELMA) 10 gram packet, Take 1 packet (10 g total) by mouth 2 (two) times a day. Mix entire contents of packet(s) into drinking glass containing 3 tablespoons of water; stir well and drink immediately. Add water and repeat  "until no powder remains to receive entire dose., Disp: 30 packet, Rfl: 3    sucroferric oxyhydroxide (VELPHORO) 500 mg Chew, Break or dissolve 1 tablet with each meal and snack. Do not exceed 6 tablets per day., Disp: 180 tablet, Rfl: 11    cinacalcet (SENSIPAR) 30 MG Tab, Take 1 tablet (30 mg total) by mouth daily with breakfast., Disp: 30 tablet, Rfl: 11    gabapentin (NEURONTIN) 100 MG capsule, Take one 100 mg capsule three times a week following dialysis., Disp: 24 capsule, Rfl: 0    metroNIDAZOLE (METROGEL VAGINAL) 0.75 % (37.5mg/5 gram) vaginal gel, Place 1 applicator vaginally once daily. Use at bedtime for 7 days, Disp: 70 g, Rfl: 0  No current facility-administered medications for this visit.    Facility-Administered Medications Ordered in Other Visits:     0.9%  NaCl infusion, , Intravenous, Continuous, Mel Calvillo MD    ondansetron disintegrating tablet 8 mg, 8 mg, Oral, Q8H PRN, Mel Calvillo MD    Review of patient's allergies indicates:   Allergen Reactions    Aspirin Hives     And vomiting    Penicillins Hives     Throat swelling    Adhesive Rash     Adhesive/silk tape (type found on band aides). Can only use "Paper Tape"    Butalbital-acetaminophen-caff Nausea And Vomiting and Other (See Comments)     Dizziness (made pt feel sick)    Latex, natural rubber Rash    Nickel Rash    Tomato Nausea And Vomiting       COMPREHENSIVE GYN HISTORY:  PAP History: Denies abnormal Paps.  Infection History: Denies STDs. Denies PID.  Benign History: Denies uterine fibroids. Denies ovarian cysts. Denies endometriosis. Denies other conditions.  Cancer History: Denies cervical cancer. Denies uterine cancer or hyperplasia. Denies ovarian cancer. Denies vulvar cancer or pre-cancer. Denies vaginal cancer or pre-cancer. Denies breast cancer. Denies colon cancer.  Sexual Activity History: Reports currently being sexually active  Menstrual History: Every 28 days, flows for 4 days. Light flow.  Dysmenorrhea History: " "Denies dysmenorrhea.    ROS:  GENERAL: No fever or chills.  BREASTS: No pain. No lumps. No discharge.  ABDOMEN: No pain. No nausea. No vomiting. No diarrhea. No constipation.  REPRODUCTIVE: No abnormal bleeding.   VULVA: No pain. No lesions. No itching.  VAGINA: No relaxation. No itching. No odor. No discharge. No lesions.  URINARY: No incontinence. No nocturia. No frequency. No dysuria.    /70   Ht 5' 11" (1.803 m)   Wt 89 kg (196 lb 3.4 oz)   LMP 03/12/2024 (Approximate)   BMI 27.37 kg/m²     PE:  APPEARANCE: Well nourished, well developed, in no acute distress.  AFFECT: WNL, alert and oriented x 3.  Deferred    1. Cervical dysplasia    2. Irregular menstrual cycle    3. Endometrial polyp    4. Menorrhagia with regular cycle    5. Vulval lesion      PLAN:    Orders Placed This Encounter    Case Request Operating Room: ABLATION, ENDOMETRIUM, THERMAL   Vulvar lesion removal   LEEP    COUNSELING:  The patient was counseled today on:    I spent a total of 15 minutes on the day of the visit. addressing problems separate from annual exam.  This includes face to face time and non-face to face time preparing to see the patient (eg, review of tests), Obtaining and/or reviewing separately obtained history, Documenting clinical information in the electronic or other health record, Independently interpreting resultsand communicating results to the patient/family/caregiver, or Care coordination.     FOLLOW-UP with me for pre-op visit.   "

## 2024-06-06 ENCOUNTER — OFFICE VISIT (OUTPATIENT)
Dept: VASCULAR SURGERY | Facility: CLINIC | Age: 44
End: 2024-06-06
Attending: SURGERY
Payer: MEDICARE

## 2024-06-06 ENCOUNTER — HOSPITAL ENCOUNTER (OUTPATIENT)
Dept: VASCULAR SURGERY | Facility: CLINIC | Age: 44
Discharge: HOME | End: 2024-06-06
Attending: SURGERY
Payer: MEDICARE

## 2024-06-06 VITALS
HEART RATE: 79 BPM | SYSTOLIC BLOOD PRESSURE: 103 MMHG | WEIGHT: 196.19 LBS | DIASTOLIC BLOOD PRESSURE: 65 MMHG | HEIGHT: 71 IN | TEMPERATURE: 98 F | BODY MASS INDEX: 27.47 KG/M2

## 2024-06-06 DIAGNOSIS — N18.6 ESRD (END STAGE RENAL DISEASE) ON DIALYSIS: Primary | ICD-10-CM

## 2024-06-06 DIAGNOSIS — Z99.2 ESRD (END STAGE RENAL DISEASE) ON DIALYSIS: ICD-10-CM

## 2024-06-06 DIAGNOSIS — Z99.2 ESRD (END STAGE RENAL DISEASE) ON DIALYSIS: Primary | ICD-10-CM

## 2024-06-06 DIAGNOSIS — N18.6 ESRD (END STAGE RENAL DISEASE) ON DIALYSIS: ICD-10-CM

## 2024-06-06 PROCEDURE — 99213 OFFICE O/P EST LOW 20 MIN: CPT | Mod: PBBFAC,25,NTX | Performed by: SURGERY

## 2024-06-06 PROCEDURE — 93990 DOPPLER FLOW TESTING: CPT | Mod: PBBFAC,NTX | Performed by: SURGERY

## 2024-06-06 PROCEDURE — 99999 PR PBB SHADOW E&M-EST. PATIENT-LVL III: CPT | Mod: PBBFAC,TXP,, | Performed by: SURGERY

## 2024-06-06 PROCEDURE — 93990 DOPPLER FLOW TESTING: CPT | Mod: 26,S$PBB,TXP, | Performed by: SURGERY

## 2024-06-18 NOTE — PROGRESS NOTES
VASCULAR SURGERY CLINIC NOTE    Patient ID: Ade Silva is a 44 y.o. female.    I. HISTORY     Chief Complaint: AV ACCESS    HPI: 45yo F with ESRD secondary to AD PKD. She has patent RUE brachiocephalic AVF and underwent embolization of large side branch with me a couple weeks ago. She has had improved cannulation since that time likely since they have not been cannulating the side branch. She says she gets cannulated in the proximal AVF adjacent to the AC fossa. She has stents placed by Dr. Morfin in the proximal AVF. She continues to complain of pain with cannulation of her fistula. They do not have issues with dialyzing or pressures; she just has such severe pain in her arm that they have to stop dialysis. To clarify, she denies pain in her hand so I don't think this is due to steal.      Past Medical History:   Diagnosis Date    Autosomal dominant polycystic kidney disease     Encounter for blood transfusion     Hypertension         Past Surgical History:   Procedure Laterality Date    AV FISTULA PLACEMENT Right 09/14/2023    Procedure: CREATION, AV FISTULA, radial cephalic;  Surgeon: MELISSA Loera II, MD;  [facility];  Service: Vascular;  Laterality: Right;    AV FISTULA PLACEMENT Right 10/05/2023    Procedure: CREATION, AV FISTULA - brachial;  Surgeon: MELISSA Loera II, MD;  [facility];  Service: Vascular;  Laterality: Right;  confirmed placement with doppler    CEREBRAL ANEURYSM REPAIR      coiling    EMBOLIZATION Right 4/23/2024    Procedure: EMBOLIZATION, BLOOD VESSEL FISTULA;  Surgeon: MELISSA Loera II, MD;  Location: [facility] OR [facility] FLR;  Service: Vascular;  Laterality: Right;    FISTULOGRAM Right 4/23/2024    Procedure: Fistulogram;  Surgeon: MELISSA Loera II, MD;  Location: [facility] OR [facility] FLR;  Service: Vascular;  Laterality: Right;  CONTRAST: 68ML, FLUORO TIME: 17.9, mGy: 45.11, Gycm2: 5.9548    INSERTION OF TUNNELED CENTRAL VENOUS HEMODIALYSIS CATHETER  Right 05/03/2023    Procedure: Insertion, Catheter, Central Venous, Hemodialysis;  Surgeon: Priya Grimm MD;  Location: [facility] CATH LAB;  Service: Interventional Nephrology;  Laterality: Right;    REMOVAL OF HEMODIALYSIS CATHETER  05/03/2023    Procedure: REMOVAL, CATHETER, HEMODIALYSIS;  Surgeon: Priya Grimm MD;  Location: [facility] CATH LAB;  Service: Interventional Nephrology;;       Social History     Occupational History    Not on file   Tobacco Use    Smoking status: Every Day     Current packs/day: 1.00     Average packs/day: 1 pack/day for 28.0 years (28.0 ttl pk-yrs)     Types: Cigarettes     Passive exposure: Never    Smokeless tobacco: Never    Tobacco comments:     Down to 2-3 cigarettes/day    Substance and Sexual Activity    Alcohol use: Not Currently    Drug use: Never    Sexual activity: Not Currently       Review of Systems   Constitutional: Negative for weight loss.   HENT:  Negative for ear pain and nosebleeds.    Eyes:  Negative for discharge and pain.   Cardiovascular:  Negative for chest pain and palpitations.   Respiratory:  Negative for cough, shortness of breath and wheezing.    Endocrine: Negative for cold intolerance, heat intolerance and polyphagia.   Hematologic/Lymphatic: Negative for adenopathy. Does not bruise/bleed easily.   Skin:  Negative for itching and rash.   Musculoskeletal:  Negative for joint swelling and muscle cramps.   Gastrointestinal:  Negative for abdominal pain, diarrhea, nausea and vomiting.   Genitourinary:  Negative for dysuria and flank pain.   Neurological:  Negative for numbness and seizures.       Current Medications Reviewed    II. PHYSICAL EXAM     Physical Exam  Constitutional:       General: She is not in acute distress.     Appearance: Normal appearance. She is normal weight. She is not ill-appearing or diaphoretic.   Cardiovascular:      Rate and Rhythm: Normal rate.      Comments: 2+ right radial pulse, continuous palpable thrill over right AC fossa and AV  fistula.  Pulmonary:      Effort: Pulmonary effort is normal.   Musculoskeletal:      Right lower leg: No edema.      Left lower leg: No edema.   Skin:     General: Skin is warm and dry.   Neurological:      General: No focal deficit present.      Mental Status: She is alert and oriented to person, place, and time. Mental status is at baseline.   Psychiatric:         Mood and Affect: Mood normal.         Behavior: Behavior normal.       III. ASSESSMENT & PLAN (MEDICAL DECISION MAKING)       Imaging Results: (I have personally reviewed the images/studies and provided my interpretation below)  RUE AVF u/s 5/9/24: Patent AVF with volume flow 2.2L/min. Aneurysmal proximal segment of the fistula less than 3cm. Stents in place in proximal AVG. No evidence of stenosis.      RUE AVF u/s 6/6/24: volume flow > 1.5L/min. No evidence of stenosis.       Assessment/Diagnosis and Plan:    44 y.o. female s/p brachiocephalic the fistula creation 10/5/24. She has had improved cannulation accuracy since embolization of her large side branch but she continues to have pain whenever she is cannulated in the upper arm. I instructed her to continue to cannulate away from the stent which is in place in the proximal fistula. She will     -instructed to cannulate in mid/upper arm AVG away from her stents/aneurysm  -RTC 1 month for symptom recheck-- if symptoms persist and she is unable to consistently dialyze then would recommend graft interposition in the proximal AVF    MELISSA Loera II, MD, [facility]  Vascular Surgery  [facility]

## 2024-06-25 ENCOUNTER — OFFICE VISIT (OUTPATIENT)
Dept: OBSTETRICS AND GYNECOLOGY | Facility: CLINIC | Age: 44
End: 2024-06-25
Payer: MEDICARE

## 2024-06-25 VITALS
WEIGHT: 198.44 LBS | HEIGHT: 71 IN | DIASTOLIC BLOOD PRESSURE: 64 MMHG | SYSTOLIC BLOOD PRESSURE: 96 MMHG | BODY MASS INDEX: 27.78 KG/M2

## 2024-06-25 DIAGNOSIS — I10 HYPERTENSION, UNSPECIFIED TYPE: ICD-10-CM

## 2024-06-25 DIAGNOSIS — Z11.3 SCREENING EXAMINATION FOR STD (SEXUALLY TRANSMITTED DISEASE): ICD-10-CM

## 2024-06-25 DIAGNOSIS — N18.9 CHRONIC KIDNEY DISEASE-MINERAL AND BONE DISORDER: ICD-10-CM

## 2024-06-25 DIAGNOSIS — N90.89 VULVAL LESION: ICD-10-CM

## 2024-06-25 DIAGNOSIS — N92.0 MENORRHAGIA WITH REGULAR CYCLE: Primary | ICD-10-CM

## 2024-06-25 DIAGNOSIS — D06.9 SEVERE CERVICAL DYSPLASIA: ICD-10-CM

## 2024-06-25 DIAGNOSIS — Z99.2 ESRD (END STAGE RENAL DISEASE) ON DIALYSIS: ICD-10-CM

## 2024-06-25 DIAGNOSIS — M89.9 CHRONIC KIDNEY DISEASE-MINERAL AND BONE DISORDER: ICD-10-CM

## 2024-06-25 DIAGNOSIS — N75.0 BARTHOLIN CYST: ICD-10-CM

## 2024-06-25 DIAGNOSIS — A63.0 WARTS, GENITAL: ICD-10-CM

## 2024-06-25 DIAGNOSIS — N18.6 ESRD (END STAGE RENAL DISEASE) ON DIALYSIS: ICD-10-CM

## 2024-06-25 DIAGNOSIS — E83.9 CHRONIC KIDNEY DISEASE-MINERAL AND BONE DISORDER: ICD-10-CM

## 2024-06-25 DIAGNOSIS — N84.0 ENDOMETRIAL POLYP: ICD-10-CM

## 2024-06-25 PROCEDURE — 87591 N.GONORRHOEAE DNA AMP PROB: CPT | Performed by: OBSTETRICS & GYNECOLOGY

## 2024-06-25 PROCEDURE — 99213 OFFICE O/P EST LOW 20 MIN: CPT | Mod: PBBFAC | Performed by: OBSTETRICS & GYNECOLOGY

## 2024-06-25 PROCEDURE — 99214 OFFICE O/P EST MOD 30 MIN: CPT | Mod: S$PBB,,, | Performed by: OBSTETRICS & GYNECOLOGY

## 2024-06-25 PROCEDURE — 87491 CHLMYD TRACH DNA AMP PROBE: CPT | Performed by: OBSTETRICS & GYNECOLOGY

## 2024-06-25 PROCEDURE — 81514 NFCT DS BV&VAGINITIS DNA ALG: CPT | Performed by: OBSTETRICS & GYNECOLOGY

## 2024-06-25 PROCEDURE — 99999 PR PBB SHADOW E&M-EST. PATIENT-LVL III: CPT | Mod: PBBFAC,,, | Performed by: OBSTETRICS & GYNECOLOGY

## 2024-06-25 NOTE — PROGRESS NOTES
HISTORY OF PRESENT ILLNESS:    Ade Silva is a 44 y.o. female, , Patient's last menstrual period was 2024 (approximate).,  presents for a pre-op exam for    scheduled on    2017     Long discussion held patient today concerning her surgery, hospital course on the day of surgery and post operative course. Precautions after surgery discussed in detail.  Risks, alternatives and benefits of surgery discussed with patient in detail and consent explained in detail. Questions were answered and patient voices understanding.  Plan pre-op with Anesthesia at [facility].      Colpo  Final Pathologic Diagnosis 1. Cervical biopsy at 1 o'clock shows detached fragments of squamous epithelium with severe dysplasia (TAYLOR 3).  Immunostain for p16 shows full-thickness epithelial staining.  The control stains appropriately.  2. Cervical biopsy at 6 o'clock shows mild-to-moderate squamous dysplasia (CIN1-2).    3. Cervical biopsy at 9 o'clock shows a single detached fragment of squamous epithelium with severe dysplasia (TAYLOR 3).    4. Endocervical curettage shows fragments of benign endocervical glandular epithelium and detached fragments of squamous epithelium moderate dysplasia (TAYLOR 2).     EMBX  Final Pathologic Diagnosis Fragments of proliferative endometrium with tubal metaplasia present.  One fragment shows prominent vessels and may represent benign polyp formation.  No atypical hyperplasia or malignancy identified.       Final Pathologic Diagnosis Vulvar biopsy shows a condyloma acuminatum.  Immunostain for p16 is positive.  The control stains appropriately.     Past Medical History:   Diagnosis Date    Autosomal dominant polycystic kidney disease     Encounter for blood transfusion     Hypertension        Past Surgical History:   Procedure Laterality Date    AV FISTULA PLACEMENT Right 2023    Procedure: CREATION, AV FISTULA, radial cephalic;  Surgeon: MELISSA Loera II, MD;  Location: [facility] OR [facility]  FLR;  Service: Vascular;  Laterality: Right;    AV FISTULA PLACEMENT Right 10/05/2023    Procedure: CREATION, AV FISTULA - brachial;  Surgeon: MELISSA Loera II, MD;  Location: [facility] OR [facility] FLR;  Service: Vascular;  Laterality: Right;  confirmed placement with doppler    CEREBRAL ANEURYSM REPAIR      coiling    EMBOLIZATION Right 4/23/2024    Procedure: EMBOLIZATION, BLOOD VESSEL FISTULA;  Surgeon: MELISSA Loera II, MD;  Location: [facility] OR [facility] FLR;  Service: Vascular;  Laterality: Right;    FISTULOGRAM Right 4/23/2024    Procedure: Fistulogram;  Surgeon: MELISSA Loera II, MD;  Location: [facility] OR [facility];  Service: Vascular;  Laterality: Right;  CONTRAST: 68ML, FLUORO TIME: 17.9, mGy: 45.11, Gycm2: 5.9548    INSERTION OF TUNNELED CENTRAL VENOUS HEMODIALYSIS CATHETER Right 05/03/2023    Procedure: Insertion, Catheter, Central Venous, Hemodialysis;  Surgeon: Priya Grimm MD;  Location: [facility] CATH LAB;  Service: Interventional Nephrology;  Laterality: Right;    REMOVAL OF HEMODIALYSIS CATHETER  05/03/2023    Procedure: REMOVAL, CATHETER, HEMODIALYSIS;  Surgeon: Priya Grimm MD;  Location: [facility] CATH LAB;  Service: Interventional Nephrology;;       MEDICATIONS AND ALLERGIES:      Current Outpatient Medications:     acetaminophen (TYLENOL) 650 MG TbSR, Take 1 tablet (650 mg total) by mouth every 8 (eight) hours., Disp: 90 tablet, Rfl: 0    calcium carbonate (TUMS ORAL), Take by mouth., Disp: , Rfl:     cinacalcet (SENSIPAR) 60 MG Tab, Take 1 tablet (60 mg total) by mouth daily with breakfast., Disp: 30 tablet, Rfl: 11    esomeprazole (NEXIUM) 20 MG capsule, Take 20 mg by mouth before breakfast., Disp: , Rfl:     loratadine (CLARITIN ORAL), Take 1 tablet by mouth daily as needed (Allergies)., Disp: , Rfl:     ondansetron (ZOFRAN) 4 MG tablet, Take 1 tablet (4 mg total) by mouth every 6 (six) hours., Disp: 20 tablet, Rfl: 0    sevelamer carbonate (RENVELA) 800 mg Tab, Take 2 tablets (1,600 mg total) by mouth 3  "(three) times daily with meals. (Patient taking differently: Take 800 mg by mouth 3 (three) times daily with meals. Take 4 tablets with all meals and snacks.), Disp: 180 tablet, Rfl: 11    sodium zirconium cyclosilicate (LOKELMA) 10 gram packet, Take 1 packet (10 g total) by mouth 2 (two) times a day. Mix entire contents of packet(s) into drinking glass containing 3 tablespoons of water; stir well and drink immediately. Add water and repeat until no powder remains to receive entire dose., Disp: 30 packet, Rfl: 3    sucroferric oxyhydroxide (VELPHORO) 500 mg Chew, Break or dissolve 1 tablet with each meal and snack. Do not exceed 6 tablets per day., Disp: 180 tablet, Rfl: 11    terconazole (TERAZOL 7) 0.4 % Crea, Place 1 applicator vaginally every evening., Disp: 45 g, Rfl: 0    vitamin D (VITAMIN D3) 1000 units Tab, Take 1,000 Units by mouth once daily., Disp: , Rfl:   No current facility-administered medications for this visit.    Facility-Administered Medications Ordered in Other Visits:     0.9%  NaCl infusion, , Intravenous, Continuous, Mel Calvillo MD    ondansetron disintegrating tablet 8 mg, 8 mg, Oral, Q8H PRN, Mel Calvillo MD    Review of patient's allergies indicates:   Allergen Reactions    Aspirin Hives     And vomiting    Penicillins Hives     Throat swelling    Adhesive Rash     Adhesive/silk tape (type found on band aides). Can only use "Paper Tape"    Butalbital-acetaminophen-caff Nausea And Vomiting and Other (See Comments)     Dizziness (made pt feel sick)    Latex, natural rubber Rash    Nickel Rash    Tomato Nausea And Vomiting       Family History   Problem Relation Name Age of Onset    Stroke Father      Heart disease Father      Polycystic kidney disease Father      Diabetes Brother         Social History     Socioeconomic History    Marital status: Single   Tobacco Use    Smoking status: Every Day     Current packs/day: 1.00     Average packs/day: 1 pack/day for 28.0 years (28.0 ttl " pk-yrs)     Types: Cigarettes     Passive exposure: Never    Smokeless tobacco: Never    Tobacco comments:     Down to 2-3 cigarettes/day    Substance and Sexual Activity    Alcohol use: Not Currently    Drug use: Never    Sexual activity: Not Currently   Social History Narrative    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     Social Determinants of Health     Financial Resource Strain: Low Risk  (12/19/2023)    Overall Financial Resource Strain (CARDIA)     Difficulty of Paying Living Expenses: Not very hard   Food Insecurity: No Food Insecurity (12/19/2023)    Hunger Vital Sign     Worried About Running Out of Food in the Last Year: Never true     Ran Out of Food in the Last Year: Never true   Transportation Needs: No Transportation Needs (12/19/2023)    PRAPARE - Transportation     Lack of Transportation (Medical): No     Lack of Transportation (Non-Medical): No   Physical Activity: Insufficiently Active (12/19/2023)    Exercise Vital Sign     Days of Exercise per Week: 2 days     Minutes of Exercise per Session: 10 min   Stress: Stress Concern Present (12/19/2023)    [employer] - Occupational Stress Questionnaire     Feeling of Stress : To some extent   Housing Stability: Low Risk  (12/19/2023)    Housing Stability Vital Sign     Unable to Pay for Housing in the Last Year: No     Number of Places Lived in the Last Year: 2     Unstable Housing in the Last Year: No       COMPREHENSIVE GYN HISTORY:  PAP History: Denies abnormal Paps.  Infection History: Denies STDs. Denies PID.  Benign History: Denies uterine fibroids. Denies ovarian cysts. Denies endometriosis. Denies other conditions.  Cancer History: Denies cervical cancer. Denies uterine cancer or hyperplasia. Denies ovarian cancer. Denies vulvar cancer or pre-cancer. Denies vaginal cancer or pre-cancer. Denies breast cancer. Denies colon cancer.  Sexual Activity History: Reports currently being sexually active  Menstrual History: Monthly. Mod then  "light flow.   Dysmenorrhea History: Reports mild dysmenorrhea.   Contraception:    ROS:  GENERAL: No weight changes. No swelling. No fatigue. No fever.  CARDIOVASCULAR: No chest pain. No shortness of breath. No leg cramps.   NEUROLOGICAL: No headaches. No vision changes.  BREASTS: No pain. No lumps. No discharge.  ABDOMEN: No pain. No nausea. No vomiting. No diarrhea. No constipation.  REPRODUCTIVE: No abnormal bleeding.   VULVA: No pain. No lesions. No itching.  VAGINA: No relaxation. No itching. No odor. No discharge. No lesions.  URINARY: No incontinence. No nocturia. No frequency. No dysuria.    BP 96/64   Ht 5' 11" (1.803 m)   Wt 90 kg (198 lb 6.6 oz)   LMP 06/21/2024 (Approximate)   BMI 27.67 kg/m²     PE:  APPEARANCE: Well nourished, well developed, in no acute distress.  AFFECT: WNL, alert and oriented x 3.  SKIN: No acne or hirsutism.  NECK: Neck symmetric, without masses or thyromegaly.  NODES: No inguinal, cervical, axillary or femoral lymph node enlargement.  CHEST: Good respiratory effort.   ABDOMEN: Soft. No tenderness or masses. No hepatosplenomegaly. No hernias.  BREASTS: Symmetrical, no skin changes, visible lesions, palpable masses or nipple discharge bilaterally.  PELVIC: External female genitalia without lesions, hyperpigmented area on the left mid monc 0/3cm in size and 0.5 cm area on the right side of the clitoris. Bartholin;s cyst - left labia.  Female hair distribution. Adequate perineal body, Normal urethral meatus. Vagina moist and well rugated without lesions or discharge.  No significant cystocele or rectocele present. Cervix pink without lesions, discharge or tenderness. Uterus is 4-6 week size, regular, mobile and nontender. Adnexa without masses or tenderness.  EXTREMITIES: No edema    DIAGNOSIS:  1. Menorrhagia with regular cycle    2. Endometrial polyp    3. Vulval lesion    4. Severe cervical dysplasia    5. ESRD (end stage renal disease) on dialysis    6. Warts, genital    7. " Chronic kidney disease-mineral and bone disorder    8. Hypertension, unspecified type    9. Bartholin cyst        PLAN:         LABS AND TESTS ORDERED:  Pre-op orders and T&S ordered    MEDICATIONS PRESCRIBED:  Prescriptions given    COUNSELING:  Post op counseling performed, questions answered    Plan    on   , 2017    FOLLOW-UP with me for post op visit.

## 2024-06-25 NOTE — H&P (VIEW-ONLY)
Addendum:  Patient has been medically cleared for surgery by Internal Medicine.   Will schedule procedure blu Tuesday or Thursday so that patient does not miss dialysis.   Orders given to dialysis team by Attending Dr. Prescott    HISTORY OF PRESENT ILLNESS:    Ade Silva is a 44 y.o. female, , Patient's last menstrual period was 2024 (approximate).,  presents for a pre-op exam LEEP, D&C/EA and Vulvar Biopsy      Long discussion held patient today concerning her surgery, hospital course on the day of surgery and post operative course. Precautions after surgery discussed in detail.  Risks, alternatives and benefits of surgery discussed with patient in detail and consent explained in detail. Questions were answered and patient voices understanding.  Plan pre-op with Anesthesia at [facility].      Colpo  Final Pathologic Diagnosis 1. Cervical biopsy at 1 o'clock shows detached fragments of squamous epithelium with severe dysplasia (TAYLOR 3).  Immunostain for p16 shows full-thickness epithelial staining.  The control stains appropriately.  2. Cervical biopsy at 6 o'clock shows mild-to-moderate squamous dysplasia (CIN1-2).    3. Cervical biopsy at 9 o'clock shows a single detached fragment of squamous epithelium with severe dysplasia (TAYLOR 3).    4. Endocervical curettage shows fragments of benign endocervical glandular epithelium and detached fragments of squamous epithelium moderate dysplasia (TAYLOR 2).     EMBX  Final Pathologic Diagnosis Fragments of proliferative endometrium with tubal metaplasia present.  One fragment shows prominent vessels and may represent benign polyp formation.  No atypical hyperplasia or malignancy identified.       Final Pathologic Diagnosis Vulvar biopsy shows a condyloma acuminatum.  Immunostain for p16 is positive.  The control stains appropriately.     Past Medical History:   Diagnosis Date    Autosomal dominant polycystic kidney disease     Brain aneurysm      Encounter for blood transfusion     Hypertension        Past Surgical History:   Procedure Laterality Date    AV FISTULA PLACEMENT Right 09/14/2023    Procedure: CREATION, AV FISTULA, radial cephalic;  Surgeon: MELISSA Loera II, MD;  Location: [facility] OR 2ND FLR;  Service: Vascular;  Laterality: Right;    AV FISTULA PLACEMENT Right 10/05/2023    Procedure: CREATION, AV FISTULA - brachial;  Surgeon: MELISSA Loera II, MD;  Location: [facility] OR 2ND FLR;  Service: Vascular;  Laterality: Right;  confirmed placement with doppler    CEREBRAL ANEURYSM REPAIR      coiling    EMBOLIZATION Right 4/23/2024    Procedure: EMBOLIZATION, BLOOD VESSEL FISTULA;  Surgeon: MELISSA Loera II, MD;  Location: [facility] OR 2ND FLR;  Service: Vascular;  Laterality: Right;    FISTULOGRAM Right 4/23/2024    Procedure: Fistulogram;  Surgeon: MELISSA Loera II, MD;  Location: [facility] OR 2ND FLR;  Service: Vascular;  Laterality: Right;  CONTRAST: 68ML, FLUORO TIME: 17.9, mGy: 45.11, Gycm2: 5.9548    INSERTION OF TUNNELED CENTRAL VENOUS HEMODIALYSIS CATHETER Right 05/03/2023    Procedure: Insertion, Catheter, Central Venous, Hemodialysis;  Surgeon: Priya Grimm MD;  Location: [facility] CATH LAB;  Service: Interventional Nephrology;  Laterality: Right;    REMOVAL OF HEMODIALYSIS CATHETER  05/03/2023    Procedure: REMOVAL, CATHETER, HEMODIALYSIS;  Surgeon: Priya Grimm MD;  Location: [facility] CATH LAB;  Service: Interventional Nephrology;;       MEDICATIONS AND ALLERGIES:      Current Outpatient Medications:     acetaminophen (TYLENOL) 650 MG TbSR, Take 1 tablet (650 mg total) by mouth every 8 (eight) hours., Disp: 90 tablet, Rfl: 0    loratadine (CLARITIN ORAL), Take 1 tablet by mouth daily as needed (Allergies)., Disp: , Rfl:     ondansetron (ZOFRAN) 4 MG tablet, Take 1 tablet (4 mg total) by mouth every 6 (six) hours., Disp: 20 tablet, Rfl: 0    sodium zirconium cyclosilicate (LOKELMA) 10 gram packet, Take 1 packet (10 g total) by mouth 2 (two)  "times a day. Mix entire contents of packet(s) into drinking glass containing 3 tablespoons of water; stir well and drink immediately. Add water and repeat until no powder remains to receive entire dose., Disp: 30 packet, Rfl: 3    sucroferric oxyhydroxide (VELPHORO) 500 mg Chew, Break or dissolve 1 tablet with each meal and snack. Do not exceed 6 tablets per day., Disp: 180 tablet, Rfl: 11    cinacalcet (SENSIPAR) 30 MG Tab, Take 1 tablet (30 mg total) by mouth daily with breakfast., Disp: 30 tablet, Rfl: 11    gabapentin (NEURONTIN) 100 MG capsule, Take one 100 mg capsule three times a week following dialysis., Disp: 24 capsule, Rfl: 0    metroNIDAZOLE (METROGEL VAGINAL) 0.75 % (37.5mg/5 gram) vaginal gel, Place 1 applicator vaginally once daily. Use at bedtime for 7 days, Disp: 70 g, Rfl: 0  No current facility-administered medications for this visit.    Facility-Administered Medications Ordered in Other Visits:     0.9%  NaCl infusion, , Intravenous, Continuous, Mel Calvillo MD    ondansetron disintegrating tablet 8 mg, 8 mg, Oral, Q8H PRN, Mel Calvillo MD    Review of patient's allergies indicates:   Allergen Reactions    Aspirin Hives     And vomiting    Penicillins Hives     Throat swelling    Adhesive Rash     Adhesive/silk tape (type found on band aides). Can only use "Paper Tape"    Butalbital-acetaminophen-caff Nausea And Vomiting and Other (See Comments)     Dizziness (made pt feel sick)    Latex, natural rubber Rash    Nickel Rash    Tomato Nausea And Vomiting       Family History   Problem Relation Name Age of Onset    Stroke Father      Heart disease Father      Polycystic kidney disease Father      Diabetes Brother         Social History     Socioeconomic History    Marital status: Single   Tobacco Use    Smoking status: Every Day     Current packs/day: 1.00     Average packs/day: 1 pack/day for 28.0 years (28.0 ttl pk-yrs)     Types: Cigarettes     Passive exposure: Never    Smokeless tobacco: " Never    Tobacco comments:     Down to 2-3 cigarettes/day    Substance and Sexual Activity    Alcohol use: Not Currently    Drug use: Never    Sexual activity: Not Currently   Social History Narrative    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     Social Determinants of Health     Financial Resource Strain: Low Risk  (12/19/2023)    Overall Financial Resource Strain (CARDIA)     Difficulty of Paying Living Expenses: Not very hard   Food Insecurity: No Food Insecurity (12/19/2023)    Hunger Vital Sign     Worried About Running Out of Food in the Last Year: Never true     Ran Out of Food in the Last Year: Never true   Transportation Needs: No Transportation Needs (12/19/2023)    PRAPARE - Transportation     Lack of Transportation (Medical): No     Lack of Transportation (Non-Medical): No   Physical Activity: Insufficiently Active (12/19/2023)    Exercise Vital Sign     Days of Exercise per Week: 2 days     Minutes of Exercise per Session: 10 min   Stress: Stress Concern Present (12/19/2023)    [employer] - Occupational Stress Questionnaire     Feeling of Stress : To some extent   Housing Stability: Low Risk  (12/19/2023)    Housing Stability Vital Sign     Unable to Pay for Housing in the Last Year: No     Number of Places Lived in the Last Year: 2     Unstable Housing in the Last Year: No       COMPREHENSIVE GYN HISTORY:  PAP History: Denies abnormal Paps.  Infection History: Denies STDs. Denies PID.  Benign History: Denies uterine fibroids. Denies ovarian cysts. Denies endometriosis. Denies other conditions.  Cancer History: Denies cervical cancer. Denies uterine cancer or hyperplasia. Denies ovarian cancer. Denies vulvar cancer or pre-cancer. Denies vaginal cancer or pre-cancer. Denies breast cancer. Denies colon cancer.  Sexual Activity History: Reports currently being sexually active  Menstrual History: Monthly. Mod then light flow.   Dysmenorrhea History: Reports mild dysmenorrhea.     ROS:  GENERAL:  "No weight changes. No swelling. No fatigue. No fever.  CARDIOVASCULAR: No chest pain. No shortness of breath. No leg cramps.   NEUROLOGICAL: No headaches. No vision changes.  BREASTS: No pain. No lumps. No discharge.  ABDOMEN: No pain. No nausea. No vomiting. No diarrhea. No constipation.  REPRODUCTIVE: No abnormal bleeding.   VULVA: No pain. No lesions. No itching.  VAGINA: No relaxation. No itching. No odor. No discharge. No lesions.  URINARY: No incontinence. No nocturia. No frequency. No dysuria.    BP 96/64   Ht 5' 11" (1.803 m)   Wt 90 kg (198 lb 6.6 oz)   LMP 06/21/2024 (Approximate)   BMI 27.67 kg/m²     PE:  APPEARANCE: Well nourished, well developed, in no acute distress.  AFFECT: WNL, alert and oriented x 3.  SKIN: No acne or hirsutism.  NECK: Neck symmetric, without masses or thyromegaly.  NODES: No inguinal, cervical, axillary or femoral lymph node enlargement.  CHEST: Good respiratory effort.   ABDOMEN: Soft. No tenderness or masses. No hepatosplenomegaly. No hernias.  BREASTS: Symmetrical, no skin changes, visible lesions, palpable masses or nipple discharge bilaterally.  PELVIC: External female genitalia without lesions, hyperpigmented area on the left mid monc 0/3cm in size and 0.5 cm area on the right side of the clitoris. Bartholin;s cyst - left labia.  Female hair distribution. Adequate perineal body, Normal urethral meatus. Vagina moist and well rugated without lesions or discharge.  No significant cystocele or rectocele present. Cervix pink without lesions, discharge or tenderness. Uterus is 4-6 week size, regular, mobile and nontender. Adnexa without masses or tenderness.  EXTREMITIES: No edema    DIAGNOSIS:  1. Menorrhagia with regular cycle    2. Endometrial polyp    3. Vulval lesion    4. Severe cervical dysplasia    5. ESRD (end stage renal disease) on dialysis    6. Warts, genital    7. Chronic kidney disease-mineral and bone disorder    8. Hypertension, unspecified type    9. " Bartholin cyst    10. Screening examination for STD (sexually transmitted disease)        PLAN:    Orders Placed This Encounter    Vaginosis Screen by DNA Probe    C. trachomatis/N. gonorrhoeae by AMP DNA       LABS AND TESTS ORDERED:  Pre-op orders and T&S ordered    MEDICATIONS PRESCRIBED:  Prescriptions given    COUNSELING:  Post op counseling performed, questions answered  Medical clearance needed from Renal     FOLLOW-UP with me for post op visit.

## 2024-06-26 LAB
BACTERIAL VAGINOSIS DNA: POSITIVE
C TRACH DNA SPEC QL NAA+PROBE: NOT DETECTED
CANDIDA GLABRATA DNA: NEGATIVE
CANDIDA KRUSEI DNA: NEGATIVE
CANDIDA RRNA VAG QL PROBE: NEGATIVE
N GONORRHOEA DNA SPEC QL NAA+PROBE: NOT DETECTED
T VAGINALIS RRNA GENITAL QL PROBE: NEGATIVE

## 2024-06-28 ENCOUNTER — ANESTHESIA EVENT (OUTPATIENT)
Dept: SURGERY | Facility: OTHER | Age: 44
End: 2024-06-28
Payer: MEDICARE

## 2024-07-01 ENCOUNTER — PATIENT MESSAGE (OUTPATIENT)
Dept: OBSTETRICS AND GYNECOLOGY | Facility: CLINIC | Age: 44
End: 2024-07-01
Payer: MEDICARE

## 2024-07-02 ENCOUNTER — OFFICE VISIT (OUTPATIENT)
Dept: PRIMARY CARE CLINIC | Facility: CLINIC | Age: 44
End: 2024-07-02
Payer: MEDICARE

## 2024-07-02 ENCOUNTER — HOSPITAL ENCOUNTER (OUTPATIENT)
Dept: PREADMISSION TESTING | Facility: OTHER | Age: 44
Discharge: HOME | End: 2024-07-02
Attending: OBSTETRICS & GYNECOLOGY
Payer: MEDICARE

## 2024-07-02 VITALS
DIASTOLIC BLOOD PRESSURE: 57 MMHG | BODY MASS INDEX: 28.28 KG/M2 | TEMPERATURE: 98 F | SYSTOLIC BLOOD PRESSURE: 104 MMHG | HEART RATE: 110 BPM | WEIGHT: 202 LBS | OXYGEN SATURATION: 96 % | HEIGHT: 71 IN | RESPIRATION RATE: 16 BRPM

## 2024-07-02 VITALS
OXYGEN SATURATION: 96 % | WEIGHT: 202.06 LBS | HEIGHT: 71 IN | HEART RATE: 95 BPM | SYSTOLIC BLOOD PRESSURE: 102 MMHG | BODY MASS INDEX: 28.29 KG/M2 | DIASTOLIC BLOOD PRESSURE: 58 MMHG

## 2024-07-02 DIAGNOSIS — Z01.818 PREOP TESTING: Primary | ICD-10-CM

## 2024-07-02 DIAGNOSIS — Z99.2 ESRD (END STAGE RENAL DISEASE) ON DIALYSIS: ICD-10-CM

## 2024-07-02 DIAGNOSIS — I72.5 BASILAR ARTERY ANEURYSM: ICD-10-CM

## 2024-07-02 DIAGNOSIS — N18.6 ESRD (END STAGE RENAL DISEASE) ON DIALYSIS: ICD-10-CM

## 2024-07-02 DIAGNOSIS — Z01.818 PRE-OP EVALUATION: Primary | ICD-10-CM

## 2024-07-02 PROCEDURE — 99214 OFFICE O/P EST MOD 30 MIN: CPT | Mod: PBBFAC,PN,TXP | Performed by: FAMILY MEDICINE

## 2024-07-02 PROCEDURE — 99999 PR PBB SHADOW E&M-EST. PATIENT-LVL IV: CPT | Mod: PBBFAC,TXP,, | Performed by: FAMILY MEDICINE

## 2024-07-02 NOTE — PROGRESS NOTES
Clinic Note  7/2/2024      Subjective:       Patient ID:  Ade is a 44 y.o. female being seen for an established visit.    Chief Complaint: Pre-op Exam    Preoperative clearance-patient with history of basilar artery aneurysm status post coiling in 2016, ESRD on dialysis due to polycystic kidney disease, anemia, here for preoperative clearance for endometrial ablation.  Has been having regular menstrual cycles/menorrhagia.  Does smoke about 3-4 cigarettes per day.  Denies history of ischemic heart disease, congestive heart failure, cerebrovascular disease, diabetes    Review of Systems   Constitutional:  Negative for chills, fever, malaise/fatigue and weight loss.   HENT:  Negative for congestion, sinus pain and sore throat.    Respiratory:  Negative for cough, shortness of breath and wheezing.    Cardiovascular:  Negative for chest pain and palpitations.   Gastrointestinal:  Negative for constipation, diarrhea, nausea and vomiting.   Genitourinary:  Negative for dysuria, frequency and urgency.   Musculoskeletal:  Negative for myalgias.   Skin:  Negative for rash.   Neurological:  Negative for headaches.       Medication List with Changes/Refills   Current Medications    ACETAMINOPHEN (TYLENOL) 650 MG TBSR    Take 1 tablet (650 mg total) by mouth every 8 (eight) hours.    CALCIUM CARBONATE (TUMS ORAL)    Take by mouth.    CINACALCET (SENSIPAR) 60 MG TAB    Take 1 tablet (60 mg total) by mouth daily with breakfast.    ESOMEPRAZOLE (NEXIUM) 20 MG CAPSULE    Take 20 mg by mouth before breakfast.    LORATADINE (CLARITIN ORAL)    Take 1 tablet by mouth daily as needed (Allergies).    ONDANSETRON (ZOFRAN) 4 MG TABLET    Take 1 tablet (4 mg total) by mouth every 6 (six) hours.    SEVELAMER CARBONATE (RENVELA) 800 MG TAB    Take 2 tablets (1,600 mg total) by mouth 3 (three) times daily with meals.    SODIUM ZIRCONIUM CYCLOSILICATE (LOKELMA) 10 GRAM PACKET    Take 1 packet (10 g total) by mouth 2 (two) times a day. Mix  "entire contents of packet(s) into drinking glass containing 3 tablespoons of water; stir well and drink immediately. Add water and repeat until no powder remains to receive entire dose.    SUCROFERRIC OXYHYDROXIDE (VELPHORO) 500 MG CHEW    Break or dissolve 1 tablet with each meal and snack. Do not exceed 6 tablets per day.    TERCONAZOLE (TERAZOL 7) 0.4 % CREA    Place 1 applicator vaginally every evening.    VITAMIN D (VITAMIN D3) 1000 UNITS TAB    Take 1,000 Units by mouth once daily.       Patient Active Problem List   Diagnosis    Polycystic kidney disease    Chronic kidney disease-mineral and bone disorder    Anemia    Chest pain    Autosomal dominant polycystic kidney disease    Hypertension    Basilar artery aneurysm    Gross hematuria    Hematuria    S/P arteriovenous (AV) fistula creation    ESRD (end stage renal disease) on dialysis    Immunization counseling    Pre-transplant evaluation for kidney transplant    ESRD on hemodialysis           Objective:      BP (!) 102/58 (BP Location: Left arm, Patient Position: Sitting, BP Method: Large (Automatic))   Pulse 95   Ht 5' 11" (1.803 m)   Wt 91.7 kg (202 lb 0.8 oz)   LMP 06/21/2024 (Approximate)   SpO2 96%   BMI 28.18 kg/m²   Estimated body mass index is 28.18 kg/m² as calculated from the following:    Height as of this encounter: 5' 11" (1.803 m).    Weight as of this encounter: 91.7 kg (202 lb 0.8 oz).  Physical Exam  Vitals reviewed.   Constitutional:       General: She is not in acute distress.     Appearance: She is not diaphoretic.   HENT:      Head: Normocephalic and atraumatic.   Eyes:      Conjunctiva/sclera: Conjunctivae normal.   Cardiovascular:      Rate and Rhythm: Normal rate and regular rhythm.      Heart sounds: Normal heart sounds.   Pulmonary:      Effort: Pulmonary effort is normal. No respiratory distress.      Breath sounds: Normal breath sounds. No wheezing.   Abdominal:      General: Bowel sounds are normal.      Palpations: " Abdomen is soft.   Musculoskeletal:         General: Normal range of motion.      Cervical back: Normal range of motion.   Skin:     General: Skin is warm and dry.      Findings: No erythema or rash.   Neurological:      Mental Status: She is alert and oriented to person, place, and time.   Psychiatric:         Mood and Affect: Mood and affect normal.         Behavior: Behavior normal.         Thought Content: Thought content normal.         Cognition and Memory: Memory normal.         Judgment: Judgment normal.           Assessment and Plan:     1. Pre-op evaluation  - RCRI score of 1 due to ESRD, 6% risk of death, mi, cardiac arrest within 30 days of surgery.  Patient medically optimized for hysteroscopy and endometrial ablation.  Recent labs reviewed and unremarkable    2. Basilar artery aneurysm  - Ambulatory referral/consult to Neurosurgery; Future    3. ESRD (end stage renal disease) on dialysis      Follow Up:   No follow-ups on file.    Other Orders Placed This Visit:  No orders of the defined types were placed in this encounter.        Jordan Christiansen MD        This note is dictated on M*Modal word recognition program.  There are word recognition mistakes that are occasionally missed on review.

## 2024-07-02 NOTE — ANESTHESIA PREPROCEDURE EVALUATION
07/02/2024  Ade Silva is a 44 y.o., female.      Pre-op Assessment    I have reviewed the Patient Summary Reports.     I have reviewed the Nursing Notes. I have reviewed the NPO Status.   I have reviewed the Medications.     Review of Systems  Anesthesia Hx:  No problems with previous Anesthesia             Denies Family Hx of Anesthesia complications.    Denies Personal Hx of Anesthesia complications.                    Social:  Non-Smoker       Hematology/Oncology:  Hematology Normal   Oncology Normal                                   EENT/Dental:  EENT/Dental Normal           Cardiovascular:     Hypertension                 ·  Left Ventricle: The left ventricle is normal in size. Normal wall thickness. Normal wall motion. There is normal systolic function with a visually estimated ejection fraction of 60 - 65%. There is normal diastolic function.  ·  Right Ventricle: Normal right ventricular cavity size. Wall thickness is normal. Right ventricle wall motion  is normal. Systolic function is normal.  ·  IVC/SVC: Normal venous pressure at 3 mmHg.  ·  PA systolic pressure could not be calculated because of no tricuspid regurgitation, but RVOT acceleration times are consistent with normal PA pressure.                              Pulmonary:  Pulmonary Normal                       Renal/:  Chronic Renal Disease, Dialysis, ESRD   MWF schedule             Hepatic/GI:  Hepatic/GI Normal                 Musculoskeletal:  Musculoskeletal Normal                Neurological:           S/p crani for aneurysm                            Endocrine:  Endocrine Normal            Dermatological:  Skin Normal    Psych:  Psychiatric Normal                    Physical Exam  General: Well nourished and Alert    Airway:  Mallampati: II   Mouth Opening: Normal  Tongue: Normal    Dental:  Periodontal disease, Loose  teeth        Anesthesia Plan  Type of Anesthesia, risks & benefits discussed:    Anesthesia Type: Gen ETT  Intra-op Monitoring Plan: Standard ASA Monitors  Post Op Pain Control Plan: multimodal analgesia  Induction:  IV  Airway Plan: Video  Informed Consent: Informed consent signed with the Patient and all parties understand the risks and agree with anesthesia plan.  All questions answered.   ASA Score: 3  Anesthesia Plan Notes: Needs BMP am of surgery- on dialysis-RUE fistula  Multiple loose teeth top and bottom- may be better with ETT    Update. Per nephrologist, pt hypotensive with dialysis so expect pressors potential need intra-op.    K 4.3    Ready For Surgery From Anesthesia Perspective.     .

## 2024-07-02 NOTE — DISCHARGE INSTRUCTIONS
Information to Prepare you for your Surgery    PRE-ADMIT TESTING -  [phone]    [address]  [facility]          Your surgery has been scheduled at [facility]. We are pleased to have the opportunity to serve you. For Further Information please call [phone].    On the day of surgery please report to the Information Desk on the 1st floor.    CONTACT YOUR PHYSICIAN'S OFFICE THE DAY PRIOR TO YOUR SURGERY TO OBTAIN YOUR ARRIVAL TIME.     The evening before surgery do not eat anything after 9 p.m. ( this includes hard candy, chewing gum and mints).  You may only have GATORADE, POWERADE AND WATER  from 9 p.m. until you leave your home.   DO NOT DRINK ANY LIQUIDS ON THE WAY TO THE HOSPITAL.      Why does your anesthesiologist allow you to drink Gatorade/Powerade before surgery?  Gatorade/Powerade helps to increase your comfort before surgery and to decrease your nausea after surgery. The carbohydrates in Gatorade/Powerade help reduce your body's stress response to surgery.  If you are a diabetic-drink only water prior to surgery.    Outpatient Surgery- May allow 2 adult (18 and older) Support Persons (1 being the designated ) for all surgical/procedural patients. A breastfeeding mother will be allowed her infant and 2 adult Support Persons. No one under the age of 18 will be allowed in the building. No swapping out of visitors in the [facility].      SPECIAL MEDICATION INSTRUCTIONS: TAKE medications checked off by the Anesthesiologist on your Medication List.    Angiogram Patients: Take medications as instructed by your physician, including aspirin.     Surgery Patients:    If you take ASPIRIN - Your PHYSICIAN/SURGEON will need to inform you IF/OR when you need to stop taking aspirin prior to your surgery.     The week prior to surgery do not ot take any medications containing IBUPROFEN or NSAIDS ( Advil, Motrin, Goodys, BC, Aleve, Naproxen etc)  If you are not sure if you should take a medicine please call your surgeon's office.  Ok to take Tylenol    Do Not Wear any make-up (especially eye make-up) to surgery. Please remove any false eyelashes or eyelash extensions. If you arrive the day of surgery with makeup/eyelashes on you will be required to remove prior to surgery. (There is a risk of corneal abrasions if eye makeup/eyelash extensions are not removed)      Leave all valuables at home.   Do Not wear any jewelry or watches, including any metal in body piercings. Jewelry must be removed prior to coming to the hospital.  There is a possibility that rings that are unable to be removed may be cut off if they are on the surgical extremity.    Please remove all hair extensions, wigs, clips and any other metal accessories/ ornaments from your hair.  These items may pose a flammable/fire risk in Surgery and must be removed.    Do not shave your surgical area at least 5 days prior to your surgery. The surgical prep will be performed at the hospital according to Infection Control regulations.    Contact Lens must be removed before surgery. Either do not wear the contact lens or bring a case and solution for storage.  Please bring a container for eyeglasses or dentures as required.  Bring any paperwork your physician has provided, such as consent forms,  history and physicals, doctor's orders, etc.   Bring comfortable clothes that are loose fitting to wear upon discharge. Take into consideration the type of surgery being performed.  Maintain your diet as advised per your physician the day prior to surgery.      Adequate rest the night before surgery is advised.   Park in the Parking lot behind the hospital or in the [facility] across the street from the parking lot. Parking is complimentary.  If you will be discharged the same day as your procedure, please arrange for a responsible adult to drive you home or to accompany you if traveling by taxi.    YOU WILL NOT BE PERMITTED TO DRIVE OR TO LEAVE THE HOSPITAL ALONE AFTER SURGERY.   If you are being discharged the same day, it is strongly recommended that you arrange for someone to remain with you for the first 24 hrs following your surgery.    The Surgeon will speak to your family/visitor after your surgery regarding the outcome of your surgery and post op care.  The Surgeon may speak to you after your surgery, but there is a possibility you may not remember the details.  Please check with your family members regarding the conversation with the Surgeon.    We strongly recommend whoever is bringing you home be present for discharge instructions.  This will ensure a thorough understanding for your post op home care.          Thank you for your cooperation.  The Staff of [facility].            Bathing Instructions with Hibiclens    Shower the evening before and morning of your procedure with Chlorhexidine (Hibiclens)  do not use Chlorhexidine on your face or genitals. Do not get in your eyes.  Wash your face with water and your regular face wash/soap  Use your regular shampoo  Apply Chlorhexidine (Hibiclens) directly on your skin or on a wet washcloth and wash gently. When showering: Move away from the shower stream when applying Chlorhexidine (Hibiclens) to avoid rinsing off too soon.  Rinse thoroughly with warm water  Do not dilute Chlorhexidine (Hibiclens)   Dry off as usual, do not use any deodorant, powder, body lotions, perfume, after shave or cologne.

## 2024-07-03 DIAGNOSIS — M89.9 CHRONIC KIDNEY DISEASE-MINERAL AND BONE DISORDER: ICD-10-CM

## 2024-07-03 DIAGNOSIS — E83.9 CHRONIC KIDNEY DISEASE-MINERAL AND BONE DISORDER: ICD-10-CM

## 2024-07-03 DIAGNOSIS — N18.6 ESRD (END STAGE RENAL DISEASE): ICD-10-CM

## 2024-07-03 DIAGNOSIS — N18.9 CHRONIC KIDNEY DISEASE-MINERAL AND BONE DISORDER: ICD-10-CM

## 2024-07-03 RX ORDER — CINACALCET 30 MG/1
30 TABLET, FILM COATED ORAL
Qty: 30 TABLET | Refills: 11 | Status: SHIPPED | OUTPATIENT
Start: 2024-07-03 | End: 2025-07-03

## 2024-07-08 ENCOUNTER — PATIENT MESSAGE (OUTPATIENT)
Dept: OBSTETRICS AND GYNECOLOGY | Facility: CLINIC | Age: 44
End: 2024-07-08
Payer: MEDICARE

## 2024-07-08 RX ORDER — METRONIDAZOLE 7.5 MG/G
1 GEL VAGINAL DAILY
Qty: 70 G | Refills: 0 | Status: SHIPPED | OUTPATIENT
Start: 2024-07-08 | End: 2024-08-08

## 2024-07-09 ENCOUNTER — PATIENT MESSAGE (OUTPATIENT)
Dept: ADMINISTRATIVE | Facility: HOSPITAL | Age: 44
End: 2024-07-09
Payer: MEDICARE

## 2024-07-09 ENCOUNTER — OFFICE VISIT (OUTPATIENT)
Dept: NEUROSURGERY | Facility: CLINIC | Age: 44
End: 2024-07-09
Payer: MEDICARE

## 2024-07-09 VITALS — HEART RATE: 88 BPM | DIASTOLIC BLOOD PRESSURE: 78 MMHG | SYSTOLIC BLOOD PRESSURE: 120 MMHG

## 2024-07-09 DIAGNOSIS — I72.5 BASILAR ARTERY ANEURYSM: ICD-10-CM

## 2024-07-09 PROCEDURE — 99213 OFFICE O/P EST LOW 20 MIN: CPT | Mod: PBBFAC,TXP | Performed by: STUDENT IN AN ORGANIZED HEALTH CARE EDUCATION/TRAINING PROGRAM

## 2024-07-09 PROCEDURE — 99999 PR PBB SHADOW E&M-EST. PATIENT-LVL III: CPT | Mod: PBBFAC,TXP,, | Performed by: STUDENT IN AN ORGANIZED HEALTH CARE EDUCATION/TRAINING PROGRAM

## 2024-07-09 NOTE — PROGRESS NOTES
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 She reports she was lost to follow up shortly after her aneurysm was treated in 2016 and does not believe she has had any imaging done since then. She has recently relocated to the area and would like to establish follow up. She gets occasional headaches but denies any change in pattern or frequency. Additionally denies seizures, nausea, vomiting, or any episodes of focal weakness or sensory loss.     Review of patient's allergies indicates:   Allergen Reactions    Aspirin Hives     And vomiting    Penicillins Hives     Throat swelling    Adhesive Rash     Adhesive/silk tape (type found on band aides). Can only use "Paper Tape"    Butalbital-acetaminophen-caff Nausea And Vomiting and Other (See Comments)     Dizziness (made pt feel sick)    Latex, natural rubber Rash    Nickel Rash    Tomato Nausea And Vomiting       Current Outpatient Medications   Medication Sig Dispense Refill    acetaminophen (TYLENOL) 650 MG TbSR Take 1 tablet (650 mg total) by mouth every 8 (eight) hours. 90 tablet 0    cinacalcet (SENSIPAR) 30 MG Tab Take 1 tablet (30 mg total) by mouth daily with breakfast. 30 tablet 11    loratadine (CLARITIN ORAL) Take 1 tablet by mouth daily as needed (Allergies).      metroNIDAZOLE (METROGEL VAGINAL) 0.75 % (37.5mg/5 gram) vaginal gel Place 1 applicator vaginally once daily. Use at bedtime for 7 days 70 g 0    ondansetron (ZOFRAN) 4 MG tablet Take 1 tablet (4 mg total) by mouth every 6 (six) hours. 20 tablet 0    sucroferric oxyhydroxide (VELPHORO) 500 mg Chew Break or dissolve 1 tablet with each meal and snack. Do not exceed 6 tablets per day. 180 " tablet 11    sodium zirconium cyclosilicate (LOKELMA) 10 gram packet Take 1 packet (10 g total) by mouth 2 (two) times a day. Mix entire contents of packet(s) into drinking glass containing 3 tablespoons of water; stir well and drink immediately. Add water and repeat until no powder remains to receive entire dose. (Patient not taking: Reported on 7/2/2024) 30 packet 3     No current facility-administered medications for this visit.     Facility-Administered Medications Ordered in Other Visits   Medication Dose Route Frequency Provider Last Rate Last Admin    0.9%  NaCl infusion   Intravenous Continuous Mel Calvillo MD        ondansetron disintegrating tablet 8 mg  8 mg Oral Q8H PRN Mel Calvillo MD           Past Medical History:   Diagnosis Date    Autosomal dominant polycystic kidney disease     Brain aneurysm     Encounter for blood transfusion     Hypertension      Past Surgical History:   Procedure Laterality Date    AV FISTULA PLACEMENT Right 09/14/2023    Procedure: CREATION, AV FISTULA, radial cephalic;  Surgeon: MELISSA Loera II, MD;  [facility];  Service: Vascular;  Laterality: Right;    AV FISTULA PLACEMENT Right 10/05/2023    Procedure: CREATION, AV FISTULA - brachial;  Surgeon: MELISSA Loera II, MD;  [facility];  Service: Vascular;  Laterality: Right;  confirmed placement with doppler    CEREBRAL ANEURYSM REPAIR      coiling    EMBOLIZATION Right 4/23/2024    Procedure: EMBOLIZATION, BLOOD VESSEL FISTULA;  Surgeon: MELISSA Loera II, MD;  [facility];  Service: Vascular;  Laterality: Right;    FISTULOGRAM Right 4/23/2024    Procedure: Fistulogram;  Surgeon: MELISSA Loera II, MD;  [facility];  Service: Vascular;  Laterality: Right;  CONTRAST: 68ML, FLUORO TIME: 17.9, mGy: 45.11, Gycm2: 5.9548    INSERTION OF TUNNELED CENTRAL VENOUS HEMODIALYSIS CATHETER Right 05/03/2023    Procedure: Insertion, Catheter, Central Venous,  Hemodialysis;  Surgeon: Priya Grimm MD;  Location: [facility] CATH LAB;  Service: Interventional Nephrology;  Laterality: Right;    REMOVAL OF HEMODIALYSIS CATHETER  05/03/2023    Procedure: REMOVAL, CATHETER, HEMODIALYSIS;  Surgeon: Priya Grimm MD;  Location: [facility] CATH LAB;  Service: Interventional Nephrology;;     Family History       Problem Relation (Age of Onset)    Diabetes Brother    Heart disease Father    Polycystic kidney disease Father    Stroke Father          Social History     Socioeconomic History    Marital status: Single   Tobacco Use    Smoking status: Every Day     Current packs/day: 1.00     Average packs/day: 1 pack/day for 28.0 years (28.0 ttl pk-yrs)     Types: Cigarettes     Passive exposure: Never    Smokeless tobacco: Never    Tobacco comments:     Down to 2-3 cigarettes/day    Substance and Sexual Activity    Alcohol use: Not Currently    Drug use: Never    Sexual activity: Not Currently   Social History Narrative    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     Social Determinants of Health     Financial Resource Strain: Low Risk  (12/19/2023)    Overall Financial Resource Strain (CARDIA)     Difficulty of Paying Living Expenses: Not very hard   Food Insecurity: No Food Insecurity (12/19/2023)    Hunger Vital Sign     Worried About Running Out of Food in the Last Year: Never true     Ran Out of Food in the Last Year: Never true   Transportation Needs: No Transportation Needs (12/19/2023)    PRAPARE - Transportation     Lack of Transportation (Medical): No     Lack of Transportation (Non-Medical): No   Physical Activity: Insufficiently Active (12/19/2023)    Exercise Vital Sign     Days of Exercise per Week: 2 days     Minutes of Exercise per Session: 10 min   Stress: Stress Concern Present (12/19/2023)    [employer] - Occupational Stress Questionnaire     Feeling of Stress : To some extent   Housing Stability: Low Risk  (12/19/2023)    Housing Stability Vital Sign     Unable to  Pay for Housing in the Last Year: No     Number of Places Lived in the Last Year: 2     Unstable Housing in the Last Year: No     OBJECTIVE:     Vital Signs  Pulse: 88  BP: 120/78  Pain Score: 0-No pain  There is no height or weight on file to calculate BMI.      Neurosurgery Physical Exam  General: well developed, well nourished, no distress.   Head: normocephalic, atraumatic  Mental Status: Awake, Alert, Oriented  Speech: Clear with content appropriate to conversation  Cranial nerves: face symmetric, tongue midline, CN II-XII grossly intact.   Eyes: pupils equal, round, reactive to light, EOMI.  Sensory: intact to light touch throughout    Motor Strength: Moves all extremities spontaneously with good tone.  Full strength upper and lower extremities. No abnormal movements seen.     Pronator Drift: no drift noted  Finger-to-nose: Intact bilaterally    Gait steady and fluid with normal stride and arm-swing    Diagnostic Results:  No relevant imaging to discuss.     ASSESSMENT/PLAN:     Ade Silva is a 43yo woman with PCKD on HD, current tobacco use, and known basilar artery aneurysm s/p coil done at an outside hospital in 2016 presenting to establish care. She is without alarm symptoms and her exam is without focal neurological deficits. However, given her risk factors of PCKD and tobacco use, I would like to get updated imaging done as soon as possible and have her follow up with cerebrovascular specialist Dr. Cesar. We discussed this plan and all questions were answered. We additionally discussed alarm symptoms that should prompt her to seek emergent care.     - MRA Brain with Contrast and Vessel Wall Imaging  - CTA Head and Neck  - Follow up with Dr. Cesar upon completion of the above referenced imaging  - Referral to Smoking Cessation clinic        Caprice Franklin PA-C  Department of Neurosurgery  [facility]

## 2024-07-10 ENCOUNTER — PATIENT MESSAGE (OUTPATIENT)
Dept: OBSTETRICS AND GYNECOLOGY | Facility: CLINIC | Age: 44
End: 2024-07-10
Payer: MEDICARE

## 2024-07-11 ENCOUNTER — ANESTHESIA (OUTPATIENT)
Dept: SURGERY | Facility: OTHER | Age: 44
End: 2024-07-11
Payer: MEDICARE

## 2024-07-15 ENCOUNTER — PATIENT MESSAGE (OUTPATIENT)
Dept: NEUROSURGERY | Facility: CLINIC | Age: 44
End: 2024-07-15
Payer: MEDICARE

## 2024-07-16 ENCOUNTER — HOSPITAL ENCOUNTER (OUTPATIENT)
Dept: RADIOLOGY | Facility: HOSPITAL | Age: 44
Discharge: HOME | End: 2024-07-16
Attending: STUDENT IN AN ORGANIZED HEALTH CARE EDUCATION/TRAINING PROGRAM
Payer: MEDICARE

## 2024-07-16 ENCOUNTER — OFFICE VISIT (OUTPATIENT)
Dept: PAIN MEDICINE | Facility: CLINIC | Age: 44
End: 2024-07-16
Payer: MEDICARE

## 2024-07-16 VITALS
SYSTOLIC BLOOD PRESSURE: 99 MMHG | HEIGHT: 71 IN | DIASTOLIC BLOOD PRESSURE: 68 MMHG | HEART RATE: 102 BPM | BODY MASS INDEX: 28.67 KG/M2 | WEIGHT: 204.81 LBS

## 2024-07-16 DIAGNOSIS — M54.2 CERVICALGIA: ICD-10-CM

## 2024-07-16 DIAGNOSIS — M79.2 NERVE PAIN: Primary | ICD-10-CM

## 2024-07-16 DIAGNOSIS — M79.601 RIGHT ARM PAIN: ICD-10-CM

## 2024-07-16 DIAGNOSIS — M54.12 CERVICAL RADICULOPATHY: ICD-10-CM

## 2024-07-16 PROCEDURE — 99204 OFFICE O/P NEW MOD 45 MIN: CPT | Mod: S$PBB,,, | Performed by: STUDENT IN AN ORGANIZED HEALTH CARE EDUCATION/TRAINING PROGRAM

## 2024-07-16 PROCEDURE — 99213 OFFICE O/P EST LOW 20 MIN: CPT | Mod: PBBFAC,25,PO | Performed by: STUDENT IN AN ORGANIZED HEALTH CARE EDUCATION/TRAINING PROGRAM

## 2024-07-16 PROCEDURE — 72052 X-RAY EXAM NECK SPINE 6/>VWS: CPT | Mod: 26,TXP,, | Performed by: RADIOLOGY

## 2024-07-16 PROCEDURE — 99999 PR PBB SHADOW E&M-EST. PATIENT-LVL III: CPT | Mod: PBBFAC,,, | Performed by: STUDENT IN AN ORGANIZED HEALTH CARE EDUCATION/TRAINING PROGRAM

## 2024-07-16 PROCEDURE — 72052 X-RAY EXAM NECK SPINE 6/>VWS: CPT | Mod: TC,FY,NTX

## 2024-07-16 RX ORDER — GABAPENTIN 100 MG/1
100 CAPSULE ORAL DAILY
Qty: 24 CAPSULE | Refills: 0 | Status: SHIPPED | OUTPATIENT
Start: 2024-07-16 | End: 2024-09-14

## 2024-07-16 NOTE — PROGRESS NOTES
[facility] - New Patient Evaluation    Referred by: Dr. Mihir Chang*   Reason for referral: Nerve pain     CC:   Chief Complaint   Patient presents with    Arm Pain         7/16/2024     2:01 PM   Last 3 PDI Scores   Pain Disability Index (PDI) 48       Subjective 07/16/2024:   Ade Silva is a 44 y.o. female with end-stage renal disease on hemodialysis (Monday, Wednesday, Friday) who presents complaining of right arm pain described as tingling, numb, sharp and electric.  She notes the pain over her dialysis graft site.  She notes numbness and tingling that radiates into the hand.  Numbness and tingling over the entirety of the hand which is intermittent.  She reports pain worsened after dialysis access was placed, however she does report pain prior to that as well.  Denies any neck pain.  No history of neck surgeries.  She was undergone multiple AV fistula surgeries on the right.  Takes Tylenol which only helps a little.    Initial Pain Assessment:  Location: right arm  Onset: 3 years  Current Pain Score: 7/10  Daily Pain of Range: 8-9/10  Quality: Tingling, Numb, Sharp, and Electric  Radiation: arm to right hand  Worsened by: extension and flexion  Improved by: heat     Patient denies night fever/night sweats, urinary incontinence, bowel incontinence, significant weight loss, significant motor weakness, and loss of sensations.      Previous Interventions:  - None    Previous Therapies:  PT/OT: no   Chiropractor:   HEP:   Relevant Surgery: yes   Multiple AV fistula surgeries on the right    Previous Medications:   - Tylenol or NSAIDS: Tylenol  - Muscle Relaxants:    - TCAs:   - SNRIs:   - Topicals:   - Anticonvulsants:    - Opioids:   - Adjuvants:     Current Pain Medications:  Tylenol     Review of Systems:  ROS    GENERAL:  No weight loss, malaise or fevers.  HEENT:   No recent changes in vision or hearing  NECK:  No difficulty with swallowing. No stridor.   RESPIRATORY:   Negative for cough, wheezing or shortness of breath, patient denies any recent URI.  CARDIOVASCULAR:  Negative for chest pain, leg swelling or palpitations.  GI:  Negative for abdominal discomfort, blood in stools or black stools or change in bowel habits.  MUSCULOSKELETAL:  See HPI.  SKIN:  Negative for lesions, rash, and itching.  PSYCH:  No mood disorder or recent psychosocial stressors.    HEMATOLOGY/LYMPHOLOGY:  Negative for prolonged bleeding, bruising easily or swollen nodes.  Patient is not currently taking any anti-coagulants  NEURO:   No history of headaches, syncope, paralysis, seizures or tremors.  All other reviewed and negative other than HPI.    History:  Current medications, allergies, medical history, surgical history,   family history, and social history were reviewed in the chart as marked.    Full Medication List:    Current Outpatient Medications:     acetaminophen (TYLENOL) 650 MG TbSR, Take 1 tablet (650 mg total) by mouth every 8 (eight) hours., Disp: 90 tablet, Rfl: 0    cinacalcet (SENSIPAR) 30 MG Tab, Take 1 tablet (30 mg total) by mouth daily with breakfast., Disp: 30 tablet, Rfl: 11    loratadine (CLARITIN ORAL), Take 1 tablet by mouth daily as needed (Allergies)., Disp: , Rfl:     metroNIDAZOLE (METROGEL VAGINAL) 0.75 % (37.5mg/5 gram) vaginal gel, Place 1 applicator vaginally once daily. Use at bedtime for 7 days, Disp: 70 g, Rfl: 0    ondansetron (ZOFRAN) 4 MG tablet, Take 1 tablet (4 mg total) by mouth every 6 (six) hours., Disp: 20 tablet, Rfl: 0    sodium zirconium cyclosilicate (LOKELMA) 10 gram packet, Take 1 packet (10 g total) by mouth 2 (two) times a day. Mix entire contents of packet(s) into drinking glass containing 3 tablespoons of water; stir well and drink immediately. Add water and repeat until no powder remains to receive entire dose., Disp: 30 packet, Rfl: 3    sucroferric oxyhydroxide (VELPHORO) 500 mg Chew, Break or dissolve 1 tablet with each meal and snack. Do not  "exceed 6 tablets per day., Disp: 180 tablet, Rfl: 11    gabapentin (NEURONTIN) 100 MG capsule, Take one 100 mg capsule three times a week following dialysis., Disp: 24 capsule, Rfl: 0  No current facility-administered medications for this visit.    Facility-Administered Medications Ordered in Other Visits:     0.9%  NaCl infusion, , Intravenous, Continuous, Mel Calvillo MD    ondansetron disintegrating tablet 8 mg, 8 mg, Oral, Q8H PRN, Mel Calvillo MD     Allergies:  Aspirin; Penicillins; Adhesive; Butalbital-acetaminophen-caff; Latex, natural rubber; Nickel; and Tomato     Medical History:   has a past medical history of Autosomal dominant polycystic kidney disease, Brain aneurysm, Encounter for blood transfusion, and Hypertension.    Surgical History:   has a past surgical history that includes Insertion of tunneled central venous hemodialysis catheter (Right, 05/03/2023); Removal of hemodialysis catheter (05/03/2023); AV fistula placement (Right, 09/14/2023); AV fistula placement (Right, 10/05/2023); Cerebral aneurysm repair; Fistulogram (Right, 4/23/2024); and Embolization (Right, 4/23/2024).    Family History:  family history includes Diabetes in her brother; Heart disease in her father; Polycystic kidney disease in her father; Stroke in her father.    Social History:   reports that she has been smoking cigarettes. She has a 28 pack-year smoking history. She has never been exposed to tobacco smoke. She has never used smokeless tobacco. She reports that she does not currently use alcohol. She reports that she does not use drugs.    Physical Exam:  Vitals:    07/16/24 1355   BP: 99/68   Pulse: 102   Weight: 92.9 kg (204 lb 12.9 oz)   Height: 5' 11" (1.803 m)   PainSc:   7   PainLoc: Arm       GENERAL: Well appearing, in no acute distress, alert and oriented x3.  PSYCH:  Mood and affect appropriate.  SKIN: Skin color, texture, turgor normal, no rashes or lesions.  HEAD/FACE:  Normocephalic, atraumatic. " Cranial nerves grossly intact.  NECK: Normal ROM. Supple.  No pain to palpation over the cervical paraspinous muscles. Spurling Negative. No pain with neck flexion, extension, or lateral rotation.   CV: + palpable thrill over the right AV fistula.  RRR with palpation of the radial artery.  PULM: No evidence of respiratory difficulty, symmetric chest rise.  GI:  Soft and non-distended.  MSK:  Mild tenderness to palpation over the right AV fistula.  No atrophy or tone abnormalities are noted.   NEURO: Bilateral upper and lower extremity coordination and strength is symmetric.  No loss of sensation is noted.  MENTAL STATUS: A x O x 3, good concentration, speech is fluent and goal directed  MOTOR: 5/5 in bilateral upper extremity muscle groups  GAIT: Normal. Ambulates unassisted.    Imaging:  -End Stage Renal Disease on Dialysis     History   ======     General History   Other: -Pain during Dialysis     Dialysis Access   =============     Dialysis access location:  Right Arm   Type of AV access: Brachiocephalic AVF   Rt inflow A PSV    243 cm/s   Rt at anastomosis PSV  413 cm/s   Rt A distal anastomosis PSV    236 cm/s   Rt fistula prox PSV    235 cm/s   Rt fistula mid PSV 83 cm/s   Rt fistula mid flow volume 1,559.0 ml/min   Rt fistula distal PSV  202 cm/s   Rt outflow V prox PSV  85 cm/s   Rt outflow V mid PSV   182 cm/s   Rt outflow V distal PSV    252 cm/s     Impression   =========     Color flow duplex imaging reveals a patent right Brachiocephalic AV fistula and proximal stent with no evidence of a focal   hemodynamically significant stenosis. The volume flow at the mid bicep measures 1559 mL/min.     DATE OF SERVICE: 06/06/2024                                                       Sonographer: Nikko Mckeon   Electronically Signed by: INA Herndon M.D. at 06/06/2024-11:31     Labs:  BMP  Lab Results   Component Value Date     07/01/2024    K 4.5 07/12/2024     07/01/2024    CO2 24 01/09/2024     BUN 62 (H) 01/09/2024    CREATININE 13.95 (H) 07/01/2024    CALCIUM 9.9 07/01/2024    ANIONGAP 13 01/09/2024    EGFRNORACEVR 3.9 (A) 01/09/2024     Lab Results   Component Value Date    ALT 7 07/01/2024    AST 7 (L) 01/09/2024    ALKPHOS 68 07/01/2024    BILITOT 0.6 01/09/2024     Lab Results   Component Value Date    WBC 6.60 07/01/2024    HGB 15.2 07/01/2024    HCT 44.9 07/01/2024     (H) 07/01/2024     (L) 07/01/2024           Assessment:  Problem List Items Addressed This Visit    None  Visit Diagnoses       Nerve pain    -  Primary    Cervicalgia        Relevant Orders    X-Ray Cervical Spine 5 View With Flex And Ext (Completed)    MRI Cervical Spine Without Contrast    Cervical radiculopathy        Right arm pain                07/16/2024 - Ade Yajaira Silva is a 44 y.o. female who  has a past medical history of Autosomal dominant polycystic kidney disease, Brain aneurysm, Encounter for blood transfusion, and Hypertension.  By history and examination this patient has chronic right upper extremity pain that appears to be neuropathic in nature, likely due to nerve damage/entrapment at the AV fistula site versus ischemic neuropathy versus cervical radiculopathy.  AV fistula patent on  ultrasound.  I will obtain imaging of her neck to rule out cervical stenosis.  We discussed the underlying diagnoses and multiple treatment options including non-opioid medications and interventional procedures.  I will start the patient on gabapentin 100 mg 3 times weekly after dialysis.  This may be increased up to 300 mg 3 times weekly after dialysis.  She may benefit from a cervical epidural steroid injection vs stellate ganglion block.  The risks and benefits of each treatment option were discussed and all questions were answered.      Treatment Plan:   Procedures:  None at this time.  May benefit from a cervical epidural steroid injection versus delayed ganglion block  PT/OT/HEP: I have stressed the importance  of physical activity and a home exercise plan to help with pain and improve health.  Medications:    - start gabapentin 100 mg 3 times weekly, following dialysis.  May be increased up to 300 mg 3 times weekly following dialysis.   -  Reviewed and consistent with medication use as prescribed.  Imaging:  Cervical x-ray ordered.  Cervical MRI ordered.  Follow Up: RTC after MRI to review results     Cata Diaz DO   Interventional Pain Medicine / Anesthesiology    Disclaimer: This note was partly generated using dictation software which may occasionally result in transcription errors.

## 2024-07-17 ENCOUNTER — PATIENT MESSAGE (OUTPATIENT)
Dept: OBSTETRICS AND GYNECOLOGY | Facility: CLINIC | Age: 44
End: 2024-07-17
Payer: MEDICARE

## 2024-07-19 ENCOUNTER — PATIENT MESSAGE (OUTPATIENT)
Dept: OBSTETRICS AND GYNECOLOGY | Facility: CLINIC | Age: 44
End: 2024-07-19
Payer: MEDICARE

## 2024-07-22 DIAGNOSIS — Z76.82 ORGAN TRANSPLANT CANDIDATE: Primary | ICD-10-CM

## 2024-07-23 ENCOUNTER — TELEPHONE (OUTPATIENT)
Dept: TRANSPLANT | Facility: CLINIC | Age: 44
End: 2024-07-23
Payer: MEDICARE

## 2024-07-23 DIAGNOSIS — Z76.82 PRE-KIDNEY TRANSPLANT, LISTED: Primary | ICD-10-CM

## 2024-07-23 NOTE — LETTER
2024    Ade Silva  [address] [place] [id number]        Dear Ade Silva:  MRN: [id number]  : 1980    You are scheduled on 10/03/2024 for follow up in the transplant clinic to update your records and evaluate your health status as you wait for a transplant.  It is very important that we ensure you are ready for your transplant.      Please make arrangements to be in our transplant clinic from 12:30 pm- 4 pm.  During this time you will watch an educational video and then be seen by our transplant , financial counselor, and advance practice provider.     If you have had a change in your medical history since your last visit, please call your transplant coordinator to ensure we have the medical records to review prior to your appointment.     Please bring the following with you to this appointment:  A Caregiver is REQUIRED  Bring ALL insurance information including medication card  Current list of medications  Copies of any outside medical records from the past year, such as: mammogram, pap smear, ultrasound, CAT scan, colonoscopy, cardiac angiogram or cardiac stress test    To reschedule your appointments please call [phone] or [phone] (ext. [phone]). Please ask for the .      Failure to cancel in advance or arrive on time could result in a $50 cancellation fee.  Your transplant candidacy could be affected by no showing these appointments.  We look forward to seeing you.    Sincerely,    [facility]  [address] [place] [id number]  [phone]

## 2024-07-23 NOTE — TELEPHONE ENCOUNTER
Spoke with pt confirming date, time, and location. Reminder mail   hand grasp, leg strength strong and equal bilaterally

## 2024-07-24 ENCOUNTER — TELEPHONE (OUTPATIENT)
Dept: OBSTETRICS AND GYNECOLOGY | Facility: CLINIC | Age: 44
End: 2024-07-24
Payer: MEDICARE

## 2024-07-24 ENCOUNTER — HOSPITAL ENCOUNTER (OUTPATIENT)
Dept: PREADMISSION TESTING | Facility: OTHER | Age: 44
Discharge: HOME | End: 2024-07-24
Payer: MEDICARE

## 2024-07-24 NOTE — PRE ADMISSION SCREENING
Patient's surgery rescheduled for tomorrow 7/25/24. Spoke with patient and reviewed NPO instructions, arrival time per Dr. Amador's office, answered questions. Patient stated has yellow folder from Pre-admit visit on 7/2/24. No additional questions at present.

## 2024-07-25 ENCOUNTER — HOSPITAL ENCOUNTER (OUTPATIENT)
Facility: OTHER | Age: 44
Discharge: HOME | End: 2024-07-25
Attending: OBSTETRICS & GYNECOLOGY | Admitting: OBSTETRICS & GYNECOLOGY
Payer: MEDICARE

## 2024-07-25 VITALS
DIASTOLIC BLOOD PRESSURE: 55 MMHG | HEART RATE: 87 BPM | OXYGEN SATURATION: 95 % | RESPIRATION RATE: 16 BRPM | SYSTOLIC BLOOD PRESSURE: 100 MMHG | TEMPERATURE: 98 F

## 2024-07-25 DIAGNOSIS — N92.0 MENORRHAGIA WITH REGULAR CYCLE: ICD-10-CM

## 2024-07-25 DIAGNOSIS — N90.89 VULVAL LESION: ICD-10-CM

## 2024-07-25 DIAGNOSIS — N18.9 CHRONIC KIDNEY DISEASE-MINERAL AND BONE DISORDER: ICD-10-CM

## 2024-07-25 DIAGNOSIS — Z98.890 STATUS POST HYSTEROSCOPY: Primary | ICD-10-CM

## 2024-07-25 DIAGNOSIS — N18.6 ESRD ON HEMODIALYSIS: ICD-10-CM

## 2024-07-25 DIAGNOSIS — N18.6 ESRD (END STAGE RENAL DISEASE) ON DIALYSIS: ICD-10-CM

## 2024-07-25 DIAGNOSIS — I72.5 BASILAR ARTERY ANEURYSM: ICD-10-CM

## 2024-07-25 DIAGNOSIS — N87.9 CERVICAL DYSPLASIA: ICD-10-CM

## 2024-07-25 DIAGNOSIS — Z99.2 ESRD ON HEMODIALYSIS: ICD-10-CM

## 2024-07-25 DIAGNOSIS — M89.9 CHRONIC KIDNEY DISEASE-MINERAL AND BONE DISORDER: ICD-10-CM

## 2024-07-25 DIAGNOSIS — R07.9 CHEST PAIN, UNSPECIFIED TYPE: ICD-10-CM

## 2024-07-25 DIAGNOSIS — I10 HYPERTENSION, UNSPECIFIED TYPE: ICD-10-CM

## 2024-07-25 DIAGNOSIS — Z01.818 PRE-TRANSPLANT EVALUATION FOR KIDNEY TRANSPLANT: ICD-10-CM

## 2024-07-25 DIAGNOSIS — Z98.890 S/P ARTERIOVENOUS (AV) FISTULA CREATION: ICD-10-CM

## 2024-07-25 DIAGNOSIS — E83.9 CHRONIC KIDNEY DISEASE-MINERAL AND BONE DISORDER: ICD-10-CM

## 2024-07-25 DIAGNOSIS — Z01.818 PREOP TESTING: ICD-10-CM

## 2024-07-25 DIAGNOSIS — Z99.2 ESRD (END STAGE RENAL DISEASE) ON DIALYSIS: ICD-10-CM

## 2024-07-25 LAB
ALBUMIN SERPL BCP-MCNC: 3.9 G/DL (ref 3.5–5.2)
ALP SERPL-CCNC: 61 U/L (ref 55–135)
ALT SERPL W/O P-5'-P-CCNC: 10 U/L (ref 10–44)
ANION GAP SERPL CALC-SCNC: 13 MMOL/L (ref 8–16)
AST SERPL-CCNC: 8 U/L (ref 10–40)
BILIRUB SERPL-MCNC: 0.5 MG/DL (ref 0.1–1)
BUN SERPL-MCNC: 51 MG/DL (ref 6–20)
CALCIUM SERPL-MCNC: 9.4 MG/DL (ref 8.7–10.5)
CHLORIDE SERPL-SCNC: 100 MMOL/L (ref 95–110)
CO2 SERPL-SCNC: 27 MMOL/L (ref 23–29)
CREAT SERPL-MCNC: 12.2 MG/DL (ref 0.5–1.4)
EST. GFR  (NO RACE VARIABLE): 4 ML/MIN/1.73 M^2
GLUCOSE SERPL-MCNC: 87 MG/DL (ref 70–110)
HCG INTACT+B SERPL-ACNC: 1.3 MIU/ML
POTASSIUM SERPL-SCNC: 4.3 MMOL/L (ref 3.5–5.1)
PROT SERPL-MCNC: 7.6 G/DL (ref 6–8.4)
SODIUM SERPL-SCNC: 140 MMOL/L (ref 136–145)

## 2024-07-25 PROCEDURE — 88342 IMHCHEM/IMCYTCHM 1ST ANTB: CPT | Mod: TXP | Performed by: PATHOLOGY

## 2024-07-25 PROCEDURE — 84702 CHORIONIC GONADOTROPIN TEST: CPT | Mod: NTX | Performed by: OBSTETRICS & GYNECOLOGY

## 2024-07-25 PROCEDURE — 80053 COMPREHEN METABOLIC PANEL: CPT | Mod: TXP | Performed by: OBSTETRICS & GYNECOLOGY

## 2024-07-25 PROCEDURE — 36000706: Mod: TXP | Performed by: OBSTETRICS & GYNECOLOGY

## 2024-07-25 PROCEDURE — 25000003 PHARM REV CODE 250: Mod: TXP | Performed by: OBSTETRICS & GYNECOLOGY

## 2024-07-25 PROCEDURE — 25000003 PHARM REV CODE 250: Mod: TXP | Performed by: NURSE ANESTHETIST, CERTIFIED REGISTERED

## 2024-07-25 PROCEDURE — 88307 TISSUE EXAM BY PATHOLOGIST: CPT | Mod: 26,NTX,, | Performed by: PATHOLOGY

## 2024-07-25 PROCEDURE — 11423 EXC H-F-NK-SP B9+MARG 2.1-3: CPT | Mod: 51,,, | Performed by: OBSTETRICS & GYNECOLOGY

## 2024-07-25 PROCEDURE — 71000015 HC POSTOP RECOV 1ST HR: Mod: NTX | Performed by: OBSTETRICS & GYNECOLOGY

## 2024-07-25 PROCEDURE — 58563 HYSTEROSCOPY ABLATION: CPT | Mod: ,,, | Performed by: OBSTETRICS & GYNECOLOGY

## 2024-07-25 PROCEDURE — 57522 CONIZATION OF CERVIX: CPT | Mod: 51,,, | Performed by: OBSTETRICS & GYNECOLOGY

## 2024-07-25 PROCEDURE — 88307 TISSUE EXAM BY PATHOLOGIST: CPT | Mod: TXP | Performed by: PATHOLOGY

## 2024-07-25 PROCEDURE — 25000242 PHARM REV CODE 250 ALT 637 W/ HCPCS: Mod: TXP | Performed by: ANESTHESIOLOGY

## 2024-07-25 PROCEDURE — 88341 IMHCHEM/IMCYTCHM EA ADD ANTB: CPT | Mod: NTX | Performed by: PATHOLOGY

## 2024-07-25 PROCEDURE — 25000003 PHARM REV CODE 250: Mod: NTX | Performed by: ANESTHESIOLOGY

## 2024-07-25 PROCEDURE — 71000033 HC RECOVERY, INTIAL HOUR: Mod: NTX | Performed by: OBSTETRICS & GYNECOLOGY

## 2024-07-25 PROCEDURE — 36000707: Mod: TXP | Performed by: OBSTETRICS & GYNECOLOGY

## 2024-07-25 PROCEDURE — 37000008 HC ANESTHESIA 1ST 15 MINUTES: Mod: TXP | Performed by: OBSTETRICS & GYNECOLOGY

## 2024-07-25 PROCEDURE — 94640 AIRWAY INHALATION TREATMENT: CPT | Mod: NTX

## 2024-07-25 PROCEDURE — 88342 IMHCHEM/IMCYTCHM 1ST ANTB: CPT | Mod: 26,NTX,, | Performed by: PATHOLOGY

## 2024-07-25 PROCEDURE — 27201423 OPTIME MED/SURG SUP & DEVICES STERILE SUPPLY: Mod: NTX | Performed by: OBSTETRICS & GYNECOLOGY

## 2024-07-25 PROCEDURE — 88305 TISSUE EXAM BY PATHOLOGIST: CPT | Mod: 26,NTX,, | Performed by: PATHOLOGY

## 2024-07-25 PROCEDURE — 25000003 PHARM REV CODE 250: Mod: TXP | Performed by: ANESTHESIOLOGY

## 2024-07-25 PROCEDURE — 36415 COLL VENOUS BLD VENIPUNCTURE: CPT | Mod: NTX | Performed by: OBSTETRICS & GYNECOLOGY

## 2024-07-25 PROCEDURE — C1782 MORCELLATOR: HCPCS | Mod: TXP | Performed by: OBSTETRICS & GYNECOLOGY

## 2024-07-25 PROCEDURE — 88305 TISSUE EXAM BY PATHOLOGIST: CPT | Mod: TXP | Performed by: PATHOLOGY

## 2024-07-25 PROCEDURE — 88341 IMHCHEM/IMCYTCHM EA ADD ANTB: CPT | Mod: 26,NTX,, | Performed by: PATHOLOGY

## 2024-07-25 PROCEDURE — 71000016 HC POSTOP RECOV ADDL HR: Mod: TXP | Performed by: OBSTETRICS & GYNECOLOGY

## 2024-07-25 PROCEDURE — 63600175 PHARM REV CODE 636 W HCPCS: Mod: TXP | Performed by: NURSE ANESTHETIST, CERTIFIED REGISTERED

## 2024-07-25 PROCEDURE — 37000009 HC ANESTHESIA EA ADD 15 MINS: Mod: NTX | Performed by: OBSTETRICS & GYNECOLOGY

## 2024-07-25 RX ORDER — OXYCODONE HYDROCHLORIDE 5 MG/1
5 TABLET ORAL
Status: DISCONTINUED | OUTPATIENT
Start: 2024-07-25 | End: 2024-07-25 | Stop reason: HOSPADM

## 2024-07-25 RX ORDER — MUPIROCIN 20 MG/G
OINTMENT TOPICAL
Status: DISCONTINUED | OUTPATIENT
Start: 2024-07-25 | End: 2024-07-25 | Stop reason: HOSPADM

## 2024-07-25 RX ORDER — SODIUM CHLORIDE 9 MG/ML
INJECTION, SOLUTION INTRAVENOUS CONTINUOUS
Status: DISCONTINUED | OUTPATIENT
Start: 2024-07-25 | End: 2024-07-25 | Stop reason: HOSPADM

## 2024-07-25 RX ORDER — DEXTROMETHORPHAN HYDROBROMIDE, GUAIFENESIN 5; 100 MG/5ML; MG/5ML
650 LIQUID ORAL EVERY 6 HOURS PRN
Qty: 30 TABLET | Refills: 1 | Status: SHIPPED | OUTPATIENT
Start: 2024-07-25

## 2024-07-25 RX ORDER — FENTANYL CITRATE 50 UG/ML
INJECTION, SOLUTION INTRAMUSCULAR; INTRAVENOUS
Status: DISCONTINUED | OUTPATIENT
Start: 2024-07-25 | End: 2024-07-25

## 2024-07-25 RX ORDER — HYDROMORPHONE HYDROCHLORIDE 2 MG/ML
INJECTION, SOLUTION INTRAMUSCULAR; INTRAVENOUS; SUBCUTANEOUS
Status: DISCONTINUED | OUTPATIENT
Start: 2024-07-25 | End: 2024-07-25

## 2024-07-25 RX ORDER — ONDANSETRON 8 MG/1
8 TABLET, ORALLY DISINTEGRATING ORAL EVERY 8 HOURS PRN
Status: DISCONTINUED | OUTPATIENT
Start: 2024-07-25 | End: 2024-07-25 | Stop reason: HOSPADM

## 2024-07-25 RX ORDER — SODIUM CHLORIDE, SODIUM LACTATE, POTASSIUM CHLORIDE, CALCIUM CHLORIDE 600; 310; 30; 20 MG/100ML; MG/100ML; MG/100ML; MG/100ML
INJECTION, SOLUTION INTRAVENOUS CONTINUOUS
Status: DISCONTINUED | OUTPATIENT
Start: 2024-07-25 | End: 2024-07-25 | Stop reason: HOSPADM

## 2024-07-25 RX ORDER — FAMOTIDINE 20 MG/1
20 TABLET, FILM COATED ORAL
Status: COMPLETED | OUTPATIENT
Start: 2024-07-25 | End: 2024-07-25

## 2024-07-25 RX ORDER — ONDANSETRON HYDROCHLORIDE 2 MG/ML
4 INJECTION, SOLUTION INTRAVENOUS DAILY PRN
Status: DISCONTINUED | OUTPATIENT
Start: 2024-07-25 | End: 2024-07-25 | Stop reason: HOSPADM

## 2024-07-25 RX ORDER — LIDOCAINE HYDROCHLORIDE 20 MG/ML
INJECTION INTRAVENOUS
Status: DISCONTINUED | OUTPATIENT
Start: 2024-07-25 | End: 2024-07-25

## 2024-07-25 RX ORDER — ONDANSETRON HYDROCHLORIDE 2 MG/ML
INJECTION, SOLUTION INTRAVENOUS
Status: DISCONTINUED | OUTPATIENT
Start: 2024-07-25 | End: 2024-07-25

## 2024-07-25 RX ORDER — LIDOCAINE HYDROCHLORIDE 10 MG/ML
0.5 INJECTION, SOLUTION EPIDURAL; INFILTRATION; INTRACAUDAL; PERINEURAL ONCE
Status: DISCONTINUED | OUTPATIENT
Start: 2024-07-25 | End: 2024-07-25 | Stop reason: HOSPADM

## 2024-07-25 RX ORDER — SODIUM CHLORIDE 0.9 % (FLUSH) 0.9 %
3 SYRINGE (ML) INJECTION
Status: DISCONTINUED | OUTPATIENT
Start: 2024-07-25 | End: 2024-07-25 | Stop reason: HOSPADM

## 2024-07-25 RX ORDER — PHENYLEPHRINE HYDROCHLORIDE 10 MG/ML
INJECTION INTRAVENOUS
Status: DISCONTINUED | OUTPATIENT
Start: 2024-07-25 | End: 2024-07-25

## 2024-07-25 RX ORDER — ALBUTEROL SULFATE 2.5 MG/.5ML
2.5 SOLUTION RESPIRATORY (INHALATION)
Status: COMPLETED | OUTPATIENT
Start: 2024-07-25 | End: 2024-07-25

## 2024-07-25 RX ORDER — ACETAMINOPHEN 500 MG
1000 TABLET ORAL
Status: COMPLETED | OUTPATIENT
Start: 2024-07-25 | End: 2024-07-25

## 2024-07-25 RX ORDER — DIPHENHYDRAMINE HYDROCHLORIDE 50 MG/ML
25 INJECTION INTRAMUSCULAR; INTRAVENOUS EVERY 4 HOURS PRN
Status: DISCONTINUED | OUTPATIENT
Start: 2024-07-25 | End: 2024-07-25 | Stop reason: HOSPADM

## 2024-07-25 RX ORDER — HYDROMORPHONE HYDROCHLORIDE 2 MG/ML
0.4 INJECTION, SOLUTION INTRAMUSCULAR; INTRAVENOUS; SUBCUTANEOUS EVERY 5 MIN PRN
Status: DISCONTINUED | OUTPATIENT
Start: 2024-07-25 | End: 2024-07-25 | Stop reason: HOSPADM

## 2024-07-25 RX ORDER — PREGABALIN 75 MG/1
75 CAPSULE ORAL ONCE
Status: COMPLETED | OUTPATIENT
Start: 2024-07-25 | End: 2024-07-25

## 2024-07-25 RX ORDER — HYDROCODONE BITARTRATE AND ACETAMINOPHEN 5; 325 MG/1; MG/1
1 TABLET ORAL EVERY 4 HOURS PRN
Status: DISCONTINUED | OUTPATIENT
Start: 2024-07-25 | End: 2024-07-25 | Stop reason: HOSPADM

## 2024-07-25 RX ORDER — GLUCAGON 1 MG
1 KIT INJECTION
Status: DISCONTINUED | OUTPATIENT
Start: 2024-07-25 | End: 2024-07-25 | Stop reason: HOSPADM

## 2024-07-25 RX ORDER — DIPHENHYDRAMINE HCL 25 MG
25 CAPSULE ORAL EVERY 4 HOURS PRN
Status: DISCONTINUED | OUTPATIENT
Start: 2024-07-25 | End: 2024-07-25 | Stop reason: HOSPADM

## 2024-07-25 RX ORDER — PROPOFOL 10 MG/ML
VIAL (ML) INTRAVENOUS
Status: DISCONTINUED | OUTPATIENT
Start: 2024-07-25 | End: 2024-07-25

## 2024-07-25 RX ORDER — OXYCODONE HYDROCHLORIDE 5 MG/1
5 TABLET ORAL EVERY 4 HOURS PRN
Qty: 7 TABLET | Refills: 0 | Status: SHIPPED | OUTPATIENT
Start: 2024-07-25

## 2024-07-25 RX ORDER — ROCURONIUM BROMIDE 10 MG/ML
INJECTION, SOLUTION INTRAVENOUS
Status: DISCONTINUED | OUTPATIENT
Start: 2024-07-25 | End: 2024-07-25

## 2024-07-25 RX ORDER — EPHEDRINE SULFATE 50 MG/ML
INJECTION, SOLUTION INTRAVENOUS
Status: DISCONTINUED | OUTPATIENT
Start: 2024-07-25 | End: 2024-07-25

## 2024-07-25 RX ORDER — ETOMIDATE 2 MG/ML
INJECTION INTRAVENOUS
Status: DISCONTINUED | OUTPATIENT
Start: 2024-07-25 | End: 2024-07-25

## 2024-07-25 RX ORDER — DEXAMETHASONE SODIUM PHOSPHATE 4 MG/ML
INJECTION, SOLUTION INTRA-ARTICULAR; INTRALESIONAL; INTRAMUSCULAR; INTRAVENOUS; SOFT TISSUE
Status: DISCONTINUED | OUTPATIENT
Start: 2024-07-25 | End: 2024-07-25

## 2024-07-25 RX ORDER — MEPERIDINE HYDROCHLORIDE 25 MG/ML
12.5 INJECTION INTRAMUSCULAR; INTRAVENOUS; SUBCUTANEOUS ONCE AS NEEDED
Status: DISCONTINUED | OUTPATIENT
Start: 2024-07-25 | End: 2024-07-25 | Stop reason: HOSPADM

## 2024-07-25 RX ADMIN — PHENYLEPHRINE HYDROCHLORIDE 200 MCG: 10 INJECTION INTRAVENOUS at 07:07

## 2024-07-25 RX ADMIN — GLYCOPYRROLATE 0.2 MG: 0.2 INJECTION, SOLUTION INTRAMUSCULAR; INTRAVITREAL at 07:07

## 2024-07-25 RX ADMIN — ALBUTEROL SULFATE 2.5 MG: 2.5 SOLUTION RESPIRATORY (INHALATION) at 06:07

## 2024-07-25 RX ADMIN — DEXAMETHASONE SODIUM PHOSPHATE 4 MG: 4 INJECTION, SOLUTION INTRAMUSCULAR; INTRAVENOUS at 07:07

## 2024-07-25 RX ADMIN — PHENYLEPHRINE HYDROCHLORIDE 100 MCG: 10 INJECTION INTRAVENOUS at 08:07

## 2024-07-25 RX ADMIN — HYDROMORPHONE HYDROCHLORIDE 0.4 MG: 2 INJECTION INTRAMUSCULAR; INTRAVENOUS; SUBCUTANEOUS at 08:07

## 2024-07-25 RX ADMIN — PROPOFOL 20 MG: 10 INJECTION, EMULSION INTRAVENOUS at 08:07

## 2024-07-25 RX ADMIN — FAMOTIDINE 20 MG: 20 TABLET, FILM COATED ORAL at 06:07

## 2024-07-25 RX ADMIN — PREGABALIN 75 MG: 75 CAPSULE ORAL at 06:07

## 2024-07-25 RX ADMIN — SODIUM CHLORIDE: 0.9 INJECTION, SOLUTION INTRAVENOUS at 06:07

## 2024-07-25 RX ADMIN — ACETAMINOPHEN 1000 MG: 500 TABLET ORAL at 06:07

## 2024-07-25 RX ADMIN — SODIUM CHLORIDE: 0.9 INJECTION, SOLUTION INTRAVENOUS at 08:07

## 2024-07-25 RX ADMIN — LIDOCAINE HYDROCHLORIDE 100 MG: 20 INJECTION, SOLUTION INTRAVENOUS at 07:07

## 2024-07-25 RX ADMIN — ONDANSETRON HYDROCHLORIDE 4 MG: 2 INJECTION INTRAMUSCULAR; INTRAVENOUS at 08:07

## 2024-07-25 RX ADMIN — FENTANYL CITRATE 100 MCG: 50 INJECTION, SOLUTION INTRAMUSCULAR; INTRAVENOUS at 07:07

## 2024-07-25 RX ADMIN — EPHEDRINE SULFATE 5 MG: 50 INJECTION INTRAVENOUS at 07:07

## 2024-07-25 RX ADMIN — MUPIROCIN: 20 OINTMENT TOPICAL at 06:07

## 2024-07-25 RX ADMIN — ROCURONIUM BROMIDE 40 MG: 10 INJECTION, SOLUTION INTRAVENOUS at 07:07

## 2024-07-25 RX ADMIN — SUGAMMADEX 200 MG: 100 INJECTION, SOLUTION INTRAVENOUS at 08:07

## 2024-07-25 RX ADMIN — ETOMIDATE 20 MG: 2 INJECTION, SOLUTION INTRAVENOUS at 07:07

## 2024-07-25 RX ADMIN — ROCURONIUM BROMIDE 10 MG: 10 INJECTION, SOLUTION INTRAVENOUS at 07:07

## 2024-07-25 RX ADMIN — OXYCODONE HYDROCHLORIDE 5 MG: 5 TABLET ORAL at 08:07

## 2024-07-25 NOTE — DISCHARGE INSTRUCTIONS
Home Care Instruction D&C Hysteroscopy             ACTIVITY LEVEL:  If you received sedation and/or an anesthetic, you may feel sleepy for several hours. Rest until you feel more  awake. Gradually resume your normal activities.    DIET:  At home, continue with liquids. If there is no nausea, you may eat a soft diet and gradually resume a regular diet.    BATHING:  You may shower  as desired in one day.  You should avoid tub baths, hot tubs and swimming pools until seen by your physician for a post-op follow up.    CARE:  You can expect watery or bloody vaginal discharge for several days. Do not use tampons, please only use pads. Sexual activity is restricted as advised by your doctor.    MEDICATIONS:  You will receive instructions for any pain and/or antibiotic prescriptions. Pain medication should be taken only if needed and as directed. Antibiotics, if ordered, should be taken as directed until the entire prescription is completed.    ADDITIONAL INFORMATION:  __________________________________________________________________________________________  WHEN TO CALL THE DOCTOR:   For any heavy vaginal bleeding   Fever over 101°F (38.4°C)   Any lower abdominal pain not relieved by the pain mediation  RETURN APPOINTMENT:  __________________________________________________________________________________________  FOR EMERGENCIES:  If any unusual problems or difficulties occur, contact Dr. Amador or the resident at [phone] (page ) or the Clinic office, [phone].

## 2024-07-25 NOTE — ANESTHESIA PROCEDURE NOTES
Intubation    Date/Time: 7/25/2024 7:13 AM    Performed by: Jackeline Martin CRNA  Authorized by: Matty Skinner MD    Intubation:     Induction:  Intravenous    Intubated:  Postinduction    Mask Ventilation:  Easy mask    Attempts:  1    Attempted By:  CRNA    Method of Intubation:  Video laryngoscopy    Blade:  Ellis 3    Laryngeal View Grade: Grade I - full view of cords      Difficult Airway Encountered?: No      Complications:  None    Airway Device:  Oral endotracheal tube    Airway Device Size:  7.5    Style/Cuff Inflation:  Cuffed (inflated to minimal occlusive pressure)    Tube secured:  23    Secured at:  The lips    Placement Verified By:  Capnometry    Complicating Factors:  None    Findings Post-Intubation:  BS equal bilateral and atraumatic/condition of teeth unchanged  Notes:      Multiple loose teeth upper and lower front   Pt able to wiggle with fingers  All intact with intubation

## 2024-07-25 NOTE — ANESTHESIA POSTPROCEDURE EVALUATION
Anesthesia Post Evaluation    Patient: Ade Silva    Procedure(s) Performed: Procedure(s) (LRB):  ABLATION, ENDOMETRIUM, THERMAL (N/A)  LEEP (LOOP ELECTROSURGICAL EXCISION PROCEDURE) (N/A)  HYSTEROSCOPY (N/A)  INCISION, VULVA (N/A)    Final Anesthesia Type: general      Patient location during evaluation: PACU  Patient participation: Yes- Able to Participate  Level of consciousness: awake and alert  Post-procedure vital signs: reviewed and stable  Pain management: adequate  Airway patency: patent    PONV status at discharge: No PONV  Anesthetic complications: no      Cardiovascular status: blood pressure returned to baseline  Respiratory status: unassisted and spontaneous ventilation  Hydration status: euvolemic  Follow-up not needed.              Vitals Value Taken Time   /57 07/25/24 0917   Temp 36.4 °C (97.6 °F) 07/25/24 0915   Pulse 86 07/25/24 0920   Resp 16 07/25/24 0915   SpO2 94 % 07/25/24 0920   Vitals shown include unfiled device data.      Event Time   Out of Recovery 09:23:32         Pain/Geovanna Score: Pain Rating Prior to Med Admin: 4 (7/25/2024  8:50 AM)  Geovanna Score: 10 (7/25/2024  9:09 AM)

## 2024-07-25 NOTE — DISCHARGE SUMMARY
Discharge Note    SUMMARY     Admit Date: 7/25/2024    Discharge Date:  07/25/2024 8:50 AM    Hospital Course   Patient was admitted for the above mentioned procedure.  Procedure was performed without difficulty.  Please see operative report for further details.  She was transferred to recovery in stable condition and discharged home the same day.      Final Diagnosis: Post-Op Diagnosis Codes:     * Cervical dysplasia [N87.9]     * Irregular menstrual cycle [N92.6]     * Endometrial polyp [N84.0]     * Menorrhagia with regular cycle [N92.0]    Disposition: Home or Self Care    Follow Up/Patient Instructions: see below    Medications:  Reconciled Home Medications:      Medication List        START taking these medications      oxyCODONE 5 MG immediate release tablet  Commonly known as: ROXICODONE  Take 1 tablet (5 mg total) by mouth every 4 (four) hours as needed for Pain.            CHANGE how you take these medications      acetaminophen 650 MG Tbsr  Commonly known as: TYLENOL  Take 1 tablet (650 mg total) by mouth every 6 (six) hours as needed (pain).  What changed:   when to take this  reasons to take this            CONTINUE taking these medications      CLARITIN ORAL  Take 1 tablet by mouth daily as needed (Allergies).     gabapentin 100 MG capsule  Commonly known as: NEURONTIN  Take one 100 mg capsule three times a week following dialysis.     LOKELMA 10 gram packet  Generic drug: sodium zirconium cyclosilicate  Take 1 packet (10 g total) by mouth 2 (two) times a day. Mix entire contents of packet(s) into drinking glass containing 3 tablespoons of water; stir well and drink immediately. Add water and repeat until no powder remains to receive entire dose.     metroNIDAZOLE 0.75 % (37.5mg/5 gram) vaginal gel  Commonly known as: Metrogel VaginaL  Place 1 applicator vaginally once daily. Use at bedtime for 7 days     ondansetron 4 MG tablet  Commonly known as: ZOFRAN  Take 1 tablet (4 mg total) by mouth every 6  (six) hours.     VELPHORO 500 mg Chew  Generic drug: sucroferric oxyhydroxide  Break or dissolve 1 tablet with each meal and snack. Do not exceed 6 tablets per day.            Discharge Procedure Orders   Diet general     Pelvic Rest   Order Comments: Nothing in the vagina (tampons, douching, diva cups or intercourse) for 2 weeks.     Call MD for:  temperature >100.4     Call MD for:  persistent nausea and vomiting     Call MD for:  severe uncontrolled pain     Call MD for:   Order Comments: Heavy vaginal bleeding saturating more than 1 maxi pad per hour.      Follow-up Information       Jazz Amador MD Follow up in 4 week(s).    Specialties: Obstetrics, Obstetrics and Gynecology  Why: Post-op visit  Contact information:  [address] [id number] [phone]                           Ivette Hill MD  [facility]   OBGYN PGY3

## 2024-07-25 NOTE — INTERVAL H&P NOTE
The patient has been examined and the H&P has been reviewed:    I concur with the findings and no changes have occurred since H&P was written.    Surgery risks, benefits and alternative options discussed and understood by patient/family.      Ade Silva is 44 y.o.  presenting for scheduled Hysteroscopy, D&C, uterine ablation, LEEP, and vulvectomy.    Temp:  [97.6 °F (36.4 °C)] 97.6 °F (36.4 °C)  Pulse:  [48-79] 74  Resp:  [18] 18  SpO2:  [96 %] 96 %  BP: ()/(21-62) 98/58    General: NAD, alert, oriented, cooperative  HEENT: NCAT, EOM grossly intact  Lungs: Normal WOB  Heart: regular rate  Abdomen: soft, nondistended, nontender, no rebound or guarding    Consents in chart. Pre-operative heparin not indicated. All questions answered and concerns addressed. To OR for planned procedure.      Ivette Hill MD  [facility]   OBGYN PGY3

## 2024-07-25 NOTE — BRIEF OP NOTE
[facility] - Surgery ([place])  Brief Operative Note    Surgery Date: 7/25/2024     Surgeons and Role:     * Jazz Amador MD - Primary     * Ivette Hill MD - Resident - Assisting      Pre-op Diagnosis:  Cervical dysplasia [N87.9]  Irregular menstrual cycle [N92.6]  Endometrial polyp [N84.0]  Menorrhagia with regular cycle [N92.0]    Post-op Diagnosis:  Post-Op Diagnosis Codes:     * Cervical dysplasia [N87.9]     * Irregular menstrual cycle [N92.6]     * Endometrial polyp [N84.0]     * Menorrhagia with regular cycle [N92.0]    Procedure(s) (LRB):  ABLATION, ENDOMETRIUM, THERMAL (N/A)  LEEP (LOOP ELECTROSURGICAL EXCISION PROCEDURE) (N/A)  HYSTEROSCOPY (N/A)  VULVECTOMY (N/A)  INCISION, VULVA (N/A)    Anesthesia: General    Operative Findings:   Uterus sounded to 7 cm, shaggy endometrium noted with multiple polypoid areas noted, bilateral ostia visualized   Cervix - non-staining Lugol's areas noted at 9 o'clock & 12 o'clock  Vulva - two left labial lesion and warty rectal lesion    Myosure -   Total deficit 190cc  Fluid used 556 cc    Novasure   Cavity length 4.0 cm   Cavity width 3.8 cm   Power 84 Manriquez  Ablation time 1:29 minutes      Estimated Blood Loss: Less than 20 cc          Specimens: Endometrial polypoid tissue, Novasure shavings, LEEP ectocervical biopsy stitch at 12 o'clock, post LEEP ECC, left labial lesions, warty rectal lesion   Specimen (24h ago, onward)      None

## 2024-07-25 NOTE — PLAN OF CARE
Ade Silva has met all discharge criteria from Phase II. Vital Signs are stable, ambulating  without difficulty. Discharge instructions given, patient verbalized understanding. Discharged from facility via wheelchair in stable condition.

## 2024-07-30 NOTE — OP NOTE
OPERATIVE REPORT    Surgery Date: 7/25/2024      Surgeons and Role:     * Jazz Amador MD - Primary     * Ivette Hill MD - Resident - Assisting        Pre-op Diagnosis:  Cervical dysplasia [N87.9]  Irregular menstrual cycle [N92.6]  Endometrial polyp [N84.0]  Menorrhagia with regular cycle [N92.0]     Post-op Diagnosis:  Post-Op Diagnosis Codes:     * Cervical dysplasia [N87.9]     * Irregular menstrual cycle [N92.6]     * Endometrial polyp [N84.0]     * Menorrhagia with regular cycle [N92.0]     Procedure(s) (LRB):  ABLATION, ENDOMETRIUM, THERMAL (N/A)  LEEP (LOOP ELECTROSURGICAL EXCISION PROCEDURE) (N/A)  HYSTEROSCOPY (N/A)  VULVECTOMY (N/A)  INCISION, VULVA (N/A)     Anesthesia: General     Operative Findings:   Uterus sounded to 7 cm, shaggy endometrium noted with multiple polypoid areas noted, bilateral ostia visualized   Cervix - non-staining Lugol's areas noted at 9 o'clock & 12 o'clock  Vulva - two left labial lesion and warty rectal lesion     Myosure   Total deficit 190cc  Fluid used 556 cc     Novasure   Cavity length 4.0 cm   Cavity width 3.8 cm   Power 84 Manriquez  Ablation time 1:29 minutes      Estimated Blood Loss: Less than 20 cc          Specimens: Endometrial polypoid tissue, Novasure shavings, LEEP ectocervical biopsy stitch at 12 o'clock, post LEEP ECC, left labial lesions, warty rectal lesion       PROCEDURE: The patient was taken to the operating room where general anesthesia was administered and found to be adequate.  She was prepped and draped in the dorsal lithotomy position.  A weighted sterile speculum was placed in the posterior aspect of vagina, and a right angle retractor was placed in the anterior aspect of the vagina.  The anterior lip of the cervix was grasped with a single tooth tenaculum.  The uterus sounded to approximately 7 cm.  The cervix was gently and gradually dilated using Hemant dilators in order to accommodate a 5mm hysteroscope.  The hysteroscope was then  advanced through the cervical os and the uterus was distended with normal saline.  The endometrium was inspected and found to be shaggy with multiple polypoid areas.  The tubal ostia were identified without difficulty, and appeared normal. The MyoSure device was then inserted and the multiple polyp void areas were removed without difficulty.  At this time a sharp curettage was performed in all 4 quadrants.  At this point the hysteroscope was removed and the NovaSure device was inserted after assuring proper uterine cavity dimensions.  The endometrial ablation was performed her protocol with a power of 84 w for 1.2 minutes.  At this point the NovaSure device was removed and bleeding was noted to be minimal.  Attention was then turned to the patient's cervix.  The Lugol solution was applied to the cervix and nonstaining areas were noted at the 9 and 12:00 p.m. positions.  At this point a LEEP ectocervical biopsy was performed along with a post LEEP ECC.  The Bovie electrocautery was used to obtain hemostasis at the edges of the cervix.  Monsel's solution was then applied to the cervix.  All areas were inspected and noted to be hemostatic.  All instruments removed from the patient's vagina.  At this time our attention was turned to the patient's vulva which 2 hyperpigmented lesions were noted on the left labia.  These were removed with sharp dissection using a 15 mm blade.  The Bovie electrocautery was used to obtain hemostasis.  All areas were inspected and noted to be hemostatic and the procedure was complete.  Sponge, lap, needle counts were correct x 2. The patient was taken to the recovery room in stable condition.

## 2024-08-01 ENCOUNTER — CLINICAL SUPPORT (OUTPATIENT)
Dept: SMOKING CESSATION | Facility: CLINIC | Age: 44
End: 2024-08-01
Payer: COMMERCIAL

## 2024-08-01 DIAGNOSIS — F17.200 NICOTINE DEPENDENCE: Primary | ICD-10-CM

## 2024-08-01 LAB
FINAL PATHOLOGIC DIAGNOSIS: NORMAL
GROSS: NORMAL
Lab: NORMAL
SUPPLEMENTAL DIAGNOSIS: NORMAL

## 2024-08-01 PROCEDURE — 99404 PREV MED CNSL INDIV APPRX 60: CPT | Mod: 95,TXP,,

## 2024-08-01 RX ORDER — IBUPROFEN 200 MG
1 TABLET ORAL DAILY
Qty: 14 PATCH | Refills: 0 | Status: SHIPPED | OUTPATIENT
Start: 2024-08-01 | End: 2024-08-01

## 2024-08-01 RX ORDER — IBUPROFEN 200 MG
1 TABLET ORAL DAILY
Qty: 14 PATCH | Refills: 0 | Status: SHIPPED | OUTPATIENT
Start: 2024-08-01

## 2024-08-03 NOTE — PROGRESS NOTES
Patient will be participating in biweekly tobacco cessation meetings and will begin the prescribed tobacco cessation medication regimen of  21 mg nicotine patch QD .  Patient currently smokes 3-5 Black and mild cigars per day . cigarettes per day.  Discussed the role of tobacco cessation program, role of nicotine replacement therapy  (NRT) and behavioral changes to assist the patient to reach their goal of being tobacco free.   Education and instruction on the role of the NRT, usage and  proper placement of the patch. Patient verbalized understanding and willingness to use patch.  Provided patient my contact information Hilary REA @[phone] .

## 2024-08-12 ENCOUNTER — PATIENT MESSAGE (OUTPATIENT)
Dept: UROLOGY | Facility: CLINIC | Age: 44
End: 2024-08-12
Payer: MEDICARE

## 2024-08-12 NOTE — TELEPHONE ENCOUNTER
Spoke with patient on the phone, scheduled for next Tuesday, 08/20/2024 at 8:00 a.m. to discuss surgery.  
no

## 2024-08-15 ENCOUNTER — CLINICAL SUPPORT (OUTPATIENT)
Dept: SMOKING CESSATION | Facility: CLINIC | Age: 44
End: 2024-08-15
Payer: COMMERCIAL

## 2024-08-15 DIAGNOSIS — F17.200 NICOTINE DEPENDENCE: Primary | ICD-10-CM

## 2024-08-15 PROCEDURE — 99402 PREV MED CNSL INDIV APPRX 30: CPT | Mod: 95,TXP,,

## 2024-08-19 NOTE — PROGRESS NOTES
Individual Follow-Up Form    8/18/2024    Quit Date:     Clinical Status of Patient: Outpatient    Length of Service: 30 minutes    Continuing Medication: no    Other Medications: none     Target Symptoms: Withdrawal and medication side effects. The following were  rated moderate (3) to severe (4) on TCRS:  Moderate (3): crave, desire   Severe (4): none    Comments: Virtual visit . Patient continues to smoke approximately 3 to 5 black and mild cigars a day. She does feel like she is smoking less of the cigars. She was not able to obtain her patches because her insurance does not cover them. We discuss using [employer] quit now to see if they can send her out some patches and or lozenge /gum. Also asked her to check her Medicare benefits to see if they have any over-the-counter drugstore benefits. Patient understands that she could call CTTS if she has trouble obtaining them from the above sources and we could try good RX. Otherwise patient will continue to wean down and hopefully declare or quit day.     Diagnosis: F17.210    Next Visit: 2 weeks

## 2024-08-20 ENCOUNTER — OFFICE VISIT (OUTPATIENT)
Dept: UROLOGY | Facility: CLINIC | Age: 44
End: 2024-08-20
Payer: MEDICARE

## 2024-08-20 VITALS
HEIGHT: 71 IN | SYSTOLIC BLOOD PRESSURE: 94 MMHG | HEART RATE: 90 BPM | BODY MASS INDEX: 30.37 KG/M2 | DIASTOLIC BLOOD PRESSURE: 63 MMHG | WEIGHT: 216.94 LBS

## 2024-08-20 DIAGNOSIS — N18.6 ESRD (END STAGE RENAL DISEASE) ON DIALYSIS: ICD-10-CM

## 2024-08-20 DIAGNOSIS — N28.89 RENAL MASS: Primary | ICD-10-CM

## 2024-08-20 DIAGNOSIS — I10 PRIMARY HYPERTENSION: ICD-10-CM

## 2024-08-20 DIAGNOSIS — Z99.2 ESRD (END STAGE RENAL DISEASE) ON DIALYSIS: ICD-10-CM

## 2024-08-20 PROCEDURE — 99999 PR PBB SHADOW E&M-EST. PATIENT-LVL III: CPT | Mod: PBBFAC,TXP,, | Performed by: UROLOGY

## 2024-08-20 PROCEDURE — 99213 OFFICE O/P EST LOW 20 MIN: CPT | Mod: PBBFAC,TXP | Performed by: UROLOGY

## 2024-08-20 PROCEDURE — 99215 OFFICE O/P EST HI 40 MIN: CPT | Mod: S$PBB,TXP,, | Performed by: UROLOGY

## 2024-08-20 NOTE — PROGRESS NOTES
Patient ID: Ade Silva is a 44 y.o. female.    Chief Complaint: Renal mass; discuss surgery      HPI: Ade Silva is a 44 y.o. White female who presents today for evaluation and management of a renal mass.    The mass was found incidentally during transplant workup. Patient has a history of polycystic kidney disease and has been on dialysis since May 2023. She urinates 2-3 times a day.     The patient denies gross hematuria and/or constitutional symptoms attributable to her recently discovered renal mass.    The patient denies a personal and family history of kidney cancer.    The patient presents today to discuss how best to proceed with their left renal mass and polycystic kidney disease.  Has no previous abdominopelvic surgeries.  She does have LUQ abdominal pain---worse with inspiration.    H/o TIA in 2016.  No prior MI/CVA.  No blood thinners.     CTRSS 12/28/2023:   Polucystic kidney disease. There are some hyperatennuated cysts in the right kidney and a questionable mass in the left lower pole.     CT scan of the abdomen w/wo from 1/23/24 was independently reviewed today and reveals no enhancing solid renal masses, polycystic kidney disease bilaterally.  CXR from 1/23/24 was independently reviewed today and reveals no acute findings.     Ultrasound of the kidneys from 4/28/23 was independently reviewed today and reveals possible multiple solid left renal masses.     Ultrasound of the kidneys from 2/15/24 was independently reviewed today and reveals innumerable bilateral renal cysts, solid mass in left mid-lower pole, 2.6cm (down from 4.9cm previously).  Contrasted US: 2/15/24----solid renal mass, no evidence of enhancement---c/w complex renal cyst.     No past abdominal surgery.   No blood thinners.     The patient has had increasing left upper quadrant abdominal pain.  It is severe.  Worse with inspiration.  Denies gross hematuria.  She is on dialysis.          Review of patient's allergies  "indicates:   Allergen Reactions    Aspirin Hives     And vomiting    Penicillins Hives     Throat swelling    Adhesive Rash     Adhesive/silk tape (type found on band aides). Can only use "Paper Tape"    Butalbital-acetaminophen-caff Nausea And Vomiting and Other (See Comments)     Dizziness (made pt feel sick)    Latex, natural rubber Rash    Nickel Rash    Tomato Nausea And Vomiting       Current Outpatient Medications   Medication Sig Dispense Refill    acetaminophen (TYLENOL) 650 MG TbSR Take 1 tablet (650 mg total) by mouth every 6 (six) hours as needed (pain). 30 tablet 1    gabapentin (NEURONTIN) 100 MG capsule Take one 100 mg capsule three times a week following dialysis. 24 capsule 0    loratadine (CLARITIN ORAL) Take 1 tablet by mouth daily as needed (Allergies).      nicotine (NICODERM CQ) 21 mg/24 hr Place 1 patch onto the skin once daily. Please dispense only clear patches , patient has a tape allergy. 14 patch 0    ondansetron (ZOFRAN) 4 MG tablet Take 1 tablet (4 mg total) by mouth every 6 (six) hours. 20 tablet 0    oxyCODONE (ROXICODONE) 5 MG immediate release tablet Take 1 tablet (5 mg total) by mouth every 4 (four) hours as needed for Pain. 7 tablet 0    sodium zirconium cyclosilicate (LOKELMA) 10 gram packet Take 1 packet (10 g total) by mouth 2 (two) times a day. Mix entire contents of packet(s) into drinking glass containing 3 tablespoons of water; stir well and drink immediately. Add water and repeat until no powder remains to receive entire dose. 30 packet 3    sucroferric oxyhydroxide (VELPHORO) 500 mg Chew Break or dissolve 1 tablet by mouth with each meal and snack. Do not exceed 6 tablets per day. 180 tablet 11     No current facility-administered medications for this visit.     Facility-Administered Medications Ordered in Other Visits   Medication Dose Route Frequency Provider Last Rate Last Admin    0.9%  NaCl infusion   Intravenous Continuous Mel Calvillo MD        ondansetron " disintegrating tablet 8 mg  8 mg Oral Q8H PRN Mel Calvillo MD           Past Medical History:   Diagnosis Date    Asthma     Autosomal dominant polycystic kidney disease     Brain aneurysm     Encounter for blood transfusion     Hypertension        Past Surgical History:   Procedure Laterality Date    AV FISTULA PLACEMENT Right 09/14/2023    Procedure: CREATION, AV FISTULA, radial cephalic;  Surgeon: MELISSA Loera II, MD;  [facility];  Service: Vascular;  Laterality: Right;    AV FISTULA PLACEMENT Right 10/05/2023    Procedure: CREATION, AV FISTULA - brachial;  Surgeon: MELISSA Loera II, MD;  [facility];  Service: Vascular;  Laterality: Right;  confirmed placement with doppler    CEREBRAL ANEURYSM REPAIR      coiling    EMBOLIZATION Right 4/23/2024    Procedure: EMBOLIZATION, BLOOD VESSEL FISTULA;  Surgeon: MELISSA Loera II, MD;  [facility];  Service: Vascular;  Laterality: Right;    FISTULOGRAM Right 4/23/2024    Procedure: Fistulogram;  Surgeon: MELISSA Loera II, MD;  [facility];  Service: Vascular;  Laterality: Right;  CONTRAST: 68ML, FLUORO TIME: 17.9, mGy: 45.11, Gycm2: 5.9548    HYSTEROSCOPY N/A 7/25/2024    Procedure: HYSTEROSCOPY;  Surgeon: Jazz Amador MD;  Location: [facility] OR;  Service: OB/GYN;  Laterality: N/A;    INCISION OF VULVA N/A 7/25/2024    Procedure: INCISION, VULVA;  Surgeon: Jazz Amador MD;  Location: [facility] OR;  Service: OB/GYN;  Laterality: N/A;    INSERTION OF TUNNELED CENTRAL VENOUS HEMODIALYSIS CATHETER Right 05/03/2023    Procedure: Insertion, Catheter, Central Venous, Hemodialysis;  Surgeon: Priya Grimm MD;  Location: [facility] CATH LAB;  Service: Interventional Nephrology;  Laterality: Right;    LOOP ELECTROSURGICAL EXCISION PROCEDURE (LEEP) N/A 7/25/2024    Procedure: LEEP (LOOP ELECTROSURGICAL EXCISION PROCEDURE);  Surgeon: Jazz Amador MD;  Location: [facility] OR;  Service: OB/GYN;   Laterality: N/A;    REMOVAL OF HEMODIALYSIS CATHETER  05/03/2023    Procedure: REMOVAL, CATHETER, HEMODIALYSIS;  Surgeon: Priya Grimm MD;  Location: [facility] CATH LAB;  Service: Interventional Nephrology;;    THERMAL ABLATION OF ENDOMETRIUM N/A 7/25/2024    Procedure: ABLATION, ENDOMETRIUM, THERMAL;  Surgeon: Jazz Amador MD;  Location: [facility];  Service: OB/GYN;  Laterality: N/A;       Family History   Problem Relation Name Age of Onset    Stroke Father      Heart disease Father      Polycystic kidney disease Father      Diabetes Brother           Review of Systems   Constitutional:  Negative for activity change.   HENT:  Negative for sore throat.    Eyes:  Negative for photophobia.   Respiratory:  Negative for shortness of breath.    Cardiovascular:  Negative for chest pain.   Gastrointestinal:  Negative for abdominal pain, nausea and vomiting.   Genitourinary:  Negative for dysuria, flank pain, frequency and hematuria.   Musculoskeletal:  Negative for arthralgias and joint swelling.   Skin:  Negative for pallor.   Neurological:  Negative for dizziness and headaches.     Objective:     Physical Exam  Constitutional:       General: She is not in acute distress.     Appearance: She is well-developed.   HENT:      Head: Normocephalic.   Cardiovascular:      Rate and Rhythm: Normal rate.   Pulmonary:      Effort: Pulmonary effort is normal. No respiratory distress.   Abdominal:      General: There is no distension.      Palpations: Abdomen is soft.      Tenderness: There is no abdominal tenderness.   Musculoskeletal:         General: Normal range of motion.      Cervical back: Normal range of motion.   Skin:     General: Skin is warm.   Neurological:      Mental Status: She is alert and oriented to person, place, and time.       Assessment:       1. Renal mass    2. ESRD (end stage renal disease) on dialysis    3. Primary hypertension              Plan:     1. Renal mass    2. ESRD (end stage renal disease) on  dialysis    3. Primary hypertension              No orders of the defined types were placed in this encounter.      - Long talk about renal mass and it's management.  Reviewed images.Discussed options including active surveillance, biopsy, minimally invasive techniques including HFRA, cryo. Discussed open and laparascopic surgical approaches. Discussed partial and radical nephrectomy. Discussed surgery preparation, surgery, recuperation, recovery, exercise restrictions. Discussed risks, benefits, and complications. Answered questions and addressed their concerns.  - imaging reviewed as per HPI  - We discussed that the mass has shrunk on renal ultrasound and moreover this is no contrast enhancement on contrasted ultrasound imaging.   - The CT with contrast did not show significant contrast enhancement to suggest solid renal masses. CXR of the chest was negative at that time.   - despite the reassuring imaging findings suggestive of this not being a malignant process, the patient reports that her nephrologist feels that her kidney should be removed.  The patient is also symptomatic on the left side and feels strongly that this very well could be related to the polycystic kidney disease.    Therefore, we will plan for a left robotic nephrectomy, with a very real possibility of conversion to open nephrectomy.  I explained that her kidney may allow very little working room and therefore it is feasible that we will not be successful with a minimally invasive approach.  However, the patient was somewhat insistent that we at least attempt this.      ESRD  -continue HD;     HTN:  -BP reviewed  -stable, continue meds and f/u with PCP

## 2024-08-22 DIAGNOSIS — N28.89 RENAL MASS: Primary | ICD-10-CM

## 2024-08-23 NOTE — ANESTHESIA PAT ROS NOTE
08/23/2024  Ade Silva is a 44 y.o., female.      Pre-op Assessment          Review of Systems           Anesthesia Assessment: Preoperative EQUATION    Planned Procedure: Procedure(s) (LRB):  XI ROBOTIC NEPHRECTOMY (Left)  Nephrectomy (Left)  Requested Anesthesia Type:General  Surgeon: Valentin Maldonado MD  Service: Urology  Known or anticipated Date of Surgery:9/19/2024    Surgeon notes: reviewed    Electronic QUestionnaire Assessment completed via nurse interview with patient.        Triage considerations:     Previous anesthesia records:GETA and No problems  7/25/2024 Leep, Hysteroscopy, with ablation  Airway:  Mallampati: II   Mouth Opening: Normal  Tongue: Normal    Placement Time: 0713 , created via procedure documentation Method of Intubation: Video Laryngoscopy Mask Ventilation: Easy Intubated: Postinduction Blade: Ellis #3 Airway Device Size: 7.5 Cuff Inflation: Minimal occlusive pressure Placement Verified By: Capnometry Complicating Factors: None Findings Post-Intubation: Bilateral breath sounds;Atraumatic/Condition of teeth unchanged Secured at: Lips Complications: None     Last PCP note: within 3 months , within [facility]   Subspecialty notes: Neurosurgery, Pain Management, Urology, Vascular Surgery    Other important co-morbidities: HTN, Smoker, and Asthma, ESRD with dialysis, polycystic kidney disease       Tests already available:  Available tests,  within 3 months , within [facility] .   8/21/2024 H/H  8/7/2024 PTH  7/25/2024 CMP    Optimization:  Anesthesia Preop Clinic Assessment  Indicated.    Medical Opinion Indicated.           Plan:    Testing:  EKG, Hematology Profile, and T&S   Pre-anesthesia  visit       Visit focus: concerns in complex and/or prolonged anesthesia, position other than supine, P3/ASA3, 245 minutes     Consultation:IM Perioperative Hospitalist     Patient  has  previously scheduled Medical Appointment: 9/9/2024    Navigation: Tests Scheduled.              Consults scheduled.             Results will be tracked by Preop Clinic.

## 2024-08-26 ENCOUNTER — TELEPHONE (OUTPATIENT)
Dept: PREADMISSION TESTING | Facility: HOSPITAL | Age: 44
End: 2024-08-26
Payer: MEDICARE

## 2024-08-26 NOTE — TELEPHONE ENCOUNTER
----- Message from Jovita Coleman RN sent at 8/23/2024 12:51 PM CDT -----  Surgery date: 9/19/2024  PreOp appt:  9/9/2024  Surgeon: Joel    Please call Pt and schedule the following preop appts:    NP  Lab  EKG    Thank you!  Jovita

## 2024-08-27 ENCOUNTER — HOSPITAL ENCOUNTER (OUTPATIENT)
Dept: RADIOLOGY | Facility: HOSPITAL | Age: 44
Discharge: HOME | End: 2024-08-27
Attending: STUDENT IN AN ORGANIZED HEALTH CARE EDUCATION/TRAINING PROGRAM
Payer: MEDICARE

## 2024-08-27 ENCOUNTER — CLINICAL SUPPORT (OUTPATIENT)
Dept: SMOKING CESSATION | Facility: CLINIC | Age: 44
End: 2024-08-27

## 2024-08-27 DIAGNOSIS — M54.2 CERVICALGIA: ICD-10-CM

## 2024-08-27 DIAGNOSIS — F17.200 NICOTINE DEPENDENCE: Primary | ICD-10-CM

## 2024-08-27 PROCEDURE — 72141 MRI NECK SPINE W/O DYE: CPT | Mod: 26,TXP,, | Performed by: RADIOLOGY

## 2024-08-27 PROCEDURE — 99999 PR PBB SHADOW E&M-EST. PATIENT-LVL I: CPT | Mod: PBBFAC,TXP,,

## 2024-08-27 PROCEDURE — 72141 MRI NECK SPINE W/O DYE: CPT | Mod: TC,TXP

## 2024-08-27 NOTE — PROGRESS NOTES
Individual Follow-Up Form    8/27/2024    Quit Date:     Clinical Status of Patient: Outpatient    Length of Service: 60 minutes    Continuing Medication: no    Other Medications:      Target Symptoms: Withdrawal and medication side effects. The following were  rated moderate (3) to severe (4) on TCRS:  Moderate (3): crave/desire   Severe (4): none    Comments:Patient presents today in clinic. She didnt realize that it was a virtual appointment. She continues to smoke 4 to 5 black and mild cigars a day. patient has not picked up her patches at the pharmacy as of yet she said that she will probably get them next week after she receives her check. We discussed some triggers that the patient is experiencing, we discussed delaying when she has the urge to smoke, and we also discussed places that she smokes. Provided patient with a lot of fidget tools to use and also cinnamon toothpicks and discussed using cinnamon or hard candy and also regular gum. She will attempt to use something in place of the cigar whether it will be putting something to drink in a bottle and another room or one at her side and whenever she has the urge to smoke, she will take a sip of the drink .  Pt's exhaled carbon monoxide level was  25 ppm as per [employer]. (non- smoker = 0-5 ppm.)  We discussed why Chantix wouldnt be a good option for her and she also shared that she did use Zyban (Wellbutrin SR) in the past and she stated it didnt work for her. She does have some NRT gum she purchased a while back but c/o it being too strong , encouraged her to cut it in half ( probably a 4 mg nrt gum ) to try and also to mix it with regular gum . Reviewed how to use the NRT gum , patient verbalized understanding and the reasons for parking the gum Reviewed how to use the patch , patient verbalized understanding and willingness to use the patch ..     The patch should be applied to an area of skin that has a   minimal amount of hair, such as upper arm,  hip, or   upper chest. The area of skin should also be clean and   dry before application. After removing the protective   seal, apply the patch, avoiding touching the adhesive as   much as possible. Once stuck on the skin, press down   firmly on the patch for around 10 seconds to ensure it is   flat and secure.     Apply to clean, dry skin. Do not use moisturizing soap,   may need to shave the hair in the patch area. If using a   skin lotion, use after and not close to the area around the   patch.     Preparing the skin: use alcohol to the skin before   applying patch, a box of alcohol wipes can come in handy   or even hand .     Change your patch every 24 hours   Change the location of your patch & don't go back to the same exact spot on your body for 7 days to avoid irritation   Do not take the patch of to smoke. Keep it on. You can smoke on the patch   If you have nicotine gum or nicotine lozenges, you can use that in addition to having a patch on. The gum and lozenges are there to help with a craving.     The following information can be helpful if you are dealing with the patch not sticking or skin irritation:     If skin irritation is a problem, try switching the type of   patch. Some patches are brown & some are clear      If skin irritation continues to be a problem, ask   patient to try applying a corticosteroid nasal spray   (Flonase) to the site BEFORE applying the patch to help   prevent rashes. Instruct patient to use one squirt of   Flonase nasal spray to the skin and let the area dry   completely before applying patch.     3. Using an antiperspirant AROUND the area of the patch,   to help minimize sweat to the area. Do not use it on the   area where the center of the patch touches the skin, but   under the adhesive is okay, also be sure to use an   antiperspirant not a deodorant and make it one that goes   on dry.     4. Skin prep can also be used on the adhesive part of the   patch or on  the tape covering the patch.     5. Wearing something over the patch: compression bandage,   sleeve, wrist/arm sweatbands or try cutting the top part of   a crew sock to use around the arm to help keep the patch on.     6. Tape down the patch. Some find the taping an X over the   patch works best.     7. [place] with placing the patch in different locations.   I have had several patients, that work out in the summer   heat, use KT (kinesiology tape) with excellent results.     Some have also used Tegaderm as well with good results             Diagnosis: F17.210    Next Visit: 2 weeks

## 2024-08-29 ENCOUNTER — HOSPITAL ENCOUNTER (OUTPATIENT)
Dept: RADIOLOGY | Facility: HOSPITAL | Age: 44
Discharge: HOME | End: 2024-08-29
Attending: STUDENT IN AN ORGANIZED HEALTH CARE EDUCATION/TRAINING PROGRAM
Payer: MEDICARE

## 2024-08-29 ENCOUNTER — TELEPHONE (OUTPATIENT)
Dept: NEUROSURGERY | Facility: CLINIC | Age: 44
End: 2024-08-29
Payer: MEDICARE

## 2024-08-29 DIAGNOSIS — I72.5 BASILAR ARTERY ANEURYSM: ICD-10-CM

## 2024-08-29 LAB
CREAT SERPL-MCNC: 4.5 MG/DL (ref 0.5–1.4)
SAMPLE: ABNORMAL

## 2024-08-29 PROCEDURE — 25500020 PHARM REV CODE 255: Mod: TXP | Performed by: STUDENT IN AN ORGANIZED HEALTH CARE EDUCATION/TRAINING PROGRAM

## 2024-08-29 PROCEDURE — 70496 CT ANGIOGRAPHY HEAD: CPT | Mod: TC,TXP

## 2024-08-29 PROCEDURE — 70544 MR ANGIOGRAPHY HEAD W/O DYE: CPT | Mod: TC,TXP

## 2024-08-29 RX ADMIN — IOHEXOL 75 ML: 350 INJECTION, SOLUTION INTRAVENOUS at 08:08

## 2024-08-29 NOTE — TELEPHONE ENCOUNTER
Called and spoke with pt. Informed that her appt with Caprice MCKAY today was canceled because Caprice wants pt to see Dr. High but there were some mistakes in scheduling pt. Apologized to pt. Informed new date and time for the appt. Pt v/u

## 2024-08-30 ENCOUNTER — PATIENT MESSAGE (OUTPATIENT)
Dept: NEUROSURGERY | Facility: CLINIC | Age: 44
End: 2024-08-30
Payer: MEDICARE

## 2024-09-03 ENCOUNTER — OFFICE VISIT (OUTPATIENT)
Dept: PAIN MEDICINE | Facility: CLINIC | Age: 44
End: 2024-09-03
Payer: MEDICARE

## 2024-09-03 ENCOUNTER — TELEPHONE (OUTPATIENT)
Dept: PAIN MEDICINE | Facility: CLINIC | Age: 44
End: 2024-09-03

## 2024-09-03 DIAGNOSIS — M79.2 NERVE PAIN: ICD-10-CM

## 2024-09-03 DIAGNOSIS — M54.12 CERVICAL RADICULOPATHY: Primary | ICD-10-CM

## 2024-09-03 DIAGNOSIS — M79.601 RIGHT ARM PAIN: ICD-10-CM

## 2024-09-03 PROCEDURE — 99214 OFFICE O/P EST MOD 30 MIN: CPT | Mod: 95,,, | Performed by: NURSE PRACTITIONER

## 2024-09-03 RX ORDER — GABAPENTIN 300 MG/1
CAPSULE ORAL
Qty: 12 CAPSULE | Refills: 0 | Status: SHIPPED | OUTPATIENT
Start: 2024-09-03

## 2024-09-03 NOTE — PROGRESS NOTES
[facility]  Patient Evaluation  telemedicine Encounter    Telemedicine Bundle:  This visit was completed during the Coronavirus Crisis to enhance patient safety.  The patient location is: patient's home  The chief complaint leading to consultation is: Arm Pain (Right )  Visit type: Virtual visit with synchronous audio and video  Total time spent with patient: 20 minutes   Each patient to whom he or she provides medical services by telemedicine is:    (1) informed of the relationship between the physician and patient and the respective role of any other health care provider with respect to management of the patient  (2) notified that he or she may decline to receive medical services by telemedicine and may withdraw from such care at any time.      Referred by: No ref. provider found   Reason for referral: * No diagnoses found *     CC:   Chief Complaint   Patient presents with    Arm Pain     Right          7/16/2024     2:01 PM   Last 3 PDI Scores   Pain Disability Index (PDI) 48       Interval history 09/03/2024:  44-year-old female that presents virtually for a follow-up appointment to discuss her cervical MRI she continues to complain of right arm pain described as tingling, numb and sharp and electric.  Her pain continues to remain in the C5-C6 dermatome distribution of the her right arm.  She denies any new pain denies profound weakness denies any bowel or bladder dysfunction at this time.    Subjective 07/16/2024:   Ade Silva is a 44 y.o. female with end-stage renal disease on hemodialysis (Monday, Wednesday, Friday) who presents complaining of right arm pain described as tingling, numb, sharp and electric.  She notes the pain over her dialysis graft site.  She notes numbness and tingling that radiates into the hand.  Numbness and tingling over the entirety of the hand which is intermittent.  She reports pain worsened after dialysis access was placed,  however she does report pain prior to that as well.  Denies any neck pain.  No history of neck surgeries.  She was undergone multiple AV fistula surgeries on the right.  Takes Tylenol which only helps a little.    Initial Pain Assessment:  Location: right arm  Onset: 3 years  Current Pain Score: 7/10  Daily Pain of Range: 8-9/10  Quality: Tingling, Numb, Sharp, and Electric  Radiation: arm to right hand  Worsened by: extension and flexion  Improved by: heat     Patient denies night fever/night sweats, urinary incontinence, bowel incontinence, significant weight loss, significant motor weakness, and loss of sensations.      Previous Interventions:  - None    Previous Therapies:  PT/OT: no   Chiropractor:   HEP:   Relevant Surgery: yes   Multiple AV fistula surgeries on the right    Previous Medications:   - Tylenol or NSAIDS: Tylenol  - Muscle Relaxants:    - TCAs:   - SNRIs:   - Topicals:   - Anticonvulsants:    - Opioids:   - Adjuvants:     Current Pain Medications:  Tylenol     Review of Systems:  Review of Systems   Musculoskeletal:  Positive for neck pain.       GENERAL:  No weight loss, malaise or fevers.  HEENT:   No recent changes in vision or hearing  NECK:  No difficulty with swallowing. No stridor.   RESPIRATORY:  Negative for cough, wheezing or shortness of breath, patient denies any recent URI.  CARDIOVASCULAR:  Negative for chest pain, leg swelling or palpitations.  GI:  Negative for abdominal discomfort, blood in stools or black stools or change in bowel habits.  MUSCULOSKELETAL:  See HPI.  SKIN:  Negative for lesions, rash, and itching.  PSYCH:  No mood disorder or recent psychosocial stressors.    HEMATOLOGY/LYMPHOLOGY:  Negative for prolonged bleeding, bruising easily or swollen nodes.  Patient is not currently taking any anti-coagulants  NEURO:   No history of headaches, syncope, paralysis, seizures or tremors.  All other reviewed and negative other than HPI.    History:  Current medications,  allergies, medical history, surgical history,   family history, and social history were reviewed in the chart as marked.    Full Medication List:    Current Outpatient Medications:     acetaminophen (TYLENOL) 650 MG TbSR, Take 1 tablet (650 mg total) by mouth every 6 (six) hours as needed (pain)., Disp: 30 tablet, Rfl: 1    gabapentin (NEURONTIN) 100 MG capsule, Take one 100 mg capsule three times a week following dialysis., Disp: 24 capsule, Rfl: 0    loratadine (CLARITIN ORAL), Take 1 tablet by mouth daily as needed (Allergies)., Disp: , Rfl:     nicotine (NICODERM CQ) 21 mg/24 hr, Place 1 patch onto the skin once daily. Please dispense only clear patches , patient has a tape allergy. (Patient not taking: Reported on 8/20/2024), Disp: 14 patch, Rfl: 0    ondansetron (ZOFRAN) 4 MG tablet, Take 1 tablet (4 mg total) by mouth every 6 (six) hours. (Patient not taking: Reported on 8/20/2024), Disp: 20 tablet, Rfl: 0    oxyCODONE (ROXICODONE) 5 MG immediate release tablet, Take 1 tablet (5 mg total) by mouth every 4 (four) hours as needed for Pain. (Patient not taking: Reported on 8/20/2024), Disp: 7 tablet, Rfl: 0    sodium zirconium cyclosilicate (LOKELMA) 10 gram packet, Take 1 packet (10 g total) by mouth 2 (two) times a day. Mix entire contents of packet(s) into drinking glass containing 3 tablespoons of water; stir well and drink immediately. Add water and repeat until no powder remains to receive entire dose. (Patient not taking: Reported on 8/20/2024), Disp: 30 packet, Rfl: 3    sucroferric oxyhydroxide (VELPHORO) 500 mg Chew, Break or dissolve 1 tablet by mouth with each meal and snack. Do not exceed 6 tablets per day., Disp: 180 tablet, Rfl: 11  No current facility-administered medications for this visit.    Facility-Administered Medications Ordered in Other Visits:     0.9%  NaCl infusion, , Intravenous, Continuous, Mel Calvillo MD    ondansetron disintegrating tablet 8 mg, 8 mg, Oral, Q8H PRN, Rajinder  MD Mel     Allergies:  Aspirin; Penicillins; Adhesive; Butalbital-acetaminophen-caff; Latex, natural rubber; Nickel; and Tomato     Medical History:   has a past medical history of Asthma, Autosomal dominant polycystic kidney disease, Brain aneurysm, Encounter for blood transfusion, and Hypertension.    Surgical History:   has a past surgical history that includes Insertion of tunneled central venous hemodialysis catheter (Right, 05/03/2023); Removal of hemodialysis catheter (05/03/2023); AV fistula placement (Right, 09/14/2023); AV fistula placement (Right, 10/05/2023); Cerebral aneurysm repair; Fistulogram (Right, 4/23/2024); Embolization (Right, 4/23/2024); Thermal ablation of endometrium (N/A, 7/25/2024); Loop electrosurgical excision procedure (LEEP) (N/A, 7/25/2024); Hysteroscopy (N/A, 7/25/2024); and Incision of vulva (N/A, 7/25/2024).    Family History:  family history includes Diabetes in her brother; Heart disease in her father; Polycystic kidney disease in her father; Stroke in her father.    Social History:   reports that she has been smoking cigarettes and cigars. She started smoking about 30 years ago. She has a 28 pack-year smoking history. She has never been exposed to tobacco smoke. She has never used smokeless tobacco. She reports that she does not currently use alcohol. She reports that she does not use drugs.    Physical Exam:  There were no vitals filed for this visit.      GENERAL: Well appearing, in no acute distress, alert and oriented x3.  PSYCH:  Mood and affect appropriate.  SKIN: Skin color, texture, turgor normal, no rashes or lesions.  HEAD/FACE:  Normocephalic, atraumatic. Cranial nerves grossly intact.  NECK: Normal ROM. Supple.  No pain to palpation over the cervical paraspinous muscles. Spurling Negative. No pain with neck flexion, extension, or lateral rotation.   CV: + palpable thrill over the right AV fistula.  RRR with palpation of the radial artery.  PULM: No evidence of  respiratory difficulty, symmetric chest rise.  GI:  Soft and non-distended.  MSK:  Mild tenderness to palpation over the right AV fistula.  No atrophy or tone abnormalities are noted.   NEURO: Bilateral upper and lower extremity coordination and strength is symmetric.  No loss of sensation is noted.  MENTAL STATUS: A x O x 3, good concentration, speech is fluent and goal directed  MOTOR: 5/5 in bilateral upper extremity muscle groups  GAIT: Normal. Ambulates unassisted.    Imaging:  -End Stage Renal Disease on Dialysis         Lumbar MRI  FINDINGS:  Alignment: Trace retrolisthesis of C5 on C6.  Normal sagittal alignment is otherwise maintained.     Vertebrae: Normal marrow signal. No fracture.     Discs: Mild disc height loss at C5-C6.     Cord: Normal.     Skull base and craniocervical junction: Normal.     Degenerative findings:     C2-C3: Small posterior disc osteophyte complex mildly asymmetric to the left resulting in mild left neural foraminal narrowing.     C3-C4: No spinal canal stenosis or neural foraminal narrowing.     C4-C5: No spinal canal stenosis or neural foraminal narrowing.     C5-C6: Posterior disc osteophyte complex results in moderate central canal stenosis with deformity of the cord but no cord signal changes.  Uncovertebral spurring and posterior disc osteophyte complex results in moderate left and mild right neural foraminal narrowing.     C6-C7: Small posterior disc bulge minimally effaces the ventral thecal sac and results in mild right neural foraminal narrowing.     C7-T1: No spinal canal stenosis or neural foraminal narrowing.     Paraspinal muscles & soft tissues: Unremarkable.        Impression:     1. Mild degenerative change of the cervical spine as detailed above, most notable at C5-C6 where a posterior disc osteophyte complex and uncovertebral spurring result in moderate central canal stenosis and moderate left and mild right neural foraminal narrowing.  History   ======      General History   Other: -Pain during Dialysis     Dialysis Access   =============     Dialysis access location:  Right Arm   Type of AV access: Brachiocephalic AVF   Rt inflow A PSV    243 cm/s   Rt at anastomosis PSV  413 cm/s   Rt A distal anastomosis PSV    236 cm/s   Rt fistula prox PSV    235 cm/s   Rt fistula mid PSV 83 cm/s   Rt fistula mid flow volume 1,559.0 ml/min   Rt fistula distal PSV  202 cm/s   Rt outflow V prox PSV  85 cm/s   Rt outflow V mid PSV   182 cm/s   Rt outflow V distal PSV    252 cm/s     Impression   =========     Color flow duplex imaging reveals a patent right Brachiocephalic AV fistula and proximal stent with no evidence of a focal   hemodynamically significant stenosis. The volume flow at the mid bicep measures 1559 mL/min.     DATE OF SERVICE: 06/06/2024                                                       Sonographer: Nikko Mckeon   Electronically Signed by: INA Herndon M.D. at 06/06/2024-11:31     Labs:  BMP  Lab Results   Component Value Date     08/07/2024    K 3.9 08/28/2024     08/07/2024    CO2 27 07/25/2024    BUN 51 (H) 07/25/2024    CREATININE 12.54 (H) 08/07/2024    CALCIUM 8.9 08/07/2024    ANIONGAP 13 07/25/2024    EGFRNORACEVR 4 (A) 07/25/2024     Lab Results   Component Value Date    ALT 7 08/07/2024    AST 8 (L) 07/25/2024    ALKPHOS 85 08/07/2024    BILITOT 0.5 07/25/2024     Lab Results   Component Value Date    WBC 7.88 08/07/2024    HGB 14.0 08/21/2024    HCT 46.2 08/21/2024     (H) 08/07/2024     (L) 08/07/2024           Assessment:  Problem List Items Addressed This Visit    None  Visit Diagnoses       Nerve pain    -  Primary    Relevant Medications    gabapentin (NEURONTIN) 300 MG capsule    Cervical radiculopathy        Relevant Medications    gabapentin (NEURONTIN) 300 MG capsule    Right arm pain        Relevant Medications    gabapentin (NEURONTIN) 300 MG capsule              07/16/2024 - Ade Silva is a 44  y.o. female who  has a past medical history of Asthma, Autosomal dominant polycystic kidney disease, Brain aneurysm, Encounter for blood transfusion, and Hypertension.  By history and examination this patient has chronic right upper extremity pain that appears to be neuropathic in nature, likely due to nerve damage/entrapment at the AV fistula site versus ischemic neuropathy versus cervical radiculopathy.  AV fistula patent on  ultrasound.  I will obtain imaging of her neck to rule out cervical stenosis.  We discussed the underlying diagnoses and multiple treatment options including non-opioid medications and interventional procedures.  I will start the patient on gabapentin 100 mg 3 times weekly after dialysis.  This may be increased up to 300 mg 3 times weekly after dialysis.  She may benefit from a cervical epidural steroid injection vs stellate ganglion block.  The risks and benefits of each treatment option were discussed and all questions were answered.        09/[id number]/[id number]-old female that presents virtually for a follow-up appointment to discuss cervical MRI she has a history of chronic right upper extremity pain that appears to be neuropathic in nature.  Her pain is in the distribution of C5-6 in the past they did consider that her pain may be related to nerve damage/entrapment at the AV fistula sites however she confirms that this has been cleared via multiple assessments of her fistula.  Her cervical MRI did show at C5-6 posterior disc osteophyte complex that results in moderate central canal stenosis of the forming the core but not cord signal changes.  She would have spurring and a posterior disc osteophyte complex and moderate left and mild right neural foraminal narrowing which could be the source of her current symptoms.  She is scheduled for an upcoming nephrectomy with Dr. Maldonado on 09/15 her and I discussed considering her for a cervical KIMBERLEE targeting C7-T1 used as a diagnostic and therapeutic  injection.  Discussed with the patient that I will reach out to Dr. Maldonado and his team to see if this epidural can be done prior to surgery if not we will have to consider this following once she has recovered.      Treatment Plan:   Procedures:  We will reach out to surgeon to consider cervical KIMBERLEE prior to nephrectomy if not it will be afterwards.  PT/OT/HEP: I have stressed the importance of physical activity and a home exercise plan to help with pain and improve health.  Medications:    -  increased  to 300 mg 3 times weekly following dialysis.12 tablets total    -  Reviewed and consistent with medication use as prescribed.  Imaging:  Reviewed and discussed in detail using images from patient's chart.  Follow Up: 6- 8 weeks     HIREN Smith  [facility] Pain Management    Disclaimer: This note was partly generated using dictation software which may occasionally result in transcription errors.    Additional note:  Discussed case with Dr. Maldonado as well as Dr. Diaz we will consider the patient for cervical KIMBERLEE following her nephrectomy and recovery. .

## 2024-09-03 NOTE — TELEPHONE ENCOUNTER
I spoke with pt in regards to Beni message. Pt was advise pt Beni message. No further concerns was expressed. Call ended.----- Message from MARILYN Hull sent at 9/3/2024  3:37 PM CDT -----  Regarding: Follow up with the patient  Please let this patient know that we will consider her for a cervical epidural following her nephrectomy with Dr. Maldonado.    Thanks    cl

## 2024-09-04 ENCOUNTER — PATIENT MESSAGE (OUTPATIENT)
Dept: NEUROSURGERY | Facility: CLINIC | Age: 44
End: 2024-09-04
Payer: MEDICARE

## 2024-09-08 NOTE — PROGRESS NOTES
Urology ([facility]) H&P for upcoming procedure  Staff:  Valentin Maldonado MD    CC: left renal mass    HPI:  Ade Silva is a 44 y.o. female with a left renal mass.      The mass was found incidentally during transplant workup. Patient has a history of polycystic kidney disease and has been on dialysis since May 2023. At that time she urinated 2-3 times a day.      The patient denies gross hematuria and/or constitutional symptoms attributable to her recently discovered renal mass.     The patient denies a personal and family history of kidney cancer.     The patient presents today to discuss how best to proceed with their left renal mass and polycystic kidney disease.  Has no previous abdominopelvic surgeries.  She does have LUQ abdominal pain---worse with inspiration.     H/o TIA in 2016.  No prior MI/CVA.  No blood thinners.      CTRSS 12/28/2023:   Polucystic kidney disease. There are some hyperatennuated cysts in the right kidney and a questionable mass in the left lower pole.      CT scan of the abdomen w/wo from 1/23/24 was independently reviewed today and reveals no enhancing solid renal masses, polycystic kidney disease bilaterally.  CXR from 1/23/24 was independently reviewed today and reveals no acute findings.      Ultrasound of the kidneys from 4/28/23 was independently reviewed today and reveals possible multiple solid left renal masses.      Ultrasound of the kidneys from 2/15/24 was independently reviewed today and reveals innumerable bilateral renal cysts, solid mass in left mid-lower pole, 2.6cm (down from 4.9cm previously).  Contrasted US: 2/15/24----solid renal mass, no evidence of enhancement---c/w complex renal cyst.      No past abdominal surgery.   No blood thinners.      The patient has had increasing left flank pain.  It is severe.  Worse with inspiration.  Denies gross hematuria.  She is on dialysis.    ROS: Negative except for as stated above    Past Medical History:   Diagnosis  Date    Asthma     Autosomal dominant polycystic kidney disease     Brain aneurysm     Encounter for blood transfusion     Hypertension        Past Surgical History:   Procedure Laterality Date    AV FISTULA PLACEMENT Right 09/14/2023    Procedure: CREATION, AV FISTULA, radial cephalic;  Surgeon: MELISSA Loera II, MD;  Location: [facility] OR [facility] FLR;  Service: Vascular;  Laterality: Right;    AV FISTULA PLACEMENT Right 10/05/2023    Procedure: CREATION, AV FISTULA - brachial;  Surgeon: MELISSA Loera II, MD;  Location: [facility] OR [facility] FLR;  Service: Vascular;  Laterality: Right;  confirmed placement with doppler    CEREBRAL ANEURYSM REPAIR      coiling    EMBOLIZATION Right 4/23/2024    Procedure: EMBOLIZATION, BLOOD VESSEL FISTULA;  Surgeon: MELISSA Loera II, MD;  Location: [facility] OR [facility] FLR;  Service: Vascular;  Laterality: Right;    FISTULOGRAM Right 4/23/2024    Procedure: Fistulogram;  Surgeon: MELISSA Loera II, MD;  [facility];  Service: Vascular;  Laterality: Right;  CONTRAST: 68ML, FLUORO TIME: 17.9, mGy: 45.11, Gycm2: 5.9548    HYSTEROSCOPY N/A 7/25/2024    Procedure: HYSTEROSCOPY;  Surgeon: Jazz Amador MD;  Location: [facility] OR;  Service: OB/GYN;  Laterality: N/A;    INCISION OF VULVA N/A 7/25/2024    Procedure: INCISION, VULVA;  Surgeon: Jazz Amador MD;  Location: [facility] OR;  Service: OB/GYN;  Laterality: N/A;    INSERTION OF TUNNELED CENTRAL VENOUS HEMODIALYSIS CATHETER Right 05/03/2023    Procedure: Insertion, Catheter, Central Venous, Hemodialysis;  Surgeon: Priya Grimm MD;  Location: [facility] CATH LAB;  Service: Interventional Nephrology;  Laterality: Right;    LOOP ELECTROSURGICAL EXCISION PROCEDURE (LEEP) N/A 7/25/2024    Procedure: LEEP (LOOP ELECTROSURGICAL EXCISION PROCEDURE);  Surgeon: Jazz Amador MD;  Location: [facility] OR;  Service: OB/GYN;  Laterality: N/A;    REMOVAL OF HEMODIALYSIS CATHETER  05/03/2023    Procedure: REMOVAL, CATHETER, HEMODIALYSIS;   Surgeon: Priya Grimm MD;  Location: [facility] CATH LAB;  Service: Interventional Nephrology;;    THERMAL ABLATION OF ENDOMETRIUM N/A 7/25/2024    Procedure: ABLATION, ENDOMETRIUM, THERMAL;  Surgeon: Jazz Amador MD;  Location: [facility] OR;  Service: OB/GYN;  Laterality: N/A;       Social History     Socioeconomic History    Marital status: Single   Tobacco Use    Smoking status: Every Day     Average packs/day: 1 pack/day for 28.0 years (28.0 ttl pk-yrs)     Types: Cigarettes, Cigars     Start date: 1994     Passive exposure: Never    Smokeless tobacco: Never    Tobacco comments:     Smoke 3-5 black and mild cigars a day    Substance and Sexual Activity    Alcohol use: Not Currently    Drug use: Never    Sexual activity: Not Currently   Social History Narrative    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     Social Determinants of Health     Financial Resource Strain: Low Risk  (12/19/2023)    Overall Financial Resource Strain (CARDIA)     Difficulty of Paying Living Expenses: Not very hard   Food Insecurity: No Food Insecurity (12/19/2023)    Hunger Vital Sign     Worried About Running Out of Food in the Last Year: Never true     Ran Out of Food in the Last Year: Never true   Transportation Needs: No Transportation Needs (12/19/2023)    PRAPARE - Transportation     Lack of Transportation (Medical): No     Lack of Transportation (Non-Medical): No   Physical Activity: Insufficiently Active (12/19/2023)    Exercise Vital Sign     Days of Exercise per Week: 2 days     Minutes of Exercise per Session: 10 min   Stress: Stress Concern Present (12/19/2023)    [employer] - Occupational Stress Questionnaire     Feeling of Stress : To some extent   Housing Stability: Low Risk  (12/19/2023)    Housing Stability Vital Sign     Unable to Pay for Housing in the Last Year: No     Number of Places Lived in the Last Year: 2     Unstable Housing in the Last Year: No       Family History   Problem Relation Name Age of  "Onset    Stroke Father      Heart disease Father      Polycystic kidney disease Father      Diabetes Brother         Review of patient's allergies indicates:   Allergen Reactions    Aspirin Hives     And vomiting    Penicillins Hives     Throat swelling    Adhesive Rash     Adhesive/silk tape (type found on band aides). Can only use "Paper Tape"    Butalbital-acetaminophen-caff Nausea And Vomiting and Other (See Comments)     Dizziness (made pt feel sick)    Latex, natural rubber Rash    Nickel Rash    Tomato Nausea And Vomiting       Current Outpatient Medications on File Prior to Visit   Medication Sig Dispense Refill    acetaminophen (TYLENOL) 650 MG TbSR Take 1 tablet (650 mg total) by mouth every 6 (six) hours as needed (pain). 30 tablet 1    gabapentin (NEURONTIN) 100 MG capsule Take one 100 mg capsule three times a week following dialysis. 24 capsule 0    gabapentin (NEURONTIN) 300 MG capsule Take 1 capsule (300 mg total) by mouth three times per week after dialysis 12 capsule 0    loratadine (CLARITIN ORAL) Take 1 tablet by mouth daily as needed (Allergies).      nicotine (NICODERM CQ) 21 mg/24 hr Place 1 patch onto the skin once daily. Please dispense only clear patches , patient has a tape allergy. (Patient not taking: Reported on 8/20/2024) 14 patch 0    ondansetron (ZOFRAN) 4 MG tablet Take 1 tablet (4 mg total) by mouth every 6 (six) hours. (Patient not taking: Reported on 8/20/2024) 20 tablet 0    oxyCODONE (ROXICODONE) 5 MG immediate release tablet Take 1 tablet (5 mg total) by mouth every 4 (four) hours as needed for Pain. (Patient not taking: Reported on 8/20/2024) 7 tablet 0    sodium zirconium cyclosilicate (LOKELMA) 10 gram packet Take 1 packet (10 g total) by mouth 2 (two) times a day. Mix entire contents of packet(s) into drinking glass containing 3 tablespoons of water; stir well and drink immediately. Add water and repeat until no powder remains to receive entire dose. (Patient not taking: " "Reported on 8/20/2024) 30 packet 3    sucroferric oxyhydroxide (VELPHORO) 500 mg Chew Break or dissolve 1 tablet by mouth with each meal and snack. Do not exceed 6 tablets per day. 180 tablet 11     Current Facility-Administered Medications on File Prior to Visit   Medication Dose Route Frequency Provider Last Rate Last Admin    0.9%  NaCl infusion   Intravenous Continuous Mel Calvillo MD        ondansetron disintegrating tablet 8 mg  8 mg Oral Q8H PRN Mel Calvillo MD           Anticoagulation:  No    Physical Exam:  Estimated body mass index is 30.26 kg/m² as calculated from the following:    Height as of 8/20/24: 5' 11" (1.803 m).    Weight as of 8/20/24: 98.4 kg (216 lb 14.9 oz).     General: No acute distress, well developed. AAOx3  Head: Normocephalic, Atraumatic  Eyes: Extra-occular movements intact, No discharge  Neck: supple, symmetrical, trachea midline  Lungs: normal respiratory effort, no respiratory distress, chronic wheeze with smoking history  CV: regular rate, 2+ pulses  Abdomen: soft, non-tender, non-distended, no organomegaly   MSK: no edema, no deformities, normal ROM, bruising on right arm around fistula sites  Skin: skin color, texture, turgor normal.  Neurologic: no focal deficits, sensation intact     Labs:  No Urine dipstick due to patient being anuric on HD    Lab Results   Component Value Date    WBC 8.74 09/04/2024    HGB 14.1 09/04/2024    HCT 44.3 09/04/2024     (H) 09/04/2024     09/04/2024         BMP  Lab Results   Component Value Date     09/04/2024    K 4.2 09/04/2024    CL 99 09/04/2024    CO2 27 07/25/2024    BUN 51 (H) 07/25/2024    CREATININE 12.16 (H) 09/04/2024    CALCIUM 8.4 (L) 09/04/2024    ANIONGAP 13 07/25/2024    EGFRNORACEVR 4 (A) 07/25/2024       Imaging:   CT AP from 1/23/24: Enlarged polycystic kidneys as before. Some of the cysts are fluid attenuation and some appear hyperdense. No significant contrast enhancement is identified to suggest " solid renal masses. 1 left renal artery 1 left renal veins. No appreciable IVC infiltration.    Contrasted Renal Ultrasound 2/15/24:  Left kidney demonstrates a solid-appearing area measuring 2.6 x 1.6 x 2.4 cm. No other solid-appearing areas are definitively visualized. After the administration of contrast, this area demonstrates no evidence of enhancement. Findings are consistent with a complicated cyst.     Renal Ultrasound 2/15/24:  Right kidney: The right kidney measures 29 cm.  Innumerable cysts consistent with polycystic kidney disease.  Multiple nonobstructing stones.  No definite mass.  No hydronephrosis.     Left kidney: The left kidney measures 30 cm.  Innumerable cysts consistent with polycystic kidney disease.  Solid-appearing area in the mid to inferior pole measuring 2.6 x 1.6 x 2.4 cm, previously measuring 4.9 x 3.6 x 4.5 cm.  No other solid-appearing areas are visualized on today's exam.  Multiple nonobstructing stones.  No hydronephrosis.    Chest XR: No evidence of metastatic disease.    Assessment: Ade Silva is a 44 y.o. female with questionable left renal mass and bilateral polycystic kidney disease, suspicious for [id number] RCC    Plan:     1. To OR on 9/19/24 for left robotic radical nephrectomy  2. Type and screen ordered preoperatively. The risks, benefits, and indications of a blood transfusion were discussed. The patient was given a chance to ask questions and all questions answered to her satisfaction. Consent obtained.   3. The risks and benefits of participating in our clinical trial have been discussed and the patient has consented for the research study here at [facility].   4. Repeat CXR since previous is from January.   5. Pre-operative medical clearance with Dennis Friend NP. Will follow up results.   6. Blood consent signed, but patient is on transplant list so would prioritize O negative blood to avoid future cross reactivity   7. Patient would like to know the weight of  her kidney for HD dry weight purposes    Hussain Padilla DO

## 2024-09-08 NOTE — H&P (VIEW-ONLY)
Urology ([facility]) H&P for upcoming procedure  Staff:  Valentin Maldonado MD    CC: left renal mass    HPI:  Ade Silva is a 44 y.o. female with a left renal mass.      The mass was found incidentally during transplant workup. Patient has a history of polycystic kidney disease and has been on dialysis since May 2023. At that time she urinated 2-3 times a day.      The patient denies gross hematuria and/or constitutional symptoms attributable to her recently discovered renal mass.     The patient denies a personal and family history of kidney cancer.     The patient presents today to discuss how best to proceed with their left renal mass and polycystic kidney disease.  Has no previous abdominopelvic surgeries.  She does have LUQ abdominal pain---worse with inspiration.     H/o TIA in 2016.  No prior MI/CVA.  No blood thinners.      CTRSS 12/28/2023:   Polucystic kidney disease. There are some hyperatennuated cysts in the right kidney and a questionable mass in the left lower pole.      CT scan of the abdomen w/wo from 1/23/24 was independently reviewed today and reveals no enhancing solid renal masses, polycystic kidney disease bilaterally.  CXR from 1/23/24 was independently reviewed today and reveals no acute findings.      Ultrasound of the kidneys from 4/28/23 was independently reviewed today and reveals possible multiple solid left renal masses.      Ultrasound of the kidneys from 2/15/24 was independently reviewed today and reveals innumerable bilateral renal cysts, solid mass in left mid-lower pole, 2.6cm (down from 4.9cm previously).  Contrasted US: 2/15/24----solid renal mass, no evidence of enhancement---c/w complex renal cyst.      No past abdominal surgery.   No blood thinners.      The patient has had increasing left flank pain.  It is severe.  Worse with inspiration.  Denies gross hematuria.  She is on dialysis.    ROS: Negative except for as stated above    Past Medical History:   Diagnosis  Date    Asthma     Autosomal dominant polycystic kidney disease     Brain aneurysm     Encounter for blood transfusion     Hypertension        Past Surgical History:   Procedure Laterality Date    AV FISTULA PLACEMENT Right 09/14/2023    Procedure: CREATION, AV FISTULA, radial cephalic;  Surgeon: MELISSA Loera II, MD;  Location: [facility] OR [facility] FLR;  Service: Vascular;  Laterality: Right;    AV FISTULA PLACEMENT Right 10/05/2023    Procedure: CREATION, AV FISTULA - brachial;  Surgeon: MELISSA Loera II, MD;  Location: [facility] OR [facility] FLR;  Service: Vascular;  Laterality: Right;  confirmed placement with doppler    CEREBRAL ANEURYSM REPAIR      coiling    EMBOLIZATION Right 4/23/2024    Procedure: EMBOLIZATION, BLOOD VESSEL FISTULA;  Surgeon: MELISSA Loera II, MD;  Location: [facility] OR [facility] FLR;  Service: Vascular;  Laterality: Right;    FISTULOGRAM Right 4/23/2024    Procedure: Fistulogram;  Surgeon: MELISSA Loera II, MD;  [facility];  Service: Vascular;  Laterality: Right;  CONTRAST: 68ML, FLUORO TIME: 17.9, mGy: 45.11, Gycm2: 5.9548    HYSTEROSCOPY N/A 7/25/2024    Procedure: HYSTEROSCOPY;  Surgeon: Jazz Amador MD;  Location: [facility] OR;  Service: OB/GYN;  Laterality: N/A;    INCISION OF VULVA N/A 7/25/2024    Procedure: INCISION, VULVA;  Surgeon: Jazz Amador MD;  Location: [facility] OR;  Service: OB/GYN;  Laterality: N/A;    INSERTION OF TUNNELED CENTRAL VENOUS HEMODIALYSIS CATHETER Right 05/03/2023    Procedure: Insertion, Catheter, Central Venous, Hemodialysis;  Surgeon: Priya Grimm MD;  Location: [facility] CATH LAB;  Service: Interventional Nephrology;  Laterality: Right;    LOOP ELECTROSURGICAL EXCISION PROCEDURE (LEEP) N/A 7/25/2024    Procedure: LEEP (LOOP ELECTROSURGICAL EXCISION PROCEDURE);  Surgeon: Jazz Amador MD;  Location: [facility] OR;  Service: OB/GYN;  Laterality: N/A;    REMOVAL OF HEMODIALYSIS CATHETER  05/03/2023    Procedure: REMOVAL, CATHETER, HEMODIALYSIS;   Surgeon: Priya Grimm MD;  Location: [facility] CATH LAB;  Service: Interventional Nephrology;;    THERMAL ABLATION OF ENDOMETRIUM N/A 7/25/2024    Procedure: ABLATION, ENDOMETRIUM, THERMAL;  Surgeon: Jazz Amador MD;  Location: [facility] OR;  Service: OB/GYN;  Laterality: N/A;       Social History     Socioeconomic History    Marital status: Single   Tobacco Use    Smoking status: Every Day     Average packs/day: 1 pack/day for 28.0 years (28.0 ttl pk-yrs)     Types: Cigarettes, Cigars     Start date: 1994     Passive exposure: Never    Smokeless tobacco: Never    Tobacco comments:     Smoke 3-5 black and mild cigars a day    Substance and Sexual Activity    Alcohol use: Not Currently    Drug use: Never    Sexual activity: Not Currently   Social History Narrative    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     Social Determinants of Health     Financial Resource Strain: Low Risk  (12/19/2023)    Overall Financial Resource Strain (CARDIA)     Difficulty of Paying Living Expenses: Not very hard   Food Insecurity: No Food Insecurity (12/19/2023)    Hunger Vital Sign     Worried About Running Out of Food in the Last Year: Never true     Ran Out of Food in the Last Year: Never true   Transportation Needs: No Transportation Needs (12/19/2023)    PRAPARE - Transportation     Lack of Transportation (Medical): No     Lack of Transportation (Non-Medical): No   Physical Activity: Insufficiently Active (12/19/2023)    Exercise Vital Sign     Days of Exercise per Week: 2 days     Minutes of Exercise per Session: 10 min   Stress: Stress Concern Present (12/19/2023)    [employer] [place] [employer] - Occupational Stress Questionnaire     Feeling of Stress : To some extent   Housing Stability: Low Risk  (12/19/2023)    Housing Stability Vital Sign     Unable to Pay for Housing in the Last Year: No     Number of Places Lived in the Last Year: 2     Unstable Housing in the Last Year: No       Family History   Problem Relation Name Age of  "Onset    Stroke Father      Heart disease Father      Polycystic kidney disease Father      Diabetes Brother         Review of patient's allergies indicates:   Allergen Reactions    Aspirin Hives     And vomiting    Penicillins Hives     Throat swelling    Adhesive Rash     Adhesive/silk tape (type found on band aides). Can only use "Paper Tape"    Butalbital-acetaminophen-caff Nausea And Vomiting and Other (See Comments)     Dizziness (made pt feel sick)    Latex, natural rubber Rash    Nickel Rash    Tomato Nausea And Vomiting       Current Outpatient Medications on File Prior to Visit   Medication Sig Dispense Refill    acetaminophen (TYLENOL) 650 MG TbSR Take 1 tablet (650 mg total) by mouth every 6 (six) hours as needed (pain). 30 tablet 1    gabapentin (NEURONTIN) 100 MG capsule Take one 100 mg capsule three times a week following dialysis. 24 capsule 0    gabapentin (NEURONTIN) 300 MG capsule Take 1 capsule (300 mg total) by mouth three times per week after dialysis 12 capsule 0    loratadine (CLARITIN ORAL) Take 1 tablet by mouth daily as needed (Allergies).      nicotine (NICODERM CQ) 21 mg/24 hr Place 1 patch onto the skin once daily. Please dispense only clear patches , patient has a tape allergy. (Patient not taking: Reported on 8/20/2024) 14 patch 0    ondansetron (ZOFRAN) 4 MG tablet Take 1 tablet (4 mg total) by mouth every 6 (six) hours. (Patient not taking: Reported on 8/20/2024) 20 tablet 0    oxyCODONE (ROXICODONE) 5 MG immediate release tablet Take 1 tablet (5 mg total) by mouth every 4 (four) hours as needed for Pain. (Patient not taking: Reported on 8/20/2024) 7 tablet 0    sodium zirconium cyclosilicate (LOKELMA) 10 gram packet Take 1 packet (10 g total) by mouth 2 (two) times a day. Mix entire contents of packet(s) into drinking glass containing 3 tablespoons of water; stir well and drink immediately. Add water and repeat until no powder remains to receive entire dose. (Patient not taking: " "Reported on 8/20/2024) 30 packet 3    sucroferric oxyhydroxide (VELPHORO) 500 mg Chew Break or dissolve 1 tablet by mouth with each meal and snack. Do not exceed 6 tablets per day. 180 tablet 11     Current Facility-Administered Medications on File Prior to Visit   Medication Dose Route Frequency Provider Last Rate Last Admin    0.9%  NaCl infusion   Intravenous Continuous Mel Calvillo MD        ondansetron disintegrating tablet 8 mg  8 mg Oral Q8H PRN Mel Calvillo MD           Anticoagulation:  No    Physical Exam:  Estimated body mass index is 30.26 kg/m² as calculated from the following:    Height as of 8/20/24: 5' 11" (1.803 m).    Weight as of 8/20/24: 98.4 kg (216 lb 14.9 oz).     General: No acute distress, well developed. AAOx3  Head: Normocephalic, Atraumatic  Eyes: Extra-occular movements intact, No discharge  Neck: supple, symmetrical, trachea midline  Lungs: normal respiratory effort, no respiratory distress, chronic wheeze with smoking history  CV: regular rate, 2+ pulses  Abdomen: soft, non-tender, non-distended, no organomegaly   MSK: no edema, no deformities, normal ROM, bruising on right arm around fistula sites  Skin: skin color, texture, turgor normal.  Neurologic: no focal deficits, sensation intact     Labs:  No Urine dipstick due to patient being anuric on HD    Lab Results   Component Value Date    WBC 8.74 09/04/2024    HGB 14.1 09/04/2024    HCT 44.3 09/04/2024     (H) 09/04/2024     09/04/2024         BMP  Lab Results   Component Value Date     09/04/2024    K 4.2 09/04/2024    CL 99 09/04/2024    CO2 27 07/25/2024    BUN 51 (H) 07/25/2024    CREATININE 12.16 (H) 09/04/2024    CALCIUM 8.4 (L) 09/04/2024    ANIONGAP 13 07/25/2024    EGFRNORACEVR 4 (A) 07/25/2024       Imaging:   CT AP from 1/23/24: Enlarged polycystic kidneys as before. Some of the cysts are fluid attenuation and some appear hyperdense. No significant contrast enhancement is identified to suggest " solid renal masses. 1 left renal artery 1 left renal veins. No appreciable IVC infiltration.    Contrasted Renal Ultrasound 2/15/24:  Left kidney demonstrates a solid-appearing area measuring 2.6 x 1.6 x 2.4 cm. No other solid-appearing areas are definitively visualized. After the administration of contrast, this area demonstrates no evidence of enhancement. Findings are consistent with a complicated cyst.     Renal Ultrasound 2/15/24:  Right kidney: The right kidney measures 29 cm.  Innumerable cysts consistent with polycystic kidney disease.  Multiple nonobstructing stones.  No definite mass.  No hydronephrosis.     Left kidney: The left kidney measures 30 cm.  Innumerable cysts consistent with polycystic kidney disease.  Solid-appearing area in the mid to inferior pole measuring 2.6 x 1.6 x 2.4 cm, previously measuring 4.9 x 3.6 x 4.5 cm.  No other solid-appearing areas are visualized on today's exam.  Multiple nonobstructing stones.  No hydronephrosis.    Chest XR: No evidence of metastatic disease.    Assessment: Ade Silva is a 44 y.o. female with questionable left renal mass and bilateral polycystic kidney disease, suspicious for [id number] RCC    Plan:     1. To OR on 9/19/24 for left robotic radical nephrectomy  2. Type and screen ordered preoperatively. The risks, benefits, and indications of a blood transfusion were discussed. The patient was given a chance to ask questions and all questions answered to her satisfaction. Consent obtained.   3. The risks and benefits of participating in our clinical trial have been discussed and the patient has consented for the research study here at [facility].   4. Repeat CXR since previous is from January.   5. Pre-operative medical clearance with Dennis Friend NP. Will follow up results.   6. Blood consent signed, but patient is on transplant list so would prioritize O negative blood to avoid future cross reactivity   7. Patient would like to know the weight of  her kidney for HD dry weight purposes    Hussain Padilla DO

## 2024-09-09 ENCOUNTER — OFFICE VISIT (OUTPATIENT)
Dept: UROLOGY | Facility: CLINIC | Age: 44
End: 2024-09-09
Payer: MEDICARE

## 2024-09-09 ENCOUNTER — TELEPHONE (OUTPATIENT)
Dept: INTERNAL MEDICINE | Facility: CLINIC | Age: 44
End: 2024-09-09
Payer: MEDICARE

## 2024-09-09 DIAGNOSIS — Q61.3 POLYCYSTIC KIDNEY DISEASE: Primary | Chronic | ICD-10-CM

## 2024-09-09 PROBLEM — N28.89 RENAL MASS: Status: ACTIVE | Noted: 2024-09-09

## 2024-09-09 PROCEDURE — 99211 OFF/OP EST MAY X REQ PHY/QHP: CPT | Mod: PBBFAC,TXP

## 2024-09-09 PROCEDURE — 99499 UNLISTED E&M SERVICE: CPT | Mod: S$PBB,NTX,, | Performed by: UROLOGY

## 2024-09-09 PROCEDURE — 99999 PR PBB SHADOW E&M-EST. PATIENT-LVL I: CPT | Mod: PBBFAC,TXP,,

## 2024-09-09 RX ORDER — TAMSULOSIN HYDROCHLORIDE 0.4 MG/1
0.4 CAPSULE ORAL NIGHTLY
Status: CANCELLED | OUTPATIENT
Start: 2024-09-09

## 2024-09-10 ENCOUNTER — OFFICE VISIT (OUTPATIENT)
Dept: INTERNAL MEDICINE | Facility: CLINIC | Age: 44
End: 2024-09-10
Payer: MEDICARE

## 2024-09-10 ENCOUNTER — TELEPHONE (OUTPATIENT)
Dept: UROLOGY | Facility: CLINIC | Age: 44
End: 2024-09-10
Payer: MEDICARE

## 2024-09-10 ENCOUNTER — RESEARCH ENCOUNTER (OUTPATIENT)
Dept: RESEARCH | Facility: HOSPITAL | Age: 44
End: 2024-09-10
Payer: MEDICARE

## 2024-09-10 ENCOUNTER — TELEPHONE (OUTPATIENT)
Dept: NEUROSURGERY | Facility: CLINIC | Age: 44
End: 2024-09-10
Payer: MEDICARE

## 2024-09-10 ENCOUNTER — OFFICE VISIT (OUTPATIENT)
Dept: NEUROSURGERY | Facility: CLINIC | Age: 44
End: 2024-09-10
Payer: MEDICARE

## 2024-09-10 ENCOUNTER — HOSPITAL ENCOUNTER (OUTPATIENT)
Dept: CARDIOLOGY | Facility: CLINIC | Age: 44
Discharge: HOME | End: 2024-09-10
Payer: MEDICARE

## 2024-09-10 VITALS
OXYGEN SATURATION: 100 % | SYSTOLIC BLOOD PRESSURE: 110 MMHG | TEMPERATURE: 98 F | HEART RATE: 80 BPM | HEIGHT: 71 IN | WEIGHT: 218.25 LBS | DIASTOLIC BLOOD PRESSURE: 57 MMHG | BODY MASS INDEX: 30.56 KG/M2

## 2024-09-10 VITALS — SYSTOLIC BLOOD PRESSURE: 103 MMHG | HEART RATE: 62 BPM | DIASTOLIC BLOOD PRESSURE: 70 MMHG

## 2024-09-10 DIAGNOSIS — R21 PAPULAR RASH: ICD-10-CM

## 2024-09-10 DIAGNOSIS — I72.5 BASILAR ARTERY ANEURYSM: Primary | ICD-10-CM

## 2024-09-10 DIAGNOSIS — F32.A ANXIETY AND DEPRESSION: ICD-10-CM

## 2024-09-10 DIAGNOSIS — I72.5 BASILAR ARTERY ANEURYSM: ICD-10-CM

## 2024-09-10 DIAGNOSIS — F41.9 ANXIETY AND DEPRESSION: ICD-10-CM

## 2024-09-10 DIAGNOSIS — Z01.818 PREOPERATIVE EXAMINATION: Primary | ICD-10-CM

## 2024-09-10 DIAGNOSIS — Z01.818 PREOPERATIVE TESTING: ICD-10-CM

## 2024-09-10 DIAGNOSIS — Q61.3 POLYCYSTIC KIDNEY DISEASE: Chronic | ICD-10-CM

## 2024-09-10 DIAGNOSIS — K21.9 GASTROESOPHAGEAL REFLUX DISEASE, UNSPECIFIED WHETHER ESOPHAGITIS PRESENT: ICD-10-CM

## 2024-09-10 DIAGNOSIS — N18.6 ESRD (END STAGE RENAL DISEASE) ON DIALYSIS: ICD-10-CM

## 2024-09-10 DIAGNOSIS — D75.89 MACROCYTOSIS WITHOUT ANEMIA: ICD-10-CM

## 2024-09-10 DIAGNOSIS — N28.89 RENAL MASS: ICD-10-CM

## 2024-09-10 DIAGNOSIS — I10 HYPERTENSION, UNSPECIFIED TYPE: ICD-10-CM

## 2024-09-10 DIAGNOSIS — R06.83 SNORING: ICD-10-CM

## 2024-09-10 DIAGNOSIS — Z99.2 ESRD (END STAGE RENAL DISEASE) ON DIALYSIS: ICD-10-CM

## 2024-09-10 DIAGNOSIS — J45.20 MILD INTERMITTENT ASTHMA WITHOUT COMPLICATION: ICD-10-CM

## 2024-09-10 LAB
OHS QRS DURATION: 82 MS
OHS QTC CALCULATION: 459 MS

## 2024-09-10 PROCEDURE — 93005 ELECTROCARDIOGRAM TRACING: CPT | Mod: PBBFAC,NTX | Performed by: INTERNAL MEDICINE

## 2024-09-10 PROCEDURE — 99213 OFFICE O/P EST LOW 20 MIN: CPT | Mod: PBBFAC,25,27,TXP | Performed by: NEUROLOGICAL SURGERY

## 2024-09-10 PROCEDURE — 99215 OFFICE O/P EST HI 40 MIN: CPT | Mod: PBBFAC,TXP,25 | Performed by: NURSE PRACTITIONER

## 2024-09-10 PROCEDURE — 99215 OFFICE O/P EST HI 40 MIN: CPT | Mod: S$PBB,NTX,, | Performed by: NEUROLOGICAL SURGERY

## 2024-09-10 PROCEDURE — 99999 PR PBB SHADOW E&M-EST. PATIENT-LVL III: CPT | Mod: PBBFAC,TXP,, | Performed by: NEUROLOGICAL SURGERY

## 2024-09-10 PROCEDURE — 99999 PR PBB SHADOW E&M-EST. PATIENT-LVL V: CPT | Mod: PBBFAC,TXP,, | Performed by: NURSE PRACTITIONER

## 2024-09-10 PROCEDURE — 93010 ELECTROCARDIOGRAM REPORT: CPT | Mod: S$PBB,NTX,, | Performed by: INTERNAL MEDICINE

## 2024-09-10 RX ORDER — HYDROGEN PEROXIDE 3 %
20 SOLUTION, NON-ORAL MISCELLANEOUS
COMMUNITY

## 2024-09-10 NOTE — DISCHARGE INSTRUCTIONS
Your surgery has been scheduled for:______9/19/24____________________________________    You should report to:  ____[facility], located on the [place] side of the first floor of the           [facility] ([phone])  __X__[facility], located on the [facility] side of the            Second floor of the [facility] ([phone])  ____3rd Floor [facility] located on the [facility] side of the [facility] [phone]  ____[facility]: located at [address] [place] [address]. [address]     Please Note   Tell your doctor if you take Aspirin, products containing Aspirin, herbal medications  or blood thinners, such as Coumadin, Ticlid, or Plavix.  (Consult your provider regarding holding or stopping before surgery).  Arrange for someone to drive you home following surgery.  You will not be allowed to leave the surgical facility alone or drive yourself home following sedation and anesthesia.    Before Surgery  Stop taking all herbal medications, vitamins, and supplements 7 days prior to surgery  No Motrin/Advil (Ibuprofen) 7 days before surgery  No Aleve (Naproxen) 7 days before surgery   No Goody's/BC Powder 7 days before surgery  Refrain from drinking alcoholic beverages for 24 hours before and after surgery  Stop or limit smoking at least 24 hours prior to surgery  You may take Tylenol for pain    Night before Surgery  Do not eat or drink after midnight  Take a shower or bath (shower is recommended).  Bathe with Hibiclens soap or an antibacterial soap from the neck down.  If not supplied by your surgeon, hibiclens soap will need to be purchased over the counter in pharmacy.  Rinse soap off thoroughly.  Shampoo your hair with your regular shampoo    The Day of Surgery  Take another bath or shower with hibiclens or any antibacterial soap, to reduce the chance of infection.  Take heart  and blood pressure medications with a small sip of water, as advised by the perioperative team.  Do not take fluid pills  You may brush your teeth and rinse your mouth, but do not swallow any additional water.   Do not apply perfumes, powder, body lotions or deodorant on the day of surgery.  Nail polish should be removed.  Do not wear makeup or moisturizer  Wear comfortable clothes, such as a button front shirt and loose fitting pants.  Leave all jewelry, including body piercings, and valuables at home.    Bring any devices you will need after surgery such as crutches or canes.  If you have sleep apnea, please bring your CPAP machine  In the event that your physical condition changes including the onset of a cold or respiratory illness, or if you have to delay or cancel your surgery, please notify your surgeon.

## 2024-09-10 NOTE — ASSESSMENT & PLAN NOTE
Dialysis days are:   MWF  Receives treatment at   [facility]  [facility]phrolog[facility]   [facility]      Patient has made arrangements for dialysis before surgery Yes- will follow regular scheduled  Dialysis access: RUE AVF    Tylenol as needed for pain   I  suggest monitoring renal function, in put and out put status corby-operatively. I  suggest avoiding nephrotoxic medication including NSAIDs, COX2 inhibitors, intravenous contrast agent,avoiding hypotension to prevent further renal impairment.

## 2024-09-10 NOTE — PROGRESS NOTES
[facility]  Progress Note    Patient Name: Ade Silva  MRN: [id number]  Date of Evaluation- 09/12/2024  PCP- Jordan Christiansen MD    Future cases for Ade Silva [[id number]]       Case ID Status Date Time Zander Procedure Provider Location    [id number] [facility] 9/19/2024 12:20  XI ROBOTIC NEPHRECTOMY Valentin Maldonado MD [[id number]] [facility]            HPI:  This is a 44 y.o. female  who presents today for a preoperative evaluation in preparation for left robotic nephrectomy.  Surgery is indicated for left renal mass .   Patient is new to me.  The history has been obtained by speaking with the patient and reviewing the electronic medical record and/or outside health information. Significant health conditions for the perioperative period are discussed below in assessment and plan.   Denies any new symptoms before surgery.       Subjective/ Objective:     Chief Complaint: Preoperative evaulation, perioperative medical management, and complication reduction plan.     Functional Capacity: walked to [facility] without CP/SOB.       Anesthesia issues: None    Difficulty mouth opening: No    Steroid use in the last 12 months:  No    Dental Issues: loose teeth- most of front teeth     Family anesthesia difficulty: None       Last monthly period: June 2024    Family Hx of Thrombosis: None    Past Medical History:   Diagnosis Date    Asthma     Autosomal dominant polycystic kidney disease     Brain aneurysm     Encounter for blood transfusion     Hypertension          No past medical history pertinent negatives.      Past Surgical History:   Procedure Laterality Date    AV FISTULA PLACEMENT Right 09/14/2023    Procedure: CREATION, AV FISTULA, radial cephalic;  Surgeon: MELISSA Loera II, MD;  Location: [facility] OR [address] [place] [address];  Service: Vascular;  Laterality: Right;    AV FISTULA PLACEMENT Right 10/05/2023    Procedure: CREATION, AV FISTULA - brachial;  Surgeon: MELISSA Loera II, MD;  Location: [facility] OR  2ND FLR;  Service: Vascular;  Laterality: Right;  confirmed placement with doppler    CEREBRAL ANEURYSM REPAIR      coiling    EMBOLIZATION Right 4/23/2024    Procedure: EMBOLIZATION, BLOOD VESSEL FISTULA;  Surgeon: MELISSA Loera II, MD;  Location: [facility] OR 2ND FLR;  Service: Vascular;  Laterality: Right;    FISTULOGRAM Right 4/23/2024    Procedure: Fistulogram;  Surgeon: MELISSA Loera II, MD;  Location: [facility] OR 2ND FLR;  Service: Vascular;  Laterality: Right;  CONTRAST: 68ML, FLUORO TIME: 17.9, mGy: 45.11, Gycm2: 5.9548    HYSTEROSCOPY N/A 7/25/2024    Procedure: HYSTEROSCOPY;  Surgeon: Jazz Amador MD;  Location: [facility];  Service: OB/GYN;  Laterality: N/A;    INCISION OF VULVA N/A 7/25/2024    Procedure: INCISION, VULVA;  Surgeon: Jazz Amador MD;  Location: [facility];  Service: OB/GYN;  Laterality: N/A;    INSERTION OF TUNNELED CENTRAL VENOUS HEMODIALYSIS CATHETER Right 05/03/2023    Procedure: Insertion, Catheter, Central Venous, Hemodialysis;  Surgeon: Priya Grimm MD;  Location: [facility] CATH LAB;  Service: Interventional Nephrology;  Laterality: Right;    LOOP ELECTROSURGICAL EXCISION PROCEDURE (LEEP) N/A 7/25/2024    Procedure: LEEP (LOOP ELECTROSURGICAL EXCISION PROCEDURE);  Surgeon: Jazz Amador MD;  Location: [facility];  Service: OB/GYN;  Laterality: N/A;    REMOVAL OF HEMODIALYSIS CATHETER  05/03/2023    Procedure: REMOVAL, CATHETER, HEMODIALYSIS;  Surgeon: Pirya Grimm MD;  Location: [facility] CATH LAB;  Service: Interventional Nephrology;;    THERMAL ABLATION OF ENDOMETRIUM N/A 7/25/2024    Procedure: ABLATION, ENDOMETRIUM, THERMAL;  Surgeon: Jazz Amador MD;  Location: [facility];  Service: OB/GYN;  Laterality: N/A;       Review of Systems   Constitutional:  Negative for chills, fatigue, fever and unexpected weight change.   HENT:  Positive for trouble swallowing (occasional with GERD). Negative for congestion, hearing loss, rhinorrhea, sore throat and tinnitus.   "  Eyes:  Negative for visual disturbance.   Respiratory:  Positive for wheezing (occasional). Negative for cough, chest tightness and shortness of breath.         STOP BANG risk factors:  Snoring   Cardiovascular:  Positive for palpitations. Negative for chest pain and leg swelling.   Gastrointestinal:  Negative for constipation and diarrhea.        Denies Fatty liver, Hepatitis   Genitourinary:  Negative for decreased urine volume, difficulty urinating, dysuria, frequency, hematuria and urgency.        Minimal urine   Musculoskeletal:  Positive for back pain (intermittent) and neck pain. Negative for arthralgias and neck stiffness.   Skin:  Positive for rash (neck/right inner thigh). Negative for wound.   Neurological:  Positive for dizziness (occasional symptoms) and numbness (right arm> left arm). Negative for syncope, weakness and headaches.   Hematological:  Bruises/bleeds easily.   Psychiatric/Behavioral:  Negative for sleep disturbance and suicidal ideas.               VITALS  Visit Vitals  BP (!) 110/57 (BP Location: Left arm, Patient Position: Sitting)   Pulse 80   Temp 98.2 °F (36.8 °C) (Oral)   Ht 5' 11" (1.803 m)   Wt 99 kg (218 lb 4.1 oz)   SpO2 100%   BMI 30.44 kg/m²          Physical Exam  Vitals reviewed.   Constitutional:       General: She is not in acute distress.     Appearance: She is well-developed. She is obese.   HENT:      Head: Normocephalic.      Nose: Nose normal.      Mouth/Throat:      Pharynx: No oropharyngeal exudate.   Eyes:      General:         Right eye: No discharge.         Left eye: No discharge.      Conjunctiva/sclera: Conjunctivae normal.      Pupils: Pupils are equal, round, and reactive to light.   Neck:      Thyroid: No thyromegaly.      Vascular: No carotid bruit or JVD.      Trachea: No tracheal deviation.   Cardiovascular:      Rate and Rhythm: Normal rate and regular rhythm.      Pulses:           Carotid pulses are 2+ on the right side and 2+ on the left side.       " Dorsalis pedis pulses are 2+ on the right side and 2+ on the left side.        Posterior tibial pulses are 2+ on the right side and 2+ on the left side.      Heart sounds: Normal heart sounds. No murmur heard.     Arteriovenous access: Right arteriovenous access is present.  Pulmonary:      Effort: Pulmonary effort is normal. No respiratory distress.      Breath sounds: Normal breath sounds. No stridor. No wheezing, rhonchi or rales.   Abdominal:      General: Bowel sounds are normal. There is no distension.      Palpations: Abdomen is soft.      Tenderness: There is no abdominal tenderness. There is no guarding.   Musculoskeletal:      Cervical back: Normal range of motion. No pain with movement.      Right lower leg: No edema.      Left lower leg: No edema.   Lymphadenopathy:      Cervical: No cervical adenopathy.   Skin:     General: Skin is warm and dry.      Capillary Refill: Capillary refill takes less than 2 seconds.      Findings: Ecchymosis and rash present. No erythema. Rash is papular.          Neurological:      Mental Status: She is alert and oriented to person, place, and time.   Psychiatric:         Behavior: Behavior normal.          Significant Labs:  Lab Results   Component Value Date    WBC 8.06 09/10/2024    HGB 14.7 09/10/2024    HCT 45.6 09/10/2024     09/10/2024    CHOL 153 10/12/2023    TRIG 79 10/12/2023    HDL 52 10/12/2023    ALT 7 09/04/2024    AST 8 (L) 07/25/2024     09/04/2024    K 4.2 09/04/2024    CL 99 09/04/2024    CREATININE 12.16 (H) 09/04/2024    BUN 51 (H) 07/25/2024    CO2 27 07/25/2024    INR 1.0 10/12/2023           EKG:   Results for orders placed or performed during the hospital encounter of 09/10/24   EKG 12-lead    Collection Time: 09/10/24 10:26 AM   Result Value Ref Range    QRS Duration 82 ms    OHS QTC Calculation 459 ms    Narrative    Test Reason : Z01.818,    Vent. Rate : 056 BPM     Atrial Rate : 056 BPM     P-R Int : 144 ms          QRS Dur : 082  "ms      QT Int : 476 ms       P-R-T Axes : 057 022 049 degrees     QTc Int : 459 ms    Sinus bradycardia  Possible Left atrial enlargement    Abnormal ECG  When compared with ECG of 24-OCT-2023 08:45,    Criteria for Anterior infarct are no longer Present  Criteria for Anterolateral infarct are no longer Present  T wave amplitude has increased in Anterior leads  Confirmed by CANDIDO ELLISON MD ([id number]) on 9/10/2024 11:41:31 AM    Referred By: HARISH BLACK           Confirmed By:CANDIDO ELLISON MD       2D ECHO:  TTE:  Results for orders placed or performed during the hospital encounter of 10/24/23   Echo   Result Value Ref Range    Ascending aorta 3.11 cm    STJ 3.24 cm    AV mean gradient 5 mmHg    Ao peak kate 1.37 m/s    Ao VTI 32.52 cm    IVS 0.77 0.6 - 1.1 cm    LA size 3.24 cm    Left Atrium Major Axis 4.34 cm    Left Atrium Minor Axis 3.88 cm    LVIDd 4.37 3.5 - 6.0 cm    LVIDs 2.95 2.1 - 4.0 cm    LVOT diameter 2.25 cm    LVOT peak VTI 28.53 cm    Posterior Wall 0.68 0.6 - 1.1 cm    MV Peak A Kate 0.66 m/s    E wave deceleration time 172.36 msec    MV Peak E Kate 0.84 m/s    PV Peak D Kate 0.37 m/s    PV Peak S Kate 0.55 m/s    RA Major Axis 4.65 cm    RA Width 3.11 cm    RVDD 3.37 cm    Sinus 3.14 cm    TAPSE 2.14 cm    LA WIDTH 4.36 cm    MV stenosis pressure 1/2 time 49.98 ms    LV Diastolic Volume 86.41 mL    LV Systolic Volume 33.72 mL    LVOT peak kate 1.23 m/s    TDI LATERAL 0.14 m/s    TDI SEPTAL 0.12 m/s    LA volume (mod) 51.42 cm3    MV "A" wave duration 8.56 msec    LV LATERAL E/E' RATIO 6.00 m/s    LV SEPTAL E/E' RATIO 7.00 m/s    FS 32 %    LA volume 49.20 cm3    LV mass 95.19 g    ZLVIDD -2.23     ZLVIDS -1.04     Left Ventricle Relative Wall Thickness 0.31 cm    AV valve area 3.49 cm²    AV Velocity Ratio 0.90     AV index (prosthetic) 0.88     MV valve area p 1/2 method 4.40 cm2    E/A ratio 1.27     Mean e' 0.13 m/s    Pulm vein S/D ratio 1.49     LVOT area 4.0 cm2    LVOT stroke volume 113.38 cm3 "    AV peak gradient 8 mmHg    E/E' ratio 6.46 m/s    LV Systolic Volume Index 17.5 mL/m2    LV Diastolic Volume Index 44.77 mL/m2    LA Volume Index 25.5 mL/m2    LV Mass Index 49 g/m2    LA Volume Index (Mod) 26.6 mL/m2    LISA by Velocity Ratio 3.57 cm²    BSA 1.94 m2    Est. RA pres 3 mmHg    Narrative      Left Ventricle: The left ventricle is normal in size. Normal wall   thickness. Normal wall motion. There is normal systolic function with a   visually estimated ejection fraction of 60 - 65%. There is normal   diastolic function.    Right Ventricle: Normal right ventricular cavity size. Wall thickness   is normal. Right ventricle wall motion  is normal. Systolic function is   normal.    IVC/SVC: Normal venous pressure at 3 mmHg.    PA systolic pressure could not be calculated because of no tricuspid   regurgitation, but RVOT acceleration times are consistent with normal PA   pressure.             Imaging   C-Spine MRI 8/27/24  FINDINGS:  Alignment: Trace retrolisthesis of C5 on C6.  Normal sagittal alignment is otherwise maintained.     Vertebrae: Normal marrow signal. No fracture.     Discs: Mild disc height loss at C5-C6.     Cord: Normal.     Skull base and craniocervical junction: Normal.     Degenerative findings:     C2-C3: Small posterior disc osteophyte complex mildly asymmetric to the left resulting in mild left neural foraminal narrowing.     C3-C4: No spinal canal stenosis or neural foraminal narrowing.     C4-C5: No spinal canal stenosis or neural foraminal narrowing.     C5-C6: Posterior disc osteophyte complex results in moderate central canal stenosis with deformity of the cord but no cord signal changes.  Uncovertebral spurring and posterior disc osteophyte complex results in moderate left and mild right neural foraminal narrowing.     C6-C7: Small posterior disc bulge minimally effaces the ventral thecal sac and results in mild right neural foraminal narrowing.     C7-T1: No spinal canal  stenosis or neural foraminal narrowing.     Paraspinal muscles & soft tissues: Unremarkable.        Impression:     1. Mild degenerative change of the cervical spine as detailed above, most notable at C5-C6 where a posterior disc osteophyte complex and uncovertebral spurring result in moderate central canal stenosis and moderate left and mild right neural foraminal narrowing.     Electronically signed by resident: Farhan Joshi  Date:                                            08/27/2024  Time:                                           08:30     Electronically signed by:Reinier Whitney Jr  Date:                                            08/27/2024  Time:                                           10:55      MRA Brain 8/29/24  Impression:     There is tubular flow related enhancement at the basilar tip along the ventral aspect of the aneurysmal coil pack concerning for residual aneurysmal flow.  Evaluation overall limited by lack of prior imaging for comparison.  Clinical correlation and comparison with prior imaging advised and consideration for further evaluation with conventional angiography.     Electronically signed by resident: Alexander Worthy  Date:                                            08/29/2024  Time:                                           13:25     Electronically signed by:Lonnie Anne DO  Date:                                            08/29/2024  Time:                                           14:10      CTA Head and Neck 8/29/24  FINDINGS:  CT head: Scatter artifact from endovascular coil pack along the region expected basilar tip distorts the examination. Within limits of the study there is no evidence for definite acute intracranial hemorrhage. Ventricles are normal in size without hydrocephalus. No midline shift or mass effect. Allowing for artifacts and CT technique no definite abnormal parenchymal enhancement. Moderate opacities maxillary antra bilaterally suggestive for mucous retention  cyst partial opacification left mastoid air cells.     CTA head: Bilateral distal cervical, petrous, cavernous and supraclinoid segments of the ICAs as well as the anterior and middle cerebral arteries are patent without significant focal stenosis or definite aneurysm with distortion of the anterior middle cerebral arteries as well as the supraclinoid ICAs secondary to metal artifacts. Subtle aneurysm could be obscured by the degree of artifact.     Posterior circulation: Distal left vertebral artery is dominant. Hypoplastic distal right vertebral artery. Basilar artery is identified in its proximal mid aspect being patent. There is obscuration of the distal basilar artery secondary to endovascular coil pack.     CTA neck: Slight motion distortion of the examination. Origin the great vessels from the arch are within normal limits. Origin of the vertebral arteries from the respective subclavian arteries are within normal limits. Left vertebral artery is slightly dominant. Vertebral arteries are patent throughout their course     Bilateral common carotid arteries, carotid bifurcations and extracranial portions of the internal carotid arteries are patent without significant focal stenosis. Less than 50% proximal ICA stenosis by NASCET criteria     No definite focal lesion throughout the pharynx/larynx     No focal parotid, submandibular or thyroid gland lesion     No evidence for lymphadenopathy throughout the neck by size criteria     Subpleural emphysematous change in the visualized lungs. No focal consolidation the visualized lungs     No evidence for acute fracture or traumatic subluxation cervical spine.     Impression:     CTA head: Obscuration of the distal basilar artery and tip with large endovascular coil pack. Evaluation for residual recurrent aneurysm limited by the artifact and lack of prior imaging for comparison.     Otherwise unremarkable CTA head as detailed above     CTA neck: No significant arterial  stenosis throughout the neck.     CT head: Allowing for artifact from presumed basilar tip endovascular coil pack no evidence for definite acute intracranial hemorrhage or hydrocephalus.     Please see above for additional details.     Electronically signed by resident: Herberth Best  Date:                                            08/29/2024  Time:                                           09:18     Electronically signed by:Lonnie Anne DO  Date:                                            08/29/2024  Time:                                           10:44      Active Cardiac Conditions: None          Revised Cardiac Risk Index   High -Risk Surgery  Intraperitoneal; Intrathoracic; suprainguinal vascular Yes- + 1 No- 0- robotic   History of Ischemic Heart Disease   (Hx of MI/positive exercise test/current chest pain due to ischemia/use of nitrate therapy/EKG with pathological Q waves) Yes- + 1 No- 0   History of CHF  (Pulmonary edema/bilateral rales or S3 gallop/PND/CXR showing pulmonary vascular redistribution) Yes- + 1 No- 0   History of CVA   (Prior stroke or TIA) Yes- + 1 No- 0   Pre-operative treatment with insulin Yes- + 1 No- 0   Pre-operative creatinine > 2mg/dl Yes- + 1 No- 0   Total: 1      Risk Status:  Estimated risk of cardiac complications after non-cardiac surgery using the Revised Cardiac Risk Index for Preoperative risk is 6.0 %      ARISCAT (Canet) risk index: Low: 1.6% risk of post-op pulmonary complications.    American Society of Anesthesiologists Physical Status classification (ASA): 3    Jayshree respiratory failure index: 0.5 %                   Orders Placed This Encounter    Ambulatory referral/consult to Psychiatry    Ambulatory referral/consult to Dermatology           Assessment/Plan:     Renal mass  Scheduled with Dr. Maldonado on 9/19/24 for left robotic nephrectomy.     Basilar artery aneurysm  S/P embolization with balloon remodeling in 2016 in [place]   Appt. with Dr. High  9/10/24    Hypertension  Current BP  at goal today.    Not currently treated since Dialysis- readings have been low or normal.        Lifestyle changes to reduce systolic BP:  Smoking cessation; exercise 30 minutes per day,  5 days per week or 150 minutes weekly; sodium reduction and avoidance of high salt foods such as processed meats, frozen meals and  fast foods.   Keeping a healthy weight/BMI can help with better BP control    BP acceptable for surgery. I recommend monitoring BP during perioperative period as uncontrolled pain can elevate blood pressure.         Polycystic kidney disease  Followed per [facility]plant [facility], on wait list.    ESRD (end stage renal disease) on dialysis  Dialysis days are:   MWF  Receives treatment at   [facility]  Nephrologist:   Dr. Chang [facility]      Patient has made arrangements for dialysis before surgery Yes- will follow regular scheduled  Dialysis access: RUE AVF    Tylenol as needed for pain   I  suggest monitoring renal function, in put and out put status corby-operatively. I  suggest avoiding nephrotoxic medication including NSAIDs, COX2 inhibitors, intravenous contrast agent,avoiding hypotension to prevent further renal impairment.     Mild intermittent asthma without complication  Not currently treated   ; controlled.  Last episode: 2002, although reports intermittent wheezing.  Denies hospitalizations/intubations  Sats today: 100%    I recommend possible use of inhaled bronchodilators if patient develops perioperative bronchospasm.        GERD (gastroesophageal reflux disease)  Currently being treated with Nexium prn.   Reports occasional difficulty swallowing  Weight loss can help with symptoms as well   Smoking cessation is recommended  Suggest follow-up with PCP    Snoring  Denies LINDY. Possible sleep apnea: recommend caution with sedating medication in the perioperative period.   Sleep Study referral declined     Anxiety and depression  Reports  history; not currently treated  Denies suicidal or homicidal ideations  Will refer to Psych for further eval and treatment    Papular rash  Reports rash to neck and right inner thigh- comes and goes.  Taking Benadryl for itching  Recommend OTC hydrocortisone cream 1%  Will refer to Derm for further eval    Macrocytosis without anemia  Chronic  No history of B12 or folate deficiency ; denies alcohol use  Denies thyroid issues  Last B12 level normal in April 2023  Folate level normal January 2024  Suggest continued follow-up with PCP for possible referral to Hematology for further eval            Discussion/Management of Perioperative Care    Thromboembolic prophylaxis (VTE) Care: Risk factors for thrombosis include: obesity, surgical procedure, and tobacco use.  I recommend prophylaxis of thromboembolism with the use of compression stockings/pneumatic devices, and/or pharmacologic agents. The benefits should outweigh the risks for pharmacologic prophylaxis in the perioperative period. I also encourage early ambulation if not contraindicated during the post-operative period.    Risk factors for post-operative pulmonary complications include:HTN, pre-existing renal disease, surgery > 3 hours, and tobacco use. To reduce the risk of pulmonary complications, prophylactic recommendations include: smoking cessation, incentive spirometry use/deep breathing, end tidal carbon dioxide monitoring, and pain control.    Risk factors for renal complications include: Pre-existing renal disease and HTN. To reduce the risk of postoperative renal complications, I recommend the patient maintain adequate hydration.  Avoid/reduce NSAIDS and BATES-2 inhibitors use as well as IV contrast for renal protection.    I recommend the use of appropriate prophylactic antibiotics to reduce the risk of surgical site infections.      The patient is at an increased risk for urinary retention due to : urological procedure. I recommend to avoid/decrease  the use of benzodiazepines, anticholinergics, and Benadryl in the perioperative period. I also recommend using opioids for the shortest period of time if possible.          This visit was focused on Preoperative evaluation, Perioperative Medical management, complication reduction plans. I suggest that the patient follows up with primary care or relevant sub specialists for ongoing health care.    I appreciate the opportunity to be involved in this patients care. Please feel free to contact me if there were any questions about this consultation.        I spent a total of 67 minutes on the day of the visit.This includes face to face time and non-face to face time preparing to see the patient (e.g., review of tests), obtaining and/or reviewing separately obtained history, documenting clinical information in the electronic or other health record, independently interpreting results and communicating results to the patient/family/caregiver, or care coordinator.       Patient is pending optimization CXR 9/17 9/12/24: Neurosurgery's note is still pending      Dennis Friend NP  Perioperative Medicine  [facility]

## 2024-09-10 NOTE — ASSESSMENT & PLAN NOTE
Not currently treated   ; controlled.  Last episode: 2002, although reports intermittent wheezing.  Denies hospitalizations/intubations  Sats today: 100%    I recommend possible use of inhaled bronchodilators if patient develops perioperative bronchospasm.

## 2024-09-10 NOTE — HPI
This is a 44 y.o. female  who presents today for a preoperative evaluation in preparation for left robotic nephrectomy.  Surgery is indicated for left renal mass .   Patient is new to me.  The history has been obtained by speaking with the patient and reviewing the electronic medical record and/or outside health information. Significant health conditions for the perioperative period are discussed below in assessment and plan.   Denies any new symptoms before surgery.

## 2024-09-10 NOTE — ASSESSMENT & PLAN NOTE
Reports history; not currently treated  Denies suicidal or homicidal ideations  Will refer to Psych for further eval and treatment

## 2024-09-10 NOTE — ASSESSMENT & PLAN NOTE
Denies LINDY. Possible sleep apnea: recommend caution with sedating medication in the perioperative period.   Sleep Study referral declined

## 2024-09-10 NOTE — PROGRESS NOTES
"Neurosurgery  Established Patient    Scribe Attestation  I, Bettie Palacios, attest that this documentation has been prepared under the direction and in the presence of Peterson High MD.    SUBJECTIVE:     History of Present Illness:  Ade Silva is a 45 y/o woman, PMHx of PCKD on HD and known basilar artery aneurysm s/p coil done at an outside hospital in 2016, that presents for f/u on imaging after last being seen pedro Franklin on 07/09/24. According to Noemi note, she had been lost to f/u after aneurysm was treated in 2016 and has not had any imaging done since. Today she reports She has recently relocated to the area and would like to establish follow up. She gets occasional headaches but denies any change in pattern or frequency. Additionally denies seizures, nausea, vomiting, or any episodes of focal weakness or sensory loss.     Review of patient's allergies indicates:   Allergen Reactions    Aspirin Hives     And vomiting    Penicillins Hives     Throat swelling    Adhesive Rash     Adhesive/silk tape (type found on band aides). Can only use "Paper Tape"    Butalbital-acetaminophen-caff Nausea And Vomiting and Other (See Comments)     Dizziness (made pt feel sick)    Latex, natural rubber Rash    Nickel Rash    Tomato Nausea And Vomiting       Current Outpatient Medications   Medication Sig Dispense Refill    acetaminophen (TYLENOL) 650 MG TbSR Take 1 tablet (650 mg total) by mouth every 6 (six) hours as needed (pain). 30 tablet 1    esomeprazole (NEXIUM) 20 MG capsule Take 20 mg by mouth before breakfast.      gabapentin (NEURONTIN) 100 MG capsule Take one 100 mg capsule three times a week following dialysis. 24 capsule 0    gabapentin (NEURONTIN) 300 MG capsule Take 1 capsule (300 mg total) by mouth three times per week after dialysis 12 capsule 0    loratadine (CLARITIN ORAL) Take 1 tablet by mouth daily as needed (Allergies).      nicotine (NICODERM CQ) 21 mg/24 hr Place 1 patch onto the skin once " "daily. Please dispense only clear patches , patient has a tape allergy. (Patient not taking: Reported on 8/20/2024) 14 patch 0    ondansetron (ZOFRAN) 4 MG tablet Take 1 tablet (4 mg total) by mouth every 6 (six) hours. (Patient not taking: Reported on 8/20/2024) 20 tablet 0    sodium zirconium cyclosilicate (LOKELMA) 10 gram packet Take 1 packet (10 g total) by mouth 2 (two) times a day. Mix entire contents of packet(s) into drinking glass containing 3 tablespoons of water; stir well and drink immediately. Add water and repeat until no powder remains to receive entire dose. (Patient not taking: Reported on 8/20/2024) 30 packet 3    sucroferric oxyhydroxide (VELPHORO) 500 mg Chew Break or dissolve 1 tablet by mouth with each meal and snack. Do not exceed 6 tablets per day. 180 tablet 11     No current facility-administered medications for this visit.     Facility-Administered Medications Ordered in Other Visits   Medication Dose Route Frequency Provider Last Rate Last Admin    0.9%  NaCl infusion   Intravenous Continuous Mel Calvillo MD        ondansetron disintegrating tablet 8 mg  8 mg Oral Q8H PRN Mel Calvillo MD           Past Medical History:   Diagnosis Date    Anxiety     Asthma     Autosomal dominant polycystic kidney disease     Brain aneurysm     Depression     Encounter for blood transfusion     GERD (gastroesophageal reflux disease)     Hypertension     Seizures     as a child (age 7-8); " stress -related"     Past Surgical History:   Procedure Laterality Date    AV FISTULA PLACEMENT Right 09/14/2023    Procedure: CREATION, AV FISTULA, radial cephalic;  Surgeon: MELISSA Loera II, MD;  Location: [facility] OR [address] [place] [address];  Service: Vascular;  Laterality: Right;    AV FISTULA PLACEMENT Right 10/05/2023    Procedure: CREATION, AV FISTULA - brachial;  Surgeon: MELISSA Loera II, MD;  Location: [facility] OR [address] [place] [address];  Service: Vascular;  Laterality: Right;  confirmed placement with doppler    CEREBRAL ANEURYSM " REPAIR      coiling    CYSTOSCOPY      age 13    EMBOLIZATION Right 04/23/2024    Procedure: EMBOLIZATION, BLOOD VESSEL FISTULA;  Surgeon: MELISSA Loera II, MD;  Location: [facility] OR 2ND FLR;  Service: Vascular;  Laterality: Right;    FISTULOGRAM Right 04/23/2024    Procedure: Fistulogram;  Surgeon: MELISSA Loera II, MD;  Location: [facility] OR 2ND FLR;  Service: Vascular;  Laterality: Right;  CONTRAST: 68ML, FLUORO TIME: 17.9, mGy: 45.11, Gycm2: 5.9548    HYSTEROSCOPY N/A 07/25/2024    Procedure: HYSTEROSCOPY;  Surgeon: Jazz Amador MD;  Location: [facility] OR;  Service: OB/GYN;  Laterality: N/A;    INCISION OF VULVA N/A 07/25/2024    Procedure: INCISION, VULVA;  Surgeon: Jazz Amador MD;  Location: [facility];  Service: OB/GYN;  Laterality: N/A;    INSERTION OF TUNNELED CENTRAL VENOUS HEMODIALYSIS CATHETER Right 05/03/2023    Procedure: Insertion, Catheter, Central Venous, Hemodialysis;  Surgeon: Priya Grimm MD;  Location: [facility] CATH LAB;  Service: Interventional Nephrology;  Laterality: Right;    LOOP ELECTROSURGICAL EXCISION PROCEDURE (LEEP) N/A 07/25/2024    Procedure: LEEP (LOOP ELECTROSURGICAL EXCISION PROCEDURE);  Surgeon: Jazz Amador MD;  Location: [facility];  Service: OB/GYN;  Laterality: N/A;    REMOVAL OF HEMODIALYSIS CATHETER  05/03/2023    Procedure: REMOVAL, CATHETER, HEMODIALYSIS;  Surgeon: Priya Grimm MD;  Location: [facility] CATH LAB;  Service: Interventional Nephrology;;    THERMAL ABLATION OF ENDOMETRIUM N/A 07/25/2024    Procedure: ABLATION, ENDOMETRIUM, THERMAL;  Surgeon: Jazz Amador MD;  Location: [facility];  Service: OB/GYN;  Laterality: N/A;     Family History       Problem Relation (Age of Onset)    Diabetes Brother    Fibromyalgia Mother    Heart disease Father    Polycystic kidney disease Father, Sister, Sister    Stroke Father          Social History     Socioeconomic History    Marital status: Single   Tobacco Use    Smoking status: Every Day     Average  packs/day: 1 pack/day for 28.0 years (28.0 ttl pk-yrs)     Types: Cigarettes, Cigars     Start date: 1994     Passive exposure: Never    Smokeless tobacco: Never    Tobacco comments:     Smoke 3-5 black and mild cigars a day    Substance and Sexual Activity    Alcohol use: Not Currently    Drug use: Never    Sexual activity: Not Currently   Social History Narrative    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     Social Determinants of Health     Financial Resource Strain: Low Risk  (12/19/2023)    Overall Financial Resource Strain (CARDIA)     Difficulty of Paying Living Expenses: Not very hard   Food Insecurity: No Food Insecurity (12/19/2023)    Hunger Vital Sign     Worried About Running Out of Food in the Last Year: Never true     Ran Out of Food in the Last Year: Never true   Transportation Needs: No Transportation Needs (12/19/2023)    PRAPARE - Transportation     Lack of Transportation (Medical): No     Lack of Transportation (Non-Medical): No   Physical Activity: Insufficiently Active (12/19/2023)    Exercise Vital Sign     Days of Exercise per Week: 2 days     Minutes of Exercise per Session: 10 min   Stress: Stress Concern Present (12/19/2023)    [employer] - Occupational Stress Questionnaire     Feeling of Stress : To some extent   Housing Stability: Low Risk  (12/19/2023)    Housing Stability Vital Sign     Unable to Pay for Housing in the Last Year: No     Number of Places Lived in the Last Year: 2     Unstable Housing in the Last Year: No       Review of Systems   All other systems reviewed and are negative.      OBJECTIVE:     Vital Signs     There is no height or weight on file to calculate BMI.    Neurosurgery Physical Exam  General: no acute distress  Head: Non-traumatic, normocephalic  Eyes: Pupils equal, EOMI  Neck: Supple, normal ROM, no tenderness to palpation  CVS: Normal rate and rhythm, distal pulses present  Pulm: Symmetric expansion, no respiratory distress  GI: Abdomen  nondistended, nontender    MSK: Moves all extremities without restriction, atraumatic  Skin: Dry, intact  Psych: Normal thought content and cognition    Neuro:  Alert, awake, oriented, to self, place, time  Language:  Speech is fluent, goal directed without any noted dysarthria or aphasia    Cranial nerves:    CNII-XII: Intact on fine exam,   Pupils equal round react to light,   Extraocular muscles are intact  V1 to V3 is intact to light touch,   no facial asymmetry,   Hearing is intact to finger rub and voice  tongue/uvula/palate midline,   shoulder shrug equal,     No pronator drift    Extremities:  Motor:  Upper Extremity    Deltoid Triceps Biceps Wrist  Extension Wrist  Flexion Interosseous   R 5/5 5/5 5/5 5/5 5/5 5/5   L 5/5 5/5 5/5 5/5 5/5 5/5       Thumb   Abduction Thumb  ADDuction Finger  Flexion Finger  Extension     R 5/5 5/5 5/5 5/5     L 5/5 5/5 5/5 5/5        Lower Extremity     Iliopsoas Quadriceps Hamstring Plantarflexion Dorsiflexion EHL   R 5/5 5/5 5/5 5/5 5/5 5/5   L 5/5 5/5 5/5 5/5 5/5 5/5       Reflexes:     DTR: 2+ biceps    2+ triceps   2+ brachioradialis   2+ patellar  2+ Achilles     Camargo's: Negative     Babinski's: Negative     Clonus: Negative     Sensory:      Sensation intact to light touch, temperature sensation, vibration, pinprick     Coordination:      Coordination intact throughout, no dysmetria, normal rapid alternating movements, no dysdiadochokinesia  Cerebellar:  Normal finger-to-nose, normal heel-to-shin  Cervical spine:  No midline cervical tenderness, negative Lhermitte, negative Spurling  Thoracic spine:  No midline thoracic tenderness  Lumbar spine:  No midline lumbar tenderness     Diagnostic Results:  I personally reviewed the patient's Diagnostic Imaging.     CTA Head and Neck 08/29/24  CTA head: Obscuration of the distal basilar artery and tip with large endovascular coil pack. Evaluation for residual recurrent aneurysm limited by the artifact and lack of prior  imaging for comparison. Otherwise unremarkable CTA head     CTA neck: No significant arterial stenosis throughout the neck.     CT head: Allowing for artifact from presumed basilar tip endovascular coil pack no evidence for definite acute intracranial hemorrhage or hydrocephalus.     MRA Brain WO Cont 08/29/24  There is tubular flow related enhancement at the basilar tip along the ventral aspect of the aneurysmal coil pack concerning for residual aneurysmal flow.  Evaluation overall limited by lack of prior imaging for comparison.    ASSESSMENT/PLAN:   45 y/o female with history of PCKD (on BRUNO) and known basilar artery aneurysm s/p coil (outside hospital, 2016).     Patient presents with known cerebral aneurysms without any follow-up  Discussed imaging results of CTA Head and Neck revealing obscuration of the distal basilar artery and tip with large endovascular coil pack. Evaluation for residual recurrent aneurysm limited by the artifact and lack of prior imaging for comparison. Otherwise unremarkable CTA head.   Discussed imaging results of MRA Brain revealing tubular flow related enhancement at the basilar tip along the ventral aspect of the aneurysmal coil pack concerning for residual aneurysmal flow.  Evaluation overall limited by lack of prior imaging for comparison.   After discussion with the patient, I recommend diagnostic cerebral angiogram .     Thank you so very much for allowing me to participate in the care of this patient.  Please feel free to call any questions, comments, or concerns.     Peterson High MD,MSc  Department of Neurosurgery   Department of Radiology  Department of Neurology  [facility]  [facility]    [facility]   [employer] [facility]     Total time spent in counseling and discussion about further management options including relevant lab work, treatment,  prognosis,  medications and intended side effects was more than 60 minutes. More than 50 % of the time was spent in counseling and coordination of care.  I spent a total of 60 minutes on the day of the visit.This includes face to face time and non-face to face time preparing to see the patient (eg, review of tests), Obtaining and/or reviewing separately obtained history, Documenting clinical information in the electronic or other health record, Independently interpreting resultsand communicating results to the patient/family/caregiver, or Care coordination.     Nikhil Attestation  I, Dr. Peterson High personally performed the services described in this documentation. All medical record entries made by the scribe, Bettie Palacios, were at my direction and in my presence.  I have reviewed the chart and agree that the record reflects my personal performance and is accurate and complete.

## 2024-09-10 NOTE — ASSESSMENT & PLAN NOTE
Currently being treated with Nexium prn.   Reports occasional difficulty swallowing  Weight loss can help with symptoms as well   Smoking cessation is recommended  Suggest follow-up with PCP

## 2024-09-10 NOTE — OUTPATIENT SUBJECTIVE & OBJECTIVE
Outpatient Subjective & Objective      Chief Complaint: Preoperative evaulation, perioperative medical management, and complication reduction plan.     Functional Capacity: walked to POC without CP/SOB.       Anesthesia issues: None    Difficulty mouth opening: No    Steroid use in the last 12 months:  No    Dental Issues: loose teeth- most of front teeth     Family anesthesia difficulty: None       Last monthly period: June 2024    Family Hx of Thrombosis: None    Past Medical History:   Diagnosis Date    Asthma     Autosomal dominant polycystic kidney disease     Brain aneurysm     Encounter for blood transfusion     Hypertension          No past medical history pertinent negatives.      Past Surgical History:   Procedure Laterality Date    AV FISTULA PLACEMENT Right 09/14/2023    Procedure: CREATION, AV FISTULA, radial cephalic;  Surgeon: MELISSA Loera II, MD;  Location: [facility] OR [facility] FLR;  Service: Vascular;  Laterality: Right;    AV FISTULA PLACEMENT Right 10/05/2023    Procedure: CREATION, AV FISTULA - brachial;  Surgeon: MELISSA Loera II, MD;  Location: [facility] OR [facility] FLR;  Service: Vascular;  Laterality: Right;  confirmed placement with doppler    CEREBRAL ANEURYSM REPAIR      coiling    EMBOLIZATION Right 4/23/2024    Procedure: EMBOLIZATION, BLOOD VESSEL FISTULA;  Surgeon: MELISSA Loera II, MD;  Location: [facility] OR [facility] FLR;  Service: Vascular;  Laterality: Right;    FISTULOGRAM Right 4/23/2024    Procedure: Fistulogram;  Surgeon: MELISSA Loera II, MD;  Location: [facility] OR 2ND FLR;  Service: Vascular;  Laterality: Right;  CONTRAST: 68ML, FLUORO TIME: 17.9, mGy: 45.11, Gycm2: 5.9548    HYSTEROSCOPY N/A 7/25/2024    Procedure: HYSTEROSCOPY;  Surgeon: Jazz Amador MD;  Location: [facility] [place] OR;  Service: OB/GYN;  Laterality: N/A;    INCISION OF VULVA N/A 7/25/2024    Procedure: INCISION, VULVA;  Surgeon: Jazz Amador MD;  Location: [facility] [place] OR;  Service: OB/GYN;  Laterality: N/A;    INSERTION  OF TUNNELED CENTRAL VENOUS HEMODIALYSIS CATHETER Right 05/03/2023    Procedure: Insertion, Catheter, Central Venous, Hemodialysis;  Surgeon: Priya Grimm MD;  Location: [facility] CATH LAB;  Service: Interventional Nephrology;  Laterality: Right;    LOOP ELECTROSURGICAL EXCISION PROCEDURE (LEEP) N/A 7/25/2024    Procedure: LEEP (LOOP ELECTROSURGICAL EXCISION PROCEDURE);  Surgeon: Jazz Amador MD;  Location: [facility] OR;  Service: OB/GYN;  Laterality: N/A;    REMOVAL OF HEMODIALYSIS CATHETER  05/03/2023    Procedure: REMOVAL, CATHETER, HEMODIALYSIS;  Surgeon: Priya Grimm MD;  Location: [facility] CATH LAB;  Service: Interventional Nephrology;;    THERMAL ABLATION OF ENDOMETRIUM N/A 7/25/2024    Procedure: ABLATION, ENDOMETRIUM, THERMAL;  Surgeon: Jazz Amador MD;  Location: [facility] OR;  Service: OB/GYN;  Laterality: N/A;       Review of Systems   Constitutional:  Negative for chills, fatigue, fever and unexpected weight change.   HENT:  Positive for trouble swallowing (occasional with GERD). Negative for congestion, hearing loss, rhinorrhea, sore throat and tinnitus.    Eyes:  Negative for visual disturbance.   Respiratory:  Positive for wheezing (occasional). Negative for cough, chest tightness and shortness of breath.         STOP BANG risk factors:  Snoring   Cardiovascular:  Positive for palpitations. Negative for chest pain and leg swelling.   Gastrointestinal:  Negative for constipation and diarrhea.        Denies Fatty liver, Hepatitis   Genitourinary:  Negative for decreased urine volume, difficulty urinating, dysuria, frequency, hematuria and urgency.        Minimal urine   Musculoskeletal:  Positive for back pain (intermittent) and neck pain. Negative for arthralgias and neck stiffness.   Skin:  Positive for rash (neck/right inner thigh). Negative for wound.   Neurological:  Positive for dizziness (occasional symptoms) and numbness (right arm> left arm). Negative for syncope, weakness and headaches.  "  Hematological:  Bruises/bleeds easily.   Psychiatric/Behavioral:  Negative for sleep disturbance and suicidal ideas.               VITALS  Visit Vitals  BP (!) 110/57 (BP Location: Left arm, Patient Position: Sitting)   Pulse 80   Temp 98.2 °F (36.8 °C) (Oral)   Ht 5' 11" (1.803 m)   Wt 99 kg (218 lb 4.1 oz)   SpO2 100%   BMI 30.44 kg/m²          Physical Exam  Vitals reviewed.   Constitutional:       General: She is not in acute distress.     Appearance: She is well-developed. She is obese.   HENT:      Head: Normocephalic.      Nose: Nose normal.      Mouth/Throat:      Pharynx: No oropharyngeal exudate.   Eyes:      General:         Right eye: No discharge.         Left eye: No discharge.      Conjunctiva/sclera: Conjunctivae normal.      Pupils: Pupils are equal, round, and reactive to light.   Neck:      Thyroid: No thyromegaly.      Vascular: No carotid bruit or JVD.      Trachea: No tracheal deviation.   Cardiovascular:      Rate and Rhythm: Normal rate and regular rhythm.      Pulses:           Carotid pulses are 2+ on the right side and 2+ on the left side.       Dorsalis pedis pulses are 2+ on the right side and 2+ on the left side.        Posterior tibial pulses are 2+ on the right side and 2+ on the left side.      Heart sounds: Normal heart sounds. No murmur heard.     Arteriovenous access: Right arteriovenous access is present.  Pulmonary:      Effort: Pulmonary effort is normal. No respiratory distress.      Breath sounds: Normal breath sounds. No stridor. No wheezing, rhonchi or rales.   Abdominal:      General: Bowel sounds are normal. There is no distension.      Palpations: Abdomen is soft.      Tenderness: There is no abdominal tenderness. There is no guarding.   Musculoskeletal:      Cervical back: Normal range of motion. No pain with movement.      Right lower leg: No edema.      Left lower leg: No edema.   Lymphadenopathy:      Cervical: No cervical adenopathy.   Skin:     General: Skin is " warm and dry.      Capillary Refill: Capillary refill takes less than 2 seconds.      Findings: Ecchymosis and rash present. No erythema. Rash is papular.          Neurological:      Mental Status: She is alert and oriented to person, place, and time.   Psychiatric:         Behavior: Behavior normal.          Significant Labs:  Lab Results   Component Value Date    WBC 8.06 09/10/2024    HGB 14.7 09/10/2024    HCT 45.6 09/10/2024     09/10/2024    CHOL 153 10/12/2023    TRIG 79 10/12/2023    HDL 52 10/12/2023    ALT 7 09/04/2024    AST 8 (L) 07/25/2024     09/04/2024    K 4.2 09/04/2024    CL 99 09/04/2024    CREATININE 12.16 (H) 09/04/2024    BUN 51 (H) 07/25/2024    CO2 27 07/25/2024    INR 1.0 10/12/2023           EKG:   Results for orders placed or performed during the hospital encounter of 09/10/24   EKG 12-lead    Collection Time: 09/10/24 10:26 AM   Result Value Ref Range    QRS Duration 82 ms    OHS QTC Calculation 459 ms    Narrative    Test Reason : Z01.818,    Vent. Rate : 056 BPM     Atrial Rate : 056 BPM     P-R Int : 144 ms          QRS Dur : 082 ms      QT Int : 476 ms       P-R-T Axes : 057 022 049 degrees     QTc Int : 459 ms    Sinus bradycardia  Possible Left atrial enlargement    Abnormal ECG  When compared with ECG of 24-OCT-2023 08:45,    Criteria for Anterior infarct are no longer Present  Criteria for Anterolateral infarct are no longer Present  T wave amplitude has increased in Anterior leads  Confirmed by CANDIDO ELLISON MD ([id number]) on 9/10/2024 11:41:31 AM    Referred By: HARISH BLACK           Confirmed By:CANDIDO ELLISON MD       2D ECHO:  TTE:  Results for orders placed or performed during the hospital encounter of 10/24/23   Echo   Result Value Ref Range    Ascending aorta 3.11 cm    STJ 3.24 cm    AV mean gradient 5 mmHg    Ao peak kate 1.37 m/s    Ao VTI 32.52 cm    IVS 0.77 0.6 - 1.1 cm    LA size 3.24 cm    Left Atrium Major Axis 4.34 cm    Left Atrium Minor Axis 3.88 cm  "   LVIDd 4.37 3.5 - 6.0 cm    LVIDs 2.95 2.1 - 4.0 cm    LVOT diameter 2.25 cm    LVOT peak VTI 28.53 cm    Posterior Wall 0.68 0.6 - 1.1 cm    MV Peak A Shun 0.66 m/s    E wave deceleration time 172.36 msec    MV Peak E Shun 0.84 m/s    PV Peak D Shun 0.37 m/s    PV Peak S Shun 0.55 m/s    RA Major Axis 4.65 cm    RA Width 3.11 cm    RVDD 3.37 cm    Sinus 3.14 cm    TAPSE 2.14 cm    LA WIDTH 4.36 cm    MV stenosis pressure 1/2 time 49.98 ms    LV Diastolic Volume 86.41 mL    LV Systolic Volume 33.72 mL    LVOT peak shun 1.23 m/s    TDI LATERAL 0.14 m/s    TDI SEPTAL 0.12 m/s    LA volume (mod) 51.42 cm3    MV "A" wave duration 8.56 msec    LV LATERAL E/E' RATIO 6.00 m/s    LV SEPTAL E/E' RATIO 7.00 m/s    FS 32 %    LA volume 49.20 cm3    LV mass 95.19 g    ZLVIDD -2.23     ZLVIDS -1.04     Left Ventricle Relative Wall Thickness 0.31 cm    AV valve area 3.49 cm²    AV Velocity Ratio 0.90     AV index (prosthetic) 0.88     MV valve area p 1/2 method 4.40 cm2    E/A ratio 1.27     Mean e' 0.13 m/s    Pulm vein S/D ratio 1.49     LVOT area 4.0 cm2    LVOT stroke volume 113.38 cm3    AV peak gradient 8 mmHg    E/E' ratio 6.46 m/s    LV Systolic Volume Index 17.5 mL/m2    LV Diastolic Volume Index 44.77 mL/m2    LA Volume Index 25.5 mL/m2    LV Mass Index 49 g/m2    LA Volume Index (Mod) 26.6 mL/m2    LISA by Velocity Ratio 3.57 cm²    BSA 1.94 m2    Est. RA pres 3 mmHg    Narrative      Left Ventricle: The left ventricle is normal in size. Normal wall   thickness. Normal wall motion. There is normal systolic function with a   visually estimated ejection fraction of 60 - 65%. There is normal   diastolic function.    Right Ventricle: Normal right ventricular cavity size. Wall thickness   is normal. Right ventricle wall motion  is normal. Systolic function is   normal.    IVC/SVC: Normal venous pressure at 3 mmHg.    PA systolic pressure could not be calculated because of no tricuspid   regurgitation, but RVOT acceleration " times are consistent with normal PA   pressure.             Imaging   C-Spine MRI 8/27/24  FINDINGS:  Alignment: Trace retrolisthesis of C5 on C6.  Normal sagittal alignment is otherwise maintained.     Vertebrae: Normal marrow signal. No fracture.     Discs: Mild disc height loss at C5-C6.     Cord: Normal.     Skull base and craniocervical junction: Normal.     Degenerative findings:     C2-C3: Small posterior disc osteophyte complex mildly asymmetric to the left resulting in mild left neural foraminal narrowing.     C3-C4: No spinal canal stenosis or neural foraminal narrowing.     C4-C5: No spinal canal stenosis or neural foraminal narrowing.     C5-C6: Posterior disc osteophyte complex results in moderate central canal stenosis with deformity of the cord but no cord signal changes.  Uncovertebral spurring and posterior disc osteophyte complex results in moderate left and mild right neural foraminal narrowing.     C6-C7: Small posterior disc bulge minimally effaces the ventral thecal sac and results in mild right neural foraminal narrowing.     C7-T1: No spinal canal stenosis or neural foraminal narrowing.     Paraspinal muscles & soft tissues: Unremarkable.        Impression:     1. Mild degenerative change of the cervical spine as detailed above, most notable at C5-C6 where a posterior disc osteophyte complex and uncovertebral spurring result in moderate central canal stenosis and moderate left and mild right neural foraminal narrowing.     Electronically signed by resident: Farhan Jsohi  Date:                                            08/27/2024  Time:                                           08:30     Electronically signed by:Reinier Whitney Jr  Date:                                            08/27/2024  Time:                                           10:55      MRA Brain 8/29/24  Impression:     There is tubular flow related enhancement at the basilar tip along the ventral aspect of the aneurysmal  coil pack concerning for residual aneurysmal flow.  Evaluation overall limited by lack of prior imaging for comparison.  Clinical correlation and comparison with prior imaging advised and consideration for further evaluation with conventional angiography.     Electronically signed by resident: Alexander Worthy  Date:                                            08/29/2024  Time:                                           13:25     Electronically signed by:Lonnie Anne DO  Date:                                            08/29/2024  Time:                                           14:10      CTA Head and Neck 8/29/24  FINDINGS:  CT head: Scatter artifact from endovascular coil pack along the region expected basilar tip distorts the examination. Within limits of the study there is no evidence for definite acute intracranial hemorrhage. Ventricles are normal in size without hydrocephalus. No midline shift or mass effect. Allowing for artifacts and CT technique no definite abnormal parenchymal enhancement. Moderate opacities maxillary antra bilaterally suggestive for mucous retention cyst partial opacification left mastoid air cells.     CTA head: Bilateral distal cervical, petrous, cavernous and supraclinoid segments of the ICAs as well as the anterior and middle cerebral arteries are patent without significant focal stenosis or definite aneurysm with distortion of the anterior middle cerebral arteries as well as the supraclinoid ICAs secondary to metal artifacts. Subtle aneurysm could be obscured by the degree of artifact.     Posterior circulation: Distal left vertebral artery is dominant. Hypoplastic distal right vertebral artery. Basilar artery is identified in its proximal mid aspect being patent. There is obscuration of the distal basilar artery secondary to endovascular coil pack.     CTA neck: Slight motion distortion of the examination. Origin the great vessels from the arch are within normal limits. Origin of the  vertebral arteries from the respective subclavian arteries are within normal limits. Left vertebral artery is slightly dominant. Vertebral arteries are patent throughout their course     Bilateral common carotid arteries, carotid bifurcations and extracranial portions of the internal carotid arteries are patent without significant focal stenosis. Less than 50% proximal ICA stenosis by NASCET criteria     No definite focal lesion throughout the pharynx/larynx     No focal parotid, submandibular or thyroid gland lesion     No evidence for lymphadenopathy throughout the neck by size criteria     Subpleural emphysematous change in the visualized lungs. No focal consolidation the visualized lungs     No evidence for acute fracture or traumatic subluxation cervical spine.     Impression:     CTA head: Obscuration of the distal basilar artery and tip with large endovascular coil pack. Evaluation for residual recurrent aneurysm limited by the artifact and lack of prior imaging for comparison.     Otherwise unremarkable CTA head as detailed above     CTA neck: No significant arterial stenosis throughout the neck.     CT head: Allowing for artifact from presumed basilar tip endovascular coil pack no evidence for definite acute intracranial hemorrhage or hydrocephalus.     Please see above for additional details.     Electronically signed by resident: Herberth Best  Date:                                            08/29/2024  Time:                                           09:18     Electronically signed by:Lonnie Anne DO  Date:                                            08/29/2024  Time:                                           10:44      Active Cardiac Conditions: None          Revised Cardiac Risk Index   High -Risk Surgery  Intraperitoneal; Intrathoracic; suprainguinal vascular Yes- + 1 No- 0- robotic   History of Ischemic Heart Disease   (Hx of MI/positive exercise test/current chest pain due to ischemia/use of nitrate  therapy/EKG with pathological Q waves) Yes- + 1 No- 0   History of CHF  (Pulmonary edema/bilateral rales or S3 gallop/PND/CXR showing pulmonary vascular redistribution) Yes- + 1 No- 0   History of CVA   (Prior stroke or TIA) Yes- + 1 No- 0   Pre-operative treatment with insulin Yes- + 1 No- 0   Pre-operative creatinine > 2mg/dl Yes- + 1 No- 0   Total: 1      Risk Status:  Estimated risk of cardiac complications after non-cardiac surgery using the Revised Cardiac Risk Index for Preoperative risk is 6.0 %      ARISCAT (Canet) risk index: Low: 1.6% risk of post-op pulmonary complications.    American Society of Anesthesiologists Physical Status classification (ASA): 3    Jayshree respiratory failure index: 0.5 %             Outpatient Subjective & Objective

## 2024-09-10 NOTE — PROGRESS NOTES
IRB# [id number]  [facility]al Protocol  Date: 9 SEP 2024                       Patient was consented in clinic for a left Xi robotic nephrectomy to occur on 19 SEP 2024.   Dr. Ernesto Maki conducted the informed consent process, cosigned by Dr. Valentin Maldonado who will conduct the nephrectomy.     We will plan to capture tissue samples from:  -normal kidney  -kidney tumor    See media attached to the H&P note for the signed ICF.    Mary Singleton, [facility]

## 2024-09-10 NOTE — ASSESSMENT & PLAN NOTE
Reports rash to neck and right inner thigh- comes and goes.  Taking Benadryl for itching  Recommend OTC hydrocortisone cream 1%  Will refer to Derm for further eval

## 2024-09-10 NOTE — ASSESSMENT & PLAN NOTE
Current BP  at goal today.    Not currently treated since Dialysis- readings have been low or normal.        Lifestyle changes to reduce systolic BP:  Smoking cessation; exercise 30 minutes per day,  5 days per week or 150 minutes weekly; sodium reduction and avoidance of high salt foods such as processed meats, frozen meals and  fast foods.   Keeping a healthy weight/BMI can help with better BP control    BP acceptable for surgery. I recommend monitoring BP during perioperative period as uncontrolled pain can elevate blood pressure.

## 2024-09-12 ENCOUNTER — TELEPHONE (OUTPATIENT)
Dept: OBSTETRICS AND GYNECOLOGY | Facility: CLINIC | Age: 44
End: 2024-09-12

## 2024-09-12 ENCOUNTER — CLINICAL SUPPORT (OUTPATIENT)
Dept: SMOKING CESSATION | Facility: CLINIC | Age: 44
End: 2024-09-12
Payer: MEDICARE

## 2024-09-12 DIAGNOSIS — N85.01 COMPLEX ENDOMETRIAL HYPERPLASIA WITHOUT ATYPIA: Primary | ICD-10-CM

## 2024-09-12 DIAGNOSIS — D01.3 AIN III (ANAL INTRAEPITHELIAL NEOPLASIA III): ICD-10-CM

## 2024-09-12 DIAGNOSIS — F17.200 NICOTINE DEPENDENCE: Primary | ICD-10-CM

## 2024-09-12 PROBLEM — D75.89 MACROCYTOSIS WITHOUT ANEMIA: Status: ACTIVE | Noted: 2024-09-12

## 2024-09-12 PROCEDURE — 99403 PREV MED CNSL INDIV APPRX 45: CPT | Mod: 95,TXP,, | Performed by: INTERNAL MEDICINE

## 2024-09-12 NOTE — ASSESSMENT & PLAN NOTE
Chronic  No history of B12 or folate deficiency ; denies alcohol use  Denies thyroid issues  Last B12 level normal in April 2023  Folate level normal January 2024  Suggest continued follow-up with PCP for possible referral to Hematology for further eval

## 2024-09-12 NOTE — PROGRESS NOTES
Individual Follow-Up Form    9/12/2024    Quit Date:     Clinical Status of Patient: Outpatient    Length of Service: 45 minutes    Continuing Medication: no    Other Medications:      Target Symptoms: Withdrawal and medication side effects. The following were  rated moderate (3) to severe (4) on TCRS:  Moderate (3): crave/desire   Severe (4): none    Comments:  Patient continues to smoke 2 to 3 cigars a day. She notices that her biggest trigger is after meals and after smoking marijuana. Patient has not picked up her patches at the drugstore nor she has called the quit line for free patches. Again gave the patient the number and she states that she will call them as soon as her visit is over discussion continued about spacing the time between cigars apart and also delaying the morning cigar. Patient states that she wishes to do this in the day-to-day process. She forgets. Discussed with patient ways to remember by keeping her cigars in another location, obtaining her patches and using them, and move where she smokes. Patient states that she is motivated to quit and she realize she really needs to quit. We discussed the possibility of what could be interfering with her, obtaining her patches. Patient states that she plans to do a better job this week because she knows that she really has to quit for the health benefit. she is having surgery this week. She will have an nephrectomy and we discussed how healing is in paired when one smokes. Patient said she understands that and it is I wish to quit.  Reviewed coping strategies/habitual behavior/relapse prevention with patient.  Pt understands to call CTTS if anything is needed before the next visit.      Diagnosis: F17.210    Next Visit: 2 weeks

## 2024-09-13 ENCOUNTER — TELEPHONE (OUTPATIENT)
Dept: GYNECOLOGIC ONCOLOGY | Facility: CLINIC | Age: 44
End: 2024-09-13
Payer: MEDICARE

## 2024-09-13 NOTE — TELEPHONE ENCOUNTER
Spoke with pt via phone regarding referral to see Dr. Jacobs.    Pt requested a October appt and would not be available 10/03.     Pt confirmed next available October appt 10/17/24 8:30 AM with Dr. Jacobs located at [facility] gyn-onc location.    Pt verbalized understanding.

## 2024-09-13 NOTE — TELEPHONE ENCOUNTER
Patient did not come to her postop visit after surgery.  Pathology as listed below.  Patient needs to be seen but has surgery scheduled next week for renal mass.  Will need to be seen be Colon & Rectal Surgery and GYN Oncology.      1,2. Fragments of endometrial tissue showing changes compatible with complex hyperplasia without atypia arising in a polyp.  3. Cervical LEEP cone biopsy shows moderate to severe squamous dysplasia (TAYLOR 2-3). High-grade dysplasia involves an inked and cauterized edge of this LEEP cone biopsy in a section from the 6-12 o'clock quadrant.  4. Endocervical curettage shows detached fragments of squamous epithelium with moderate to severe dysplasia (TAYLOR 2-3).  5. Vulvar biopsy, pending dermatopathology consult with final report to follow.  6. Specimen labeled warty rectal lesion shows squamous mucosa with severe dysplasia (AIN 3).  No definitive evidence of invasion is identified.  Immunostain for p16 is strongly and diffusely positive.  Ki-67 stains full-thickness epithelial nuclei.  Both   controls stain appropriately.    Note: Specimens1 and 2 were reviewed by  who concurs with the diagnosis VC     Comment: Interp By Wai Singleton M.D., Signed on 08/01/2024 at 08:16  Supplemental Diagnosis      5. Vulvar biopsies show an intradermal melanocytic nevus and a fragment of benign skin compatible with a skin tag.  This specimen was seen by Dr. Russell from dermatopathology

## 2024-09-17 ENCOUNTER — HOSPITAL ENCOUNTER (OUTPATIENT)
Dept: RADIOLOGY | Facility: HOSPITAL | Age: 44
Discharge: HOME | End: 2024-09-17
Payer: MEDICARE

## 2024-09-17 DIAGNOSIS — Q61.3 POLYCYSTIC KIDNEY DISEASE: Chronic | ICD-10-CM

## 2024-09-17 PROCEDURE — 71046 X-RAY EXAM CHEST 2 VIEWS: CPT | Mod: 26,NTX,, | Performed by: RADIOLOGY

## 2024-09-17 PROCEDURE — 71046 X-RAY EXAM CHEST 2 VIEWS: CPT | Mod: TC,TXP

## 2024-09-18 ENCOUNTER — ANESTHESIA EVENT (OUTPATIENT)
Dept: SURGERY | Facility: HOSPITAL | Age: 44
End: 2024-09-18
Payer: MEDICARE

## 2024-09-18 ENCOUNTER — TELEPHONE (OUTPATIENT)
Dept: UROLOGY | Facility: CLINIC | Age: 44
End: 2024-09-18
Payer: MEDICARE

## 2024-09-18 NOTE — TELEPHONE ENCOUNTER
Instructions for Day of Surgery      Report To:    [facility], 2nd floor of [facility]    Arrival time: 1030    Please note:     If you are allergic to any medication please let your care team know the day of surgery.  If you have ever had adverse reaction to anesthesia please let your care team know the day of surgery   Please arrange transportation from the hospital, you will not be allowed to drive yourself home from surgery since you have been under sedation or anesthesia   Notify your doctor or care team of any diabetic medications that you take as these may need to be held or adjusted prior to surgery     Before Surgery     You should stop taking any blood thinners for up to 7 days depending on the blood thinner, please let care team know what you are taking so you can be directed when to stop certain medications.    You should hold any aspirin containing products for 7 days    Stop taking any herbal supplements 14 days prior to surgery     Night Before Surgery     Nothing to eat or drink after midnight the night before your surgery  Take medications as instructed the morning of surgery  No alcoholic beverages 24 hours prior to surgery

## 2024-09-18 NOTE — ANESTHESIA PREPROCEDURE EVALUATION
[facility]   Anesthesia Pre-Operative Evaluation        Patient Name: Ade Silva  YOB: 1980  MRN: [id number]    SUBJECTIVE:     Pre-operative Evaluation for Procedure(s) (LRB):  XI ROBOTIC NEPHRECTOMY (Left)  Nephrectomy (Left)     09/18/2024    Ade Silva is a 44 y.o. female with a PMHx significant for ESRD 2/2 PCKD on HD MW via RUE AVF, hx of cerebral aneurysm s/p coil, current smoker,  now with L renal mass.     Patient now presents for the above procedure(s)     Previous Airway:     Intubation:     Induction:  Intravenous    Intubated:  Postinduction    Mask Ventilation:  Easy mask    Attempts:  1    Attempted By:  CRNA    Method of Intubation:  Video laryngoscopy    Blade:  Ellis 3    Laryngeal View Grade: Grade I - full view of cords      Difficult Airway Encountered?: No      Complications:  None    Airway Device:  Oral endotracheal tube    Airway Device Size:  7.5    Style/Cuff Inflation:  Cuffed (inflated to minimal occlusive pressure)    Tube secured:  23    Secured at:  The lips    Placement Verified By:  Capnometry    Complicating Factors:  None    Findings Post-Intubation:  BS equal bilateral and atraumatic/condition of teeth unchanged  Notes:      Multiple loose teeth upper and lower front     Current Outpatient Medications   Medication Instructions    acetaminophen (TYLENOL) 650 mg, Oral, Every 6 hours PRN    esomeprazole (NEXIUM) 20 mg, Oral, Before breakfast    gabapentin (NEURONTIN) 300 MG capsule Take 1 capsule (300 mg total) by mouth three times per week after dialysis    LOKELMA 10 g, Oral, 2 times daily, Mix entire contents of packet(s) into drinking glass containing 3 tablespoons of water; stir well and drink immediately. Add water and repeat until no powder remains to receive entire dose.    loratadine (CLARITIN ORAL) 1 tablet, Oral, Daily PRN    nicotine (NICODERM CQ) 21 mg/24 hr 1 patch, Transdermal, Daily, Please dispense only clear patches  ", patient has a tape allergy.    ondansetron (ZOFRAN) 4 mg, Oral, Every 6 hours    sucroferric oxyhydroxide (VELPHORO) 500 mg Chew Break or dissolve 1 tablet by mouth with each meal and snack. Do not exceed 6 tablets per day.       Review of patient's allergies indicates:   Allergen Reactions    Aspirin Hives     And vomiting    Penicillins Hives     Throat swelling    Adhesive Rash     Adhesive/silk tape (type found on band aides). Can only use "Paper Tape"    Butalbital-acetaminophen-caff Nausea And Vomiting and Other (See Comments)     Dizziness (made pt feel sick)    Latex, natural rubber Rash    Nickel Rash    Tomato Nausea And Vomiting       Past Surgical History:   Procedure Laterality Date    AV FISTULA PLACEMENT Right 09/14/2023    Procedure: CREATION, AV FISTULA, radial cephalic;  Surgeon: MELISSA Loera II, MD;  Location: [facility] OR 2ND FLR;  Service: Vascular;  Laterality: Right;    AV FISTULA PLACEMENT Right 10/05/2023    Procedure: CREATION, AV FISTULA - brachial;  Surgeon: MELISSA Loera II, MD;  Location: [facility] OR 2ND FLR;  Service: Vascular;  Laterality: Right;  confirmed placement with doppler    CEREBRAL ANEURYSM REPAIR      coiling    CYSTOSCOPY      age 13    EMBOLIZATION Right 04/23/2024    Procedure: EMBOLIZATION, BLOOD VESSEL FISTULA;  Surgeon: MELISSA Loera II, MD;  Location: [facility] OR [facility] FLR;  Service: Vascular;  Laterality: Right;    FISTULOGRAM Right 04/23/2024    Procedure: Fistulogram;  Surgeon: MELISSA Loera II, MD;  Location: [facility] OR 2ND FLR;  Service: Vascular;  Laterality: Right;  CONTRAST: 68ML, FLUORO TIME: 17.9, mGy: 45.11, Gycm2: 5.9548    HYSTEROSCOPY N/A 07/25/2024    Procedure: HYSTEROSCOPY;  Surgeon: Jazz Amador MD;  Location: [facility] OR;  Service: OB/GYN;  Laterality: N/A;    INCISION OF VULVA N/A 07/25/2024    Procedure: INCISION, VULVA;  Surgeon: Jazz Amador MD;  Location: [facility] OR;  Service: OB/GYN;  Laterality: N/A;    " INSERTION OF TUNNELED CENTRAL VENOUS HEMODIALYSIS CATHETER Right 05/03/2023    Procedure: Insertion, Catheter, Central Venous, Hemodialysis;  Surgeon: Priya Grimm MD;  Location: [facility] CATH LAB;  Service: Interventional Nephrology;  Laterality: Right;    LOOP ELECTROSURGICAL EXCISION PROCEDURE (LEEP) N/A 07/25/2024    Procedure: LEEP (LOOP ELECTROSURGICAL EXCISION PROCEDURE);  Surgeon: Jazz Amadro MD;  Location: [facility] OR;  Service: OB/GYN;  Laterality: N/A;    REMOVAL OF HEMODIALYSIS CATHETER  05/03/2023    Procedure: REMOVAL, CATHETER, HEMODIALYSIS;  Surgeon: Priya Grimm MD;  Location: [facility] CATH LAB;  Service: Interventional Nephrology;;    THERMAL ABLATION OF ENDOMETRIUM N/A 07/25/2024    Procedure: ABLATION, ENDOMETRIUM, THERMAL;  Surgeon: Jazz Amador MD;  Location: [facility] OR;  Service: OB/GYN;  Laterality: N/A;       Social History     Substance and Sexual Activity   Drug Use Never     Alcohol Use: Not At Risk (12/19/2023)    AUDIT-C     Frequency of Alcohol Consumption: Never     Average Number of Drinks: Patient does not drink     Frequency of Binge Drinking: Never     Tobacco Use: High Risk (9/10/2024)    Patient History     Smoking Tobacco Use: Every Day     Smokeless Tobacco Use: Never     Passive Exposure: Never       OBJECTIVE:     Vital Signs Range (Last 24H):  Pulse:  [76-80]   BP: ()/(58-68)       Significant Labs  Lab Results   Component Value Date    WBC 8.06 09/10/2024    HGB 14.7 09/10/2024    HCT 45.6 09/10/2024     09/10/2024    CHOL 153 10/12/2023    TRIG 79 10/12/2023    HDL 52 10/12/2023    ALT 7 09/04/2024    AST 8 (L) 07/25/2024     09/04/2024    K 4.1 09/13/2024    CL 99 09/04/2024    CREATININE 12.16 (H) 09/04/2024    BUN 51 (H) 07/25/2024    CO2 27 07/25/2024    INR 1.0 10/12/2023       Cardiac Studies:    EKG:   Results for orders placed or performed during the hospital encounter of 09/10/24   EKG 12-lead    Collection Time: 09/10/24 10:26  AM   Result Value Ref Range    QRS Duration 82 ms    [facility] QTC Calculation 459 ms    Narrative    Test Reason : Z01.818,    Vent. Rate : 056 BPM     Atrial Rate : 056 BPM     P-R Int : 144 ms          QRS Dur : 082 ms      QT Int : 476 ms       P-R-T Axes : 057 022 049 degrees     QTc Int : 459 ms    Sinus bradycardia  Possible Left atrial enlargement    Abnormal ECG  When compared with ECG of 24-OCT-2023 08:45,    Criteria for Anterior infarct are no longer Present  Criteria for Anterolateral infarct are no longer Present  T wave amplitude has increased in Anterior leads  Confirmed by CANDIDO ELLISON MD ([id number]) on 9/10/2024 11:41:31 AM    Referred By: HARISH BLACK           Confirmed By:CANDIDO ELLISON MD       Transthoracic Echo  Results for orders placed during the hospital encounter of 10/24/23    Echo    Interpretation Summary    Left Ventricle: The left ventricle is normal in size. Normal wall thickness. Normal wall motion. There is normal systolic function with a visually estimated ejection fraction of 60 - 65%. There is normal diastolic function.    Right Ventricle: Normal right ventricular cavity size. Wall thickness is normal. Right ventricle wall motion  is normal. Systolic function is normal.    IVC/SVC: Normal venous pressure at 3 mmHg.    PA systolic pressure could not be calculated because of no tricuspid regurgitation, but RVOT acceleration times are consistent with normal PA pressure.      Transesophageal Echo  No results found for this or any previous visit.      ASSESSMENT/PLAN AND ANESTHESIA ROS/EXAM:     Ade Silva is a 44 y.o. female is presenting for Procedure(s) (LRB):  XI ROBOTIC NEPHRECTOMY (Left)  Nephrectomy (Left)        Pre-op Assessment    I have reviewed the Patient Summary Reports.          Review of Systems  Anesthesia Hx:  No problems with previous Anesthesia                Social:  Non-Smoker       Hematology/Oncology:  Hematology Normal   Oncology Normal                                    EENT/Dental:  EENT/Dental Normal           Cardiovascular:     Hypertension                                        Pulmonary:    Asthma                    Renal/:  Chronic Renal Disease (Renal mass, polycystic kidney disease), ESRD                Hepatic/GI:     GERD             Musculoskeletal:  Musculoskeletal Normal                Neurological:       Seizures                                Endocrine:  Endocrine Normal            Dermatological:  Skin Normal    Psych:   anxiety depression              Physical Exam  General: Well nourished and Alert    Airway:  Mallampati: II / II  Mouth Opening: Normal  TM Distance: Normal  Tongue: Normal  Neck ROM: Normal ROM    Dental:  Periodontal disease, Loose teeth    Chest/Lungs:  Clear to auscultation, Normal Respiratory Rate    Heart:  Rate: Normal  Rhythm: Regular Rhythm  Sounds: Normal      Anesthesia Plan  Type of Anesthesia, risks & benefits discussed:    Anesthesia Type: Gen ETT  Intra-op Monitoring Plan: Standard ASA Monitors and Art Line  Post Op Pain Control Plan: multimodal analgesia  Induction:  IV  Airway Plan: Video and Direct, Post-Induction  Informed Consent: Informed consent signed with the Patient and all parties understand the risks and agree with anesthesia plan.  All questions answered.   ASA Score: 3  Day of Surgery Review of History & Physical: H&P Update referred to the surgeon/provider.    Ready For Surgery From Anesthesia Perspective.     .

## 2024-09-19 ENCOUNTER — HOSPITAL ENCOUNTER (INPATIENT)
Facility: HOSPITAL | Age: 44
LOS: 1 days | Discharge: HOME | DRG: 660 | End: 2024-09-20
Attending: UROLOGY | Admitting: UROLOGY
Payer: MEDICARE

## 2024-09-19 ENCOUNTER — TELEPHONE (OUTPATIENT)
Dept: OPHTHALMOLOGY | Facility: CLINIC | Age: 44
End: 2024-09-19
Payer: MEDICARE

## 2024-09-19 ENCOUNTER — ANESTHESIA (OUTPATIENT)
Dept: SURGERY | Facility: HOSPITAL | Age: 44
End: 2024-09-19
Payer: MEDICARE

## 2024-09-19 ENCOUNTER — RESEARCH ENCOUNTER (OUTPATIENT)
Dept: RESEARCH | Facility: HOSPITAL | Age: 44
End: 2024-09-19
Payer: MEDICARE

## 2024-09-19 DIAGNOSIS — Q61.3 POLYCYSTIC KIDNEY DISEASE: ICD-10-CM

## 2024-09-19 DIAGNOSIS — N28.89 RENAL MASS: ICD-10-CM

## 2024-09-19 DIAGNOSIS — Z01.818 PREOPERATIVE TESTING: Primary | ICD-10-CM

## 2024-09-19 LAB
ABO + RH BLD: NORMAL
ANION GAP SERPL CALC-SCNC: 20 MMOL/L (ref 8–16)
BASOPHILS # BLD AUTO: 0.05 K/UL (ref 0–0.2)
BASOPHILS NFR BLD: 0.3 % (ref 0–1.9)
BLD GP AB SCN CELLS X3 SERPL QL: NORMAL
BUN SERPL-MCNC: 55 MG/DL (ref 6–20)
CALCIUM SERPL-MCNC: 8.2 MG/DL (ref 8.7–10.5)
CHLORIDE SERPL-SCNC: 98 MMOL/L (ref 95–110)
CO2 SERPL-SCNC: 17 MMOL/L (ref 23–29)
CREAT SERPL-MCNC: 12.1 MG/DL (ref 0.5–1.4)
DIFFERENTIAL METHOD BLD: ABNORMAL
EOSINOPHIL # BLD AUTO: 0 K/UL (ref 0–0.5)
EOSINOPHIL NFR BLD: 0.1 % (ref 0–8)
ERYTHROCYTE [DISTWIDTH] IN BLOOD BY AUTOMATED COUNT: 14.7 % (ref 11.5–14.5)
EST. GFR  (NO RACE VARIABLE): 3.6 ML/MIN/1.73 M^2
GLUCOSE SERPL-MCNC: 130 MG/DL (ref 70–110)
HCT VFR BLD AUTO: 44.6 % (ref 37–48.5)
HGB BLD-MCNC: 14.5 G/DL (ref 12–16)
IMM GRANULOCYTES # BLD AUTO: 0.12 K/UL (ref 0–0.04)
IMM GRANULOCYTES NFR BLD AUTO: 0.7 % (ref 0–0.5)
LYMPHOCYTES # BLD AUTO: 0.9 K/UL (ref 1–4.8)
LYMPHOCYTES NFR BLD: 5 % (ref 18–48)
MAGNESIUM SERPL-MCNC: 2.3 MG/DL (ref 1.6–2.6)
MCH RBC QN AUTO: 33 PG (ref 27–31)
MCHC RBC AUTO-ENTMCNC: 32.5 G/DL (ref 32–36)
MCV RBC AUTO: 101 FL (ref 82–98)
MONOCYTES # BLD AUTO: 0.2 K/UL (ref 0.3–1)
MONOCYTES NFR BLD: 1.2 % (ref 4–15)
NEUTROPHILS # BLD AUTO: 16.6 K/UL (ref 1.8–7.7)
NEUTROPHILS NFR BLD: 92.7 % (ref 38–73)
NRBC BLD-RTO: 0 /100 WBC
PHOSPHATE SERPL-MCNC: 7.7 MG/DL (ref 2.7–4.5)
PLATELET # BLD AUTO: 150 K/UL (ref 150–450)
PMV BLD AUTO: 9.6 FL (ref 9.2–12.9)
POTASSIUM SERPL-SCNC: 5.1 MMOL/L (ref 3.5–5.1)
RBC # BLD AUTO: 4.4 M/UL (ref 4–5.4)
SODIUM SERPL-SCNC: 135 MMOL/L (ref 136–145)
SPECIMEN OUTDATE: NORMAL
WBC # BLD AUTO: 17.87 K/UL (ref 3.9–12.7)

## 2024-09-19 PROCEDURE — 25000003 PHARM REV CODE 250: Mod: NTX | Performed by: STUDENT IN AN ORGANIZED HEALTH CARE EDUCATION/TRAINING PROGRAM

## 2024-09-19 PROCEDURE — 37000008 HC ANESTHESIA 1ST 15 MINUTES: Mod: NTX | Performed by: UROLOGY

## 2024-09-19 PROCEDURE — 88307 TISSUE EXAM BY PATHOLOGIST: CPT | Mod: NTX | Performed by: PATHOLOGY

## 2024-09-19 PROCEDURE — 63600175 PHARM REV CODE 636 W HCPCS: Mod: NTX

## 2024-09-19 PROCEDURE — 85025 COMPLETE CBC W/AUTO DIFF WBC: CPT | Mod: NTX | Performed by: STUDENT IN AN ORGANIZED HEALTH CARE EDUCATION/TRAINING PROGRAM

## 2024-09-19 PROCEDURE — 63600175 PHARM REV CODE 636 W HCPCS: Mod: NTX | Performed by: STUDENT IN AN ORGANIZED HEALTH CARE EDUCATION/TRAINING PROGRAM

## 2024-09-19 PROCEDURE — 0TT14ZZ RESECTION OF LEFT KIDNEY, PERCUTANEOUS ENDOSCOPIC APPROACH: ICD-10-PCS | Performed by: UROLOGY

## 2024-09-19 PROCEDURE — 86900 BLOOD TYPING SEROLOGIC ABO: CPT | Mod: NTX | Performed by: STUDENT IN AN ORGANIZED HEALTH CARE EDUCATION/TRAINING PROGRAM

## 2024-09-19 PROCEDURE — 99223 1ST HOSP IP/OBS HIGH 75: CPT | Mod: NTX,,, | Performed by: INTERNAL MEDICINE

## 2024-09-19 PROCEDURE — 71000015 HC POSTOP RECOV 1ST HR: Mod: NTX | Performed by: UROLOGY

## 2024-09-19 PROCEDURE — 80048 BASIC METABOLIC PNL TOTAL CA: CPT | Mod: NTX | Performed by: STUDENT IN AN ORGANIZED HEALTH CARE EDUCATION/TRAINING PROGRAM

## 2024-09-19 PROCEDURE — 25000003 PHARM REV CODE 250: Mod: NTX | Performed by: NURSE ANESTHETIST, CERTIFIED REGISTERED

## 2024-09-19 PROCEDURE — 37000009 HC ANESTHESIA EA ADD 15 MINS: Mod: NTX | Performed by: UROLOGY

## 2024-09-19 PROCEDURE — 64461 PVB THORACIC SINGLE INJ SITE: CPT | Mod: NTX

## 2024-09-19 PROCEDURE — 76942 ECHO GUIDE FOR BIOPSY: CPT | Mod: NTX

## 2024-09-19 PROCEDURE — 27201423 OPTIME MED/SURG SUP & DEVICES STERILE SUPPLY: Mod: NTX | Performed by: UROLOGY

## 2024-09-19 PROCEDURE — 63600175 PHARM REV CODE 636 W HCPCS: Mod: NTX | Performed by: NURSE ANESTHETIST, CERTIFIED REGISTERED

## 2024-09-19 PROCEDURE — 63600175 PHARM REV CODE 636 W HCPCS: Mod: JZ,JG,NTX | Performed by: STUDENT IN AN ORGANIZED HEALTH CARE EDUCATION/TRAINING PROGRAM

## 2024-09-19 PROCEDURE — 83735 ASSAY OF MAGNESIUM: CPT | Mod: NTX | Performed by: STUDENT IN AN ORGANIZED HEALTH CARE EDUCATION/TRAINING PROGRAM

## 2024-09-19 PROCEDURE — 71000016 HC POSTOP RECOV ADDL HR: Mod: NTX | Performed by: UROLOGY

## 2024-09-19 PROCEDURE — 50545 LAPARO RADICAL NEPHRECTOMY: CPT | Mod: LT,,, | Performed by: UROLOGY

## 2024-09-19 PROCEDURE — 25000003 PHARM REV CODE 250: Mod: NTX

## 2024-09-19 PROCEDURE — 84100 ASSAY OF PHOSPHORUS: CPT | Mod: NTX | Performed by: STUDENT IN AN ORGANIZED HEALTH CARE EDUCATION/TRAINING PROGRAM

## 2024-09-19 PROCEDURE — 36000713 HC OR TIME LEV V EA ADD 15 MIN: Mod: NTX | Performed by: UROLOGY

## 2024-09-19 PROCEDURE — 11000001 HC ACUTE MED/SURG PRIVATE ROOM: Mod: NTX

## 2024-09-19 PROCEDURE — 36000712 HC OR TIME LEV V 1ST 15 MIN: Mod: NTX | Performed by: UROLOGY

## 2024-09-19 PROCEDURE — 86850 RBC ANTIBODY SCREEN: CPT | Mod: NTX | Performed by: STUDENT IN AN ORGANIZED HEALTH CARE EDUCATION/TRAINING PROGRAM

## 2024-09-19 PROCEDURE — 71000033 HC RECOVERY, INTIAL HOUR: Mod: NTX | Performed by: UROLOGY

## 2024-09-19 RX ORDER — FENTANYL CITRATE 50 UG/ML
25 INJECTION, SOLUTION INTRAMUSCULAR; INTRAVENOUS EVERY 5 MIN PRN
Status: DISCONTINUED | OUTPATIENT
Start: 2024-09-19 | End: 2024-09-19 | Stop reason: HOSPADM

## 2024-09-19 RX ORDER — PROPOFOL 10 MG/ML
VIAL (ML) INTRAVENOUS
Status: DISCONTINUED | OUTPATIENT
Start: 2024-09-19 | End: 2024-09-19

## 2024-09-19 RX ORDER — HEPARIN SODIUM 5000 [USP'U]/ML
5000 INJECTION, SOLUTION INTRAVENOUS; SUBCUTANEOUS EVERY 8 HOURS
Status: DISCONTINUED | OUTPATIENT
Start: 2024-09-20 | End: 2024-09-20 | Stop reason: HOSPADM

## 2024-09-19 RX ORDER — ROCURONIUM BROMIDE 10 MG/ML
INJECTION, SOLUTION INTRAVENOUS
Status: DISCONTINUED | OUTPATIENT
Start: 2024-09-19 | End: 2024-09-19

## 2024-09-19 RX ORDER — DEXMEDETOMIDINE HYDROCHLORIDE 100 UG/ML
INJECTION, SOLUTION INTRAVENOUS
Status: DISCONTINUED | OUTPATIENT
Start: 2024-09-19 | End: 2024-09-19

## 2024-09-19 RX ORDER — OXYCODONE HYDROCHLORIDE 10 MG/1
10 TABLET ORAL EVERY 4 HOURS PRN
Status: DISCONTINUED | OUTPATIENT
Start: 2024-09-19 | End: 2024-09-20 | Stop reason: HOSPADM

## 2024-09-19 RX ORDER — ONDANSETRON 8 MG/1
8 TABLET, ORALLY DISINTEGRATING ORAL EVERY 8 HOURS PRN
Status: DISCONTINUED | OUTPATIENT
Start: 2024-09-19 | End: 2024-09-20 | Stop reason: HOSPADM

## 2024-09-19 RX ORDER — FENTANYL CITRATE 50 UG/ML
25-200 INJECTION, SOLUTION INTRAMUSCULAR; INTRAVENOUS
Status: DISCONTINUED | OUTPATIENT
Start: 2024-09-19 | End: 2024-09-19 | Stop reason: HOSPADM

## 2024-09-19 RX ORDER — CLINDAMYCIN PHOSPHATE 900 MG/50ML
900 INJECTION, SOLUTION INTRAVENOUS
Status: COMPLETED | OUTPATIENT
Start: 2024-09-19 | End: 2024-09-19

## 2024-09-19 RX ORDER — SODIUM CHLORIDE 0.9 % (FLUSH) 0.9 %
10 SYRINGE (ML) INJECTION
Status: DISCONTINUED | OUTPATIENT
Start: 2024-09-19 | End: 2024-09-19 | Stop reason: HOSPADM

## 2024-09-19 RX ORDER — LIDOCAINE HYDROCHLORIDE 10 MG/ML
1 INJECTION, SOLUTION EPIDURAL; INFILTRATION; INTRACAUDAL; PERINEURAL ONCE AS NEEDED
Status: DISCONTINUED | OUTPATIENT
Start: 2024-09-19 | End: 2024-09-20 | Stop reason: HOSPADM

## 2024-09-19 RX ORDER — LIDOCAINE HYDROCHLORIDE 20 MG/ML
INJECTION, SOLUTION EPIDURAL; INFILTRATION; INTRACAUDAL; PERINEURAL
Status: DISCONTINUED | OUTPATIENT
Start: 2024-09-19 | End: 2024-09-19

## 2024-09-19 RX ORDER — ACETAMINOPHEN 500 MG
1000 TABLET ORAL EVERY 6 HOURS
Status: DISCONTINUED | OUTPATIENT
Start: 2024-09-19 | End: 2024-09-20 | Stop reason: HOSPADM

## 2024-09-19 RX ORDER — MIDAZOLAM HYDROCHLORIDE 1 MG/ML
.5-4 INJECTION, SOLUTION INTRAMUSCULAR; INTRAVENOUS
Status: DISCONTINUED | OUTPATIENT
Start: 2024-09-19 | End: 2024-09-19 | Stop reason: HOSPADM

## 2024-09-19 RX ORDER — HYDROMORPHONE HYDROCHLORIDE 1 MG/ML
1 INJECTION, SOLUTION INTRAMUSCULAR; INTRAVENOUS; SUBCUTANEOUS
Status: DISCONTINUED | OUTPATIENT
Start: 2024-09-19 | End: 2024-09-20 | Stop reason: HOSPADM

## 2024-09-19 RX ORDER — PROMETHAZINE HYDROCHLORIDE 12.5 MG/1
25 TABLET ORAL EVERY 6 HOURS PRN
Status: DISCONTINUED | OUTPATIENT
Start: 2024-09-19 | End: 2024-09-20 | Stop reason: HOSPADM

## 2024-09-19 RX ORDER — MUPIROCIN 20 MG/G
OINTMENT TOPICAL 2 TIMES DAILY
OUTPATIENT
Start: 2024-09-19 | End: 2024-09-24

## 2024-09-19 RX ORDER — DIPHENHYDRAMINE HCL 25 MG
25 CAPSULE ORAL EVERY 12 HOURS PRN
Status: DISCONTINUED | OUTPATIENT
Start: 2024-09-19 | End: 2024-09-20 | Stop reason: HOSPADM

## 2024-09-19 RX ORDER — PHENAZOPYRIDINE HYDROCHLORIDE 100 MG/1
200 TABLET, FILM COATED ORAL 3 TIMES DAILY PRN
Status: DISCONTINUED | OUTPATIENT
Start: 2024-09-19 | End: 2024-09-20 | Stop reason: HOSPADM

## 2024-09-19 RX ORDER — DEXAMETHASONE SODIUM PHOSPHATE 4 MG/ML
INJECTION, SOLUTION INTRA-ARTICULAR; INTRALESIONAL; INTRAMUSCULAR; INTRAVENOUS; SOFT TISSUE
Status: DISCONTINUED | OUTPATIENT
Start: 2024-09-19 | End: 2024-09-19

## 2024-09-19 RX ORDER — GLUCAGON 1 MG
1 KIT INJECTION
Status: DISCONTINUED | OUTPATIENT
Start: 2024-09-19 | End: 2024-09-19 | Stop reason: HOSPADM

## 2024-09-19 RX ORDER — FAMOTIDINE 20 MG/1
20 TABLET, FILM COATED ORAL 2 TIMES DAILY
Status: DISCONTINUED | OUTPATIENT
Start: 2024-09-19 | End: 2024-09-20

## 2024-09-19 RX ORDER — SODIUM CHLORIDE 0.9 % (FLUSH) 0.9 %
3 SYRINGE (ML) INJECTION
Status: DISCONTINUED | OUTPATIENT
Start: 2024-09-19 | End: 2024-09-20 | Stop reason: HOSPADM

## 2024-09-19 RX ORDER — KETAMINE HCL IN 0.9 % NACL 50 MG/5 ML
SYRINGE (ML) INTRAVENOUS
Status: DISCONTINUED | OUTPATIENT
Start: 2024-09-19 | End: 2024-09-19

## 2024-09-19 RX ORDER — OXYCODONE HYDROCHLORIDE 5 MG/1
5 TABLET ORAL EVERY 4 HOURS PRN
Status: DISCONTINUED | OUTPATIENT
Start: 2024-09-19 | End: 2024-09-20 | Stop reason: HOSPADM

## 2024-09-19 RX ORDER — PROCHLORPERAZINE EDISYLATE 5 MG/ML
5 INJECTION INTRAMUSCULAR; INTRAVENOUS EVERY 30 MIN PRN
Status: DISCONTINUED | OUTPATIENT
Start: 2024-09-19 | End: 2024-09-19 | Stop reason: HOSPADM

## 2024-09-19 RX ORDER — SODIUM CHLORIDE 9 MG/ML
INJECTION, SOLUTION INTRAVENOUS CONTINUOUS
Status: DISCONTINUED | OUTPATIENT
Start: 2024-09-19 | End: 2024-09-19

## 2024-09-19 RX ORDER — HYDROMORPHONE HYDROCHLORIDE 1 MG/ML
INJECTION, SOLUTION INTRAMUSCULAR; INTRAVENOUS; SUBCUTANEOUS
Status: DISCONTINUED | OUTPATIENT
Start: 2024-09-19 | End: 2024-09-19

## 2024-09-19 RX ORDER — BUPIVACAINE HYDROCHLORIDE 7.5 MG/ML
INJECTION, SOLUTION EPIDURAL; RETROBULBAR
Status: COMPLETED | OUTPATIENT
Start: 2024-09-19 | End: 2024-09-19

## 2024-09-19 RX ORDER — SODIUM CHLORIDE 9 MG/ML
INJECTION, SOLUTION INTRAVENOUS
OUTPATIENT
Start: 2024-09-19

## 2024-09-19 RX ORDER — ONDANSETRON HYDROCHLORIDE 2 MG/ML
INJECTION, SOLUTION INTRAVENOUS
Status: DISCONTINUED | OUTPATIENT
Start: 2024-09-19 | End: 2024-09-19

## 2024-09-19 RX ORDER — TALC
6 POWDER (GRAM) TOPICAL NIGHTLY PRN
Status: DISCONTINUED | OUTPATIENT
Start: 2024-09-19 | End: 2024-09-20 | Stop reason: HOSPADM

## 2024-09-19 RX ORDER — SODIUM CHLORIDE 9 MG/ML
INJECTION, SOLUTION INTRAVENOUS ONCE
OUTPATIENT
Start: 2024-09-19 | End: 2024-09-19

## 2024-09-19 RX ORDER — ONDANSETRON HYDROCHLORIDE 2 MG/ML
4 INJECTION, SOLUTION INTRAVENOUS EVERY 6 HOURS PRN
Status: DISCONTINUED | OUTPATIENT
Start: 2024-09-19 | End: 2024-09-20 | Stop reason: HOSPADM

## 2024-09-19 RX ORDER — HEPARIN SODIUM 5000 [USP'U]/ML
5000 INJECTION, SOLUTION INTRAVENOUS; SUBCUTANEOUS
Status: COMPLETED | OUTPATIENT
Start: 2024-09-19 | End: 2024-09-19

## 2024-09-19 RX ORDER — FENTANYL CITRATE 50 UG/ML
INJECTION, SOLUTION INTRAMUSCULAR; INTRAVENOUS
Status: DISCONTINUED | OUTPATIENT
Start: 2024-09-19 | End: 2024-09-19

## 2024-09-19 RX ADMIN — OXYCODONE HYDROCHLORIDE 5 MG: 5 TABLET ORAL at 06:09

## 2024-09-19 RX ADMIN — DEXMEDETOMIDINE 4 MCG: 100 INJECTION, SOLUTION, CONCENTRATE INTRAVENOUS at 03:09

## 2024-09-19 RX ADMIN — DEXMEDETOMIDINE 8 MCG: 100 INJECTION, SOLUTION, CONCENTRATE INTRAVENOUS at 03:09

## 2024-09-19 RX ADMIN — SODIUM CHLORIDE, SODIUM GLUCONATE, SODIUM ACETATE, POTASSIUM CHLORIDE, MAGNESIUM CHLORIDE, SODIUM PHOSPHATE, DIBASIC, AND POTASSIUM PHOSPHATE: .53; .5; .37; .037; .03; .012; .00082 INJECTION, SOLUTION INTRAVENOUS at 03:09

## 2024-09-19 RX ADMIN — FENTANYL CITRATE 50 MCG: 50 INJECTION INTRAMUSCULAR; INTRAVENOUS at 03:09

## 2024-09-19 RX ADMIN — SODIUM CHLORIDE: 0.9 INJECTION, SOLUTION INTRAVENOUS at 12:09

## 2024-09-19 RX ADMIN — FENTANYL CITRATE 50 MCG: 50 INJECTION INTRAMUSCULAR; INTRAVENOUS at 01:09

## 2024-09-19 RX ADMIN — ROCURONIUM BROMIDE 20 MG: 10 INJECTION, SOLUTION INTRAVENOUS at 02:09

## 2024-09-19 RX ADMIN — FAMOTIDINE 20 MG: 20 TABLET ORAL at 09:09

## 2024-09-19 RX ADMIN — ROCURONIUM BROMIDE 50 MG: 10 INJECTION, SOLUTION INTRAVENOUS at 01:09

## 2024-09-19 RX ADMIN — DEXMEDETOMIDINE 4 MCG: 100 INJECTION, SOLUTION, CONCENTRATE INTRAVENOUS at 04:09

## 2024-09-19 RX ADMIN — CLINDAMYCIN IN 5 PERCENT DEXTROSE 900 MG: 18 INJECTION, SOLUTION INTRAVENOUS at 01:09

## 2024-09-19 RX ADMIN — SODIUM CHLORIDE, SODIUM GLUCONATE, SODIUM ACETATE, POTASSIUM CHLORIDE, MAGNESIUM CHLORIDE, SODIUM PHOSPHATE, DIBASIC, AND POTASSIUM PHOSPHATE: .53; .5; .37; .037; .03; .012; .00082 INJECTION, SOLUTION INTRAVENOUS at 12:09

## 2024-09-19 RX ADMIN — LIDOCAINE HYDROCHLORIDE 100 MG: 20 INJECTION, SOLUTION EPIDURAL; INFILTRATION; INTRACAUDAL at 01:09

## 2024-09-19 RX ADMIN — HYDROMORPHONE HYDROCHLORIDE 0.25 MG: 1 INJECTION, SOLUTION INTRAMUSCULAR; INTRAVENOUS; SUBCUTANEOUS at 05:09

## 2024-09-19 RX ADMIN — DEXMEDETOMIDINE 4 MCG: 100 INJECTION, SOLUTION, CONCENTRATE INTRAVENOUS at 05:09

## 2024-09-19 RX ADMIN — ROCURONIUM BROMIDE 10 MG: 10 INJECTION, SOLUTION INTRAVENOUS at 03:09

## 2024-09-19 RX ADMIN — ROCURONIUM BROMIDE 20 MG: 10 INJECTION, SOLUTION INTRAVENOUS at 04:09

## 2024-09-19 RX ADMIN — FENTANYL CITRATE 25 MCG: 50 INJECTION INTRAMUSCULAR; INTRAVENOUS at 06:09

## 2024-09-19 RX ADMIN — DEXAMETHASONE SODIUM PHOSPHATE 4 MG: 4 INJECTION INTRA-ARTICULAR; INTRALESIONAL; INTRAMUSCULAR; INTRAVENOUS; SOFT TISSUE at 01:09

## 2024-09-19 RX ADMIN — BUPIVACAINE HYDROCHLORIDE 15 ML: 7.5 INJECTION, SOLUTION EPIDURAL; RETROBULBAR at 11:09

## 2024-09-19 RX ADMIN — Medication 10 MG: at 03:09

## 2024-09-19 RX ADMIN — Medication 15 MG: at 03:09

## 2024-09-19 RX ADMIN — Medication 5 MG: at 02:09

## 2024-09-19 RX ADMIN — PROPOFOL 20 MG: 10 INJECTION, EMULSION INTRAVENOUS at 05:09

## 2024-09-19 RX ADMIN — ONDANSETRON 4 MG: 2 INJECTION INTRAMUSCULAR; INTRAVENOUS at 04:09

## 2024-09-19 RX ADMIN — HEPARIN SODIUM 5000 UNITS: 5000 INJECTION INTRAVENOUS; SUBCUTANEOUS at 11:09

## 2024-09-19 RX ADMIN — PROPOFOL 50 MG: 10 INJECTION, EMULSION INTRAVENOUS at 04:09

## 2024-09-19 RX ADMIN — FENTANYL CITRATE 50 MCG: 50 INJECTION INTRAMUSCULAR; INTRAVENOUS at 11:09

## 2024-09-19 RX ADMIN — Medication 20 MG: at 04:09

## 2024-09-19 RX ADMIN — GENTAMICIN SULFATE 240 MG: 40 INJECTION, SOLUTION INTRAMUSCULAR; INTRAVENOUS at 01:09

## 2024-09-19 RX ADMIN — MIDAZOLAM 1 MG: 1 INJECTION INTRAMUSCULAR; INTRAVENOUS at 11:09

## 2024-09-19 RX ADMIN — ACETAMINOPHEN 1000 MG: 500 TABLET ORAL at 06:09

## 2024-09-19 RX ADMIN — HYDROMORPHONE HYDROCHLORIDE 0.5 MG: 1 INJECTION, SOLUTION INTRAMUSCULAR; INTRAVENOUS; SUBCUTANEOUS at 05:09

## 2024-09-19 RX ADMIN — PROPOFOL 50 MG: 10 INJECTION, EMULSION INTRAVENOUS at 02:09

## 2024-09-19 RX ADMIN — PROPOFOL 200 MG: 10 INJECTION, EMULSION INTRAVENOUS at 01:09

## 2024-09-19 RX ADMIN — SUGAMMADEX 200 MG: 100 INJECTION, SOLUTION INTRAVENOUS at 05:09

## 2024-09-19 NOTE — SUBJECTIVE & OBJECTIVE
"Past Medical History:   Diagnosis Date    Anxiety     Asthma     Autosomal dominant polycystic kidney disease     Brain aneurysm     Depression     Encounter for blood transfusion     GERD (gastroesophageal reflux disease)     Hypertension     Seizures     as a child (age 7-8); " stress -related"    TIA (transient ischemic attack)        Past Surgical History:   Procedure Laterality Date    AV FISTULA PLACEMENT Right 09/14/2023    Procedure: CREATION, AV FISTULA, radial cephalic;  Surgeon: MELISSA Loera II, MD;  [facility];  Service: Vascular;  Laterality: Right;    AV FISTULA PLACEMENT Right 10/05/2023    Procedure: CREATION, AV FISTULA - brachial;  Surgeon: MELISSA Loera II, MD;  [facility];  Service: Vascular;  Laterality: Right;  confirmed placement with doppler    CEREBRAL ANEURYSM REPAIR      coiling    CYSTOSCOPY      age 13    EMBOLIZATION Right 04/23/2024    Procedure: EMBOLIZATION, BLOOD VESSEL FISTULA;  Surgeon: MELISSA Loera II, MD;  [facility];  Service: Vascular;  Laterality: Right;    FISTULOGRAM Right 04/23/2024    Procedure: Fistulogram;  Surgeon: MELISSA Loera II, MD;  [facility];  Service: Vascular;  Laterality: Right;  CONTRAST: 68ML, FLUORO TIME: 17.9, mGy: 45.11, Gycm2: 5.9548    HYSTEROSCOPY N/A 07/25/2024    Procedure: HYSTEROSCOPY;  Surgeon: Jazz Amador MD;  Location: [facility] OR;  Service: OB/GYN;  Laterality: N/A;    INCISION OF VULVA N/A 07/25/2024    Procedure: INCISION, VULVA;  Surgeon: Jazz Amador MD;  Location: [facility] OR;  Service: OB/GYN;  Laterality: N/A;    INSERTION OF TUNNELED CENTRAL VENOUS HEMODIALYSIS CATHETER Right 05/03/2023    Procedure: Insertion, Catheter, Central Venous, Hemodialysis;  Surgeon: Priya Grimm MD;  Location: [facility] CATH LAB;  Service: Interventional Nephrology;  Laterality: Right;    LOOP ELECTROSURGICAL EXCISION PROCEDURE (LEEP) N/A 07/25/2024    Procedure: LEEP " "(LOOP ELECTROSURGICAL EXCISION PROCEDURE);  Surgeon: aJzz Amador MD;  Location: [facility] OR;  Service: OB/GYN;  Laterality: N/A;    REMOVAL OF HEMODIALYSIS CATHETER  05/03/2023    Procedure: REMOVAL, CATHETER, HEMODIALYSIS;  Surgeon: Priya Grimm MD;  Location: [facility] CATH LAB;  Service: Interventional Nephrology;;    THERMAL ABLATION OF ENDOMETRIUM N/A 07/25/2024    Procedure: ABLATION, ENDOMETRIUM, THERMAL;  Surgeon: Jazz Amador MD;  Location: [facility] OR;  Service: OB/GYN;  Laterality: N/A;       Review of patient's allergies indicates:   Allergen Reactions    Aspirin Hives     And vomiting    Penicillins Hives     Throat swelling    Adhesive Rash     Adhesive/silk tape (type found on band aides). Can only use "Paper Tape"    Butalbital-acetaminophen-caff Nausea And Vomiting and Other (See Comments)     Dizziness (made pt feel sick)    Latex, natural rubber Rash    Nickel Rash    Tomato Nausea And Vomiting     Current Facility-Administered Medications   Medication Frequency    0.9%  NaCl infusion Continuous    acetaminophen tablet 1,000 mg Q6H    diphenhydrAMINE capsule 25 mg Q12H PRN    famotidine tablet 20 mg BID    fentaNYL 50 mcg/mL injection 25 mcg Q5 Min PRN    fentaNYL 50 mcg/mL injection  mcg PRN    glucagon (human recombinant) injection 1 mg PRN    [START ON 9/20/2024] heparin (porcine) injection 5,000 Units Q8H    HYDROmorphone injection 1 mg Q3H PRN    LIDOcaine (PF) 10 mg/ml (1%) injection 10 mg Once PRN    melatonin tablet 6 mg Nightly PRN    melatonin tablet 6 mg Nightly PRN    midazolam injection 0.5-4 mg PRN    ondansetron disintegrating tablet 8 mg Q8H PRN    ondansetron injection 4 mg Q6H PRN    oxyCODONE immediate release tablet 5 mg Q4H PRN    oxyCODONE immediate release tablet 5 mg Q4H PRN    oxyCODONE immediate release tablet Tab 10 mg Q4H PRN    phenazopyridine tablet 200 mg TID PRN    prochlorperazine injection Soln 5 mg Q30 Min PRN    promethazine tablet 25 mg Q6H PRN    " sodium chloride 0.9% flush 10 mL PRN    sodium chloride 0.9% flush 3 mL PRN    sodium zirconium cyclosilicate packet 10 g BID     Facility-Administered Medications Ordered in Other Encounters   Medication Frequency    0.9%  NaCl infusion Continuous    ondansetron disintegrating tablet 8 mg Q8H PRN     Family History       Problem Relation (Age of Onset)    Diabetes Brother    Fibromyalgia Mother    Heart disease Father    Polycystic kidney disease Father, Sister, Sister    Stroke Father          Tobacco Use    Smoking status: Every Day     Average packs/day: 1 pack/day for 28.0 years (28.0 ttl pk-yrs)     Types: Cigarettes, Cigars     Start date: 1994     Passive exposure: Never    Smokeless tobacco: Never    Tobacco comments:     Smoke 3-5 black and mild cigars a day    Substance and Sexual Activity    Alcohol use: Not Currently    Drug use: Never    Sexual activity: Not Currently     Review of Systems   Respiratory:  Negative for cough and shortness of breath.    Cardiovascular:  Negative for chest pain.   Gastrointestinal:  Positive for abdominal pain.   Neurological:  Negative for dizziness and weakness.   Psychiatric/Behavioral: Negative.       Objective:     Vital Signs (Most Recent):  Temp: 97.7 °F (36.5 °C) (09/19/24 1757)  Pulse: 94 (09/19/24 1757)  Resp: 18 (09/19/24 1757)  BP: 128/67 (09/19/24 1757)  SpO2: 98 % (09/19/24 1757) Vital Signs (24h Range):  Temp:  [97.7 °F (36.5 °C)] 97.7 °F (36.5 °C)  Pulse:  [84-94] 94  Resp:  [13-20] 18  SpO2:  [95 %-100 %] 98 %  BP: (104-129)/(55-83) 128/67     Weight: 90.7 kg (200 lb) (09/19/24 1042)  Body mass index is 27.89 kg/m².  Body surface area is 2.13 meters squared.    No intake/output data recorded.     Physical Exam  Constitutional:       General: She is not in acute distress.     Appearance: Normal appearance. She is normal weight.   Cardiovascular:      Rate and Rhythm: Normal rate and regular rhythm.      Pulses: Normal pulses.   Pulmonary:      Effort:  "Pulmonary effort is normal. No respiratory distress.      Breath sounds: Normal breath sounds. No wheezing or rales.   Abdominal:      General: Abdomen is flat.      Palpations: Abdomen is soft.      Comments: S/p surgical changes, multiple dressings/bandages, single JOLENE drain w dark sanguinous output.    Musculoskeletal:         General: Normal range of motion.      Cervical back: Normal range of motion.   Skin:     General: Skin is warm and dry.   Neurological:      General: No focal deficit present.      Mental Status: She is alert and oriented to person, place, and time. Mental status is at baseline.   Psychiatric:         Mood and Affect: Mood normal.          Significant Labs:  BMP:   Recent Labs   Lab 09/13/24  1400   K 4.1     CBC: No results for input(s): "WBC", "RBC", "HGB", "HCT", "PLT", "MCV", "MCH", "MCHC" in the last 168 hours.  All labs within the past 24 hours have been reviewed.    CXR reviewed, no acute abnormalities.   "

## 2024-09-19 NOTE — TRANSFER OF CARE
"Anesthesia Transfer of Care Note    Patient: Ade Silva    Procedure(s) Performed: Procedure(s) (LRB):  XI ROBOTIC NEPHRECTOMY (Left)    Patient location: PACU    Anesthesia Type: general    Transport from OR: Transported from OR on 6-10 L/min O2 by face mask with adequate spontaneous ventilation    Post pain: adequate analgesia    Post assessment: no apparent anesthetic complications and tolerated procedure well    Post vital signs: stable    Level of consciousness: sedated and responds to stimulation    Nausea/Vomiting: no nausea/vomiting    Complications: none    Transfer of care protocol was followed    Last vitals: Visit Vitals  BP (!) 104/56 (BP Location: Left arm, Patient Position: Lying)   Pulse 85   Temp 36.5 °C (97.7 °F) (Oral)   Resp 13   Ht 5' 11" (1.803 m)   Wt 90.7 kg (200 lb)   LMP 06/20/2024 (Exact Date)   SpO2 96%   Breastfeeding No   BMI 27.89 kg/m²     "

## 2024-09-19 NOTE — HPI
Ade is a pleasant 45 yo woman w pmhx significant for end-stage renal disease secondary PCKD on hemodialysis Monday Wednesday Friday started 4/2023 via right upper extremity AV fistula, history of cerebral aneurysm status post coil, nicotine abuse, left renal mass here for L robotic radical nephrectomy. The surgery went well without complications. I was called tonight by urology for a consult for evaluation for HD needs. Post op labs are pending. Pre op chest XR unremarkable.     Follows w Dr Chang in outpatient setting. Currently awaiting available kidney transplant.

## 2024-09-19 NOTE — BRIEF OP NOTE
[facility] ([facility])  Brief Operative Note    SUMMARY     Surgery Date: 9/19/2024     Surgeons and Role:     * Valentin Maldonado MD - Primary     * Farhan Hyman MD - Resident - Assisting        Pre-op Diagnosis:  Renal mass [N28.89]    Post-op Diagnosis:  Post-Op Diagnosis Codes:     * Renal mass [N28.89]    Procedure(s) (LRB):  XI ROBOTIC NEPHRECTOMY (Left)    Anesthesia: General    Implants:  * No implants in log *    Operative Findings:   Robotic left radical nephrectomy    Estimated Blood Loss: * No values recorded between 9/19/2024  1:44 PM and 9/19/2024  5:33 PM *    Estimated Blood Loss has not been documented. EBL = 100 cc.         Specimens:   Specimen (24h ago, onward)       Start     Ordered    09/19/24 1642  Specimen to Pathology, Surgery Urology  Once        Comments: Pre-op Diagnosis: Renal mass [N28.89]Procedure(s):XI ROBOTIC NEPHRECTOMY Number of specimens: 1Name of specimens: 1) Left Kidney (permanent)     References:    Click here for ordering Quick Tip   Question Answer Comment   Procedure Type: Urology    Specimen Class: Known or suspected malignancy    Release to patient Immediate        09/19/24 1707                    [id number]

## 2024-09-19 NOTE — ASSESSMENT & PLAN NOTE
ESRD since 4/2023 on HD MWF. Unknown when last HD session was. Cr 12.2, K 4.1. Post surgery CBC and BMP pending. Will make decision for HD tonight based off labs, but likely we will resume her home scheduled HD MWF tomorrow.

## 2024-09-19 NOTE — OP NOTE
[facility]  Operative Note     Date: 09/19/2024      Pre-Op Diagnosis: left renal mass in polycystic kidney    Patient Active Problem List   Diagnosis    Polycystic kidney disease    Chronic kidney disease-mineral and bone disorder    Anemia    Chest pain    Autosomal dominant polycystic kidney disease    Hypertension    Basilar artery aneurysm    Gross hematuria    Hematuria    S/P arteriovenous (AV) fistula creation    ESRD (end stage renal disease) on dialysis    Immunization counseling    Pre-transplant evaluation for kidney transplant    ESRD on hemodialysis    s/p hysteroscopy, endometrial ablation, LEEP & vulvectomy    Renal mass    Mild intermittent asthma without complication    GERD (gastroesophageal reflux disease)    Snoring    Anxiety and depression    Papular rash    Macrocytosis without anemia        Post-Op Diagnosis: same     Procedure(s) Performed:   1.  Left robotic radical nephrectomy---Modifier 22     Specimen(s):   - Left kidney with surrounding Gerota's fascia  - Adrenal gland spared     Surgeon: Valentin Maldonado MD     Bedside assistant: Dave Cadet     Assistant: Logan Hyman MD     Anesthesia: General endotracheal anesthesia     Indications: Ade Silva is a 44 y.o. female  with left renal mass in a polycystic kidney.  After discussion of management options and risks and benefits associated with each the patient has elected to pursue surgical management.       Findings:   - Large cystic left kidney with extensive surrounding inflammatory changes; Due to the extremely large size of the kidney and its adherence to the colonic mesentery and psoas muscle, the surgery required a significantly longer amount  of time and care with dissection, thus the Modifier 22 distinction.     EBL: 100 mL     Drains:   1.  15 Fr JOLENE drain     Anesthesia: General endotracheal anesthesia     Complications:  none     Procedure in Detail: After discussion of risks and benefits of the  procedure, informed consent was obtained.  The patient was brought to the operating room and placed supine on the operating table.  SCDs were applied and working prior to induction of anesthesia.  General anesthesia was administered. A 16 Fr Olson catheter was placed in the standard fashion with 10 ml sterile water used to inflate the balloon. An OG tube was placed. The patient was then moved into the modified flank position with the left side up. The patient was appropriately padded and secured to the table. The patient was then prepped and draped in the usual sterile fashion. Timeout was performed and preoperative antibiotics were confirmed.       A Veress needle was introduced into the abdomen. Entry into the peritoneal cavity was confirmed with the drop test and an initial insufflation pressure of <10mmHg. Aspiration during our drop test revealed no blood or succus. The peritoneal cavity was insufflated up to 15 mmHg and the Veress was removed. The location of the 8 mm camera port was marked with a marking pen and incised sharply using a 15 blade. The 8 mm port was then introduced.  The camera was passed through the port and the abdomen was inspected and found to be free of bowel or vascular injury. Using direct vision the other 2 robotic ports were placed, just below the costal margin and lower on the left abdomen, ensuring at least 8 cm between ports to allow robotic mobility. A 12 mm assistant port was placed in the midline. Each trocar was introduced under direct vision ensuring no injury to the underlying abdominal contents.       Dissection began along the white line of Toldt, reflecting the colon medially away from the kidney. The splenic reflection was released. The psoas muscle was identified. Once the colon was reflected, dissection was carried out just below the kidney, and the ureter was identified.  The ureter was elevated and we continued our dissection toward the lower pole of the kidney  proximally until we identified the renal hilum.      Careful dissection of the hilum was performed.  The renal vein was easily identified anteriorly.  There was one renal vein and one renal artery. There was extensive inflammatory changes surrounding the hilum making skeletonization of the vessels very difficult. The Vessels were isolated and a window was developed inferior and superior to the hilum to ensure a safe landing zone for a stapler. The artery and vein were taken en bloc with a vascular staple load. Hemostasis was excellent.      The kidney was then freed from the remaining superior, posterior and lateral attachments.   Due to the extremely large size of the kidney and its adherence to the colonic mesentery and psoas muscle, the surgery required a significantly longer amount  of time and care with dissection, thus the Modifier 22 distinction.  The ureter was divided. Hemostasis was again assessed and was excellent. The mass was placed into an EndoCatch bag via the 12mm midline assistant port. The robot was undocked and all trocars were removed. The 12 mm midline incision was extended from skin down to fascia to allow removal of the specimen. This was inspected and found to be intact. This was passed off the field for pathologic analysis.      The retroperitoneum was inspected for persistent bleeding and was irrigated. A drain was placed in the LLQ via the LLQ port site. The ken catheter was removed.     The extraction site fascia was closed using interrupted 1-0 PDS in a running fashion.  All skin incisions were closed using 4-0 monocryl. Dermabond was applied to all of the incisions.     The patient tolerated the procedure well and was transferred to the recovery room in stable condition.     Disposition: The patient will remain on the urology service overnight for observation.      Farhan Hyman MD    I have reviewed the operative note performed by Dr. Hyman, and I concur with her/his documentation  of Ade Silva. I was present for the critical or key portions of the procedure.

## 2024-09-19 NOTE — ANESTHESIA PROCEDURE NOTES
Left SAMMI SS    Patient location during procedure: pre-op   Block not for primary anesthetic.  Reason for block: at surgeon's request and post-op pain management   Post-op Pain Location: Lower left abdominal pain   Start time: 9/19/2024 11:39 AM  Timeout: 9/19/2024 11:38 AM   End time: 9/19/2024 11:45 AM    Staffing  Authorizing Provider: Alfred Julien MD  Performing Provider: Onelia Cruz MD    Staffing  Performed by: Onelia Cruz MD  Authorized by: Alfred Julien MD    Preanesthetic Checklist  Completed: patient identified, IV checked, site marked, risks and benefits discussed, surgical consent, monitors and equipment checked, pre-op evaluation and timeout performed  Peripheral Block  Patient position: sitting  Prep: ChloraPrep  Patient monitoring: heart rate, cardiac monitor, continuous pulse ox, continuous capnometry and frequent blood pressure checks  Block type: erector spinae plane  Laterality: left  Injection technique: single shot  Interspace: T8-9    Needle  Needle type: Echogenic   Needle gauge: 20 G  Needle length: 4 in  Needle localization: anatomical landmarks and ultrasound guidance   -ultrasound image captured on disc.  Assessment  Injection assessment: negative aspiration, negative parasthesia and local visualized surrounding nerve  Paresthesia pain: none  Heart rate change: no  Slow fractionated injection: yes  Pain Tolerance: comfortable throughout block  Medications:    Medications: bupivacaine (pf) (MARCAINE) injection 0.75% - Perineural, Other   15 mL - 9/19/2024 11:45:00 AM    Additional Notes  Patient tolerated well.  See [facility] RN record for vitals. Administered 30cc of 0.375% bupivacaine with 1:300k epi, 10mg dexamethasone, and 50mcg clonidine as local anesthetic.

## 2024-09-19 NOTE — CONSULTS
"[facility] - Surgery ([facility])  Nephrology  Consult Note    Patient Name: Ade Silva  MRN: [id number]  Admission Date: 9/19/2024  Hospital Length of Stay: 0 days  Attending Provider: Valentin Maldonado MD   Primary Care Physician: Jordan Christiansen MD  Principal Problem:Renal mass    Inpatient consult to Nephrology  Consult performed by: Antoine Villeda MD  Consult ordered by: Farhan Hyman MD  Reason for consult: ESRD management        Subjective:     HPI: Ade is a pleasant 43 yo woman w pmhx significant for end-stage renal disease secondary PCKD on hemodialysis Monday Wednesday Friday started 4/2023 via right upper extremity AV fistula, history of cerebral aneurysm status post coil, nicotine abuse, left renal mass here for L robotic radical nephrectomy. The surgery went well without complications. I was called tonight by urology for a consult for evaluation for HD needs. Post op labs are pending. Pre op chest XR unremarkable.     Follows w Dr Chang in outpatient setting. Currently awaiting available kidney transplant.     Past Medical History:   Diagnosis Date    Anxiety     Asthma     Autosomal dominant polycystic kidney disease     Brain aneurysm     Depression     Encounter for blood transfusion     GERD (gastroesophageal reflux disease)     Hypertension     Seizures     as a child (age 7-8); " stress -related"    TIA (transient ischemic attack)        Past Surgical History:   Procedure Laterality Date    AV FISTULA PLACEMENT Right 09/14/2023    Procedure: CREATION, AV FISTULA, radial cephalic;  Surgeon: MELISSA Loera II, MD;  Location: [facility] OR [address] [place] [address];  Service: Vascular;  Laterality: Right;    AV FISTULA PLACEMENT Right 10/05/2023    Procedure: CREATION, AV FISTULA - brachial;  Surgeon: MELISSA Loera II, MD;  Location: [facility] OR [address] [place] [address];  Service: Vascular;  Laterality: Right;  confirmed placement with doppler    CEREBRAL ANEURYSM REPAIR      coiling    CYSTOSCOPY      age 13    " "EMBOLIZATION Right 04/23/2024    Procedure: EMBOLIZATION, BLOOD VESSEL FISTULA;  Surgeon: MELISSA Loera II, MD;  Location: [facility] OR 2ND FLR;  Service: Vascular;  Laterality: Right;    FISTULOGRAM Right 04/23/2024    Procedure: Fistulogram;  Surgeon: MELISSA Loera II, MD;  Location: [facility] OR 2ND FLR;  Service: Vascular;  Laterality: Right;  CONTRAST: 68ML, FLUORO TIME: 17.9, mGy: 45.11, Gycm2: 5.9548    HYSTEROSCOPY N/A 07/25/2024    Procedure: HYSTEROSCOPY;  Surgeon: Jazz Amador MD;  Location: [facility];  Service: OB/GYN;  Laterality: N/A;    INCISION OF VULVA N/A 07/25/2024    Procedure: INCISION, VULVA;  Surgeon: Jazz Amador MD;  Location: [facility];  Service: OB/GYN;  Laterality: N/A;    INSERTION OF TUNNELED CENTRAL VENOUS HEMODIALYSIS CATHETER Right 05/03/2023    Procedure: Insertion, Catheter, Central Venous, Hemodialysis;  Surgeon: Priya Grimm MD;  Location: [facility] CATH LAB;  Service: Interventional Nephrology;  Laterality: Right;    LOOP ELECTROSURGICAL EXCISION PROCEDURE (LEEP) N/A 07/25/2024    Procedure: LEEP (LOOP ELECTROSURGICAL EXCISION PROCEDURE);  Surgeon: Jazz Amador MD;  Location: [facility];  Service: OB/GYN;  Laterality: N/A;    REMOVAL OF HEMODIALYSIS CATHETER  05/03/2023    Procedure: REMOVAL, CATHETER, HEMODIALYSIS;  Surgeon: Priya Grimm MD;  Location: [facility] CATH LAB;  Service: Interventional Nephrology;;    THERMAL ABLATION OF ENDOMETRIUM N/A 07/25/2024    Procedure: ABLATION, ENDOMETRIUM, THERMAL;  Surgeon: Jazz Amador MD;  Location: [facility];  Service: OB/GYN;  Laterality: N/A;       Review of patient's allergies indicates:   Allergen Reactions    Aspirin Hives     And vomiting    Penicillins Hives     Throat swelling    Adhesive Rash     Adhesive/silk tape (type found on band aides). Can only use "Paper Tape"    Butalbital-acetaminophen-caff Nausea And Vomiting and Other (See Comments)     Dizziness (made pt feel sick)    Latex, natural rubber Rash "    Nickel Rash    Tomato Nausea And Vomiting     Current Facility-Administered Medications   Medication Frequency    0.9%  NaCl infusion Continuous    acetaminophen tablet 1,000 mg Q6H    diphenhydrAMINE capsule 25 mg Q12H PRN    famotidine tablet 20 mg BID    fentaNYL 50 mcg/mL injection 25 mcg Q5 Min PRN    fentaNYL 50 mcg/mL injection  mcg PRN    glucagon (human recombinant) injection 1 mg PRN    [START ON 9/20/2024] heparin (porcine) injection 5,000 Units Q8H    HYDROmorphone injection 1 mg Q3H PRN    LIDOcaine (PF) 10 mg/ml (1%) injection 10 mg Once PRN    melatonin tablet 6 mg Nightly PRN    melatonin tablet 6 mg Nightly PRN    midazolam injection 0.5-4 mg PRN    ondansetron disintegrating tablet 8 mg Q8H PRN    ondansetron injection 4 mg Q6H PRN    oxyCODONE immediate release tablet 5 mg Q4H PRN    oxyCODONE immediate release tablet 5 mg Q4H PRN    oxyCODONE immediate release tablet Tab 10 mg Q4H PRN    phenazopyridine tablet 200 mg TID PRN    prochlorperazine injection Soln 5 mg Q30 Min PRN    promethazine tablet 25 mg Q6H PRN    sodium chloride 0.9% flush 10 mL PRN    sodium chloride 0.9% flush 3 mL PRN    sodium zirconium cyclosilicate packet 10 g BID     Facility-Administered Medications Ordered in Other Encounters   Medication Frequency    0.9%  NaCl infusion Continuous    ondansetron disintegrating tablet 8 mg Q8H PRN     Family History       Problem Relation (Age of Onset)    Diabetes Brother    Fibromyalgia Mother    Heart disease Father    Polycystic kidney disease Father, Sister, Sister    Stroke Father          Tobacco Use    Smoking status: Every Day     Average packs/day: 1 pack/day for 28.0 years (28.0 ttl pk-yrs)     Types: Cigarettes, Cigars     Start date: 1994     Passive exposure: Never    Smokeless tobacco: Never    Tobacco comments:     Smoke 3-5 black and mild cigars a day    Substance and Sexual Activity    Alcohol use: Not Currently    Drug use: Never    Sexual activity: Not  "Currently     Review of Systems   Respiratory:  Negative for cough and shortness of breath.    Cardiovascular:  Negative for chest pain.   Gastrointestinal:  Positive for abdominal pain.   Neurological:  Negative for dizziness and weakness.   Psychiatric/Behavioral: Negative.       Objective:     Vital Signs (Most Recent):  Temp: 97.7 °F (36.5 °C) (09/19/24 1757)  Pulse: 94 (09/19/24 1757)  Resp: 18 (09/19/24 1757)  BP: 128/67 (09/19/24 1757)  SpO2: 98 % (09/19/24 1757) Vital Signs (24h Range):  Temp:  [97.7 °F (36.5 °C)] 97.7 °F (36.5 °C)  Pulse:  [84-94] 94  Resp:  [13-20] 18  SpO2:  [95 %-100 %] 98 %  BP: (104-129)/(55-83) 128/67     Weight: 90.7 kg (200 lb) (09/19/24 1042)  Body mass index is 27.89 kg/m².  Body surface area is 2.13 meters squared.    No intake/output data recorded.     Physical Exam  Constitutional:       General: She is not in acute distress.     Appearance: Normal appearance. She is normal weight.   Cardiovascular:      Rate and Rhythm: Normal rate and regular rhythm.      Pulses: Normal pulses.   Pulmonary:      Effort: Pulmonary effort is normal. No respiratory distress.      Breath sounds: Normal breath sounds. No wheezing or rales.   Abdominal:      General: Abdomen is flat.      Palpations: Abdomen is soft.      Comments: S/p surgical changes, multiple dressings/bandages, single JOLENE drain w dark sanguinous output.    Musculoskeletal:         General: Normal range of motion.      Cervical back: Normal range of motion.   Skin:     General: Skin is warm and dry.   Neurological:      General: No focal deficit present.      Mental Status: She is alert and oriented to person, place, and time. Mental status is at baseline.   Psychiatric:         Mood and Affect: Mood normal.          Significant Labs:  BMP:   Recent Labs   Lab 09/13/24  1400   K 4.1     CBC: No results for input(s): "WBC", "RBC", "HGB", "HCT", "PLT", "MCV", "MCH", "MCHC" in the last 168 hours.  All labs within the past 24 hours " have been reviewed.    CXR reviewed, no acute abnormalities.   Assessment/Plan:   Renal mass  Known history of PCKD. S/p L robotic radical nephrectomy today by Urology. No complications.  cc's. Drain in place.     ESRD (end stage renal disease) on dialysis  ESRD since 4/2023 on HD MWF. Last HD session 9/18. No missed sessions. K 5.1, BUN 55, CO2 17.   -Will attempt for 3 hr session HD tonight given her borderline elevated potassium, but if we are having staffing issues it would be appropriate for an HD session tomorrow morning (which is her regularly scheduled session).   -if HD is not done tonight, please repeat a BMP in 4 hrs and shift potassium as needed. Lokelma ordered by primary team.     Polycystic kidney disease  See renal mass    Secondary hyperparathyroidism       PTH noted elevated at 1044 from 9/4    Thank you for your consult. I will follow-up with patient. Please contact us if you have any additional questions.    Antoine Villeda MD  Nephrology  [facility] - Surgery (2nd Fl)

## 2024-09-19 NOTE — TELEPHONE ENCOUNTER
Left message stating I was calling about referral in placed:    Schedule -Next available with OCT and HVF

## 2024-09-19 NOTE — ANESTHESIA PROCEDURE NOTES
Arterial    Diagnosis: Renal mass    Patient location during procedure: done in OR  Timeout: 9/19/2024 1:14 PM  Procedure end time: 9/19/2024 1:16 PM    Staffing  Authorizing Provider: Alonso Sims MD  Performing Provider: Samara Salgado MD    Staffing  Performed by: Samara Salgado MD  Authorized by: Alonso Sims MD    Anesthesiologist was present at the time of the procedure.    Preanesthetic Checklist  Completed: patient identified, IV checked, site marked, risks and benefits discussed, surgical consent, monitors and equipment checked, pre-op evaluation, timeout performed and anesthesia consent givenArterial  Skin Prep: chlorhexidine gluconate and isopropyl alcohol  Local Infiltration: none  Orientation: left  Location: radial    Catheter Size: 20 G  Catheter placement by Ultrasound guidance. Heme positive aspiration all ports.   Vessel Caliber: small, medium, patent, compressibility normal  Vascular Doppler:  not done  Needle advanced into vessel with real time Ultrasound guidance.  Guidewire confirmed in vessel.  Sterile sheath used.  Image recorded and saved.Insertion Attempts: 2  Assessment  Dressing: secured with tape and tegaderm  Patient: Tolerated well

## 2024-09-19 NOTE — ANESTHESIA PROCEDURE NOTES
Intubation    Date/Time: 9/19/2024 1:05 PM    Performed by: Samara Salgado MD  Authorized by: Alonso Sims MD    Intubation:     Induction:  Intravenous    Intubated:  Postinduction    Mask Ventilation:  Easy mask    Attempts:  1    Attempted By:  Resident anesthesiologist    Method of Intubation:  Video laryngoscopy    Blade:  Ellis 3    Laryngeal View Grade: Grade I - full view of cords      Difficult Airway Encountered?: No      Complications:  None    Airway Device:  Oral endotracheal tube    Airway Device Size:  7.0    Style/Cuff Inflation:  Cuffed (inflated to minimal occlusive pressure)    Tube secured:  23    Secured at:  The lips    Placement Verified By:  Capnometry    Complicating Factors:  None    Findings Post-Intubation:  Atraumatic/condition of teeth unchanged and BS equal bilateral  Notes:      Very poor dentition with numerous loose teeth - all intact

## 2024-09-19 NOTE — INTERVAL H&P NOTE
The patient has been examined and the H&P has been reviewed:    I concur with the findings and no changes have occurred since H&P was written.    Surgery risks, benefits and alternative options discussed and understood by patient/family.    -I have explained the risk, benefits, and alternatives of the procedure in detail.  The risks including but not limited to bleeding, injury to adjacent structures including the spleen, liver, lung, pancreas, colon and intestines, blood vessels in the abdomen including the renal artery or renal vein, or need for conversion to open nephrectomy were explained to the patient in depth. The patient voices understanding and all questions have been answered. The patient agrees to proceed as planned with a left robotic nephrectomy        There are no hospital problems to display for this patient.

## 2024-09-19 NOTE — PROGRESS NOTES
IRB# [id number]  [facility] Renal Protocol  Date: 19 SEP 2024                     Specimen was not retrieved from OR 24 due to the procedure occurring after hours. Subject will be documented as a screen failure per [facility] protocol.       Mary Singleton aCRC

## 2024-09-19 NOTE — ASSESSMENT & PLAN NOTE
Known history of PCKD. S/p L robotic radical nephrectomy today by Urology. No complications.  cc's. Drain in place.

## 2024-09-20 VITALS
TEMPERATURE: 98 F | DIASTOLIC BLOOD PRESSURE: 65 MMHG | SYSTOLIC BLOOD PRESSURE: 122 MMHG | WEIGHT: 221.31 LBS | OXYGEN SATURATION: 94 % | HEIGHT: 71 IN | RESPIRATION RATE: 18 BRPM | BODY MASS INDEX: 30.98 KG/M2 | HEART RATE: 65 BPM

## 2024-09-20 LAB
ALBUMIN SERPL BCP-MCNC: 3.3 G/DL (ref 3.5–5.2)
ANION GAP SERPL CALC-SCNC: 19 MMOL/L (ref 8–16)
BASOPHILS # BLD AUTO: 0.02 K/UL (ref 0–0.2)
BASOPHILS NFR BLD: 0.1 % (ref 0–1.9)
BUN SERPL-MCNC: 73 MG/DL (ref 6–20)
CALCIUM SERPL-MCNC: 8.4 MG/DL (ref 8.7–10.5)
CHLORIDE SERPL-SCNC: 96 MMOL/L (ref 95–110)
CO2 SERPL-SCNC: 20 MMOL/L (ref 23–29)
CREAT SERPL-MCNC: 13.5 MG/DL (ref 0.5–1.4)
DIFFERENTIAL METHOD BLD: ABNORMAL
EOSINOPHIL # BLD AUTO: 0 K/UL (ref 0–0.5)
EOSINOPHIL NFR BLD: 0 % (ref 0–8)
ERYTHROCYTE [DISTWIDTH] IN BLOOD BY AUTOMATED COUNT: 14.7 % (ref 11.5–14.5)
EST. GFR  (NO RACE VARIABLE): 3.2 ML/MIN/1.73 M^2
GLUCOSE SERPL-MCNC: 116 MG/DL (ref 70–110)
HCT VFR BLD AUTO: 42.9 % (ref 37–48.5)
HGB BLD-MCNC: 13.8 G/DL (ref 12–16)
IMM GRANULOCYTES # BLD AUTO: 0.09 K/UL (ref 0–0.04)
IMM GRANULOCYTES NFR BLD AUTO: 0.6 % (ref 0–0.5)
LYMPHOCYTES # BLD AUTO: 0.7 K/UL (ref 1–4.8)
LYMPHOCYTES NFR BLD: 4.7 % (ref 18–48)
MAGNESIUM SERPL-MCNC: 2.4 MG/DL (ref 1.6–2.6)
MCH RBC QN AUTO: 32.7 PG (ref 27–31)
MCHC RBC AUTO-ENTMCNC: 32.2 G/DL (ref 32–36)
MCV RBC AUTO: 102 FL (ref 82–98)
MONOCYTES # BLD AUTO: 0.8 K/UL (ref 0.3–1)
MONOCYTES NFR BLD: 5.3 % (ref 4–15)
NEUTROPHILS # BLD AUTO: 13.9 K/UL (ref 1.8–7.7)
NEUTROPHILS NFR BLD: 89.3 % (ref 38–73)
NRBC BLD-RTO: 0 /100 WBC
PHOSPHATE SERPL-MCNC: 7.5 MG/DL (ref 2.7–4.5)
PLATELET # BLD AUTO: 155 K/UL (ref 150–450)
PMV BLD AUTO: 9.9 FL (ref 9.2–12.9)
POTASSIUM SERPL-SCNC: 5.4 MMOL/L (ref 3.5–5.1)
RBC # BLD AUTO: 4.22 M/UL (ref 4–5.4)
SODIUM SERPL-SCNC: 135 MMOL/L (ref 136–145)
WBC # BLD AUTO: 15.59 K/UL (ref 3.9–12.7)

## 2024-09-20 PROCEDURE — 80048 BASIC METABOLIC PNL TOTAL CA: CPT | Mod: NTX | Performed by: STUDENT IN AN ORGANIZED HEALTH CARE EDUCATION/TRAINING PROGRAM

## 2024-09-20 PROCEDURE — 25000003 PHARM REV CODE 250: Mod: NTX | Performed by: NURSE PRACTITIONER

## 2024-09-20 PROCEDURE — 63600175 PHARM REV CODE 636 W HCPCS: Mod: NTX

## 2024-09-20 PROCEDURE — 36415 COLL VENOUS BLD VENIPUNCTURE: CPT | Mod: NTX | Performed by: STUDENT IN AN ORGANIZED HEALTH CARE EDUCATION/TRAINING PROGRAM

## 2024-09-20 PROCEDURE — 82040 ASSAY OF SERUM ALBUMIN: CPT | Mod: NTX | Performed by: STUDENT IN AN ORGANIZED HEALTH CARE EDUCATION/TRAINING PROGRAM

## 2024-09-20 PROCEDURE — 90935 HEMODIALYSIS ONE EVALUATION: CPT | Mod: NTX,,, | Performed by: NURSE PRACTITIONER

## 2024-09-20 PROCEDURE — 63600175 PHARM REV CODE 636 W HCPCS: Mod: NTX | Performed by: STUDENT IN AN ORGANIZED HEALTH CARE EDUCATION/TRAINING PROGRAM

## 2024-09-20 PROCEDURE — 25000003 PHARM REV CODE 250: Mod: NTX

## 2024-09-20 PROCEDURE — 83735 ASSAY OF MAGNESIUM: CPT | Mod: NTX | Performed by: STUDENT IN AN ORGANIZED HEALTH CARE EDUCATION/TRAINING PROGRAM

## 2024-09-20 PROCEDURE — 25000003 PHARM REV CODE 250: Mod: NTX | Performed by: STUDENT IN AN ORGANIZED HEALTH CARE EDUCATION/TRAINING PROGRAM

## 2024-09-20 PROCEDURE — 27201247 HC HEMODIALYSIS, SET-UP & CANCEL: Mod: NTX

## 2024-09-20 PROCEDURE — 84100 ASSAY OF PHOSPHORUS: CPT | Mod: NTX | Performed by: STUDENT IN AN ORGANIZED HEALTH CARE EDUCATION/TRAINING PROGRAM

## 2024-09-20 PROCEDURE — 85025 COMPLETE CBC W/AUTO DIFF WBC: CPT | Mod: NTX | Performed by: STUDENT IN AN ORGANIZED HEALTH CARE EDUCATION/TRAINING PROGRAM

## 2024-09-20 RX ORDER — POLYETHYLENE GLYCOL 3350 17 G/17G
17 POWDER, FOR SOLUTION ORAL DAILY
Qty: 238 G | Refills: 0 | Status: SHIPPED | OUTPATIENT
Start: 2024-09-20 | End: 2024-10-04

## 2024-09-20 RX ORDER — ACETAMINOPHEN 500 MG
1000 TABLET ORAL EVERY 6 HOURS PRN
Qty: 30 TABLET | Refills: 0 | Status: SHIPPED | OUTPATIENT
Start: 2024-09-20

## 2024-09-20 RX ORDER — OXYCODONE HYDROCHLORIDE 5 MG/1
5 TABLET ORAL EVERY 4 HOURS PRN
Qty: 10 TABLET | Refills: 0 | Status: SHIPPED | OUTPATIENT
Start: 2024-09-20 | End: 2024-09-25

## 2024-09-20 RX ORDER — SEVELAMER CARBONATE 800 MG/1
800 TABLET, FILM COATED ORAL
Status: DISCONTINUED | OUTPATIENT
Start: 2024-09-20 | End: 2024-09-20 | Stop reason: HOSPADM

## 2024-09-20 RX ORDER — FAMOTIDINE 20 MG/1
20 TABLET, FILM COATED ORAL DAILY
Status: DISCONTINUED | OUTPATIENT
Start: 2024-09-21 | End: 2024-09-20 | Stop reason: HOSPADM

## 2024-09-20 RX ADMIN — OXYCODONE HYDROCHLORIDE 10 MG: 10 TABLET ORAL at 10:09

## 2024-09-20 RX ADMIN — OXYCODONE HYDROCHLORIDE 10 MG: 10 TABLET ORAL at 05:09

## 2024-09-20 RX ADMIN — ACETAMINOPHEN 1000 MG: 500 TABLET ORAL at 12:09

## 2024-09-20 RX ADMIN — OXYCODONE HYDROCHLORIDE 10 MG: 10 TABLET ORAL at 03:09

## 2024-09-20 RX ADMIN — ACETAMINOPHEN 1000 MG: 500 TABLET ORAL at 01:09

## 2024-09-20 RX ADMIN — HYDROMORPHONE HYDROCHLORIDE 1 MG: 1 INJECTION, SOLUTION INTRAMUSCULAR; INTRAVENOUS; SUBCUTANEOUS at 09:09

## 2024-09-20 RX ADMIN — SODIUM ZIRCONIUM CYCLOSILICATE 10 G: 10 POWDER, FOR SUSPENSION ORAL at 01:09

## 2024-09-20 RX ADMIN — HEPARIN SODIUM 5000 UNITS: 5000 INJECTION INTRAVENOUS; SUBCUTANEOUS at 01:09

## 2024-09-20 RX ADMIN — FAMOTIDINE 20 MG: 20 TABLET ORAL at 08:09

## 2024-09-20 RX ADMIN — ACETAMINOPHEN 1000 MG: 500 TABLET ORAL at 05:09

## 2024-09-20 NOTE — HPI
Ade Silva is a 44 yr old female with autosomal dominant polycystic kidney disease, CKD V (HD M/W/F), and left renal mass now s/p radical left nephrectomy with Dr. Maldonado on 09/20/24

## 2024-09-20 NOTE — ANESTHESIA POSTPROCEDURE EVALUATION
Anesthesia Post Evaluation    Patient: Ade Silva    Procedure(s) Performed: Procedure(s) (LRB):  XI ROBOTIC NEPHRECTOMY (Left)    Final Anesthesia Type: general      Patient location during evaluation: [facility]  Patient participation: Yes- Able to Participate  Level of consciousness: awake and alert  Post-procedure vital signs: reviewed and stable  Pain management: adequate  Airway patency: patent    PONV status at discharge: No PONV  Anesthetic complications: no      Cardiovascular status: hemodynamically stable  Respiratory status: unassisted, spontaneous ventilation and room air  Hydration status: euvolemic  Follow-up not needed.          Vitals Value Taken Time   /74 09/20/24 0415   Temp 36.8 °C (98.2 °F) 09/20/24 0415   Pulse 87 09/20/24 0415   Resp 18 09/20/24 0522   SpO2 95 % 09/20/24 0415         Event Time   Out of Recovery 18:35:00         Pain/Geovanna Score: Pain Rating Prior to Med Admin: 8 (9/20/2024  5:22 AM)  Pain Rating Post Med Admin: 0 (9/20/2024  1:00 AM)  Geovanna Score: 10 (9/19/2024  6:35 PM)

## 2024-09-20 NOTE — PROGRESS NOTES
[facility]  Urology  Progress Note    Patient Name: Ade Silva  MRN: [id number]  Admission Date: 9/19/2024  Hospital Length of Stay: 1 days  Code Status: Full Code   Attending Provider: Valentin Maldonado MD   Primary Care Physician: Jordan Christiansen MD    Subjective:     HPI:   Ade Silva is a 44 yr old female with autosomal dominant polycystic kidney disease, CKD V (HD M/W/F), and left renal mass now s/p radical left nephrectomy with Dr. Maldonado on 09/20/24    Interval History:   NAEO. AFVSS. Slightly hypotensive this am at 112/74. Patient resting in bed, in some pain and endorsing pain with inspiration. She ambulated, and ate a sandwich overnight. HD arrived at bedside 3 am and patient asked to reschedule to another time today.    Post op Hgb stable at 13.8  Objective:     Temp:  [97.7 °F (36.5 °C)-98.2 °F (36.8 °C)] 98.2 °F (36.8 °C)  Pulse:  [82-98] 87  Resp:  [12-20] 18  SpO2:  [93 %-100 %] 95 %  BP: (104-129)/(55-83) 112/74     Body mass index is 30.87 kg/m².           Drains       Drain  Duration                  Hemodialysis AV Fistula 09/14/23 Right forearm 372 days         Hemodialysis AV Fistula 10/05/23 0833 Right upper arm 350 days         Closed/Suction Drain 09/19/24 1708 Tube - 1 Left Abdomen Bulb 15 Fr. <1 day                     Physical Exam  HENT:      Head: Normocephalic.   Eyes:      Pupils: Pupils are equal, round, and reactive to light.   Cardiovascular:      Rate and Rhythm: Normal rate.   Pulmonary:      Effort: Pulmonary effort is normal.   Abdominal:      Comments: Incision site clean/ dry/ intact with dermabond on top.   LLQ drain in place draining SS output   Musculoskeletal:         General: Normal range of motion.      Cervical back: Normal range of motion.   Skin:     General: Skin is warm.   Neurological:      Mental Status: She is alert.   Psychiatric:         Mood and Affect: Mood normal.           Significant Labs:    BMP:  Recent Labs   Lab 09/18/24  1400  09/19/24  1759 09/20/24  0450   NA  --  135* 135*   K 4.1 5.1 5.4*   CL  --  98 96   CO2  --  17* 20*   BUN  --  55* 73*   CREATININE  --  12.1* 13.5*   CALCIUM  --  8.2* 8.4*       CBC:   Recent Labs   Lab 09/18/24  1400 09/19/24  1759 09/20/24  0451   WBC  --  17.87* 15.59*   HGB 13.6 14.5 13.8   HCT 42.8 44.6 42.9   PLT  --  150 155       All pertinent labs results from the past 24 hours have been reviewed.    Significant Imaging:  All pertinent imaging results/findings from the past 24 hours have been reviewed.                  Assessment/Plan:     * Renal mass  S/p robotic left radical nephrectomy with Dr. Maldonado on 09/19/24    --Patient doing well POD #1  --Patient counseled on using IS 10x today  --Denied HD this morning, will speak with HD to reschedule today as soon as possible  --Will follow up labs today regarding electrolytes  --Nephrology already on board  --Drain out later today    Dispo: If HD completed, and patient continues to progress, d/c home later today for self care        VTE Risk Mitigation (From admission, onward)           Ordered     heparin (porcine) injection 5,000 Units  Every 8 hours         09/19/24 1740     IP VTE LOW RISK PATIENT  Once         09/19/24 1749     Place sequential compression device  Until discontinued         09/19/24 1749     Place LAURA hose  Until discontinued         09/19/24 1012     Place sequential compression device  Until discontinued         09/19/24 1012                    Ernesto Maki MD  Urology  [facility] - Surgery

## 2024-09-20 NOTE — ASSESSMENT & PLAN NOTE
S/p robotic left radical nephrectomy with Dr. Maldonado on 09/19/24    --Patient doing well POD #1  --Patient counseled on using IS 10x today  --Denied HD this morning, will speak with HD to reschedule today as soon as possible  --Will follow up labs today regarding electrolytes  --Nephrology already on board  --Drain out later today    Dispo: If HD completed, and patient continues to progress, d/c home later today for self care

## 2024-09-20 NOTE — PROGRESS NOTES
[facility] H[facility] NOTE     Patient currently on hemodialysis for removal of uremic toxins .     Patient seen and evaluated on hemodialysis, tolerating treatment, see HD flowsheet for vitals and assessments.      No Hypotension, chest pain, shortness of breath, cramping, nausea or vomiting.     Target UF: 2 L as tolerated, keep MAP >65.  Admitted sp radical left nephrectomy with Dr. Maldonado, resting without distress during treatment.   Hgb 13.8, no EPO  K 5.4, potassium bath adjusted.   Phos 7.5, will restart binders, anticipate some clearance with HD  Consider renal diet restrictions; please limit daily intake to 32 oz per day.   No lab stick/BP intake on access site  Continue to monitor intake and output, daily weights   Please avoid gadolinium, fleets, phos-based laxatives, NSAIDs  Will follow closely and continue dialysis treatments while in-patient    SHU Thornton DNP, APRN, FNP-C  Department of Nephrology  [facility]  Pager: [phone]

## 2024-09-20 NOTE — NURSING
Dialysis tx started to the right arm AVF per orders placed by Dr. Villeda:    Order Questions    Question Answer   Antibiotics on HD? No   Duration of Treatment 3 hours   Dialyzer F160   Dialysate Temperature (C) 36.5   Target  mL/min   If unable to maintain flow due to inadequate vascular access patency, patient intolerance (i.e. chest pain, access discomfort) or elevated venous pressure, adjust blood flow rate to a minimum of _____mL/min 100    mL/min   K+ 2 MEQ/L   Ca++ 2.5 MEQ/L   Na+ 138 MEQ/   Bicarb 30 meq   Access to be used AVF   Needle gauge 16 gauge   Location Arm   Laterality Right   Target UF 1L   If unable to maintain this UFR due to patient intolerance (i.e. hypotension, chest pain, muscle cramping, nausea or vomiting), adjust UFR to achieve a minimum of _______ liters of UF 0   Fluid Removal Instructions maintain SBP > 90 mmHG

## 2024-09-20 NOTE — DISCHARGE SUMMARY
[facility]  Urology  Discharge Summary      Patient Name: Ade Silva  MRN: [id number]  Admission Date: 9/19/2024  Hospital Length of Stay: 1 days  Discharge Date and Time:  09/20/2024 3:48 PM  Attending Physician: Valentin Maldonado MD   Discharging Provider: Jp Green MD  Primary Care Physician: Jordan Christiansen MD    HPI:    Ade Silva is a 44 yr old female with autosomal dominant polycystic kidney disease, CKD V (HD M/W/F), and left renal mass now s/p radical left nephrectomy with Dr. Maldonado on 09/20/24    Procedure(s) (LRB):  XI ROBOTIC NEPHRECTOMY (Left)     Indwelling Lines/Drains at time of discharge:   Lines/Drains/Airways       Drain  Duration                  Hemodialysis AV Fistula 09/14/23 Right forearm 372 days         Hemodialysis AV Fistula 10/05/23 0833 Right upper arm 351 days         Closed/Suction Drain 09/19/24 1708 Tube - 1 Left Abdomen Bulb 15 Fr. <1 day                    Hospital Course (synopsis of major diagnoses, care, treatment, and services provided during the course of the hospital stay):   Patient was brought to the hospital for the above procedure.  The patient tolerated it well.  Post op, the patient's diet was advanced, their pain was controlled, and the patient was ambulating without problem.  They passed a voiding trial and their drain was removed.  The patient will follow up in 2 weeks with Dr. Maldonado.      Goals of Care Treatment Preferences:  Code Status: Full Code      Consults:   Consults (From admission, onward)          Status Ordering Provider     Inpatient consult to Nephrology  Once        Provider:  (Not yet assigned)    Completed CHEKO BLANK     Inpatient consult to Nephrology  Once        Provider:  (Not yet assigned)    Completed CHEKO BLANK            Significant Diagnostic Studies:     Pending Diagnostic Studies:       Procedure Component Value Units Date/Time    Specimen to Pathology, Surgery Urology [[id number]] Collected: 09/19/24  1713    Order Status: Sent Lab Status: In process Updated: 09/20/24 0853    Specimen: Tissue             Final Active Diagnoses:    Diagnosis Date Noted POA    PRINCIPAL PROBLEM:  Renal mass [N28.89] 09/09/2024 Yes    ESRD (end stage renal disease) on dialysis [N18.6, Z99.2] 10/05/2023 Not Applicable    Polycystic kidney disease [Q61.3] 04/28/2023 Not Applicable     Chronic      Problems Resolved During this Admission:         Discharged Condition: good    Disposition: Home    Follow Up: In clinic    Patient Instructions:      CBC Without Differential   Standing Status: Future Number of Occurrences: 1 Standing Exp. Date: 10/22/25     Order Specific Question Answer Comments   Send normal result to authorizing provider's In Basket if patient is active on MyChart: Yes      EKG 12-lead   Standing Status: Future Number of Occurrences: 1 Standing Exp. Date: 08/23/25     Type & Screen   Standing Status: Future Number of Occurrences: 1 Standing Exp. Date: 10/22/25     Medications:  Reconciled Home Medications:      Medication List        START taking these medications      oxyCODONE 5 MG immediate release tablet  Commonly known as: ROXICODONE  Take 1 tablet (5 mg total) by mouth every 4 (four) hours as needed for Pain.     polyethylene glycol 17 gram/dose powder  Commonly known as: GLYCOLAX  Use to cap to measure 17g, mix with liquid, and take by mouth once daily. for 14 days            CHANGE how you take these medications      * acetaminophen 650 MG Tbsr  Commonly known as: TYLENOL  Take 1 tablet (650 mg total) by mouth every 6 (six) hours as needed (pain).  What changed: Another medication with the same name was added. Make sure you understand how and when to take each.     * acetaminophen 500 MG tablet  Commonly known as: TYLENOL  Take 2 tablets (1,000 mg total) by mouth every 6 (six) hours as needed for Pain.  What changed: You were already taking a medication with the same name, and this prescription was added. Make  sure you understand how and when to take each.           * This list has 2 medication(s) that are the same as other medications prescribed for you. Read the directions carefully, and ask your doctor or other care provider to review them with you.                CONTINUE taking these medications      CLARITIN ORAL  Take 1 tablet by mouth daily as needed (Allergies).     esomeprazole 20 MG capsule  Commonly known as: NEXIUM  Take 20 mg by mouth before breakfast.     gabapentin 300 MG capsule  Commonly known as: NEURONTIN  Take 1 capsule (300 mg total) by mouth three times per week after dialysis     VELPHORO 500 mg Chew  Generic drug: sucroferric oxyhydroxide  Break or dissolve 1 tablet by mouth with each meal and snack. Do not exceed 6 tablets per day.            ASK your doctor about these medications      LOKELMA 10 gram packet  Generic drug: sodium zirconium cyclosilicate  Take 1 packet (10 g total) by mouth 2 (two) times a day. Mix entire contents of packet(s) into drinking glass containing 3 tablespoons of water; stir well and drink immediately. Add water and repeat until no powder remains to receive entire dose.     nicotine 21 mg/24 hr  Commonly known as: NICODERM CQ  Place 1 patch onto the skin once daily. Please dispense only clear patches , patient has a tape allergy.     ondansetron 4 MG tablet  Commonly known as: ZOFRAN  Take 1 tablet (4 mg total) by mouth every 6 (six) hours.              Time spent on the discharge of patient: 15 minutes    Jp Green MD  Urology  [facility] - Surgery

## 2024-09-20 NOTE — PROGRESS NOTES
.Nursing Transfer Note      Reason patient is being transferred: procedure care complete    Transfer To: 545    Transfer via bed    Transfer with 2L NC  to O2    Transported by Venkatesh MOE     Medicines sent: n/a    Any special needs or follow-up needed: routine    Chart send with patient: Yes    Notified: mother    Patient reassessed at: 1935 9/19/24 (date, time)

## 2024-09-20 NOTE — CONSULTS
Consult note completed 9/19/24 at 1850.    SHU Thornton DNP, APRN, FNP-C  Department of Nephrology  [facility]  Pager: [phone]

## 2024-09-20 NOTE — NURSING
Arrived to start hd per md orders, pt refused, stating she wasn't feeling good and wanted to wait until the morning, advised her on adhering to tx plan, she verbalized understand ing and  requested to be ran at a later time.

## 2024-09-20 NOTE — SUBJECTIVE & OBJECTIVE
Interval History:   NAEO. AFVSS. Slightly hypotensive this am at 112/74. Patient resting in bed, in some pain and endorsing pain with inspiration. She ambulated, and ate a sandwich overnight. HD arrived at bedside 3 am and patient asked to reschedule to another time today.    Post op Hgb stable at 13.8  Objective:     Temp:  [97.7 °F (36.5 °C)-98.2 °F (36.8 °C)] 98.2 °F (36.8 °C)  Pulse:  [82-98] 87  Resp:  [12-20] 18  SpO2:  [93 %-100 %] 95 %  BP: (104-129)/(55-83) 112/74     Body mass index is 30.87 kg/m².           Drains       Drain  Duration                  Hemodialysis AV Fistula 09/14/23 Right forearm 372 days         Hemodialysis AV Fistula 10/05/23 0833 Right upper arm 350 days         Closed/Suction Drain 09/19/24 1708 Tube - 1 Left Abdomen Bulb 15 Fr. <1 day                     Physical Exam  HENT:      Head: Normocephalic.   Eyes:      Pupils: Pupils are equal, round, and reactive to light.   Cardiovascular:      Rate and Rhythm: Normal rate.   Pulmonary:      Effort: Pulmonary effort is normal.   Abdominal:      Comments: Incision site clean/ dry/ intact with dermabond on top.   LLQ drain in place draining SS output   Musculoskeletal:         General: Normal range of motion.      Cervical back: Normal range of motion.   Skin:     General: Skin is warm.   Neurological:      Mental Status: She is alert.   Psychiatric:         Mood and Affect: Mood normal.           Significant Labs:    BMP:  Recent Labs   Lab 09/18/24 1400 09/19/24 1759 09/20/24  0450   NA  --  135* 135*   K 4.1 5.1 5.4*   CL  --  98 96   CO2  --  17* 20*   BUN  --  55* 73*   CREATININE  --  12.1* 13.5*   CALCIUM  --  8.2* 8.4*       CBC:   Recent Labs   Lab 09/18/24 1400 09/19/24 1759 09/20/24  0451   WBC  --  17.87* 15.59*   HGB 13.6 14.5 13.8   HCT 42.8 44.6 42.9   PLT  --  150 155       All pertinent labs results from the past 24 hours have been reviewed.    Significant Imaging:  All pertinent imaging results/findings from the  past 24 hours have been reviewed.

## 2024-09-20 NOTE — PLAN OF CARE
Problem: Wound  Goal: Optimal Coping  Outcome: Progressing  Goal: Optimal Functional Ability  Outcome: Progressing  Goal: Absence of Infection Signs and Symptoms  Outcome: Progressing  Goal: Improved Oral Intake  Outcome: Progressing  Goal: Optimal Pain Control and Function  Outcome: Progressing  Goal: Skin Health and Integrity  Outcome: Progressing  Goal: Optimal Wound Healing  Outcome: Progressing     Problem: Adult Inpatient Plan of Care  Goal: Plan of Care Review  Outcome: Progressing  Goal: Patient-Specific Goal (Individualized)  Outcome: Progressing  Goal: Absence of Hospital-Acquired Illness or Injury  Outcome: Progressing  Goal: Optimal Comfort and Wellbeing  Outcome: Progressing  Goal: Readiness for Transition of Care  Outcome: Progressing     Problem: Infection  Goal: Absence of Infection Signs and Symptoms  Outcome: Progressing     Problem: Hemodialysis  Goal: Safe, Effective Therapy Delivery  Outcome: Progressing  Goal: Effective Tissue Perfusion  Outcome: Progressing  Goal: Absence of Infection Signs and Symptoms  Outcome: Progressing     Problem: Fall Injury Risk  Goal: Absence of Fall and Fall-Related Injury  Outcome: Progressing

## 2024-09-20 NOTE — PLAN OF CARE
Problem: Hemodialysis  Goal: Safe, Effective Therapy Delivery  Outcome: Progressing  Goal: Effective Tissue Perfusion  Outcome: Progressing  Goal: Absence of Infection Signs and Symptoms  Outcome: Progressing     Dialysis tx ended to the right arm AVF, hemostasis achieved.    Net fluid removed: 1L    Prn dilaudid and oxy given during tx    Pt awaiting transport from [facility] to room 545 by wheelchair.

## 2024-09-24 ENCOUNTER — PATIENT MESSAGE (OUTPATIENT)
Dept: FAMILY MEDICINE | Facility: CLINIC | Age: 44
End: 2024-09-24
Payer: MEDICARE

## 2024-09-24 NOTE — PLAN OF CARE
[facility]  Discharge Final Note    Primary Care Provider: Jordan Christiansen MD    Expected Discharge Date: 9/20/2024    Final Discharge Note (most recent)       Final Note - 09/20/24 1743          Final Note    Anticipated Discharge Disposition Home or Self Care     Hospital Resources/Appts/Education Provided Provided patient/caregiver with written discharge plan information;Provided education on problems/symptoms using teachback                   Future Appointments   Date Time Provider Department Center   9/25/2024 10:30 AM CHAIR 21, [facility] KIDNEY [facility] Kidney [facility]   9/26/2024 10:15 AM [facility] OIC-US1 MASTER [facility] ULTR IC Imaging [facility]tr   9/27/2024 10:30 AM CHAIR 21, [facility] Kidney [facility]   9/30/2024 10:30 AM CHAIR 21, [facility] Kidney [facility]   10/1/2024  9:15 AM Valentin Maldonado MD [facility] UROLOG [facility]   10/2/2024 10:30 AM CHAIR 21, [facility] Kidney [facility]   10/3/2024 10:00 AM [facility] OIC-XRAY [facility] XRAY IC Imaging [facility]   10/3/2024 11:00 AM ECHO, [facility] ECHOSTR [address]   10/3/2024 12:00 PM LAB, APPOINTMENT [facility]   10/3/2024 12:30 PM KIDNEY LISTED, TRANSPLANT [facility] KIDNTX [address]   10/4/2024 10:30 AM CHAIR 32, [facility] Kidney [facility]   10/7/2024 10:30 AM CHAIR 32, [facility] KIDNEY [facility] Kidney [facility]   10/8/2024  9:30 AM Rita Neal, NP [facility] COLON [address]   10/9/2024 10:30 AM CHAIR 32, [facility] KIDNEY [facility] Kidney [facility]   10/10/2024  2:00 PM Hilary Flores [facility] PUL SMO [address]   10/11/2024 10:30 AM CHAIR 32, [facility] KIDNEY [facility] Kidney [facility]   10/14/2024 10:30 AM CHAIR 32, [facility] KIDNEY [facility] Kidney [facility]   10/16/2024 10:30 AM CHAIR 32, [facility] KIDNEY CARE [facility] Kidney [facility]   10/17/2024  8:30 AM Elis Jacobs MD [facility] GYN ON[facility]   10/18/2024 10:30 AM CHAIR 32, [facility] KIDNEY CARE [facility] Kidney [facility]   10/21/2024 10:30 AM CHAIR 32, [facility]  KIDNEY CARE [facility] Kidney [facility]   10/22/2024  2:00 PM Peterson High MD [facility] NEUROS8 [address]   10/23/2024 10:30 AM CHAIR 32, [facility] KIDNEY CARE [facility] Kidney [facility]   10/25/2024 10:30 AM CHAIR 32, [facility] KIDNEY CARE [facility] Kidney [facility]   10/28/2024 10:30 AM CHAIR 32, [facility] KIDNEY [facility] Kidney [facility]   10/30/2024 10:30 AM CHAIR 32, [facility] KIDNEY CARE [facility] Kidney [facility]   11/1/2024 10:30 AM CHAIR 21, [facility] KIDNEY CARE [facility] Kidney [facility]   11/4/2024 10:30 AM CHAIR 21, [facility] KIDNEY CARE [facility]ney [facility]   11/6/2024 10:30 AM CHAIR 21, [facility] KIDNEY CARE [facility]ney [facility]   11/8/2024 10:30 AM CHAIR 21, [facility] KIDNEY CARE [facility]   11/11/2024 10:30 AM CHAIR 21, [facility] KIDNEY CARE [facility]   11/13/2024 10:30 AM CHAIR 21, [facility] KIDNEY CARE [facility]   11/15/2024 10:30 AM CHAIR 21, [facility] KIDNEY CARE [facility] Kid[facility]   11/18/2024 10:30 AM CHAIR 06, [facility] KIDNEY CARE [facility]   11/20/2024 10:30 AM CHAIR 21, [facility] KIDNEY CARE [facility]   11/22/2024 10:30 AM CHAIR 21, [facility] KIDNEY CARE [facility]   11/25/2024 10:30 AM CHAIR 21, [facility] KIDNEY CARE [facility]   11/27/2024 10:30 AM CHAIR 21, [facility] KIDNEY CARE [facility]   11/29/2024 10:30 AM CHAIR 21, [facility] KIDNEY CARE [facility]   12/2/2024 10:30 AM CHAIR 21, [facility] KIDNEY CARE [facility]ney [facility]   12/3/2024 11:00 AM [facility] OIC-MAMMO1 [facility] MAMMOIC Imaging Ctr   12/4/2024 10:30 AM CHAIR 21, [facility] KIDNEY [facility] Kidney [facility]   12/6/2024 10:30 AM CHAIR 21, [facility] KIDNEY [facility] Kidney [facility]   12/9/2024 10:30 AM CHAIR 21, [facility] Kidney [facility]   12/11/2024 10:30 AM CHAIR 21, [facility] Kidney [facility]   12/13/2024 10:30 AM CHAIR 21, [facility] Kidney [facility]   12/16/2024 10:30 AM CHAIR 21, [facility] Kidney [facility]   12/18/2024 10:30 AM CHAIR 21, [facility]  KIDNEY CARE [facility] Kidney [facility]   12/20/2024 10:30 AM CHAIR 21, [facility] KIDNEY [facility] Kidney [facility]   12/23/2024 10:30 AM CHAIR 21, [facility] Kidney [facility]   12/25/2024 10:30 AM CHAIR 21, [facility] Kidney [facility]   12/27/2024 10:30 AM CHAIR 21, [facility] Kidney [facility]   12/30/2024 10:30 AM CHAIR 21, [facility] Kidney [facility]   1/14/2025 11:10 AM Presley Garnett MD [facility]

## 2024-09-25 ENCOUNTER — PATIENT MESSAGE (OUTPATIENT)
Dept: PAIN MEDICINE | Facility: CLINIC | Age: 44
End: 2024-09-25
Payer: MEDICARE

## 2024-09-26 ENCOUNTER — HOSPITAL ENCOUNTER (OUTPATIENT)
Dept: RADIOLOGY | Facility: HOSPITAL | Age: 44
Discharge: HOME | End: 2024-09-26
Attending: STUDENT IN AN ORGANIZED HEALTH CARE EDUCATION/TRAINING PROGRAM
Payer: MEDICARE

## 2024-09-26 DIAGNOSIS — M79.601 RIGHT ARM PAIN: ICD-10-CM

## 2024-09-26 DIAGNOSIS — M79.2 NERVE PAIN: ICD-10-CM

## 2024-09-26 DIAGNOSIS — N28.89 RENAL MASS: ICD-10-CM

## 2024-09-26 DIAGNOSIS — M54.12 CERVICAL RADICULOPATHY: ICD-10-CM

## 2024-09-26 LAB
FINAL PATHOLOGIC DIAGNOSIS: NORMAL
GROSS: NORMAL
Lab: NORMAL
MICROSCOPIC EXAM: NORMAL

## 2024-09-26 PROCEDURE — 76775 US EXAM ABDO BACK WALL LIM: CPT | Mod: TC,TXP

## 2024-09-26 RX ORDER — GABAPENTIN 300 MG/1
CAPSULE ORAL
Qty: 36 CAPSULE | Refills: 2 | Status: SHIPPED | OUTPATIENT
Start: 2024-09-26

## 2024-09-29 NOTE — PROGRESS NOTES
Subjective:       Patient ID: Ade Silva is a 44 y.o. female.    Chief Complaint:  post-op s/p robotic left nephrectomy      History of Present Illness  HPI  Ade Silva is a 44 y.o. White female who presents today for her post-op appt.    Originally had imaging which showed a possible left renal mass. The mass was found incidentally during transplant workup. Patient has a history of polycystic kidney disease and has been on dialysis since May 2023.     The patient denies a personal and family history of kidney cancer.    The patient had increasing/severe left upper quadrant abdominal pain.  She attributed this pain to her PCKD.  She ultimately decided on and then underwent a left robotic nephrectomy on 9/19/24.  She did well post-op.  Today she reports recovering well after surgery. She states that some of her back pain is improved since surgery and her abdomen feels slightly less full. She states that she has been fairly fatigued but overall feels as if she is doing well.      SURGICAL PATHOLOGY 9/19/24:      Component 10 d ago   Final Pathologic Diagnosis LEFT KIDNEY, RADICAL NEPHRECTOMY:  Negative for malignancy.    Benign multiloculated cyst, 2.5 cm.  Background enlarged cystic kidney, consistent with polycystic kidney disease, cysts range from 0.3 cm-2.2 cm.                  Review of Systems  Review of Systems  All other systems reviewed and negative except pertinent positives noted in HPI.       Objective:     Physical Exam  Constitutional:       General: She is not in acute distress.     Appearance: She is well-developed.   HENT:      Head: Normocephalic and atraumatic.   Eyes:      General: No scleral icterus.  Neck:      Trachea: No tracheal deviation.   Pulmonary:      Effort: Pulmonary effort is normal. No respiratory distress.   Abdominal:      Comments: Incisions healing well, no signs of infection     Neurological:      Mental Status: She is alert and oriented to person, place, and  time.   Psychiatric:         Behavior: Behavior normal.         Thought Content: Thought content normal.         Judgment: Judgment normal.         Lab Review  Lab Results   Component Value Date    COLORU Pale Yellow 06/16/2023    SPECGRAV 1.010 04/28/2023    PHUR 7.0 04/28/2023    NITRITE Negative 04/28/2023    KETONESU Negative 04/28/2023    UROBILINOGEN Normal 06/16/2023         Assessment:        1. Polycystic kidney disease    2. ESRD (end stage renal disease) on dialysis    3. Primary hypertension            Plan:     Polycystic kidney disease    ESRD (end stage renal disease) on dialysis    Primary hypertension        Flank Pain/PCKD:  -pathology reviewed and confirmed benign.     -f/u in 1 year with renal US.    -NO contraindication for renal transplant moving forward from a  perspective.       ESRD  -continue HD;     HTN:  -BP reviewed  -stable, continue meds and f/u with PCP      The patient was seen and examined with the resident physician.  All questions were answered.  The plan was discussed with the patient and I concur with the resident physician's documentation.

## 2024-10-01 ENCOUNTER — OFFICE VISIT (OUTPATIENT)
Dept: UROLOGY | Facility: CLINIC | Age: 44
End: 2024-10-01
Payer: MEDICARE

## 2024-10-01 ENCOUNTER — TELEPHONE (OUTPATIENT)
Dept: TRANSPLANT | Facility: CLINIC | Age: 44
End: 2024-10-01
Payer: MEDICAID

## 2024-10-01 VITALS
SYSTOLIC BLOOD PRESSURE: 104 MMHG | DIASTOLIC BLOOD PRESSURE: 71 MMHG | WEIGHT: 214.5 LBS | BODY MASS INDEX: 30.03 KG/M2 | HEIGHT: 71 IN | HEART RATE: 86 BPM

## 2024-10-01 DIAGNOSIS — Q61.3 POLYCYSTIC KIDNEY DISEASE: Primary | ICD-10-CM

## 2024-10-01 DIAGNOSIS — I10 PRIMARY HYPERTENSION: ICD-10-CM

## 2024-10-01 DIAGNOSIS — N18.6 ESRD (END STAGE RENAL DISEASE) ON DIALYSIS: ICD-10-CM

## 2024-10-01 DIAGNOSIS — Z99.2 ESRD (END STAGE RENAL DISEASE) ON DIALYSIS: ICD-10-CM

## 2024-10-01 PROCEDURE — 99024 POSTOP FOLLOW-UP VISIT: CPT | Mod: NTX,POP,, | Performed by: UROLOGY

## 2024-10-01 PROCEDURE — 99213 OFFICE O/P EST LOW 20 MIN: CPT | Mod: PBBFAC,TXP | Performed by: UROLOGY

## 2024-10-01 PROCEDURE — 99999 PR PBB SHADOW E&M-EST. PATIENT-LVL III: CPT | Mod: PBBFAC,TXP,, | Performed by: UROLOGY

## 2024-10-01 NOTE — LETTER
October 1, 2024    Ade Silva  [address]  [place] [address]      Dear Ade Silva:  MRN: [id number]    The [facility] team reviewed your transplant candidacy.  It is our programs opinion that you are temporarily not a transplant candidate at our facility as of 10/1/24 because of insurance issues.  You will remain listed on the wait list, but will not be able to receive a transplant until this issue is resolved.      Attached is a letter from the [employer] ([employer]).  It describes the services and information offered to patients by [employer] and the Organ Procurement and Transplant Network.    The [facility] team remains available to answer any questions.  Should you have any questions regarding this decision, please do not hesitate to contact our pre-transplant office.      Sincerely,      Isabelle Briseno MD  Medical Director, Kidney & Kidney/Pancreas Transplantation  lh/enclosure    Cc: Dr. Priya Grimm        [facility]               The Organ Procurement and Transplantation Network   Toll-free patient services line: [phone]  Your resource for organ transplant information      Staffed 8:30 am - 5:00 pm ET Monday - Friday   Leave a message 24/7 to receive a call back    The Organ Procurement and Transplantation Network (OPTN) is the national transplant system. It makes the policies that decide how donated organs are matched to patients waiting for a transplant. The OPTN:    Makes sure donated organs get matched to people on the transplant waiting list  Tells people about the donation and transplant processes  Makes sure that the public knows about the need for more organ and tissue donations    The OPTN has a free patient services line that you can call to:  Get more information about:   o Organ donation and organ transplants   o Donation and transplant policies  Get an information kit with:   o A list of transplant  hospitals   o Waiting list information  Talk about any questions you may have about your transplant hospital or organ procurement organization. The staff will do their best to help you or point you to others who may help.  Find out how you can volunteer with the OPTN and help shape transplant policy    The patient services line number is: 5-059-243-2432    Patient services line staff CANNOT answer questions about your own medical care, including:  Waiting list status  Test results  Medical records  You will need to call your transplant hospital for this information.    The following websites have more information about transplantation and donation:  OPTN: https://optn.transplant.hrsa.gov/  For potential living donors and transplant recipients:   o Living with transplant: https://www.transplantliving.org/   o Living donation process: https://optn.transplant.hrsa.gov/living-donation/     o Financial assistance: https://www.livingdonorassistance.org/  Transplantation data: https://www.srtr.org/  Organ donation: https://www.organdonor.gov/    Volunteer with the OPTN: https://optn.transplant.hrsa.gov/get-involved

## 2024-10-09 ENCOUNTER — PATIENT MESSAGE (OUTPATIENT)
Dept: NEUROSURGERY | Facility: CLINIC | Age: 44
End: 2024-10-09
Payer: MEDICAID

## 2024-10-10 ENCOUNTER — PATIENT MESSAGE (OUTPATIENT)
Dept: TRANSPLANT | Facility: CLINIC | Age: 44
End: 2024-10-10
Payer: MEDICAID

## 2024-10-10 ENCOUNTER — CLINICAL SUPPORT (OUTPATIENT)
Dept: SMOKING CESSATION | Facility: CLINIC | Age: 44
End: 2024-10-10

## 2024-10-10 DIAGNOSIS — F17.200 NICOTINE DEPENDENCE: Primary | ICD-10-CM

## 2024-10-10 NOTE — PROGRESS NOTES
Individual Follow-Up Form    10/10/2024    Quit Date:     Clinical Status of Patient: Outpatient    Length of Service: 45 minutes    Continuing Medication: yes  Patches    Other Medications:      Target Symptoms: Withdrawal and medication side effects. The following were  rated moderate (3) to severe (4) on TCRS:  Moderate (3): crave/desire   Severe (4): none    Comments: Some technical problems with virtual . Virtual / Audio visit .  Patient continues to smoke 2-3 black and mild  per day. Pt remains on tobacco cessation medication of  21 mg nicotine patch QD No adverse effects noted at this time.  Reviewed strategies, habitual behavior, stress, and high risk situations. Introduced stress with addition interventions, and the importance of rewarding oneself for accomplishments toward becoming tobacco free. She did get her patches from smoking quit line and is using them and she feels they are really helping her . Encouraged her to try the nrt gum or regular gum as well to help with breakthrough crave . Pt understands to call CTTS if anything is needed before the next visit.            Diagnosis: F17.210    Next Visit: 2 weeks

## 2024-10-11 ENCOUNTER — PATIENT MESSAGE (OUTPATIENT)
Dept: TRANSPLANT | Facility: CLINIC | Age: 44
End: 2024-10-11
Payer: MEDICAID

## 2024-10-17 ENCOUNTER — PATIENT MESSAGE (OUTPATIENT)
Dept: NEPHROLOGY | Facility: CLINIC | Age: 44
End: 2024-10-17
Payer: COMMERCIAL

## 2024-10-17 ENCOUNTER — OFFICE VISIT (OUTPATIENT)
Dept: GYNECOLOGIC ONCOLOGY | Facility: CLINIC | Age: 44
End: 2024-10-17
Payer: MEDICARE

## 2024-10-17 VITALS
WEIGHT: 216.19 LBS | OXYGEN SATURATION: 98 % | BODY MASS INDEX: 30.27 KG/M2 | HEIGHT: 71 IN | TEMPERATURE: 98 F | HEART RATE: 89 BPM | DIASTOLIC BLOOD PRESSURE: 55 MMHG | SYSTOLIC BLOOD PRESSURE: 111 MMHG

## 2024-10-17 DIAGNOSIS — D06.9 CIN III WITH SEVERE DYSPLASIA: Primary | ICD-10-CM

## 2024-10-17 DIAGNOSIS — N85.01 COMPLEX ENDOMETRIAL HYPERPLASIA WITHOUT ATYPIA: ICD-10-CM

## 2024-10-17 PROCEDURE — 99999 PR PBB SHADOW E&M-EST. PATIENT-LVL III: CPT | Mod: PBBFAC,,, | Performed by: OBSTETRICS & GYNECOLOGY

## 2024-10-17 PROCEDURE — 99213 OFFICE O/P EST LOW 20 MIN: CPT | Mod: PBBFAC | Performed by: OBSTETRICS & GYNECOLOGY

## 2024-10-17 RX ORDER — MEDROXYPROGESTERONE ACETATE 10 MG/1
10 TABLET ORAL DAILY
Qty: 30 TABLET | Refills: 11 | Status: SHIPPED | OUTPATIENT
Start: 2024-10-17 | End: 2025-10-17

## 2024-10-17 NOTE — PROGRESS NOTES
Subjective:      Patient ID: Ade Silva is a 44 y.o. female.    Chief Complaint: Advice Only (Complex hyperplasia without atypia, TAYLOR 2-3)      HPI  Ade Silva is a 44 yr old  referred from Dr. POLA Amador for complex hyperplasia without atypia and TAYLOR 2-3. She reports irregular bleeding and cycles her whole life but stopped having cycles early  for approx nine months. Resumed irregular bleeding early .     She has a complex medical history that includes polycystic kidney disease for which she has required dialysis since 2023. Brain aneurysm she reports is due to her PCKD (coiling). Recent robotic surgery with left nephrectomy. Laparotomy for kidney extraction    Data reviewed:  2023: pap smear: ASC-H, + HR HPV 16  3/7/2024: Pelvic US: 8 cm uterus, ES 8.6 mm, normal ovaries, no FF. 0.9 cm hypoechoic nodule within the endometrial cavity which appears vascular. Finding is suggestive of an endometrial polyp.   3/12/2024: colposcopy: TAYLOR 2-3 endometrial biopsy: no atypical hyperplasia or malignancy identified  2024: vulvar biopsy: condyloma acuminatum   2024: hysteroscopy D&C: complex hyperplasia without atypia arising in a polyp. Endometrial ablation  LEEP/ECC: TAYLOR 2-3, High-grade dysplasia involves an inked and cauterized edge of this LEEP cone biopsy   warty rectal lesion : AIN 3  2024: robotic left nephrectomy for kidney mass. Negative for malignancy.       She has a consult with CRS/Dr. Mojica on 10/24/2024 for AIN 3    Denies history for abnormal pap smears but also reports she knows she's had HPV exposure. Denies prior gardasil vaccine.    Tobacco use. Prior cigarettes 1 ppd/20 + years then cigars 2-3 a day since 2018.      Review of Systems   Constitutional:  Positive for fatigue (since surgery). Negative for appetite change, chills, diaphoresis and fever.   Respiratory:  Negative for chest tightness, shortness of breath and wheezing.    Cardiovascular:   "Negative for chest pain, palpitations and leg swelling.   Gastrointestinal:  Negative for abdominal pain, constipation, diarrhea, nausea and vomiting.   Genitourinary:  Positive for genital sores. Negative for menstrual problem, pelvic pain, vaginal bleeding (none since ablation), vaginal discharge and vaginal pain.   Musculoskeletal:  Negative for gait problem.   Skin:  Negative for color change.   Neurological:  Positive for headaches (history for aneurysm). Negative for dizziness and light-headedness.   Psychiatric/Behavioral:  Negative for agitation. The patient is not nervous/anxious.        Past Medical History:   Diagnosis Date    Anxiety     Asthma     Autosomal dominant polycystic kidney disease     Brain aneurysm     Depression     Encounter for blood transfusion     GERD (gastroesophageal reflux disease)     Hypertension     Seizures     as a child (age 7-8); " stress -related"    TIA (transient ischemic attack)      Past Surgical History:   Procedure Laterality Date    AV FISTULA PLACEMENT Right 09/14/2023    Procedure: CREATION, AV FISTULA, radial cephalic;  Surgeon: MELISSA Loera II, MD;  Location: [facility] OR [address] [place] [address];  Service: Vascular;  Laterality: Right;    AV FISTULA PLACEMENT Right 10/05/2023    Procedure: CREATION, AV FISTULA - brachial;  Surgeon: MELISSA Loera II, MD;  Location: [facility] OR [address] [place] [address];  Service: Vascular;  Laterality: Right;  confirmed placement with doppler    CEREBRAL ANEURYSM REPAIR      coiling    CYSTOSCOPY      age 13    EMBOLIZATION Right 04/23/2024    Procedure: EMBOLIZATION, BLOOD VESSEL FISTULA;  Surgeon: MELISSA Loera II, MD;  Location: [facility] OR [address] [place] [address];  Service: Vascular;  Laterality: Right;    FISTULOGRAM Right 04/23/2024    Procedure: Fistulogram;  Surgeon: MELISSA Loera II, MD;  Location: [facility] OR [address] [place] [address];  Service: Vascular;  Laterality: Right;  CONTRAST: 68ML, FLUORO TIME: 17.9, mGy: 45.11, Gycm2: 5.9548    HYSTEROSCOPY N/A 07/25/2024    Procedure: " HYSTEROSCOPY;  Surgeon: Jazz Amador MD;  Location: [facility];  Service: OB/GYN;  Laterality: N/A;    INCISION OF VULVA N/A 07/25/2024    Procedure: INCISION, VULVA;  Surgeon: Jazz Amador MD;  Location: [facility];  Service: OB/GYN;  Laterality: N/A;    INSERTION OF TUNNELED CENTRAL VENOUS HEMODIALYSIS CATHETER Right 05/03/2023    Procedure: Insertion, Catheter, Central Venous, Hemodialysis;  Surgeon: Priya Grimm MD;  Location: [facility] CATH LAB;  Service: Interventional Nephrology;  Laterality: Right;    LAPAROSCOPIC ROBOT-ASSISTED SURGICAL REMOVAL OF KIDNEY USING DA GLORIA XI Left 9/19/2024    Procedure: XI ROBOTIC NEPHRECTOMY;  Surgeon: Valentin Maldonado MD;  Location: [address];  Service: Urology;  Laterality: Left;    LOOP ELECTROSURGICAL EXCISION PROCEDURE (LEEP) N/A 07/25/2024    Procedure: LEEP (LOOP ELECTROSURGICAL EXCISION PROCEDURE);  Surgeon: Jazz Amador MD;  Location: [facility];  Service: OB/GYN;  Laterality: N/A;    REMOVAL OF HEMODIALYSIS CATHETER  05/03/2023    Procedure: REMOVAL, CATHETER, HEMODIALYSIS;  Surgeon: Priya Grimm MD;  Location: [facility] CATH LAB;  Service: Interventional Nephrology;;    THERMAL ABLATION OF ENDOMETRIUM N/A 07/25/2024    Procedure: ABLATION, ENDOMETRIUM, THERMAL;  Surgeon: Jazz Amador MD;  Location: [facility];  Service: OB/GYN;  Laterality: N/A;     Family History   Problem Relation Name Age of Onset    Fibromyalgia Mother      Stroke Father      Heart disease Father      Polycystic kidney disease Father      Polycystic kidney disease Sister      Polycystic kidney disease Sister      Diabetes Brother      Ovarian cancer Neg Hx      Uterine cancer Neg Hx      Breast cancer Neg Hx      Colon cancer Neg Hx       Social History     Socioeconomic History    Marital status: Single   Tobacco Use    Smoking status: Every Day     Average packs/day: 1 pack/day for 28.0 years (28.0 ttl pk-yrs)     Types: Cigarettes, Cigars     Start date: 1994      Passive exposure: Never    Smokeless tobacco: Never    Tobacco comments:     Smoke 3-5 black and mild cigars a day    Substance and Sexual Activity    Alcohol use: Not Currently    Drug use: Never    Sexual activity: Not Currently   Social History Narrative    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     Social Drivers of Health     Financial Resource Strain: Low Risk  (12/19/2023)    Overall Financial Resource Strain (CARDIA)     Difficulty of Paying Living Expenses: Not very hard   Food Insecurity: No Food Insecurity (12/19/2023)    Hunger Vital Sign     Worried About Running Out of Food in the Last Year: Never true     Ran Out of Food in the Last Year: Never true   Transportation Needs: No Transportation Needs (12/19/2023)    PRAPARE - Transportation     Lack of Transportation (Medical): No     Lack of Transportation (Non-Medical): No   Physical Activity: Insufficiently Active (12/19/2023)    Exercise Vital Sign     Days of Exercise per Week: 2 days     Minutes of Exercise per Session: 10 min   Stress: Stress Concern Present (12/19/2023)    [employer] [place] [employer] - Occupational Stress Questionnaire     Feeling of Stress : To some extent   Housing Stability: Low Risk  (12/19/2023)    Housing Stability Vital Sign     Unable to Pay for Housing in the Last Year: No     Number of Places Lived in the Last Year: 2     Unstable Housing in the Last Year: No     Current Outpatient Medications   Medication Sig    acetaminophen (TYLENOL) 500 MG tablet Take 2 tablets (1,000 mg total) by mouth every 6 (six) hours as needed for Pain.    acetaminophen (TYLENOL) 650 MG TbSR Take 1 tablet (650 mg total) by mouth every 6 (six) hours as needed (pain).    esomeprazole (NEXIUM) 20 MG capsule Take 20 mg by mouth before breakfast.    gabapentin (NEURONTIN) 300 MG capsule Take 1 capsule (300 mg total) by mouth three times per week after dialysis    loratadine (CLARITIN ORAL) Take 1 tablet by mouth daily as needed (Allergies).    nicotine  "(NICODERM CQ) 21 mg/24 hr Place 1 patch onto the skin once daily. Please dispense only clear patches , patient has a tape allergy.    ondansetron (ZOFRAN) 4 MG tablet Take 1 tablet (4 mg total) by mouth every 6 (six) hours.    sodium zirconium cyclosilicate (LOKELMA) 10 gram packet Take 1 packet (10 g total) by mouth 2 (two) times a day. Mix entire contents of packet(s) into drinking glass containing 3 tablespoons of water; stir well and drink immediately. Add water and repeat until no powder remains to receive entire dose.    sucroferric oxyhydroxide (VELPHORO) 500 mg Chew Break or dissolve 1 tablet by mouth with each meal and snack. Do not exceed 6 tablets per day.    OPW TEST CLAIM - DO NOT FILL OPW test claim. Do not fill.    hpv vaccine,9-jaz (GARDASIL 9, PF,) 0.5 mL Syrg Inject 0.5 mLs into the muscle for 3 doses    medroxyPROGESTERone (PROVERA) 10 MG tablet Take 1 tablet (10 mg total) by mouth once daily.     No current facility-administered medications for this visit.     Facility-Administered Medications Ordered in Other Visits   Medication    0.9%  NaCl infusion    ondansetron disintegrating tablet 8 mg     Review of patient's allergies indicates:   Allergen Reactions    Aspirin Hives     And vomiting    Penicillins Hives     Throat swelling    Adhesive Rash     Adhesive/silk tape (type found on band aides). Can only use "Paper Tape"    Butalbital-acetaminophen-caff Nausea And Vomiting and Other (See Comments)     Dizziness (made pt feel sick)    Latex, natural rubber Rash    Nickel Rash    Tomato Nausea And Vomiting     BP (!) 111/55 (Patient Position: Sitting)   Pulse 89   Temp 98.1 °F (36.7 °C)   Ht 5' 11" (1.803 m)   Wt 98.1 kg (216 lb 3.2 oz)   SpO2 98%   BMI 30.15 kg/m²     Objective:   Physical Exam:   Constitutional: She is oriented to person, place, and time. She appears well-developed and well-nourished. No distress.    HENT:   Head: Normocephalic and atraumatic.    Eyes: No scleral " icterus.     Cardiovascular:       Exam reveals no cyanosis and no edema.        Pulmonary/Chest: Effort normal. No respiratory distress.        Abdominal: Soft. She exhibits no distension and no fluid wave. There is abdominal tenderness (surgical healing). There is no rigidity.   Nephrectomy laparotomy site         Genitourinary:    Uterus normal.      Pelvic exam was performed with patient supine.   Labial bartholins abnormal.There is no rash, tenderness or lesion on the right labia. There is no rash, tenderness or lesion on the left labia. Right adnexum displays no mass, no tenderness and no fullness. Left adnexum displays no mass, no tenderness and no fullness. No vaginal discharge or bleeding in the vagina. Cervix exhibits discharge. Cervix exhibits no lesion. Uterus is not enlarged, not fixed, not tender and not hosting fibroids.    Genitourinary Comments: Healing LEEP bed             Musculoskeletal: Normal range of motion and moves all extremeties. No edema.       Neurological: She is alert and oriented to person, place, and time.    Skin: Skin is warm. No rash noted. No cyanosis or erythema.    Psychiatric: She has a normal mood and affect. Thought content normal.       Assessment:     1. TAYLOR III with severe dysplasia    2. 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 This was arising in a polyp, which was resected and then the uterine lining was ablated subsequently. This is a benign condition with low risk of progression to malignancy ~5-10%. We discussed definitive treatment is hysterectomy/BSO. Alternative acceptable treatment options are hormonal with Progesterone IUD or PO progesterone follow by  close follow up and repeat sampling at 3-6 months to ensure resolution and no recurrence. IUD may be difficult to insert given her ablated endometrium.     We also discussed the TAYLOR 2-3 on her LEEP  with positive focal margin and ECC. We discussed this is a pre-cancerous condition. Options for positive margins include close surveillance with pap/ECC/HPV Test in 4-6 months, repeating excision or definitive hysterectomy If re excision not feasible. I do recommend HPV Vaccination.     She is still recovering from her nephrectomy and wishes to avoid any surgery at this time. This is reasonable as neither condition requires urgent surgical intervention. In this case, I would recommend starting provera 10mg daily and planning to return here in 3 months with plans for repeating pap, HPV test, ECC and EMBX. We did discuss endometrial sampling may be limited or non diagnostic given her history of ablation and that any abnormal bleeding  should be evaluated promptly. She understands.     Lastly, she is seeing CRS soon for AIN. If plan is ultimately for an exam under anesthesia , she may chose to repeat excision and endometrial sampling at the same time if we can coordinate.       Elis Jacobs MD  Gynecologic Oncology

## 2024-10-21 ENCOUNTER — TELEPHONE (OUTPATIENT)
Dept: TRANSPLANT | Facility: CLINIC | Age: 44
End: 2024-10-21
Payer: COMMERCIAL

## 2024-10-23 ENCOUNTER — PATIENT MESSAGE (OUTPATIENT)
Dept: NEUROSURGERY | Facility: CLINIC | Age: 44
End: 2024-10-23
Payer: COMMERCIAL

## 2024-10-23 ENCOUNTER — TELEPHONE (OUTPATIENT)
Dept: PAIN MEDICINE | Facility: CLINIC | Age: 44
End: 2024-10-23
Payer: COMMERCIAL

## 2024-10-23 ENCOUNTER — PATIENT MESSAGE (OUTPATIENT)
Dept: PAIN MEDICINE | Facility: CLINIC | Age: 44
End: 2024-10-23
Payer: COMMERCIAL

## 2024-10-23 DIAGNOSIS — M79.601 RIGHT ARM PAIN: ICD-10-CM

## 2024-10-23 DIAGNOSIS — M54.12 CERVICAL RADICULOPATHY: Primary | ICD-10-CM

## 2024-10-23 DIAGNOSIS — M79.2 NERVE PAIN: ICD-10-CM

## 2024-10-23 NOTE — TELEPHONE ENCOUNTER
----- Message from MARILYN Smith sent at 10/23/2024  3:38 PM CDT -----  Regarding: Order for DAIANA JETT    Patient Name: DAIANA JETT([id number])  Sex: Female  : 1980      PCP: SYMONE WESTFALL    [facility]     Types of orders made on 10/23/2024: Procedure Request    Order Date:10/23/2024  Ordering User:JORGE LUIS REAL [[id number]  Encounter Provider:Jorge Luis Real FNP [[id number]]  Authorizing Provider: Jorge Luis Real FNP [[id number]]  Supervising Provider:RENETTA POWERS [[id number]]  Type of Supervision:Collaborating Physician  Department:[facility] PAIN MANAGEMENT[[id number]]    Common Order Information  Procedure -> Epidural Injection (specify level) Cmt: C7-T1    Pre-op Diagnosis -> DDD (degenerative disc disease), cervical       -> Cervical radiculopathy       -> Chronic pain syndrome     Order Specific Information  Order: Procedure Request Order for Pain Management [Custom: [id number]]  Order #:          [id number]: 1 FUTURE    Priority: Routine  Class: Clinic Performed    Future Order Information      Expires on:10/23/2025            Expected by:10/23/2024                   Associated Diagnoses      M54.12 Cervical radiculopathy      M79.2 Nerve pain      M79.601 Right arm pain      Physician -> Eulogioter         Is patient on anti-coagulants? -> No         Facility Name: -> [facility]         Follow-up: -> 2 weeks           Priority: Routine  Class: Clinic Performed    Future Order Information      Expires on:10/23/2025            Expected by:10/23/2024                   Associated Diagnoses      M54.12 Cervical radiculopathy      M79.2 Nerve pain      M79.601 Right arm pain      Procedure -> Epidural Injection (specify level) Cmt: C7-T1        Physician -> Eulogioter         Is patient on anti-coagulants? -> No         Pre-op Diagnosis -> DDD (degenerative disc disease), cervical           -> Cervical radiculopathy           -> Chronic pain syndrome         Facility Name: -> [facility]          Follow-up: -> 2 weeks

## 2024-10-24 ENCOUNTER — TELEPHONE (OUTPATIENT)
Dept: SURGERY | Facility: CLINIC | Age: 44
End: 2024-10-24
Payer: COMMERCIAL

## 2024-10-24 ENCOUNTER — OFFICE VISIT (OUTPATIENT)
Dept: SURGERY | Facility: CLINIC | Age: 44
End: 2024-10-24
Payer: COMMERCIAL

## 2024-10-24 VITALS
HEIGHT: 71 IN | OXYGEN SATURATION: 98 % | WEIGHT: 215.75 LBS | BODY MASS INDEX: 30.2 KG/M2 | DIASTOLIC BLOOD PRESSURE: 59 MMHG | RESPIRATION RATE: 19 BRPM | HEART RATE: 100 BPM | SYSTOLIC BLOOD PRESSURE: 107 MMHG

## 2024-10-24 DIAGNOSIS — D01.3 AIN III (ANAL INTRAEPITHELIAL NEOPLASIA III): ICD-10-CM

## 2024-10-24 PROCEDURE — 99999 PR PBB SHADOW E&M-EST. PATIENT-LVL III: CPT | Mod: PBBFAC,TXP,, | Performed by: COLON & RECTAL SURGERY

## 2024-10-24 NOTE — TELEPHONE ENCOUNTER
Placed call to pt requesting she return to clinic as consents were signed for surgery with Dr. Mojica today; however, left office area prior to receiving preop instructions.     1135: Chloe placed call to pt. Call sent to     1140:LVM w/ reason for calling, clinic number & reason for calling. Requested pt either return to clinic for the reviewing of preop instructions, place a call to our clinic to discuss, or if pt preference was to receive instructions via portal.     Will await pt response.

## 2024-10-24 NOTE — PROGRESS NOTES
"CRS Office Visit History and Physical    Referring Md:   Jazz Amador Md  [address] [facility]  [address]  [place] [address]    SUBJECTIVE:     Chief Complaint: anal lesions    History of Present Illness:  The patient is a new patient to this practice.   Course is as follows:  Patient is a 44 y.o. 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 Jacobs gyn onc.  She has had lesions for years and finds it bothersome and occasionally painful if aggressively wiping after bowel movements. Denies bleeding or prolapse. Has a daily formed bowel movement, occasionally harder but denies straining. Hx of anal receptive intercourse but no recent partners. Had a colonoscopy  or  with no concerning polyps identified. Grandmother had CRC diagnosed in her 40s and  in her 50s. Her history is significant for polycystic kidneys on HD, and underwent robotic L nephrectomy here 2024 for a lesion.    Last Colonoscopy:  or  per patient    Review of patient's allergies indicates:   Allergen Reactions    Aspirin Hives     And vomiting    Penicillins Hives     Throat swelling    Adhesive Rash     Adhesive/silk tape (type found on band aides). Can only use "Paper Tape"    Butalbital-acetaminophen-caff Nausea And Vomiting and Other (See Comments)     Dizziness (made pt feel sick)    Latex, natural rubber Rash    Nickel Rash    Tomato Nausea And Vomiting       Past Medical History:   Diagnosis Date    Anxiety     Asthma     Autosomal dominant polycystic kidney disease     Brain aneurysm     Depression     Encounter for blood transfusion     GERD (gastroesophageal reflux disease)     Hypertension     Seizures     as a child (age 7-8); " stress -related"    TIA (transient ischemic attack)      Past Surgical History:   Procedure Laterality Date    AV FISTULA PLACEMENT Right 2023    Procedure: CREATION, AV FISTULA, radial cephalic;  Surgeon: MELISSA Loera " Brodie CHANDRA MD;  Location: [facility] OR [facility] FLR;  Service: Vascular;  Laterality: Right;    AV FISTULA PLACEMENT Right 10/05/2023    Procedure: CREATION, AV FISTULA - brachial;  Surgeon: MELISSA Loera II, MD;  [address];  Service: Vascular;  Laterality: Right;  confirmed placement with doppler    CEREBRAL ANEURYSM REPAIR      coiling    CYSTOSCOPY      age 13    EMBOLIZATION Right 04/23/2024    Procedure: EMBOLIZATION, BLOOD VESSEL FISTULA;  Surgeon: MELISSA Leora II, MD;  [facility];  Service: Vascular;  Laterality: Right;    FISTULOGRAM Right 04/23/2024    Procedure: Fistulogram;  Surgeon: MELISSA Loera II, MD;  [facility];  Service: Vascular;  Laterality: Right;  CONTRAST: 68ML, FLUORO TIME: 17.9, mGy: 45.11, Gycm2: 5.9548    HYSTEROSCOPY N/A 07/25/2024    Procedure: HYSTEROSCOPY;  Surgeon: Jazz Amador MD;  Location: [facility];  Service: OB/GYN;  Laterality: N/A;    INCISION OF VULVA N/A 07/25/2024    Procedure: INCISION, VULVA;  Surgeon: Jazz Amador MD;  Location: [facility] OR;  Service: OB/GYN;  Laterality: N/A;    INSERTION OF TUNNELED CENTRAL VENOUS HEMODIALYSIS CATHETER Right 05/03/2023    Procedure: Insertion, Catheter, Central Venous, Hemodialysis;  Surgeon: Priya Grimm MD;  Location: [facility] CATH LAB;  Service: Interventional Nephrology;  Laterality: Right;    LAPAROSCOPIC ROBOT-ASSISTED SURGICAL REMOVAL OF KIDNEY USING DA GLORIA XI Left 9/19/2024    Procedure: XI ROBOTIC NEPHRECTOMY;  Surgeon: Valentin Maldonado MD;  Location: [address];  Service: Urology;  Laterality: Left;    LOOP ELECTROSURGICAL EXCISION PROCEDURE (LEEP) N/A 07/25/2024    Procedure: LEEP (LOOP ELECTROSURGICAL EXCISION PROCEDURE);  Surgeon: Jazz Amador MD;  Location: [facility] OR;  Service: OB/GYN;  Laterality: N/A;    REMOVAL OF HEMODIALYSIS CATHETER  05/03/2023    Procedure: REMOVAL, CATHETER, HEMODIALYSIS;  Surgeon: Priya Grimm MD;  Location: [facility] CATH  "LAB;  Service: Interventional Nephrology;;    THERMAL ABLATION OF ENDOMETRIUM N/A 07/25/2024    Procedure: ABLATION, ENDOMETRIUM, THERMAL;  Surgeon: Jazz Amador MD;  Location: [facility];  Service: OB/GYN;  Laterality: N/A;     Family History   Problem Relation Name Age of Onset    Fibromyalgia Mother      Stroke Father      Heart disease Father      Polycystic kidney disease Father      Polycystic kidney disease Sister      Polycystic kidney disease Sister      Diabetes Brother      Ovarian cancer Neg Hx      Uterine cancer Neg Hx      Breast cancer Neg Hx      Colon cancer Neg Hx       Social History     Tobacco Use    Smoking status: Every Day     Average packs/day: 1 pack/day for 28.0 years (28.0 ttl pk-yrs)     Types: Cigarettes, Cigars     Start date: 1994     Passive exposure: Never    Smokeless tobacco: Never    Tobacco comments:     Smoke 3-5 black and mild cigars a day    Substance Use Topics    Alcohol use: Not Currently    Drug use: Never        Review of Systems:  Review of Systems   All other systems reviewed and are negative.      OBJECTIVE:     Vital Signs (Most Recent)  BP (!) 107/59 (BP Location: Left arm, Patient Position: Sitting)   Pulse 100   Resp 19   Ht 5' 10.98" (1.803 m)   Wt 97.8 kg (215 lb 11.5 oz)   SpO2 98%   BMI 30.10 kg/m²     Physical Exam:  General: White female in no distress   Neuro: alert and oriented x 4.  Moves all extremities.     HEENT: no icterus.  Trachea midline  Respiratory: respirations are even and unlabored  Cardiac: regular rate  Abdomen: healing midline incision and robotic port sites, clean and intact, soft non distended  Extremities: Warm dry and intact, fistula not assessed  Skin: no rashes  Anorectal: A few scattered skin tags perianal.. Anoscopy with a few mildly hypertrophied anal papilla. No dyssynergy and normal sphincter tone on TERRI.      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 female with perianal skin tags.  She would like these removed, will schedule for outpatient excision with EUA. Consent signed in office.    Kristel Junior MD  Colon & Rectal Surgery Fellow

## 2024-10-24 NOTE — TELEPHONE ENCOUNTER
----- Message from MARILYN Smith sent at 10/23/2024  3:38 PM CDT -----  Regarding: Order for DAIANA JETT    Patient Name: DAIANA JETT([id number])  Sex: Female  : 1980      PCP: SYMONE WESTFALL    Center: [facility]     Types of orders made on 10/23/2024: Procedure Request    Order Date:10/23/2024  Ordering User:JORGE LUIS REAL [[id number]]  Encounter Provider:Jorge Luis Real FNP [[id number]]  Authorizing Provider: Jorge Luis Real FNP [[id number]]  Supervising Provider:RENETTA POWERS [[id number]]  Type of Supervision:Collaborating Physician  Department:[facility] PAIN MANAGEMENT[[id number]]    Common Order Information  Procedure -> Epidural Injection (specify level) Cmt: C7-T1    Pre-op Diagnosis -> DDD (degenerative disc disease), cervical       -> Cervical radiculopathy       -> Chronic pain syndrome     Order Specific Information  Order: Procedure Request Order for Pain Management [Custom: [id number]]  Order #:          [id number]: 1 FUTURE    Priority: Routine  Class: Clinic Performed    Future Order Information      Expires on:10/23/2025            Expected by:10/23/2024                   Associated Diagnoses      M54.12 Cervical radiculopathy      M79.2 Nerve pain      M79.601 Right arm pain      Physician -> Eulogioter         Is patient on anti-coagulants? -> No         Facility Name: -> [facility]         Follow-up: -> 2 weeks           Priority: Routine  Class: Clinic Performed    Future Order Information      Expires on:10/23/2025            Expected by:10/23/2024                   Associated Diagnoses      M54.12 Cervical radiculopathy      M79.2 Nerve pain      M79.601 Right arm pain      Procedure -> Epidural Injection (specify level) Cmt: C7-T1        Physician -> Eulogioter         Is patient on anti-coagulants? -> No         Pre-op Diagnosis -> DDD (degenerative disc disease), cervical           -> Cervical radiculopathy           -> Chronic pain syndrome         Facility Name: -> [facility]          Follow-up: -> 2 weeks

## 2024-10-25 ENCOUNTER — PATIENT MESSAGE (OUTPATIENT)
Dept: SURGERY | Facility: CLINIC | Age: 44
End: 2024-10-25
Payer: COMMERCIAL

## 2024-10-25 ENCOUNTER — PATIENT MESSAGE (OUTPATIENT)
Dept: GYNECOLOGIC ONCOLOGY | Facility: CLINIC | Age: 44
End: 2024-10-25
Payer: COMMERCIAL

## 2024-10-28 ENCOUNTER — TELEPHONE (OUTPATIENT)
Dept: GYNECOLOGIC ONCOLOGY | Facility: CLINIC | Age: 44
End: 2024-10-28
Payer: COMMERCIAL

## 2024-10-29 ENCOUNTER — TELEPHONE (OUTPATIENT)
Dept: GYNECOLOGIC ONCOLOGY | Facility: CLINIC | Age: 44
End: 2024-10-29
Payer: COMMERCIAL

## 2024-10-29 ENCOUNTER — CLINICAL SUPPORT (OUTPATIENT)
Dept: SMOKING CESSATION | Facility: CLINIC | Age: 44
End: 2024-10-29

## 2024-10-29 DIAGNOSIS — F17.200 NICOTINE DEPENDENCE: Primary | ICD-10-CM

## 2024-10-29 PROCEDURE — 99999 PR PBB SHADOW E&M-EST. PATIENT-LVL I: CPT | Mod: PBBFAC,TXP,,

## 2024-10-30 ENCOUNTER — TELEPHONE (OUTPATIENT)
Dept: TRANSPLANT | Facility: CLINIC | Age: 44
End: 2024-10-30
Payer: COMMERCIAL

## 2024-10-31 DIAGNOSIS — Z76.82 PRE-KIDNEY TRANSPLANT, LISTED: Primary | ICD-10-CM

## 2024-11-04 DIAGNOSIS — K62.9 ANAL LESION: Primary | ICD-10-CM

## 2024-11-05 ENCOUNTER — TELEPHONE (OUTPATIENT)
Dept: PAIN MEDICINE | Facility: CLINIC | Age: 44
End: 2024-11-05
Payer: COMMERCIAL

## 2024-11-08 NOTE — PRE-PROCEDURE INSTRUCTIONS
Unable to reach pt via phone.  Left voicemail with arrival time also informing pt of need for responsible  accompaniment and instructing pt to follow pre-procedure instructions provided via [facility] portal.  The following message was sent to pt's portal.        Dear Ade,     Please read over the following pre-procedure instructions in it's entirety as there is helpful information here to get you well prepared for your upcoming procedure.     You are scheduled for a procedure with Dr. Hough on 11/12/2024.     [facility] at the corner of [facility] and [facility]. It is in the [place] next to [employer]. The address is: [address] [place] [address]. Take the elevator to the 2nd floor.       Registration check in time: 8:20 am  Procedure scheduled for time: 9:20 am     If you are receiving sedation, you CANNOT drive yourself and must have a responsible friend or family member (no rideshare) to drive you home.        You should take any medications that you routinely take for blood pressure, heart medications, thyroid, cholesterol, etc.      The fasting restrictions are dependent on whether or not you are receiving sedation. Sedation is not available for all procedures.      Your fasting instructions/Sedation type are as follow:  IV sedation.   Nothing to eat after midnight the night prior to procedure.   Patients are encouraged to consume clear liquids up to 2 hours prior to scheduled arrival time.  -Clear liquids include Gatorade, water, soda, black coffee or tea (no milk or creamer), and clear juices. - Clear liquids do NOT include anything with pulp or food particles (chicken broth, ice cream, yogurt, Jello, etc.) You CANNOT drive yourself and must have a .           If you are on blood thinners, you need to follow the anticoagulation instructions that had been discussed previously. You should only stop the blood thinners if it was approved by your primary  care physician or your cardiologist. In the event that you are not able to stop your blood thinners, a blood thinner was not listed on your medication list, or we were not able to get clearance from your cardiologist, then the procedure may have to be postponed/canceled.      IF you were told to stop your blood thinners, this is how long you should generally hold some of the more common ones. Remember that stopping blood thinners is only necessary for certain procedures. If you are unsure of your instructions, please call us.   Aspirin - 5 days  Plavix/Clopidogrel - 7 days  Warfarin / Coumadin - 5 days  Eliquis - 3 days  Pradaxa/Dabigatran - 4 days  Xarelto/Rivaroxaban - 3 days     *If you take Ozempic, Trulicity, Mounjaro, Rybelsus, Zepbound Or other weight loss, non-insulin injections you must hold this for one week (7 days) prior if you are having IV sedation.      If you are a diabetic, do not take your medication if you will be fasting, but bring it with you. Please plan on being here for roughly 2-3 hours.      Please call us if any of the following have occurred:  *running fever or having any flu-like symptoms  *have been taking antibiotics in the past 2 weeks  *have had any or plan on having any immunizations in the 2 weeks before or after your injection(Flu/Shingles/Covid Booster/Pneumonia, etc.)  *had any out patient procedures other than with us in the past 2 weeks (Colonoscopy, Endoscopy, Biopsy, OBGYN, Dental, etc.) Or received a steroid injection from another provider within the last 2 weeks.  *have any wounds or rashes  *awaiting ANY test results that could result in you having to take antibiotics (Urine Culture, Flu/Covid/Strept Swab, etc)     If you have been COVID positive, you will need to hold off on your procedure until you are symptom free for 10 days. If you did not have any symptoms, you can have your procedure 10 days from your positive test result.      On the morning of your  procedure:  *HOLD ALL VITAMINS, MINERALS, HERBS (INCLUDING HERBAL TEAS) AND SUPPLEMENTS  *SHOWER WITH ANTIBACTERIAL SOAP (example: DIAL over the counter) NIGHT BEFORE AND MORNING OF PROCEDURE  *DO NOT APPLY ANY LOTIONS, OILS, POWDERS, PERFUME/COLOGNE, OINTMENTS, GELS, CREAMS, MAKEUP OR DEODORANT TO YOUR SKIN MORNING OF PROCEDURE  *LEAVE JEWELRY AND ANY VALUABLES AT HOME  *WEAR LOOSE COMFORTABLE CLOTHING      If you have any questions please call [phone].    Please reply to this portal message as receipt of delivery.     Thank you,  [facility]  [place], LPN [facility]  Pre-Admit

## 2024-11-12 ENCOUNTER — HOSPITAL ENCOUNTER (OUTPATIENT)
Facility: HOSPITAL | Age: 44
Discharge: HOME | End: 2024-11-12
Attending: STUDENT IN AN ORGANIZED HEALTH CARE EDUCATION/TRAINING PROGRAM | Admitting: STUDENT IN AN ORGANIZED HEALTH CARE EDUCATION/TRAINING PROGRAM
Payer: COMMERCIAL

## 2024-11-12 VITALS
RESPIRATION RATE: 17 BRPM | HEART RATE: 66 BPM | DIASTOLIC BLOOD PRESSURE: 67 MMHG | BODY MASS INDEX: 29.35 KG/M2 | TEMPERATURE: 98 F | OXYGEN SATURATION: 98 % | WEIGHT: 205 LBS | SYSTOLIC BLOOD PRESSURE: 117 MMHG | HEIGHT: 70 IN

## 2024-11-12 DIAGNOSIS — M54.12 CERVICAL RADICULOPATHY: Primary | ICD-10-CM

## 2024-11-12 DIAGNOSIS — M54.12 CERVICAL RADICULITIS: ICD-10-CM

## 2024-11-12 PROCEDURE — 62321 NJX INTERLAMINAR CRV/THRC: CPT | Performed by: STUDENT IN AN ORGANIZED HEALTH CARE EDUCATION/TRAINING PROGRAM

## 2024-11-12 PROCEDURE — 25500020 PHARM REV CODE 255: Performed by: STUDENT IN AN ORGANIZED HEALTH CARE EDUCATION/TRAINING PROGRAM

## 2024-11-12 PROCEDURE — 63600175 PHARM REV CODE 636 W HCPCS: Performed by: STUDENT IN AN ORGANIZED HEALTH CARE EDUCATION/TRAINING PROGRAM

## 2024-11-12 PROCEDURE — 62321 NJX INTERLAMINAR CRV/THRC: CPT | Mod: ,,, | Performed by: STUDENT IN AN ORGANIZED HEALTH CARE EDUCATION/TRAINING PROGRAM

## 2024-11-12 RX ORDER — DEXAMETHASONE SODIUM PHOSPHATE 10 MG/ML
INJECTION INTRAMUSCULAR; INTRAVENOUS
Status: DISCONTINUED | OUTPATIENT
Start: 2024-11-12 | End: 2024-11-12 | Stop reason: HOSPADM

## 2024-11-12 RX ORDER — LIDOCAINE HYDROCHLORIDE 20 MG/ML
INJECTION, SOLUTION EPIDURAL; INFILTRATION; INTRACAUDAL; PERINEURAL
Status: DISCONTINUED | OUTPATIENT
Start: 2024-11-12 | End: 2024-11-12 | Stop reason: HOSPADM

## 2024-11-12 RX ORDER — FENTANYL CITRATE 50 UG/ML
INJECTION, SOLUTION INTRAMUSCULAR; INTRAVENOUS
Status: DISCONTINUED | OUTPATIENT
Start: 2024-11-12 | End: 2024-11-12 | Stop reason: HOSPADM

## 2024-11-12 RX ORDER — MIDAZOLAM HYDROCHLORIDE 1 MG/ML
INJECTION INTRAMUSCULAR; INTRAVENOUS
Status: DISCONTINUED | OUTPATIENT
Start: 2024-11-12 | End: 2024-11-12 | Stop reason: HOSPADM

## 2024-11-12 NOTE — DISCHARGE INSTRUCTIONS
[facility]  ContactPoint service # [phone]  On-call pager for emergency# [phone]     POST-PROCEDURE INSTRUCTIONS:    Today you had an injection that included a steroid medications.  The steroid may or may not have been mixed with a local anesthetic when it was injected.   If the injection was in the neck, you may feel some pressure, numbness, or slight weakness in the arm after the procedure for a short period of time (this is a normal response), if this persists for longer than 1 day please contact our office or go to the emergency room.  If the injection was in the low back, you may feel some pressure, numbness, or slight weakness in the leg after the procedure for a short period of time (this is a normal response), if this persists for longer than 1 day please contact our office or go to the emergency room.  You may get side effects from the steroid.  This is not uncommon.  Symptoms include: elevated blood sugar, elevated blood pressure, headache, flushing, nausea, insomnia.  These symptoms are transient and will resolve within 1-3 days.  If symptoms last longer than this please contact our office or head to the emergency room.  Steroid medications can take anywhere from 3-14 days to take effect (rarely longer).  You may notice that your pain worsens for a short period of time after the injection, this would not be unusual due to the pressure and trauma from the needle.    If you do not have a follow up appointment scheduled, please contact my office (or the office of the physician who referred you for the procedure) to get a post-procedure follow up scheduled 2-4 weeks after the procedure.  This can be done as a virtual visit if that is more convenient for you.      What you need to do:    Keep a record of your response to the injection you had today.    How much relief did you get?   When did the relief start and how long did it last?  Were you  able to decrease the use of any of your pain medications?  Were you able to increase your level of activity?  How long did the relief last?    What to watch out for:    If you experience any of the following symptoms after your procedure, please notify the messaging service immediately (see above for contact information):   fever (increased oral temperature)   bleeding or swelling at the injection site,    drainage, rash or redness at the injection site    possible signs of infection    increased pain at the injection site   worsening of your usual pain   severe headache   new or worsening numbness    new arm and/or leg weakness, or    changes in bowel and/or bladder function: urinating or defecating on yourself and not knowing that you did it.    PLEASE FOLLOW ALL INSTRUCTIONS CAREFULLY     Do not engage in strenuous activity (e.g., lifting or pushing heavy objects or repeated bending) for 24 hours.     Do not take a bath, swim or use Jacuzzi for 24 hours after procedure. (A shower is fine).   Remove any Band-Aids when you get home.    Use cold/ice, as needed for comfort.  We recommend the use of cold therapy alternating on for 20 minutes, off for 20 minutes.    Do not apply direct heat (heating pad or heat packs) to the injection site for 24 hours.     Resume your usual medications, unless instructed otherwise by your Pain Physician.     If you are on warfarin (Coumadin) or other blood thinner, resume this medication as instructed by your prescribing Physician.    IF AT ANY POINT YOU ARE VERY CONCERNED ABOUT YOUR SYMPTOMS, PLEASE GO TO THE EMERGENCY ROOM.    If you develop worsening pain, weakness, numbness, lose bowel or bladder control (i.e., having an accident where you did not even know you had to go to the bathroom and suddenly noticed you soiled yourself), saddle anesthesia (a loss of sensation restricted to the area of the buttocks, anus and between the legs -- i.e., those parts of your body that would  touch a saddle if you were sitting on one) you need to go immediately to the emergency department for evaluation and treatment.    ----------------------------------------------------------------------------------------------------------------------------------------------------------------  If you received Sedation please read the following instructions:  POST SEDATION INSTRUCTIONS    Today you received intravenous medication (also known as sedation) that was used to help you relax and/or decrease discomfort during your procedure. This medication will be acting in your body for the next 24 hours, so you might feel a little tired or sleepy. This feeling will slowly wear off.   Common side effects associated with these medications include: drowsiness, dizziness, sleepiness, confusion, feeling excited, difficulty remembering things, lack of steadiness with walking or balance, loss of fine muscle control, slowed reflexes, difficulty focusing, and blurred vision.  Some over-the-counter and prescription medications (e.g., muscle relaxants, opioids, mood-altering medications, sedatives/hypnotics, antihistamines) can interact with the intravenous medication you received and cause an increased risk of the side effects listed above in addition to other potentially life threatening side effects. Use extreme caution if you are taking such medications, and consult with your Pain Physician or prescribing physician if you have any questions.  For the next 12-24 hours:    DO NOT--Drive a car, operate machinery or power tools   DO NOT--Drink any alcoholic beverages (not even beer), they may dangerously increase the risk of side effects.    DO NOT--Make any important legal or business decisions or sign important documents.  We advise you to have someone to assist you at home. Move slowly and carefully. Do not make sudden changes in position. Be aware of dizziness or light-headedness and move accordingly.   If you seek medical  treatment within 24 hours, let the nurse or doctor caring for you know that you have received the above medications. If you have any questions or concerns related to your sedation or treatment today please contact us.

## 2024-11-12 NOTE — H&P
"HPI  Patient presenting for Procedure(s) (LRB):  C7-T1 KIMBERLEE (N/A)     Patient on Anti-coagulation No    No health changes since previous encounter    Past Medical History:   Diagnosis Date    Anxiety     Asthma     Autosomal dominant polycystic kidney disease     Brain aneurysm     Depression     Encounter for blood transfusion     GERD (gastroesophageal reflux disease)     Hypertension     Seizures     as a child (age 7-8); " stress -related"    TIA (transient ischemic attack)      Past Surgical History:   Procedure Laterality Date    AV FISTULA PLACEMENT Right 09/14/2023    Procedure: CREATION, AV FISTULA, radial cephalic;  Surgeon: MELISSA Loera II, MD;  Location: [facility] OR [facility] FLR;  Service: Vascular;  Laterality: Right;    AV FISTULA PLACEMENT Right 10/05/2023    Procedure: CREATION, AV FISTULA - brachial;  Surgeon: MELISSA Loera II, MD;  Location: [facility] OR [facility] FLR;  Service: Vascular;  Laterality: Right;  confirmed placement with doppler    CEREBRAL ANEURYSM REPAIR      coiling    CYSTOSCOPY      age 13    EMBOLIZATION Right 04/23/2024    Procedure: EMBOLIZATION, BLOOD VESSEL FISTULA;  Surgeon: MELISSA Loera II, MD;  Location: [facility] OR [facility] FLR;  Service: Vascular;  Laterality: Right;    FISTULOGRAM Right 04/23/2024    Procedure: Fistulogram;  Surgeon: MELISSA Loera II, MD;  Location: [facility] OR [facility] FLR;  Service: Vascular;  Laterality: Right;  CONTRAST: 68ML, FLUORO TIME: 17.9, mGy: 45.11, Gycm2: 5.9548    HYSTEROSCOPY N/A 07/25/2024    Procedure: HYSTEROSCOPY;  Surgeon: Jazz Amador MD;  Location: [facility] OR;  Service: OB/GYN;  Laterality: N/A;    INCISION OF VULVA N/A 07/25/2024    Procedure: INCISION, VULVA;  Surgeon: Jazz Amador MD;  Location: [facility] OR;  Service: OB/GYN;  Laterality: N/A;    INSERTION OF TUNNELED CENTRAL VENOUS HEMODIALYSIS CATHETER Right 05/03/2023    Procedure: Insertion, Catheter, Central Venous, Hemodialysis;  Surgeon: Priya Grimm MD;  Location: [facility] CATH " "LAB;  Service: Interventional Nephrology;  Laterality: Right;    LAPAROSCOPIC ROBOT-ASSISTED SURGICAL REMOVAL OF KIDNEY USING DA GLORIA XI Left 9/19/2024    Procedure: XI ROBOTIC NEPHRECTOMY;  Surgeon: Valentin Maldonado MD;  Location: [address] FLR;  Service: Urology;  Laterality: Left;    LOOP ELECTROSURGICAL EXCISION PROCEDURE (LEEP) N/A 07/25/2024    Procedure: LEEP (LOOP ELECTROSURGICAL EXCISION PROCEDURE);  Surgeon: Jazz Amador MD;  Location: [facility] OR;  Service: OB/GYN;  Laterality: N/A;    REMOVAL OF HEMODIALYSIS CATHETER  05/03/2023    Procedure: REMOVAL, CATHETER, HEMODIALYSIS;  Surgeon: Priya Grimm MD;  Location: [facility] CATH LAB;  Service: Interventional Nephrology;;    THERMAL ABLATION OF ENDOMETRIUM N/A 07/25/2024    Procedure: ABLATION, ENDOMETRIUM, THERMAL;  Surgeon: Jazz Amador MD;  Location: [facility];  Service: OB/GYN;  Laterality: N/A;     Review of patient's allergies indicates:   Allergen Reactions    Aspirin Hives     And vomiting    Penicillins Hives     Throat swelling    Adhesive Rash     Adhesive/silk tape (type found on band aides). Can only use "Paper Tape"    Butalbital-acetaminophen-caff Nausea And Vomiting and Other (See Comments)     Dizziness (made pt feel sick)    Latex, natural rubber Rash    Nickel Rash    Tomato Nausea And Vomiting      No current facility-administered medications for this encounter.     Facility-Administered Medications Ordered in Other Encounters   Medication    0.9%  NaCl infusion    ondansetron disintegrating tablet 8 mg       PMHx, PSHx, Allergies, Medications reviewed in epic    ROS negative except pain complaints in HPI    OBJECTIVE:    There were no vitals taken for this visit.    PHYSICAL EXAMINATION:    GENERAL: Well appearing, in no acute distress, alert and oriented x3.  PSYCH:  Mood and affect appropriate.  SKIN: Skin color, texture, turgor normal, no rashes or lesions which will impact the procedure.  CV: RRR with palpation of the " radial artery.  PULM: No evidence of respiratory difficulty, symmetric chest rise. Clear to auscultation.  NEURO: Cranial nerves grossly intact.    Plan:    Proceed with procedure as planned Procedure(s) (LRB):  C7-T1 KIMBERLEE (N/A)    Presley Arredondo  11/12/2024

## 2024-11-12 NOTE — DISCHARGE SUMMARY
[facility]  Discharge Note  Short Stay    Procedure(s) (LRB):  C7-T1 KIMBERLEE (N/A)      OUTCOME: Patient tolerated treatment/procedure well without complication and is now ready for discharge.    DISPOSITION: Home or Self Care    FINAL DIAGNOSIS:  <principal problem not specified>    FOLLOWUP: In clinic    DISCHARGE INSTRUCTIONS:  No discharge procedures on file.     TIME SPENT ON DISCHARGE: 10 minutes

## 2024-11-12 NOTE — PLAN OF CARE
Safety precautions maintained. Bed locked and in lowest position. Instructed pt to call for assistance. Pt verbalizes understanding. Pt. stated she is unable to provide urine sample for UPT because she does not produce urine; last menstrual cycle was July 2024. MD notified and present at bedside to discuss procedure, pt verbalized understanding and willing to proceed.

## 2024-11-12 NOTE — OP NOTE
"PROCEDURE:  CERVICAL C7-T1 INTERLAMINAR EPIDURAL STEROID INJECTION    Patient Name: Ade Silva  MRN: [id number]  DATE OF PROCEDURE: 11/12/2024    DIAGNOSIS: Cervical Radiculopathy  CPT CODE: 73681      POSTPROCEDURE DIAGNOSIS: Same    PHYSICIAN: Presley Hough DO  NEEDLE TYPE: - 20G 3.5" Touhy Needle  LOCAL ANESTHETIC INJECTED: Xylocaine 2%   MEDICATIONS INJECTED: 4cc mixture of 3cc Normal Saline + 10mg Dexamethasone (10mg/ml)  CONTRAST: Omni 300  LOSS OF RESISTANCE DEPTH: 6 cm    Sedation Medications - Mild Sedation with 1md Versed and 50mcg Fentanyl.    Conscious sedation ordered by M.D. Patient re-evaluation prior to administration of conscious sedation. No changes noted in patient's status from initial evaluation. The patient's vital signs were monitored by RN and patient remained hemodynamically stable throughout the procedure.    Event Time In   Sedation Start 0954   Sedation End 1004       Estimated Blood Loss - <2ml  Drains: None  Specimens Removed: None  Urine Output - Not Measured  Complications: None  Outcome: Good    Informed Consent:  The patient's condition and proposed procedures, risks, and alternatives were discussed with the patient or responsible party.  The patient's / responsible party's questions were answered.   The patient / responsible party appeared to understand and chose to proceed.  Informed consent was obtained.  After obtaining written consent, an IV hep lock was placed. (See nurses notes for details).     Procedure in Detail:  The patient was taken back to the OR suite and placed in a prone position. The skin overlying the injection site was prepped and draped in an aseptic fashion. The target injection site (see above) was identified with fluoroscopy and marked.     Procedural Pause:  A procedural pause verifying correct patient, medical record number, allergies, medications to be administered, current vital signs, and surgical site was performed immediately prior to " beginning the procedure.    With the patient laying in a prone position, the surgical area was prepped and draped in the usual sterile fashion using ChloraPrep and a fenestrated drape. The level was determined under fluoroscopy guidance. Skin anesthesia was achieved by injecting Lidocaine 2% over the injection site.  The interlaminar space was then approached with a 20 gauge, 3.5 inch Tuohy needle that was introduced under fluoroscopic guidance with AP, lateral and/or contralateral oblique imaging. Once the Ligamentum flavum was encountered loss of resistance to saline was used to enter the epidural space. With positive loss of resistance and negative aspiration for CSF or Blood, contrast dye  Omnipaque (300mg/mL) was injected to confirm placement and there was no vascular runoff. Then 4 mL of the medication mixture listed above was then injected slowly. Displacement of the radio opaque contrast after injection of the medication confirmed that the medication went into the area of the epidural space. The needles were removed, and bleeding was nil. A sterile dressing was applied. No specimens collected. The patient tolerated the procedure well.     The heart rate, pulse oximetry, and blood pressure were continuously monitored throughout the procedure.  The procedure was well tolerated. She was carefully escorted to the recovery room in stable condition. Patient was monitored by RN for recovery period.  The patient will be contacted in the next few days to determine extent of relief.  Patient was given post procedure and discharge instructions to follow at home.  The patient was discharged in a stable condition.    Note Electronically Signed By:  Presley Arredondo

## 2024-11-14 ENCOUNTER — CLINICAL SUPPORT (OUTPATIENT)
Dept: SMOKING CESSATION | Facility: CLINIC | Age: 44
End: 2024-11-14

## 2024-11-14 DIAGNOSIS — F17.200 NICOTINE DEPENDENCE: Primary | ICD-10-CM

## 2024-11-14 PROCEDURE — 99999 PR PBB SHADOW E&M-EST. PATIENT-LVL II: CPT | Mod: PBBFAC,TXP,,

## 2024-11-17 NOTE — PROGRESS NOTES
Individual Follow-Up Form    11/14/24    Quit Date:     Clinical Status of Patient: Outpatient    Length of Service: 45 minutes    Continuing Medication: yes  Patches    Other Medications: none      Target Symptoms: Withdrawal and medication side effects. The following were  rated moderate (3) to severe (4) on TCRS:  Moderate (3): crave, desire   Severe (4): none    Comments: Virtual / Audio visit .  Patient continues to smoke Black and mild cigars but is working on a quit and states this week was rough for her but she is determined to continue her efforts to quit .  Pt remains on tobacco cessation medication of  21 mg nicotine patch QD .No adverse effects noted at this time. Discussed using distraction techniques and delaying smoking for as long as possible to increase the interval time between cigars .  Reviewed coping strategies/habitual behavior/relapse prevention with patient.  Pt understands to call CTTS if anything is needed before the next visit.        Diagnosis: F17.210    Next Visit: 2 weeks

## 2024-11-18 ENCOUNTER — CLINICAL SUPPORT (OUTPATIENT)
Dept: SMOKING CESSATION | Facility: CLINIC | Age: 44
End: 2024-11-18
Payer: MEDICAID

## 2024-11-18 DIAGNOSIS — F17.200 NICOTINE DEPENDENCE: Primary | ICD-10-CM

## 2024-11-18 PROCEDURE — 99999 PR PBB SHADOW E&M-EST. PATIENT-LVL I: CPT | Mod: PBBFAC,TXP,,

## 2024-11-18 PROCEDURE — 99406 BEHAV CHNG SMOKING 3-10 MIN: CPT | Mod: S$PBB,TXP,, | Performed by: FAMILY MEDICINE

## 2024-11-18 NOTE — PROGRESS NOTES
Spoke with patient today in regard to smoking cessation progress for 3 month telephone follow up, she states not tobacco free. Patient is currently enrolled in the program with a scheduled follow up and states the use of peppermints to help aid in her quit attempt. Informed patient of benefit period, future follow ups, and contact information if any further help or support is needed. Will complete smart form for 3 month follow up on Quit attempt #1.

## 2024-11-19 ENCOUNTER — CLINICAL SUPPORT (OUTPATIENT)
Dept: SMOKING CESSATION | Facility: CLINIC | Age: 44
End: 2024-11-19

## 2024-11-19 DIAGNOSIS — F17.200 NICOTINE DEPENDENCE: Primary | ICD-10-CM

## 2024-11-20 NOTE — PROGRESS NOTES
Individual Follow-Up Form    11/19/24    Quit Date:     Clinical Status of Patient: Outpatient    Length of Service: 60 minutes    Continuing Medication: yes  Patches    Other Medications: none      Target Symptoms: Withdrawal and medication side effects. The following were  rated moderate (3) to severe (4) on TCRS:  Moderate (3): crave, desire   Severe (4): none    Comments:  Audio visit .  Patient continues to smoke , approximately 2 cigars a day . Pt remains on tobacco cessation medication of  21 mg nicotine patch QD, she uses peppermints in time of crave . No adverse effects noted at this time. She will be traveling and staying with her brother for the next 2 weeks . We discussed how changes in environment can help take away the routine and/ or triggers involved with smoking . Her brother is a smoker but [place] and will have to smoke in certain areas , she feels that will help as well . Reviewed strategies, habitual behavior, stress, and high risk situations. Introduced stress with addition interventions, SOLVE, relaxation with interventions,  and the importance of rewarding oneself for accomplishments toward becoming tobacco free. Patient remains motivated to quit . Pt understands to call CTTS if anything is needed before the next visit.          Diagnosis: F17.210    Next Visit: 2 weeks

## 2024-12-02 ENCOUNTER — PATIENT MESSAGE (OUTPATIENT)
Dept: PAIN MEDICINE | Facility: CLINIC | Age: 44
End: 2024-12-02
Payer: COMMERCIAL

## 2024-12-02 NOTE — ANESTHESIA PAT ROS NOTE
12/02/2024  Ade Silva is a 44 y.o., female.      Pre-op Assessment          Review of Systems  Anesthesia Hx:   History of prior surgery of interest to airway management or planning: nephrectomy. Previous anesthesia: General XI ROBOTIC NEPHRECTOMY (Left: Abdomen) 9/19/24 with general anesthesia.  Procedure performed at an [facility].      Airway issues documented on chart review include mask, easy, videolaryngoscope used  , view on video-laryngoscopy Grade I    Denies Family Hx of Anesthesia complications.    Denies Personal Hx of Anesthesia complications.                    Social:  Smoker, No Alcohol Use       Hematology/Oncology:    Oncology Normal    -- Anemia:                                  EENT/Dental:  EENT/Dental Normal           Cardiovascular:            Denies Angina.            Functional Capacity good / => 4 METS                         Pulmonary:    Asthma   Denies Shortness of breath.  Denies Recent URI.                 Renal/:  Chronic Renal Disease, ESRD, Dialysis   AUTOSOMAL DOMINANT POLYCYSTIC KIDNEY DISEASE.              Hepatic/GI:        ANAL LESION             Musculoskeletal:  Musculoskeletal Normal                Neurological:  TIA,     Seizures, well controlled                                Endocrine:  Endocrine Normal            Dermatological:  RIGHT ARM FISTULA.    Psych:  Psychiatric History                       Anesthesia Assessment: Preoperative EQUATION    Planned Procedure: Procedure(s) (LRB):  EXCISION, LESION, ANUS (N/A)  CONE BIOPSY, CERVIX, USING LASER (N/A)  DILATION AND CURETTAGE, UTERUS (N/A)  CURETTAGE, ENDOCERVICAL (N/A)  Requested Anesthesia Type:General  Surgeon: Son Mojica MD  Service: Colon and Rectal  Known or anticipated Date of Surgery:12/10/2024    Surgeon notes: reviewed    Electronic QUestionnaire Assessment completed via  nurse interview with patient.        Triage considerations:     The patient has no apparent active cardiac condition (No unstable coronary Syndrome such as severe unstable angina or recent [<1 month] myocardial infarction, decompensated CHF, severe valvular   disease or significant arrhythmia)    Previous anesthesia records:GETA and No problems    Airway:  Mallampati: II / II  Mouth Opening: Normal  TM Distance: Normal  Tongue: Normal  Neck ROM: Normal ROM     Dental:  Periodontal disease, Loose teeth        Last PCP note: 3-6 months ago , within [facility]   Subspecialty notes: Neurosurgery, Pain Management, Urology, CRS, GYN/ONC    Other important co-morbidities: GERD, HTN, Smoker, and ESRD ON HD MWF, S/P LEFT NEPHRECTOMY       Tests already available:  Available tests,  within 3 months , within [facility] .     11/20/24  POTASSIUM, HEMATOCRIT, HEMOGLOBIN    11/6/24  HEMOGRAM, PTH, BMP    10/2/24  BMP    9/10/24  EKG            Instructions given. (See in Nurse's note)    Optimization:  Anesthesia Preop Clinic Assessment NOT Indicated    Medical Opinion NOT Indicated             Plan:    Testing:  None Needed             Patient  has previously scheduled Medical Appointment: NOT AT THIS TIME    Navigation: Straight Line to surgery.               No tests, anesthesia preop clinic visit, or consult required.

## 2024-12-09 ENCOUNTER — TELEPHONE (OUTPATIENT)
Dept: SURGERY | Facility: CLINIC | Age: 44
End: 2024-12-09
Payer: COMMERCIAL

## 2024-12-09 ENCOUNTER — PATIENT MESSAGE (OUTPATIENT)
Dept: PAIN MEDICINE | Facility: CLINIC | Age: 44
End: 2024-12-09
Payer: COMMERCIAL

## 2024-12-09 ENCOUNTER — ANESTHESIA EVENT (OUTPATIENT)
Dept: SURGERY | Facility: HOSPITAL | Age: 44
End: 2024-12-09
Payer: COMMERCIAL

## 2024-12-09 NOTE — ANESTHESIA PREPROCEDURE EVALUATION
[facility]  Anesthesia Pre-Operative Evaluation   12/09/2024        Ade Silva, 1980  [id number]  Procedure(s) (LRB):  EXCISION, LESION, ANUS (N/A)  CONE BIOPSY, CERVIX, USING LASER (N/A)  DILATION AND CURETTAGE, UTERUS (N/A)  CURETTAGE, ENDOCERVICAL (N/A)    Subjective    Ademannie Silva is a 44 y.o. female w/ a significant PMHx of polycystic kidney disease, HTN, basilar artery aneurysm, ESRD on HD, GERD, snoring, anxiety and depression.    Patient now presents for above procedure(s).     Prev Airway:   Date Department Mask Airway Size Difficult Intubation Attempts   09/19/24 [facility] - Surgery ([facility]) easy mask 7.0 No 1   07/25/24 [facility] - Surgery ([place]) easy mask 7.5 No 1       LDA:        Hemodialysis AV Fistula 09/14/23 Right forearm (Active)   Patency Thrill 11/12/24 1011   Status Not Accessed 11/12/24 1011   Dressing Intervention Other (Comment) 11/12/24 1011   Number of days: 452            Hemodialysis AV Fistula 10/05/23 0833 Right upper arm (Active)   Needle Size 16ga 12/09/24 1025   Site Assessment Clean;Dry;Intact 12/09/24 1025   Patency Present;Thrill;Bruit 12/09/24 1025   Status Accessed 12/09/24 1025   Flows Good 11/22/24 1415   Dressing Intervention First dressing 11/20/24 1415   Dressing Status Clean;Dry;Intact 11/22/24 1415   Site Condition No complications 11/22/24 1415   Dressing Open to air (None) 12/09/24 1025   Number of days: 431            Closed/Suction Drain 09/19/24 1708 Tube - 1 Left Abdomen Bulb 15 Fr. (Active)   Site Description Unable to view 09/20/24 0831   Dressing Type Gauze 09/20/24 0831   Dressing Status Clean;Dry;Intact 09/20/24 0831   Dressing Intervention Integrity maintained 09/20/24 0831   Drainage Serosanguineous 09/20/24 0831   Status To bulb suction 09/19/24 1935   Output (mL) 50 mL 09/20/24 0526   Number of days: 80       Drips:     Patient Active Problem List   Diagnosis    Polycystic kidney disease    Chronic kidney  "disease-mineral and bone disorder    Anemia    Chest pain    Autosomal dominant polycystic kidney disease    Hypertension    Basilar artery aneurysm    Gross hematuria    Hematuria    S/P arteriovenous (AV) fistula creation    ESRD (end stage renal disease) on dialysis    Immunization counseling    Pre-transplant evaluation for kidney transplant    ESRD on hemodialysis    s/p hysteroscopy, endometrial ablation, LEEP & vulvectomy    Renal mass    Mild intermittent asthma without complication    GERD (gastroesophageal reflux disease)    Snoring    Anxiety and depression    Papular rash    Macrocytosis without anemia       Review of patient's allergies indicates:   Allergen Reactions    Aspirin Hives     And vomiting    Penicillins Hives     Throat swelling    Adhesive Rash     Adhesive/silk tape (type found on band aides). Can only use "Paper Tape"    Butalbital-acetaminophen-caff Nausea And Vomiting and Other (See Comments)     Dizziness (made pt feel sick)    Latex, natural rubber Rash    Nickel Rash    Tomato Nausea And Vomiting       Current Inpatient Medications:    tuberculin  5 Units Intradermal 1 time in Clinic/HOD       Current Facility-Administered Medications on File Prior to Encounter   Medication Dose Route Frequency Provider Last Rate Last Admin    0.9%  NaCl infusion   Intravenous Continuous Mel Calvillo MD        ondansetron disintegrating tablet 8 mg  8 mg Oral Q8H PRN Mel Calvillo MD         Current Outpatient Medications on File Prior to Encounter   Medication Sig Dispense Refill    acetaminophen (TYLENOL) 500 MG tablet Take 2 tablets (1,000 mg total) by mouth every 6 (six) hours as needed for Pain. 30 tablet 0    acetaminophen (TYLENOL) 650 MG TbSR Take 1 tablet (650 mg total) by mouth every 6 (six) hours as needed (pain). 30 tablet 1    esomeprazole (NEXIUM) 20 MG capsule Take 20 mg by mouth before breakfast.      gabapentin (NEURONTIN) 300 MG capsule Take 1 capsule (300 mg total) by mouth " three times per week after dialysis 36 capsule 2    OPW TEST CLAIM - DO NOT FILL OPW test claim. Do not fill. 1 each 0    hpv vaccine,9-jaz (GARDASIL 9, PF,) 0.5 mL Syrg Inject 0.5 mLs into the muscle for 3 doses 1.5 mL 0    loratadine (CLARITIN ORAL) Take 1 tablet by mouth daily as needed (Allergies).      medroxyPROGESTERone (PROVERA) 10 MG tablet Take 1 tablet (10 mg total) by mouth once daily. 30 tablet 11    nicotine (NICODERM CQ) 21 mg/24 hr Place 1 patch onto the skin once daily. Please dispense only clear patches , patient has a tape allergy. 14 patch 0    ondansetron (ZOFRAN) 4 MG tablet Take 1 tablet (4 mg total) by mouth every 6 (six) hours. 20 tablet 0    sodium zirconium cyclosilicate (LOKELMA) 10 gram packet Take 1 packet (10 g total) by mouth 2 (two) times a day. Mix entire contents of packet(s) into drinking glass containing 3 tablespoons of water; stir well and drink immediately. Add water and repeat until no powder remains to receive entire dose. 30 packet 3    sucroferric oxyhydroxide (VELPHORO) 500 mg Chew Break or dissolve 1 tablet by mouth with each meal and snack. Do not exceed 6 tablets per day. 180 tablet 11       Past Surgical History:   Procedure Laterality Date    AV FISTULA PLACEMENT Right 09/14/2023    Procedure: CREATION, AV FISTULA, radial cephalic;  Surgeon: MELISSA Loera II, MD;  Location: [address];  Service: Vascular;  Laterality: Right;    AV FISTULA PLACEMENT Right 10/05/2023    Procedure: CREATION, AV FISTULA - brachial;  Surgeon: MELISSA Loera II, MD;  Location: [address];  Service: Vascular;  Laterality: Right;  confirmed placement with doppler    CEREBRAL ANEURYSM REPAIR      coiling    CYSTOSCOPY      age 13    EMBOLIZATION Right 04/23/2024    Procedure: EMBOLIZATION, BLOOD VESSEL FISTULA;  Surgeon: MELISSA Loera II, MD;  Location: [facility] OR [address] [place] [address];  Service: Vascular;  Laterality: Right;    EPIDURAL STEROID INJECTION INTO CERVICAL SPINE N/A  11/12/2024    Procedure: C7-T1 KIMBERLEE;  Surgeon: Presley Hough DO;  [facility];  Service: Pain Management;  Laterality: N/A;  15 mins no AC    FISTULOGRAM Right 04/23/2024    Procedure: Fistulogram;  Surgeon: MELISSA Loera II, MD;  [address];  Service: Vascular;  Laterality: Right;  CONTRAST: 68ML, FLUORO TIME: 17.9, mGy: 45.11, Gycm2: 5.9548    HYSTEROSCOPY N/A 07/25/2024    Procedure: HYSTEROSCOPY;  Surgeon: Jazz Amador MD;  Location: [facility];  Service: OB/GYN;  Laterality: N/A;    INCISION OF VULVA N/A 07/25/2024    Procedure: INCISION, VULVA;  Surgeon: Jazz Amador MD;  Location: [facility];  Service: OB/GYN;  Laterality: N/A;    INSERTION OF TUNNELED CENTRAL VENOUS HEMODIALYSIS CATHETER Right 05/03/2023    Procedure: Insertion, Catheter, Central Venous, Hemodialysis;  Surgeon: Priya Grimm MD;  Location: [facility] CATH LAB;  Service: Interventional Nephrology;  Laterality: Right;    LAPAROSCOPIC ROBOT-ASSISTED SURGICAL REMOVAL OF KIDNEY USING DA GLORIA XI Left 9/19/2024    Procedure: XI ROBOTIC NEPHRECTOMY;  Surgeon: Valentin Maldonado MD;  Location: [address];  Service: Urology;  Laterality: Left;    LOOP ELECTROSURGICAL EXCISION PROCEDURE (LEEP) N/A 07/25/2024    Procedure: LEEP (LOOP ELECTROSURGICAL EXCISION PROCEDURE);  Surgeon: Jazz Amador MD;  Location: [facility];  Service: OB/GYN;  Laterality: N/A;    REMOVAL OF HEMODIALYSIS CATHETER  05/03/2023    Procedure: REMOVAL, CATHETER, HEMODIALYSIS;  Surgeon: Priya Grimm MD;  Location: [facility] CATH LAB;  Service: Interventional Nephrology;;    THERMAL ABLATION OF ENDOMETRIUM N/A 07/25/2024    Procedure: ABLATION, ENDOMETRIUM, THERMAL;  Surgeon: Jazz Amador MD;  Location: [facility];  Service: OB/GYN;  Laterality: N/A;       Social History:  Tobacco Use: High Risk (11/12/2024)    Patient History     Smoking Tobacco Use: Every Day     Smokeless Tobacco Use: Never     Passive Exposure:  Never       Alcohol Use: Not At Risk (12/19/2023)    AUDIT-C     Frequency of Alcohol Consumption: Never     Average Number of Drinks: Patient does not drink     Frequency of Binge Drinking: Never       Objective    Vital Signs Range:  BMI Readings from Last 1 Encounters:   11/12/24 29.41 kg/m²       Pulse:  [56-88]   BP: ()/(49-76)        Significant Labs:        Component Value Date/Time    WBC 9.28 11/06/2024 1400    HGB 14.1 11/20/2024 1400    HCT 42.1 11/20/2024 1400    HCT 38 09/14/2023 0708     11/06/2024 1400     11/06/2024 1400    K 4.4 11/20/2024 1400     11/06/2024 1400    CO2 20 (L) 09/20/2024 0450     (H) 09/20/2024 0450    BUN 73 (H) 09/20/2024 0450    CREATININE 12.96 (H) 11/06/2024 1400    MG 2.4 09/20/2024 0450    PHOS 6.9 (H) 11/06/2024 1400    CALCIUM 8.7 11/06/2024 1400    ALBUMIN 3.9 11/06/2024 1400    PROT 7.6 07/25/2024 0554    ALKPHOS 92 11/06/2024 1400    BILITOT 0.5 07/25/2024 0554    AST 8 (L) 07/25/2024 0554    ALT 9 11/06/2024 1400    INR 1.0 10/12/2023 0653        Please see Results Review for additional labs.     Diagnostic Studies: All relevant studies, reviewed.      EKG:   Results for orders placed or performed during the hospital encounter of 09/10/24   EKG 12-lead    Collection Time: 09/10/24 10:26 AM   Result Value Ref Range    QRS Duration 82 ms    [facility] QTC Calculation 459 ms    Narrative    Test Reason : Z01.818,    Vent. Rate : 056 BPM     Atrial Rate : 056 BPM     P-R Int : 144 ms          QRS Dur : 082 ms      QT Int : 476 ms       P-R-T Axes : 057 022 049 degrees     QTc Int : 459 ms    Sinus bradycardia  Possible Left atrial enlargement    Abnormal ECG  When compared with ECG of 24-OCT-2023 08:45,    Criteria for Anterior infarct are no longer Present  Criteria for Anterolateral infarct are no longer Present  T wave amplitude has increased in Anterior leads  Confirmed by CANDIDO ELLISON MD ([id number]) on 9/10/2024 11:41:31 AM    Referred By:  HARISH BLACK           Confirmed By:CANDIDO ELLISON MD       ECHO:  Results for orders placed during the hospital encounter of 10/24/23    Echo    Interpretation Summary    Left Ventricle: The left ventricle is normal in size. Normal wall thickness. Normal wall motion. There is normal systolic function with a visually estimated ejection fraction of 60 - 65%. There is normal diastolic function.    Right Ventricle: Normal right ventricular cavity size. Wall thickness is normal. Right ventricle wall motion  is normal. Systolic function is normal.    IVC/SVC: Normal venous pressure at 3 mmHg.    PA systolic pressure could not be calculated because of no tricuspid regurgitation, but RVOT acceleration times are consistent with normal PA pressure.         Pre-op Assessment    I have reviewed the Patient Summary Reports.     I have reviewed the Nursing Notes.    I have reviewed the Medications.     Review of Systems  Anesthesia Hx:  No problems with previous Anesthesia                Cardiovascular:     Hypertension                                    Hypertension         Pulmonary:    Asthma       Asthma:               Renal/:  Chronic Renal Disease        Kidney Function/Disease             Hepatic/GI:     GERD         Gerd          Neurological:  TIA,     Seizures           Seizure Disorder              TIA - Transient Ischemic Attack               Psych:  Psychiatric History anxiety depression                Physical Exam  General: Well nourished, Cooperative and Alert    Airway:  Mallampati: III / II  Mouth Opening: Normal  TM Distance: Normal  Tongue: Normal  Neck ROM: Flexion Decreased, Extension Decreased    Dental:  Loose teeth        Anesthesia Plan  Type of Anesthesia, risks & benefits discussed:    Anesthesia Type: Gen ETT  Intra-op Monitoring Plan: Standard ASA Monitors  Post Op Pain Control Plan: multimodal analgesia  Induction:  IV  Airway Plan: Video, Post-Induction  ASA Score: 3  Day of Surgery Review of  History & Physical: H&P Update referred to the surgeon/provider.    Ready For Surgery From Anesthesia Perspective.     .

## 2024-12-10 ENCOUNTER — TELEPHONE (OUTPATIENT)
Dept: GYNECOLOGIC ONCOLOGY | Facility: CLINIC | Age: 44
End: 2024-12-10
Payer: COMMERCIAL

## 2024-12-10 ENCOUNTER — ANESTHESIA (OUTPATIENT)
Dept: SURGERY | Facility: HOSPITAL | Age: 44
End: 2024-12-10
Payer: COMMERCIAL

## 2024-12-10 ENCOUNTER — TELEPHONE (OUTPATIENT)
Dept: PAIN MEDICINE | Facility: CLINIC | Age: 44
End: 2024-12-10
Payer: COMMERCIAL

## 2024-12-10 ENCOUNTER — HOSPITAL ENCOUNTER (OUTPATIENT)
Facility: HOSPITAL | Age: 44
Discharge: HOME | End: 2024-12-10
Attending: COLON & RECTAL SURGERY | Admitting: COLON & RECTAL SURGERY
Payer: COMMERCIAL

## 2024-12-10 VITALS
BODY MASS INDEX: 31.62 KG/M2 | HEIGHT: 70 IN | SYSTOLIC BLOOD PRESSURE: 109 MMHG | TEMPERATURE: 98 F | OXYGEN SATURATION: 100 % | HEART RATE: 66 BPM | DIASTOLIC BLOOD PRESSURE: 63 MMHG | WEIGHT: 220.88 LBS | RESPIRATION RATE: 19 BRPM

## 2024-12-10 DIAGNOSIS — Z98.890 STATUS POST COLPOSCOPY: Primary | ICD-10-CM

## 2024-12-10 DIAGNOSIS — K64.4 SKIN TAG OF ANUS: ICD-10-CM

## 2024-12-10 LAB
B-HCG UR QL: NEGATIVE
CTP QC/QA: YES
GLUCOSE SERPL-MCNC: 76 MG/DL (ref 70–110)
HCO3 UR-SCNC: 28.7 MMOL/L (ref 24–28)
HCT VFR BLD CALC: 40 %PCV (ref 36–54)
PCO2 BLDA: 56.9 MMHG (ref 35–45)
PH SMN: 7.31 [PH] (ref 7.35–7.45)
PO2 BLDA: 21 MMHG (ref 40–60)
POC BE: 2 MMOL/L
POC IONIZED CALCIUM: 1.17 MMOL/L (ref 1.06–1.42)
POC SATURATED O2: 30 % (ref 95–100)
POC TCO2: 30 MMOL/L (ref 24–29)
POTASSIUM BLD-SCNC: 4.6 MMOL/L (ref 3.5–5.1)
SAMPLE: ABNORMAL
SODIUM BLD-SCNC: 140 MMOL/L (ref 136–145)

## 2024-12-10 PROCEDURE — 63600175 PHARM REV CODE 636 W HCPCS: Mod: TXP

## 2024-12-10 PROCEDURE — 88342 IMHCHEM/IMCYTCHM 1ST ANTB: CPT | Mod: TXP | Performed by: PATHOLOGY

## 2024-12-10 PROCEDURE — 36000707: Mod: TXP | Performed by: COLON & RECTAL SURGERY

## 2024-12-10 PROCEDURE — 63600175 PHARM REV CODE 636 W HCPCS: Mod: JZ,JG,TXP | Performed by: COLON & RECTAL SURGERY

## 2024-12-10 PROCEDURE — 71000016 HC POSTOP RECOV ADDL HR: Mod: TXP | Performed by: COLON & RECTAL SURGERY

## 2024-12-10 PROCEDURE — 25000003 PHARM REV CODE 250: Mod: TXP | Performed by: NURSE PRACTITIONER

## 2024-12-10 PROCEDURE — 36000706: Mod: TXP | Performed by: COLON & RECTAL SURGERY

## 2024-12-10 PROCEDURE — 71000044 HC DOSC ROUTINE RECOVERY FIRST HOUR: Mod: TXP | Performed by: COLON & RECTAL SURGERY

## 2024-12-10 PROCEDURE — 46230 REMOVAL OF ANAL TAGS: CPT | Mod: NTX,,, | Performed by: COLON & RECTAL SURGERY

## 2024-12-10 PROCEDURE — 81025 URINE PREGNANCY TEST: CPT | Mod: TXP | Performed by: COLON & RECTAL SURGERY

## 2024-12-10 PROCEDURE — 58110 BX DONE W/COLPOSCOPY ADD-ON: CPT | Mod: ,,, | Performed by: OBSTETRICS & GYNECOLOGY

## 2024-12-10 PROCEDURE — 37000008 HC ANESTHESIA 1ST 15 MINUTES: Mod: TXP | Performed by: COLON & RECTAL SURGERY

## 2024-12-10 PROCEDURE — 25000003 PHARM REV CODE 250: Mod: TXP | Performed by: OBSTETRICS & GYNECOLOGY

## 2024-12-10 PROCEDURE — 57454 BX/CURETT OF CERVIX W/SCOPE: CPT | Mod: ,,, | Performed by: OBSTETRICS & GYNECOLOGY

## 2024-12-10 PROCEDURE — 71000015 HC POSTOP RECOV 1ST HR: Mod: TXP | Performed by: COLON & RECTAL SURGERY

## 2024-12-10 PROCEDURE — 25000003 PHARM REV CODE 250: Mod: TXP

## 2024-12-10 PROCEDURE — 88305 TISSUE EXAM BY PATHOLOGIST: CPT | Mod: 59,TXP | Performed by: PATHOLOGY

## 2024-12-10 PROCEDURE — 37000009 HC ANESTHESIA EA ADD 15 MINS: Mod: TXP | Performed by: COLON & RECTAL SURGERY

## 2024-12-10 RX ORDER — PHENYLEPHRINE HYDROCHLORIDE 10 MG/ML
INJECTION INTRAVENOUS
Status: DISCONTINUED | OUTPATIENT
Start: 2024-12-10 | End: 2024-12-10

## 2024-12-10 RX ORDER — MIDAZOLAM HYDROCHLORIDE 1 MG/ML
INJECTION INTRAMUSCULAR; INTRAVENOUS
Status: DISCONTINUED | OUTPATIENT
Start: 2024-12-10 | End: 2024-12-10

## 2024-12-10 RX ORDER — DEXTROMETHORPHAN HYDROBROMIDE, GUAIFENESIN 5; 100 MG/5ML; MG/5ML
650 LIQUID ORAL EVERY 6 HOURS PRN
Qty: 30 TABLET | Refills: 1 | Status: SHIPPED | OUTPATIENT
Start: 2024-12-10

## 2024-12-10 RX ORDER — HALOPERIDOL 5 MG/ML
0.5 INJECTION INTRAMUSCULAR EVERY 10 MIN PRN
Status: DISCONTINUED | OUTPATIENT
Start: 2024-12-10 | End: 2024-12-10 | Stop reason: HOSPADM

## 2024-12-10 RX ORDER — SCOLOPAMINE TRANSDERMAL SYSTEM 1 MG/1
PATCH, EXTENDED RELEASE TRANSDERMAL
Status: DISCONTINUED
Start: 2024-12-10 | End: 2024-12-10 | Stop reason: HOSPADM

## 2024-12-10 RX ORDER — FENTANYL CITRATE 50 UG/ML
25 INJECTION, SOLUTION INTRAMUSCULAR; INTRAVENOUS EVERY 5 MIN PRN
Status: DISCONTINUED | OUTPATIENT
Start: 2024-12-10 | End: 2024-12-10 | Stop reason: HOSPADM

## 2024-12-10 RX ORDER — LIDOCAINE HYDROCHLORIDE 10 MG/ML
1 INJECTION, SOLUTION EPIDURAL; INFILTRATION; INTRACAUDAL; PERINEURAL ONCE
Status: DISCONTINUED | OUTPATIENT
Start: 2024-12-10 | End: 2024-12-10 | Stop reason: HOSPADM

## 2024-12-10 RX ORDER — ROCURONIUM BROMIDE 10 MG/ML
INJECTION, SOLUTION INTRAVENOUS
Status: DISCONTINUED | OUTPATIENT
Start: 2024-12-10 | End: 2024-12-10

## 2024-12-10 RX ORDER — BUPIVACAINE HYDROCHLORIDE 2.5 MG/ML
INJECTION, SOLUTION EPIDURAL; INFILTRATION; INTRACAUDAL
Status: DISCONTINUED | OUTPATIENT
Start: 2024-12-10 | End: 2024-12-10 | Stop reason: HOSPADM

## 2024-12-10 RX ORDER — PROPOFOL 10 MG/ML
VIAL (ML) INTRAVENOUS
Status: DISCONTINUED | OUTPATIENT
Start: 2024-12-10 | End: 2024-12-10

## 2024-12-10 RX ORDER — LIDOCAINE HYDROCHLORIDE 20 MG/ML
INJECTION INTRAVENOUS
Status: DISCONTINUED | OUTPATIENT
Start: 2024-12-10 | End: 2024-12-10

## 2024-12-10 RX ORDER — SCOLOPAMINE TRANSDERMAL SYSTEM 1 MG/1
1 PATCH, EXTENDED RELEASE TRANSDERMAL
Status: DISCONTINUED | OUTPATIENT
Start: 2024-12-10 | End: 2024-12-10 | Stop reason: HOSPADM

## 2024-12-10 RX ORDER — SODIUM CHLORIDE 0.9 % (FLUSH) 0.9 %
10 SYRINGE (ML) INJECTION
Status: DISCONTINUED | OUTPATIENT
Start: 2024-12-10 | End: 2024-12-10 | Stop reason: HOSPADM

## 2024-12-10 RX ORDER — DEXAMETHASONE SODIUM PHOSPHATE 4 MG/ML
INJECTION, SOLUTION INTRA-ARTICULAR; INTRALESIONAL; INTRAMUSCULAR; INTRAVENOUS; SOFT TISSUE
Status: DISCONTINUED | OUTPATIENT
Start: 2024-12-10 | End: 2024-12-10

## 2024-12-10 RX ORDER — MUPIROCIN 20 MG/G
OINTMENT TOPICAL
Status: DISCONTINUED | OUTPATIENT
Start: 2024-12-10 | End: 2024-12-10 | Stop reason: HOSPADM

## 2024-12-10 RX ORDER — SODIUM CHLORIDE 9 MG/ML
INJECTION, SOLUTION INTRAVENOUS CONTINUOUS
Status: DISCONTINUED | OUTPATIENT
Start: 2024-12-10 | End: 2024-12-10 | Stop reason: HOSPADM

## 2024-12-10 RX ORDER — ACETAMINOPHEN 500 MG
1000 TABLET ORAL
Status: COMPLETED | OUTPATIENT
Start: 2024-12-10 | End: 2024-12-10

## 2024-12-10 RX ORDER — HALOPERIDOL 5 MG/ML
INJECTION INTRAMUSCULAR
Status: DISCONTINUED | OUTPATIENT
Start: 2024-12-10 | End: 2024-12-10

## 2024-12-10 RX ORDER — FENTANYL CITRATE 50 UG/ML
INJECTION, SOLUTION INTRAMUSCULAR; INTRAVENOUS
Status: DISCONTINUED | OUTPATIENT
Start: 2024-12-10 | End: 2024-12-10

## 2024-12-10 RX ORDER — GLUCAGON 1 MG
1 KIT INJECTION
Status: DISCONTINUED | OUTPATIENT
Start: 2024-12-10 | End: 2024-12-10 | Stop reason: HOSPADM

## 2024-12-10 RX ORDER — OXYCODONE HYDROCHLORIDE 5 MG/1
5 TABLET ORAL EVERY 4 HOURS PRN
Qty: 20 TABLET | Refills: 0 | Status: SHIPPED | OUTPATIENT
Start: 2024-12-10

## 2024-12-10 RX ORDER — ONDANSETRON HYDROCHLORIDE 2 MG/ML
INJECTION, SOLUTION INTRAVENOUS
Status: DISCONTINUED | OUTPATIENT
Start: 2024-12-10 | End: 2024-12-10

## 2024-12-10 RX ADMIN — SUGAMMADEX 200 MG: 100 INJECTION, SOLUTION INTRAVENOUS at 08:12

## 2024-12-10 RX ADMIN — FENTANYL CITRATE 25 MCG: 50 INJECTION INTRAMUSCULAR; INTRAVENOUS at 08:12

## 2024-12-10 RX ADMIN — ONDANSETRON 4 MG: 2 INJECTION INTRAMUSCULAR; INTRAVENOUS at 07:12

## 2024-12-10 RX ADMIN — HALOPERIDOL LACTATE 0.5 MG: 5 INJECTION, SOLUTION INTRAMUSCULAR at 07:12

## 2024-12-10 RX ADMIN — FENTANYL CITRATE 50 MCG: 50 INJECTION, SOLUTION INTRAMUSCULAR; INTRAVENOUS at 07:12

## 2024-12-10 RX ADMIN — SODIUM CHLORIDE: 0.9 INJECTION, SOLUTION INTRAVENOUS at 07:12

## 2024-12-10 RX ADMIN — MUPIROCIN: 20 OINTMENT TOPICAL at 05:12

## 2024-12-10 RX ADMIN — MIDAZOLAM HYDROCHLORIDE 2 MG: 2 INJECTION, SOLUTION INTRAMUSCULAR; INTRAVENOUS at 07:12

## 2024-12-10 RX ADMIN — PROPOFOL 130 MG: 10 INJECTION, EMULSION INTRAVENOUS at 07:12

## 2024-12-10 RX ADMIN — PHENYLEPHRINE HYDROCHLORIDE 100 MCG: 10 INJECTION INTRAVENOUS at 07:12

## 2024-12-10 RX ADMIN — DEXAMETHASONE SODIUM PHOSPHATE 4 MG: 4 INJECTION, SOLUTION INTRAMUSCULAR; INTRAVENOUS at 07:12

## 2024-12-10 RX ADMIN — LIDOCAINE HYDROCHLORIDE 100 MG: 20 INJECTION INTRAVENOUS at 07:12

## 2024-12-10 RX ADMIN — ACETAMINOPHEN 1000 MG: 500 TABLET ORAL at 05:12

## 2024-12-10 RX ADMIN — ROCURONIUM BROMIDE 50 MG: 10 INJECTION, SOLUTION INTRAVENOUS at 07:12

## 2024-12-10 RX ADMIN — SCOLOPAMINE TRANSDERMAL SYSTEM 1 PATCH: 1 PATCH, EXTENDED RELEASE TRANSDERMAL at 06:12

## 2024-12-10 NOTE — H&P
Ade Silva is 44 y.o.  presenting for scheduled EUA with colposcopic survey with possible cervical biopsy, ECC and endometrial biopsy for the evaluation of persistent cervical dysplasia.    Temp:  [97.2 °F (36.2 °C)] 97.2 °F (36.2 °C)  Pulse:  [56-88] 79  Resp:  [18] 18  SpO2:  [100 %] 100 %  BP: ()/(49-76) 110/55    General: NAD, alert, oriented, cooperative  HEENT: NCAT, EOM grossly intact  Lungs: Normal WOB  Heart: regular rate  Abdomen: soft, nondistended, nontender, no rebound or guarding    Consents in chart. Pre-operative heparin not indicated. All questions answered and concerns addressed. To OR for planned procedure.      Xiomara Calloway MD  Obstetrics and Gynecology - PGY2     Subjective:      Patient ID: Ade Silva is a 44 y.o. female.     Chief Complaint: Advice Only (Complex hyperplasia without atypia, TAYLOR 2-3)        HPI  Ade Silva is a 44 yr old  referred from Dr. POLA Amador for complex hyperplasia without atypia and TAYLOR 2-3. She reports irregular bleeding and cycles her whole life but stopped having cycles early  for approx nine months. Resumed irregular bleeding early .      She has a complex medical history that includes polycystic kidney disease for which she has required dialysis since 2023. Brain aneurysm she reports is due to her PCKD (coiling). Recent robotic surgery with left nephrectomy. Laparotomy for kidney extraction     Data reviewed:  2023: pap smear: ASC-H, + HR HPV 16  3/7/2024: Pelvic US: 8 cm uterus, ES 8.6 mm, normal ovaries, no FF. 0.9 cm hypoechoic nodule within the endometrial cavity which appears vascular. Finding is suggestive of an endometrial polyp.   3/12/2024: colposcopy: TAYLOR 2-3 endometrial biopsy: no atypical hyperplasia or malignancy identified  2024: vulvar biopsy: condyloma acuminatum   2024: hysteroscopy D&C: complex hyperplasia without atypia arising in a polyp. Endometrial ablation  LEEP/ECC: TAYLOR  "2-3, High-grade dysplasia involves an inked and cauterized edge of this LEEP cone biopsy   warty rectal lesion : AIN 3  9/19/2024: robotic left nephrectomy for kidney mass. Negative for malignancy.        She has a consult with CRS/Dr. Mojica on 10/24/2024 for AIN 3     Denies history for abnormal pap smears but also reports she knows she's had HPV exposure. Denies prior gardasil vaccine.     Tobacco use. Prior cigarettes 1 ppd/20 + years then cigars 2-3 a day since 2018.        Review of Systems   Constitutional:  Positive for fatigue (since surgery). Negative for appetite change, chills, diaphoresis and fever.   Respiratory:  Negative for chest tightness, shortness of breath and wheezing.    Cardiovascular:  Negative for chest pain, palpitations and leg swelling.   Gastrointestinal:  Negative for abdominal pain, constipation, diarrhea, nausea and vomiting.   Genitourinary:  Positive for genital sores. Negative for menstrual problem, pelvic pain, vaginal bleeding (none since ablation), vaginal discharge and vaginal pain.   Musculoskeletal:  Negative for gait problem.   Skin:  Negative for color change.   Neurological:  Positive for headaches (history for aneurysm). Negative for dizziness and light-headedness.   Psychiatric/Behavioral:  Negative for agitation. The patient is not nervous/anxious.               Past Medical History:   Diagnosis Date    Anxiety      Asthma      Autosomal dominant polycystic kidney disease      Brain aneurysm      Depression      Encounter for blood transfusion      GERD (gastroesophageal reflux disease)      Hypertension      Seizures       as a child (age 7-8); " stress -related"    TIA (transient ischemic attack)              Past Surgical History:   Procedure Laterality Date    AV FISTULA PLACEMENT Right 09/14/2023     Procedure: CREATION, AV FISTULA, radial cephalic;  Surgeon: MELISSA Loera II, MD;  Location: [facility] OR [address] [place] [address];  Service: Vascular;  Laterality: Right;    AV " FISTULA PLACEMENT Right 10/05/2023     Procedure: CREATION, AV FISTULA - brachial;  Surgeon: MELISSA Loera II, MD;  Location: [facility] OR [facility] FLR;  Service: Vascular;  Laterality: Right;  confirmed placement with doppler    CEREBRAL ANEURYSM REPAIR         coiling    CYSTOSCOPY         age 13    EMBOLIZATION Right 04/23/2024     Procedure: EMBOLIZATION, BLOOD VESSEL FISTULA;  Surgeon: MELISSA Loera II, MD;  Location: [facility] OR [facility] FLR;  Service: Vascular;  Laterality: Right;    FISTULOGRAM Right 04/23/2024     Procedure: Fistulogram;  Surgeon: MELISSA Loera II, MD;  Location: [facility] OR [facility] FLR;  Service: Vascular;  Laterality: Right;  CONTRAST: 68ML, FLUORO TIME: 17.9, mGy: 45.11, Gycm2: 5.9548    HYSTEROSCOPY N/A 07/25/2024     Procedure: HYSTEROSCOPY;  Surgeon: Jazz Amador MD;  Location: [facility];  Service: OB/GYN;  Laterality: N/A;    INCISION OF VULVA N/A 07/25/2024     Procedure: INCISION, VULVA;  Surgeon: Jazz Amador MD;  Location: [facility];  Service: OB/GYN;  Laterality: N/A;    INSERTION OF TUNNELED CENTRAL VENOUS HEMODIALYSIS CATHETER Right 05/03/2023     Procedure: Insertion, Catheter, Central Venous, Hemodialysis;  Surgeon: Priya Grimm MD;  Location: [facility] CATH LAB;  Service: Interventional Nephrology;  Laterality: Right;    LAPAROSCOPIC ROBOT-ASSISTED SURGICAL REMOVAL OF KIDNEY USING DA GLORIA XI Left 9/19/2024     Procedure: XI ROBOTIC NEPHRECTOMY;  Surgeon: Valentin Maldonado MD;  Location: [address];  Service: Urology;  Laterality: Left;    LOOP ELECTROSURGICAL EXCISION PROCEDURE (LEEP) N/A 07/25/2024     Procedure: LEEP (LOOP ELECTROSURGICAL EXCISION PROCEDURE);  Surgeon: Jazz Amador MD;  Location: [facility];  Service: OB/GYN;  Laterality: N/A;    REMOVAL OF HEMODIALYSIS CATHETER   05/03/2023     Procedure: REMOVAL, CATHETER, HEMODIALYSIS;  Surgeon: Priya Grimm MD;  Location: [facility] CATH LAB;  Service: Interventional Nephrology;;    THERMAL ABLATION OF  ENDOMETRIUM N/A 07/25/2024     Procedure: ABLATION, ENDOMETRIUM, THERMAL;  Surgeon: Jazz Amador MD;  Location: [facility];  Service: OB/GYN;  Laterality: N/A;             Family History   Problem Relation Name Age of Onset    Fibromyalgia Mother        Stroke Father        Heart disease Father        Polycystic kidney disease Father        Polycystic kidney disease Sister        Polycystic kidney disease Sister        Diabetes Brother        Ovarian cancer Neg Hx        Uterine cancer Neg Hx        Breast cancer Neg Hx        Colon cancer Neg Hx          Social History            Socioeconomic History    Marital status: Single   Tobacco Use    Smoking status: Every Day       Average packs/day: 1 pack/day for 28.0 years (28.0 ttl pk-yrs)       Types: Cigarettes, Cigars       Start date: 1994       Passive exposure: Never    Smokeless tobacco: Never    Tobacco comments:       Smoke 3-5 black and mild cigars a day    Substance and Sexual Activity    Alcohol use: Not Currently    Drug use: Never    Sexual activity: Not Currently   Social History Narrative     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      Social Drivers of Health           Financial Resource Strain: Low Risk  (12/19/2023)     Overall Financial Resource Strain (CARDIA)      Difficulty of Paying Living Expenses: Not very hard   Food Insecurity: No Food Insecurity (12/19/2023)     Hunger Vital Sign      Worried About Running Out of Food in the Last Year: Never true      Ran Out of Food in the Last Year: Never true   Transportation Needs: No Transportation Needs (12/19/2023)     PRAPARE - Transportation      Lack of Transportation (Medical): No      Lack of Transportation (Non-Medical): No   Physical Activity: Insufficiently Active (12/19/2023)     Exercise Vital Sign      Days of Exercise per Week: 2 days      Minutes of Exercise per Session: 10 min   Stress: Stress Concern Present (12/19/2023)     [employer] - Occupational Stress  Questionnaire      Feeling of Stress : To some extent   Housing Stability: Low Risk  (12/19/2023)     Housing Stability Vital Sign      Unable to Pay for Housing in the Last Year: No      Number of Places Lived in the Last Year: 2      Unstable Housing in the Last Year: No           Current Outpatient Medications   Medication Sig    acetaminophen (TYLENOL) 500 MG tablet Take 2 tablets (1,000 mg total) by mouth every 6 (six) hours as needed for Pain.    acetaminophen (TYLENOL) 650 MG TbSR Take 1 tablet (650 mg total) by mouth every 6 (six) hours as needed (pain).    esomeprazole (NEXIUM) 20 MG capsule Take 20 mg by mouth before breakfast.    gabapentin (NEURONTIN) 300 MG capsule Take 1 capsule (300 mg total) by mouth three times per week after dialysis    loratadine (CLARITIN ORAL) Take 1 tablet by mouth daily as needed (Allergies).    nicotine (NICODERM CQ) 21 mg/24 hr Place 1 patch onto the skin once daily. Please dispense only clear patches , patient has a tape allergy.    ondansetron (ZOFRAN) 4 MG tablet Take 1 tablet (4 mg total) by mouth every 6 (six) hours.    sodium zirconium cyclosilicate (LOKELMA) 10 gram packet Take 1 packet (10 g total) by mouth 2 (two) times a day. Mix entire contents of packet(s) into drinking glass containing 3 tablespoons of water; stir well and drink immediately. Add water and repeat until no powder remains to receive entire dose.    sucroferric oxyhydroxide (VELPHORO) 500 mg Chew Break or dissolve 1 tablet by mouth with each meal and snack. Do not exceed 6 tablets per day.    OPW TEST CLAIM - DO NOT FILL OPW test claim. Do not fill.    hpv vaccine,9-jaz (GARDASIL 9, PF,) 0.5 mL Syrg Inject 0.5 mLs into the muscle for 3 doses    medroxyPROGESTERone (PROVERA) 10 MG tablet Take 1 tablet (10 mg total) by mouth once daily.      No current facility-administered medications for this visit.          Facility-Administered Medications Ordered in Other Visits   Medication    0.9%  NaCl  "infusion    ondansetron disintegrating tablet 8 mg            Review of patient's allergies indicates:   Allergen Reactions    Aspirin Hives       And vomiting    Penicillins Hives       Throat swelling    Adhesive Rash       Adhesive/silk tape (type found on band aides). Can only use "Paper Tape"    Butalbital-acetaminophen-caff Nausea And Vomiting and Other (See Comments)       Dizziness (made pt feel sick)    Latex, natural rubber Rash    Nickel Rash    Tomato Nausea And Vomiting      BP (!) 111/55 (Patient Position: Sitting)   Pulse 89   Temp 98.1 °F (36.7 °C)   Ht 5' 11" (1.803 m)   Wt 98.1 kg (216 lb 3.2 oz)   SpO2 98%   BMI 30.15 kg/m²      Objective:   Physical Exam:   Constitutional: She is oriented to person, place, and time. She appears well-developed and well-nourished. No distress.    HENT:   Head: Normocephalic and atraumatic.    Eyes: No scleral icterus.     Cardiovascular:       Exam reveals no cyanosis and no edema.        Pulmonary/Chest: Effort normal. No respiratory distress.         Abdominal: Soft. She exhibits no distension and no fluid wave. There is abdominal tenderness (surgical healing). There is no rigidity.   Nephrectomy laparotomy site          Genitourinary:    Uterus normal.      Pelvic exam was performed with patient supine.   Labial bartholins abnormal.There is no rash, tenderness or lesion on the right labia. There is no rash, tenderness or lesion on the left labia. Right adnexum displays no mass, no tenderness and no fullness. Left adnexum displays no mass, no tenderness and no fullness. No vaginal discharge or bleeding in the vagina. Cervix exhibits discharge. Cervix exhibits no lesion. Uterus is not enlarged, not fixed, not tender and not hosting fibroids.    Genitourinary Comments: Healing LEEP bed             Musculoskeletal: Normal range of motion and moves all extremeties. No edema.       Neurological: She is alert and oriented to person, place, and time.    Skin: Skin " is warm. No rash noted. No cyanosis or erythema.    Psychiatric: She has a normal mood and affect. Thought content normal.         Assessment:      1. TAYLOR III with severe dysplasia    2. Complex endometrial hyperplasia without atypia          Plan:       Today the patient was counseled on her diagnosis of complex hyperplasia without atypia. This was arising in a polyp, which was resected and then the uterine lining was ablated subsequently. This is a benign condition with low risk of progression to malignancy ~5-10%. We discussed definitive treatment is hysterectomy/BSO. Alternative acceptable treatment options are hormonal with Progesterone IUD or PO progesterone follow by  close follow up and repeat sampling at 3-6 months to ensure resolution and no recurrence. IUD may be difficult to insert given her ablated endometrium.      We also discussed the TAYLOR 2-3 on her LEEP with positive focal margin and ECC. We discussed this is a pre-cancerous condition. Options for positive margins include close surveillance with pap/ECC/HPV Test in 4-6 months, repeating excision or definitive hysterectomy If re excision not feasible. I do recommend HPV Vaccination.      She is still recovering from her nephrectomy and wishes to avoid any surgery at this time. This is reasonable as neither condition requires urgent surgical intervention. In this case, I would recommend starting provera 10mg daily and planning to return here in 3 months with plans for repeating pap, HPV test, ECC and EMBX. We did discuss endometrial sampling may be limited or non diagnostic given her history of ablation and that any abnormal bleeding  should be evaluated promptly. She understands.      Lastly, she is seeing CRS soon for AIN. If plan is ultimately for an exam under anesthesia , she may chose to repeat excision and endometrial sampling at the same time if we can coordinate.         Elis Jacobs MD  Gynecologic Oncology

## 2024-12-10 NOTE — DISCHARGE SUMMARY
"Discharge Summary  Gynecology      Admit Date: 12/10/2024    Discharge Date and Time: 12/10/2024     Attending Physician: Son Mojica MD, Elis Jacobs MD    Principal Diagnoses: Status post colposcopy    Active Hospital Problems    Diagnosis  POA    *S/P EUA/Colposcopy/Cervcal Biopsy, ECC, EMBx - 12/10/24 [Z98.890]  Not Applicable      Resolved Hospital Problems   No resolved problems to display.       Procedures: Procedure(s) (LRB):  EXCISION, LESION, ANUS (N/A)  CURETTAGE, ENDOCERVICAL (N/A)  BIOPSY, CERVIX (N/A)    Discharged Condition: good    Hospital Course:   Ade Silva is a 44 y.o. y.o.  female who presented on 12/10/2024   for above procedures for the treatment of cervical dysplasia and an anal lesion. Patient tolerated procedure. Post-operative course was uncomplicated.  On day of discharge, patient was urinating, ambulating, and tolerating a regular diet without difficulty. Pain was well controlled on PO medication. She was discharged home on POD#0 in stable condition with instructions to follow up with Dr. Jacobs and Dr. Mojica in 4 weeks .     Consults: None    Significant Diagnostic Studies:  No results for input(s): "WBC", "HGB", "HCT", "MCV", "PLT" in the last 168 hours.     Treatments:   1. Surgery as above    Disposition: Home or Self Care    Patient Instructions:   Current Discharge Medication List        START taking these medications    Details   oxyCODONE (ROXICODONE) 5 MG immediate release tablet Take 1 tablet (5 mg total) by mouth every 4 (four) hours as needed for Pain.  Qty: 20 tablet, Refills: 0    Comments: Quantity prescribed more than 7 day supply? No  Associated Diagnoses: Skin tag of anus           CONTINUE these medications which have CHANGED    Details   acetaminophen (TYLENOL) 650 MG TbSR Take 1 tablet (650 mg total) by mouth every 6 (six) hours as needed (pain).  Qty: 30 tablet, Refills: 1           CONTINUE these medications which have NOT CHANGED    " Details   esomeprazole (NEXIUM) 20 MG capsule Take 20 mg by mouth before breakfast.      gabapentin (NEURONTIN) 300 MG capsule Take 1 capsule (300 mg total) by mouth three times per week after dialysis  Qty: 36 capsule, Refills: 2    Associated Diagnoses: Cervical radiculopathy; Right arm pain; Nerve pain      loratadine (CLARITIN ORAL) Take 1 tablet by mouth daily as needed (Allergies).      medroxyPROGESTERone (PROVERA) 10 MG tablet Take 1 tablet (10 mg total) by mouth once daily.  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 Do not exceed 6 tablets per day.  Qty: 180 tablet, Refills: 11      OPW TEST CLAIM - DO NOT FILL OPW test claim. Do not fill.  Qty: 1 each, Refills: 0      hpv vaccine,9-jaz (GARDASIL 9, PF,) 0.5 mL Syrg Inject 0.5 mLs into the muscle for 3 doses  Qty: 1.5 mL, Refills: 0    Associated Diagnoses: Complex endometrial hyperplasia without atypia; TAYLOR III with severe dysplasia      nicotine (NICODERM CQ) 21 mg/24 hr Place 1 patch onto the skin once daily. Please dispense only clear patches , patient has a tape allergy.  Qty: 14 patch, Refills: 0    Comments: Patient not a trust member . Address verified.  Associated Diagnoses: Nicotine dependence      ondansetron (ZOFRAN) 4 MG tablet Take 1 tablet (4 mg total) by mouth every 6 (six) hours.  Qty: 20 tablet, Refills: 0      sodium zirconium cyclosilicate (LOKELMA) 10 gram packet Take 1 packet (10 g total) by mouth 2 (two) times a day. Mix entire contents of packet(s) into drinking glass containing 3 tablespoons of water; stir well and drink immediately. Add water and repeat until no powder remains to receive entire dose.  Qty: 30 packet, Refills: 3    Associated Diagnoses: Hyperkalemia           STOP taking these medications       acetaminophen (TYLENOL) 500 MG tablet Comments:    Reason for Stopping:               Discharge Procedure Orders   Diet general     Lifting restrictions   Order Comments: LIFTING:  No lifting greater than 15 pounds for six weeks.     PELVIC REST:  No douching, tampons, or intercourse for 6 weeks.  If prescribed, vaginal estrogen cream may be used during the postoperative period.     Other restrictions (specify):   Order Comments: DRIVING:  No driving while on narcotics. Driving may be resumed initially with a competent passenger one to two weeks after surgery if no longer taking narcotics.     EXERCISE:  For six weeks your exercise should be limited to walking. You may walk as far as you wish, as long as you increase your level of exertion gradually and avoid slippery surfaces. You may climb stairs as needed to get around, but should not use stair climbing for exercise.     Call MD for:  temperature >100.4     Call MD for:  persistent nausea and vomiting     Call MD for:  severe uncontrolled pain     Call MD for:  difficulty breathing, headache or visual disturbances     Call MD for:  redness, tenderness, or signs of infection (pain, swelling, redness, odor or green/yellow discharge around incision site)     Call MD for:  hives     Call MD for:   Order Comments: inability to void,urine is ketchup colored or you have large clots, vaginal bleeding is heavier than a period.    VAGINAL DISCHARGE: You may develop a vaginal discharge and intermittent vaginal spotting after surgery and up to 6 weeks postoperatively.  The discharge may have an odor and may change in color but it is normal.  This is due to dissolving stiches.  Contact your surgical team if you develop vaginal or vulvar irritation along with a discharge.  Also contact your surgical team if you have vaginal discharge that smells like urine or stool.    PAIN MEDICATIONS:     Take your pain medications as instructed. It is best to take pain medications before your pain becomes severe. This will allow you to  take less medication yet have better pain relief. For the first 2 or 3 days it may be helpful to take your pain medications on a regular schedule (e.g. every 4 to 6 hours). This will help you to keep your pain under better control. You should then begin to take fewer medications each day until you no longer need them. Do not take pain medication on an empty stomach. This may lead to nausea and vomiting.    CONSTIPATION REMEDIES: Patients are often constipated after surgery or with use of oral narcotic medicine. You should continue to take the stool softener, Senokot-S during the next six weeks, and consume adequate amounts of water.  If you have not had a bowel movement for 3 days after dismissal, or are uncomfortable and unable to pass stool, please try one or all of the following measures:  1.  Milk of Magnesia - 30 cc by mouth every 12 hours   2.  Dulcolax suppository - One suppository per rectum every 4-6 hours   3.  Metamucil, Fibercon or other bulk former - use as directed  4.  Fleets Enema  5.  Prunes or Prune juice    If you continue to have constipation after trying the above remedies, you should contact your surgical team using the contact information listed above     No dressing needed   Order Comments: Monsel's paste was applied to the cervix at the site of the cervical biopsy. Coffee ground vaginal discharge is normal from this medication. Please call the clinic if you have bright red vaginal bleeding greater than 1 pad in an hour as this would be abnormal.     Activity as tolerated   Order Comments: Return to normal activity slowly as you feel able.  For 6 weeks your exercise should be limited to walking.  You may walk as far as you wish, as long as you increase your level of exertion gradually and avoid slippery surfaces.     Shower on day dressing removed (No bath)   Order Comments: You may shower at any time but should avoid immersing any abdominal incisions in water for at least 2 weeks after  surgery or until the wound is completely healed.  If given, please shower with Hibaclens soap until bottle is completely finish        Follow-up Information       Elis Jacobs MD Follow up in 4 week(s).    Specialty: Gynecologic Oncology  Why: Post operative visit  Contact information:  [address]  [facility] [address]  [phone]                           Xiomara Calloway MD  Obstetrics and Gynecology - PGY2

## 2024-12-10 NOTE — ANESTHESIA POSTPROCEDURE EVALUATION
Anesthesia Post Evaluation    Patient: Ade Silva    Procedure(s) Performed: Procedure(s) (LRB):  EXCISION, LESION, ANUS (N/A)  CURETTAGE, ENDOCERVICAL (N/A)  BIOPSY, CERVIX (N/A)  BIOPSY, UTERUS    Final Anesthesia Type: general      Patient location during evaluation: PACU  Patient participation: Yes- Able to Participate  Level of consciousness: awake and alert and oriented  Post-procedure vital signs: reviewed and stable  Pain management: adequate  Airway patency: patent    PONV status at discharge: No PONV  Anesthetic complications: no      Cardiovascular status: stable  Respiratory status: unassisted, spontaneous ventilation and room air  Hydration status: euvolemic  Follow-up not needed.              Vitals Value Taken Time   /59 12/10/24 0917   Temp 36.5 °C (97.7 °F) 12/10/24 0816   Pulse 62 12/10/24 0924   Resp 26 12/10/24 0924   SpO2 100 % 12/10/24 0924   Vitals shown include unfiled device data.      No case tracking events are documented in the log.      Pain/Geovanna Score: Pain Rating Prior to Med Admin: 7 (12/10/2024  8:19 AM)  Geovanna Score: 10 (12/10/2024  8:30 AM)

## 2024-12-10 NOTE — TRANSFER OF CARE
"Anesthesia Transfer of Care Note    Patient: Ade Silva    Procedure(s) Performed: Procedure(s) (LRB):  EXCISION, LESION, ANUS (N/A)  CURETTAGE, ENDOCERVICAL (N/A)  BIOPSY, CERVIX (N/A)  BIOPSY, UTERUS    Patient location: [facility]    Anesthesia Type: general    Transport from OR: Transported from OR on 6-10 L/min O2 by face mask with adequate spontaneous ventilation    Post pain: adequate analgesia    Post assessment: no apparent anesthetic complications and tolerated procedure well    Post vital signs: stable    Level of consciousness: sedated    Nausea/Vomiting: no nausea/vomiting    Complications: none    Transfer of care protocol was followed      Last vitals: Visit Vitals  BP (!) 110/55 (BP Location: Left arm, Patient Position: Lying)   Pulse 79   Temp 36.2 °C (97.2 °F)   Resp 18   Ht 5' 10" (1.778 m)   Wt 100.2 kg (220 lb 14.4 oz)   LMP 07/08/2024 (Approximate)   SpO2 100%   Breastfeeding No   BMI 31.70 kg/m²     "

## 2024-12-10 NOTE — H&P
"FOCUSED SURGICAL H&P    Ade Silva is a 44 y.o. female. MRN is [id number].    CC: Here today for the following surgical procedure(s):  EXCISION, LESION, ANUS  CONE BIOPSY, CERVIX, USING LASER (Abdomen)  DILATION AND CURETTAGE, UTERUS (Vagina)  CURETTAGE, ENDOCERVICAL    HPI: For a detailed history of the patients history of present illness please refer to the last progress note. In brief, this is a 44 y.o. female with a known history of anxiety, asthma and anal skin tags, here today for surgical intervention. There has been no recent changes in the patients health, including fevers, chest pain, or shortness of breath, and no new medications have been started. The patient has not had anything to eat or drink for the last 8 hours.      Past Medical History:   Past Medical History:   Diagnosis Date    Anxiety     Asthma     Autosomal dominant polycystic kidney disease     Brain aneurysm     Depression     Encounter for blood transfusion     GERD (gastroesophageal reflux disease)     Hypertension     Seizures     as a child (age 7-8); " stress -related"    TIA (transient ischemic attack)        Past Surgical History:   Past Surgical History:   Procedure Laterality Date    AV FISTULA PLACEMENT Right 09/14/2023    Procedure: CREATION, AV FISTULA, radial cephalic;  Surgeon: MELISSA Loera II, MD;  Location: [facility] OR [address] [address];  Service: Vascular;  Laterality: Right;    AV FISTULA PLACEMENT Right 10/05/2023    Procedure: CREATION, AV FISTULA - brachial;  Surgeon: MELISSA Loera II, MD;  Location: [facility] OR [address] [place] [address];  Service: Vascular;  Laterality: Right;  confirmed placement with doppler    CEREBRAL ANEURYSM REPAIR      coiling    CYSTOSCOPY      age 13    EMBOLIZATION Right 04/23/2024    Procedure: EMBOLIZATION, BLOOD VESSEL FISTULA;  Surgeon: MELISSA Loera II, MD;  Location: [facility] OR [address] [place] [address];  Service: Vascular;  Laterality: Right;    EPIDURAL STEROID INJECTION INTO CERVICAL SPINE N/A 11/12/2024    Procedure: " C7-T1 KIMBERLEE;  Surgeon: Presley Hough DO;  Location: [facility]IN [facility]NA[facility];  Service: Pain Management;  Laterality: N/A;  15 mins no AC    FISTULOGRAM Right 04/23/2024    Procedure: Fistulogram;  Surgeon: MELISSA Loera II, MD;  [address];  Service: Vascular;  Laterality: Right;  CONTRAST: 68ML, FLUORO TIME: 17.9, mGy: 45.11, Gycm2: 5.9548    HYSTEROSCOPY N/A 07/25/2024    Procedure: HYSTEROSCOPY;  Surgeon: Jazz Amador MD;  Location: [facility];  Service: OB/GYN;  Laterality: N/A;    INCISION OF VULVA N/A 07/25/2024    Procedure: INCISION, VULVA;  Surgeon: Jazz Amador MD;  Location: [facility];  Service: OB/GYN;  Laterality: N/A;    INSERTION OF TUNNELED CENTRAL VENOUS HEMODIALYSIS CATHETER Right 05/03/2023    Procedure: Insertion, Catheter, Central Venous, Hemodialysis;  Surgeon: Priya Grimm MD;  Location: [facility] CATH LAB;  Service: Interventional Nephrology;  Laterality: Right;    LAPAROSCOPIC ROBOT-ASSISTED SURGICAL REMOVAL OF KIDNEY USING DA GLORIA XI Left 9/19/2024    Procedure: XI ROBOTIC NEPHRECTOMY;  Surgeon: Valentin Maldonado MD;  Location: [address];  Service: Urology;  Laterality: Left;    LOOP ELECTROSURGICAL EXCISION PROCEDURE (LEEP) N/A 07/25/2024    Procedure: LEEP (LOOP ELECTROSURGICAL EXCISION PROCEDURE);  Surgeon: Jazz Amador MD;  Location: [facility];  Service: OB/GYN;  Laterality: N/A;    REMOVAL OF HEMODIALYSIS CATHETER  05/03/2023    Procedure: REMOVAL, CATHETER, HEMODIALYSIS;  Surgeon: rPiya Grimm MD;  Location: [facility] CATH LAB;  Service: Interventional Nephrology;;    THERMAL ABLATION OF ENDOMETRIUM N/A 07/25/2024    Procedure: ABLATION, ENDOMETRIUM, THERMAL;  Surgeon: Jazz Amador MD;  Location: [facility];  Service: OB/GYN;  Laterality: N/A;       Social History:   Social History     Socioeconomic History    Marital status: Single   Tobacco Use    Smoking status: Every Day     Average packs/day: 1 pack/day for 28.0 years (28.0 ttl  pk-yrs)     Types: Cigarettes, Cigars     Start date: 1994     Passive exposure: Never    Smokeless tobacco: Never    Tobacco comments:     Smoke 3-5 black and mild cigars a day    Substance and Sexual Activity    Alcohol use: Not Currently    Drug use: Never    Sexual activity: Not Currently   Social History Narrative    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     Social Drivers of Health     Financial Resource Strain: Low Risk  (12/19/2023)    Overall Financial Resource Strain (CARDIA)     Difficulty of Paying Living Expenses: Not very hard   Food Insecurity: No Food Insecurity (12/19/2023)    Hunger Vital Sign     Worried About Running Out of Food in the Last Year: Never true     Ran Out of Food in the Last Year: Never true   Transportation Needs: No Transportation Needs (12/19/2023)    PRAPARE - Transportation     Lack of Transportation (Medical): No     Lack of Transportation (Non-Medical): No   Physical Activity: Insufficiently Active (12/19/2023)    Exercise Vital Sign     Days of Exercise per Week: 2 days     Minutes of Exercise per Session: 10 min   Stress: Stress Concern Present (12/19/2023)    [employer] [place] [employer] - Occupational Stress Questionnaire     Feeling of Stress : To some extent   Housing Stability: Low Risk  (12/19/2023)    Housing Stability Vital Sign     Unable to Pay for Housing in the Last Year: No     Number of Places Lived in the Last Year: 2     Unstable Housing in the Last Year: No       Family History:   Family History   Problem Relation Name Age of Onset    Fibromyalgia Mother      Stroke Father      Heart disease Father      Polycystic kidney disease Father      Polycystic kidney disease Sister      Polycystic kidney disease Sister      Diabetes Brother      Ovarian cancer Neg Hx      Uterine cancer Neg Hx      Breast cancer Neg Hx      Colon cancer Neg Hx            Allergies:  Review of patient's allergies indicates:   Allergen Reactions    Aspirin Hives     And vomiting     "Penicillins Hives     Throat swelling    Adhesive Rash     Adhesive/silk tape (type found on band aides). Can only use "Paper Tape"    Butalbital-acetaminophen-caff Nausea And Vomiting and Other (See Comments)     Dizziness (made pt feel sick)    Latex, natural rubber Rash    Nickel Rash    Tomato Nausea And Vomiting         Medications:    Current Facility-Administered Medications:     0.9% NaCl infusion, , Intravenous, Continuous, Heather Sapp NP    LIDOcaine (PF) 10 mg/ml (1%) injection 10 mg, 1 mL, Intradermal, Once, Heather Sapp NP    mupirocin 2 % ointment, , Nasal, On Call Procedure, Heather Sapp NP, Given at 12/10/24 0547    Facility-Administered Medications Ordered in Other Encounters:     0.9%  NaCl infusion, , Intravenous, Continuous, Mel Calvillo MD    ondansetron disintegrating tablet 8 mg, 8 mg, Oral, Q8H PRN, Mel Calvillo MD                  Vital Signs:  Vitals:    12/10/24 0547   BP:    Pulse:    Resp:    Temp: 97.2 °F (36.2 °C)         Physical Exam:  Neuro: awake, alert, no acute distress.  HEENT: PERRLA, neck supple, no lymphadenopathy.  Heart: regular rate/rhythm  Lungs: equal chest expansion bilaterally, no increased work of breathing on RA  Abdomen: soft, non-distended, non-tender to palpation.  Extremities: warm, well-perfused       Labs:  Lab Results   Component Value Date/Time    WBC 9.28 11/06/2024 02:00 PM    HGB 14.1 11/20/2024 02:00 PM    HCT 42.1 11/20/2024 02:00 PM    HCT 38 09/14/2023 07:08 AM     11/06/2024 02:00 PM     11/06/2024 02:00 PM     Lab Results   Component Value Date/Time     11/06/2024 02:00 PM    K 4.4 11/20/2024 02:00 PM     11/06/2024 02:00 PM    CO2 20 (L) 09/20/2024 04:50 AM    BUN 73 (H) 09/20/2024 04:50 AM     (H) 09/20/2024 04:50 AM    MG 2.4 09/20/2024 04:50 AM    PHOS 6.9 (H) 11/06/2024 02:00 PM     Lab Results   Component Value Date/Time    INR 1.0 10/12/2023 06:53 AM     No components found for: " ""TROPI"  Lab Results   Component Value Date/Time    ALT 9 11/06/2024 02:00 PM    AST 8 (L) 07/25/2024 05:54 AM    LIPASE 107 (H) 04/28/2023 06:47 AM          Assessment/Plan:  44 y.o. female here today for the following surgical procedure:    EXCISION, LESION, ANUS        The indications for surgery, highlighting the risks and benefits of the procedure were discussed with the patient. These included but are not limited to swelling, bleeding, pain, infection, and adverse anesthesia-related event. I also discussed risk of injury to nearby structures. The patient seems to understand the risks, as well as the alternatives including nonoperative observation/survellience and wishes to proceed with the surgical intervention.    Patient has been examined and consented.      Plan discussed with and agreed by Dr. Galina Clifton, DO  PGY-1    "

## 2024-12-10 NOTE — ANESTHESIA PROCEDURE NOTES
Intubation    Date/Time: 12/10/2024 7:26 AM    Performed by: Jean-Claude Pelaez MD  Authorized by: Giovanni Baer Jr., MD    Intubation:     Induction:  Intravenous    Intubated:  Postinduction    Mask Ventilation:  Easy with oral airway    Attempts:  1    Attempted By:  Resident anesthesiologist    Method of Intubation:  Video laryngoscopy    Blade:  Ellis 3    Laryngeal View Grade: Grade IIA - cords partially seen      Difficult Airway Encountered?: No      Complications:  None    Airway Device:  Oral endotracheal tube    Airway Device Size:  7.0    Style/Cuff Inflation:  Cuffed (inflated to minimal occlusive pressure)    Tube secured:  23    Secured at:  The lips    Placement Verified By:  Capnometry    Complicating Factors:  None    Findings Post-Intubation:  BS equal bilateral

## 2024-12-10 NOTE — OP NOTE
OPERATIVE REPORT FOR EUA, Colposcopy, Cervical Biopsy, ECC and Endometrial Biopsy    DATE OF PROCEDURE: 12/10/2024     PREOPERATIVE DIAGNOSIS:   1. Cervical Dysplasia  2. History of Complex endometrial hyperplasia without atypia     POSTOPERATIVE DIAGNOSIS:   Same Plus  1. S/P EUA  2. S/P Colposcopy with Cervical Biopsy  3. S/P ECC  4/ S/P EMBx    PROCEDURE:  1. EUA  2. Colposcopy  3. Cervical Biopsy  4. Endocervical Curettage   5. Endometrial Biopsy    SURGEON: Elis Jacobs MD    ASSISTANT: Xiomara Calloway MD - PGY2    ANESTHESIA: General    COMPLICATIONS: None    EBL: 5 cc    IV FLUIDS: See Anesthesia    URINE OUTPUT: 20 cc drained via in-and-out catheterization prior to procedure    FINDINGS:   1. Acetic Acid placed on cervix with acetowhite lesion noted at 6 oclock  2. Cervical Biopsy obtained at 6 oclock  3. ECC obtained  4. EMBx attempted however cervix noted to be severely stenosed. Curette advanced to 4cm and contents were aspirated x2    SPECIMENS:  1. Cervical Biopsy 6 o clock  2. ECC  3. Endometrial Biopsy      PROCEDURE:   Patient was taken to the operating room where general anesthesia was administered and found to be adequate.  She was placed in the dorsal lithotomy position using yellow finned stirrups, then prepped and draped in the usual sterile fashion. Bladder was drained via in-and-out catheterization prior to procedure.  A surgical timeout was performed with patient's name, date of birth, procedure to be performed, and allergies verbalized. All OR staff in agreement. No preoperative antibiotics were administered as none were indicated.    Attention was then turned to the vagina where right angle retractor was placed in the posterior aspect, and a right angle retractor was placed in the anterior aspect.  The anterior lip of the cervix was grasped with a single tooth tenaculum. The cervix did not descend with gentle traction on the single-tooth tenaculum. Acetic Acid was placed on the cervix and the  colposcopic was used to view the cervical tissue. An aceto-white lesion was noted at 6 o clock. The entire transformation zone was visualized. A cervical biopsy was obtained at 6 o'clock and placed in formalin. A post-colposcopy endocervical curettage was performed and specimen was sent to pathology. Significant cervical stenosis was present and the uterus was sounded to 4cm. Decision was made to not attempt dilation 2/2 severe cervical stenosis from endometrial ablation procedure. An endometrial biopsy Pipelle was used to obtain an endometrial sample x2 aspirations. Endometrial Pipelle was advanced only to 4cm.    The cervical biopsy, ECC and endometrial biopsies were sent to pathology. The single tooth tenaculum was removed and hemostasis was noted at the puncture sites in the cervix. Monsel's paste was placed at the site of the cervical biopsy and excellent hemostasis was achieved.     Sponge and instrument counts were correct x 2.     The case was then turned over to colorectal. Please their Op note for additional details.    Xiomara Calloway MD  Obstetrics and Gynecology - PGY2                                                                     I was present and scrubbed for entire case. I agree with resident op note.      Elis Jacobs MD  Gynecologic Oncology

## 2024-12-10 NOTE — BRIEF OP NOTE
[facility] ([facility])  Brief Operative Note    Surgery Date: 12/10/2024     Surgeons and Role:  Panel 1:     * Son Mojica MD - Primary  Panel 2:     * Elis Jacobs MD - Primary    Assisting Surgeon: None    Pre-op Diagnosis:  Anal lesion [K62.9]    Post-op Diagnosis:  Post-Op Diagnosis Codes:     * Anal lesion [K62.9]    Procedure(s) (LRB):  EXCISION, LESION, ANUS (N/A)  CURETTAGE, ENDOCERVICAL (N/A)  BIOPSY, CERVIX (N/A)  BIOPSY, UTERUS    Anesthesia: General    Operative Findings: removal of anal skin tags x2    Estimated Blood Loss: * No values recorded between 12/10/2024  7:30 AM and 12/10/2024  8:10 AM *         Specimens:   Specimen (24h ago, onward)       Start     Ordered    12/10/24 0753  Specimen to Pathology, Surgery Gynecology and Obstetrics  Once        Comments: Pre-op Diagnosis: Anal lesion [K62.9]Procedure(s):EXCISION, LESION, ANUSCURETTAGE, ENDOCERVICALBIOPSY, CERVIX Number of specimens: 5Name of specimens: 1. Cervix biospy 6 oclock - permanent 2. Endocervix curettage - permanent 3. Endometrial biospy -permanent 4. Left anterior lateral skin tag - permanet 5. Anterior skin tag - permanent     References:    Click here for ordering Quick Tip   Question Answer Comment   Procedure Type: Gynecology and Obstetrics    Release to patient Immediate        12/10/24 5266

## 2024-12-10 NOTE — OP NOTE
ADE SILVA  [id number]  [id number]    OPERATIVE NOTE:    DATE OF OPERATION: 12/10/2024    PREOPERATIVE DIAGNOSIS: Anal skin tags.  History of anal dysplasia.    POSTOPERATIVE DIAGNOSIS: Anal skin tags. History of anal dysplasia    PROCEDURE PERFORMED: Excision of anal skin tags.  Exam under anesthesia.     ATTENDING SURGEON: Son Mojica MD    RESIDENT/FELLOW SURGEON: Zak Clifton MD    ANESTHESIA: GETA    ESTIMATED BLOOD LOSS: <10 mL    FINDINGS:  1. Anal skin tags.  2. No anal canal lesions on anoscopy    SPECIMENS:   Left anterolateral skin tag. 2. Posterior skin tag.    COMPLICATIONS:none    DISPOSITION: PACU    CONDITION: good    INDICATION FOR PROCEDURE:  Ade Silva is a 44 y.o. female who has a history of anal dysplasia.  She presented for surveillance.  She was noted to have external anal tags which she desires excision of at the same time she is undergoing exam under anesthesia by the gyn service.    DESCRIPTION OF PROCEDURE:   With the patient already in lithotomy and prepped and draped in sterile manner, and the gyn service having completed their portion of the case we then inspected the perianal skin.  There were skin tags anterior posterior that were excised with electrocautery digital rectal exam showed no masses.  Anoscopy was performed.  She has normal distal rectal mucosa with no growths in the anal canal.  A sterile dressing was applied.  The patient was taken to the recovery room stable condition.  She is discharged home with instructions for wound care, diet, medications, and follow-up.    This procedure was not performed to treat cancer       Son Mojica MD  Colon & Rectal Surgery  [facility] [address] [place] [address]

## 2024-12-12 ENCOUNTER — CLINICAL SUPPORT (OUTPATIENT)
Dept: SMOKING CESSATION | Facility: CLINIC | Age: 44
End: 2024-12-12

## 2024-12-12 DIAGNOSIS — F17.200 NICOTINE DEPENDENCE: Primary | ICD-10-CM

## 2024-12-12 LAB
FINAL PATHOLOGIC DIAGNOSIS: NORMAL
GROSS: NORMAL
Lab: NORMAL

## 2024-12-12 NOTE — PROGRESS NOTES
Individual Follow-Up Form    12/12/2024    Quit Date:     Clinical Status of Patient: Outpatient    Length of Service: 30 minutes    Continuing Medication: yes  Patches    Other Medications:      Target Symptoms: Withdrawal and medication side effects. The following were  rated moderate (3) to severe (4) on TCRS:  Moderate (3): crave/desire   Severe (4): none    Comments: Audio visit .  Patient continues to smoke 2 cigars per day. Pt remains on tobacco cessation medication of  21 mg nicotine patch QD  No adverse effects noted at this time. Patient had surgery yesterday and unfortunately its not feeling very well today. She did manage to smoke a lot less when she was on her trip because of restrictions of where she can smoke. But when she came home, she is smoking two cigarettes a day, but she notices that shes not smoking as much of the cigar as she used to. Shes not ready to declare a quick day today. She wishes to wait until after the holidays to plan that.       Diagnosis: F17.210    Next Visit: 2 weeks

## 2024-12-17 ENCOUNTER — OFFICE VISIT (OUTPATIENT)
Dept: PAIN MEDICINE | Facility: CLINIC | Age: 44
End: 2024-12-17
Payer: COMMERCIAL

## 2024-12-17 ENCOUNTER — PATIENT MESSAGE (OUTPATIENT)
Dept: NEPHROLOGY | Facility: CLINIC | Age: 44
End: 2024-12-17
Payer: COMMERCIAL

## 2024-12-17 DIAGNOSIS — M54.12 CERVICAL RADICULOPATHY: Primary | ICD-10-CM

## 2024-12-17 DIAGNOSIS — M79.2 NERVE PAIN: ICD-10-CM

## 2024-12-17 DIAGNOSIS — M54.2 CERVICALGIA: ICD-10-CM

## 2024-12-17 DIAGNOSIS — M79.601 RIGHT ARM PAIN: ICD-10-CM

## 2024-12-17 PROCEDURE — 1159F MED LIST DOCD IN RCRD: CPT | Mod: CPTII,95,, | Performed by: NURSE PRACTITIONER

## 2024-12-17 PROCEDURE — G2211 COMPLEX E/M VISIT ADD ON: HCPCS | Mod: 95,,, | Performed by: NURSE PRACTITIONER

## 2024-12-17 PROCEDURE — 99214 OFFICE O/P EST MOD 30 MIN: CPT | Mod: 95,,, | Performed by: NURSE PRACTITIONER

## 2024-12-17 PROCEDURE — 1160F RVW MEDS BY RX/DR IN RCRD: CPT | Mod: CPTII,95,, | Performed by: NURSE PRACTITIONER

## 2024-12-17 PROCEDURE — 3066F NEPHROPATHY DOC TX: CPT | Mod: CPTII,95,, | Performed by: NURSE PRACTITIONER

## 2024-12-17 NOTE — PROGRESS NOTES
[facility]  Patient Evaluation  telemedicine Encounter    Telemedicine Bundle:  This visit was completed during the Coronavirus Crisis to enhance patient safety.  The patient location is: patient's home  The chief complaint leading to consultation is: Arm Pain (Right )  Visit type: Virtual visit with synchronous audio and video  Total time spent with patient: 20 minutes   Each patient to whom he or she provides medical services by telemedicine is:    (1) informed of the relationship between the physician and patient and the respective role of any other health care provider with respect to management of the patient  (2) notified that he or she may decline to receive medical services by telemedicine and may withdraw from such care at any time.      Referred by: Dr. Okeefe   Reason for referral: * No diagnoses found *     CC:   Chief Complaint   Patient presents with    Arm Pain     Right          7/16/2024     2:01 PM   Last 3 PDI Scores   Pain Disability Index (PDI) 48       Interval History 12/17/2024:  43 y/o female presents virtually she is s/p a Cervical KIMBERLEE targeting C7/T1 done by Presley Hough DO she is reporting 60% relief of relief for 2 months. She is now reporting pain in her right arm that began after a recent dialysis treatment and access.  Pain score today is 3/10.  She reports being unable to afford compound cream that was gone to include gabapentin, lidocaine and probably cane  so she refused the medication.  Today she denies any profound weakness in her right arm denies any recent incident or trauma denies dropping things denies any inability to write with a pen.  She has continues to take gabapentin following dialysis 300 mg 3 times per week max dose.  She does report relief with gabapentin and denies any adverse side effects.        Interval history 09/03/2024:  44-year-old female that presents virtually for a follow-up appointment to  discuss her cervical MRI she continues to complain of right arm pain described as tingling, numb and sharp and electric.  Her pain continues to remain in the C5-C6 dermatome distribution of the her right arm.  She denies any new pain denies profound weakness denies any bowel or bladder dysfunction at this time.    Subjective 07/16/2024:   Ade Silva is a 44 y.o. female with end-stage renal disease on hemodialysis (Monday, Wednesday, Friday) who presents complaining of right arm pain described as tingling, numb, sharp and electric.  She notes the pain over her dialysis graft site.  She notes numbness and tingling that radiates into the hand.  Numbness and tingling over the entirety of the hand which is intermittent.  She reports pain worsened after dialysis access was placed, however she does report pain prior to that as well.  Denies any neck pain.  No history of neck surgeries.  She was undergone multiple AV fistula surgeries on the right.  Takes Tylenol which only helps a little.    Initial Pain Assessment:  Location: right arm  Onset: 3 years  Current Pain Score: 7/10  Daily Pain of Range: 8-9/10  Quality: Tingling, Numb, Sharp, and Electric  Radiation: arm to right hand  Worsened by: extension and flexion  Improved by: heat     Patient denies night fever/night sweats, urinary incontinence, bowel incontinence, significant weight loss, significant motor weakness, and loss of sensations.      Previous Interventions:  -11/12/2024 cervical KIMBERLEE targeting C7-T1 60% relief for 30 days    Previous Therapies:  PT/OT: no   Chiropractor:   HEP:   Relevant Surgery: yes   Multiple AV fistula surgeries on the right    Previous Medications:   - Tylenol or NSAIDS: Tylenol  - Muscle Relaxants:    - TCAs:   - SNRIs:   - Topicals:   - Anticonvulsants:    - Opioids:   - Adjuvants:     Current Pain Medications:  Tylenol     Review of Systems:  Review of Systems   Musculoskeletal:  Positive for neck pain.       GENERAL:  No  weight loss, malaise or fevers.  HEENT:   No recent changes in vision or hearing  NECK:  No difficulty with swallowing. No stridor.   RESPIRATORY:  Negative for cough, wheezing or shortness of breath, patient denies any recent URI.  CARDIOVASCULAR:  Negative for chest pain, leg swelling or palpitations.  GI:  Negative for abdominal discomfort, blood in stools or black stools or change in bowel habits.  MUSCULOSKELETAL:  See HPI.  SKIN:  Negative for lesions, rash, and itching.  PSYCH:  No mood disorder or recent psychosocial stressors.    HEMATOLOGY/LYMPHOLOGY:  Negative for prolonged bleeding, bruising easily or swollen nodes.  Patient is not currently taking any anti-coagulants  NEURO:   No history of headaches, syncope, paralysis, seizures or tremors.  All other reviewed and negative other than HPI.    History:  Current medications, allergies, medical history, surgical history,   family history, and social history were reviewed in the chart as marked.    Full Medication List:    Current Outpatient Medications:     acetaminophen (TYLENOL) 650 MG TbSR, Take 1 tablet (650 mg total) by mouth every 6 (six) hours as needed (pain)., Disp: 30 tablet, Rfl: 1    cinacalcet (SENSIPAR) 30 MG Tab, Take 30 mg by mouth daily with breakfast., Disp: , Rfl:     esomeprazole (NEXIUM) 20 MG capsule, Take 20 mg by mouth before breakfast., Disp: , Rfl:     gabapentin (NEURONTIN) 300 MG capsule, Take 1 capsule (300 mg total) by mouth three times per week after dialysis, Disp: 36 capsule, Rfl: 2    OPW TEST CLAIM - DO NOT FILL, OPW test claim. Do not fill., Disp: 1 each, Rfl: 0    hpv vaccine,9-jaz (GARDASIL 9, PF,) 0.5 mL Syrg, Inject 0.5 mLs into the muscle for 3 doses, Disp: 1.5 mL, Rfl: 0    loratadine (CLARITIN ORAL), Take 1 tablet by mouth daily as needed (Allergies)., Disp: , Rfl:     medroxyPROGESTERone (PROVERA) 10 MG tablet, Take 1 tablet (10 mg total) by mouth once daily., Disp: 30 tablet, Rfl: 11    nicotine (NICODERM CQ) 21  mg/24 hr, Place 1 patch onto the skin once daily. Please dispense only clear patches , patient has a tape allergy., Disp: 14 patch, Rfl: 0    ondansetron (ZOFRAN) 4 MG tablet, Take 1 tablet (4 mg total) by mouth every 6 (six) hours., Disp: 20 tablet, Rfl: 0    oxyCODONE (ROXICODONE) 5 MG immediate release tablet, Take 1 tablet (5 mg total) by mouth every 4 (four) hours as needed for Pain., Disp: 20 tablet, Rfl: 0    sodium zirconium cyclosilicate (LOKELMA) 10 gram packet, Take 1 packet (10 g total) by mouth 2 (two) times a day. Mix entire contents of packet(s) into drinking glass containing 3 tablespoons of water; stir well and drink immediately. Add water and repeat until no powder remains to receive entire dose., Disp: 30 packet, Rfl: 3    sucroferric oxyhydroxide (VELPHORO) 500 mg Chew, Break or dissolve 1 tablet by mouth with each meal and snack. Do not exceed 6 tablets per day., Disp: 180 tablet, Rfl: 11    Current Facility-Administered Medications:     tuberculin injection 5 Units, 5 Units, Intradermal, 1 time in Clinic/HOD, Mihir Chang Jr., MD    Facility-Administered Medications Ordered in Other Visits:     0.9%  NaCl infusion, , Intravenous, Continuous, Mel Calvillo MD    ondansetron disintegrating tablet 8 mg, 8 mg, Oral, Q8H PRN, Mel Calvillo MD     Allergies:  Aspirin; Penicillins; Adhesive; Butalbital-acetaminophen-caff; Latex, natural rubber; Nickel; and Tomato     Medical History:   has a past medical history of Anxiety, Asthma, Autosomal dominant polycystic kidney disease, Brain aneurysm, Depression, Encounter for blood transfusion, GERD (gastroesophageal reflux disease), Hypertension, Seizures, and TIA (transient ischemic attack).    Surgical History:   has a past surgical history that includes Insertion of tunneled central venous hemodialysis catheter (Right, 05/03/2023); Removal of hemodialysis catheter (05/03/2023); AV fistula placement (Right, 09/14/2023); AV fistula placement  (Right, 10/05/2023); Cerebral aneurysm repair; Fistulogram (Right, 04/23/2024); Embolization (Right, 04/23/2024); Thermal ablation of endometrium (N/A, 07/25/2024); Loop electrosurgical excision procedure (LEEP) (N/A, 07/25/2024); Hysteroscopy (N/A, 07/25/2024); Incision of vulva (N/A, 07/25/2024); Cystoscopy; Laparoscopic robot-assisted surgical removal of kidney using da Sheyla Xi (Left, 9/19/2024); Epidural steroid injection into cervical spine (N/A, 11/12/2024); Surgical excision of anal lesion (N/A, 12/10/2024); curettage, endocervical (N/A, 12/10/2024); Cervical biopsy (N/A, 12/10/2024); and Biopsy of vagina (12/10/2024).    Family History:  family history includes Diabetes in her brother; Fibromyalgia in her mother; Heart disease in her father; Polycystic kidney disease in her father, sister, and sister; Stroke in her father.    Social History:   reports that she has been smoking cigarettes and cigars. She started smoking about 30 years ago. She has a 28 pack-year smoking history. She has never been exposed to tobacco smoke. She has never used smokeless tobacco. She reports that she does not currently use alcohol. She reports that she does not use drugs.    Physical Exam:  There were no vitals filed for this visit.  There was no PE done for this virtual visit.     GENERAL: Well appearing, in no acute distress, alert and oriented x3.  PSYCH:  Mood and affect appropriate.  SKIN: Skin color, texture, turgor normal, no rashes or lesions.  HEAD/FACE:  Normocephalic, atraumatic. Cranial nerves grossly intact.  NECK: Normal ROM. Supple.  No pain to palpation over the cervical paraspinous muscles. Spurling Negative. No pain with neck flexion, extension, or lateral rotation.   CV: + palpable thrill over the right AV fistula.  RRR with palpation of the radial artery.  PULM: No evidence of respiratory difficulty, symmetric chest rise.  GI:  Soft and non-distended.  MSK:  Mild tenderness to palpation over the right AV  fistula.  No atrophy or tone abnormalities are noted.   NEURO: Bilateral upper and lower extremity coordination and strength is symmetric.  No loss of sensation is noted.  MENTAL STATUS: A x O x 3, good concentration, speech is fluent and goal directed  MOTOR: 5/5 in bilateral upper extremity muscle groups  GAIT: Normal. Ambulates unassisted.    Imaging:  -End Stage Renal Disease on Dialysis         Lumbar MRI  FINDINGS:  Alignment: Trace retrolisthesis of C5 on C6.  Normal sagittal alignment is otherwise maintained.     Vertebrae: Normal marrow signal. No fracture.     Discs: Mild disc height loss at C5-C6.     Cord: Normal.     Skull base and craniocervical junction: Normal.     Degenerative findings:     C2-C3: Small posterior disc osteophyte complex mildly asymmetric to the left resulting in mild left neural foraminal narrowing.     C3-C4: No spinal canal stenosis or neural foraminal narrowing.     C4-C5: No spinal canal stenosis or neural foraminal narrowing.     C5-C6: Posterior disc osteophyte complex results in moderate central canal stenosis with deformity of the cord but no cord signal changes.  Uncovertebral spurring and posterior disc osteophyte complex results in moderate left and mild right neural foraminal narrowing.     C6-C7: Small posterior disc bulge minimally effaces the ventral thecal sac and results in mild right neural foraminal narrowing.     C7-T1: No spinal canal stenosis or neural foraminal narrowing.     Paraspinal muscles & soft tissues: Unremarkable.        Impression:     1. Mild degenerative change of the cervical spine as detailed above, most notable at C5-C6 where a posterior disc osteophyte complex and uncovertebral spurring result in moderate central canal stenosis and moderate left and mild right neural foraminal narrowing.  History   ======     General History   Other: -Pain during Dialysis     Dialysis Access   =============     Dialysis access location:  Right Arm   Type of AV  access: Brachiocephalic AVF   Rt inflow A PSV    243 cm/s   Rt at anastomosis PSV  413 cm/s   Rt A distal anastomosis PSV    236 cm/s   Rt fistula prox PSV    235 cm/s   Rt fistula mid PSV 83 cm/s   Rt fistula mid flow volume 1,559.0 ml/min   Rt fistula distal PSV  202 cm/s   Rt outflow V prox PSV  85 cm/s   Rt outflow V mid PSV   182 cm/s   Rt outflow V distal PSV    252 cm/s     Impression   =========     Color flow duplex imaging reveals a patent right Brachiocephalic AV fistula and proximal stent with no evidence of a focal   hemodynamically significant stenosis. The volume flow at the mid bicep measures 1559 mL/min.     DATE OF SERVICE: 06/06/2024                                                       Sonographer: Nikko Mckeon   Electronically Signed by: INA Herndon M.D. at 06/06/2024-11:31     Labs:  BMP  Lab Results   Component Value Date     12/09/2024    K 4.0 12/11/2024    CL 98 12/09/2024    CO2 20 (L) 09/20/2024    BUN 73 (H) 09/20/2024    CREATININE 13.91 (H) 12/09/2024    CALCIUM 8.6 (L) 12/09/2024    ANIONGAP 19 (H) 09/20/2024    EGFRNORACEVR 3.2 (A) 09/20/2024     Lab Results   Component Value Date    ALT 10 12/09/2024    AST 8 (L) 07/25/2024    ALKPHOS 94 12/09/2024    BILITOT 0.5 07/25/2024     Lab Results   Component Value Date    WBC 8.07 12/09/2024    HGB 12.6 12/09/2024    HCT 40 12/10/2024     (H) 12/09/2024     12/09/2024           Assessment:  Problem List Items Addressed This Visit    None      07/16/2024 - Ade Silva is a 44 y.o. female who  has a past medical history of Anxiety, Asthma, Autosomal dominant polycystic kidney disease, Brain aneurysm, Depression, Encounter for blood transfusion, GERD (gastroesophageal reflux disease), Hypertension, Seizures, and TIA (transient ischemic attack).  By history and examination this patient has chronic right upper extremity pain that appears to be neuropathic in nature, likely due to nerve damage/entrapment at  the AV fistula site versus ischemic neuropathy versus cervical radiculopathy.  AV fistula patent on  ultrasound.  I will obtain imaging of her neck to rule out cervical stenosis.  We discussed the underlying diagnoses and multiple treatment options including non-opioid medications and interventional procedures.  I will start the patient on gabapentin 100 mg 3 times weekly after dialysis.  This may be increased up to 300 mg 3 times weekly after dialysis.  She may benefit from a cervical epidural steroid injection vs stellate ganglion block.  The risks and benefits of each treatment option were discussed and all questions were answered.        09/03/20249858-24-lkwd-old female that presents virtually for a follow-up appointment to discuss cervical MRI she has a history of chronic right upper extremity pain that appears to be neuropathic in nature.  Her pain is in the distribution of C5-6 in the past they did consider that her pain may be related to nerve damage/entrapment at the AV fistula sites however she confirms that this has been cleared via multiple assessments of her fistula.  Her cervical MRI did show at C5-6 posterior disc osteophyte complex that results in moderate central canal stenosis of the forming the core but not cord signal changes.  She would have spurring and a posterior disc osteophyte complex and moderate left and mild right neural foraminal narrowing which could be the source of her current symptoms.  She is scheduled for an upcoming nephrectomy with Dr. Maldonado on 09/15 her and I discussed considering her for a cervical KIMBERLEE targeting C7-T1 used as a diagnostic and therapeutic injection.  Discussed with the patient that I will reach out to Dr. Maldonado and his team to see if this epidural can be done prior to surgery if not we will have to consider this following once she has recovered.      12/17/20246523-7-dwbr-old female that presents virtually for a follow-up appointment she has a history of chronic  right upper extremity pain that appears to be neuropathic in nature.  Her pain is in the distribution of C5-6 in the past they did consider that her pain may be related to nerve damage/entrapment at the AV fistula sites however she confirms that this has been cleared via multiple assessments of her fistula.  Her cervical MRI did show at C5-6 posterior disc osteophyte complex that results in moderate central canal stenosis of the forming the core but not cord signal changes.  Recently provided a cervical KIMBERLEE targeting C7-T1 that provided her 60% relief for 30 days and then she reports the pain returned immediately following a dialysis treatment in which access was difficult.  Her pain score today was 3/10 she did feel like the cervical KIMBERLEE helped with her radicular symptoms.  See plan agreed upon below.    Treatment Plan:   Procedures:  none at this time  PT/OT/HEP: I have stressed the importance of physical activity and a home exercise plan to help with pain and improve health.  Medications:    -  Continue  300 mg 3 times weekly following dialysis.12 tablets total ( [facility])    -  Reviewed and consistent with medication use as prescribed.  Imaging:  Reviewed and discussed in detail using images from patient's chart.  Follow Up: 3-4 months or sooner if needed for continued care.     Beni Garcia, BOLA-C  [facility]    Disclaimer: This note was partly generated using dictation software which may occasionally result in transcription errors.

## 2024-12-18 ENCOUNTER — TELEPHONE (OUTPATIENT)
Dept: PAIN MEDICINE | Facility: CLINIC | Age: 44
End: 2024-12-18
Payer: COMMERCIAL

## 2024-12-18 DIAGNOSIS — M79.601 RIGHT ARM PAIN: ICD-10-CM

## 2024-12-18 DIAGNOSIS — M79.2 NERVE PAIN: ICD-10-CM

## 2024-12-18 DIAGNOSIS — M54.12 CERVICAL RADICULOPATHY: ICD-10-CM

## 2024-12-18 DIAGNOSIS — G90.511 COMPLEX REGIONAL PAIN SYNDROME TYPE 1 OF RIGHT UPPER EXTREMITY: Primary | ICD-10-CM

## 2024-12-18 NOTE — TELEPHONE ENCOUNTER
Spoke with patient regarding right stellate ganglion block procedure that we will be ordering for her to help reduce some of her symptoms, explained procedure in layman's terms, patient verbalized understanding and agreed.    Beni Garcia, NP-C  [facility]

## 2024-12-19 ENCOUNTER — TELEPHONE (OUTPATIENT)
Dept: PAIN MEDICINE | Facility: CLINIC | Age: 44
End: 2024-12-19
Payer: COMMERCIAL

## 2024-12-19 DIAGNOSIS — M79.2 NERVE PAIN: ICD-10-CM

## 2024-12-19 DIAGNOSIS — G90.511 COMPLEX REGIONAL PAIN SYNDROME TYPE 1 OF RIGHT UPPER EXTREMITY: Primary | ICD-10-CM

## 2024-12-19 DIAGNOSIS — M79.601 RIGHT ARM PAIN: ICD-10-CM

## 2024-12-19 NOTE — TELEPHONE ENCOUNTER
----- Message from MARILYN Smith sent at 2024  1:33 PM CST -----  Regarding: Order for DAIANA JETT    Patient Name: DAIANA JETT([id number])  Sex: Female  : 1980      PCP: SYMONE WESTFALL    Center: [facility]     Types of orders made on 2024: Procedure Request    Order Date:2024  Ordering User:JORGE LUIS REAL [id number]  Encounter Provider:Jorge Luis Real FNP [[id number]]  Authorizing Provider: Jorge Luis Real FNP [[id number]]  Supervising Provider:RENETTA POWERS [[id number]]  Type of Supervision:Collaborating Physician  Department:[facility] PAIN MANAGEMENT[[id number]]    Common Order Information  Procedure -> Other (Specify location and laterality) Cmt: Right stellate             ganglion block    Pre-op Diagnosis -> Cervical radiculopathy       -> Complex regional pain syndrome type 1 affecting right upper arm       -> Chronic pain syndrome     Order Specific Information  Order: Procedure Request Order for Pain Management [Custom: [id number]]  Order #:          [id number]: 1 FUTURE    Priority: Routine  Class: Clinic Performed    Future Order Information      Expires on:2025            Expected by:2024                   Associated Diagnoses      G90.511 Complex regional pain syndrome type 1 of right upper extremity      M79.2 Nerve pain      M79.601 Right arm pain      Physician -> Gelter         Is patient on anti-coagulants? -> No         Facility Name: -> [place]         Follow-up: -> 2 weeks           Priority: Routine  Class: Clinic Performed    Future Order Information      Expires on:2025            Expected by:2024                   Associated Diagnoses      G90.511 Complex regional pain syndrome type 1 of right upper extremity      M79.2 Nerve pain      M79.601 Right arm pain      Procedure -> Other (Specify location and laterality) Cmt: Right stellate                 ganglion block        Physician -> Gelter         Is patient on anti-coagulants? ->  No         Pre-op Diagnosis -> Cervical radiculopathy           -> Complex regional pain syndrome type 1 affecting right upper arm           -> Chronic pain syndrome         Facility Name: -> [place]         Follow-up: -> 2 weeks

## 2024-12-19 NOTE — TELEPHONE ENCOUNTER
----- Message from MARILYN Smith sent at 2024  1:33 PM CST -----  Regarding: Order for DAIANA JETT    Patient Name: DAIANA JETT([id number])  Sex: Female  : 1980      PCP: SYMONE WESTFALL    Center: [facility]     Types of orders made on 2024: Procedure Request    Order Date:2024  Ordering User:JORGE LUIS REAL [[id number]  Encounter Provider:Jorge Luis Real FNP [[id number]]  Authorizing Provider: Jorge Luis Real FNP [[id number]]  Supervising Provider:RENETTA POWERS [[id number]]  Type of Supervision:Collaborating Physician  Department:[facility] PAIN MANAGEMENT[[id number]]    Common Order Information  Procedure -> Other (Specify location and laterality) Cmt: Right stellate             ganglion block    Pre-op Diagnosis -> Cervical radiculopathy       -> Complex regional pain syndrome type 1 affecting right upper arm       -> Chronic pain syndrome     Order Specific Information  Order: Procedure Request Order for Pain Management [Custom: [id number]]  Order #:          [id number]: 1 FUTURE    Priority: Routine  Class: Clinic Performed    Future Order Information      Expires on:2025            Expected by:2024                   Associated Diagnoses      G90.511 Complex regional pain syndrome type 1 of right upper extremity      M79.2 Nerve pain      M79.601 Right arm pain      Physician -> Gelter         Is patient on anti-coagulants? -> No         Facility Name: -> [place]         Follow-up: -> 2 weeks           Priority: Routine  Class: Clinic Performed    Future Order Information      Expires on:2025            Expected by:2024                   Associated Diagnoses      G90.511 Complex regional pain syndrome type 1 of right upper extremity      M79.2 Nerve pain      M79.601 Right arm pain      Procedure -> Other (Specify location and laterality) Cmt: Right stellate                 ganglion block        Physician -> Gelter         Is patient on anti-coagulants? ->  No         Pre-op Diagnosis -> Cervical radiculopathy           -> Complex regional pain syndrome type 1 affecting right upper arm           -> Chronic pain syndrome         Facility Name: -> [place]         Follow-up: -> 2 weeks

## 2025-01-02 ENCOUNTER — TELEPHONE (OUTPATIENT)
Dept: PAIN MEDICINE | Facility: CLINIC | Age: 45
End: 2025-01-02
Payer: COMMERCIAL

## 2025-01-06 ENCOUNTER — PATIENT MESSAGE (OUTPATIENT)
Dept: NEPHROLOGY | Facility: CLINIC | Age: 45
End: 2025-01-06
Payer: COMMERCIAL

## 2025-01-07 DIAGNOSIS — Z99.2 ESRD (END STAGE RENAL DISEASE) ON DIALYSIS: Primary | ICD-10-CM

## 2025-01-07 DIAGNOSIS — N18.6 ESRD (END STAGE RENAL DISEASE) ON DIALYSIS: Primary | ICD-10-CM

## 2025-01-07 RX ORDER — FOLIC ACID/VIT B COMPLEX AND C 0.8 MG
1 TABLET ORAL DAILY
Qty: 90 TABLET | Refills: 3 | Status: SHIPPED | OUTPATIENT
Start: 2025-01-07 | End: 2026-01-07

## 2025-01-07 NOTE — PRE-PROCEDURE INSTRUCTIONS
Unable to reach patient via phone. Left message with pre procedure instructions and arrival time. The following was sent to pt portal.     Dear Ade     You are scheduled for your procedure on 1/9/2024 with Dr. Ingram. Please report to [facility] [address]     Park in the front lot of the building and enter at the main entrance. Proceed to the second floor for registration.     Arrival time: 6:00 AM     Anesthesia fasting instructions:   IV sedation. You should not eat for 8 hours and can only drink clear liquids (water or black coffee without cream/sugar) up until 2 hours before your scheduled time.  You CANNOT drive yourself and must have a .     Please ensure that you have planned your ride home.      The morning of your procedure please HOLD the following:      Vitamins and Supplements   Aspirin and NSAIDS (Ibuprofen, Mobic, Aleve etc.)  Diabetic medication (Metformin, etc)  Stimulants (Adderall, Vyvanse etc)   Diuretic Medication (Lasix, Dyazide, Aldactone etc)     *Please follow any additional medication instructions received     Shower the night before and the morning of your procedure with antibacterial soap (example: Dial)      Do not apply any products to your skin or hair after showering such as lotions, oils, ointments, creams, powders, lotion, deodorant, make-up, perfumes, etc.     Please leave all jewelry and valuables at home.  Wear loose comfortable clothes. All patients will be placed in a gown.        *If you start to feel sick (fever, chills, coughing, sore throat, etc.)  begin taking any antibiotics or steroid, please contact your doctor's office at [phone] prior to coming to the facility.         Best Wishes,  [facility]  Pre Admit testing

## 2025-01-07 NOTE — DISCHARGE INSTRUCTIONS
"[facility] [place]/Joey Ingram  Messaging service # [phone]  On-call pager for emergency# [phone]     POST-PROCEDURE INSTRUCTIONS:    Today you had a sympathetic block.  If the procedure was in the neck, it is called a stellate ganglion block.  If the procedure was in the mid-back it is called a celiac p[khoi block, and if the procedure was in the low back then it is called a Lumbar Sympathetic Block.  A steroid may or may not have been mixed with a local anesthetic when it was injected.   The purpose of a sympathetic block is to assist with pain, swelling, temperature changes, skin changes, hypersensitivity, excessive sweating  If the injection was in the neck, you may feel some pressure, numbness, or slight weakness in the arm after the procedure for a short period of time (this is a normal response), if this persists for longer than 1 day please contact our office or go to the emergency room.  If the injection was in the neck then you may experience something called "Meron Syndrome".  These symptoms include a smaller pupil, drooping eyelid, and little or no sweating on the affected side.  These symptoms are temporary (usually lasting just a few hours).  You may also get redness of the eye, feel warmth in the face and may experience hoarseness of the voice. On rare occasions you may have difficulty swallowing.  Be careful when you first start eating/drinking within a few hours of the injection. These effects are temporary and last a few hours.    What you need to do:    Keep a record of your response to the injection you had today.    How much relief did you get?   When did the relief start and how long did it last?  Were you able to decrease the use of any of your pain medications?  Were you able to increase your level of activity?  How long did the relief last?    What to watch out for:    If you experience any of the following symptoms after your procedure, please " notify the messaging service immediately (see above for contact information):   fever (increased oral temperature)   bleeding or swelling at the injection site,    drainage, rash or redness at the injection site    possible signs of infection    increased pain at the injection site   worsening of your usual pain   severe headache   new or worsening numbness    new arm and/or leg weakness, or    changes in bowel and/or bladder function: urinating or defecating on yourself and not knowing that you did it.    PLEASE FOLLOW ALL INSTRUCTIONS CAREFULLY     Do not engage in strenuous activity (e.g., lifting or pushing heavy objects or repeated bending) for 24 hours.     Do not take a bath, swim or use Jacuzzi for 24 hours after procedure. (A shower is fine).   Remove any Band-Aids when you get home.    Use cold/ice, as needed for comfort.  We recommend the use of cold therapy alternating on for 20 minutes, off for 20 minutes.    Do not apply direct heat (heating pad or heat packs) to the injection site for 24 hours.     Resume your usual medications, unless instructed otherwise by your Pain Physician.     If you are on warfarin (Coumadin) or other blood thinner, resume this medication as instructed by your prescribing Physician.    IF AT ANY POINT YOU ARE VERY CONCERNED ABOUT YOUR SYMPTOMS, PLEASE GO TO THE EMERGENCY ROOM.    If you develop worsening pain, weakness, numbness, lose bowel or bladder control (i.e., having an accident where you did not even know you had to go to the bathroom and suddenly noticed you soiled yourself), saddle anesthesia (a loss of sensation restricted to the area of the buttocks, anus and between the legs -- i.e., those parts of your body that would touch a saddle if you were sitting on one) you need to go immediately to the emergency department for evaluation and  treatment.    ----------------------------------------------------------------------------------------------------------------------------------------------------------------  If you received Sedation please read the following instructions:  POST SEDATION INSTRUCTIONS    Today you received intravenous medication (also known as sedation) that was used to help you relax and/or decrease discomfort during your procedure. This medication will be acting in your body for the next 24 hours, so you might feel a little tired or sleepy. This feeling will slowly wear off.   Common side effects associated with these medications include: drowsiness, dizziness, sleepiness, confusion, feeling excited, difficulty remembering things, lack of steadiness with walking or balance, loss of fine muscle control, slowed reflexes, difficulty focusing, and blurred vision.  Some over-the-counter and prescription medications (e.g., muscle relaxants, opioids, mood-altering medications, sedatives/hypnotics, antihistamines) can interact with the intravenous medication you received and cause an increased risk of the side effects listed above in addition to other potentially life threatening side effects. Use extreme caution if you are taking such medications, and consult with your Pain Physician or prescribing physician if you have any questions.  For the next 12-24 hours:    DO NOT--Drive a car, operate machinery or power tools   DO NOT--Drink any alcoholic beverages (not even beer), they may dangerously increase the risk of side effects.    DO NOT--Make any important legal or business decisions or sign important documents.  We advise you to have someone to assist you at home. Move slowly and carefully. Do not make sudden changes in position. Be aware of dizziness or light-headedness and move accordingly.   If you seek medical treatment within 24 hours, let the nurse or doctor caring for you know that you have received the above medications. If you  have any questions or concerns related to your sedation or treatment today please contact us.

## 2025-01-09 ENCOUNTER — OFFICE VISIT (OUTPATIENT)
Dept: GYNECOLOGIC ONCOLOGY | Facility: CLINIC | Age: 45
End: 2025-01-09
Payer: COMMERCIAL

## 2025-01-09 ENCOUNTER — HOSPITAL ENCOUNTER (OUTPATIENT)
Facility: HOSPITAL | Age: 45
Discharge: HOME | End: 2025-01-09
Attending: EMERGENCY MEDICINE | Admitting: EMERGENCY MEDICINE
Payer: COMMERCIAL

## 2025-01-09 ENCOUNTER — TELEPHONE (OUTPATIENT)
Dept: GYNECOLOGIC ONCOLOGY | Facility: CLINIC | Age: 45
End: 2025-01-09

## 2025-01-09 VITALS
OXYGEN SATURATION: 98 % | HEART RATE: 102 BPM | BODY MASS INDEX: 31.18 KG/M2 | WEIGHT: 222.69 LBS | DIASTOLIC BLOOD PRESSURE: 60 MMHG | HEIGHT: 71 IN | TEMPERATURE: 98 F | SYSTOLIC BLOOD PRESSURE: 116 MMHG

## 2025-01-09 VITALS
HEIGHT: 71 IN | SYSTOLIC BLOOD PRESSURE: 126 MMHG | TEMPERATURE: 98 F | DIASTOLIC BLOOD PRESSURE: 70 MMHG | WEIGHT: 205 LBS | RESPIRATION RATE: 16 BRPM | HEART RATE: 79 BPM | OXYGEN SATURATION: 10 % | BODY MASS INDEX: 28.7 KG/M2

## 2025-01-09 DIAGNOSIS — D06.9 CIN III WITH SEVERE DYSPLASIA: ICD-10-CM

## 2025-01-09 DIAGNOSIS — Z98.890 STATUS POST COLPOSCOPY: Primary | ICD-10-CM

## 2025-01-09 DIAGNOSIS — N87.1 DYSPLASIA OF CERVIX, HIGH GRADE CIN 2: ICD-10-CM

## 2025-01-09 DIAGNOSIS — N85.01 COMPLEX ENDOMETRIAL HYPERPLASIA WITHOUT ATYPIA: ICD-10-CM

## 2025-01-09 DIAGNOSIS — G89.29 CHRONIC PAIN: ICD-10-CM

## 2025-01-09 PROCEDURE — 63600175 PHARM REV CODE 636 W HCPCS: Performed by: EMERGENCY MEDICINE

## 2025-01-09 PROCEDURE — 64510 N BLOCK STELLATE GANGLION: CPT | Mod: RT | Performed by: EMERGENCY MEDICINE

## 2025-01-09 PROCEDURE — 64510 N BLOCK STELLATE GANGLION: CPT | Mod: RT,,, | Performed by: EMERGENCY MEDICINE

## 2025-01-09 PROCEDURE — 99999 PR PBB SHADOW E&M-EST. PATIENT-LVL IV: CPT | Mod: PBBFAC,,, | Performed by: OBSTETRICS & GYNECOLOGY

## 2025-01-09 RX ORDER — BUPIVACAINE HYDROCHLORIDE 2.5 MG/ML
INJECTION, SOLUTION EPIDURAL; INFILTRATION; INTRACAUDAL
Status: DISCONTINUED | OUTPATIENT
Start: 2025-01-09 | End: 2025-01-09 | Stop reason: HOSPADM

## 2025-01-09 RX ORDER — LIDOCAINE HYDROCHLORIDE 20 MG/ML
INJECTION, SOLUTION EPIDURAL; INFILTRATION; INTRACAUDAL; PERINEURAL
Status: DISCONTINUED | OUTPATIENT
Start: 2025-01-09 | End: 2025-01-09 | Stop reason: HOSPADM

## 2025-01-09 RX ORDER — TRIAMCINOLONE ACETONIDE 40 MG/ML
INJECTION, SUSPENSION INTRA-ARTICULAR; INTRAMUSCULAR
Status: DISCONTINUED | OUTPATIENT
Start: 2025-01-09 | End: 2025-01-09 | Stop reason: HOSPADM

## 2025-01-09 NOTE — DISCHARGE SUMMARY
Discharge Note  Short Stay      SUMMARY     Admit Date: 1/9/2025    Attending Physician: Victor Hugo Ingram      Discharge Physician: Victor Hugo Ingram      Discharge Date: 1/9/2025 7:58 AM    Procedure(s) (LRB):  Right stellate ganglion block (Right)    Final Diagnosis: Complex regional pain syndrome type 1 of right upper extremity [G90.511]  Nerve pain [M79.2]  Right arm pain [M79.601]    Disposition: Home or self care    Patient Instructions:   Current Discharge Medication List        CONTINUE these medications which have NOT CHANGED    Details   acetaminophen (TYLENOL) 650 MG TbSR Take 1 tablet (650 mg total) by mouth every 6 (six) hours as needed (pain).  Qty: 30 tablet, Refills: 1      B complex-vitamin C-folic acid (DENIS-CHANTAL) 0.8 mg Tab Take 1 tablet (0.8 mg total) by mouth once daily.  Qty: 90 tablet, Refills: 3    Associated Diagnoses: ESRD (end stage renal disease) on dialysis      cinacalcet (SENSIPAR) 30 MG Tab Take 30 mg by mouth daily with breakfast.      esomeprazole (NEXIUM) 20 MG capsule Take 20 mg by mouth before breakfast.      gabapentin (NEURONTIN) 300 MG capsule Take 1 capsule (300 mg total) by mouth three times per week after dialysis  Qty: 36 capsule, Refills: 2    Associated Diagnoses: Cervical radiculopathy; Right arm pain; Nerve pain      loratadine (CLARITIN ORAL) Take 1 tablet by mouth daily as needed (Allergies).      medroxyPROGESTERone (PROVERA) 10 MG tablet Take 1 tablet (10 mg total) by mouth once daily.  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 Do not exceed 6 tablets per day.  Qty: 180 tablet, Refills: 11      OPW TEST CLAIM - DO NOT FILL OPW test claim. Do not fill.  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 Please dispense only clear patches , patient has a tape allergy.  Qty: 14 patch, Refills: 0    Comments: Patient not a trust member . Address verified.  Associated Diagnoses: Nicotine dependence      ondansetron (ZOFRAN) 4 MG tablet Take 1 tablet (4 mg total) by mouth every 6 (six) hours.  Qty: 20 tablet, Refills: 0      oxyCODONE (ROXICODONE) 5 MG immediate release tablet Take 1 tablet (5 mg total) by mouth every 4 (four) hours as needed for Pain.  Qty: 20 tablet, Refills: 0    Comments: Quantity prescribed more than 7 day supply? No  Associated Diagnoses: Skin tag of anus      sodium zirconium cyclosilicate (LOKELMA) 10 gram packet Take 1 packet (10 g total) by mouth 2 (two) times a day. Mix entire contents of packet(s) into drinking glass containing 3 tablespoons of water; stir well and drink immediately. Add water and repeat until no powder remains to receive entire dose.  Qty: 30 packet, Refills: 3    Associated Diagnoses: Hyperkalemia                 Discharge Diagnosis: Complex regional pain syndrome type 1 of right upper extremity [G90.511]  Nerve pain [M79.2]  Right arm pain [M79.601]  Condition on Discharge: Stable with no complications to procedure   Diet on Discharge: Same as before.  Activity: as per instruction sheet.  Discharge to: Home with a responsible adult.  Follow up: 2-4 weeks       Please call my office or pager at [phone] if experienced any weakness or loss of sensation, fever > 101.5, pain uncontrolled with oral medications, persistent nausea/vomiting/or diarrhea, redness or drainage from the incisions, or any other worrisome concerns. If physician on call was not reached or could not communicate with our office for any reason please go to the nearest emergency department

## 2025-01-09 NOTE — OP NOTE
Diagnostic/Therapeutic Right Stellate Ganglion Block under Fluoroscopic Guidance    The procedure, risks, benefits, and options were discussed with the patient. There are no contraindications to the procedure. The patent expressed understanding and agreed to the procedure. Informed written consent was obtained prior to the start of the procedure and can be found in the patient's chart.    PATIENT NAME: Mrs. Ade Silva   MRN: [id number]     DATE OF PROCEDURE: 01/09/2025    PROCEDURE: Diagnostic/Therapeutic Right Stellate Block under Fluoroscopic Guidance    PRE-OP DIAGNOSIS: Complex regional pain syndrome type 1 of right upper extremity [G90.511]  Nerve pain [M79.2]  Right arm pain [M79.601]    POST-OP DIAGNOSIS: Same    PHYSICIAN: Victor Hugo Ingram MD      MEDICATIONS INJECTED: Preservative-free Decadron 10mg with 9cc of Bupivacaine 0.25%     LOCAL ANESTHETIC INJECTED: Xylocaine 2%     SEDATION: None    ESTIMATED BLOOD LOSS: None    COMPLICATIONS: None    TECHNIQUE: Time-out was performed to identify the patient and procedure to be performed. With the patient laying in a supine position, the surgical area was prepped and draped in the usual sterile fashion using ChloraPrep and a fenestrated drape.The area was determined under fluoroscopy guidance. Skin anesthesia was achieved by injecting Lidocaine 2% over the injection site. Ultrasound guidance was used in order to avoid the neurovascular bundle.  This was followed by advancement of a  Stimuplex 21G x 100mm needle until contact with the transverse process at the level of C6 while retracting the carotid artery laterally. Once the final needle tip position was confirmed on fluoroscopy, negative pressure was applied to confirm no intravascular placement.  5 mL of the medication mixture listed above was injected slowly. The needles were removed and bleeding was nil. A sterile dressing was applied. No specimens collected. The patient tolerated the procedure well.        The patient was monitored after the procedure in the recovery area. They were given post-procedure and discharge instructions to follow at home. The patient was discharged in a stable condition.    Victor Hugo Ingram MD

## 2025-01-09 NOTE — PROGRESS NOTES
Subjective:      Patient ID: Ade Silva is a 44 y.o. female.    Chief Complaint: Follow-up (2 month (hpv,pap,ecc))    HPI  Ade Silva is a 44 yr old P0 here for follow up. Recent cervix biopsy is TAYLOR 2.     Referral Hx:   referred from Dr. POLA Amador for complex hyperplasia without atypia and TAYLOR 2-3. She reports irregular bleeding and cycles her whole life but stopped having cycles early 2023 for approx nine months. Resumed irregular bleeding early 2024.     She has a complex medical history that includes polycystic kidney disease for which she has required dialysis since 5/2023. Brain aneurysm she reports is due to her PCKD (coiling). Recent robotic surgery with left nephrectomy. Laparotomy for kidney extraction    Data reviewed:  12/7/2023: pap smear: ASC-H, + HR HPV 16  3/7/2024: Pelvic US: 8 cm uterus, ES 8.6 mm, normal ovaries, no FF. 0.9 cm hypoechoic nodule within the endometrial cavity which appears vascular. Finding is suggestive of an endometrial polyp.   3/12/2024: colposcopy: TAYLOR 2-3 endometrial biopsy: no atypical hyperplasia or malignancy identified  4/9/2024: vulvar biopsy: condyloma acuminatum   7/25/2024: hysteroscopy D&C: complex hyperplasia without atypia arising in a polyp. Endometrial ablation  LEEP/ECC: TAYLOR 2-3, High-grade dysplasia involves an inked and cauterized edge of this LEEP cone biopsy   warty rectal lesion : AIN 3  9/19/2024: robotic left nephrectomy for kidney mass. Negative for malignancy.     12/10/24: EUA biopsies joint case with Dr. Mojica; Cervix Biopsy=TAYLOR 2    Tobacco use. Prior cigarettes 1 ppd/20 + years then cigars 2-3 a day since 2018.      Review of Systems   Constitutional:  Positive for fatigue (since surgery). Negative for appetite change, chills, diaphoresis and fever.   Respiratory:  Negative for chest tightness, shortness of breath and wheezing.    Cardiovascular:  Negative for chest pain, palpitations and leg swelling.   Gastrointestinal:  Negative  "for abdominal pain, constipation, diarrhea, nausea and vomiting.   Genitourinary:  Positive for genital sores. Negative for menstrual problem, pelvic pain, vaginal bleeding (none since ablation), vaginal discharge and vaginal pain.   Musculoskeletal:  Negative for gait problem.   Skin:  Negative for color change.   Neurological:  Positive for headaches (history for aneurysm). Negative for dizziness and light-headedness.   Psychiatric/Behavioral:  Negative for agitation. The patient is not nervous/anxious.        Past Medical History:   Diagnosis Date    Anxiety     Asthma     Autosomal dominant polycystic kidney disease     Brain aneurysm     Depression     Encounter for blood transfusion     GERD (gastroesophageal reflux disease)     Hypertension     Seizures     as a child (age 7-8); " stress -related"    TIA (transient ischemic attack)      Past Surgical History:   Procedure Laterality Date    AV FISTULA PLACEMENT Right 09/14/2023    Procedure: CREATION, AV FISTULA, radial cephalic;  Surgeon: MELISSA Loera II, MD;  Location: [facility] OR [address] [place] [address];  Service: Vascular;  Laterality: Right;    AV FISTULA PLACEMENT Right 10/05/2023    Procedure: CREATION, AV FISTULA - brachial;  Surgeon: MELISSA Loera II, MD;  [facility];  Service: Vascular;  Laterality: Right;  confirmed placement with doppler    BIOPSY OF VAGINA  12/10/2024    Procedure: BIOPSY, UTERUS;  Surgeon: Elis Jacobs MD;  Location: [facility] OR [address] [place] [address];  Service: Gynecology Oncology;;    CEREBRAL ANEURYSM REPAIR      coiling    CERVICAL BIOPSY N/A 12/10/2024    Procedure: BIOPSY, CERVIX;  Surgeon: Elis Jacobs MD;  Location: [facility] OR [address] [place] [address];  Service: Gynecology Oncology;  Laterality: N/A;    CURETTAGE, ENDOCERVICAL N/A 12/10/2024    Procedure: CURETTAGE, ENDOCERVICAL;  Surgeon: Elis Jacobs MD;  Location: [facility] OR [address] [place] [address];  Service: Gynecology Oncology;  Laterality: N/A;    CYSTOSCOPY      age 13    EMBOLIZATION Right 04/23/2024    " Procedure: EMBOLIZATION, BLOOD VESSEL FISTULA;  Surgeon: MELISSA Loera II, MD;  Location: [facility] 2ND FLR;  Service: Vascular;  Laterality: Right;    EPIDURAL STEROID INJECTION INTO CERVICAL SPINE N/A 11/12/2024    Procedure: C7-T1 KIMBERLEE;  Surgeon: Presley Hough DO;  Location: [facility] PAIN MANAGEMENT;  Service: Pain Management;  Laterality: N/A;  15 mins no AC    FISTULOGRAM Right 04/23/2024    Procedure: Fistulogram;  Surgeon: MELISSA Loera II, MD;  [address]  Service: Vascular;  Laterality: Right;  CONTRAST: 68ML, FLUORO TIME: 17.9, mGy: 45.11, Gycm2: 5.9548    HYSTEROSCOPY N/A 07/25/2024    Procedure: HYSTEROSCOPY;  Surgeon: Jazz Amador MD;  Location: [facility];  Service: OB/GYN;  Laterality: N/A;    INCISION OF VULVA N/A 07/25/2024    Procedure: INCISION, VULVA;  Surgeon: Jazz Amador MD;  Location: [facility];  Service: OB/GYN;  Laterality: N/A;    INSERTION OF TUNNELED CENTRAL VENOUS HEMODIALYSIS CATHETER Right 05/03/2023    Procedure: Insertion, Catheter, Central Venous, Hemodialysis;  Surgeon: Priya Grimm MD;  Location: [facility] CATH LAB;  Service: Interventional Nephrology;  Laterality: Right;    LAPAROSCOPIC ROBOT-ASSISTED SURGICAL REMOVAL OF KIDNEY USING DA GLORIA XI Left 9/19/2024    Procedure: XI ROBOTIC NEPHRECTOMY;  Surgeon: Valentin Maldonado MD;  [address]  Service: Urology;  Laterality: Left;    LOOP ELECTROSURGICAL EXCISION PROCEDURE (LEEP) N/A 07/25/2024    Procedure: LEEP (LOOP ELECTROSURGICAL EXCISION PROCEDURE);  Surgeon: Jazz Amador MD;  Location: [facility];  Service: OB/GYN;  Laterality: N/A;    REMOVAL OF HEMODIALYSIS CATHETER  05/03/2023    Procedure: REMOVAL, CATHETER, HEMODIALYSIS;  Surgeon: Priya Grimm MD;  Location: [facility] CATH LAB;  Service: Interventional Nephrology;;    SURGICAL EXCISION OF ANAL LESION N/A 12/10/2024    Procedure: EXCISION, LESION, ANUS;  Surgeon: Son Mojica MD;  Location: [facility] OR [facility]  FLR;  Service: Colon and Rectal;  Laterality: N/A;    THERMAL ABLATION OF ENDOMETRIUM N/A 07/25/2024    Procedure: ABLATION, ENDOMETRIUM, THERMAL;  Surgeon: Jazz Amador MD;  Location: [facility];  Service: OB/GYN;  Laterality: N/A;     Family History   Problem Relation Name Age of Onset    Fibromyalgia Mother      Stroke Father      Heart disease Father      Polycystic kidney disease Father      Polycystic kidney disease Sister      Polycystic kidney disease Sister      Diabetes Brother      Ovarian cancer Neg Hx      Uterine cancer Neg Hx      Breast cancer Neg Hx      Colon cancer Neg Hx       Social History     Socioeconomic History    Marital status: Single   Tobacco Use    Smoking status: Every Day     Average packs/day: 1 pack/day for 28.0 years (28.0 ttl pk-yrs)     Types: Cigarettes, Cigars     Start date: 1994     Passive exposure: Never    Smokeless tobacco: Never    Tobacco comments:     Smoke 3-5 black and mild cigars a day    Substance and Sexual Activity    Alcohol use: Not Currently    Drug use: Never    Sexual activity: Not Currently   Social History Narrative    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     Social Drivers of Health     Financial Resource Strain: Low Risk  (12/19/2023)    Overall Financial Resource Strain (CARDIA)     Difficulty of Paying Living Expenses: Not very hard   Food Insecurity: No Food Insecurity (12/19/2023)    Hunger Vital Sign     Worried About Running Out of Food in the Last Year: Never true     Ran Out of Food in the Last Year: Never true   Transportation Needs: No Transportation Needs (12/19/2023)    PRAPARE - Transportation     Lack of Transportation (Medical): No     Lack of Transportation (Non-Medical): No   Physical Activity: Insufficiently Active (12/19/2023)    Exercise Vital Sign     Days of Exercise per Week: 2 days     Minutes of Exercise per Session: 10 min   Stress: Stress Concern Present (12/19/2023)    [employer] - Occupational Stress  Questionnaire     Feeling of Stress : To some extent   Housing Stability: Low Risk  (12/19/2023)    Housing Stability Vital Sign     Unable to Pay for Housing in the Last Year: No     Number of Places Lived in the Last Year: 2     Unstable Housing in the Last Year: No     Current Outpatient Medications   Medication Sig    acetaminophen (TYLENOL) 650 MG TbSR Take 1 tablet (650 mg total) by mouth every 6 (six) hours as needed (pain).    B complex-vitamin C-folic acid (DENIS-CHANTAL) 0.8 mg Tab Take 1 tablet (0.8 mg total) by mouth once daily.    cinacalcet (SENSIPAR) 30 MG Tab Take 30 mg by mouth daily with breakfast.    esomeprazole (NEXIUM) 20 MG capsule Take 20 mg by mouth before breakfast.    gabapentin (NEURONTIN) 300 MG capsule Take 1 capsule (300 mg total) by mouth three times per week after dialysis    hpv vaccine,9-jaz (GARDASIL 9, PF,) 0.5 mL Syrg Inject 0.5 mLs into the muscle for 3 doses    loratadine (CLARITIN ORAL) Take 1 tablet by mouth daily as needed (Allergies).    medroxyPROGESTERone (PROVERA) 10 MG tablet Take 1 tablet (10 mg total) by mouth once daily.    nicotine (NICODERM CQ) 21 mg/24 hr Place 1 patch onto the skin once daily. Please dispense only clear patches , patient has a tape allergy.    ondansetron (ZOFRAN) 4 MG tablet Take 1 tablet (4 mg total) by mouth every 6 (six) hours.    OPW TEST CLAIM - DO NOT FILL OPW test claim. Do not fill.    oxyCODONE (ROXICODONE) 5 MG immediate release tablet Take 1 tablet (5 mg total) by mouth every 4 (four) hours as needed for Pain.    sodium zirconium cyclosilicate (LOKELMA) 10 gram packet Take 1 packet (10 g total) by mouth 2 (two) times a day. Mix entire contents of packet(s) into drinking glass containing 3 tablespoons of water; stir well and drink immediately. Add water and repeat until no powder remains to receive entire dose.    sucroferric oxyhydroxide (VELPHORO) 500 mg Chew Break or dissolve 1 tablet by mouth with each meal and snack. Do not exceed 6  "tablets per day.     Current Facility-Administered Medications   Medication    tuberculin injection 5 Units     Facility-Administered Medications Ordered in Other Visits   Medication    0.9%  NaCl infusion    ondansetron disintegrating tablet 8 mg     Review of patient's allergies indicates:   Allergen Reactions    Aspirin Hives     And vomiting    Penicillins Hives     Throat swelling    Adhesive Rash     Adhesive/silk tape (type found on band aides). Can only use "Paper Tape"    Butalbital-acetaminophen-caff Nausea And Vomiting and Other (See Comments)     Dizziness (made pt feel sick)    Latex, natural rubber Rash    Nickel Rash    Tomato Nausea And Vomiting     /60 (Patient Position: Sitting)   Pulse 102   Temp 97.7 °F (36.5 °C)   Ht 5' 11" (1.803 m)   Wt 101 kg (222 lb 11.2 oz)   LMP 07/08/2024 (Approximate) Comment: no periods  SpO2 98%   BMI 31.06 kg/m²     Objective:   Physical Exam:   Constitutional: She is oriented to person, place, and time. She appears well-developed and well-nourished.       Cardiovascular:  Normal rate.             Pulmonary/Chest: Effort normal.                      Neurological: She is alert and oriented to person, place, and time.         Assessment:     1. S/P EUA/Colposcopy/Cervcal Biopsy, ECC, EMBx - 12/10/24    2. Dysplasia of cervix, high grade TAYLOR 2      Plan:      Discussed her biopsy for EUA shows Persistent TAYLOR 2 after the previous LEEP with positive margins, we discussed re excision now vs continued observation. I would favor re excision given her immunocompromise and thus elevated risk for progression to cancer.  She elects for re-excision. She would also like her bartholin gland cyst resected at time of surgery.   Plan for CKC and bartholin gland cyst excision with marsupialization.  We reviewed risks of surgery including bleeding, need for transfusion, infection, trauma to surrounding structures (blood vessels, nerves, organs including bowel, bladder, " ureters), cardiac events, VTE and even death. She understands and agrees to proceed. Consents signed.  Surgery on Tuesday or Thursday given her dialysis scheduling MWF.      Elis Jacobs MD  Gynecologic Oncology

## 2025-01-09 NOTE — PLAN OF CARE
Ade Silva has met all discharge criteria from Phase II. Vital Signs are stable, ambulating  without difficulty. Discharge instructions given, patient verbalized understanding. Discharged from facility via ambulation in stable condition.

## 2025-01-09 NOTE — H&P
"HPI  Patient presenting for Procedure(s) (LRB):  Right stellate ganglion block (Right)     No health changes since previous encounter    Past Medical History:   Diagnosis Date    Anxiety     Asthma     Autosomal dominant polycystic kidney disease     Brain aneurysm     Depression     Encounter for blood transfusion     GERD (gastroesophageal reflux disease)     Hypertension     Seizures     as a child (age 7-8); " stress -related"    TIA (transient ischemic attack)      Past Surgical History:   Procedure Laterality Date    AV FISTULA PLACEMENT Right 09/14/2023    Procedure: CREATION, AV FISTULA, radial cephalic;  Surgeon: MELISSA Loera II, MD;  Location: [facility] OR 2ND FLR;  Service: Vascular;  Laterality: Right;    AV FISTULA PLACEMENT Right 10/05/2023    Procedure: CREATION, AV FISTULA - brachial;  Surgeon: MELISSA Loera II, MD;  Location: [facility] OR 2ND FLR;  Service: Vascular;  Laterality: Right;  confirmed placement with doppler    BIOPSY OF VAGINA  12/10/2024    Procedure: BIOPSY, UTERUS;  Surgeon: Elis Jacobs MD;  Location: [facility] OR 2ND FLR;  Service: Gynecology Oncology;;    CEREBRAL ANEURYSM REPAIR      coiling    CERVICAL BIOPSY N/A 12/10/2024    Procedure: BIOPSY, CERVIX;  Surgeon: Elis Jacobs MD;  Location: [facility] OR 2ND FLR;  Service: Gynecology Oncology;  Laterality: N/A;    CURETTAGE, ENDOCERVICAL N/A 12/10/2024    Procedure: CURETTAGE, ENDOCERVICAL;  Surgeon: Elsi Jacobs MD;  Location: [facility] OR 2ND FLR;  Service: Gynecology Oncology;  Laterality: N/A;    CYSTOSCOPY      age 13    EMBOLIZATION Right 04/23/2024    Procedure: EMBOLIZATION, BLOOD VESSEL FISTULA;  Surgeon: MELISSA Loera II, MD;  Location: [facility] OR 2ND FLR;  Service: Vascular;  Laterality: Right;    EPIDURAL STEROID INJECTION INTO CERVICAL SPINE N/A 11/12/2024    Procedure: C7-T1 KIMBERLEE;  Surgeon: Presley Hough DO;  [facility]: [facility] PAIN MANAGEMENT;  Service: Pain Management;  Laterality: N/A;  15 mins no AC    " FISTULOGRAM Right 04/23/2024    Procedure: Fistulogram;  Surgeon: MELISSA Loera II, MD;  Location: [facility] 2ND FLR;  Service: Vascular;  Laterality: Right;  CONTRAST: 68ML, FLUORO TIME: 17.9, mGy: 45.11, Gycm2: 5.9548    HYSTEROSCOPY N/A 07/25/2024    Procedure: HYSTEROSCOPY;  Surgeon: Jazz Amador MD;  Location: [facility];  Service: OB/GYN;  Laterality: N/A;    INCISION OF VULVA N/A 07/25/2024    Procedure: INCISION, VULVA;  Surgeon: Jazz Amador MD;  Location: [facility];  Service: OB/GYN;  Laterality: N/A;    INSERTION OF TUNNELED CENTRAL VENOUS HEMODIALYSIS CATHETER Right 05/03/2023    Procedure: Insertion, Catheter, Central Venous, Hemodialysis;  Surgeon: Priya Grimm MD;  Location: [facility] CATH LAB;  Service: Interventional Nephrology;  Laterality: Right;    LAPAROSCOPIC ROBOT-ASSISTED SURGICAL REMOVAL OF KIDNEY USING DA GLORIA XI Left 9/19/2024    Procedure: XI ROBOTIC NEPHRECTOMY;  Surgeon: Valentin Maldonado MD;  Location: [address] FLR;  Service: Urology;  Laterality: Left;    LOOP ELECTROSURGICAL EXCISION PROCEDURE (LEEP) N/A 07/25/2024    Procedure: LEEP (LOOP ELECTROSURGICAL EXCISION PROCEDURE);  Surgeon: Jazz Amador MD;  Location: [facility];  Service: OB/GYN;  Laterality: N/A;    REMOVAL OF HEMODIALYSIS CATHETER  05/03/2023    Procedure: REMOVAL, CATHETER, HEMODIALYSIS;  Surgeon: Priya Grimm MD;  Location: [facility] CATH LAB;  Service: Interventional Nephrology;;    SURGICAL EXCISION OF ANAL LESION N/A 12/10/2024    Procedure: EXCISION, LESION, ANUS;  Surgeon: Son Mojica MD;  Location: [facility] 2ND FLR;  Service: Colon and Rectal;  Laterality: N/A;    THERMAL ABLATION OF ENDOMETRIUM N/A 07/25/2024    Procedure: ABLATION, ENDOMETRIUM, THERMAL;  Surgeon: Jazz Amador MD;  Location: [facility];  Service: OB/GYN;  Laterality: N/A;     Review of patient's allergies indicates:   Allergen Reactions    Aspirin Hives     And vomiting    Penicillins Hives     Throat swelling  "   Adhesive Rash     Adhesive/silk tape (type found on band aides). Can only use "Paper Tape"    Butalbital-acetaminophen-caff Nausea And Vomiting and Other (See Comments)     Dizziness (made pt feel sick)    Latex, natural rubber Rash    Nickel Rash    Tomato Nausea And Vomiting      No current facility-administered medications for this encounter.     Facility-Administered Medications Ordered in Other Encounters   Medication    0.9%  NaCl infusion    ondansetron disintegrating tablet 8 mg       PMHx, PSHx, Allergies, Medications reviewed in epic    ROS negative except pain complaints in HPI    OBJECTIVE:    BP (!) 92/53   Pulse 86   Temp 98.1 °F (36.7 °C) (Temporal)   Resp 18   Ht 5' 11" (1.803 m)   Wt 93 kg (205 lb)   LMP 07/08/2024 (Approximate) Comment: no periods  SpO2 100%   Breastfeeding No   BMI 28.59 kg/m²     PHYSICAL EXAMINATION:    GENERAL: Well appearing, in no acute distress, alert and oriented x3.  PSYCH:  Mood and affect appropriate.  SKIN: Skin color, texture, turgor normal, no rashes or lesions which will impact the procedure.  CV: RRR with palpation of the radial artery.  PULM: No evidence of respiratory difficulty, symmetric chest rise. Clear to auscultation.  NEURO: Cranial nerves grossly intact.    Plan:    Proceed with procedure as planned Procedure(s) (LRB):  Right stellate ganglion block (Right)    Victor Hugo Ingram  01/09/2025            "

## 2025-01-09 NOTE — H&P (VIEW-ONLY)
Subjective:      Patient ID: Ade Silva is a 44 y.o. female.    Chief Complaint: Follow-up (2 month (hpv,pap,ecc))    HPI  Ade Silva is a 44 yr old P0 here for follow up. Recent cervix biopsy is TAYLOR 2.     Referral Hx:   referred from Dr. POLA Amador for complex hyperplasia without atypia and TAYLOR 2-3. She reports irregular bleeding and cycles her whole life but stopped having cycles early 2023 for approx nine months. Resumed irregular bleeding early 2024.     She has a complex medical history that includes polycystic kidney disease for which she has required dialysis since 5/2023. Brain aneurysm she reports is due to her PCKD (coiling). Recent robotic surgery with left nephrectomy. Laparotomy for kidney extraction    Data reviewed:  12/7/2023: pap smear: ASC-H, + HR HPV 16  3/7/2024: Pelvic US: 8 cm uterus, ES 8.6 mm, normal ovaries, no FF. 0.9 cm hypoechoic nodule within the endometrial cavity which appears vascular. Finding is suggestive of an endometrial polyp.   3/12/2024: colposcopy: TAYLOR 2-3 endometrial biopsy: no atypical hyperplasia or malignancy identified  4/9/2024: vulvar biopsy: condyloma acuminatum   7/25/2024: hysteroscopy D&C: complex hyperplasia without atypia arising in a polyp. Endometrial ablation  LEEP/ECC: TAYLOR 2-3, High-grade dysplasia involves an inked and cauterized edge of this LEEP cone biopsy   warty rectal lesion : AIN 3  9/19/2024: robotic left nephrectomy for kidney mass. Negative for malignancy.     12/10/24: EUA biopsies joint case with Dr. Mojica; Cervix Biopsy=TAYLOR 2    Tobacco use. Prior cigarettes 1 ppd/20 + years then cigars 2-3 a day since 2018.      Review of Systems   Constitutional:  Positive for fatigue (since surgery). Negative for appetite change, chills, diaphoresis and fever.   Respiratory:  Negative for chest tightness, shortness of breath and wheezing.    Cardiovascular:  Negative for chest pain, palpitations and leg swelling.   Gastrointestinal:  Negative  "for abdominal pain, constipation, diarrhea, nausea and vomiting.   Genitourinary:  Positive for genital sores. Negative for menstrual problem, pelvic pain, vaginal bleeding (none since ablation), vaginal discharge and vaginal pain.   Musculoskeletal:  Negative for gait problem.   Skin:  Negative for color change.   Neurological:  Positive for headaches (history for aneurysm). Negative for dizziness and light-headedness.   Psychiatric/Behavioral:  Negative for agitation. The patient is not nervous/anxious.        Past Medical History:   Diagnosis Date    Anxiety     Asthma     Autosomal dominant polycystic kidney disease     Brain aneurysm     Depression     Encounter for blood transfusion     GERD (gastroesophageal reflux disease)     Hypertension     Seizures     as a child (age 7-8); " stress -related"    TIA (transient ischemic attack)      Past Surgical History:   Procedure Laterality Date    AV FISTULA PLACEMENT Right 09/14/2023    Procedure: CREATION, AV FISTULA, radial cephalic;  Surgeon: MELISSA Loera II, MD;  Location: [facility] OR [address] [place] [address];  Service: Vascular;  Laterality: Right;    AV FISTULA PLACEMENT Right 10/05/2023    Procedure: CREATION, AV FISTULA - brachial;  Surgeon: MELISSA Loera II, MD;  [facility];  Service: Vascular;  Laterality: Right;  confirmed placement with doppler    BIOPSY OF VAGINA  12/10/2024    Procedure: BIOPSY, UTERUS;  Surgeon: Elis Jacobs MD;  Location: [facility] OR [address] [place] [address];  Service: Gynecology Oncology;;    CEREBRAL ANEURYSM REPAIR      coiling    CERVICAL BIOPSY N/A 12/10/2024    Procedure: BIOPSY, CERVIX;  Surgeon: Elis Jacobs MD;  Location: [facility] OR [address] [place] [address];  Service: Gynecology Oncology;  Laterality: N/A;    CURETTAGE, ENDOCERVICAL N/A 12/10/2024    Procedure: CURETTAGE, ENDOCERVICAL;  Surgeon: Elis Jacobs MD;  Location: [facility] OR [address] [place] [address];  Service: Gynecology Oncology;  Laterality: N/A;    CYSTOSCOPY      age 13    EMBOLIZATION Right 04/23/2024    " Procedure: EMBOLIZATION, BLOOD VESSEL FISTULA;  Surgeon: MELISSA Loera II, MD;  Location: [facility] 2ND FLR;  Service: Vascular;  Laterality: Right;    EPIDURAL STEROID INJECTION INTO CERVICAL SPINE N/A 11/12/2024    Procedure: C7-T1 KIMBERLEE;  Surgeon: Presley Hough DO;  Location: [facility] PAIN MANAGEMENT;  Service: Pain Management;  Laterality: N/A;  15 mins no AC    FISTULOGRAM Right 04/23/2024    Procedure: Fistulogram;  Surgeon: MELISSA Loera II, MD;  [address];  Service: Vascular;  Laterality: Right;  CONTRAST: 68ML, FLUORO TIME: 17.9, mGy: 45.11, Gycm2: 5.9548    HYSTEROSCOPY N/A 07/25/2024    Procedure: HYSTEROSCOPY;  Surgeon: Jazz Amador MD;  Location: [facility];  Service: OB/GYN;  Laterality: N/A;    INCISION OF VULVA N/A 07/25/2024    Procedure: INCISION, VULVA;  Surgeon: Jazz Amador MD;  Location: [facility];  Service: OB/GYN;  Laterality: N/A;    INSERTION OF TUNNELED CENTRAL VENOUS HEMODIALYSIS CATHETER Right 05/03/2023    Procedure: Insertion, Catheter, Central Venous, Hemodialysis;  Surgeon: Priya Grimm MD;  Location: [facility] CATH LAB;  Service: Interventional Nephrology;  Laterality: Right;    LAPAROSCOPIC ROBOT-ASSISTED SURGICAL REMOVAL OF KIDNEY USING DA GLORIA XI Left 9/19/2024    Procedure: XI ROBOTIC NEPHRECTOMY;  Surgeon: Valentin Maldonado MD;  [address]  Service: Urology;  Laterality: Left;    LOOP ELECTROSURGICAL EXCISION PROCEDURE (LEEP) N/A 07/25/2024    Procedure: LEEP (LOOP ELECTROSURGICAL EXCISION PROCEDURE);  Surgeon: Jazz Amador MD;  Location: [facility];  Service: OB/GYN;  Laterality: N/A;    REMOVAL OF HEMODIALYSIS CATHETER  05/03/2023    Procedure: REMOVAL, CATHETER, HEMODIALYSIS;  Surgeon: Priya Grimm MD;  Location: [facility] CATH LAB;  Service: Interventional Nephrology;;    SURGICAL EXCISION OF ANAL LESION N/A 12/10/2024    Procedure: EXCISION, LESION, ANUS;  Surgeon: Son Mojica MD;  Location: [facility] OR [facility];  Service: Colon and Rectal;  Laterality: N/A;    THERMAL ABLATION OF ENDOMETRIUM N/A 07/25/2024    Procedure: ABLATION, ENDOMETRIUM, THERMAL;  Surgeon: Jazz Amador MD;  Location: [facility];  Service: OB/GYN;  Laterality: N/A;     Family History   Problem Relation Name Age of Onset    Fibromyalgia Mother      Stroke Father      Heart disease Father      Polycystic kidney disease Father      Polycystic kidney disease Sister      Polycystic kidney disease Sister      Diabetes Brother      Ovarian cancer Neg Hx      Uterine cancer Neg Hx      Breast cancer Neg Hx      Colon cancer Neg Hx       Social History     Socioeconomic History    Marital status: Single   Tobacco Use    Smoking status: Every Day     Average packs/day: 1 pack/day for 28.0 years (28.0 ttl pk-yrs)     Types: Cigarettes, Cigars     Start date: 1994     Passive exposure: Never    Smokeless tobacco: Never    Tobacco comments:     Smoke 3-5 black and mild cigars a day    Substance and Sexual Activity    Alcohol use: Not Currently    Drug use: Never    Sexual activity: Not Currently   Social History Narrative    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     Social Drivers of Health     Financial Resource Strain: Low Risk  (12/19/2023)    Overall Financial Resource Strain (CARDIA)     Difficulty of Paying Living Expenses: Not very hard   Food Insecurity: No Food Insecurity (12/19/2023)    Hunger Vital Sign     Worried About Running Out of Food in the Last Year: Never true     Ran Out of Food in the Last Year: Never true   Transportation Needs: No Transportation Needs (12/19/2023)    PRAPARE - Transportation     Lack of Transportation (Medical): No     Lack of Transportation (Non-Medical): No   Physical Activity: Insufficiently Active (12/19/2023)    Exercise Vital Sign     Days of Exercise per Week: 2 days     Minutes of Exercise per Session: 10 min   Stress: Stress Concern Present (12/19/2023)    [employer] [place] [employer] - Occupational Stress  Questionnaire     Feeling of Stress : To some extent   Housing Stability: Low Risk  (12/19/2023)    Housing Stability Vital Sign     Unable to Pay for Housing in the Last Year: No     Number of Places Lived in the Last Year: 2     Unstable Housing in the Last Year: No     Current Outpatient Medications   Medication Sig    acetaminophen (TYLENOL) 650 MG TbSR Take 1 tablet (650 mg total) by mouth every 6 (six) hours as needed (pain).    B complex-vitamin C-folic acid (DENIS-CHANTAL) 0.8 mg Tab Take 1 tablet (0.8 mg total) by mouth once daily.    cinacalcet (SENSIPAR) 30 MG Tab Take 30 mg by mouth daily with breakfast.    esomeprazole (NEXIUM) 20 MG capsule Take 20 mg by mouth before breakfast.    gabapentin (NEURONTIN) 300 MG capsule Take 1 capsule (300 mg total) by mouth three times per week after dialysis    hpv vaccine,9-jaz (GARDASIL 9, PF,) 0.5 mL Syrg Inject 0.5 mLs into the muscle for 3 doses    loratadine (CLARITIN ORAL) Take 1 tablet by mouth daily as needed (Allergies).    medroxyPROGESTERone (PROVERA) 10 MG tablet Take 1 tablet (10 mg total) by mouth once daily.    nicotine (NICODERM CQ) 21 mg/24 hr Place 1 patch onto the skin once daily. Please dispense only clear patches , patient has a tape allergy.    ondansetron (ZOFRAN) 4 MG tablet Take 1 tablet (4 mg total) by mouth every 6 (six) hours.    OPW TEST CLAIM - DO NOT FILL OPW test claim. Do not fill.    oxyCODONE (ROXICODONE) 5 MG immediate release tablet Take 1 tablet (5 mg total) by mouth every 4 (four) hours as needed for Pain.    sodium zirconium cyclosilicate (LOKELMA) 10 gram packet Take 1 packet (10 g total) by mouth 2 (two) times a day. Mix entire contents of packet(s) into drinking glass containing 3 tablespoons of water; stir well and drink immediately. Add water and repeat until no powder remains to receive entire dose.    sucroferric oxyhydroxide (VELPHORO) 500 mg Chew Break or dissolve 1 tablet by mouth with each meal and snack. Do not exceed 6  "tablets per day.     Current Facility-Administered Medications   Medication    tuberculin injection 5 Units     Facility-Administered Medications Ordered in Other Visits   Medication    0.9%  NaCl infusion    ondansetron disintegrating tablet 8 mg     Review of patient's allergies indicates:   Allergen Reactions    Aspirin Hives     And vomiting    Penicillins Hives     Throat swelling    Adhesive Rash     Adhesive/silk tape (type found on band aides). Can only use "Paper Tape"    Butalbital-acetaminophen-caff Nausea And Vomiting and Other (See Comments)     Dizziness (made pt feel sick)    Latex, natural rubber Rash    Nickel Rash    Tomato Nausea And Vomiting     /60 (Patient Position: Sitting)   Pulse 102   Temp 97.7 °F (36.5 °C)   Ht 5' 11" (1.803 m)   Wt 101 kg (222 lb 11.2 oz)   LMP 07/08/2024 (Approximate) Comment: no periods  SpO2 98%   BMI 31.06 kg/m²     Objective:   Physical Exam:   Constitutional: She is oriented to person, place, and time. She appears well-developed and well-nourished.       Cardiovascular:  Normal rate.             Pulmonary/Chest: Effort normal.                      Neurological: She is alert and oriented to person, place, and time.         Assessment:     1. S/P EUA/Colposcopy/Cervcal Biopsy, ECC, EMBx - 12/10/24    2. Dysplasia of cervix, high grade TAYLOR 2      Plan:      Discussed her biopsy for EUA shows Persistent TAYLOR 2 after the previous LEEP with positive margins, we discussed re excision now vs continued observation. I would favor re excision given her immunocompromise and thus elevated risk for progression to cancer.  She elects for re-excision. She would also like her bartholin gland cyst resected at time of surgery.   Plan for CKC and bartholin gland cyst excision with marsupialization.  We reviewed risks of surgery including bleeding, need for transfusion, infection, trauma to surrounding structures (blood vessels, nerves, organs including bowel, bladder, " ureters), cardiac events, VTE and even death. She understands and agrees to proceed. Consents signed.  Surgery on Tuesday or Thursday given her dialysis scheduling MWF.      Elis Jacobs MD  Gynecologic Oncology

## 2025-01-14 ENCOUNTER — OFFICE VISIT (OUTPATIENT)
Dept: DERMATOLOGY | Facility: CLINIC | Age: 45
End: 2025-01-14
Payer: COMMERCIAL

## 2025-01-14 DIAGNOSIS — L73.2 HIDRADENITIS SUPPURATIVA: ICD-10-CM

## 2025-01-14 DIAGNOSIS — L29.89 UREMIC PRURITUS: Primary | ICD-10-CM

## 2025-01-14 DIAGNOSIS — L28.1 PRURIGO NODULARIS: ICD-10-CM

## 2025-01-14 PROCEDURE — 1159F MED LIST DOCD IN RCRD: CPT | Mod: CPTII,S$GLB,, | Performed by: STUDENT IN AN ORGANIZED HEALTH CARE EDUCATION/TRAINING PROGRAM

## 2025-01-14 PROCEDURE — 1160F RVW MEDS BY RX/DR IN RCRD: CPT | Mod: CPTII,S$GLB,, | Performed by: STUDENT IN AN ORGANIZED HEALTH CARE EDUCATION/TRAINING PROGRAM

## 2025-01-14 PROCEDURE — 3066F NEPHROPATHY DOC TX: CPT | Mod: CPTII,S$GLB,, | Performed by: STUDENT IN AN ORGANIZED HEALTH CARE EDUCATION/TRAINING PROGRAM

## 2025-01-14 PROCEDURE — 99204 OFFICE O/P NEW MOD 45 MIN: CPT | Mod: S$GLB,,, | Performed by: STUDENT IN AN ORGANIZED HEALTH CARE EDUCATION/TRAINING PROGRAM

## 2025-01-14 PROCEDURE — 99999 PR PBB SHADOW E&M-EST. PATIENT-LVL III: CPT | Mod: PBBFAC,,, | Performed by: STUDENT IN AN ORGANIZED HEALTH CARE EDUCATION/TRAINING PROGRAM

## 2025-01-14 RX ORDER — HYDROXYZINE HYDROCHLORIDE 10 MG/1
10 TABLET, FILM COATED ORAL NIGHTLY PRN
Qty: 30 TABLET | Refills: 5 | Status: SHIPPED | OUTPATIENT
Start: 2025-01-14

## 2025-01-14 RX ORDER — TRIAMCINOLONE ACETONIDE 1 MG/G
CREAM TOPICAL 2 TIMES DAILY
Qty: 454 G | Refills: 1 | Status: SHIPPED | OUTPATIENT
Start: 2025-01-14

## 2025-01-14 RX ORDER — CLINDAMYCIN PHOSPHATE 10 UG/ML
LOTION TOPICAL 2 TIMES DAILY
Qty: 60 ML | Refills: 6 | Status: SHIPPED | OUTPATIENT
Start: 2025-01-14

## 2025-01-14 NOTE — PATIENT INSTRUCTIONS
Cerave itch relief cream--keep in refrigerator          XEROSIS (DRY SKIN)        Definition    Xerosis is the term for dry skin.  We all have a natural oil coating over our skin produced by the skin oil glands.  If this oil is removed, the skin becomes dry which can lead to cracking, which can lead to inflammation.  Xerosis is usually a long-term problem that recurs often, especially in the winter.    Cause    Long hot baths or showers can remove our natural oil and lead to xerosis.  One should never take more than one bath or shower a day and for no longer than ten minutes.  Use of harsh soaps such as Zest, Dial, and Ivory can worsen and cause xerosis.  Cold winter weather worsens xerosis because the amount of moisture contained in cold air is much less than the amount of moisture in warm air.    Treatment    Treatment is intended to restore the natural oil to your skin.  Keep the skin lubricated.    Do not take more than one bath or shower a day.  Use lukewarm water, not hot.  Hot water dries out the skin.    Use a gentle moisturizing soap such as Cetaphil soap, Oil of Olay, Dove, Basis, Ivory moisture care, Restoraderm cleanser.    When toweling dry, dont rub.  Blot the skin so there is still some water left on the skin.  You should apply a moisturizing cream to all of the skin such as Cerave cream, Cetaphil cream, Lipikar [place] AP+ Intense Repair Moisturizing Cream or Restoraderm or Eucerin Original Formula cream.   Alpha hydroxyacid lotions, i.e., AmLactin, also work very well for preventing dry skin, but may burn when used on inflamed or reddened skin.    If you like to swim during the winter months, you should not use soap when getting out of the pool.  When you have finished swimming, rinse off the chlorine with cool to warm water.  If this will be the only shower of the day, then you may use Cetaphil or another mild soap to cleanse your skin.  After the shower, apply a moisturizing cream to all of the skin  as above.        [address] [place] [address]/ [phone] FAX/ [email or web address]

## 2025-01-14 NOTE — PROGRESS NOTES
Subjective:      Patient ID:  Ade Silva is a 44 y.o. female who presents for   Chief Complaint   Patient presents with    Rash     Pt here for rash/ breakouts    Pt reports h/o PKD- currently on dialysis     Pt has had itchy red breakouts on trunk and lower legs for over 5 months.     Pt currently using antibacterial soap. Pt previously tried hydrocortisone and oatmeal soap but no relief.     Rash     Gets boils in body folds  Also generally diffusely itchy  H/o ESRD on HD    Review of Systems   Skin:  Positive for rash.       Objective:   Physical Exam   Constitutional: She appears well-developed and well-nourished.   Neurological: She is alert and oriented to person, place, and time.   Psychiatric: She has a normal mood and affect.   Skin:   Areas Examined (abnormalities noted in diagram):   Neck Inspection Performed  Abdomen Inspection Performed  Back Inspection Performed  RUE Inspected  LUE Inspection Performed  RLE Inspected  LLE Inspection Performed            Diagram Legend     Erythematous scaling macule/papule c/w actinic keratosis       Vascular papule c/w angioma      Pigmented verrucoid papule/plaque c/w seborrheic keratosis      Yellow umbilicated papule c/w sebaceous hyperplasia      Irregularly shaped tan macule c/w lentigo     1-2 mm smooth white papules consistent with Milia      Movable subcutaneous cyst with punctum c/w epidermal inclusion cyst      Subcutaneous movable cyst c/w pilar cyst      Firm pink to brown papule c/w dermatofibroma      Pedunculated fleshy papule(s) c/w skin tag(s)      Evenly pigmented macule c/w junctional nevus     Mildly variegated pigmented, slightly irregular-bordered macule c/w mildly atypical nevus      Flesh colored to evenly pigmented papule c/w intradermal nevus       Pink pearly papule/plaque c/w basal cell carcinoma      Erythematous hyperkeratotic cursted plaque c/w SCC      Surgical scar with no sign of skin cancer recurrence      Open and closed  comedones      Inflammatory papules and pustules      Verrucoid papule consistent consistent with wart     Erythematous eczematous patches and plaques     Dystrophic onycholytic nail with subungual debris c/w onychomycosis     Umbilicated papule    Erythematous-base heme-crusted tan verrucoid plaque consistent with inflamed seborrheic keratosis     Erythematous Silvery Scaling Plaque c/w Psoriasis     See annotation      Assessment / Plan:        Uremic pruritus  -     triamcinolone acetonide 0.1% (KENALOG) 0.1 % cream; Apply topically 2 (two) times daily. Use to affected areas for up to 2 weeks then take a 1 week break or decrease to 3 times weekly. Do not apply to groin or face. Use to itchy areas  Dispense: 454 g; Refill: 1  -     hydrOXYzine HCL (ATARAX) 10 MG Tab; Take 1 tablet (10 mg total) by mouth nightly as needed (itching).  Dispense: 30 tablet; Refill: 5    Counseled on dry skin care. Limit long hot showers  Cerave itch relief cream prn  Hydroxyzine qhs prn  TAC PRN    Discussed nbUVB if not controlled with above    Hidradenitis suppurativa  -     clindamycin (CLEOCIN T) 1 % lotion; Apply topically 2 (two) times daily. Use to areas prone to boils  Dispense: 60 mL; Refill: 6  - hibiclens wash qd    Mendieta I    Prurigo nodularis  -     triamcinolone acetonide 0.1% (KENALOG) 0.1 % cream; Apply topically 2 (two) times daily. Use to affected areas for up to 2 weeks then take a 1 week break or decrease to 3 times weekly. Do not apply to groin or face. Use to itchy areas  Dispense: 454 g; Refill: 1  -d/c picking

## 2025-01-15 ENCOUNTER — TELEPHONE (OUTPATIENT)
Dept: TRANSPLANT | Facility: CLINIC | Age: 45
End: 2025-01-15
Payer: COMMERCIAL

## 2025-01-16 ENCOUNTER — OFFICE VISIT (OUTPATIENT)
Dept: TRANSPLANT | Facility: CLINIC | Age: 45
End: 2025-01-16
Payer: COMMERCIAL

## 2025-01-16 ENCOUNTER — HOSPITAL ENCOUNTER (OUTPATIENT)
Dept: RADIOLOGY | Facility: HOSPITAL | Age: 45
Discharge: HOME | End: 2025-01-16
Attending: NURSE PRACTITIONER
Payer: COMMERCIAL

## 2025-01-16 ENCOUNTER — HOSPITAL ENCOUNTER (OUTPATIENT)
Dept: CARDIOLOGY | Facility: HOSPITAL | Age: 45
Discharge: HOME | End: 2025-01-16
Attending: NURSE PRACTITIONER
Payer: COMMERCIAL

## 2025-01-16 VITALS
OXYGEN SATURATION: 99 % | HEART RATE: 104 BPM | SYSTOLIC BLOOD PRESSURE: 117 MMHG | HEIGHT: 71 IN | TEMPERATURE: 97 F | WEIGHT: 226.88 LBS | RESPIRATION RATE: 20 BRPM | DIASTOLIC BLOOD PRESSURE: 65 MMHG | BODY MASS INDEX: 31.76 KG/M2

## 2025-01-16 VITALS
BODY MASS INDEX: 31.08 KG/M2 | HEART RATE: 78 BPM | HEIGHT: 71 IN | DIASTOLIC BLOOD PRESSURE: 68 MMHG | WEIGHT: 222 LBS | SYSTOLIC BLOOD PRESSURE: 132 MMHG

## 2025-01-16 DIAGNOSIS — Z99.2 HYPERPARATHYROIDISM DUE TO ESRD ON DIALYSIS: ICD-10-CM

## 2025-01-16 DIAGNOSIS — Q61.3 POLYCYSTIC KIDNEY DISEASE: Chronic | ICD-10-CM

## 2025-01-16 DIAGNOSIS — N25.81 HYPERPARATHYROIDISM DUE TO ESRD ON DIALYSIS: ICD-10-CM

## 2025-01-16 DIAGNOSIS — Z99.2 ESRD (END STAGE RENAL DISEASE) ON DIALYSIS: Primary | ICD-10-CM

## 2025-01-16 DIAGNOSIS — Z76.82 ORGAN TRANSPLANT CANDIDATE: ICD-10-CM

## 2025-01-16 DIAGNOSIS — D63.1 ANEMIA IN ESRD (END-STAGE RENAL DISEASE): ICD-10-CM

## 2025-01-16 DIAGNOSIS — N18.6 BENIGN HYPERTENSION WITH ESRD (END-STAGE RENAL DISEASE): ICD-10-CM

## 2025-01-16 DIAGNOSIS — N18.6 HYPERPARATHYROIDISM DUE TO ESRD ON DIALYSIS: ICD-10-CM

## 2025-01-16 DIAGNOSIS — I12.0 BENIGN HYPERTENSION WITH ESRD (END-STAGE RENAL DISEASE): ICD-10-CM

## 2025-01-16 DIAGNOSIS — N18.6 ANEMIA IN ESRD (END-STAGE RENAL DISEASE): ICD-10-CM

## 2025-01-16 DIAGNOSIS — N18.6 ESRD (END STAGE RENAL DISEASE) ON DIALYSIS: Primary | ICD-10-CM

## 2025-01-16 DIAGNOSIS — E66.811 CLASS 1 OBESITY DUE TO EXCESS CALORIES WITH SERIOUS COMORBIDITY AND BODY MASS INDEX (BMI) OF 30.0 TO 30.9 IN ADULT: ICD-10-CM

## 2025-01-16 DIAGNOSIS — Z98.890 STATUS POST COLPOSCOPY: ICD-10-CM

## 2025-01-16 DIAGNOSIS — E66.09 CLASS 1 OBESITY DUE TO EXCESS CALORIES WITH SERIOUS COMORBIDITY AND BODY MASS INDEX (BMI) OF 30.0 TO 30.9 IN ADULT: ICD-10-CM

## 2025-01-16 PROBLEM — R07.9 CHEST PAIN: Status: RESOLVED | Noted: 2023-04-28 | Resolved: 2025-01-16

## 2025-01-16 PROBLEM — Z71.85 IMMUNIZATION COUNSELING: Status: RESOLVED | Noted: 2023-10-12 | Resolved: 2025-01-16

## 2025-01-16 PROBLEM — Z01.818 PRE-TRANSPLANT EVALUATION FOR KIDNEY TRANSPLANT: Status: RESOLVED | Noted: 2023-10-12 | Resolved: 2025-01-16

## 2025-01-16 LAB
ASCENDING AORTA: 3.61 CM
AV AREA BY CONTINUOUS VTI: 4 CM2
AV INDEX (PROSTH): 0.94
AV LVOT MEAN GRADIENT: 2 MMHG
AV LVOT PEAK GRADIENT: 4 MMHG
AV MEAN GRADIENT: 3 MMHG
AV PEAK GRADIENT: 5 MMHG
AV VALVE AREA BY VELOCITY RATIO: 3.8 CM²
AV VALVE AREA: 3.9 CM2
AV VELOCITY RATIO: 0.91
BSA FOR ECHO PROCEDURE: 2.25 M2
CV ECHO LV RWT: 0.32 CM
DOP CALC AO PEAK VEL: 1.1 M/S
DOP CALC AO VTI: 21.4 CM
DOP CALC LVOT AREA: 4.2 CM2
DOP CALC LVOT DIAMETER: 2.3 CM
DOP CALC LVOT PEAK VEL: 1 M/S
DOP CALC LVOT STROKE VOLUME: 83.9 CM3
DOP CALCLVOT PEAK VEL VTI: 20.2 CM
E WAVE DECELERATION TIME: 173 MS
E/A RATIO: 1.36
E/E' RATIO: 6 M/S
ECHO EF ESTIMATED: 63 %
ECHO LV POSTERIOR WALL: 0.7 CM (ref 0.6–1.1)
FRACTIONAL SHORTENING: 34.1 % (ref 28–44)
GLOBAL LONGITUIDAL STRAIN: 14 %
INTERVENTRICULAR SEPTUM: 0.9 CM (ref 0.6–1.1)
IVC DIAMETER: 1.67 CM
IVRT: 83 MS
LA MAJOR: 4.6 CM
LA MINOR: 4.6 CM
LA WIDTH: 3.7 CM
LEFT ATRIUM SIZE: 3.2 CM
LEFT ATRIUM VOLUME INDEX MOD: 23 ML/M2
LEFT ATRIUM VOLUME INDEX: 21 ML/M2
LEFT ATRIUM VOLUME MOD: 50 ML
LEFT ATRIUM VOLUME: 46 CM3
LEFT INTERNAL DIMENSION IN SYSTOLE: 2.9 CM (ref 2.1–4)
LEFT VENTRICLE DIASTOLIC VOLUME INDEX: 39.55 ML/M2
LEFT VENTRICLE DIASTOLIC VOLUME: 87 ML
LEFT VENTRICLE MASS INDEX: 49.7 G/M2
LEFT VENTRICLE SYSTOLIC VOLUME INDEX: 14.8 ML/M2
LEFT VENTRICLE SYSTOLIC VOLUME: 32.57 ML
LEFT VENTRICULAR INTERNAL DIMENSION IN DIASTOLE: 4.4 CM (ref 3.5–6)
LEFT VENTRICULAR MASS: 109.4 G
LV LATERAL E/E' RATIO: 5.8
LV SEPTAL E/E' RATIO: 7.1
MV A" WAVE DURATION": 176.97 MS
MV PEAK A VEL: 0.47 M/S
MV PEAK E VEL: 0.64 M/S
OHS CV RV/LV RATIO: 0.7 CM
OHS LV EJECTION FRACTION SIMPSONS BIPLANE MOD: 55 %
PISA TR MAX VEL: 2.1 M/S
PULM VEIN A" WAVE DURATION": 176.97 MS
PULM VEIN S/D RATIO: 1.1
PULMONIC VEIN PEAK A VELOCITY: 0.6 M/S
PV PEAK D VEL: 0.49 M/S
PV PEAK S VEL: 0.54 M/S
RA MAJOR: 4.37 CM
RA PRESSURE ESTIMATED: 3 MMHG
RA WIDTH: 3.11 CM
RIGHT ATRIAL AREA: 14.3 CM2
RIGHT VENTRICLE DIASTOLIC BASEL DIMENSION: 3.1 CM
RV TB RVSP: 5 MMHG
RV TISSUE DOPPLER FREE WALL SYSTOLIC VELOCITY 1 (APICAL 4 CHAMBER VIEW): 12.83 CM/S
SINUS: 4.15 CM
STJ: 3.32 CM
TDI LATERAL: 0.11 M/S
TDI SEPTAL: 0.09 M/S
TDI: 0.1 M/S
TR MAX PG: 18 MMHG
TRICUSPID ANNULAR PLANE SYSTOLIC EXCURSION: 2.11 CM
TV PEAK GRADIENT: 17 MMHG
TV REST PULMONARY ARTERY PRESSURE: 21 MMHG
Z-SCORE OF LEFT VENTRICULAR DIMENSION IN END DIASTOLE: -5.35
Z-SCORE OF LEFT VENTRICULAR DIMENSION IN END SYSTOLE: -3.59

## 2025-01-16 PROCEDURE — 1159F MED LIST DOCD IN RCRD: CPT | Mod: CPTII,S$GLB,TXP, | Performed by: NURSE PRACTITIONER

## 2025-01-16 PROCEDURE — 93306 TTE W/DOPPLER COMPLETE: CPT | Mod: 26,TXP,, | Performed by: INTERNAL MEDICINE

## 2025-01-16 PROCEDURE — 3008F BODY MASS INDEX DOCD: CPT | Mod: CPTII,S$GLB,TXP, | Performed by: NURSE PRACTITIONER

## 2025-01-16 PROCEDURE — 99215 OFFICE O/P EST HI 40 MIN: CPT | Mod: S$GLB,TXP,, | Performed by: NURSE PRACTITIONER

## 2025-01-16 PROCEDURE — 71046 X-RAY EXAM CHEST 2 VIEWS: CPT | Mod: 26,TXP,, | Performed by: RADIOLOGY

## 2025-01-16 PROCEDURE — 99999 PR PBB SHADOW E&M-EST. PATIENT-LVL V: CPT | Mod: PBBFAC,TXP,, | Performed by: NURSE PRACTITIONER

## 2025-01-16 PROCEDURE — 3066F NEPHROPATHY DOC TX: CPT | Mod: CPTII,S$GLB,TXP, | Performed by: NURSE PRACTITIONER

## 2025-01-16 PROCEDURE — 71046 X-RAY EXAM CHEST 2 VIEWS: CPT | Mod: TC,TXP

## 2025-01-16 PROCEDURE — 93306 TTE W/DOPPLER COMPLETE: CPT | Mod: TXP

## 2025-01-16 PROCEDURE — 3074F SYST BP LT 130 MM HG: CPT | Mod: CPTII,S$GLB,TXP, | Performed by: NURSE PRACTITIONER

## 2025-01-16 PROCEDURE — 93356 MYOCRD STRAIN IMG SPCKL TRCK: CPT | Mod: TXP,,, | Performed by: INTERNAL MEDICINE

## 2025-01-16 PROCEDURE — 3078F DIAST BP <80 MM HG: CPT | Mod: CPTII,S$GLB,TXP, | Performed by: NURSE PRACTITIONER

## 2025-01-16 NOTE — PROGRESS NOTES
Kidney Transplant Recipient Reevalulation    Referring Physician: Priya Grimm  Current Nephrologist: Priya Grimm  Waitlist Status: active  Dialysis Start Date: 8/16/2023    Subjective:     CC:  Annual reassessment of kidney transplant candidacy.    HPI:  Ms. Silva is a 44 y.o. year old White female with ESRD secondary to polycystic kidney disease.  She has been on the wait list for a kidney transplant at [facility] since 8/16/2023. Patient is currently on hemodialysis started on 5/8/23. Patient is dialyzing on MWF schedule.  Patient reports that she is tolerating dialysis well.. She has a RUE AV fistula. --Pr reports having vascular access/ nerve problems to the right arm. Recently had a nerve conduction  study and a nerve block to the arm . She feels like the pain is bearable now and finds it  easier to tolerate dialysis sessions     Hx: brain aneurysm coiled in 2016.     Recent hospitalizations or ED visits.  9/19/24 Left Robotic Nephrectomy with Dr. Maldonado   Has healed and fells better since getting kidney removed     Lov 10/12/23  Fx assessment:  moved back to [place] from [place] to be closer to her mother, was previously following with nephrology up there.   UNCHANGED: She remains active, no CP or SOB with exertion. Looks good, not frail.     Lab /diagnostic results /TXP [facility] reviewed    1/16/25 TTE: EF 55-60%, PA 21 mmhg  1/16/25 CXR: (-)    10/12/23 Iliacs: iliac arteries and veins are patent and symmetric bilaterally    11/28/23 MMG (-)  10/24/23 Lexiscan: Normal myocardial perfusion scan. no evidence of myocardial ischemia or infarction.   12/7/2023: pap smear: ASC-H, + HR HPV 16  3/7/2024: Pelvic US: 8 cm uterus, ES 8.6 mm, normal ovaries, no FF. 0.9 cm hypoechoic nodule within the endometrial cavity which appears vascular. Finding is suggestive of an endometrial polyp.   3/12/2024: colposcopy: TAYLOR 2-3 endometrial biopsy: no atypical hyperplasia or malignancy identified  4/9/2024: vulvar biopsy:  "condyloma acuminatum   7/25/2024: hysteroscopy D&C: complex hyperplasia without atypia arising in a polyp. Endometrial ablation  LEEP/ECC: TAYLOR 2-3, High-grade dysplasia involves an inked and cauterized edge of this LEEP cone biopsy   warty rectal lesion : AIN 3  9/19/2024: robotic left nephrectomy for kidney mass. Negative for malignancy.     12/10/24: EUA biopsies joint case with Dr. Mojica; Cervix Biopsy=TAYLOR 2    GYN /onc 1/9/25: Discussed her biopsy for EUA shows Persistent TAYLOR 2 after the previous LEEP with positive margins, we discussed re excision now vs continued observation. I would favor re excision given her immunocompromise and thus elevated risk for progression to cancer.  She elects for re-excision. She would also like her bartholin gland cyst resected at time of surgery.   Plan for CKC and bartholin gland cyst excision with marsupialization.  --Procedure scheduled 2/9/25    Past Medical History:   Diagnosis Date    Anxiety     Asthma     Autosomal dominant polycystic kidney disease     Brain aneurysm     Depression     Encounter for blood transfusion     GERD (gastroesophageal reflux disease)     Hypertension     Seizures     as a child (age 7-8); " stress -related"    TIA (transient ischemic attack)        Review of Systems   Constitutional:  Positive for fatigue. Negative for activity change, appetite change, chills, fever and unexpected weight change.   HENT:  Negative for congestion, facial swelling, postnasal drip, rhinorrhea, sinus pressure, sore throat and trouble swallowing.    Eyes:  Negative for pain, redness and visual disturbance.   Respiratory:  Negative for cough, chest tightness, shortness of breath and wheezing.    Cardiovascular: Negative.  Negative for chest pain, palpitations and leg swelling.   Gastrointestinal:  Positive for abdominal distention. Negative for abdominal pain, diarrhea, nausea and vomiting.        GERD   Genitourinary:  Positive for decreased urine volume. Negative " "for dysuria, flank pain and urgency.   Musculoskeletal:  Positive for arthralgias and back pain. Negative for gait problem, neck pain and neck stiffness.            Skin:  Negative for rash.   Allergic/Immunologic: Negative for environmental allergies, food allergies and immunocompromised state.   Neurological:  Negative for dizziness, weakness, light-headedness and headaches.   Psychiatric/Behavioral:  Negative for agitation and confusion. The patient is not nervous/anxious.        Objective:   body mass index is 31.64 kg/m².  /65 (BP Location: Left arm, Patient Position: Sitting)   Pulse 104   Temp 97.3 °F (36.3 °C) (Temporal)   Resp 20   Ht 5' 11" (1.803 m)   Wt 102.9 kg (226 lb 13.7 oz)   LMP 07/08/2024 (Approximate) Comment: no periods  SpO2 99%   BMI 31.64 kg/m²     Physical Exam  Constitutional:       Appearance: Normal appearance. She is well-developed.   HENT:      Head: Normocephalic.      Nose: Nose normal.      Mouth/Throat:      Dentition: Abnormal dentition.   Eyes:      Conjunctiva/sclera: Conjunctivae normal.      Pupils: Pupils are equal, round, and reactive to light.   Cardiovascular:      Rate and Rhythm: Normal rate and regular rhythm.      Heart sounds: Normal heart sounds.   Pulmonary:      Effort: Pulmonary effort is normal.      Breath sounds: Normal breath sounds.   Abdominal:      General: Bowel sounds are normal.      Palpations: Abdomen is soft.   Musculoskeletal:      Cervical back: Normal range of motion and neck supple.      Comments: RUE AV fistula + thrill     Skin:     General: Skin is warm and dry.   Neurological:      Mental Status: She is alert and oriented to person, place, and time.   Psychiatric:         Behavior: Behavior normal.         Labs:  Lab Results   Component Value Date    WBC 8.60 01/03/2025    HGB 13.8 01/03/2025    HCT 41.4 01/03/2025     01/03/2025    K 4.0 01/08/2025     01/03/2025    CO2 20 (L) 09/20/2024    BUN 73 (H) 09/20/2024    " "CREATININE 12.45 (H) 01/03/2025    EGFRNORACEVR 3.2 (A) 09/20/2024    CALCIUM 9.8 01/03/2025    PHOS 5.0 (H) 01/03/2025    MG 2.4 09/20/2024    ALBUMIN 4.5 01/03/2025    AST 8 (L) 07/25/2024    ALT 11 01/03/2025     (H) 01/03/2025       Lab Results   Component Value Date    BILIRUBINUA Negative 06/16/2023    LIPASE 107 (H) 04/28/2023    PROTEINUA 2+ (A) 04/28/2023    NITRITE Negative 04/28/2023    RBCUA >100 (H) 04/28/2023    WBCUA 0 04/28/2023       No results found for: "HLAABCTYPE"    Lab Results   Component Value Date    CPRA 0 11/06/2024    KS8PLLG A74 09/04/2024    CIABCLM WEAK: A74 11/06/2024    CIIAB Negative 11/06/2024    ABCMT INCONCLUSIVE 09/04/2024       Labs were reviewed with the patient.    Pre-transplant Workup:   Reviewed with the patient.    Assessment:     1. ESRD (end stage renal disease) on dialysis    2. Polycystic kidney disease    3. Benign hypertension with ESRD (end-stage renal disease)    4. Anemia in ESRD (end-stage renal disease)    5. Hyperparathyroidism due to ESRD on dialysis    6. S/P EUA/Colposcopy/Cervcal Biopsy, ECC, EMBx - 12/10/24    7. Class 1 obesity due to excess calories with serious comorbidity and body mass index (BMI) of 30.0 to 30.9 in adult        Plan:   GYN/ONC 1/9/25  Discussed her biopsy for EUA shows Persistent TAYLOR 2 after the previous LEEP with positive margins, we discussed re excision now vs continued observation. I would favor re excision given her immunocompromise and thus elevated risk for progression to cancer.  She elects for re-excision. She would also like her bartholin gland cyst resected at time of surgery.   Plan for CKC and bartholin gland cyst excision with marsupialization.  -Procedure scheduled 2/9/25    Transplant Candidacy:   Ms. Silva is a suitable kidney transplant candidate.  Meets center eligibility for accepting HCV+ donor offer - Yes.  Patient educated on HCV+ donors. Ade is willing  to accept HCV+ donor offer -  Yes   Patient " is a candidate for KDPI > 85 kidney donor offer - No d/t weight  She remains in overall stable health, and will remain active on the transplant list.    Patient advised that it is recommended that all transplant candidates, and their close contacts and household members receive Covid vaccination.    Flores Nolen NP       Follow-up:   In addition to the tests noted in the plan, Ms. Silva will continue to have reevaluation as per the standing pre-kidney transplant protocol:  Monthly blood for PRA  Annual return to clinic, except HIV positive, > 65 years of age, or pancreas transplant candidates who will be scheduled to see transplant every 6 months while in pre-transplant phase  Annual re-testing: CXR, EKG, yearly mammograms for women over 40 and PSA for males over 40, cardiology follow-up as recommended by initial cardiology pre-transplant evaluation  Renal ultrasound every 2 years  Baseline colonoscopy after age 50 and repeated as recommended    UNOS Patient Status  Functional Status: 60% - Requires occasional assistance but is able to care for needs  Physical Capacity: No Limitations

## 2025-01-16 NOTE — LETTER
January 22, 2025        Priya Grimm  [address] [facility] [address]  Phone: [phone]  Fax: [phone]             [facility]- [address]  [address]  [place] [address]  Phone: [phone]   Patient: Ade Silva   MR Number: [id number]   YOB: 1980   Date of Visit: 1/16/2025       Dear Dr. Priya Grimm    Thank you for referring Ade Silva to me for evaluation. Attached you will find relevant portions of my assessment and plan of care.    If you have questions, please do not hesitate to call me. I look forward to following Ade Silva along with you.    Sincerely,    Flores Nolen, NP    Enclosure    If you would like to receive this communication electronically, please contact [email or web address] or [phone] to request [employer] Link access.    [employer] Link is a tool which provides read-only access to select patient information with whom you have a relationship. Its easy to use and provides real time access to review your patients record including encounter summaries, notes, results, and demographic information.    If you feel you have received this communication in error or would no longer like to receive these types of communications, please e-mail [email or web address]    to be kept informed

## 2025-01-17 ENCOUNTER — PATIENT MESSAGE (OUTPATIENT)
Dept: GYNECOLOGIC ONCOLOGY | Facility: CLINIC | Age: 45
End: 2025-01-17
Payer: COMMERCIAL

## 2025-01-17 NOTE — PROGRESS NOTES
WAITLIST  PATIENT EDUCATION NOTE    Ms. Ade Silva was seen in pre-kidney transplant clinic for evaluation for kidney, kidney/pancreas or pancreas only transplant.  The patient attended a group education session that discussed/reviewed the following aspects of transplantation: evaluation and selection committee process, UNOS waitlist management/multiple listings, types of organs offered (KDPI < 85%, KDPI > 85%, PHS risk, DCD, HCV+, HIV+ for HIV+ recipients and enbloc/dual), financial aspects, surgical procedures, dietary instruction pre- and post-transplant, health maintenance pre- and post-transplant, post-transplant hospitalization and outpatient follow-up, potential to participate in a research protocol, and medication management and side effects.  A question and answer session was provided after the presentation.    The patient was seen by all members of the multi-disciplinary team to include: Nephrologist/PA, Surgeon, , Transplant Coordinator, , Pharmacist and Dietician (if applicable).    The patient reviewed and signed all consents for evaluation which were witnessed and sent to scanning into the EPIC chart.    The patient was given an education book and plan for further evaluation based on her individual assessment.      Reviewed program requirement for complete COVID vaccination with documentation prior to listing. COVID education information reviewed with patient. Patient encouraged to be up to date on all vaccinations.     The patient was encouraged to call with any questions or concerns.

## 2025-01-24 ENCOUNTER — ANESTHESIA EVENT (OUTPATIENT)
Dept: SURGERY | Facility: OTHER | Age: 45
End: 2025-01-24
Payer: COMMERCIAL

## 2025-01-30 ENCOUNTER — TELEPHONE (OUTPATIENT)
Dept: NEUROSURGERY | Facility: CLINIC | Age: 45
End: 2025-01-30
Payer: COMMERCIAL

## 2025-01-30 ENCOUNTER — HOSPITAL ENCOUNTER (OUTPATIENT)
Dept: PREADMISSION TESTING | Facility: OTHER | Age: 45
Discharge: HOME | End: 2025-01-30
Attending: OBSTETRICS & GYNECOLOGY
Payer: COMMERCIAL

## 2025-01-30 VITALS
DIASTOLIC BLOOD PRESSURE: 69 MMHG | BODY MASS INDEX: 31.48 KG/M2 | HEART RATE: 99 BPM | RESPIRATION RATE: 18 BRPM | HEIGHT: 71 IN | SYSTOLIC BLOOD PRESSURE: 121 MMHG | TEMPERATURE: 99 F | OXYGEN SATURATION: 97 % | WEIGHT: 224.88 LBS

## 2025-01-30 DIAGNOSIS — Z01.818 PREOP TESTING: Primary | ICD-10-CM

## 2025-01-30 NOTE — ANESTHESIA PREPROCEDURE EVALUATION
01/30/2025  Ade Silva is a 44 y.o., female.      Pre-op Assessment    I have reviewed the Patient Summary Reports.     I have reviewed the Nursing Notes. I have reviewed the NPO Status.   I have reviewed the Medications.     Review of Systems  Anesthesia Hx:  No problems with previous Anesthesia             Denies Family Hx of Anesthesia complications.    Denies Personal Hx of Anesthesia complications.                    Social:  Non-Smoker       Cardiovascular:     Hypertension                    ·  Left Ventricle: The left ventricle is normal in size. Normal wall thickness. There is normal systolic function with a visually estimated ejection fraction of 55 - 60%. Quantitated ejection fraction is 55%. Global longitudinal strain is -14.0%. There is normal diastolic function.  ·  Right Ventricle: Normal right ventricular cavity size. Wall thickness is normal. Systolic function is normal.  ·  Tricuspid Valve: There is mild regurgitation.  ·  Pulmonary Artery: The estimated pulmonary artery systolic pressure is 21 mmHg.  ·  IVC/SVC: Normal venous pressure at 3 mmHg.                                Pulmonary:    Asthma                    Renal/:  Chronic Renal Disease, ESRD, Dialysis   Robotic nephrectomy- last summer             Hepatic/GI:     GERD                Neurological:  TIA,     Seizures    Basilar artery aneurysm coiled 2016                            Psych:  Psychiatric History                Physical Exam  General: Well nourished and Alert    Airway:  Mallampati: II   Mouth Opening: Normal  Tongue: Normal    Dental:  Periodontal disease  Very poor dentition      Anesthesia Plan  Type of Anesthesia, risks & benefits discussed:    Anesthesia Type: Gen ETT  Intra-op Monitoring Plan: Standard ASA Monitors  Post Op Pain Control Plan: multimodal analgesia  Induction:  IV  Airway Plan:  Video  Informed Consent: Informed consent signed with the Patient and all parties understand the risks and agree with anesthesia plan.  All questions answered.   ASA Score: 3  Day of Surgery Review of History & Physical: H&P Update referred to the surgeon/provider.  Anesthesia Plan Notes: Dialysis MWF  Labs DOS  Pt aware of poor dentition and doesn't mind if teeth knocked out    Ready For Surgery From Anesthesia Perspective.     .

## 2025-01-30 NOTE — DISCHARGE INSTRUCTIONS
Information to Prepare you for your Surgery    PRE-ADMIT TESTING -  [phone]   -Kristal  [address] [place] [facility] [address]  [facility]      Your surgery has been scheduled at [facility]. We are pleased to have the opportunity to serve you. For Further Information please call [phone].    On the day of surgery please report to the Information Desk on the 1st floor.    CONTACT YOUR PHYSICIAN'S OFFICE THE DAY PRIOR TO YOUR SURGERY TO OBTAIN YOUR ARRIVAL TIME.     The evening before surgery do not eat anything after 9 p.m. ( this includes hard candy, chewing gum and mints).  You may only have GATORADE, POWERADE AND WATER  from 9 p.m. until you leave your home.    DO NOT DRINK ANY LIQUIDS ON THE WAY TO THE HOSPITAL.      Why does your anesthesiologist allow you to drink Gatorade/Powerade before surgery?  Gatorade/Powerade helps to increase your comfort before surgery and to decrease your nausea after surgery. The carbohydrates in Gatorade/Powerade help reduce your body's stress response to surgery.      Outpatient Surgery- May allow 2 adult (18 and older) Support Persons (1 being the designated ) for all surgical/procedural patients. A breastfeeding mother will be allowed her infant and 2 adult Support Persons. No one under the age of 18 will be allowed in the building. No swapping out of visitors in the [facility].      SPECIAL MEDICATION INSTRUCTIONS: TAKE medications checked off by the Anesthesiologist on your Medication List.        Surgery Patients:    If you take ASPIRIN - Your PHYSICIAN/SURGEON will need to inform you IF/OR when you need to stop taking aspirin prior to your surgery.     The week prior to surgery do not ot take any medications containing IBUPROFEN or NSAIDS ( Advil, Motrin, Goodys, BC, Aleve, Naproxen,  Ketorolac, Meloxicam, Mobic, Toradol,etc) If you are not sure if you should take a  medicine please call your surgeon's office.  Ok to take Tylenol    Do Not Wear any make-up (especially eye make-up) to surgery. Please remove any false eyelashes or eyelash extensions. If you arrive the day of surgery with makeup/eyelashes on you will be required to remove prior to surgery. (There is a risk of corneal abrasions if eye makeup/eyelash extensions are not removed)      Leave all valuables at home.   Do Not wear any jewelry or watches, including any metal in body piercings. Jewelry must be removed prior to coming to the hospital.  There is a possibility that rings that are unable to be removed may be cut off if they are on the surgical extremity.    Please remove all hair extensions, wigs, clips and any other metal accessories/ ornaments from your hair.  These items may pose a flammable/fire risk in Surgery and must be removed.    Do not shave your surgical area at least 5 days prior to your surgery. The surgical prep will be performed at the hospital according to Infection Control regulations.    Contact Lens must be removed before surgery. Either do not wear the contact lens or bring a case and solution for storage.  Please bring a container for eyeglasses or dentures as required.  Bring any paperwork your physician has provided, such as consent forms,  history and physicals, doctor's orders, etc.   Bring comfortable clothes that are loose fitting to wear upon discharge. Take into consideration the type of surgery being performed.  Maintain your diet as advised per your physician the day prior to surgery.      Adequate rest the night before surgery is advised.   Park in the Parking lot behind the hospital or in the [facility] across the street from the parking lot. Parking is complimentary.  If you will be discharged the same day as your procedure, please arrange for a responsible adult to drive you home or to accompany you if traveling by taxi.   YOU WILL NOT BE PERMITTED TO DRIVE OR TO  LEAVE THE HOSPITAL ALONE AFTER SURGERY.   If you are being discharged the same day, it is strongly recommended that you arrange for someone to remain with you for the first 24 hrs following your surgery.    The Surgeon will speak to your family/visitor after your surgery regarding the outcome of your surgery and post op care.  The Surgeon may speak to you after your surgery, but there is a possibility you may not remember the details.  Please check with your family members regarding the conversation with the Surgeon.    We strongly recommend whoever is bringing you home be present for discharge instructions.  This will ensure a thorough understanding for your post op home care.    If the patient has fever, cough, or signs/symptoms of Flu or Covid please do not come in for your surgery. Contact your surgeon and your primary care physician for further instructions.           Thank you for your cooperation.  The Staff of [facility].            Bathing Instructions with Hibiclens or Dial Soap    Shower the evening before and morning of your procedure with Chlorhexidine (Hibiclens)  do not use Chlorhexidine on your face or genitals. Do not get in your eyes.  Wash your face with water and your regular face wash/soap  Use your regular shampoo  Apply Chlorhexidine (Hibiclens) directly on your skin or on a wet washcloth and wash gently. When showering: Move away from the shower stream when applying Chlorhexidine (Hibiclens) to avoid rinsing off too soon.  Rinse thoroughly with warm water  Do not dilute Chlorhexidine (Hibiclens)   Dry off as usual, do not use any deodorant, powder, body lotions, perfume, after shave or cologne.     Thanks ,   Kristal

## 2025-01-30 NOTE — TELEPHONE ENCOUNTER
Called and spoke with pt. Pt accepted 02/27/25 for angiogram. Instructed will call back with time. Pt [email or web address].

## 2025-02-03 ENCOUNTER — CLINICAL SUPPORT (OUTPATIENT)
Dept: SMOKING CESSATION | Facility: CLINIC | Age: 45
End: 2025-02-03

## 2025-02-03 DIAGNOSIS — F17.200 NICOTINE DEPENDENCE: Primary | ICD-10-CM

## 2025-02-03 PROCEDURE — 99999 PR PBB SHADOW E&M-EST. PATIENT-LVL I: CPT | Mod: PBBFAC,TXP,,

## 2025-02-03 PROCEDURE — 99406 BEHAV CHNG SMOKING 3-10 MIN: CPT | Mod: TXP,,,

## 2025-02-03 NOTE — PROGRESS NOTES
Spoke with patient today in regard to smoking cessation progress for 6 month phone follow up on Quit 1.  Patient not tobacco free at this time but stated that she is ready to continue working on her Quit. Commended patient on their progress thus far.  Patient currently enrolled in program for Quit 1 and has scheduled an appointment with her CTTS.  Informed patient of benefit period, future follow ups, and contact information if any further help or support is needed.  Will complete smart form for 6 month follow up on Quit attempt # 1.

## 2025-02-04 ENCOUNTER — TELEPHONE (OUTPATIENT)
Dept: GYNECOLOGIC ONCOLOGY | Facility: CLINIC | Age: 45
End: 2025-02-04
Payer: COMMERCIAL

## 2025-02-06 ENCOUNTER — ANESTHESIA (OUTPATIENT)
Dept: SURGERY | Facility: OTHER | Age: 45
End: 2025-02-06
Payer: COMMERCIAL

## 2025-02-06 ENCOUNTER — HOSPITAL ENCOUNTER (OUTPATIENT)
Facility: OTHER | Age: 45
Discharge: HOME | End: 2025-02-06
Attending: OBSTETRICS & GYNECOLOGY | Admitting: OBSTETRICS & GYNECOLOGY
Payer: COMMERCIAL

## 2025-02-06 VITALS
DIASTOLIC BLOOD PRESSURE: 54 MMHG | TEMPERATURE: 98 F | HEART RATE: 75 BPM | RESPIRATION RATE: 15 BRPM | OXYGEN SATURATION: 95 % | SYSTOLIC BLOOD PRESSURE: 96 MMHG

## 2025-02-06 DIAGNOSIS — R31.0 GROSS HEMATURIA: ICD-10-CM

## 2025-02-06 DIAGNOSIS — M89.9 CHRONIC KIDNEY DISEASE-MINERAL AND BONE DISORDER: ICD-10-CM

## 2025-02-06 DIAGNOSIS — R31.9 HEMATURIA, UNSPECIFIED TYPE: ICD-10-CM

## 2025-02-06 DIAGNOSIS — D75.89 MACROCYTOSIS WITHOUT ANEMIA: ICD-10-CM

## 2025-02-06 DIAGNOSIS — N18.6 HYPERPARATHYROIDISM DUE TO ESRD ON DIALYSIS: ICD-10-CM

## 2025-02-06 DIAGNOSIS — R21 PAPULAR RASH: ICD-10-CM

## 2025-02-06 DIAGNOSIS — Z98.890 STATUS POST COLPOSCOPY: ICD-10-CM

## 2025-02-06 DIAGNOSIS — E83.9 CHRONIC KIDNEY DISEASE-MINERAL AND BONE DISORDER: ICD-10-CM

## 2025-02-06 DIAGNOSIS — E66.811 CLASS 1 OBESITY DUE TO EXCESS CALORIES WITH SERIOUS COMORBIDITY AND BODY MASS INDEX (BMI) OF 30.0 TO 30.9 IN ADULT: ICD-10-CM

## 2025-02-06 DIAGNOSIS — F41.9 ANXIETY AND DEPRESSION: ICD-10-CM

## 2025-02-06 DIAGNOSIS — N18.9 CHRONIC KIDNEY DISEASE-MINERAL AND BONE DISORDER: ICD-10-CM

## 2025-02-06 DIAGNOSIS — Z99.2 ESRD (END STAGE RENAL DISEASE) ON DIALYSIS: ICD-10-CM

## 2025-02-06 DIAGNOSIS — D64.9 ANEMIA, UNSPECIFIED TYPE: ICD-10-CM

## 2025-02-06 DIAGNOSIS — F32.A ANXIETY AND DEPRESSION: ICD-10-CM

## 2025-02-06 DIAGNOSIS — N87.1 DYSPLASIA OF CERVIX, HIGH GRADE CIN 2: ICD-10-CM

## 2025-02-06 DIAGNOSIS — N18.6 ESRD (END STAGE RENAL DISEASE) ON DIALYSIS: ICD-10-CM

## 2025-02-06 DIAGNOSIS — Q61.3 POLYCYSTIC KIDNEY DISEASE: Chronic | ICD-10-CM

## 2025-02-06 DIAGNOSIS — N18.6 BENIGN HYPERTENSION WITH ESRD (END-STAGE RENAL DISEASE): ICD-10-CM

## 2025-02-06 DIAGNOSIS — N25.81 HYPERPARATHYROIDISM DUE TO ESRD ON DIALYSIS: ICD-10-CM

## 2025-02-06 DIAGNOSIS — N28.89 RENAL MASS: ICD-10-CM

## 2025-02-06 DIAGNOSIS — K21.9 GERD (GASTROESOPHAGEAL REFLUX DISEASE): ICD-10-CM

## 2025-02-06 DIAGNOSIS — R06.83 SNORING: ICD-10-CM

## 2025-02-06 DIAGNOSIS — Q61.2 AUTOSOMAL DOMINANT POLYCYSTIC KIDNEY DISEASE: ICD-10-CM

## 2025-02-06 DIAGNOSIS — K21.9 GASTROESOPHAGEAL REFLUX DISEASE, UNSPECIFIED WHETHER ESOPHAGITIS PRESENT: ICD-10-CM

## 2025-02-06 DIAGNOSIS — J45.20 MILD INTERMITTENT ASTHMA WITHOUT COMPLICATION: ICD-10-CM

## 2025-02-06 DIAGNOSIS — I12.0 BENIGN HYPERTENSION WITH ESRD (END-STAGE RENAL DISEASE): ICD-10-CM

## 2025-02-06 DIAGNOSIS — E66.09 CLASS 1 OBESITY DUE TO EXCESS CALORIES WITH SERIOUS COMORBIDITY AND BODY MASS INDEX (BMI) OF 30.0 TO 30.9 IN ADULT: ICD-10-CM

## 2025-02-06 DIAGNOSIS — Z98.890 STATUS POST HYSTEROSCOPY: ICD-10-CM

## 2025-02-06 DIAGNOSIS — I72.5 BASILAR ARTERY ANEURYSM: ICD-10-CM

## 2025-02-06 DIAGNOSIS — Z98.890 S/P ARTERIOVENOUS (AV) FISTULA CREATION: ICD-10-CM

## 2025-02-06 DIAGNOSIS — Z01.818 PREOP TESTING: ICD-10-CM

## 2025-02-06 DIAGNOSIS — Z99.2 HYPERPARATHYROIDISM DUE TO ESRD ON DIALYSIS: ICD-10-CM

## 2025-02-06 DIAGNOSIS — Z98.890 STATUS POST CONE BIOPSY OF CERVIX: Primary | ICD-10-CM

## 2025-02-06 LAB
ABO + RH BLD: NORMAL
ANION GAP SERPL CALC-SCNC: 12 MMOL/L (ref 8–16)
BASOPHILS # BLD AUTO: 0.03 K/UL (ref 0–0.2)
BASOPHILS NFR BLD: 0.3 % (ref 0–1.9)
BLD GP AB SCN CELLS X3 SERPL QL: NORMAL
BUN SERPL-MCNC: 39 MG/DL (ref 6–20)
CALCIUM SERPL-MCNC: 10.6 MG/DL (ref 8.7–10.5)
CHLORIDE SERPL-SCNC: 99 MMOL/L (ref 95–110)
CO2 SERPL-SCNC: 28 MMOL/L (ref 23–29)
CREAT SERPL-MCNC: 8.9 MG/DL (ref 0.5–1.4)
DIFFERENTIAL METHOD BLD: ABNORMAL
EOSINOPHIL # BLD AUTO: 0.1 K/UL (ref 0–0.5)
EOSINOPHIL NFR BLD: 0.9 % (ref 0–8)
ERYTHROCYTE [DISTWIDTH] IN BLOOD BY AUTOMATED COUNT: 14.3 % (ref 11.5–14.5)
EST. GFR  (NO RACE VARIABLE): 5 ML/MIN/1.73 M^2
GLUCOSE SERPL-MCNC: 84 MG/DL (ref 70–110)
HCG INTACT+B SERPL-ACNC: 1.4 MIU/ML
HCT VFR BLD AUTO: 36.7 % (ref 37–48.5)
HGB BLD-MCNC: 11.6 G/DL (ref 12–16)
IMM GRANULOCYTES # BLD AUTO: 0.07 K/UL (ref 0–0.04)
IMM GRANULOCYTES NFR BLD AUTO: 0.7 % (ref 0–0.5)
LYMPHOCYTES # BLD AUTO: 1.5 K/UL (ref 1–4.8)
LYMPHOCYTES NFR BLD: 15.8 % (ref 18–48)
MCH RBC QN AUTO: 34.5 PG (ref 27–31)
MCHC RBC AUTO-ENTMCNC: 31.6 G/DL (ref 32–36)
MCV RBC AUTO: 109 FL (ref 82–98)
MONOCYTES # BLD AUTO: 0.5 K/UL (ref 0.3–1)
MONOCYTES NFR BLD: 5.6 % (ref 4–15)
NEUTROPHILS # BLD AUTO: 7.4 K/UL (ref 1.8–7.7)
NEUTROPHILS NFR BLD: 76.7 % (ref 38–73)
NRBC BLD-RTO: 0 /100 WBC
PLATELET # BLD AUTO: 154 K/UL (ref 150–450)
PMV BLD AUTO: 9.8 FL (ref 9.2–12.9)
POTASSIUM SERPL-SCNC: 5.4 MMOL/L (ref 3.5–5.1)
RBC # BLD AUTO: 3.36 M/UL (ref 4–5.4)
SODIUM SERPL-SCNC: 139 MMOL/L (ref 136–145)
SPECIMEN OUTDATE: NORMAL
WBC # BLD AUTO: 9.66 K/UL (ref 3.9–12.7)

## 2025-02-06 PROCEDURE — 36000707: Mod: TXP | Performed by: OBSTETRICS & GYNECOLOGY

## 2025-02-06 PROCEDURE — 25000242 PHARM REV CODE 250 ALT 637 W/ HCPCS: Mod: TXP | Performed by: NURSE ANESTHETIST, CERTIFIED REGISTERED

## 2025-02-06 PROCEDURE — 86901 BLOOD TYPING SEROLOGIC RH(D): CPT | Mod: TXP | Performed by: OBSTETRICS & GYNECOLOGY

## 2025-02-06 PROCEDURE — 25000003 PHARM REV CODE 250: Mod: TXP | Performed by: NURSE ANESTHETIST, CERTIFIED REGISTERED

## 2025-02-06 PROCEDURE — 71000039 HC RECOVERY, EACH ADD'L HOUR: Mod: TXP | Performed by: OBSTETRICS & GYNECOLOGY

## 2025-02-06 PROCEDURE — 88304 TISSUE EXAM BY PATHOLOGIST: CPT | Mod: TXP | Performed by: PATHOLOGY

## 2025-02-06 PROCEDURE — 36000706: Mod: TXP | Performed by: OBSTETRICS & GYNECOLOGY

## 2025-02-06 PROCEDURE — 27201423 OPTIME MED/SURG SUP & DEVICES STERILE SUPPLY: Mod: TXP | Performed by: OBSTETRICS & GYNECOLOGY

## 2025-02-06 PROCEDURE — 25000003 PHARM REV CODE 250: Mod: TXP | Performed by: OBSTETRICS & GYNECOLOGY

## 2025-02-06 PROCEDURE — 37000008 HC ANESTHESIA 1ST 15 MINUTES: Mod: TXP | Performed by: OBSTETRICS & GYNECOLOGY

## 2025-02-06 PROCEDURE — 80048 BASIC METABOLIC PNL TOTAL CA: CPT | Mod: TXP | Performed by: OBSTETRICS & GYNECOLOGY

## 2025-02-06 PROCEDURE — 63600175 PHARM REV CODE 636 W HCPCS: Mod: TXP | Performed by: NURSE ANESTHETIST, CERTIFIED REGISTERED

## 2025-02-06 PROCEDURE — D9220A PRA ANESTHESIA: Mod: ANES,NTX,, | Performed by: ANESTHESIOLOGY

## 2025-02-06 PROCEDURE — 85025 COMPLETE CBC W/AUTO DIFF WBC: CPT | Mod: TXP | Performed by: OBSTETRICS & GYNECOLOGY

## 2025-02-06 PROCEDURE — P9045 ALBUMIN (HUMAN), 5%, 250 ML: HCPCS | Mod: JZ,TB,TXP | Performed by: NURSE ANESTHETIST, CERTIFIED REGISTERED

## 2025-02-06 PROCEDURE — P9045 ALBUMIN (HUMAN), 5%, 250 ML: HCPCS | Mod: JZ,TB,TXP | Performed by: ANESTHESIOLOGY

## 2025-02-06 PROCEDURE — 37000009 HC ANESTHESIA EA ADD 15 MINS: Mod: TXP | Performed by: OBSTETRICS & GYNECOLOGY

## 2025-02-06 PROCEDURE — 25000003 PHARM REV CODE 250: Mod: TXP | Performed by: ANESTHESIOLOGY

## 2025-02-06 PROCEDURE — 84702 CHORIONIC GONADOTROPIN TEST: CPT | Mod: TXP | Performed by: ANESTHESIOLOGY

## 2025-02-06 PROCEDURE — 88307 TISSUE EXAM BY PATHOLOGIST: CPT | Mod: TXP | Performed by: PATHOLOGY

## 2025-02-06 PROCEDURE — 63600175 PHARM REV CODE 636 W HCPCS: Mod: TXP | Performed by: OBSTETRICS & GYNECOLOGY

## 2025-02-06 PROCEDURE — 63600175 PHARM REV CODE 636 W HCPCS: Mod: JZ,TB,TXP | Performed by: ANESTHESIOLOGY

## 2025-02-06 PROCEDURE — 71000016 HC POSTOP RECOV ADDL HR: Mod: TXP | Performed by: OBSTETRICS & GYNECOLOGY

## 2025-02-06 PROCEDURE — 88341 IMHCHEM/IMCYTCHM EA ADD ANTB: CPT | Mod: TXP | Performed by: PATHOLOGY

## 2025-02-06 PROCEDURE — 71000033 HC RECOVERY, INTIAL HOUR: Mod: TXP | Performed by: OBSTETRICS & GYNECOLOGY

## 2025-02-06 PROCEDURE — 88305 TISSUE EXAM BY PATHOLOGIST: CPT | Mod: 59,TXP | Performed by: PATHOLOGY

## 2025-02-06 PROCEDURE — 88342 IMHCHEM/IMCYTCHM 1ST ANTB: CPT | Mod: 59,TXP | Performed by: PATHOLOGY

## 2025-02-06 PROCEDURE — 71000015 HC POSTOP RECOV 1ST HR: Mod: TXP | Performed by: OBSTETRICS & GYNECOLOGY

## 2025-02-06 PROCEDURE — D9220A PRA ANESTHESIA: Mod: CRNA,NTX,, | Performed by: NURSE ANESTHETIST, CERTIFIED REGISTERED

## 2025-02-06 RX ORDER — LIDOCAINE HYDROCHLORIDE 20 MG/ML
INJECTION INTRAVENOUS
Status: DISCONTINUED | OUTPATIENT
Start: 2025-02-06 | End: 2025-02-06

## 2025-02-06 RX ORDER — OXYCODONE HYDROCHLORIDE 5 MG/1
5 TABLET ORAL EVERY 4 HOURS PRN
Status: CANCELLED | OUTPATIENT
Start: 2025-02-06

## 2025-02-06 RX ORDER — SILVER NITRATE 38.21; 12.74 MG/1; MG/1
STICK TOPICAL
Status: DISCONTINUED | OUTPATIENT
Start: 2025-02-06 | End: 2025-02-06 | Stop reason: HOSPADM

## 2025-02-06 RX ORDER — EPHEDRINE SULFATE 50 MG/ML
INJECTION, SOLUTION INTRAVENOUS
Status: DISCONTINUED | OUTPATIENT
Start: 2025-02-06 | End: 2025-02-06

## 2025-02-06 RX ORDER — FENTANYL CITRATE 50 UG/ML
INJECTION, SOLUTION INTRAMUSCULAR; INTRAVENOUS
Status: DISCONTINUED | OUTPATIENT
Start: 2025-02-06 | End: 2025-02-06

## 2025-02-06 RX ORDER — HYDROMORPHONE HYDROCHLORIDE 2 MG/ML
0.2 INJECTION, SOLUTION INTRAMUSCULAR; INTRAVENOUS; SUBCUTANEOUS
Status: CANCELLED | OUTPATIENT
Start: 2025-02-06

## 2025-02-06 RX ORDER — VASOPRESSIN 20 [USP'U]/ML
INJECTION, SOLUTION INTRAMUSCULAR; SUBCUTANEOUS
Status: DISCONTINUED | OUTPATIENT
Start: 2025-02-06 | End: 2025-02-06

## 2025-02-06 RX ORDER — LIDOCAINE HYDROCHLORIDE 10 MG/ML
1 INJECTION, SOLUTION EPIDURAL; INFILTRATION; INTRACAUDAL; PERINEURAL ONCE
Status: DISCONTINUED | OUTPATIENT
Start: 2025-02-06 | End: 2025-02-06 | Stop reason: HOSPADM

## 2025-02-06 RX ORDER — PROPOFOL 10 MG/ML
VIAL (ML) INTRAVENOUS
Status: DISCONTINUED | OUTPATIENT
Start: 2025-02-06 | End: 2025-02-06

## 2025-02-06 RX ORDER — DEXTROMETHORPHAN HYDROBROMIDE, GUAIFENESIN 5; 100 MG/5ML; MG/5ML
650 LIQUID ORAL EVERY 6 HOURS
Qty: 30 TABLET | Refills: 0 | Status: SHIPPED | OUTPATIENT
Start: 2025-02-06

## 2025-02-06 RX ORDER — MIDAZOLAM HYDROCHLORIDE 1 MG/ML
INJECTION INTRAMUSCULAR; INTRAVENOUS
Status: DISCONTINUED | OUTPATIENT
Start: 2025-02-06 | End: 2025-02-06

## 2025-02-06 RX ORDER — PHENYLEPHRINE HYDROCHLORIDE 10 MG/ML
INJECTION INTRAVENOUS
Status: DISCONTINUED | OUTPATIENT
Start: 2025-02-06 | End: 2025-02-06

## 2025-02-06 RX ORDER — DIPHENHYDRAMINE HYDROCHLORIDE 50 MG/ML
12.5 INJECTION INTRAMUSCULAR; INTRAVENOUS EVERY 30 MIN PRN
Status: DISCONTINUED | OUTPATIENT
Start: 2025-02-06 | End: 2025-02-06 | Stop reason: HOSPADM

## 2025-02-06 RX ORDER — OXYCODONE HYDROCHLORIDE 5 MG/1
5 TABLET ORAL EVERY 4 HOURS PRN
Status: DISCONTINUED | OUTPATIENT
Start: 2025-02-06 | End: 2025-02-06 | Stop reason: HOSPADM

## 2025-02-06 RX ORDER — PREGABALIN 75 MG/1
75 CAPSULE ORAL ONCE
Status: COMPLETED | OUTPATIENT
Start: 2025-02-06 | End: 2025-02-06

## 2025-02-06 RX ORDER — ALBUMIN HUMAN 50 G/1000ML
SOLUTION INTRAVENOUS
Status: DISCONTINUED | OUTPATIENT
Start: 2025-02-06 | End: 2025-02-06

## 2025-02-06 RX ORDER — CALCIUM CHLORIDE DIHYDRATE 100 MG/ML
INJECTION, SOLUTION INTRAVENOUS
Status: DISCONTINUED | OUTPATIENT
Start: 2025-02-06 | End: 2025-02-06

## 2025-02-06 RX ORDER — LIDOCAINE HYDROCHLORIDE 10 MG/ML
0.5 INJECTION, SOLUTION EPIDURAL; INFILTRATION; INTRACAUDAL; PERINEURAL ONCE
Status: DISCONTINUED | OUTPATIENT
Start: 2025-02-06 | End: 2025-02-06 | Stop reason: HOSPADM

## 2025-02-06 RX ORDER — SODIUM CHLORIDE 0.9 % (FLUSH) 0.9 %
3 SYRINGE (ML) INJECTION
Status: DISCONTINUED | OUTPATIENT
Start: 2025-02-06 | End: 2025-02-06 | Stop reason: HOSPADM

## 2025-02-06 RX ORDER — PROCHLORPERAZINE EDISYLATE 5 MG/ML
5 INJECTION INTRAMUSCULAR; INTRAVENOUS EVERY 30 MIN PRN
Status: DISCONTINUED | OUTPATIENT
Start: 2025-02-06 | End: 2025-02-06 | Stop reason: HOSPADM

## 2025-02-06 RX ORDER — BUPIVACAINE HYDROCHLORIDE 2.5 MG/ML
INJECTION, SOLUTION EPIDURAL; INFILTRATION; INTRACAUDAL
Status: DISCONTINUED | OUTPATIENT
Start: 2025-02-06 | End: 2025-02-06 | Stop reason: HOSPADM

## 2025-02-06 RX ORDER — ALBUMIN HUMAN 50 G/1000ML
12.5 SOLUTION INTRAVENOUS ONCE
Status: COMPLETED | OUTPATIENT
Start: 2025-02-06 | End: 2025-02-06

## 2025-02-06 RX ORDER — GLUCAGON 1 MG
1 KIT INJECTION
Status: DISCONTINUED | OUTPATIENT
Start: 2025-02-06 | End: 2025-02-06 | Stop reason: HOSPADM

## 2025-02-06 RX ORDER — HYDROMORPHONE HYDROCHLORIDE 2 MG/ML
0.4 INJECTION, SOLUTION INTRAMUSCULAR; INTRAVENOUS; SUBCUTANEOUS EVERY 5 MIN PRN
Status: DISCONTINUED | OUTPATIENT
Start: 2025-02-06 | End: 2025-02-06 | Stop reason: HOSPADM

## 2025-02-06 RX ORDER — ONDANSETRON HYDROCHLORIDE 2 MG/ML
INJECTION, SOLUTION INTRAVENOUS
Status: DISCONTINUED | OUTPATIENT
Start: 2025-02-06 | End: 2025-02-06

## 2025-02-06 RX ORDER — MUPIROCIN 20 MG/G
OINTMENT TOPICAL
Status: DISCONTINUED | OUTPATIENT
Start: 2025-02-06 | End: 2025-02-06 | Stop reason: HOSPADM

## 2025-02-06 RX ORDER — DOCUSATE SODIUM 100 MG/1
200 CAPSULE, LIQUID FILLED ORAL 2 TIMES DAILY
Qty: 90 CAPSULE | Refills: 3 | Status: SHIPPED | OUTPATIENT
Start: 2025-02-06

## 2025-02-06 RX ORDER — OXYCODONE HYDROCHLORIDE 5 MG/1
5 TABLET ORAL EVERY 4 HOURS PRN
Qty: 10 TABLET | Refills: 0 | Status: SHIPPED | OUTPATIENT
Start: 2025-02-06

## 2025-02-06 RX ORDER — SODIUM CHLORIDE, SODIUM LACTATE, POTASSIUM CHLORIDE, CALCIUM CHLORIDE 600; 310; 30; 20 MG/100ML; MG/100ML; MG/100ML; MG/100ML
INJECTION, SOLUTION INTRAVENOUS CONTINUOUS
Status: DISCONTINUED | OUTPATIENT
Start: 2025-02-06 | End: 2025-02-06 | Stop reason: HOSPADM

## 2025-02-06 RX ORDER — CEFAZOLIN 2 G/1
2 INJECTION, POWDER, FOR SOLUTION INTRAMUSCULAR; INTRAVENOUS
Status: DISCONTINUED | OUTPATIENT
Start: 2025-02-06 | End: 2025-02-06 | Stop reason: HOSPADM

## 2025-02-06 RX ORDER — DEXAMETHASONE SODIUM PHOSPHATE 4 MG/ML
INJECTION, SOLUTION INTRA-ARTICULAR; INTRALESIONAL; INTRAMUSCULAR; INTRAVENOUS; SOFT TISSUE
Status: DISCONTINUED | OUTPATIENT
Start: 2025-02-06 | End: 2025-02-06

## 2025-02-06 RX ORDER — ALBUTEROL SULFATE 90 UG/1
INHALANT RESPIRATORY (INHALATION)
Status: DISCONTINUED | OUTPATIENT
Start: 2025-02-06 | End: 2025-02-06

## 2025-02-06 RX ORDER — CLINDAMYCIN PHOSPHATE 900 MG/50ML
INJECTION, SOLUTION INTRAVENOUS
Status: DISCONTINUED | OUTPATIENT
Start: 2025-02-06 | End: 2025-02-06

## 2025-02-06 RX ORDER — ONDANSETRON HYDROCHLORIDE 2 MG/ML
4 INJECTION, SOLUTION INTRAVENOUS EVERY 4 HOURS PRN
Status: CANCELLED | OUTPATIENT
Start: 2025-02-06

## 2025-02-06 RX ADMIN — VASOPRESSIN 1 UNITS: 20 INJECTION INTRAVENOUS at 07:02

## 2025-02-06 RX ADMIN — EPHEDRINE SULFATE 20 MG: 50 INJECTION INTRAVENOUS at 07:02

## 2025-02-06 RX ADMIN — EPHEDRINE SULFATE 15 MG: 50 INJECTION INTRAVENOUS at 07:02

## 2025-02-06 RX ADMIN — MUPIROCIN: 20 OINTMENT TOPICAL at 05:02

## 2025-02-06 RX ADMIN — PHENYLEPHRINE HYDROCHLORIDE 100 MCG: 10 INJECTION INTRAVENOUS at 08:02

## 2025-02-06 RX ADMIN — CALCIUM CHLORIDE 0.5 G: 100 INJECTION, SOLUTION INTRAVENOUS at 07:02

## 2025-02-06 RX ADMIN — HYDROMORPHONE HYDROCHLORIDE 0.2 MG: 2 INJECTION INTRAMUSCULAR; INTRAVENOUS; SUBCUTANEOUS at 09:02

## 2025-02-06 RX ADMIN — ALBUTEROL SULFATE 2 PUFF: 90 AEROSOL, METERED RESPIRATORY (INHALATION) at 08:02

## 2025-02-06 RX ADMIN — MIDAZOLAM HYDROCHLORIDE 2 MG: 1 INJECTION, SOLUTION INTRAMUSCULAR; INTRAVENOUS at 07:02

## 2025-02-06 RX ADMIN — VASOPRESSIN 2 UNITS: 20 INJECTION INTRAVENOUS at 07:02

## 2025-02-06 RX ADMIN — SODIUM CHLORIDE: 0.9 INJECTION, SOLUTION INTRAVENOUS at 06:02

## 2025-02-06 RX ADMIN — LIDOCAINE HYDROCHLORIDE 100 MG: 20 INJECTION, SOLUTION INTRAVENOUS at 07:02

## 2025-02-06 RX ADMIN — FENTANYL CITRATE 50 MCG: 50 INJECTION, SOLUTION INTRAMUSCULAR; INTRAVENOUS at 07:02

## 2025-02-06 RX ADMIN — FENTANYL CITRATE 100 MCG: 50 INJECTION, SOLUTION INTRAMUSCULAR; INTRAVENOUS at 07:02

## 2025-02-06 RX ADMIN — PROPOFOL 180 MG: 10 INJECTION, EMULSION INTRAVENOUS at 07:02

## 2025-02-06 RX ADMIN — ALBUMIN (HUMAN) 250 ML: 12.5 SOLUTION INTRAVENOUS at 07:02

## 2025-02-06 RX ADMIN — PHENYLEPHRINE HYDROCHLORIDE 200 MCG: 10 INJECTION INTRAVENOUS at 07:02

## 2025-02-06 RX ADMIN — DEXAMETHASONE SODIUM PHOSPHATE 4 MG: 4 INJECTION, SOLUTION INTRAMUSCULAR; INTRAVENOUS at 07:02

## 2025-02-06 RX ADMIN — ONDANSETRON HYDROCHLORIDE 4 MG: 2 INJECTION INTRAMUSCULAR; INTRAVENOUS at 07:02

## 2025-02-06 RX ADMIN — PHENYLEPHRINE HYDROCHLORIDE 100 MCG: 10 INJECTION INTRAVENOUS at 07:02

## 2025-02-06 RX ADMIN — GLYCOPYRROLATE 0.2 MG: 0.2 INJECTION, SOLUTION INTRAMUSCULAR; INTRAVITREAL at 07:02

## 2025-02-06 RX ADMIN — CLINDAMYCIN PHOSPHATE 900 MG: 18 INJECTION, SOLUTION INTRAVENOUS at 07:02

## 2025-02-06 RX ADMIN — OXYCODONE HYDROCHLORIDE 5 MG: 5 TABLET ORAL at 09:02

## 2025-02-06 RX ADMIN — PREGABALIN 75 MG: 75 CAPSULE ORAL at 05:02

## 2025-02-06 RX ADMIN — ALBUMIN (HUMAN) 12.5 G: 12.5 SOLUTION INTRAVENOUS at 10:02

## 2025-02-06 NOTE — DISCHARGE SUMMARY
Discharge Summary  Gynecology      Admit Date: 2025    Discharge Date and Time: 2025     Attending Physician: Elis Jacobs MD    Principal Diagnoses: Status post cone biopsy of cervix    Active Hospital Problems    Diagnosis  POA    *Status post CKC & Bartholins Gland Excision 25 [[id number]]  Not Applicable      Resolved Hospital Problems   No resolved problems to display.       Procedures: Procedure(s) (LRB):  CONE BIOPSY, CERVIX, USING COLD KNIFE (N/A)  EXCISION, CYST, BARTHOLIN'S GLAND (N/A)    Discharged Condition: good    Hospital Course:   Ade Silva is a 44 y.o. y.o.  female who presented on 2025 for above procedures for the treatment of TAYLOR II-III. Patient tolerated procedure. 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 Post-operative course was uncomplicated.  On day of discharge, patient was urinating, ambulating, and tolerating a regular diet without difficulty. Pain was well controlled on PO medication. She was discharged home on POD#0 in stable condition with instructions to follow up with Dr. Jacobs in 4 weeks.     Consults: None    Significant Diagnostic Studies:  Recent Labs   Lab 25  0548   WBC 9.66   HGB 11.6*   HCT 36.7*   *           Treatments:   1. Surgery as above    Disposition: Home or Self Care    Patient Instructions:   Current Discharge Medication List        START taking these medications    Details   conjugated estrogens (PREMARIN) vaginal cream Place 1 g vaginally once daily for 14 days, THEN 1 g 3 (three) times a week.  Qty: 30 g, Refills: 4      docusate sodium (COLACE) 100 MG capsule Take 2 capsules (200 mg total) by mouth 2 (two) times daily.  Qty: 90 capsule, Refills: 3      oxyCODONE (ROXICODONE) 5 MG immediate release tablet Take 1 tablet (5 mg total) by mouth every 4 (four) hours as needed for Pain.  Qty: 10 tablet, Refills: 0    Comments: Quantity prescribed more than  7 day supply? No  Associated Diagnoses: Status post cone biopsy of cervix           CONTINUE these medications which have CHANGED    Details   acetaminophen (TYLENOL) 650 MG TbSR Take 1 tablet (650 mg total) by mouth every 6 (six) hours.  Qty: 30 tablet, Refills: 0           CONTINUE these medications which have NOT CHANGED    Details   B complex-vitamin C-folic acid (DENIS-CHANTAL) 0.8 mg Tab Take 1 tablet (0.8 mg total) by mouth once daily.  Qty: 90 tablet, Refills: 3    Associated Diagnoses: ESRD (end stage renal disease) on dialysis      cinacalcet (SENSIPAR) 30 MG Tab Take 30 mg by mouth daily with breakfast.      clindamycin (CLEOCIN T) 1 % lotion Apply topically 2 (two) times daily. Use to areas prone to boils  Qty: 60 mL, Refills: 6    Associated Diagnoses: Hidradenitis suppurativa      gabapentin (NEURONTIN) 300 MG capsule Take 1 capsule (300 mg total) by mouth three times per week after dialysis  Qty: 36 capsule, Refills: 2    Associated Diagnoses: Cervical radiculopathy; Right arm pain; Nerve pain      hydrOXYzine HCL (ATARAX) 10 MG Tab Take 1 tablet (10 mg total) by mouth nightly as needed (itching).  Qty: 30 tablet, Refills: 5    Associated Diagnoses: Uremic pruritus      loratadine (CLARITIN ORAL) Take 1 tablet by mouth daily as needed (Allergies).      medroxyPROGESTERone (PROVERA) 10 MG tablet Take 1 tablet (10 mg total) by mouth once daily.  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 Do not fill.  Qty: 1 each, Refills: 0      sucroferric oxyhydroxide (VELPHORO) 500 mg Chew Break or dissolve 1 tablet by mouth with each meal and snack. Do not exceed 6 tablets per day.  Qty: 180 tablet, Refills: 11      triamcinolone acetonide 0.1% (KENALOG) 0.1 % cream Apply topically 2 (two) times daily. Use to affected areas for up to 2 weeks then take a 1 week break or decrease to 3 times weekly. Do not apply to  groin or face. Use to itchy areas  Qty: 454 g, Refills: 1    Associated Diagnoses: Uremic pruritus; Prurigo nodularis      esomeprazole (NEXIUM) 20 MG capsule Take 20 mg by mouth before breakfast.      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 Please dispense only clear patches , patient has a tape allergy.  Qty: 14 patch, Refills: 0    Comments: Patient not a trust member . Address verified.  Associated Diagnoses: Nicotine dependence      sodium zirconium cyclosilicate (LOKELMA) 10 gram packet Take 1 packet (10 g total) by mouth 2 (two) times a day. Mix entire contents of packet(s) into drinking glass containing 3 tablespoons of water; stir well and drink immediately. Add water and repeat until no powder remains to receive entire dose.  Qty: 30 packet, Refills: 3    Associated Diagnoses: Hyperkalemia           STOP taking these medications       ondansetron (ZOFRAN) 4 MG tablet Comments:   Reason for Stopping:               Discharge Procedure Orders   Diet general     Lifting restrictions   Order Comments: LIFTING:  No lifting greater than 15 lb for 6 weeks.    PELVIC REST:  No douching, tampons, or intercourse for 6 weeks.    If prescribed, vaginal estrogen cream may be used during the postoperative period.     DRIVING:  No driving while on narcotics. Driving may be resumed initially with a competent passenger one to two weeks after surgery if no longer taking narcotics.     Other restrictions (specify):   Order Comments: No alcoholic beverages while taking narcotic pain medications.  Do not drive while taking narcotic pain medication.    No douching, tampons, or intercourse for six weeks.      Avoid straining with bowel movements and/or constipation.     Wound  care routine (specify)   Order Comments: VULVAR CARE:  After urinating or having a bowel movement, do the following to clean and dry the area of your incisions   - Use a moistened wipe, making sure to wipe front-to-back  - Use a small squirt bottle with lukewarm water to rinse the area  - Use a clean, soft towel to pat dry the area  - Can use ice packs as needed for vulvar/vaginal swelling. Make sure to wrap ice pack in towel so as not to apply directly to skin  - Use Premarin cream both on opening of & inside of vagina   - Use a blow dryer on a cool setting to dry the incisions     You may shower or take a tub bath following surgery   - When taking a tub bath to help relieve discomfort or to clean the area, use clear, warm water  - Do not use bubble bath or bath oil   - After bathing, use a clean, soft towel to pat dry the area   - Then use a blow dryer on a cool setting to dry the incisions    -Avoid wearing tight-fitting clothing over your incisions.  -You may use an ice pack on your vulva for up to 10 minutes at a time to help with swelling. Avoid placing ice directly on your skin.   -You may soak in a bathtub 4-6 times per day (10-15 minutes each time) to help with your vulvar discomfort and swelling.   -Stitches may have been placed at the time of surgery. These stitches are all dissolvable. As they begin to dissolve, you may notice yellow to white discharge, this is normal.    DRAIN CARE (IF YOU HAVE A DRAIN):  1. Keep the area where the tube exits the skin clean and dry. You may clean with soap and water on a daily basis.  2. Empty the container when it becomes half full. The container will not create suction if it becomes too full. To empty the container (or bulb):  - Open the stopper to release the suction.  - Holding the stopper out of the way, pour the fluid into the measuring cup provided to you.  - Measure and record the amount. If you have multiple drains, please record these amounts separately. Total  the amount of fluid you empty in each 24 hour period.   - Compress the container and replace the stopper. Keep the container compressed at all times except when emptying it. The compression creates the gentle suction.   3. Strip drains 3-4 times daily as instructed.   4. Goal drainage output is less than 20 cc per day for at least 2 consecutive days.   5. Call your surgical team weekly with your drain output. Bring this record to your next appointment with your physician if the drain is still in place at that time.  6. Contact your surgical team if you noticed any of the followin) your drain output decreases rapidly. 2) You develop fever/chills. 3) You have pain, redness, swelling, or heat around the drain site.    DRESSING CARE(IF YOU HAVE A DRESSING): If you have a bandage on wound, you may remove it the day after dismissal.  If you had steri-strips remove them once they begin to peel off (usually 2 weeks).  If they still haven't peeled off in 2 weeks, please peel them off.     Call MD for:  temperature >100.4     Call MD for:  persistent nausea and vomiting     Call MD for:  severe uncontrolled pain     Call MD for:  difficulty breathing, headache or visual disturbances     Call MD for:  redness, tenderness, or signs of infection (pain, swelling, redness, odor or green/yellow discharge around incision site)     Call MD for:  hives     Call MD for:   Order Comments: inability to void,urine is ketchup colored or you have large clots, vaginal bleeding is heavier than a period.    VAGINAL DISCHARGE: You may develop a vaginal discharge and intermittent vaginal spotting after surgery and up to 6 weeks postoperatively.  The discharge may have an odor and may change in color but it is normal.  This is due to dissolving stiches.  Contact your surgical team if you develop vaginal or vulvar irritation along with a discharge.  Also contact your surgical team if you have vaginal discharge that smells like urine or  stool.    CONSTIPATION REMEDIES: Patients are often constipated after surgery or with use of oral narcotic medicine. You should continue to take the stool softener, Senokot-S during the next six weeks, and consume adequate amounts of water.  If you have not had a bowel movement for 3 days after dismissal, or are uncomfortable and unable to pass stool, please try one or all of the following measures:  1.  Milk of Magnesia - 30 cc by mouth every 12 hours   2.  Dulcolax suppository - One suppository per rectum every 4-6 hours   3.  Metamucil, Fibercon or other bulk former - use as directed  4.  Fleets Enema  5.  Prunes or Prune juice    If you continue to have constipation after trying the above remedies, you should contact your surgical team using the contact information listed above      PAIN MEDICATIONS:     Take your pain medications as instructed. It is best to take pain medications before your pain becomes severe. This will allow you to take less medication yet have better pain relief. For the first 2 or 3 days it may be helpful to take your pain medications on a regular schedule (e.g. every 4 to 6 hours). This will help you to keep your pain under better control. You should then begin to take fewer medications each day until you no longer need them. Do not take pain medication on an empty stomach. This may lead to nausea and vomiting.     Ice to affected area   Order Comments: PRN for swelling     Activity as tolerated   Order Comments: Return to normal activity slowly as you feel able.  For 6 weeks your exercise should be limited to walking.  You may walk as far as you wish, as long as you increase your level of exertion gradually and avoid slippery surfaces.     Shower on day dressing removed (No bath)   Order Comments: You may shower at any time but should avoid immersing any abdominal incisions in water for at least 2 weeks after surgery or until the wound is completely healed.  If given, please shower with  Hibaclens soap until bottle is completely finish        Follow-up Information       Elis Jacobs MD Follow up on 3/6/2025.    Specialty: Gynecologic Oncology  Why: post op  Contact information:  [address]  [facility] [address] [phone]                              Laury Jolly MD  OB/GYN PGY-2

## 2025-02-06 NOTE — ANESTHESIA PROCEDURE NOTES
Intubation    Date/Time: 2/6/2025 7:10 AM    Performed by: Karen Wheat  Authorized by: Bill Mendoza MD    Intubation:     Induction:  Intravenous    Intubated:  Postinduction    Mask Ventilation:  Easy mask    Attempts:  1    Attempted By:  CRNA    Difficult Airway Encountered?: No      Complications:  None    Airway Device:  Supraglottic airway/LMA    Airway Device Size:  4.0    Placement Verified By:  Capnometry    Complicating Factors:  None    Findings Post-Intubation:  BS equal bilateral and atraumatic/condition of teeth unchanged

## 2025-02-06 NOTE — TRANSFER OF CARE
Anesthesia Transfer of Care Note    Patient: Ade Silva    Procedure(s) Performed: Procedure(s) (LRB):  CONE BIOPSY, CERVIX, USING COLD KNIFE (N/A)  EXCISION, CYST, BARTHOLIN'S GLAND (N/A)    Patient location: PACU    Anesthesia Type: general    Transport from OR: Transported from OR on 6-10 L/min O2 by face mask with adequate spontaneous ventilation    Post pain: adequate analgesia    Post assessment: no apparent anesthetic complications and tolerated procedure well    Post vital signs: stable    Level of consciousness: awake    Nausea/Vomiting: no nausea/vomiting    Complications: none    Transfer of care protocol was followed      Last vitals: Visit Vitals  /60 (BP Location: Left arm, Patient Position: Lying)   Pulse 92   Temp 36.3 °C (97.4 °F)   Resp 16   LMP 07/08/2024 (Approximate)   SpO2 100%   Breastfeeding No

## 2025-02-06 NOTE — OP NOTE
DATE OF PROCEDURE:  02/06/2025      SURGEON:  Elis Jacobs MD      ASSISTANT:  Laury Jolly MD (RES)     PREOPERATIVE DIAGNOSES: 1. TAYLOR 2   2. Bartholin's gland cyst   3. Polycystic kidney disease  4. ESRD on Dialysis      POSTOPERATIVE DIAGNOSIS:  Same     OPERATIVE PROCEDURE:   1. Cold knife conization  2. Left Bartholin's gland cyst excision     COMPLICATIONS:  None.     ESTIMATED BLOOD LOSS:  200 cc     ANESTHESIA:  LMA      INTRAOPERATIVE FINDINGS: 1. Cervix with diffuse non staining of transformation zone.   2. 2cm Bartholin's gland cyst      PROCEDURE IN DETAIL:  After informed consent was obtained, the patient was taken  to the Operating Suite.  General anesthesia was administered via laryngeal mask airway and once it was felt to be adequate, she was placed in dorsal lithotomy   position.  In and out catheterization was performed after prepping and draping.    She was then draped in the usual fashion.  A weighted speculum was placed in   the vagina.  Cervix was visualized, grasped with single-tooth tenaculum and   stain.  The area to be excised was easily identified. Stay sutures of 0 Vicryl were placed at 3 o'clock and 9 o'clock positions.  The area to  be excised then underwent a circumferential incision with a cone-shaped   dissection towards the canal of the cervix.  This resulted in a   cone-shaped specimen that was removed. A small piece of specimen tore at 12 o'clock as it was being removed and thus was sent separately. Post Conization ECC was collected in usual fashion.  Hemostasis was ensured  with monopolar cautery and the cervix was hemostatic. Once this had been performed, Monsel's was placed in the cone bed and the stay sutures were tied across the midline.     Attention was turned to the left bartholin's gland cyst. 3 cm Incision was made in overlying vulvovaginal tissue. Cyst was grasped with allis and elevated with gentle traction. It was then carefully dissected away from surrounding  structures and   resected with the Ligasure and sent to pathology for permanent section. The remaining vulvovaginal tissue was copiously irrigated and made hemostatic with cautery and suture. Incision was then closed using 2-0 vicryl for deep layers and 3-0 vicry for more superficial layers and then 4-0 monocryl in a subcuticular fashion to close the overlying skin. Rectal exam confirmed intact mucosa with no defects and no sutures.  Local anesthesia was then injected around the incision.      Good hemostasis was noted, and the patient was awoken and taken to Recovery Room in  stable condition.  Of note, I was present for and performed all key aspects of  the procedure.      Elis Jacobs MD  Gynecologic Oncology

## 2025-02-06 NOTE — INTERVAL H&P NOTE
The patient has been examined and the H&P has been reviewed:    I concur with the findings and no changes have occurred since H&P was written.    Surgery risks, benefits and alternative options discussed and understood by patient/family.    Ade Silva is 44 y.o.  presenting for scheduled CKC & Bartholin Gland Excision/all other indicated procedures.    Temp:  [98.2 °F (36.8 °C)] 98.2 °F (36.8 °C)  Pulse:  [58-88] 86  Resp:  [18] 18  SpO2:  [96 %] 96 %  BP: ()/(48-83) 102/53    General: NAD, alert, oriented, cooperative  HEENT: NCAT, EOM grossly intact  Lungs: Normal WOB  Heart: regular rate  Abdomen: soft, nondistended, nontender, no rebound or guarding    Consents in chart. Pre-operative heparin not indicated. All questions answered and concerns addressed. To OR for planned procedure.       Laury Jolly MD  OB/GYN PGY-2

## 2025-02-06 NOTE — PLAN OF CARE
Ade Silva has met all discharge criteria from Phase II. Vital Signs are stable, ambulating  without difficulty. Discharge instructions given, patient verbalized understanding. Discharged from facility via wheelchair in stable condition.     
Patient prefers to have Pami present for discharge. [phone]  
No

## 2025-02-06 NOTE — ANESTHESIA POSTPROCEDURE EVALUATION
Anesthesia Post Evaluation    Patient: Ade Silva    Procedure(s) Performed: Procedure(s) (LRB):  CONE BIOPSY, CERVIX, USING COLD KNIFE (N/A)  EXCISION, CYST, BARTHOLIN'S GLAND (N/A)    Final Anesthesia Type: general      Patient location during evaluation: PACU  Patient participation: Yes- Able to Participate  Level of consciousness: awake and alert  Post-procedure vital signs: reviewed and stable  Pain management: adequate  Airway patency: patent    PONV status at discharge: No PONV  Anesthetic complications: no      Cardiovascular status: blood pressure returned to baseline  Respiratory status: unassisted and spontaneous ventilation  Hydration status: euvolemic  Follow-up not needed.              Vitals Value Taken Time   /56 02/06/25 0917   Temp 36.3 °C (97.4 °F) 02/06/25 0904   Pulse 101 02/06/25 0929   Resp 16 02/06/25 0927   SpO2 97 % 02/06/25 0929   Vitals shown include unfiled device data.      No case tracking events are documented in the log.      Pain/Geovanna Score: Pain Rating Prior to Med Admin: 6 (2/6/2025  9:27 AM)  Geovanna Score: 8 (2/6/2025  9:04 AM)

## 2025-02-11 ENCOUNTER — HOSPITAL ENCOUNTER (OUTPATIENT)
Dept: RADIOLOGY | Facility: HOSPITAL | Age: 45
Discharge: HOME | End: 2025-02-11
Attending: FAMILY MEDICINE
Payer: COMMERCIAL

## 2025-02-11 ENCOUNTER — CLINICAL SUPPORT (OUTPATIENT)
Dept: SMOKING CESSATION | Facility: CLINIC | Age: 45
End: 2025-02-11

## 2025-02-11 VITALS — BODY MASS INDEX: 30.96 KG/M2 | WEIGHT: 222 LBS

## 2025-02-11 DIAGNOSIS — Z12.31 ENCOUNTER FOR SCREENING MAMMOGRAM FOR BREAST CANCER: ICD-10-CM

## 2025-02-11 DIAGNOSIS — F17.200 NICOTINE DEPENDENCE: Primary | ICD-10-CM

## 2025-02-11 PROCEDURE — 99404 PREV MED CNSL INDIV APPRX 60: CPT | Mod: TXP,,,

## 2025-02-11 PROCEDURE — 77067 SCR MAMMO BI INCL CAD: CPT | Mod: TC,TXP

## 2025-02-11 PROCEDURE — 77067 SCR MAMMO BI INCL CAD: CPT | Mod: 26,TXP,, | Performed by: RADIOLOGY

## 2025-02-11 PROCEDURE — 99999 PR PBB SHADOW E&M-EST. PATIENT-LVL I: CPT | Mod: PBBFAC,TXP,,

## 2025-02-11 PROCEDURE — 77063 BREAST TOMOSYNTHESIS BI: CPT | Mod: 26,TXP,, | Performed by: RADIOLOGY

## 2025-02-11 NOTE — PROGRESS NOTES
Individual Follow-Up Form    2/11/2025    Quit Date:     Clinical Status of Patient: Outpatient    Length of Service: 60 minutes    Continuing Medication: no    Other Medications:      Target Symptoms: Withdrawal and medication side effects. The following were  rated moderate (3) to severe (4) on TCRS:  Moderate (3): crave,desire   Severe (4): none    Comments:  Patient presents to clinic today. She is back from visiting her [place] out of states and now finds herself back up to 5 black and mild cigars a day . Patient states that she must quit. Her top three reasons or she wants to get her transplant, she wants to stop wheezing, she wants to be independent from smoking, and she also adds the cost. Her insurance does not cover patches, but has some 14 mg patches and she will start using them until she can buy some 2 mg patches . CTTS provided patient with some 2 mg nicotine gum to sample,  reviewed with patient how to use them, patient verbalized understanding and willingness to use. Discuss with patient about trying some sugar-free cough drops to use as well. Patient states she was using regular candy and she is having a hard time finding some sugar-free candy. Patient is also looking in to joining a gym, she likes to swim and thinks that will help her as a welcome distraction. Provided patient with some fidget tools, and also a handout about coping. Patient is to move where she keeps her cigars and also move smoking outdoors. For the discuss some stressors that she has and working on dealing with them minus smoking. Pt understands to call CTTS if anything is needed before the next visit.      Diagnosis: F17.210    Next Visit: 2 weeks

## 2025-02-14 ENCOUNTER — TELEPHONE (OUTPATIENT)
Dept: NEUROSURGERY | Facility: CLINIC | Age: 45
End: 2025-02-14
Payer: COMMERCIAL

## 2025-02-14 DIAGNOSIS — I72.5 BASILAR ARTERY ANEURYSM: Primary | ICD-10-CM

## 2025-02-16 LAB
FINAL PATHOLOGIC DIAGNOSIS: NORMAL
GROSS: NORMAL
Lab: NORMAL

## 2025-02-17 ENCOUNTER — RESULTS FOLLOW-UP (OUTPATIENT)
Dept: GYNECOLOGIC ONCOLOGY | Facility: CLINIC | Age: 45
End: 2025-02-17
Payer: COMMERCIAL

## 2025-02-17 NOTE — TELEPHONE ENCOUNTER
----- Message from Elis Jacobs MD sent at 2/17/2025  7:56 AM CST -----  Regarding: FW:  Please let her know good news:     Cervix just had low grade dysplasia and NO residual high grade.  The vaginal cyst was also benign.   Both of these have been appropriately managed with her surgery.  We will discuss how to keep an eye on things moving forward at post op visit     Please ensure she has a post op visit    Thanks   Elis  ----- Message -----  From: [facility] [employer]  Sent: 2/16/2025   4:29 PM CST  To: Elis Jacobs MD

## 2025-02-18 ENCOUNTER — PATIENT MESSAGE (OUTPATIENT)
Dept: NEUROSURGERY | Facility: CLINIC | Age: 45
End: 2025-02-18
Payer: COMMERCIAL

## 2025-02-20 ENCOUNTER — CLINICAL SUPPORT (OUTPATIENT)
Dept: SMOKING CESSATION | Facility: CLINIC | Age: 45
End: 2025-02-20

## 2025-02-20 DIAGNOSIS — F17.200 NICOTINE DEPENDENCE: Primary | ICD-10-CM

## 2025-02-20 NOTE — PROGRESS NOTES
Individual Follow-Up Form    2/20/2025    Quit Date:     Clinical Status of Patient: Outpatient    Length of Service: 60 minutes    Continuing Medication: no    Other Medications:      Target Symptoms: Withdrawal and medication side effects. The following were  rated moderate (3) to severe (4) on TCRS:  Moderate (3): crave , desire   Severe (4): none    Comments: Virtual / Audio visit .  Patient continues to smoke 3-4 cigars per day.  Patient states that she is not presently using any NRT to help her quit. She is changing her routine and habit and was able to cut down some doing this period. She is not taking her cigars with her whenever she leaves home and that helps her reduce the amount she smokes that day. She is also working on keeping distracted to help her as well. Some high risk situations was discussed as well as additional ways to help her cut down. She is also delaying the morning smoke for as long as possible and finds that that is helping her quite a bit. Commended her on her accomplishment encourage her to keep delaying that morning one. She will also try to start using her nrt to help .  Reviewed coping strategies/habitual behavior/relapse prevention with patient.  Pt understands to call CTTS if anything is needed before the next visit.        Diagnosis: F17.210    Next Visit: 1 week

## 2025-02-21 ENCOUNTER — PATIENT MESSAGE (OUTPATIENT)
Dept: INTERVENTIONAL RADIOLOGY/VASCULAR | Facility: HOSPITAL | Age: 45
End: 2025-02-21
Payer: COMMERCIAL

## 2025-02-22 DIAGNOSIS — Z00.00 ENCOUNTER FOR MEDICARE ANNUAL WELLNESS EXAM: ICD-10-CM

## 2025-02-27 ENCOUNTER — HOSPITAL ENCOUNTER (OUTPATIENT)
Dept: INTERVENTIONAL RADIOLOGY/VASCULAR | Facility: HOSPITAL | Age: 45
Discharge: HOME | End: 2025-02-27
Attending: NEUROLOGICAL SURGERY
Payer: COMMERCIAL

## 2025-02-27 VITALS
HEIGHT: 71 IN | RESPIRATION RATE: 16 BRPM | BODY MASS INDEX: 30.8 KG/M2 | TEMPERATURE: 98 F | WEIGHT: 220 LBS | DIASTOLIC BLOOD PRESSURE: 64 MMHG | SYSTOLIC BLOOD PRESSURE: 124 MMHG | HEART RATE: 82 BPM | OXYGEN SATURATION: 98 %

## 2025-02-27 DIAGNOSIS — I72.5 BASILAR ARTERY ANEURYSM: ICD-10-CM

## 2025-02-27 LAB — HCG INTACT+B SERPL-ACNC: <2.4 MIU/ML

## 2025-02-27 PROCEDURE — C1769 GUIDE WIRE: HCPCS | Mod: TXP

## 2025-02-27 PROCEDURE — C1889 IMPLANT/INSERT DEVICE, NOC: HCPCS | Mod: TXP

## 2025-02-27 PROCEDURE — 25500020 PHARM REV CODE 255: Mod: TXP | Performed by: NEUROLOGICAL SURGERY

## 2025-02-27 PROCEDURE — C1760 CLOSURE DEV, VASC: HCPCS | Mod: TXP

## 2025-02-27 PROCEDURE — C1894 INTRO/SHEATH, NON-LASER: HCPCS | Mod: TXP

## 2025-02-27 PROCEDURE — 84702 CHORIONIC GONADOTROPIN TEST: CPT | Mod: TXP | Performed by: NEUROLOGICAL SURGERY

## 2025-02-27 PROCEDURE — 63600175 PHARM REV CODE 636 W HCPCS: Mod: TXP | Performed by: PSYCHIATRY & NEUROLOGY

## 2025-02-27 RX ORDER — MIDAZOLAM HYDROCHLORIDE 1 MG/ML
INJECTION, SOLUTION INTRAMUSCULAR; INTRAVENOUS
Status: COMPLETED | OUTPATIENT
Start: 2025-02-27 | End: 2025-02-27

## 2025-02-27 RX ORDER — IODIXANOL 320 MG/ML
200 INJECTION, SOLUTION INTRAVASCULAR
Status: COMPLETED | OUTPATIENT
Start: 2025-02-27 | End: 2025-02-27

## 2025-02-27 RX ORDER — LIDOCAINE HYDROCHLORIDE 10 MG/ML
1 INJECTION, SOLUTION EPIDURAL; INFILTRATION; INTRACAUDAL; PERINEURAL ONCE
Status: DISCONTINUED | OUTPATIENT
Start: 2025-02-27 | End: 2025-02-28 | Stop reason: HOSPADM

## 2025-02-27 RX ORDER — LIDOCAINE AND PRILOCAINE 25; 25 MG/G; MG/G
CREAM TOPICAL ONCE
Status: DISCONTINUED | OUTPATIENT
Start: 2025-02-27 | End: 2025-02-28 | Stop reason: HOSPADM

## 2025-02-27 RX ORDER — SODIUM CHLORIDE 0.9 % (FLUSH) 0.9 %
10 SYRINGE (ML) INJECTION
Status: DISCONTINUED | OUTPATIENT
Start: 2025-02-27 | End: 2025-02-28 | Stop reason: HOSPADM

## 2025-02-27 RX ORDER — SODIUM CHLORIDE 9 MG/ML
INJECTION, SOLUTION INTRAVENOUS CONTINUOUS
Status: DISCONTINUED | OUTPATIENT
Start: 2025-02-27 | End: 2025-02-28 | Stop reason: HOSPADM

## 2025-02-27 RX ORDER — LIDOCAINE HYDROCHLORIDE 10 MG/ML
INJECTION, SOLUTION INFILTRATION; PERINEURAL
Status: COMPLETED | OUTPATIENT
Start: 2025-02-27 | End: 2025-02-27

## 2025-02-27 RX ORDER — FENTANYL CITRATE 50 UG/ML
INJECTION, SOLUTION INTRAMUSCULAR; INTRAVENOUS
Status: COMPLETED | OUTPATIENT
Start: 2025-02-27 | End: 2025-02-27

## 2025-02-27 RX ADMIN — MIDAZOLAM HYDROCHLORIDE 0.5 MG: 2 INJECTION, SOLUTION INTRAMUSCULAR; INTRAVENOUS at 12:02

## 2025-02-27 RX ADMIN — FENTANYL CITRATE 50 MCG: 50 INJECTION, SOLUTION INTRAMUSCULAR; INTRAVENOUS at 11:02

## 2025-02-27 RX ADMIN — IODIXANOL 80 ML: 320 INJECTION, SOLUTION INTRAVASCULAR at 12:02

## 2025-02-27 RX ADMIN — FENTANYL CITRATE 25 MCG: 50 INJECTION, SOLUTION INTRAMUSCULAR; INTRAVENOUS at 12:02

## 2025-02-27 RX ADMIN — MIDAZOLAM HYDROCHLORIDE 0.5 MG: 2 INJECTION, SOLUTION INTRAMUSCULAR; INTRAVENOUS at 11:02

## 2025-02-27 RX ADMIN — FENTANYL CITRATE 25 MCG: 50 INJECTION, SOLUTION INTRAMUSCULAR; INTRAVENOUS at 11:02

## 2025-02-27 RX ADMIN — LIDOCAINE HYDROCHLORIDE 5 ML: 10 INJECTION, SOLUTION INFILTRATION; PERINEURAL at 11:02

## 2025-02-27 RX ADMIN — MIDAZOLAM HYDROCHLORIDE 1 MG: 2 INJECTION, SOLUTION INTRAMUSCULAR; INTRAVENOUS at 11:02

## 2025-02-27 NOTE — NURSING
Pt given discharge instructions and all  questions addressed.  Pt advised to call hospital with any other questions, phone numbers provided.

## 2025-02-27 NOTE — PLAN OF CARE
Patient admitted to S[facility]U. Initial assessment completed by this RN. VS obtained. 20 g IV inserted to L a/c. Plan of care reviewed with patient. Patient verbalizes understanding and agrees with plan of care. DEBORAH

## 2025-02-27 NOTE — DISCHARGE INSTRUCTIONS
"Please call with any questions or concerns.    Monday through Friday 8:00 am - 4:30 pm  Interventional Radiology Clinic  [phone]    Urgent concerns after hours/weekends  Ask the  for the "Interventional Radiologist on call"  [phone]     Cerebral Angiography    What happens during a cerebral angiography?    An IV (intravenous line is started in your arm. You may be given a medicine that helps you relax (sedative)  Youre given an injection to numb the site where the catheter will be inserted. This is usually in the groin area  A small puncture is made into the artery, and the catheter is inserted into the blood vessel. Using X-rays, the catheter is then carefully guided through the artery.  Contrast fluid is injected through the catheter into the artery. You may feel warmth or pressure in your Head, neck, or chest. You may be asked to hold your breath and be still during injections. When the procedure is complete, the catheter is removed and pressure is held at the groin or wrist.    What happens after cerebral angiography    Youll be taken to a recovery area.  You will probably need to lay flat for 2-4 hours    Once you are at home  Dont drive for 24 hours  Avoid walking bending, lifting, and taking stairs for 24 hours.  Avoid lifting anything over 5 pounds for 7 days  Be sure to follow any other instructions from your doctor    When should I call my health care provider?    Call your doctor if you have any of the following:    Severe headache, visual problems, new weakness, dizziness, or trouble speaking  Fever of 100.4 (38C) or higher lasting for 24 to 48 hours  Bleeding, swelling, or a large lump at the insertions site  Sharp or increasing pain at the insertion site  Leg pain, numbness, or a cold leg or foot  Any other symptoms your provider instructed you to report based on your medical condition.    Contact information:    For immediate concerns that are not emergent, you may call our " interventional radiology clinic at [phone] or [phone]    ** After hours and weekends: Call the paging  at [phone] and ask for the Radiology Resident on call**

## 2025-02-27 NOTE — PLAN OF CARE
Procedure completed. Patient tolerated well; VSS. Hemostasis achieved to right groin via 5 Fr Vascade at 1222; patient to remain flat until 1422. Site CDI without hematoma. Patient to be transported to [facility] accompanied by this RN; report to be given at the bedside.

## 2025-02-27 NOTE — H&P
"Interventional Neuroradiology Pre-procedure Note    Procedure: Diagnostic cerebral angiogram    History of Present Illness:  Ade Silva is a 44 y.o. 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 According to Noemi note, she had been lost to f/u after aneurysm was treated in 2016 and has not had any imaging done since.    ROS:   Hematological: no known coagulopathies  Respiratory: no shortness of breath  Cardiovascular: no chest pain  Gastrointestinal: no abdominal pain  Genito-Urinary: no dysuria  Musculoskeletal: negative  Neurological: no TIA or stroke symptoms     Scheduled Meds:    LIDOcaine (PF) 10 mg/ml (1%)  1 mL Other Once    tuberculin  5 Units Intradermal 1 time in Clinic/HOD     Current Meds: Current Medications[1]   Continuous Infusions:    0.9% NaCl   Intravenous Continuous         PRN Meds:    Allergies:   Review of patient's allergies indicates:   Allergen Reactions    Penicillins Hives and Edema     Throat swelling    Adhesive Rash     Adhesive/silk tape (type found on band aides). Can only use "Paper Tape"    Aspirin Hives and Nausea And Vomiting     And vomiting    Butalbital-acetaminophen-caff Nausea And Vomiting and Other (See Comments)     Dizziness (made pt feel sick)    Latex, natural rubber Rash    Nickel Rash    Tomato Nausea And Vomiting     Sedation Hx: No adverse events.    Labs:              Objective:  Vitals: Blood pressure (!) 94/53, pulse 89, temperature 97.9 °F (36.6 °C), temperature source Temporal, resp. rate 18, height 5' 11" (1.803 m), weight 99.8 kg (220 lb), SpO2 98%, not currently breastfeeding.     Physical Exam:  General: well developed, well nourished, no distress.   Head: normocephalic, atraumatic  Neck: No tracheal deviation. No palpable masses. Full ROM.   Neurologic: Alert and oriented. Thought content " appropriate.  GCS: E4 V5 M6; Total: 15  Language: No aphasia  Speech: No dysarthria  Cranial nerves: face symmetric, tongue midline, CN II-XII grossly intact.   Eyes: pupils equal, round, reactive to light with accomodation, EOMI.   Pulmonary: normal respirations, no signs of respiratory distress  Abdomen: soft, non-distended, not tender to palpation  Vascular: Pulses 2+ and symmetric radial and dorsalis pedis. No LE edema.   Skin: Skin is warm, dry and intact.  Sensory: intact to light touch throughout  Motor Strength: Moves all extremities spontaneously with good tone.  Full strength upper and lower extremities. No abnormal movements seen.     ASA: 2  MAL: 2    Plan:  -Plan for cerebral angiogram with possible intervention  -Sedation Plan: moderate sedation   -All diagnostics and imaging reviewed  -Patient NPO since MN  -Risks & benefits of procedure explained in detail; patient consented and all questions answered  -Further reccs to follow procedure          Gilberto Hay MD, MHA  Fellow, NeuroEndovascular Surgery, [facility]  Neurologist, [facility], [place]         [1]   Current Outpatient Medications:     acetaminophen (TYLENOL) 650 MG TbSR, Take 1 tablet (650 mg total) by mouth every 6 (six) hours., Disp: 30 tablet, Rfl: 0    B complex-vitamin C-folic acid (DENIS-CHANTAL) 0.8 mg Tab, Take 1 tablet (0.8 mg total) by mouth once daily., Disp: 90 tablet, Rfl: 3    cinacalcet (SENSIPAR) 30 MG Tab, Take 30 mg by mouth daily with breakfast., Disp: , Rfl:     clindamycin (CLEOCIN T) 1 % lotion, Apply topically 2 (two) times daily. Use to areas prone to boils, Disp: 60 mL, Rfl: 6    conjugated estrogens (PREMARIN) vaginal cream, Place 1 g vaginally once daily for 14 days, THEN 1 g 3 (three) times a week., Disp: 30 g, Rfl: 4    docusate sodium (COLACE) 100 MG capsule, Take 2 capsules (200 mg total) by mouth 2 (two) times daily., Disp: 90 capsule, Rfl: 3    esomeprazole (NEXIUM) 20 MG capsule,  Take 20 mg by mouth before breakfast., Disp: , Rfl:     gabapentin (NEURONTIN) 300 MG capsule, Take 1 capsule (300 mg total) by mouth three times per week after dialysis, Disp: 36 capsule, Rfl: 2    hpv vaccine,9-jaz (GARDASIL 9, PF,) 0.5 mL Syrg, Inject 0.5 mLs into the muscle for 3 doses, Disp: 1.5 mL, Rfl: 0    hydrOXYzine HCL (ATARAX) 10 MG Tab, Take 1 tablet (10 mg total) by mouth nightly as needed (itching)., Disp: 30 tablet, Rfl: 5    loratadine (CLARITIN ORAL), Take 1 tablet by mouth daily as needed (Allergies)., Disp: , Rfl:     medroxyPROGESTERone (PROVERA) 10 MG tablet, Take 1 tablet (10 mg total) by mouth once daily., Disp: 30 tablet, Rfl: 11    midodrine (PROAMATINE) 10 MG tablet, Take 1 tablet (10 mg total) by mouth 2 (two) times daily as needed (As needed on dialysis days for hypotension)., Disp: 90 tablet, Rfl: 2    nicotine (NICODERM CQ) 21 mg/24 hr, Place 1 patch onto the skin once daily. Please dispense only clear patches , patient has a tape allergy., Disp: 14 patch, Rfl: 0    ondansetron (ZOFRAN) 4 MG tablet, Take 1 tablet (4 mg total) by mouth 2 (two) times daily., Disp: 30 tablet, Rfl: 3    OPW TEST CLAIM - DO NOT FILL, OPW test claim. Do not fill., Disp: 1 each, Rfl: 0    oxyCODONE (ROXICODONE) 5 MG immediate release tablet, Take 1 tablet (5 mg total) by mouth every 4 (four) hours as needed for Pain., Disp: 10 tablet, Rfl: 0    sodium zirconium cyclosilicate (LOKELMA) 10 gram packet, Take 1 packet (10 g total) by mouth 2 (two) times a day. Mix entire contents of packet(s) into drinking glass containing 3 tablespoons of water; stir well and drink immediately. Add water and repeat until no powder remains to receive entire dose., Disp: 30 packet, Rfl: 3    sucroferric oxyhydroxide (VELPHORO) 500 mg Chew, Break or dissolve 1 tablet by mouth with each meal and snack. Do not exceed 6 tablets per day., Disp: 180 tablet, Rfl: 11    triamcinolone acetonide 0.1% (KENALOG) 0.1 % cream, Apply topically 2  (two) times daily. Use to affected areas for up to 2 weeks then take a 1 week break or decrease to 3 times weekly. Do not apply to groin or face. Use to itchy areas, Disp: 454 g, Rfl: 1    Current Facility-Administered Medications:     0.9% NaCl infusion, , Intravenous, Continuous, Allie Gomes PA-C    LIDOcaine (PF) 10 mg/ml (1%) injection 10 mg, 1 mL, Other, Once, Allie Gomes PA-C    tuberculin injection 5 Units, 5 Units, Intradermal, 1 time in Clinic/HOD, Mhiir Chang Jr., MD    Facility-Administered Medications Ordered in Other Encounters:     0.9%  NaCl infusion, , Intravenous, Continuous, Mel Calvillo MD

## 2025-03-06 ENCOUNTER — OFFICE VISIT (OUTPATIENT)
Dept: GYNECOLOGIC ONCOLOGY | Facility: CLINIC | Age: 45
End: 2025-03-06
Payer: MEDICAID

## 2025-03-06 ENCOUNTER — TELEPHONE (OUTPATIENT)
Dept: GYNECOLOGIC ONCOLOGY | Facility: CLINIC | Age: 45
End: 2025-03-06

## 2025-03-06 VITALS
TEMPERATURE: 98 F | OXYGEN SATURATION: 98 % | WEIGHT: 230.31 LBS | DIASTOLIC BLOOD PRESSURE: 70 MMHG | HEIGHT: 71 IN | SYSTOLIC BLOOD PRESSURE: 118 MMHG | BODY MASS INDEX: 32.24 KG/M2 | HEART RATE: 99 BPM

## 2025-03-06 DIAGNOSIS — N85.01 COMPLEX ENDOMETRIAL HYPERPLASIA WITHOUT ATYPIA: ICD-10-CM

## 2025-03-06 DIAGNOSIS — N87.1 DYSPLASIA OF CERVIX, HIGH GRADE CIN 2: Primary | ICD-10-CM

## 2025-03-06 DIAGNOSIS — Z98.890 STATUS POST CONE BIOPSY OF CERVIX: ICD-10-CM

## 2025-03-06 PROCEDURE — 99215 OFFICE O/P EST HI 40 MIN: CPT | Mod: PBBFAC | Performed by: OBSTETRICS & GYNECOLOGY

## 2025-03-06 PROCEDURE — 99999 PR PBB SHADOW E&M-EST. PATIENT-LVL V: CPT | Mod: PBBFAC,,, | Performed by: OBSTETRICS & GYNECOLOGY

## 2025-03-06 NOTE — PROGRESS NOTES
Subjective:      Patient ID: Ade Silva is a 44 y.o. female.    Chief Complaint: Post-op Evaluation (4 week )    HPI  Ade Silva is a 44 yr old P0 here for post op s/p CKC for TAYLOR 2 and Bartholin's gland resection.  Final path revealed TAYLOR 1 and benign bartholin's gland cyst. She is doing well.      She has a complex medical history that includes polycystic kidney disease for which she has required dialysis since 5/2023. Brain aneurysm she reports is due to her PCKD (coiling). Recent robotic surgery with left nephrectomy. Laparotomy for kidney extraction    Data reviewed:  12/7/2023: pap smear: ASC-H, + HR HPV 16  3/7/2024: Pelvic US: 8 cm uterus, ES 8.6 mm, normal ovaries, no FF. 0.9 cm hypoechoic nodule within the endometrial cavity which appears vascular. Finding is suggestive of an endometrial polyp.   3/12/2024: colposcopy: TAYLOR 2-3 endometrial biopsy: no atypical hyperplasia or malignancy identified  4/9/2024: vulvar biopsy: condyloma acuminatum   7/25/2024: hysteroscopy D&C: complex hyperplasia without atypia arising in a polyp. Endometrial ablation  LEEP/ECC: TAYLOR 2-3, High-grade dysplasia involves an inked and cauterized edge of this LEEP cone biopsy   warty rectal lesion : AIN 3  9/19/2024: robotic left nephrectomy for kidney mass. Negative for malignancy.     12/10/24: EUA biopsies joint case with Dr. Mojica; Cervix Biopsy=TAYLOR 2  2/6/25: CKC: TAYLOR 1 (one positive margin), Bartholin's gland (benign)     Tobacco use. Prior cigarettes 1 ppd/20 + years then cigars 2-3 a day since 2018.    Review of Systems   Constitutional:  Positive for fatigue (since surgery). Negative for appetite change, chills, diaphoresis and fever.   Respiratory:  Negative for chest tightness, shortness of breath and wheezing.    Cardiovascular:  Negative for chest pain, palpitations and leg swelling.   Gastrointestinal:  Negative for abdominal pain, constipation, diarrhea, nausea and vomiting.   Genitourinary:  Positive for  "genital sores. Negative for menstrual problem, pelvic pain, vaginal bleeding (none since ablation), vaginal discharge and vaginal pain.   Musculoskeletal:  Negative for gait problem.   Skin:  Negative for color change.   Neurological:  Positive for headaches (history for aneurysm). Negative for dizziness and light-headedness.   Psychiatric/Behavioral:  Negative for agitation. The patient is not nervous/anxious.        Objective:   Physical Exam:   Constitutional: She is oriented to person, place, and time. She appears well-developed and well-nourished.       Cardiovascular:  Normal rate.             Pulmonary/Chest: Effort normal.          Genitourinary:    Genitourinary Comments: Chaperone present  Cervix well healed no lesion  Vagina healing well, no signs of separation or infection                  Neurological: She is alert and oriented to person, place, and time.     /70 (Patient Position: Sitting)   Pulse 99   Temp 98.2 °F (36.8 °C)   Ht 5' 11" (1.803 m)   Wt 104.5 kg (230 lb 4.8 oz)   SpO2 98%   BMI 32.12 kg/m²     Assessment:     1. Dysplasia of cervix, high grade TAYLOR 2    2. Complex endometrial hyperplasia without atypia    3. Status post CKC & Bartholins Gland Excision 2/6/25      Plan:      - Healing well to date  - Final path TAYLOR 1, positive margin focally- RTC 4-6 months for repeat pap, ECC, HPV test for ongoing monitoring of dysplasia. High risk for recurrence given ESRD on HD and Smoking. Encouraged smoking cessation.   - History of benign endometrial hyperplasia s/p ablation- Monitoring for any vaginal bleeding       Elis Jacobs MD  Gynecologic Oncology         "

## 2025-03-11 ENCOUNTER — CLINICAL SUPPORT (OUTPATIENT)
Dept: SMOKING CESSATION | Facility: CLINIC | Age: 45
End: 2025-03-11

## 2025-03-11 ENCOUNTER — PATIENT MESSAGE (OUTPATIENT)
Dept: NEUROSURGERY | Facility: CLINIC | Age: 45
End: 2025-03-11

## 2025-03-11 ENCOUNTER — CLINICAL SUPPORT (OUTPATIENT)
Dept: NEUROSURGERY | Facility: CLINIC | Age: 45
End: 2025-03-11
Payer: MEDICARE

## 2025-03-11 DIAGNOSIS — F17.200 NICOTINE DEPENDENCE: Primary | ICD-10-CM

## 2025-03-11 DIAGNOSIS — I72.5 BASILAR ARTERY ANEURYSM: Primary | ICD-10-CM

## 2025-03-11 PROCEDURE — 99404 PREV MED CNSL INDIV APPRX 60: CPT | Mod: 95,TXP,,

## 2025-03-11 NOTE — PROGRESS NOTES
Individual Follow-Up Form    3/11/2025    Quit Date:     Clinical Status of Patient: Outpatient    Length of Service: 60 minutes    Continuing Medication: no    Other Medications:      Target Symptoms: Withdrawal and medication side effects. The following were  rated moderate (3) to severe (4) on TCRS:  Moderate (3): crave ,desire   Severe (4): none    Comments: Patient shares that she has not been using any patches or nicotine replacement products. She reports smoking about 3 to 4 black and mild cigars per day. Patient states that she is delaying the morning smoke and also playing games to use as distraction as a means of delaying smoking. But she does admit she needs to start using the patches to help her. We discussed her reasons for wanting to quit and ways to address her present triggers without smoking. She is keeping her cigars out near her. The plan is for her to keep them in another roomand out of sight so that she has to walk to another room to get her cigar before smoking it. She also shares that she needs some dental work and that the dentist desires patients to be quit when doing extensive work as to not impede the healing process. While discussing with patient about any upcoming high risk situations and the necessary steps to prepare, offer guidance on how to manage and minimize them, and ensure that the patient feels confident and empowered. Pt understands to call CTTS if anything is needed before the next visit.        Diagnosis: F17.210    Next Visit: 2 weeks

## 2025-03-19 ENCOUNTER — PATIENT MESSAGE (OUTPATIENT)
Dept: DERMATOLOGY | Facility: CLINIC | Age: 45
End: 2025-03-19
Payer: MEDICARE

## 2025-03-25 ENCOUNTER — CLINICAL SUPPORT (OUTPATIENT)
Dept: SMOKING CESSATION | Facility: CLINIC | Age: 45
End: 2025-03-25

## 2025-03-25 DIAGNOSIS — F17.200 NICOTINE DEPENDENCE: Primary | ICD-10-CM

## 2025-03-25 PROCEDURE — 99999 PR PBB SHADOW E&M-EST. PATIENT-LVL II: CPT | Mod: PBBFAC,TXP,,

## 2025-03-25 NOTE — PROGRESS NOTES
Individual Follow-Up Form    3/25/2025    Quit Date:     Clinical Status of Patient: Outpatient    Length of Service: 60 minutes    Continuing Medication: yes  Patches    Other Medications: none     Target Symptoms: Withdrawal and medication side effects. The following were  rated moderate (3) to severe (4) on TCRS:  Moderate (3): crave, desire   Severe (4): none    Comments:Patient presents for follow up smoking 3-4 per day. Pt remains on tobacco cessation medication of  21 mg nicotine patch QD  After reviewing TCRS with patient :  No adverse effects/mental changes noted at this time.   Pt's exhaled carbon monoxide level was  14 ppm as per Smokerlyzer. (non- smoker = 0-5 ppm.)  Reviewed coping strategies/habitual behavior/relapse prevention with patient.  Patient understands he can call CTTS at any time.Discussion about prioritizing her self . She states that her number one goal is to improve her health . We discussed how eliminating smoking can be the number one in her health, that it sometimes not thought about as being so harmful to a body when there are numerous other health problems happening . We discussed CO and it's effect on a body .    Pt understands to call CTTS if anything is needed before the next visit.       Diagnosis: F17.210    Next Visit: 2 weeks

## 2025-04-01 ENCOUNTER — CLINICAL SUPPORT (OUTPATIENT)
Dept: SMOKING CESSATION | Facility: CLINIC | Age: 45
End: 2025-04-01

## 2025-04-01 DIAGNOSIS — F17.200 NICOTINE DEPENDENCE: ICD-10-CM

## 2025-04-01 PROCEDURE — 99404 PREV MED CNSL INDIV APPRX 60: CPT | Mod: TXP,,,

## 2025-04-01 PROCEDURE — 99999 PR PBB SHADOW E&M-EST. PATIENT-LVL II: CPT | Mod: PBBFAC,TXP,,

## 2025-04-01 RX ORDER — IBUPROFEN 200 MG
1 TABLET ORAL DAILY
Qty: 28 PATCH | Refills: 0 | Status: SHIPPED | OUTPATIENT
Start: 2025-04-01

## 2025-04-01 NOTE — PROGRESS NOTES
Individual Follow-Up Form    4/1/2025    Quit Date:     Clinical Status of Patient: Outpatient    Length of Service: 60 minutes    Continuing Medication: no    Other Medications:      Target Symptoms: Withdrawal and medication side effects. The following were  rated moderate (3) to severe (4) on TCRS:  Moderate (3): crave, desire   Severe (4): none    Comments: Audio visit . Patient continues to smoke about 2-3 cigars a day .Audio visit .  Patient continues to smoke  2-3 cigars per day. Pt remains on tobacco cessation medication of  21 mg nicotine patch QD . No adverse effects noted at this time.   She shares that she is successful leaving her cigars back when she leaves home . She does have a vehicle now so we discussed her leaving home and keeping distracted by shopping or going to the mall and spending time there , she feels like she can extend the time between smoking . She is still smoking some time inside , encouraged her to bring it outdoors 100% of a time to help her quit . We also discussed how smoking is holding her back from her goals in life and health . She is very motivated to quit and she will work on the things we have discussed and encouraged her to make being smoke free a priority because the rewards are important for her .  Reviewed coping strategies/habitual behavior/relapse prevention with patient.  Pt understands to call CTTS if anything is needed before the next visit.      Diagnosis: F17.210    Next Visit: 2 weeks

## 2025-04-03 ENCOUNTER — OFFICE VISIT (OUTPATIENT)
Dept: DERMATOLOGY | Facility: CLINIC | Age: 45
End: 2025-04-03
Payer: MEDICAID

## 2025-04-03 DIAGNOSIS — L29.89 UREMIC PRURITUS: ICD-10-CM

## 2025-04-03 DIAGNOSIS — R21 RASH: Primary | ICD-10-CM

## 2025-04-03 DIAGNOSIS — L87.1 REACTIVE PERFORATING COLLAGENOSIS: ICD-10-CM

## 2025-04-03 PROCEDURE — 99999 PR PBB SHADOW E&M-EST. PATIENT-LVL IV: CPT | Mod: PBBFAC,,, | Performed by: STUDENT IN AN ORGANIZED HEALTH CARE EDUCATION/TRAINING PROGRAM

## 2025-04-03 PROCEDURE — 1160F RVW MEDS BY RX/DR IN RCRD: CPT | Mod: CPTII,,, | Performed by: STUDENT IN AN ORGANIZED HEALTH CARE EDUCATION/TRAINING PROGRAM

## 2025-04-03 PROCEDURE — 99214 OFFICE O/P EST MOD 30 MIN: CPT | Mod: PBBFAC | Performed by: STUDENT IN AN ORGANIZED HEALTH CARE EDUCATION/TRAINING PROGRAM

## 2025-04-03 PROCEDURE — 11104 PUNCH BX SKIN SINGLE LESION: CPT | Mod: S$PBB,,, | Performed by: STUDENT IN AN ORGANIZED HEALTH CARE EDUCATION/TRAINING PROGRAM

## 2025-04-03 PROCEDURE — 3066F NEPHROPATHY DOC TX: CPT | Mod: CPTII,,, | Performed by: STUDENT IN AN ORGANIZED HEALTH CARE EDUCATION/TRAINING PROGRAM

## 2025-04-03 PROCEDURE — 99214 OFFICE O/P EST MOD 30 MIN: CPT | Mod: 25,S$PBB,, | Performed by: STUDENT IN AN ORGANIZED HEALTH CARE EDUCATION/TRAINING PROGRAM

## 2025-04-03 PROCEDURE — 11104 PUNCH BX SKIN SINGLE LESION: CPT | Mod: PBBFAC | Performed by: STUDENT IN AN ORGANIZED HEALTH CARE EDUCATION/TRAINING PROGRAM

## 2025-04-03 PROCEDURE — 1159F MED LIST DOCD IN RCRD: CPT | Mod: CPTII,,, | Performed by: STUDENT IN AN ORGANIZED HEALTH CARE EDUCATION/TRAINING PROGRAM

## 2025-04-03 RX ORDER — HYDROXYZINE HYDROCHLORIDE 10 MG/1
10 TABLET, FILM COATED ORAL 2 TIMES DAILY PRN
Qty: 60 TABLET | Refills: 5 | Status: SHIPPED | OUTPATIENT
Start: 2025-04-03

## 2025-04-03 NOTE — PROGRESS NOTES
Subjective:      Patient ID:  Ade Silva is a 44 y.o. female who presents for   Chief Complaint   Patient presents with    Uremic pruritus     Pt here for Uremic pruritus follow-up       Pt reports h/o PKD- currently on dialysis         Pt currently using antibacterial soap. Pt states Hydroxyzine isn't helping much for itching      Rash - Follow-up  Symptom course: unchanged  Affected locations: right lower leg, left lower leg, right upper leg and left upper leg  Signs / symptoms: itching      Seen 1/2025 for uremic pruritus and prurigo nodularis  Recommended TAC, atarax 10 qhs and cerave itch relief cream    Itching poorly controlled    Review of Systems   Constitutional: Negative.    HENT: Negative.     Respiratory: Negative.     Musculoskeletal: Negative.    Skin:  Positive for itching and rash.       Objective:   Physical Exam   Constitutional: She appears well-developed and well-nourished.   Neurological: She is alert and oriented to person, place, and time.   Psychiatric: She has a normal mood and affect.   Skin:   Areas Examined (abnormalities noted in diagram):   Neck Inspection Performed  Abdomen Inspection Performed  Back Inspection Performed  RUE Inspected  LUE Inspection Performed  RLE Inspected  LLE Inspection Performed            Diagram Legend     Erythematous scaling macule/papule c/w actinic keratosis       Vascular papule c/w angioma      Pigmented verrucoid papule/plaque c/w seborrheic keratosis      Yellow umbilicated papule c/w sebaceous hyperplasia      Irregularly shaped tan macule c/w lentigo     1-2 mm smooth white papules consistent with Milia      Movable subcutaneous cyst with punctum c/w epidermal inclusion cyst      Subcutaneous movable cyst c/w pilar cyst      Firm pink to brown papule c/w dermatofibroma      Pedunculated fleshy papule(s) c/w skin tag(s)      Evenly pigmented macule c/w junctional nevus     Mildly variegated pigmented, slightly irregular-bordered macule c/w  mildly atypical nevus      Flesh colored to evenly pigmented papule c/w intradermal nevus       Pink pearly papule/plaque c/w basal cell carcinoma      Erythematous hyperkeratotic cursted plaque c/w SCC      Surgical scar with no sign of skin cancer recurrence      Open and closed comedones      Inflammatory papules and pustules      Verrucoid papule consistent consistent with wart     Erythematous eczematous patches and plaques     Dystrophic onycholytic nail with subungual debris c/w onychomycosis     Umbilicated papule    Erythematous-base heme-crusted tan verrucoid plaque consistent with inflamed seborrheic keratosis     Erythematous Silvery Scaling Plaque c/w Psoriasis     See annotation                    Assessment / Plan:      Pathology Orders:       Normal Orders This Visit    Specimen to Pathology, Dermatology     Questions:    Procedure Type: Dermatology and skin neoplasms    Number of Specimens: 1    ------------------------: -------------------------    Spec 1 Procedure: Punch Biopsy    Spec 1 Clinical Impression: reactive perforating collagenosis vs prurigo nodularis    Spec 1 Source: left lower abdomen    Clinical Information: see EPIC    Clinical History: see EPIC    Specimen Source: Skin    Release to patient: Immediate    Send normal result to authorizing provider's In Basket if patient is active on MyChart: Yes          Rash  -     Specimen to Pathology, Dermatology    Punch biopsy procedure note:  Punch biopsy performed after verbal consent obtained. Area marked and prepped with alcohol. Approximately 1cc of 1% lidocaine with epinephrine injected. 3 mm disposable punch used to remove lesion. Hemostasis obtained and biopsy site closed with 1 - 2 Prolene sutures. Wound care instructions reviewed with patient and handout given.    Uremic pruritus  Reactive perforating collagenosis  Itching diffusely including areas without rash but most pruritic on lower abdomen and inner thigh with monomorphous  erythematous papules with central dell or keratotic core. Suspicious for RPC and she does have ESRD. Biopsy to further evaluate  - c/w TAC bid prn  - c/w atarax 10 mg. Increase to bid prn--it does not maker her drowsy and does temporarily alleviate the itching  - further treatment pending biopsy results--consider allopurinol, dupixent, nbUVB, etc         Follow up in about 3 months (around 7/3/2025).

## 2025-04-03 NOTE — PATIENT INSTRUCTIONS

## 2025-04-15 ENCOUNTER — CLINICAL SUPPORT (OUTPATIENT)
Dept: SMOKING CESSATION | Facility: CLINIC | Age: 45
End: 2025-04-15

## 2025-04-15 ENCOUNTER — TELEPHONE (OUTPATIENT)
Dept: NEUROSURGERY | Facility: CLINIC | Age: 45
End: 2025-04-15
Payer: MEDICARE

## 2025-04-15 ENCOUNTER — PATIENT MESSAGE (OUTPATIENT)
Dept: DERMATOLOGY | Facility: CLINIC | Age: 45
End: 2025-04-15
Payer: MEDICARE

## 2025-04-15 ENCOUNTER — PATIENT MESSAGE (OUTPATIENT)
Dept: NEUROSURGERY | Facility: CLINIC | Age: 45
End: 2025-04-15

## 2025-04-15 ENCOUNTER — OFFICE VISIT (OUTPATIENT)
Dept: NEUROSURGERY | Facility: CLINIC | Age: 45
End: 2025-04-15
Payer: MEDICARE

## 2025-04-15 VITALS — DIASTOLIC BLOOD PRESSURE: 71 MMHG | HEART RATE: 91 BPM | SYSTOLIC BLOOD PRESSURE: 107 MMHG

## 2025-04-15 DIAGNOSIS — F17.200 NICOTINE DEPENDENCE: Primary | ICD-10-CM

## 2025-04-15 DIAGNOSIS — I72.5 BASILAR ARTERY ANEURYSM: Primary | ICD-10-CM

## 2025-04-15 DIAGNOSIS — L87.1 REACTIVE PERFORATING COLLAGENOSIS: Primary | ICD-10-CM

## 2025-04-15 PROCEDURE — 99999 PR PBB SHADOW E&M-EST. PATIENT-LVL III: CPT | Mod: PBBFAC,TXP,, | Performed by: NEUROLOGICAL SURGERY

## 2025-04-15 PROCEDURE — 3078F DIAST BP <80 MM HG: CPT | Mod: CPTII,NTX,S$GLB, | Performed by: NEUROLOGICAL SURGERY

## 2025-04-15 PROCEDURE — 3066F NEPHROPATHY DOC TX: CPT | Mod: CPTII,NTX,S$GLB, | Performed by: NEUROLOGICAL SURGERY

## 2025-04-15 PROCEDURE — 3074F SYST BP LT 130 MM HG: CPT | Mod: CPTII,NTX,S$GLB, | Performed by: NEUROLOGICAL SURGERY

## 2025-04-15 PROCEDURE — 99215 OFFICE O/P EST HI 40 MIN: CPT | Mod: NTX,S$GLB,, | Performed by: NEUROLOGICAL SURGERY

## 2025-04-15 PROCEDURE — 99999 PR PBB SHADOW E&M-EST. PATIENT-LVL II: CPT | Mod: PBBFAC,TXP,,

## 2025-04-15 RX ORDER — ALLOPURINOL 100 MG/1
100 TABLET ORAL
Qty: 12 TABLET | Refills: 4 | Status: SHIPPED | OUTPATIENT
Start: 2025-04-16 | End: 2025-09-03

## 2025-04-15 NOTE — PROGRESS NOTES
"Neurosurgery  Established Patient    Scribe Attestation  I, Bettie Palacios, attest that this documentation has been prepared under the direction and in the presence of Peterson High MD.    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 According to Noemi note, she had been lost to f/u after aneurysm was treated in 2016 and has not had any imaging done since. Today she reports She has recently relocated to the area and would like to establish follow up. She gets occasional headaches but denies any change in pattern or frequency. Additionally denies seizures, nausea, vomiting, or any episodes of focal weakness or sensory loss.     Interval Hx 04/15/25  Patient returns to clinic after undergoing diagnostic cerebral angiogram for evaluation of known basilar artery aneurysm which demonstrated "superiorly projecting residual basilar artery apex aneurysm with associated delayed contrast emptying." Today she reports feeling overall well. She has been having occasional headaches but these are tolerable. Patient also has nausea but attributes this to PCKD and HD treatment. When asked about aneurysm treatment in 2016, patient does not recall aneurysm being ruptured but notes undergoing spinal tap and being discharged same day as procedure. Positive smoking history but is working towards cessation. No known family hx of aneurysms.      Review of patient's allergies indicates:   Allergen Reactions    Penicillins Hives and Edema     Throat swelling    Adhesive Rash     Adhesive/silk tape (type found on band aides). Can only use "Paper Tape"    Aspirin Hives and Nausea And Vomiting     And vomiting    Butalbital-acetaminophen-caff Nausea And Vomiting and Other (See Comments)     Dizziness (made pt feel sick)    " "Latex, natural rubber Rash    Nickel Rash    Tomato Nausea And Vomiting       Current Medications[1]    Past Medical History:   Diagnosis Date    Anxiety     Asthma     last used 11/2025  smoking related last attack 23 y/o    Autosomal dominant polycystic kidney disease     Brain aneurysm     Depression     Encounter for blood transfusion     GERD (gastroesophageal reflux disease)     Hypertension     low since diaylsis    Seizures     as a child (age 7-8); " stress -related"    Stroke     Teeth missing     reports several loose teeth    TIA (transient ischemic attack)     Wears prescription eyeglasses     not using     Past Surgical History:   Procedure Laterality Date    AV FISTULA PLACEMENT Right 09/14/2023    Procedure: CREATION, AV FISTULA, radial cephalic;  Surgeon: MELISSA Loera II, MD;  [facility];  Service: Vascular;  Laterality: Right;    AV FISTULA PLACEMENT Right 10/05/2023    Procedure: CREATION, AV FISTULA - brachial;  Surgeon: MELISSA Loera II, MD;  [facility];  Service: Vascular;  Laterality: Right;  confirmed placement with doppler    BARTHOLIN GLAND CYST EXCISION N/A 2/6/2025    Procedure: EXCISION, CYST, BARTHOLIN'S GLAND;  Surgeon: Elis Jacobs MD;  Location: [facility];  Service: Gynecology Oncology;  Laterality: N/A;    BIOPSY OF VAGINA  12/10/2024    Procedure: BIOPSY, UTERUS;  Surgeon: Elis Jacobs MD;  Location: [facility] OR [address] [place] [address];  Service: Gynecology Oncology;;    CEREBRAL ANEURYSM REPAIR      coiling    CERVICAL BIOPSY N/A 12/10/2024    Procedure: BIOPSY, CERVIX;  Surgeon: Elis Jacobs MD;  Location: [facility] OR [address] [place] [address];  Service: Gynecology Oncology;  Laterality: N/A;    COLD KNIFE CONIZATION OF CERVIX N/A 2/6/2025    Procedure: CONE BIOPSY, CERVIX, USING COLD KNIFE;  Surgeon: Elis Jacobs MD;  Location: [facility];  Service: Gynecology Oncology;  Laterality: N/A;  2 hr case    COLONOSCOPY      CURETTAGE, ENDOCERVICAL N/A 12/10/2024    " Procedure: CURETTAGE, ENDOCERVICAL;  Surgeon: Elis Jacobs MD;  Location: [facility] OR [address] [place] [address];  Service: Gynecology Oncology;  Laterality: N/A;    CYSTOSCOPY      age 13    EMBOLIZATION Right 04/23/2024    Procedure: EMBOLIZATION, BLOOD VESSEL FISTULA;  Surgeon: MELISSA Loera II, MD;  [address];  Service: Vascular;  Laterality: Right;    EPIDURAL STEROID INJECTION INTO CERVICAL SPINE N/A 11/12/2024    Procedure: C7-T1 KIMBERLEE;  Surgeon: Presley Hough DO;  Location: [facility] PAIN MANAGEMENT;  Service: Pain Management;  Laterality: N/A;  15 mins no AC    FISTULOGRAM Right 04/23/2024    Procedure: Fistulogram;  Surgeon: MELISSA Loera II, MD;  Location: [facility] OR [address] [place] [address];  Service: Vascular;  Laterality: Right;  CONTRAST: 68ML, FLUORO TIME: 17.9, mGy: 45.11, Gycm2: 5.9548    HYSTEROSCOPY N/A 07/25/2024    Procedure: HYSTEROSCOPY;  Surgeon: Jazz Amador MD;  Location: [facility];  Service: OB/GYN;  Laterality: N/A;    INCISION OF VULVA N/A 07/25/2024    Procedure: INCISION, VULVA;  Surgeon: Jazz Amador MD;  Location: [facility];  Service: OB/GYN;  Laterality: N/A;    INJECTION OF ANESTHETIC AGENT AROUND STELLATE GANGLION Right 01/09/2025    Procedure: Right stellate ganglion block;  Surgeon: Victor Hugo Ingram MD;  Location: [facility] PAIN MANAGEMENT;  Service: Pain Management;  Laterality: Right;  no AC    INSERTION OF TUNNELED CENTRAL VENOUS HEMODIALYSIS CATHETER Right 05/03/2023    Procedure: Insertion, Catheter, Central Venous, Hemodialysis;  Surgeon: Priya Grimm MD;  Location: [facility] CATH LAB;  Service: Interventional Nephrology;  Laterality: Right;    LAPAROSCOPIC ROBOT-ASSISTED SURGICAL REMOVAL OF KIDNEY USING DA GLORIA XI Left 09/19/2024    Procedure: XI ROBOTIC NEPHRECTOMY;  Surgeon: Valentin Maldonado MD;  Location: [address];  Service: Urology;  Laterality: Left;    LOOP ELECTROSURGICAL EXCISION PROCEDURE (LEEP) N/A 07/25/2024    Procedure: LEEP (LOOP ELECTROSURGICAL  EXCISION PROCEDURE);  Surgeon: Jazz Amador MD;  Location: [facility];  Service: OB/GYN;  Laterality: N/A;    REMOVAL OF HEMODIALYSIS CATHETER  05/03/2023    Procedure: REMOVAL, CATHETER, HEMODIALYSIS;  Surgeon: Priya Grimm MD;  Location: [facility] CATH LAB;  Service: Interventional Nephrology;;    SURGICAL EXCISION OF ANAL LESION N/A 12/10/2024    Procedure: EXCISION, LESION, ANUS;  Surgeon: Son Mojica MD;  Location: [facility] OR 2ND FLR;  Service: Colon and Rectal;  Laterality: N/A;    THERMAL ABLATION OF ENDOMETRIUM N/A 07/25/2024    Procedure: ABLATION, ENDOMETRIUM, THERMAL;  Surgeon: Jazz Amador MD;  Location: [facility] OR;  Service: OB/GYN;  Laterality: N/A;     Family History       Problem Relation (Age of Onset)    Diabetes Brother    Fibromyalgia Mother    Heart disease Father    Polycystic kidney disease Father, Sister, Sister    Stroke Father          Social History     Socioeconomic History    Marital status: Single   Tobacco Use    Smoking status: Every Day     Current packs/day: 1.00     Average packs/day: 1 pack/day for 31.3 years (31.3 ttl pk-yrs)     Types: Cigarettes, Cigars     Start date: 1994     Passive exposure: Current    Smokeless tobacco: Never    Tobacco comments:     Smoke 3-5 black and mild cigars a day replaced cigarettes with cigars   Substance and Sexual Activity    Alcohol use: Not Currently    Drug use: Yes     Frequency: 7.0 times per week     Types: Marijuana     Comment: thc smoking and gummies    Sexual activity: Not Currently   Social History Narrative    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     Social Drivers of Health     Financial Resource Strain: High Risk (2/10/2025)    Overall Financial Resource Strain (CARDIA)     Difficulty of Paying Living Expenses: Very hard   Food Insecurity: Food Insecurity Present (2/10/2025)    Hunger Vital Sign     Worried About Running Out of Food in the Last Year: Often true     Ran Out of Food in the Last Year: Sometimes true   Transportation  Needs: No Transportation Needs (12/19/2023)    PRAPARE - Transportation     Lack of Transportation (Medical): No     Lack of Transportation (Non-Medical): No   Physical Activity: Inactive (2/10/2025)    Exercise Vital Sign     Days of Exercise per Week: 0 days     Minutes of Exercise per Session: 0 min   Stress: Stress Concern Present (2/10/2025)    [facility] - Occupational Stress Questionnaire     Feeling of Stress : Rather much   Housing Stability: Low Risk  (12/19/2023)    Housing Stability Vital Sign     Unable to Pay for Housing in the Last Year: No     Number of Places Lived in the Last Year: 2     Unstable Housing in the Last Year: No       Review of Systems   Gastrointestinal:  Positive for nausea.   Neurological:  Positive for headaches.   All other systems reviewed and are negative.    OBJECTIVE:     Vital Signs     There is no height or weight on file to calculate BMI.    Neurosurgery Physical Exam  General: no acute distress  Head: Non-traumatic, normocephalic  Eyes: Pupils equal, EOMI  Neck: Supple, normal ROM, no tenderness to palpation  CVS: Normal rate and rhythm, distal pulses present  Pulm: Symmetric expansion, no respiratory distress  GI: Abdomen nondistended, nontender    MSK: Moves all extremities without restriction, atraumatic  Skin: Dry, intact  Psych: Normal thought content and cognition    Neuro:  Alert, awake, oriented, to self, place, time  Language:  Speech is fluent, goal directed without any noted dysarthria or aphasia    Cranial nerves:    CNII-XII: Intact on fine exam,   Pupils equal round react to light,   Extraocular muscles are intact  V1 to V3 is intact to light touch,   no facial asymmetry,   Hearing is intact to finger rub and voice  tongue/uvula/palate midline,   shoulder shrug equal,     No pronator drift    Extremities:  Motor:  Upper Extremity    Deltoid Triceps Biceps Wrist  Extension Wrist  Flexion Interosseous   R 5/5 5/5 5/5 5/5 5/5 5/5    L 5/5 5/5 5/5 5/5 5/5 5/5       Thumb   Abduction Thumb  ADDuction Finger  Flexion Finger  Extension     R 5/5 5/5 5/5 5/5     L 5/5 5/5 5/5 5/5        Lower Extremity     Iliopsoas Quadriceps Hamstring Plantarflexion Dorsiflexion EHL   R 5/5 5/5 5/5 5/5 5/5 5/5   L 5/5 5/5 5/5 5/5 5/5 5/5       Reflexes:     DTR: 2+ biceps    2+ triceps   2+ brachioradialis   2+ patellar  2+ Achilles     Camargo's: Negative     Babinski's: Negative     Clonus: Negative     Sensory:      Sensation intact to light touch, temperature sensation, vibration, pinprick     Coordination:      Coordination intact throughout, no dysmetria, normal rapid alternating movements, no dysdiadochokinesia  Cerebellar:  Normal finger-to-nose, normal heel-to-shin  Cervical spine:  No midline cervical tenderness, negative Lhermitte, negative Spurling  Thoracic spine:  No midline thoracic tenderness  Lumbar spine:  No midline lumbar tenderness     Diagnostic Results:  I personally reviewed the patient's Diagnostic Imaging.     IR Angiogram Carotid Cerebral Bilateral 02/27/25  Residual aneurysm of the basilar artery apex was demonstrated.     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 Positive smoking hx.    Patient presents with occasional headaches and nausea. No other focal neurological deficits.    Discussed imaging results of IR Angiogram revealing  Residual aneurysm of the basilar artery apex was demonstrated.   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    After discussion with the patient, I recommend:  F/u with MRA brain with vessel wall imaging and MRI Brain without contrast to evaluate aneurysm.  Patient will also obtain previous medical records and imaging of aneurysm from 2016  following coil to allow for aneurysm growth assessment.   I have answered all of their questions and patient wish to proceed with this plan. We will schedule patient.       Thank you so very much for allowing me to participate in the care of this patient.  Please feel free to call any questions, comments, or concerns.     Peterson High MD,MSc  Department of Neurosurgery   Department of Radiology  Department of Neurology  [facility]    [facility]   [facility] [employer]     Total time spent in counseling and discussion about further management options including relevant lab work, treatment,  prognosis, medications and intended side effects was more than 60 minutes. More than 50 % of the time was spent in counseling and coordination of care.  I spent a total of 60 minutes on the day of the visit.This includes face to face time and non-face to face time preparing to see the patient (eg, review of tests), Obtaining and/or reviewing separately obtained history, Documenting clinical information in the electronic or other health record, Independently interpreting resultsand communicating results to the patient/family/caregiver, or Care coordination.     Scribe Attestation  I, Dr. Peterson High personally performed the services described in this documentation. All medical record entries made by the scribe, Bettie Palacios, were at my direction and in my presence.  I have reviewed the chart and agree that the record reflects my personal performance and is accurate and complete.           [1]   Current Outpatient Medications   Medication Sig Dispense Refill    acetaminophen (TYLENOL) 650 MG TbSR Take 1 tablet (650 mg total) by mouth every 6 (six) hours. 30 tablet 0    B complex-vitamin C-folic acid (DENIS-CHANTAL) 0.8 mg Tab Take 1 tablet (0.8 mg total) by mouth once daily. 90 tablet 3    cinacalcet  (SENSIPAR) 30 MG Tab Take 30 mg by mouth daily with breakfast.      clindamycin (CLEOCIN T) 1 % lotion Apply topically 2 (two) times daily. Use to areas prone to boils 60 mL 6    conjugated estrogens (PREMARIN) vaginal cream Place 1 g vaginally once daily for 14 days, THEN 1 g 3 (three) times a week. 30 g 4    docusate sodium (COLACE) 100 MG capsule Take 2 capsules (200 mg total) by mouth 2 (two) times daily. 90 capsule 3    esomeprazole (NEXIUM) 20 MG capsule Take 20 mg by mouth before breakfast.      gabapentin (NEURONTIN) 300 MG capsule Take 1 capsule (300 mg total) by mouth three times per week after dialysis 36 capsule 2    hpv vaccine,9-jaz (GARDASIL 9, PF,) 0.5 mL Syrg Inject 0.5 mLs into the muscle for 3 doses 1.5 mL 0    hydrOXYzine HCL (ATARAX) 10 MG Tab Take 1 tablet (10 mg total) by mouth 2 (two) times daily as needed (itching). 60 tablet 5    loratadine (CLARITIN ORAL) Take 1 tablet by mouth daily as needed (Allergies).      medroxyPROGESTERone (PROVERA) 10 MG tablet Take 1 tablet (10 mg total) by mouth once daily. 30 tablet 11    midodrine (PROAMATINE) 10 MG tablet Take 1 tablet (10 mg total) by mouth 2 (two) times daily as needed (As needed on dialysis days for hypotension). 90 tablet 2    nicotine (NICODERM CQ) 21 mg/24 hr Place 1 patch onto the skin once daily. Please dispense only clear patches , patient has a tape allergy. 28 patch 0    ondansetron (ZOFRAN) 4 MG tablet Take 1 tablet (4 mg total) by mouth 2 (two) times daily. 30 tablet 3    OPW TEST CLAIM - DO NOT FILL OPW test claim. Do not fill. 1 each 0    oxyCODONE (ROXICODONE) 5 MG immediate release tablet Take 1 tablet (5 mg total) by mouth every 4 (four) hours as needed for Pain. 10 tablet 0    sodium zirconium cyclosilicate (LOKELMA) 10 gram packet Take 1 packet (10 g total) by mouth 2 (two) times a day. Mix entire contents of packet(s) into drinking glass containing 3 tablespoons of water; stir well and drink immediately. Add water and  repeat until no powder remains to receive entire dose. 30 packet 3    sucroferric oxyhydroxide (VELPHORO) 500 mg Chew Break or dissolve 1 tablet by mouth with each meal and snack. Do not exceed 6 tablets per day. 180 tablet 11    triamcinolone acetonide 0.1% (KENALOG) 0.1 % cream Apply topically 2 (two) times daily. Use to affected areas for up to 2 weeks then take a 1 week break or decrease to 3 times weekly. Do not apply to groin or face. Use to itchy areas 454 g 1     Current Facility-Administered Medications   Medication Dose Route Frequency Provider Last Rate Last Admin    tuberculin injection 5 Units  5 Units Intradermal 1 time in Clinic/HOD Mihir Chang Jr., MD         Facility-Administered Medications Ordered in Other Visits   Medication Dose Route Frequency Provider Last Rate Last Admin    0.9%  NaCl infusion   Intravenous Continuous Mel Calvillo MD

## 2025-04-15 NOTE — PROGRESS NOTES
Individual Follow-Up Form    4/15/2025    Quit Date:     Clinical Status of Patient: Outpatient    Length of Service: 60 minutes    Continuing Medication: yes  Patches    Other Medications:      Target Symptoms: Withdrawal and medication side effects. The following were  rated moderate (3) to severe (4) on TCRS:  Moderate (3): crave, desire   Severe (4): none     Comments: Audio visit .  Patient continues to smoke per day. Pt remains on tobacco cessation medication of  21 mg nicotine patch QD and 2 mg nicotine lozenge / gum PRN (1-2 per hour in place of cigarettes.) No adverse effects noted at this time.  Will use [employer] to take a course to use as a distraction , it is an online program where she can take classes on various subjects . Discussion about using distraction is very necessary for her . She can go through dialysis for an extended period of time and not think of smoking but when she is idle at home is her trigger . Reviewed strategies, habitual behavior, stress, and high risk situations. Introduced stress with addition interventions, SOLVE, relaxation with interventions, nutrition, exercise, weight gain, and the importance of rewarding oneself for accomplishments toward becoming tobacco free.   .Pt understands to call CTTS if anything is needed before the next visit.      Diagnosis: F17.210    Next Visit: 2 weeks

## 2025-04-17 ENCOUNTER — CLINICAL SUPPORT (OUTPATIENT)
Dept: DERMATOLOGY | Facility: CLINIC | Age: 45
End: 2025-04-17
Payer: MEDICARE

## 2025-04-17 DIAGNOSIS — Z48.02 VISIT FOR SUTURE REMOVAL: Primary | ICD-10-CM

## 2025-04-17 PROCEDURE — 99024 POSTOP FOLLOW-UP VISIT: CPT | Mod: S$GLB,,, | Performed by: STUDENT IN AN ORGANIZED HEALTH CARE EDUCATION/TRAINING PROGRAM

## 2025-04-17 NOTE — PROGRESS NOTES
CC: 45 y.o.female patient is here for suture removal.     HPI: Patient is s/p punch biopsy of left lower abdomen on 4/3/2025.  Patient reports no problems.    WOUND PE:  Sutures intact.  Wound healing well.  Good approximation of skin edges.  No signs or symptoms of infection.    IMPRESSION:  FEATURES CONSISTENT WITH REACTIVE PERFORATING COLLAGENOSIS.     This lesion is benign.    PLAN:  Sutures removed today.  Continue wound care.    RTC: In 6 month(s) for skin check or sooner should any new concerns arise

## 2025-04-22 ENCOUNTER — CLINICAL SUPPORT (OUTPATIENT)
Dept: SMOKING CESSATION | Facility: CLINIC | Age: 45
End: 2025-04-22

## 2025-04-22 DIAGNOSIS — F17.200 NICOTINE DEPENDENCE: Primary | ICD-10-CM

## 2025-04-22 PROCEDURE — 99999 PR PBB SHADOW E&M-EST. PATIENT-LVL II: CPT | Mod: PBBFAC,TXP,,

## 2025-04-22 PROCEDURE — 99402 PREV MED CNSL INDIV APPRX 30: CPT | Mod: TXP,,,

## 2025-04-23 NOTE — PROGRESS NOTES
Individual Follow-Up Form    4/23/2025    Quit Date:     Clinical Status of Patient: Outpatient    Length of Service: 30 minutes    Continuing Medication: yes  Patches  Other Medications:      Target Symptoms: Withdrawal and medication side effects. The following were  rated moderate (3) to severe (4) on TCRS:  Moderate (3): crave, desire   Severe (4): none    Comments: Audio visit .  Patient continues to smoke 2-3 cigars per day. Pt remains on tobacco cessation medication of  21 mg nicotine patch QD  No adverse effects noted at this time. Patient shares she has not been feeling very well and that has helped her delay smoking .  Reviewed coping strategies/habitual behavior/relapse prevention with patient.  Visit cut short , she is having a headache. Pt understands to call CTTS if anything is needed before the next visit.          Diagnosis: F17.210    Next Visit: 2 weeks

## 2025-05-05 ENCOUNTER — PATIENT MESSAGE (OUTPATIENT)
Dept: VASCULAR SURGERY | Facility: CLINIC | Age: 45
End: 2025-05-05
Payer: MEDICARE

## 2025-05-05 ENCOUNTER — PATIENT MESSAGE (OUTPATIENT)
Dept: TRANSPLANT | Facility: CLINIC | Age: 45
End: 2025-05-05
Payer: MEDICARE

## 2025-05-05 DIAGNOSIS — Z99.2 ESRD ON HEMODIALYSIS: Primary | ICD-10-CM

## 2025-05-05 DIAGNOSIS — N18.6 ESRD ON HEMODIALYSIS: Primary | ICD-10-CM

## 2025-05-08 ENCOUNTER — HOSPITAL ENCOUNTER (OUTPATIENT)
Dept: RADIOLOGY | Facility: HOSPITAL | Age: 45
Discharge: HOME | End: 2025-05-08
Attending: NEUROLOGICAL SURGERY
Payer: MEDICARE

## 2025-05-08 ENCOUNTER — OFFICE VISIT (OUTPATIENT)
Dept: VASCULAR SURGERY | Facility: CLINIC | Age: 45
End: 2025-05-08
Attending: SURGERY
Payer: MEDICARE

## 2025-05-08 ENCOUNTER — HOSPITAL ENCOUNTER (OUTPATIENT)
Dept: VASCULAR SURGERY | Facility: CLINIC | Age: 45
Discharge: HOME | End: 2025-05-08
Attending: SURGERY
Payer: MEDICARE

## 2025-05-08 VITALS
HEIGHT: 71 IN | BODY MASS INDEX: 33.02 KG/M2 | HEART RATE: 106 BPM | TEMPERATURE: 98 F | DIASTOLIC BLOOD PRESSURE: 60 MMHG | WEIGHT: 235.88 LBS | SYSTOLIC BLOOD PRESSURE: 117 MMHG

## 2025-05-08 DIAGNOSIS — T82.858A STENOSIS OF ARTERIOVENOUS DIALYSIS FISTULA, INITIAL ENCOUNTER: Primary | ICD-10-CM

## 2025-05-08 DIAGNOSIS — N18.6 ESRD ON HEMODIALYSIS: ICD-10-CM

## 2025-05-08 DIAGNOSIS — Z99.2 ESRD ON HEMODIALYSIS: ICD-10-CM

## 2025-05-08 DIAGNOSIS — Z99.2 ESRD ON HEMODIALYSIS: Primary | ICD-10-CM

## 2025-05-08 DIAGNOSIS — N18.6 ESRD ON HEMODIALYSIS: Primary | ICD-10-CM

## 2025-05-08 DIAGNOSIS — I72.5 BASILAR ARTERY ANEURYSM: ICD-10-CM

## 2025-05-08 PROCEDURE — 93990 DOPPLER FLOW TESTING: CPT | Mod: S$GLB,TXP,, | Performed by: SURGERY

## 2025-05-08 PROCEDURE — 99999 PR PBB SHADOW E&M-EST. PATIENT-LVL IV: CPT | Mod: PBBFAC,TXP,, | Performed by: SURGERY

## 2025-05-08 PROCEDURE — 70551 MRI BRAIN STEM W/O DYE: CPT | Mod: TC,TXP

## 2025-05-08 NOTE — H&P (VIEW-ONLY)
"VASCULAR SURGERY CLINIC NOTE    Patient ID: Ade Silva is a 45 y.o. female.    I. HISTORY     Chief Complaint: AV ACCESS    HPI: 46 yo F with ESRD secondary to AD PKD. Patient reports cannulation complication with arterial pressure being low and clots seen after cannulation. She says the dialysis center can cannulate the distal ACF adjacent to the AC fossa. She has stents placed by Dr. Morfin in the proximal AVF. She had a nerve block to help with her nerve pain. No pain from access. Patient has gained weight and the proximal part of the AVF is 15 mm deep. The dialysis center is having a hard time cannulating the Proximal part of the AVF.  Denies any steal symtpoms.      Past Medical History:   Diagnosis Date    Anxiety     Asthma     last used 11/2025  smoking related last attack 23 y/o    Autosomal dominant polycystic kidney disease     Brain aneurysm     Depression     Encounter for blood transfusion     GERD (gastroesophageal reflux disease)     Hypertension     low since diaylsis    Seizures     as a child (age 7-8); " stress -related"    Stroke     Teeth missing     reports several loose teeth    TIA (transient ischemic attack)     Wears prescription eyeglasses     not using        Past Surgical History:   Procedure Laterality Date    AV FISTULA PLACEMENT Right 09/14/2023    Procedure: CREATION, AV FISTULA, radial cephalic;  Surgeon: MELISSA Loera II, MD;  Location: [address];  Service: Vascular;  Laterality: Right;    AV FISTULA PLACEMENT Right 10/05/2023    Procedure: CREATION, AV FISTULA - brachial;  Surgeon: MELISSA Loera II, MD;  Location: [address];  Service: Vascular;  Laterality: Right;  confirmed placement with doppler    BARTHOLIN GLAND CYST EXCISION N/A 2/6/2025    Procedure: EXCISION, CYST, BARTHOLIN'S GLAND;  Surgeon: Elis Jacobs MD;  Location: [facility];  Service: Gynecology Oncology;  Laterality: N/A;    BIOPSY OF VAGINA  12/10/2024    Procedure: BIOPSY, " UTERUS;  Surgeon: Elis Jacobs MD;  Location: [facility] OR 2ND FLR;  Service: Gynecology Oncology;;    CEREBRAL ANEURYSM REPAIR      coiling    CERVICAL BIOPSY N/A 12/10/2024    Procedure: BIOPSY, CERVIX;  Surgeon: Elis Jacobs MD;  Location: [facility] OR 2ND FLR;  Service: Gynecology Oncology;  Laterality: N/A;    COLD KNIFE CONIZATION OF CERVIX N/A 2/6/2025    Procedure: CONE BIOPSY, CERVIX, USING COLD KNIFE;  Surgeon: Elis Jacobs MD;  Location: [facility] OR;  Service: Gynecology Oncology;  Laterality: N/A;  2 hr case    COLONOSCOPY      CURETTAGE, ENDOCERVICAL N/A 12/10/2024    Procedure: CURETTAGE, ENDOCERVICAL;  Surgeon: Elis Jacobs MD;  [facility];  Service: Gynecology Oncology;  Laterality: N/A;    CYSTOSCOPY      age 13    EMBOLIZATION Right 04/23/2024    Procedure: EMBOLIZATION, BLOOD VESSEL FISTULA;  Surgeon: MELISSA Loera II, MD;  [facility];  Service: Vascular;  Laterality: Right;    EPIDURAL STEROID INJECTION INTO CERVICAL SPINE N/A 11/12/2024    Procedure: C7-T1 KIMBERLEE;  Surgeon: Presley Hough DO;  Location: [facility] PAIN MANAGEMENT;  Service: Pain Management;  Laterality: N/A;  15 mins no AC    FISTULOGRAM Right 04/23/2024    Procedure: Fistulogram;  Surgeon: MELISSA Loera II, MD;  [facility];  Service: Vascular;  Laterality: Right;  CONTRAST: 68ML, FLUORO TIME: 17.9, mGy: 45.11, Gycm2: 5.9548    HYSTEROSCOPY N/A 07/25/2024    Procedure: HYSTEROSCOPY;  Surgeon: Jazz Amador MD;  Location: [facility] OR;  Service: OB/GYN;  Laterality: N/A;    INCISION OF VULVA N/A 07/25/2024    Procedure: INCISION, VULVA;  Surgeon: Jazz Amador MD;  Location: [facility] OR;  Service: OB/GYN;  Laterality: N/A;    INJECTION OF ANESTHETIC AGENT AROUND STELLATE GANGLION Right 01/09/2025    Procedure: Right stellate ganglion block;  Surgeon: Victor Hugo Ingram MD;  Location: [facility] PAIN MANAGEMENT;  Service: Pain Management;  Laterality: Right;  no AC     INSERTION OF TUNNELED CENTRAL VENOUS HEMODIALYSIS CATHETER Right 05/03/2023    Procedure: Insertion, Catheter, Central Venous, Hemodialysis;  Surgeon: Priya Grimm MD;  Location: [facility] CATH LAB;  Service: Interventional Nephrology;  Laterality: Right;    LAPAROSCOPIC ROBOT-ASSISTED SURGICAL REMOVAL OF KIDNEY USING DA GLORIA XI Left 09/19/2024    Procedure: XI ROBOTIC NEPHRECTOMY;  Surgeon: Valentin Maldonado MD;  [facility];  Service: Urology;  Laterality: Left;    LOOP ELECTROSURGICAL EXCISION PROCEDURE (LEEP) N/A 07/25/2024    Procedure: LEEP (LOOP ELECTROSURGICAL EXCISION PROCEDURE);  Surgeon: Jazz Amador MD;  Location: [facility] OR;  Service: OB/GYN;  Laterality: N/A;    REMOVAL OF HEMODIALYSIS CATHETER  05/03/2023    Procedure: REMOVAL, CATHETER, HEMODIALYSIS;  Surgeon: Priya Grimm MD;  Location: [facility] CATH LAB;  Service: Interventional Nephrology;;    SURGICAL EXCISION OF ANAL LESION N/A 12/10/2024    Procedure: EXCISION, LESION, ANUS;  Surgeon: Son Mojica MD;  Location: [facility] OR 2ND FLR;  Service: Colon and Rectal;  Laterality: N/A;    THERMAL ABLATION OF ENDOMETRIUM N/A 07/25/2024    Procedure: ABLATION, ENDOMETRIUM, THERMAL;  Surgeon: Jazz Amador MD;  Location: [facility];  Service: OB/GYN;  Laterality: N/A;       Social History     Occupational History    Not on file   Tobacco Use    Smoking status: Every Day     Current packs/day: 1.00     Average packs/day: 1 pack/day for 31.4 years (31.4 ttl pk-yrs)     Types: Cigarettes, Cigars     Start date: 1994     Passive exposure: Current    Smokeless tobacco: Never    Tobacco comments:     Smoke 3-5 black and mild cigars a day replaced cigarettes with cigars   Substance and Sexual Activity    Alcohol use: Not Currently    Drug use: Yes     Frequency: 7.0 times per week     Types: Marijuana     Comment: thc smoking and gummies    Sexual activity: Not Currently       Review of Systems   Constitutional: Negative for weight loss.    HENT:  Negative for ear pain and nosebleeds.    Eyes:  Negative for discharge and pain.   Cardiovascular:  Negative for chest pain and palpitations.   Respiratory:  Negative for cough, shortness of breath and wheezing.    Endocrine: Negative for cold intolerance, heat intolerance and polyphagia.   Hematologic/Lymphatic: Negative for adenopathy. Does not bruise/bleed easily.   Skin:  Negative for itching and rash.   Musculoskeletal:  Negative for joint swelling and muscle cramps.   Gastrointestinal:  Negative for abdominal pain, diarrhea, nausea and vomiting.   Genitourinary:  Negative for dysuria and flank pain.   Neurological:  Negative for numbness and seizures.       Current Medications Reviewed    II. PHYSICAL EXAM     Physical Exam  Constitutional:       General: She is not in acute distress.     Appearance: Normal appearance. She is normal weight. She is not ill-appearing or diaphoretic.   Cardiovascular:      Rate and Rhythm: Normal rate.      Comments: 2+ right radial pulse, continuous palpable thrill over right AC fossa and distal AV fistula. Unable to palpate thrill as easily in upper arm  Pulmonary:      Effort: Pulmonary effort is normal.   Musculoskeletal:      Right lower leg: No edema.      Left lower leg: No edema.   Skin:     General: Skin is warm and dry.   Neurological:      General: No focal deficit present.      Mental Status: She is alert and oriented to person, place, and time. Mental status is at baseline.   Psychiatric:         Mood and Affect: Mood normal.         Behavior: Behavior normal.       III. ASSESSMENT & PLAN (MEDICAL DECISION MAKING)       Imaging Results: (I have personally reviewed the images/studies and provided my interpretation below)    RUE AVF u/s 5/9/24: Patent AVF with volume flow 2.2L/min. Aneurysmal proximal segment of the fistula less than 3cm. Stents in place in proximal AVG. No evidence of stenosis.       RUE AVF u/s 6/6/24: volume flow > 1.5L/min. No evidence of  stenosis.     RUE AVF US 5/8/2025: Volume flow 2.5 L. Hematoma which is resolving. PSV 786cm/s would be suggestive of stenosis from velocity standpoint although visually it does not appear stenotic.  AVF proximal 15 mm deep.       Assessment/Diagnosis and Plan:    Encounter Diagnoses   Name Primary?    ESRD on hemodialysis     Stenosis of arteriovenous dialysis fistula, initial encounter Yes         45 y.o. female s/p brachiocephalic the fistula creation 10/5/24. Patient has gained weight. The proximal part of the AVF is deep 15 mm and has a hard time cannulating. It was also reported the arterial pressure have decreased after cannulation. Based on this combined with possible inflow stenosis, I would recommend fistulogram with possible endovascular intervention.    -instructed to cannulate in mid/upper arm AVG away from her stents/aneurysm  -Fistulogram with possible endo intervention. Plan direct fistula access retrograde. scheduled May 13 2025, Consent in the chart. Risk and Benefits were discussed at length with the patient. Patient understood and signed consent.     MELISSA Loera II, MD, [facility]  Vascular Surgery  [facility]

## 2025-05-08 NOTE — PROGRESS NOTES
"VASCULAR SURGERY CLINIC NOTE    Patient ID: Ade Silva is a 45 y.o. female.    I. HISTORY     Chief Complaint: AV ACCESS    HPI: 44 yo F with ESRD secondary to AD PKD. Patient reports cannulation complication with arterial pressure being low and clots seen after cannulation. She says the dialysis center can cannulate the distal ACF adjacent to the AC fossa. She has stents placed by Dr. Morfin in the proximal AVF. She had a nerve block to help with her nerve pain. No pain from access. Patient has gained weight and the proximal part of the AVF is 15 mm deep. The dialysis center is having a hard time cannulating the Proximal part of the AVF.  Denies any steal symtpoms.      Past Medical History:   Diagnosis Date    Anxiety     Asthma     last used 11/2025  smoking related last attack 21 y/o    Autosomal dominant polycystic kidney disease     Brain aneurysm     Depression     Encounter for blood transfusion     GERD (gastroesophageal reflux disease)     Hypertension     low since diaylsis    Seizures     as a child (age 7-8); " stress -related"    Stroke     Teeth missing     reports several loose teeth    TIA (transient ischemic attack)     Wears prescription eyeglasses     not using        Past Surgical History:   Procedure Laterality Date    AV FISTULA PLACEMENT Right 09/14/2023    Procedure: CREATION, AV FISTULA, radial cephalic;  Surgeon: MELISSA Loera II, MD;  Location: [address];  Service: Vascular;  Laterality: Right;    AV FISTULA PLACEMENT Right 10/05/2023    Procedure: CREATION, AV FISTULA - brachial;  Surgeon: MELISSA Loera II, MD;  Location: [address];  Service: Vascular;  Laterality: Right;  confirmed placement with doppler    BARTHOLIN GLAND CYST EXCISION N/A 2/6/2025    Procedure: EXCISION, CYST, BARTHOLIN'S GLAND;  Surgeon: Elis Jacobs MD;  Location: [facility];  Service: Gynecology Oncology;  Laterality: N/A;    BIOPSY OF VAGINA  12/10/2024    Procedure: BIOPSY, " UTERUS;  Surgeon: Elis Jacobs MD;  Location: [facility] OR 2ND FLR;  Service: Gynecology Oncology;;    CEREBRAL ANEURYSM REPAIR      coiling    CERVICAL BIOPSY N/A 12/10/2024    Procedure: BIOPSY, CERVIX;  Surgeon: Elis Jacobs MD;  Location: [facility] OR 2ND FLR;  Service: Gynecology Oncology;  Laterality: N/A;    COLD KNIFE CONIZATION OF CERVIX N/A 2/6/2025    Procedure: CONE BIOPSY, CERVIX, USING COLD KNIFE;  Surgeon: Elis Jacobs MD;  Location: [facility] OR;  Service: Gynecology Oncology;  Laterality: N/A;  2 hr case    COLONOSCOPY      CURETTAGE, ENDOCERVICAL N/A 12/10/2024    Procedure: CURETTAGE, ENDOCERVICAL;  Surgeon: Elis Jacobs MD;  [facility];  Service: Gynecology Oncology;  Laterality: N/A;    CYSTOSCOPY      age 13    EMBOLIZATION Right 04/23/2024    Procedure: EMBOLIZATION, BLOOD VESSEL FISTULA;  Surgeon: MELISSA Loera II, MD;  [facility];  Service: Vascular;  Laterality: Right;    EPIDURAL STEROID INJECTION INTO CERVICAL SPINE N/A 11/12/2024    Procedure: C7-T1 KIMBERLEE;  Surgeon: Presley Hough DO;  Location: [facility] PAIN MANAGEMENT;  Service: Pain Management;  Laterality: N/A;  15 mins no AC    FISTULOGRAM Right 04/23/2024    Procedure: Fistulogram;  Surgeon: MELISSA Loera II, MD;  [facility];  Service: Vascular;  Laterality: Right;  CONTRAST: 68ML, FLUORO TIME: 17.9, mGy: 45.11, Gycm2: 5.9548    HYSTEROSCOPY N/A 07/25/2024    Procedure: HYSTEROSCOPY;  Surgeon: Jazz Amador MD;  Location: [facility] OR;  Service: OB/GYN;  Laterality: N/A;    INCISION OF VULVA N/A 07/25/2024    Procedure: INCISION, VULVA;  Surgeon: Jazz Amador MD;  Location: [facility] OR;  Service: OB/GYN;  Laterality: N/A;    INJECTION OF ANESTHETIC AGENT AROUND STELLATE GANGLION Right 01/09/2025    Procedure: Right stellate ganglion block;  Surgeon: Victor Hugo Ingram MD;  Location: [facility] PAIN MANAGEMENT;  Service: Pain Management;  Laterality: Right;  no AC     INSERTION OF TUNNELED CENTRAL VENOUS HEMODIALYSIS CATHETER Right 05/03/2023    Procedure: Insertion, Catheter, Central Venous, Hemodialysis;  Surgeon: Priya Grimm MD;  Location: [facility] CATH LAB;  Service: Interventional Nephrology;  Laterality: Right;    LAPAROSCOPIC ROBOT-ASSISTED SURGICAL REMOVAL OF KIDNEY USING DA GLORIA XI Left 09/19/2024    Procedure: XI ROBOTIC NEPHRECTOMY;  Surgeon: Valentin Maldonado MD;  [facility];  Service: Urology;  Laterality: Left;    LOOP ELECTROSURGICAL EXCISION PROCEDURE (LEEP) N/A 07/25/2024    Procedure: LEEP (LOOP ELECTROSURGICAL EXCISION PROCEDURE);  Surgeon: Jazz Amador MD;  Location: [facility] OR;  Service: OB/GYN;  Laterality: N/A;    REMOVAL OF HEMODIALYSIS CATHETER  05/03/2023    Procedure: REMOVAL, CATHETER, HEMODIALYSIS;  Surgeon: Priya Grimm MD;  Location: [facility] CATH LAB;  Service: Interventional Nephrology;;    SURGICAL EXCISION OF ANAL LESION N/A 12/10/2024    Procedure: EXCISION, LESION, ANUS;  Surgeon: Son Mojica MD;  Location: [facility] OR 2ND FLR;  Service: Colon and Rectal;  Laterality: N/A;    THERMAL ABLATION OF ENDOMETRIUM N/A 07/25/2024    Procedure: ABLATION, ENDOMETRIUM, THERMAL;  Surgeon: Jazz Amador MD;  Location: [facility];  Service: OB/GYN;  Laterality: N/A;       Social History     Occupational History    Not on file   Tobacco Use    Smoking status: Every Day     Current packs/day: 1.00     Average packs/day: 1 pack/day for 31.4 years (31.4 ttl pk-yrs)     Types: Cigarettes, Cigars     Start date: 1994     Passive exposure: Current    Smokeless tobacco: Never    Tobacco comments:     Smoke 3-5 black and mild cigars a day replaced cigarettes with cigars   Substance and Sexual Activity    Alcohol use: Not Currently    Drug use: Yes     Frequency: 7.0 times per week     Types: Marijuana     Comment: thc smoking and gummies    Sexual activity: Not Currently       Review of Systems   Constitutional: Negative for weight loss.    HENT:  Negative for ear pain and nosebleeds.    Eyes:  Negative for discharge and pain.   Cardiovascular:  Negative for chest pain and palpitations.   Respiratory:  Negative for cough, shortness of breath and wheezing.    Endocrine: Negative for cold intolerance, heat intolerance and polyphagia.   Hematologic/Lymphatic: Negative for adenopathy. Does not bruise/bleed easily.   Skin:  Negative for itching and rash.   Musculoskeletal:  Negative for joint swelling and muscle cramps.   Gastrointestinal:  Negative for abdominal pain, diarrhea, nausea and vomiting.   Genitourinary:  Negative for dysuria and flank pain.   Neurological:  Negative for numbness and seizures.       Current Medications Reviewed    II. PHYSICAL EXAM     Physical Exam  Constitutional:       General: She is not in acute distress.     Appearance: Normal appearance. She is normal weight. She is not ill-appearing or diaphoretic.   Cardiovascular:      Rate and Rhythm: Normal rate.      Comments: 2+ right radial pulse, continuous palpable thrill over right AC fossa and distal AV fistula. Unable to palpate thrill as easily in upper arm  Pulmonary:      Effort: Pulmonary effort is normal.   Musculoskeletal:      Right lower leg: No edema.      Left lower leg: No edema.   Skin:     General: Skin is warm and dry.   Neurological:      General: No focal deficit present.      Mental Status: She is alert and oriented to person, place, and time. Mental status is at baseline.   Psychiatric:         Mood and Affect: Mood normal.         Behavior: Behavior normal.       III. ASSESSMENT & PLAN (MEDICAL DECISION MAKING)       Imaging Results: (I have personally reviewed the images/studies and provided my interpretation below)    RUE AVF u/s 5/9/24: Patent AVF with volume flow 2.2L/min. Aneurysmal proximal segment of the fistula less than 3cm. Stents in place in proximal AVG. No evidence of stenosis.       RUE AVF u/s 6/6/24: volume flow > 1.5L/min. No evidence of  stenosis.     RUE AVF US 5/8/2025: Volume flow 2.5 L. Hematoma which is resolving. PSV 786cm/s would be suggestive of stenosis from velocity standpoint although visually it does not appear stenotic.  AVF proximal 15 mm deep.       Assessment/Diagnosis and Plan:    Encounter Diagnoses   Name Primary?    ESRD on hemodialysis     Stenosis of arteriovenous dialysis fistula, initial encounter Yes         45 y.o. female s/p brachiocephalic the fistula creation 10/5/24. Patient has gained weight. The proximal part of the AVF is deep 15 mm and has a hard time cannulating. It was also reported the arterial pressure have decreased after cannulation. Based on this combined with possible inflow stenosis, I would recommend fistulogram with possible endovascular intervention.    -instructed to cannulate in mid/upper arm AVG away from her stents/aneurysm  -Fistulogram with possible endo intervention. Plan direct fistula access retrograde. scheduled May 13 2025, Consent in the chart. Risk and Benefits were discussed at length with the patient. Patient understood and signed consent.     MELISSA Loera II, MD, [facility]  Vascular Surgery  [facility]

## 2025-05-12 ENCOUNTER — TELEPHONE (OUTPATIENT)
Dept: VASCULAR SURGERY | Facility: CLINIC | Age: 45
End: 2025-05-12
Payer: MEDICARE

## 2025-05-12 ENCOUNTER — ANESTHESIA EVENT (OUTPATIENT)
Dept: SURGERY | Facility: HOSPITAL | Age: 45
End: 2025-05-12

## 2025-05-12 NOTE — PRE-PROCEDURE INSTRUCTIONS
PreOp Instructions given:   - Verbal medication information (what to hold and what to take)   - NPO guidelines 2400  - Arrival place directions given; time to be given the day before procedure by the   Surgeon's Office DOSC  - Bathing with antibacterial soap   - Don't wear any jewelry or bring any valuables AM of surgery   - No makeup or moisturizer to face   - No perfume/cologne, powder, lotions or aftershave   Pt. verbalized understanding.   Pt denies any h/o Anesthesia/Sedation complications or side effects.   - f/u iron studies   - CTM

## 2025-05-12 NOTE — ANESTHESIA PREPROCEDURE EVALUATION
[facility]  Anesthesia Pre-Operative Evaluation    Patient Name: Ade Silva  YOB: 1980  MRN: [id number]    SUBJECTIVE:   05/12/2025    Pre-operative evaluation for Procedure(s) (LRB):  PTA, AV FISTULA (Right)    Ade Silva is a 45 y.o. female with a PMHx significant for GERD, asthma, nicotine use, marijuana use, ADPKD, ESRD on HDS MWF with AVG stenosis . Patient now presents for the above procedure(s).    Previous Airway  Induction: Intravenous  Intubated: Postinduction  Mask Ventilation: Easy with oral airway  Attempts: 1  Attempted By: Resident anesthesiologist  Method of Intubation: Video laryngoscopy  Blade: Ellis 3  Laryngeal View Grade: Grade IIA - cords partially seen     LDA:        Hemodialysis AV Fistula 09/14/23 Right forearm (Active)   Site Assessment Dry;Clean;Intact 12/10/24 0937   Patency Present;Thrill 12/10/24 0937   Status Not Accessed 11/12/24 1011   Dressing Intervention Other (Comment) 11/12/24 1011   Number of days: 606            Hemodialysis AV Fistula 10/05/23 0833 Right upper arm (Active)   Site Assessment Clean;Dry;Intact 01/09/25 0635   Patency Present;Thrill;Bruit 01/09/25 0635   Status Deaccessed 01/09/25 0635   Flows Good 01/09/25 0635   Dressing Intervention Other (Comment) 07/25/24 0605   Dressing Status Clean;Dry;Intact 01/09/25 0635   Site Condition No complications 01/09/25 0635   Dressing Open to air (None) 04/23/24 1030   Number of days: 585            Hemodialysis AV Fistula Right upper arm (Active)   Needle Size 16ga 05/12/25 1332   Site Assessment Clean;Dry;Intact 05/12/25 1332   Patency Present;Thrill;Bruit 05/12/25 1332   Status Deaccessed 05/12/25 1332   Flows Good 05/12/25 0943   Dressing Intervention First dressing 05/12/25 1332   Dressing Status Clean;Dry 05/12/25 1332   Site Condition No complications 05/12/25 1332   Dressing Gauze 05/12/25 1332   Number of days:             Closed/Suction Drain 09/19/24 1708  "Tube - 1 Left Abdomen Bulb 15 Fr. (Active)   Site Description Unable to view 09/20/24 0831   Dressing Type Gauze 09/20/24 0831   Dressing Status Clean;Dry;Intact 09/20/24 0831   Dressing Intervention Integrity maintained 09/20/24 0831   Drainage Serosanguineous 09/20/24 0831   Status To bulb suction 09/19/24 1935   Output (mL) 50 mL 09/20/24 0526   Number of days: 234       Transthoracic Echo:  Results for orders placed during the hospital encounter of 01/16/25    Echo    Interpretation Summary    Left Ventricle: The left ventricle is normal in size. Normal wall thickness. There is normal systolic function with a visually estimated ejection fraction of 55 - 60%. Quantitated ejection fraction is 55%. Global longitudinal strain is -14.0%. There is normal diastolic function.    Right Ventricle: Normal right ventricular cavity size. Wall thickness is normal. Systolic function is normal.    Tricuspid Valve: There is mild regurgitation.    Pulmonary Artery: The estimated pulmonary artery systolic pressure is 21 mmHg.    IVC/SVC: Normal venous pressure at 3 mmHg.      Problem List[1]    Review of patient's allergies indicates:   Allergen Reactions    Penicillins Hives and Edema     Throat swelling    Adhesive Rash     Adhesive/silk tape (type found on band aides). Can only use "Paper Tape"    Aspirin Hives and Nausea And Vomiting     And vomiting    Butalbital-acetaminophen-caff Nausea And Vomiting and Other (See Comments)     Dizziness (made pt feel sick)    Latex, natural rubber Rash    Nickel Rash    Tomato Nausea And Vomiting       Current Outpatient Medications   Medication Instructions    acetaminophen (TYLENOL) 650 mg, Oral, Every 6 hours    allopurinoL (ZYLOPRIM) 100 mg, Oral, Three times weekly, Take 3 times weekly after dialysis    B complex-vitamin C-folic acid (DENIS-CHANTAL) 0.8 mg Tab 0.8 mg, Oral, Daily    cinacalcet (SENSIPAR) 30 mg, Every Mon, Wed, Fri    clindamycin (CLEOCIN T) 1 % lotion " Topical (Top), 2 times daily, Use to areas prone to boils    conjugated estrogens (PREMARIN) vaginal cream Place 1 g vaginally once daily for 14 days, THEN 1 g 3 (three) times a week.    docusate sodium (COLACE) 200 mg, Oral, 2 times daily    esomeprazole (NEXIUM) 20 mg, Before breakfast    gabapentin (NEURONTIN) 300 MG capsule Take 1 capsule (300 mg total) by mouth three times per week after dialysis    hydrOXYzine HCL (ATARAX) 10 mg, Oral, 2 times daily PRN    LOKELMA 10 g, Oral, 2 times daily, Mix entire contents of packet(s) into drinking glass containing 3 tablespoons of water; stir well and drink immediately. Add water and repeat until no powder remains to receive entire dose.    loratadine (CLARITIN ORAL) 1 tablet, Daily PRN    medroxyPROGESTERone (PROVERA) 10 mg, Oral, Daily    midodrine (PROAMATINE) 10 mg, Oral, 2 times daily PRN    nicotine (NICODERM CQ) 21 mg/24 hr 1 patch, Transdermal, Daily, Please dispense only clear patches , patient has a tape allergy.    ondansetron (ZOFRAN) 4 mg, Oral, 2 times daily    OPW TEST CLAIM - DO NOT FILL OPW test claim. Do not fill.    oxyCODONE (ROXICODONE) 5 mg, Oral, Every 4 hours PRN    sucroferric oxyhydroxide (VELPHORO) 500 mg Chew Break or dissolve 1 tablet by mouth with each meal and snack. Do not exceed 6 tablets per day.    triamcinolone acetonide 0.1% (KENALOG) 0.1 % cream Topical (Top), 2 times daily, Use to affected areas for up to 2 weeks then take a 1 week break or decrease to 3 times weekly. Do not apply to groin or face. Use to itchy areas       Past Surgical History:   Procedure Laterality Date    AV FISTULA PLACEMENT Right 09/14/2023    Procedure: CREATION, AV FISTULA, radial cephalic;  Surgeon: MELISSA Loera II, MD;  Location: [address];  Service: Vascular;  Laterality: Right;    AV FISTULA PLACEMENT Right 10/05/2023    Procedure: CREATION, AV FISTULA - brachial;  Surgeon: MELISSA Loera II, MD;  Location: [address]  FLR;  Service: Vascular;  Laterality: Right;  confirmed placement with doppler    BARTHOLIN GLAND CYST EXCISION N/A 2/6/2025    Procedure: EXCISION, CYST, BARTHOLIN'S GLAND;  Surgeon: Elis Jacobs MD;  Location: [facility];  Service: Gynecology Oncology;  Laterality: N/A;    BIOPSY OF VAGINA  12/10/2024    Procedure: BIOPSY, UTERUS;  Surgeon: Elis Jacobs MD;  Location: [facility] OR 2ND FLR;  Service: Gynecology Oncology;;    CEREBRAL ANEURYSM REPAIR      coiling    CERVICAL BIOPSY N/A 12/10/2024    Procedure: BIOPSY, CERVIX;  Surgeon: Elis Jacobs MD;  Location: [facility] OR 2ND FLR;  Service: Gynecology Oncology;  Laterality: N/A;    COLD KNIFE CONIZATION OF CERVIX N/A 2/6/2025    Procedure: CONE BIOPSY, CERVIX, USING COLD KNIFE;  Surgeon: Elis Jacobs MD;  Location: [facility];  Service: Gynecology Oncology;  Laterality: N/A;  2 hr case    COLONOSCOPY      CURETTAGE, ENDOCERVICAL N/A 12/10/2024    Procedure: CURETTAGE, ENDOCERVICAL;  Surgeon: Elis Jacobs MD;  Location: [facility] OR 2ND FLR;  Service: Gynecology Oncology;  Laterality: N/A;    CYSTOSCOPY      age 13    EMBOLIZATION Right 04/23/2024    Procedure: EMBOLIZATION, BLOOD VESSEL FISTULA;  Surgeon: MELISSA Loera II, MD;  Location: [facility] OR 2ND FLR;  Service: Vascular;  Laterality: Right;    EPIDURAL STEROID INJECTION INTO CERVICAL SPINE N/A 11/12/2024    Procedure: C7-T1 KIMBERLEE;  Surgeon: Presley Hough DO;  Location: [facility];  Service: Pain Management;  Laterality: N/A;  15 mins no AC    FISTULOGRAM Right 04/23/2024    Procedure: Fistulogram;  Surgeon: MELISSA Loera II, MD;  Location: [facility] OR 2ND FLR;  Service: Vascular;  Laterality: Right;  CONTRAST: 68ML, FLUORO TIME: 17.9, mGy: 45.11, Gycm2: 5.9548    HYSTEROSCOPY N/A 07/25/2024    Procedure: HYSTEROSCOPY;  Surgeon: Jazz Amador MD;  Location: [facility];  Service: OB/GYN;  Laterality: N/A;    INCISION OF VULVA N/A 07/25/2024    Procedure: INCISION, VULVA;   Surgeon: Jazz Amador MD;  Location: [facility];  Service: OB/GYN;  Laterality: N/A;    INJECTION OF ANESTHETIC AGENT AROUND STELLATE GANGLION Right 01/09/2025    Procedure: Right stellate ganglion block;  Surgeon: Victor Hugo Ingram MD;  Location: [facility];  Service: Pain Management;  Laterality: Right;  no AC    INSERTION OF TUNNELED CENTRAL VENOUS HEMODIALYSIS CATHETER Right 05/03/2023    Procedure: Insertion, Catheter, Central Venous, Hemodialysis;  Surgeon: Priya Grimm MD;  Location: [facility] CATH LAB;  Service: Interventional Nephrology;  Laterality: Right;    LAPAROSCOPIC ROBOT-ASSISTED SURGICAL REMOVAL OF KIDNEY USING DA GLORIA XI Left 09/19/2024    Procedure: XI ROBOTIC NEPHRECTOMY;  Surgeon: Valentin Maldonado MD;  Location: [address];  Service: Urology;  Laterality: Left;    LOOP ELECTROSURGICAL EXCISION PROCEDURE (LEEP) N/A 07/25/2024    Procedure: LEEP (LOOP ELECTROSURGICAL EXCISION PROCEDURE);  Surgeon: Jazz Amador MD;  Location: [facility];  Service: OB/GYN;  Laterality: N/A;    REMOVAL OF HEMODIALYSIS CATHETER  05/03/2023    Procedure: REMOVAL, CATHETER, HEMODIALYSIS;  Surgeon: Priya Grimm MD;  Location: [facility] CATH LAB;  Service: Interventional Nephrology;;    SURGICAL EXCISION OF ANAL LESION N/A 12/10/2024    Procedure: EXCISION, LESION, ANUS;  Surgeon: Son Mojica MD;  [address]  Service: Colon and Rectal;  Laterality: N/A;    THERMAL ABLATION OF ENDOMETRIUM N/A 07/25/2024    Procedure: ABLATION, ENDOMETRIUM, THERMAL;  Surgeon: Jazz Amador MD;  Location: [facility];  Service: OB/GYN;  Laterality: N/A;       Social History     Substance and Sexual Activity   Drug Use Yes    Frequency: 7.0 times per week    Types: Marijuana    Comment: thc smoking and gummies     Alcohol Use: Not At Risk (2/10/2025)    AUDIT-C     Frequency of Alcohol Consumption: Never     Average Number of Drinks: Patient does not drink     Frequency of  Binge Drinking: Never     Tobacco Use: High Risk (5/8/2025)    Patient History     Smoking Tobacco Use: Every Day     Smokeless Tobacco Use: Never     Passive Exposure: Current       OBJECTIVE:     Vital Signs Range:      5/9/2025     9:42 AM 5/9/2025    10:30 AM 5/12/2025     9:45 AM   Vitals - 1 value per visit   SYSTOLIC 112  136   DIASTOLIC 69  76   Pulse  59 92         CBC:   Lab Results   Component Value Date    WBC 7.63 05/07/2025    HGB 13.3 05/07/2025    HCT 41.0 05/07/2025     (H) 05/07/2025     05/07/2025         CMP:   Sodium   Date Value Ref Range Status   05/07/2025 140 136 - 145 mEq/L Final     Potassium   Date Value Ref Range Status   05/07/2025 4.3 3.5 - 5.1 mEq/L Final     Chloride   Date Value Ref Range Status   05/07/2025 100 96 - 108 mEq/L Final     CO2   Date Value Ref Range Status   02/06/2025 28 23 - 29 mmol/L Final     Glucose   Date Value Ref Range Status   02/06/2025 84 70 - 110 mg/dL Final     BUN   Date Value Ref Range Status   02/06/2025 39 (H) 6 - 20 mg/dL Final     Creatinine   Date Value Ref Range Status   05/07/2025 13.19 (H) 0.60 - 1.30 mg/dL Final     Calcium   Date Value Ref Range Status   05/07/2025 9.1 8.7 - 10.4 mg/dL Final     Comment:     Please note change in reference range.     Total Protein   Date Value Ref Range Status   07/25/2024 7.6 6.0 - 8.4 g/dL Final     Albumin   Date Value Ref Range Status   05/07/2025 4.2 3.5 - 5.2 g/dL Final     Total Bilirubin   Date Value Ref Range Status   07/25/2024 0.5 0.1 - 1.0 mg/dL Final     Comment:     For infants and newborns, interpretation of results should be based  on gestational age, weight and in agreement with clinical  observations.    Premature Infant recommended reference ranges:  Up to 24 hours.............<8.0 mg/dL  Up to 48 hours............<12.0 mg/dL  3-5 days..................<15.0 mg/dL  6-29 days.................<15.0 mg/dL       Alkaline Phosphatase   Date Value Ref Range Status   05/07/2025 79  35 - 104 U/L Final     AST   Date Value Ref Range Status   07/25/2024 8 (L) 10 - 40 U/L Final     ALT   Date Value Ref Range Status   05/07/2025 10 7 - 52 U/L Final     Anion Gap   Date Value Ref Range Status   02/06/2025 12 8 - 16 mmol/L Final       INR:  Lab Results   Component Value Date    INR 1.0 10/12/2023    INR 1.0 04/28/2023       Cardiac Studies    EKG:   Results for orders placed or performed during the hospital encounter of 09/10/24   EKG 12-lead    Collection Time: 09/10/24 10:26 AM   Result Value Ref Range    QRS Duration 82 ms    [facility] QTC Calculation 459 ms    Narrative    Test Reason : Z01.818,    Vent. Rate : 056 BPM     Atrial Rate : 056 BPM     P-R Int : 144 ms          QRS Dur : 082 ms      QT Int : 476 ms       P-R-T Axes : 057 022 049 degrees     QTc Int : 459 ms    Sinus bradycardia  Possible Left atrial enlargement    Abnormal ECG  When compared with ECG of 24-OCT-2023 08:45,    Criteria for Anterior infarct are no longer Present  Criteria for Anterolateral infarct are no longer Present  T wave amplitude has increased in Anterior leads  Confirmed by CANDIDO ELLISON MD ([id number]) on 9/10/2024 11:41:31 AM    Referred By: HARISH BLACK           Confirmed By:CANDIDO ELLISON MD       Transthoracic Echo:  Results for orders placed during the hospital encounter of 01/16/25    Echo    Interpretation Summary    Left Ventricle: The left ventricle is normal in size. Normal wall thickness. There is normal systolic function with a visually estimated ejection fraction of 55 - 60%. Quantitated ejection fraction is 55%. Global longitudinal strain is -14.0%. There is normal diastolic function.    Right Ventricle: Normal right ventricular cavity size. Wall thickness is normal. Systolic function is normal.    Tricuspid Valve: There is mild regurgitation.    Pulmonary Artery: The estimated pulmonary artery systolic pressure is 21 mmHg.    IVC/SVC: Normal venous pressure at 3 mmHg.      Transesophageal Echo:  No  results found for this or any previous visit.      Nuclear Stress Test:  No results found for this or any previous visit.      Stress Echo:  No results found for this or any previous visit.      Nuclear Stress Echo:  No results found for this or any previous visit.      Cardiac Catheterization:  No results found for this or any previous visit.      Cardiac Device Check:  No results found for this or any previous visit.      ASSESSMENT/PLAN:                                                                                                                05/12/2025  Ade Silva is a 45 y.o., female.      Pre-op Assessment          Review of Systems  Cardiovascular:     Hypertension                                          Pulmonary:    Asthma                    Renal/:  Chronic Renal Disease, ESRD, Dialysis                Hepatic/GI:     GERD                Neurological:  TIA,                                            Anesthesia Plan  Type of Anesthesia, risks & benefits discussed:    Anesthesia Type: MAC, Gen Natural Airway  Intra-op Monitoring Plan: Standard ASA Monitors  Post Op Pain Control Plan: multimodal analgesia and IV/PO Opioids PRN  Induction:  IV  Informed Consent: Informed consent signed with the Patient and all parties understand the risks and agree with anesthesia plan.  All questions answered.   ASA Score: 3  Day of Surgery Review of History & Physical: H&P Update referred to the surgeon/provider.    Ready For Surgery From Anesthesia Perspective.   .             [1]  Patient Active Problem List  Diagnosis    Polycystic kidney disease    Chronic kidney disease-mineral and bone disorder    Anemia    Autosomal dominant polycystic kidney disease    Benign hypertension with ESRD (end-stage renal disease)    Basilar artery aneurysm    Gross hematuria    Hematuria    S/P arteriovenous (AV) fistula creation    ESRD (end stage renal disease) on dialysis    s/p hysteroscopy, endometrial  ablation, LEEP & vulvectomy    Renal mass    Mild intermittent asthma without complication    GERD (gastroesophageal reflux disease)    Snoring    Anxiety and depression    Papular rash    Macrocytosis without anemia    S/P EUA/Colposcopy/Cervcal Biopsy, ECC, EMBx - 12/10/24    Dysplasia of cervix, high grade TAYLOR 2    Hyperparathyroidism due to ESRD on dialysis    Class 1 obesity due to excess calories with serious comorbidity and body mass index (BMI) of 30.0 to 30.9 in adult    Status post CKC & Bartholins Gland Excision 2/6/25    ESRD on hemodialysis

## 2025-05-13 ENCOUNTER — ANESTHESIA (OUTPATIENT)
Dept: SURGERY | Facility: HOSPITAL | Age: 45
End: 2025-05-13
Payer: MEDICARE

## 2025-05-13 ENCOUNTER — HOSPITAL ENCOUNTER (OUTPATIENT)
Facility: HOSPITAL | Age: 45
Discharge: HOME | End: 2025-05-13
Attending: SURGERY | Admitting: SURGERY
Payer: MEDICARE

## 2025-05-13 VITALS
RESPIRATION RATE: 22 BRPM | OXYGEN SATURATION: 98 % | DIASTOLIC BLOOD PRESSURE: 59 MMHG | SYSTOLIC BLOOD PRESSURE: 99 MMHG | HEART RATE: 83 BPM | TEMPERATURE: 98 F

## 2025-05-13 DIAGNOSIS — T82.898S ARTERIOVENOUS FISTULA OCCLUSION, SEQUELA: Primary | ICD-10-CM

## 2025-05-13 PROBLEM — T82.898A ARTERIOVENOUS FISTULA OCCLUSION: Status: ACTIVE | Noted: 2025-05-13

## 2025-05-13 LAB — HCG INTACT+B SERPL-ACNC: 2.7 MIU/ML

## 2025-05-13 PROCEDURE — 63600175 PHARM REV CODE 636 W HCPCS

## 2025-05-13 PROCEDURE — 36000706: Mod: TXP | Performed by: SURGERY

## 2025-05-13 PROCEDURE — C1725 CATH, TRANSLUMIN NON-LASER: HCPCS | Mod: NTX | Performed by: SURGERY

## 2025-05-13 PROCEDURE — C1769 GUIDE WIRE: HCPCS | Mod: NTX | Performed by: SURGERY

## 2025-05-13 PROCEDURE — C1894 INTRO/SHEATH, NON-LASER: HCPCS | Mod: NTX | Performed by: SURGERY

## 2025-05-13 PROCEDURE — 36000707: Mod: NTX | Performed by: SURGERY

## 2025-05-13 PROCEDURE — 84702 CHORIONIC GONADOTROPIN TEST: CPT | Mod: NTX | Performed by: SURGERY

## 2025-05-13 PROCEDURE — 37000009 HC ANESTHESIA EA ADD 15 MINS: Mod: NTX | Performed by: SURGERY

## 2025-05-13 PROCEDURE — 36902 INTRO CATH DIALYSIS CIRCUIT: CPT | Mod: TXP,,, | Performed by: SURGERY

## 2025-05-13 PROCEDURE — 71000044 HC DOSC ROUTINE RECOVERY FIRST HOUR: Mod: TXP | Performed by: SURGERY

## 2025-05-13 PROCEDURE — 25500020 PHARM REV CODE 255: Mod: TXP | Performed by: SURGERY

## 2025-05-13 PROCEDURE — 27201423 OPTIME MED/SURG SUP & DEVICES STERILE SUPPLY: Mod: TXP | Performed by: SURGERY

## 2025-05-13 PROCEDURE — 63600175 PHARM REV CODE 636 W HCPCS: Mod: TXP | Performed by: SURGERY

## 2025-05-13 PROCEDURE — 37000008 HC ANESTHESIA 1ST 15 MINUTES: Mod: NTX | Performed by: SURGERY

## 2025-05-13 PROCEDURE — 71000015 HC POSTOP RECOV 1ST HR: Mod: NTX | Performed by: SURGERY

## 2025-05-13 RX ORDER — GLUCAGON 1 MG
1 KIT INJECTION
Status: DISCONTINUED | OUTPATIENT
Start: 2025-05-13 | End: 2025-05-13 | Stop reason: HOSPADM

## 2025-05-13 RX ORDER — MIDAZOLAM HYDROCHLORIDE 1 MG/ML
INJECTION INTRAMUSCULAR; INTRAVENOUS
Status: DISCONTINUED | OUTPATIENT
Start: 2025-05-13 | End: 2025-05-13

## 2025-05-13 RX ORDER — IODIXANOL 320 MG/ML
INJECTION, SOLUTION INTRAVASCULAR
Status: DISCONTINUED | OUTPATIENT
Start: 2025-05-13 | End: 2025-05-13 | Stop reason: HOSPADM

## 2025-05-13 RX ORDER — HEPARIN SOD,PORCINE/0.9 % NACL 1000/500ML
INTRAVENOUS SOLUTION INTRAVENOUS
Status: DISCONTINUED | OUTPATIENT
Start: 2025-05-13 | End: 2025-05-13 | Stop reason: HOSPADM

## 2025-05-13 RX ORDER — HEPARIN SODIUM 1000 [USP'U]/ML
INJECTION, SOLUTION INTRAVENOUS; SUBCUTANEOUS
Status: DISCONTINUED | OUTPATIENT
Start: 2025-05-13 | End: 2025-05-13

## 2025-05-13 RX ORDER — LIDOCAINE HYDROCHLORIDE 10 MG/ML
INJECTION, SOLUTION INFILTRATION; PERINEURAL
Status: DISCONTINUED | OUTPATIENT
Start: 2025-05-13 | End: 2025-05-13 | Stop reason: HOSPADM

## 2025-05-13 RX ORDER — ONDANSETRON HYDROCHLORIDE 2 MG/ML
4 INJECTION, SOLUTION INTRAVENOUS DAILY PRN
Status: DISCONTINUED | OUTPATIENT
Start: 2025-05-13 | End: 2025-05-13 | Stop reason: HOSPADM

## 2025-05-13 RX ORDER — FENTANYL CITRATE 50 UG/ML
INJECTION, SOLUTION INTRAMUSCULAR; INTRAVENOUS
Status: DISCONTINUED | OUTPATIENT
Start: 2025-05-13 | End: 2025-05-13

## 2025-05-13 RX ORDER — SODIUM CHLORIDE 0.9 % (FLUSH) 0.9 %
10 SYRINGE (ML) INJECTION
Status: DISCONTINUED | OUTPATIENT
Start: 2025-05-13 | End: 2025-05-13 | Stop reason: HOSPADM

## 2025-05-13 RX ORDER — HYDROMORPHONE HYDROCHLORIDE 1 MG/ML
0.2 INJECTION, SOLUTION INTRAMUSCULAR; INTRAVENOUS; SUBCUTANEOUS EVERY 5 MIN PRN
Status: DISCONTINUED | OUTPATIENT
Start: 2025-05-13 | End: 2025-05-13 | Stop reason: HOSPADM

## 2025-05-13 RX ORDER — SODIUM CHLORIDE 9 MG/ML
INJECTION, SOLUTION INTRAVENOUS CONTINUOUS
Status: DISCONTINUED | OUTPATIENT
Start: 2025-05-13 | End: 2025-05-13 | Stop reason: HOSPADM

## 2025-05-13 RX ORDER — PHENYLEPHRINE HYDROCHLORIDE 10 MG/ML
INJECTION INTRAVENOUS
Status: DISCONTINUED | OUTPATIENT
Start: 2025-05-13 | End: 2025-05-13

## 2025-05-13 RX ADMIN — PHENYLEPHRINE HYDROCHLORIDE 100 MCG: 10 INJECTION INTRAVENOUS at 08:05

## 2025-05-13 RX ADMIN — HEPARIN SODIUM 3000 UNITS: 1000 INJECTION, SOLUTION INTRAVENOUS; SUBCUTANEOUS at 08:05

## 2025-05-13 RX ADMIN — MIDAZOLAM HYDROCHLORIDE 2 MG: 2 INJECTION, SOLUTION INTRAMUSCULAR; INTRAVENOUS at 07:05

## 2025-05-13 RX ADMIN — FENTANYL CITRATE 25 MCG: 50 INJECTION, SOLUTION INTRAMUSCULAR; INTRAVENOUS at 08:05

## 2025-05-13 RX ADMIN — PHENYLEPHRINE HYDROCHLORIDE 50 MCG: 10 INJECTION INTRAVENOUS at 08:05

## 2025-05-13 NOTE — BRIEF OP NOTE
[facility] - Surgery ([facility])  Brief Operative Note    Surgery Date: 5/13/2025     Surgeons and Role:     * MELISSA Loera II, MD - Primary     * Damir Dotson MD - Resident - Assisting        Pre-op Diagnosis:  ESRD on hemodialysis [N18.6, Z99.2]    Post-op Diagnosis:  Post-Op Diagnosis Codes:     * ESRD on hemodialysis [N18.6, Z99.2]    Procedure(s) (LRB):  PTA, AV FISTULA (Right)    Anesthesia: Local MAC    Operative Findings: RUE brachiocephalic AV fistula with inflow stenoses as well as some stenosis of previously placed venous outflow stent. Balloon traversed stenoses with good inflow noted post dilatation.     Estimated Blood Loss: minimal         Specimens:   Specimen (24h ago, onward)      None            * No specimens in log *        Discharge Note    OUTCOME: Patient tolerated treatment/procedure well without complication and is now ready for discharge.    DISPOSITION: Home or Self Care    FINAL DIAGNOSIS:  Arteriovenous fistula occlusion    FOLLOWUP: In clinic    DISCHARGE INSTRUCTIONS:    Discharge Procedure Orders   Diet general     Other restrictions (specify):   Order Comments: No heavy lifting over 10lbs for 6 weeks     Wound care routine (specify)   Order Comments: OTHER INSTRUCTIONS  -- Monitor for temp > 101 F, bleeding, redness, purulent drainage, or any sudden, new extreme pain. If any occur, please call our clinic or go to the emergency department if after normal business hours.  -- You may resume your regular diet as tolerated.   -- Follow up with Dr. Loera in 1 weeks in clinic for a post-op check. If no appointment is made within the week, please call the clinic to schedule.     Call MD for:  temperature >100.4     Call MD for:  persistent nausea and vomiting     Call MD for:  severe uncontrolled pain     Call MD for:  difficulty breathing, headache or visual disturbances     Call MD for:  redness, tenderness, or signs of infection (pain, swelling, redness, odor or green/yellow  discharge around incision site)     Call MD for:  hives     Call MD for:  persistent dizziness or light-headedness     Call MD for:  extreme fatigue

## 2025-05-13 NOTE — TRANSFER OF CARE
Anesthesia Transfer of Care Note    Patient: Ade Silva    Procedure(s) Performed: Procedure(s) (LRB):  PTA, AV FISTULA (Right)    Patient [facility]    Anesthesia Type: MAC    Transport from OR: Transported from OR on room air with adequate spontaneous ventilation    Post pain: adequate analgesia    Post assessment: no apparent anesthetic complications    Post vital signs: stable    Level of consciousness: awake    Nausea/Vomiting: no nausea/vomiting    Complications: none    Transfer of care protocol was followed    Last vitals: Visit Vitals  /60   Pulse 89   Temp 37.2 °C (98.9 °F) (Temporal)   Resp (!) 21   SpO2 96%   Breastfeeding No

## 2025-05-14 DIAGNOSIS — Z99.2 ESRD ON HEMODIALYSIS: Primary | ICD-10-CM

## 2025-05-14 DIAGNOSIS — N18.6 ESRD ON HEMODIALYSIS: Primary | ICD-10-CM

## 2025-05-15 ENCOUNTER — OFFICE VISIT (OUTPATIENT)
Dept: CARDIOLOGY | Facility: CLINIC | Age: 45
End: 2025-05-15
Payer: MEDICARE

## 2025-05-15 VITALS
SYSTOLIC BLOOD PRESSURE: 92 MMHG | BODY MASS INDEX: 33.4 KG/M2 | DIASTOLIC BLOOD PRESSURE: 58 MMHG | HEART RATE: 114 BPM | WEIGHT: 238.56 LBS | HEIGHT: 71 IN

## 2025-05-15 DIAGNOSIS — Q61.3 POLYCYSTIC KIDNEY DISEASE: Chronic | ICD-10-CM

## 2025-05-15 DIAGNOSIS — N18.6 ESRD (END STAGE RENAL DISEASE) ON DIALYSIS: ICD-10-CM

## 2025-05-15 DIAGNOSIS — I77.810 AORTIC ROOT DILATATION: ICD-10-CM

## 2025-05-15 DIAGNOSIS — Z72.0 TOBACCO ABUSE: ICD-10-CM

## 2025-05-15 DIAGNOSIS — Z99.2 ESRD (END STAGE RENAL DISEASE) ON DIALYSIS: ICD-10-CM

## 2025-05-15 DIAGNOSIS — E66.9 NON MORBID OBESITY, UNSPECIFIED OBESITY TYPE: ICD-10-CM

## 2025-05-15 DIAGNOSIS — R00.2 HEART PALPITATIONS: Primary | ICD-10-CM

## 2025-05-15 DIAGNOSIS — Z82.49 FH: CAD (CORONARY ARTERY DISEASE): ICD-10-CM

## 2025-05-15 LAB
OHS QRS DURATION: 74 MS
OHS QTC CALCULATION: 457 MS

## 2025-05-15 PROCEDURE — 99999 PR PBB SHADOW E&M-EST. PATIENT-LVL V: CPT | Mod: PBBFAC,TXP,, | Performed by: INTERNAL MEDICINE

## 2025-05-15 RX ORDER — ATORVASTATIN CALCIUM 20 MG/1
20 TABLET, FILM COATED ORAL NIGHTLY
Qty: 90 TABLET | Refills: 3 | Status: SHIPPED | OUTPATIENT
Start: 2025-05-15

## 2025-05-15 NOTE — PROGRESS NOTES
"Neurosurgery  Established Patient    Scribe Attestation  I, Bettie Palacios, attest that this documentation has been prepared under the direction and in the presence of Petersno High MD.    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 According to Noemi note, she had been lost to f/u after aneurysm was treated in 2016 and has not had any imaging done since. Today she reports She has recently relocated to the area and would like to establish follow up. She gets occasional headaches but denies any change in pattern or frequency. Additionally denies seizures, nausea, vomiting, or any episodes of focal weakness or sensory loss.     Interval Hx 04/15/25  Patient returns to clinic after undergoing diagnostic cerebral angiogram for evaluation of known basilar artery aneurysm which demonstrated "superiorly projecting residual basilar artery apex aneurysm with associated delayed contrast emptying." Today she reports feeling overall well. She has been having occasional headaches but these are tolerable. Patient also has nausea but attributes this to PCKD and HD treatment. When asked about aneurysm treatment in 2016, patient does not recall aneurysm being ruptured but notes undergoing spinal tap and being discharged same day as procedure. Positive smoking history but is working towards cessation. No known family hx of aneurysms.     Interval Hx 05/20/25  Ade Silva is a 46 y/o F that returns to clinic for evaluation of aneurysm.   Ade presents for follow-up of a previously treated basilar artery aneurysm, with recent symptoms of dizziness and disorientation.     Ade, a 45-year-old individual with a history of kidney disease, presents for follow-up of a basilar artery aneurysm previously treated " with coiling in 2016. Recently, she has been having dizziness, disorientation, and an episode of loss of consciousness for approximately a minute, though she remained aware of the event.     In 2016, she had a persistent headache for about 2 weeks that was unresponsive to treatment, along with increased vomiting. The headaches progressively worsened, leading to an emergency room visit. Between the ER visit and her treatment at [facility] (presumably a medical facility), she had a few episodes of syncope.     Approximately 3 weeks post-coiling procedure in 2016, she had temporary blindness in her right eye lasting 10-20 minutes, with some numbness. A similar, less severe episode occurred months later. She was evaluated at the emergency room after the first incident and was told she had a TIA (Transient Ischemic Attack).     Her headaches have significantly improved since the surgery. She reports recent weight gain, which has made it difficult to determine her dry weight for dialysis, potentially contributing to her recent symptoms.     Her history of kidney disease is noted to increase her risk for aneurysm formation and recurrence.     She denies rupture of the aneurysm.     Ade has a history of kidney disease. She was diagnosed with an aneurysm in 2016, located at the top of the basilar artery/basilar apex. Following the coiling procedure, she experienced a Transient Ischemic Attack (TIA).     In 2016, the patient underwent an aneurysm coiling procedure to treat her basilar artery aneurysm. The procedure was initially successful, resulting in complete obliteration of the aneurysm. However, during the current visit, a recurrence of the aneurysm was noted.     Ade underwent an MRI Brain in 2023, where a regular MRI was performed instead of an MRA with vessel wall imaging. This scan revealed residual aneurysm filling, but showed no enlargement of fluid spaces or blockage of the third ventricle. A previous MRI Brain  "in 2016 demonstrated complete obliteration of the aneurysm following the coiling procedure.     Ade is allergic to aspirin, which causes emesis when taken.     Smoking: History of smoking        ROS:  Constitutional: +dizziness  Head: -headaches  Eyes: +loss of vision  Gastrointestinal: +vomiting  Neurological: +numbness, +confusion or disorientation, +syncope       Review of patient's allergies indicates:   Allergen Reactions    Penicillins Hives and Edema     Throat swelling    Adhesive Rash     Adhesive/silk tape (type found on band aides). Can only use "Paper Tape"    Aspirin Hives and Nausea And Vomiting     And vomiting    Butalbital-acetaminophen-caff Nausea And Vomiting and Other (See Comments)     Dizziness (made pt feel sick)    Latex, natural rubber Rash    Nickel Rash    Tomato Nausea And Vomiting       Current Medications[1]    Past Medical History:   Diagnosis Date    Anxiety     Asthma     last used 11/2025  smoking related last attack 21 y/o    Autosomal dominant polycystic kidney disease     Brain aneurysm     Depression     Encounter for blood transfusion     GERD (gastroesophageal reflux disease)     Hypertension     low since diaylsis    Seizures     as a child (age 7-8); " stress -related"    Stroke     Teeth missing     reports several loose teeth    TIA (transient ischemic attack)     Wears prescription eyeglasses     not using     Past Surgical History:   Procedure Laterality Date    AV FISTULA PLACEMENT Right 09/14/2023    Procedure: CREATION, AV FISTULA, radial cephalic;  Surgeon: MELISSA Loera II, MD;  Location: [facility] OR [address] [place] [address];  Service: Vascular;  Laterality: Right;    AV FISTULA PLACEMENT Right 10/05/2023    Procedure: CREATION, AV FISTULA - brachial;  Surgeon: MELISSA Loera II, MD;  Location: [facility] OR [address] [place] [address];  Service: Vascular;  Laterality: Right;  confirmed placement with doppler    BARTHOLIN GLAND CYST EXCISION N/A 2/6/2025    Procedure: EXCISION, CYST, BARTHOLIN'S " GLAND;  Surgeon: Elis Jacobs MD;  Location: [facility];  Service: Gynecology Oncology;  Laterality: N/A;    BIOPSY OF VAGINA  12/10/2024    Procedure: BIOPSY, UTERUS;  Surgeon: Elis Jacobs MD;  Location: [facility] OR 2ND FLR;  Service: Gynecology Oncology;;    CEREBRAL ANEURYSM REPAIR      coiling    CERVICAL BIOPSY N/A 12/10/2024    Procedure: BIOPSY, CERVIX;  Surgeon: Elis Jacobs MD;  Location: [facility] OR [facility] FLR;  Service: Gynecology Oncology;  Laterality: N/A;    COLD KNIFE CONIZATION OF CERVIX N/A 2/6/2025    Procedure: CONE BIOPSY, CERVIX, USING COLD KNIFE;  Surgeon: Elis Jacobs MD;  Location: [facility] OR;  Service: Gynecology Oncology;  Laterality: N/A;  2 hr case    COLONOSCOPY      CURETTAGE, ENDOCERVICAL N/A 12/10/2024    Procedure: CURETTAGE, ENDOCERVICAL;  Surgeon: Elis Jacobs MD;  [facility];  Service: Gynecology Oncology;  Laterality: N/A;    CYSTOSCOPY      age 13    EMBOLIZATION Right 04/23/2024    Procedure: EMBOLIZATION, BLOOD VESSEL FISTULA;  Surgeon: MELISSA Loera II, MD;  [facility];  Service: Vascular;  Laterality: Right;    EPIDURAL STEROID INJECTION INTO CERVICAL SPINE N/A 11/12/2024    Procedure: C7-T1 KIMBERLEE;  Surgeon: Presley Hough DO;  Location: [facility] PAIN MANAGEMENT;  Service: Pain Management;  Laterality: N/A;  15 mins no AC    FISTULOGRAM Right 04/23/2024    Procedure: Fistulogram;  Surgeon: MELISSA Loera II, MD;  Location: [facility] OR 2ND FLR;  Service: Vascular;  Laterality: Right;  CONTRAST: 68ML, FLUORO TIME: 17.9, mGy: 45.11, Gycm2: 5.9548    HYSTEROSCOPY N/A 07/25/2024    Procedure: HYSTEROSCOPY;  Surgeon: Jazz Amador MD;  Location: [facility];  Service: OB/GYN;  Laterality: N/A;    INCISION OF VULVA N/A 07/25/2024    Procedure: INCISION, VULVA;  Surgeon: Jazz Amador MD;  Location: [facility] OR;  Service: OB/GYN;  Laterality: N/A;    INJECTION OF ANESTHETIC AGENT AROUND STELLATE GANGLION Right 01/09/2025     Procedure: Right stellate ganglion block;  Surgeon: Victor Hugo Ingram MD;  Location: [facility] PAIN MANAGEMENT;  Service: Pain Management;  Laterality: Right;  no AC    INSERTION OF TUNNELED CENTRAL VENOUS HEMODIALYSIS CATHETER Right 05/03/2023    Procedure: Insertion, Catheter, Central Venous, Hemodialysis;  Surgeon: Priya Grimm MD;  Location: [facility] CATH LAB;  Service: Interventional Nephrology;  Laterality: Right;    LAPAROSCOPIC ROBOT-ASSISTED SURGICAL REMOVAL OF KIDNEY USING DA GLORIA XI Left 09/19/2024    Procedure: XI ROBOTIC NEPHRECTOMY;  Surgeon: Valentin Maldonado MD;  Location: [address];  Service: Urology;  Laterality: Left;    LOOP ELECTROSURGICAL EXCISION PROCEDURE (LEEP) N/A 07/25/2024    Procedure: LEEP (LOOP ELECTROSURGICAL EXCISION PROCEDURE);  Surgeon: Jazz Amador MD;  Location: [facility];  Service: OB/GYN;  Laterality: N/A;    PERCUTANEOUS TRANSLUMINAL ANGIOPLASTY OF ARTERIOVENOUS FISTULA Right 5/13/2025    Procedure: PTA, AV FISTULA;  Surgeon: MELISSA Loera II, MD;  [address];  Service: Vascular;  Laterality: Right;  5.8 min  33.99 mGy  6.1132 Gy.cm  20ml Dye    REMOVAL OF HEMODIALYSIS CATHETER  05/03/2023    Procedure: REMOVAL, CATHETER, HEMODIALYSIS;  Surgeon: Pirya Grimm MD;  Location: [facility] CATH LAB;  Service: Interventional Nephrology;;    SURGICAL EXCISION OF ANAL LESION N/A 12/10/2024    Procedure: EXCISION, LESION, ANUS;  Surgeon: Son Mojica MD;  [facility];  Service: Colon and Rectal;  Laterality: N/A;    THERMAL ABLATION OF ENDOMETRIUM N/A 07/25/2024    Procedure: ABLATION, ENDOMETRIUM, THERMAL;  Surgeon: Jazz Amador MD;  Location: [facility];  Service: OB/GYN;  Laterality: N/A;     Family History       Problem Relation (Age of Onset)    Diabetes Brother    Fibromyalgia Mother    Heart disease Father    Polycystic kidney disease Father, Sister, Sister    Stroke Father          Social History     Socioeconomic History     Marital status: Single   Tobacco Use    Smoking status: Every Day     Current packs/day: 1.00     Average packs/day: 1 pack/day for 31.4 years (31.4 ttl pk-yrs)     Types: Cigarettes, Cigars     Start date: 1994     Passive exposure: Current    Smokeless tobacco: Never    Tobacco comments:     Smoke 3-5 black and mild cigars a day replaced cigarettes with cigars   Substance and Sexual Activity    Alcohol use: Not Currently    Drug use: Yes     Frequency: 7.0 times per week     Types: Marijuana     Comment: thc smoking and gummies    Sexual activity: Not Currently   Social History Narrative    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     Social Drivers of Health     Financial Resource Strain: High Risk (2/10/2025)    Overall Financial Resource Strain (CARDIA)     Difficulty of Paying Living Expenses: Very hard   Food Insecurity: Food Insecurity Present (2/10/2025)    Hunger Vital Sign     Worried About Running Out of Food in the Last Year: Often true     Ran Out of Food in the Last Year: Sometimes true   Transportation Needs: No Transportation Needs (12/19/2023)    PRAPARE - Transportation     Lack of Transportation (Medical): No     Lack of Transportation (Non-Medical): No   Physical Activity: Inactive (2/10/2025)    Exercise Vital Sign     Days of Exercise per Week: 0 days     Minutes of Exercise per Session: 0 min   Stress: Stress Concern Present (2/10/2025)    [employer] [place] [employer] - Occupational Stress Questionnaire     Feeling of Stress : Rather much   Housing Stability: Low Risk  (12/19/2023)    Housing Stability Vital Sign     Unable to Pay for Housing in the Last Year: No     Number of Places Lived in the Last Year: 2     Unstable Housing in the Last Year: No       Review of Systems   All other systems reviewed and are negative.      OBJECTIVE:     Vital Signs     There is no height or weight on file to calculate BMI.    Neurosurgery Physical Exam  General: no acute distress  Head: Non-traumatic,  normocephalic  Eyes: Pupils equal, EOMI  Neck: Supple, normal ROM, no tenderness to palpation  CVS: Normal rate and rhythm, distal pulses present  Pulm: Symmetric expansion, no respiratory distress  GI: Abdomen nondistended, nontender    MSK: Moves all extremities without restriction, atraumatic  Skin: Dry, intact  Psych: Normal thought content and cognition    Neuro:  Alert, awake, oriented, to self, place, time  Language:  Speech is fluent, goal directed without any noted dysarthria or aphasia    Cranial nerves:    CNII-XII: Intact on fine exam,   Pupils equal round react to light,   Extraocular muscles are intact  V1 to V3 is intact to light touch,   no facial asymmetry,   Hearing is intact to finger rub and voice  tongue/uvula/palate midline,   shoulder shrug equal,     No pronator drift    Extremities:  Motor:  Upper Extremity    Deltoid Triceps Biceps Wrist  Extension Wrist  Flexion Interosseous   R 5/5 5/5 5/5 5/5 5/5 5/5   L 5/5 5/5 5/5 5/5 5/5 5/5       Thumb   Abduction Thumb  ADDuction Finger  Flexion Finger  Extension     R 5/5 5/5 5/5 5/5     L 5/5 5/5 5/5 5/5        Lower Extremity     Iliopsoas Quadriceps Hamstring Plantarflexion Dorsiflexion EHL   R 5/5 5/5 5/5 5/5 5/5 5/5   L 5/5 5/5 5/5 5/5 5/5 5/5       Reflexes:     DTR: 2+ biceps    2+ triceps   2+ brachioradialis   2+ patellar  2+ Achilles     Camargo's: Negative     Babinski's: Negative     Clonus: Negative     Sensory:      Sensation intact to light touch, temperature sensation, vibration, pinprick     Coordination:      Coordination intact throughout, no dysmetria, normal rapid alternating movements, no dysdiadochokinesia  Cerebellar:  Normal finger-to-nose, normal heel-to-shin  Cervical spine:  No midline cervical tenderness, negative Lhermitte, negative Spurling  Thoracic spine:  No midline thoracic tenderness  Lumbar spine:  No midline lumbar tenderness     Diagnostic Results:  I personally reviewed the patient's Diagnostic Imaging.      MRI Brain Stealth 05/08/25  Stealth noncontrast MRI performed for purposes of procedural localization.     Continued artifact within the interpeduncular cistern and involving the cerebral peduncles related to known basilar tip aneurysm coiling.     No evidence for hydrocephalus.     IR Angiogram Carotid Cerebral Bilateral 02/27/25  1.  Residual aneurysm of the basilar artery apex was demonstrated.  2.  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    ASSESSMENT/PLAN:   46 y/o female with history of PCKD (on HD) and basilar artery aneurysm s/p coil (outside hospital, 2016) with radiographic evidence of residual. Positive smoking hx.     Patient presents with     Discussed imaging results of MRI Brain Stealth 05/08/25 revealing  Stealth noncontrast MRI performed for purposes of procedural localization.  Continued artifact within the interpeduncular cistern and involving the cerebral peduncles related to known basilar tip aneurysm coiling.  No evidence for hydrocephalus.  Discussed imaging results of IR Angiogram 02/27/25 revealing  Residual aneurysm of the basilar artery apex was demonstrated.  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    After discussion with the patient, I recommend  I have answered all of their questions and patient wish to proceed with this plan. We will schedule patient.   \    Assessment & Plan    I67.848 Other cerebrovascular vasospasm and vasoconstriction  N18.6 End stage renal disease  Z99.2 Dependence on renal dialysis  R55 Syncope and collapse  R42 Dizziness and giddiness  H53.129 Transient visual loss, unspecified eye  T45.515A Adverse effect of anticoagulants, initial encounter  Z86.73 Personal history of TIA, and cerebral infarction without residual deficits  Z87.891 Personal history of nicotine dependence  G97.84 Intracranial hypotension following other procedure  Z88.6 Allergy  status to analgesic agent     CEREBRAL ANEURYSM:  - Reviewed previous imaging from 2016 showing well-treated aneurysm with coiling, now with significant recurrence.  - Considered retreatment options, leaning towards endovascular approach due to deep location in brain.  - Endovascular treatment with stent and coils likely needed for more definitive treatment, but unable to take aspirin.  - Open surgery generally more definitive but higher risk in this location due to perforating vessels.  - Explained risks of untreated aneurysms, including potential for rupture and associated morbidity/mortality.  - Discussed differences between endovascular and open surgical approaches for aneurysm treatment.  - Explained need for antiplatelet therapy with stent placement to prevent clot formation.  - Ordered MRA with vessel wall imaging of the brain to further evaluate aneurysm and determine optimal treatment approach.     END STAGE RENAL DISEASE:  - Young age, smoking history, and renal disease increase risk of aneurysm recurrence.     PERSONAL HISTORY OF NICOTINE DEPENDENCE:  - Young age, smoking history, and renal disease increase risk of aneurysm recurrence.     FOLLOW-UP:  - Follow up to review MRA results and discuss treatment plan.    Thank you so very much for allowing me to participate in the care of this patient.  Please feel free to call any questions, comments, or concerns.     Peterson High MD,MSc  Department of Neurosurgery   Department of Radiology  Department of Neurology  [facility]  [facility]    [facility] [employer]   [employer] [facility]     Total time spent in counseling and discussion about further management options including relevant lab work, treatment,  prognosis, medications and intended side effects was more than 60 minutes. More than 50 % of the time was spent in counseling and coordination of  care.  I spent a total of 60 minutes on the day of the visit.This includes face to face time and non-face to face time preparing to see the patient (eg, review of tests), Obtaining and/or reviewing separately obtained history, Documenting clinical information in the electronic or other health record, Independently interpreting resultsand communicating results to the patient/family/caregiver, or Care coordination.     Scribe Attestation  I, Dr. Peterson High personally performed the services described in this documentation. All medical record entries made by the scribe, Bettie Palacios, were at my direction and in my presence.  I have reviewed the chart and agree that the record reflects my personal performance and is accurate and complete.           [1]   Current Outpatient Medications   Medication Sig Dispense Refill    acetaminophen (TYLENOL) 650 MG TbSR Take 1 tablet (650 mg total) by mouth every 6 (six) hours. 30 tablet 0    allopurinoL (ZYLOPRIM) 100 MG tablet Take 1 tablet (100 mg total) by mouth 3 (three) times a week. Take 3 times weekly after dialysis 12 tablet 4    B complex-vitamin C-folic acid (DENIS-CHANTAL) 0.8 mg Tab Take 1 tablet (0.8 mg total) by mouth once daily. 90 tablet 3    cinacalcet (SENSIPAR) 30 MG Tab Take 30 mg by mouth every Mon, Wed, Fri.      clindamycin (CLEOCIN T) 1 % lotion Apply topically 2 (two) times daily. Use to areas prone to boils 60 mL 6    conjugated estrogens (PREMARIN) vaginal cream Place 1 g vaginally once daily for 14 days, THEN 1 g 3 (three) times a week. 30 g 4    docusate sodium (COLACE) 100 MG capsule Take 2 capsules (200 mg total) by mouth 2 (two) times daily. 90 capsule 3    esomeprazole (NEXIUM) 20 MG capsule Take 20 mg by mouth before breakfast.      gabapentin (NEURONTIN) 300 MG capsule Take 1 capsule (300 mg total) by mouth three times per week after dialysis 36 capsule 2    hydrOXYzine HCL (ATARAX) 10 MG Tab Take 1 tablet (10 mg total) by mouth 2 (two) times daily  as needed (itching). 60 tablet 5    loratadine (CLARITIN ORAL) Take 1 tablet by mouth daily as needed (Allergies).      medroxyPROGESTERone (PROVERA) 10 MG tablet Take 1 tablet (10 mg total) by mouth once daily. 30 tablet 11    midodrine (PROAMATINE) 10 MG tablet Take 1 tablet (10 mg total) by mouth 2 (two) times daily as needed (As needed on dialysis days for hypotension). 90 tablet 2    nicotine (NICODERM CQ) 21 mg/24 hr Place 1 patch onto the skin once daily. Please dispense only clear patches , patient has a tape allergy. 28 patch 0    ondansetron (ZOFRAN) 4 MG tablet Take 1 tablet (4 mg total) by mouth 2 (two) times daily. 30 tablet 3    OPW TEST CLAIM - DO NOT FILL OPW test claim. Do not fill. 1 each 0    oxyCODONE (ROXICODONE) 5 MG immediate release tablet Take 1 tablet (5 mg total) by mouth every 4 (four) hours as needed for Pain. (Patient not taking: Reported on 5/12/2025) 10 tablet 0    sodium zirconium cyclosilicate (LOKELMA) 10 gram packet Take 1 packet (10 g total) by mouth 2 (two) times a day. Mix entire contents of packet(s) into drinking glass containing 3 tablespoons of water; stir well and drink immediately. Add water and repeat until no powder remains to receive entire dose. 30 packet 3    sucroferric oxyhydroxide (VELPHORO) 500 mg Chew Break or dissolve 1 tablet by mouth with each meal and snack. Do not exceed 6 tablets per day. 180 tablet 11    triamcinolone acetonide 0.1% (KENALOG) 0.1 % cream Apply topically 2 (two) times daily. Use to affected areas for up to 2 weeks then take a 1 week break or decrease to 3 times weekly. Do not apply to groin or face. Use to itchy areas 454 g 1     Current Facility-Administered Medications   Medication Dose Route Frequency Provider Last Rate Last Admin    tuberculin injection 5 Units  5 Units Intradermal 1 time in Clinic/HOD Mihir Chang Jr., MD         Facility-Administered Medications Ordered in Other Visits   Medication Dose Route Frequency  Provider Last Rate Last Admin    0.9%  NaCl infusion   Intravenous Continuous Mel Calvillo MD

## 2025-05-15 NOTE — PROGRESS NOTES
"CARDIOVASCULAR CONSULTATION    REASON FOR CONSULT:   Ade Silva is a 45 y.o. female who presents for palps.    PCP: Елена Cifuentes: Charlene  HISTORY OF PRESENT ILLNESS:   The patient is a pleasant 45-year-old woman who presents for evaluation of daily palpitations.  She reports symptoms on arrival here in the office.  Her EKG notes sinus tachycardia.  She denies any angina or limiting dyspnea on exertion.  There has been no syncopal episodes (other than with her initial hemodialysis appointment).  She denies any PND, orthopnea, or lower extremity edema.  There has been no melena, hematuria, or claudication symptoms.      The patient reports a family history of CAD with her father suffering his 1st myocardial infarction in his 30s.      The patient is a current smoker, but is enrolled in the ambulatory smoking cessation program.  I have strongly encouraged him to quit smoking.  She denies alcohol excess and aside from marijuana denies any other illicit drug use.    CARDIOVASCULAR HISTORY:   Ao root dil, 4.2cm (echo 1/2025)    PAST MEDICAL HISTORY:     Past Medical History:   Diagnosis Date    Anxiety     Asthma     last used 11/2025  smoking related last attack 21 y/o    Autosomal dominant polycystic kidney disease     Brain aneurysm     Depression     Encounter for blood transfusion     GERD (gastroesophageal reflux disease)     Hypertension     low since diaylsis    Seizures     as a child (age 7-8); " stress -related"    Stroke     Teeth missing     reports several loose teeth    TIA (transient ischemic attack)     Wears prescription eyeglasses     not using       PAST SURGICAL HISTORY:     Past Surgical History:   Procedure Laterality Date    AV FISTULA PLACEMENT Right 09/14/2023    Procedure: CREATION, AV FISTULA, radial cephalic;  Surgeon: MELISSA Loera II, MD;  Location: [facility] OR [address] [place] [address];  Service: Vascular;  Laterality: Right;    AV FISTULA PLACEMENT Right 10/05/2023    Procedure: CREATION, AV " FISTULA - brachial;  Surgeon: MELISSA Loera II, MD;  Location: [facility] OR 2ND FLR;  Service: Vascular;  Laterality: Right;  confirmed placement with doppler    BARTHOLIN GLAND CYST EXCISION N/A 2/6/2025    Procedure: EXCISION, CYST, BARTHOLIN'S GLAND;  Surgeon: Elis Jacobs MD;  Location: [facility] OR;  Service: Gynecology Oncology;  Laterality: N/A;    BIOPSY OF VAGINA  12/10/2024    Procedure: BIOPSY, UTERUS;  Surgeon: Elis Jacobs MD;  [facility];  Service: Gynecology Oncology;;    CEREBRAL ANEURYSM REPAIR      coiling    CERVICAL BIOPSY N/A 12/10/2024    Procedure: BIOPSY, CERVIX;  Surgeon: Elis Jacobs MD;  [facility];  Service: Gynecology Oncology;  Laterality: N/A;    COLD KNIFE CONIZATION OF CERVIX N/A 2/6/2025    Procedure: CONE BIOPSY, CERVIX, USING COLD KNIFE;  Surgeon: Elis Jacobs MD;  Location: [facility];  Service: Gynecology Oncology;  Laterality: N/A;  2 hr case    COLONOSCOPY      CURETTAGE, ENDOCERVICAL N/A 12/10/2024    Procedure: CURETTAGE, ENDOCERVICAL;  Surgeon: Elis Jacobs MD;  [facility];  Service: Gynecology Oncology;  Laterality: N/A;    CYSTOSCOPY      age 13    EMBOLIZATION Right 04/23/2024    Procedure: EMBOLIZATION, BLOOD VESSEL FISTULA;  Surgeon: MELISSA Loera II, MD;  [facility];  Service: Vascular;  Laterality: Right;    EPIDURAL STEROID INJECTION INTO CERVICAL SPINE N/A 11/12/2024    Procedure: C7-T1 KIMBERLEE;  Surgeon: Presley Hough DO;  [facility];  Service: Pain Management;  Laterality: N/A;  15 mins no AC    FISTULOGRAM Right 04/23/2024    Procedure: Fistulogram;  Surgeon: MELISSA Loera II, MD;  [facility];  Service: Vascular;  Laterality: Right;  CONTRAST: 68ML, FLUORO TIME: 17.9, mGy: 45.11, Gycm2: 5.9548    HYSTEROSCOPY N/A 07/25/2024    Procedure: HYSTEROSCOPY;  Surgeon: Jazz Amador MD;  Location: [facility];  Service: OB/GYN;  Laterality: N/A;     INCISION OF VULVA N/A 07/25/2024    Procedure: INCISION, VULVA;  Surgeon: Jazz Amador MD;  Location: [facility] OR;  Service: OB/GYN;  Laterality: N/A;    INJECTION OF ANESTHETIC AGENT AROUND STELLATE GANGLION Right 01/09/2025    Procedure: Right stellate ganglion block;  Surgeon: Victor Hugo Ingram MD;  Location: [facility] PAIN MANAGEMENT;  Service: Pain Management;  Laterality: Right;  no AC    INSERTION OF TUNNELED CENTRAL VENOUS HEMODIALYSIS CATHETER Right 05/03/2023    Procedure: Insertion, Catheter, Central Venous, Hemodialysis;  Surgeon: Priya Grimm MD;  Location: [facility] CATH LAB;  Service: Interventional Nephrology;  Laterality: Right;    LAPAROSCOPIC ROBOT-ASSISTED SURGICAL REMOVAL OF KIDNEY USING DA GLORIA XI Left 09/19/2024    Procedure: XI ROBOTIC NEPHRECTOMY;  Surgeon: Valentin Maldonado MD;  [address];  Service: Urology;  Laterality: Left;    LOOP ELECTROSURGICAL EXCISION PROCEDURE (LEEP) N/A 07/25/2024    Procedure: LEEP (LOOP ELECTROSURGICAL EXCISION PROCEDURE);  Surgeon: Jazz Amador MD;  Location: [facility];  Service: OB/GYN;  Laterality: N/A;    PERCUTANEOUS TRANSLUMINAL ANGIOPLASTY OF ARTERIOVENOUS FISTULA Right 5/13/2025    Procedure: PTA, AV FISTULA;  Surgeon: MELISSA Loera II, MD;  [address];  Service: Vascular;  Laterality: Right;  5.8 min  33.99 mGy  6.1132 Gy.cm  20ml Dye    REMOVAL OF HEMODIALYSIS CATHETER  05/03/2023    Procedure: REMOVAL, CATHETER, HEMODIALYSIS;  Surgeon: Priya Grimm MD;  Location: [facility] CATH LAB;  Service: Interventional Nephrology;;    SURGICAL EXCISION OF ANAL LESION N/A 12/10/2024    Procedure: EXCISION, LESION, ANUS;  Surgeon: Son Mojica MD;  [facility];  Service: Colon and Rectal;  Laterality: N/A;    THERMAL ABLATION OF ENDOMETRIUM N/A 07/25/2024    Procedure: ABLATION, ENDOMETRIUM, THERMAL;  Surgeon: Jazz Amador MD;  Location: [facility];  Service: OB/GYN;  Laterality: N/A;  "      ALLERGIES AND MEDICATION:     Review of patient's allergies indicates:   Allergen Reactions    Penicillins Hives and Edema     Throat swelling    Adhesive Rash     Adhesive/silk tape (type found on band aides). Can only use "Paper Tape"    Aspirin Hives and Nausea And Vomiting     And vomiting    Butalbital-acetaminophen-caff Nausea And Vomiting and Other (See Comments)     Dizziness (made pt feel sick)    Latex, natural rubber Rash    Nickel Rash    Tomato Nausea And Vomiting        Medication List            Accurate as of May 15, 2025  1:11 PM. If you have any questions, ask your nurse or doctor.                CONTINUE taking these medications      acetaminophen 650 MG Tbsr  Commonly known as: TYLENOL  Take 1 tablet (650 mg total) by mouth every 6 (six) hours.     allopurinoL 100 MG tablet  Commonly known as: ZYLOPRIM  Take 1 tablet (100 mg total) by mouth 3 (three) times a week. Take 3 times weekly after dialysis     cinacalcet 30 MG Tab  Commonly known as: SENSIPAR     CLARITIN ORAL     clindamycin 1 % lotion  Commonly known as: CLEOCIN T  Apply topically 2 (two) times daily. Use to areas prone to boils     docusate sodium 100 MG capsule  Commonly known as: COLACE  Take 2 capsules (200 mg total) by mouth 2 (two) times daily.     esomeprazole 20 MG capsule  Commonly known as: NEXIUM     gabapentin 300 MG capsule  Commonly known as: NEURONTIN  Take 1 capsule (300 mg total) by mouth three times per week after dialysis     hydrOXYzine HCL 10 MG Tab  Commonly known as: ATARAX  Take 1 tablet (10 mg total) by mouth 2 (two) times daily as needed (itching).     LOKELMA 10 gram packet  Generic drug: sodium zirconium cyclosilicate  Take 1 packet (10 g total) by mouth 2 (two) times a day. Mix entire contents of packet(s) into drinking glass containing 3 tablespoons of water; stir well and drink immediately. Add water and repeat until no powder remains to receive entire dose.     medroxyPROGESTERone 10 MG " tablet  Commonly known as: PROVERA  Take 1 tablet (10 mg total) by mouth once daily.     midodrine 10 MG tablet  Commonly known as: PROAMATINE  Take 1 tablet (10 mg total) by mouth 2 (two) times daily as needed (As needed on dialysis days for hypotension).     nicotine 21 mg/24 hr  Commonly known as: NICODERM CQ  Place 1 patch onto the skin once daily. Please dispense only clear patches , patient has a tape allergy.     ondansetron 4 MG tablet  Commonly known as: ZOFRAN  Take 1 tablet (4 mg total) by mouth 2 (two) times daily.     OPW TEST CLAIM - DO NOT FILL  OPW test claim. Do not fill.     oxyCODONE 5 MG immediate release tablet  Commonly known as: ROXICODONE  Take 1 tablet (5 mg total) by mouth every 4 (four) hours as needed for Pain.     PREMARIN vaginal cream  Generic drug: conjugated estrogens  Place 1 g vaginally once daily for 14 days, THEN 1 g 3 (three) times a week.  Start taking on: February 6, 2025     DENIS-CHANTAL 0.8 mg Tab  Generic drug: B complex-vitamin C-folic acid  Take 1 tablet (0.8 mg total) by mouth once daily.     triamcinolone acetonide 0.1% 0.1 % cream  Commonly known as: KENALOG  Apply topically 2 (two) times daily. Use to affected areas for up to 2 weeks then take a 1 week break or decrease to 3 times weekly. Do not apply to groin or face. Use to itchy areas     VELPHORO 500 mg Chew  Generic drug: sucroferric oxyhydroxide  Break or dissolve 1 tablet by mouth with each meal and snack. Do not exceed 6 tablets per day.              SOCIAL HISTORY:   Social History[1]    FAMILY HISTORY:     Family History   Problem Relation Name Age of Onset    Fibromyalgia Mother      Stroke Father      Heart disease Father      Polycystic kidney disease Father      Polycystic kidney disease Sister      Polycystic kidney disease Sister      Diabetes Brother      Ovarian cancer Neg Hx      Uterine cancer Neg Hx      Breast cancer Neg Hx      Colon cancer Neg Hx         REVIEW OF SYSTEMS:   Review of Systems  "  Constitutional:  Negative for chills, diaphoresis and fever.   HENT:  Negative for nosebleeds.    Eyes:  Negative for blurred vision, double vision and photophobia.   Respiratory:  Negative for hemoptysis, shortness of breath and wheezing.    Cardiovascular:  Positive for palpitations. Negative for chest pain, orthopnea, claudication, leg swelling and PND.   Gastrointestinal:  Negative for abdominal pain, blood in stool, heartburn, melena, nausea and vomiting.   Genitourinary:  Negative for flank pain and hematuria.   Musculoskeletal:  Negative for falls, myalgias and neck pain.   Skin:  Negative for rash.   Neurological:  Negative for dizziness, seizures, loss of consciousness, weakness and headaches.   Endo/Heme/Allergies:  Negative for polydipsia. Does not bruise/bleed easily.   Psychiatric/Behavioral:  Negative for depression and memory loss. The patient is not nervous/anxious.        PHYSICAL EXAM:     Vitals:    05/15/25 1256   BP: (!) 92/58   Pulse: (!) 114    Body mass index is 33.27 kg/m².  Weight: 108.2 kg (238 lb 8.6 oz)   Height: 5' 11" (180.3 cm)     Physical Exam  Vitals reviewed.   Constitutional:       General: She is not in acute distress.     Appearance: She is well-developed. She is obese. She is not ill-appearing, toxic-appearing or diaphoretic.   HENT:      Head: Normocephalic and atraumatic.   Eyes:      General: No scleral icterus.     Extraocular Movements: Extraocular movements intact.      Conjunctiva/sclera: Conjunctivae normal.      Pupils: Pupils are equal, round, and reactive to light.   Neck:      Thyroid: No thyromegaly.      Vascular: Normal carotid pulses. No carotid bruit or JVD.      Trachea: Trachea normal.   Cardiovascular:      Rate and Rhythm: Regular rhythm. Tachycardia present.      Pulses:           Carotid pulses are 2+ on the right side and 2+ on the left side.     Heart sounds: S1 normal and S2 normal. No murmur heard.     No friction rub. No gallop.   Pulmonary:      " Effort: Pulmonary effort is normal. No respiratory distress.      Breath sounds: Normal breath sounds. No stridor. No wheezing, rhonchi or rales.   Chest:      Chest wall: No tenderness.   Abdominal:      General: There is no distension.      Palpations: Abdomen is soft.   Musculoskeletal:         General: No swelling or tenderness. Normal range of motion.      Cervical back: Normal range of motion and neck supple. No edema or rigidity.      Right lower leg: No edema.      Left lower leg: No edema.   Feet:      Right foot:      Skin integrity: No ulcer.      Left foot:      Skin integrity: No ulcer.   Skin:     General: Skin is warm and dry.      Coloration: Skin is not jaundiced.   Neurological:      General: No focal deficit present.      Mental Status: She is alert and oriented to person, place, and time.      Cranial Nerves: No cranial nerve deficit.   Psychiatric:         Mood and Affect: Mood normal.         Speech: Speech normal.         Behavior: Behavior normal. Behavior is cooperative.         DATA:   EKG: (personally reviewed tracing(s))  5/15/25 , low volt, PRWP, ?IPLMI-age indet    Laboratory:  CBC:  Recent Labs   Lab 03/05/25  1400 03/19/25  1400 04/02/25  1400 04/16/25  1400 05/07/25  1400   WBC 10.35  --  7.72  --  7.63   Hemoglobin 11.5 L   < > 12.0 12.2 13.3   Hematocrit 36.2 L   < > 37.7 38.2 41.0   Platelets 172  --  164  --  149    < > = values in this interval not displayed.       CHEMISTRIES:  Recent Labs   Lab 05/01/23  0513 05/01/23  0843 05/02/23  0518 05/03/23  0457 05/04/23  0534 01/09/24  1132 02/07/24  0000 07/25/24  0554 07/31/24  1400 09/19/24  1759 09/20/24  0450 09/25/24  1400 02/05/25  1400 02/06/25  0548 02/12/25  1400 02/14/25  1400 02/19/25  1400 03/05/25  1400 04/02/25  1400 05/07/25  1400   Glucose  --    < > 83 79   < > 73  --  87  --  130 H 116 H  --   --  84  --   --   --   --   --   --    Sodium  --    < > 135 L 134 L   < > 137   < > 140   < > 135 L 135 L   < > 140  "139  --   --   --  141 138 140   Potassium  --    < > 4.4 4.7   < > 4.2   < > 4.3   < > 5.1 5.4 H   < > 3.5 5.4 H   < > 5.8 H 5.6 H 4.0 5.1 4.3   BUN  --    < > 90 H 109 H   < > 62 H  --  51 H  --  55 H 73 H  --   --  39 H  --   --   --   --   --   --    Creatinine  --    < > 9.5 H 10.5 H   < > 11.4 H   < > 12.2 H   < > 12.1 H 13.5 H   < > 4.80 H 8.9 H  --   --   --  11.25 H 12.26 H 13.19 H   eGFR  --    < > 4.9 A 4.3 A   < > 3.9 A  --  4 A  --  3.6 A 3.2 A  --   --  5 A  --   --   --   --   --   --    Calcium  --    < > 7.3 L 7.6 L   < > 8.3 L   < > 9.4   < > 8.2 L 8.4 L   < > 10.0 10.6 H  --   --   --  10.2 9.2 9.1   Magnesium 1.6  --  1.7 1.8  --   --   --   --   --  2.3 2.4  --   --   --   --   --   --   --   --   --     < > = values in this interval not displayed.       CARDIAC BIOMARKERS:  Recent Labs   Lab 04/28/23  0647   Troponin I 0.018    H       COAGS:  Recent Labs   Lab 04/28/23  1329 10/12/23  0653   INR 1.0 1.0       LIPIDS/LFTS:  Recent Labs   Lab 05/10/23  0000 06/07/23  0000 10/12/23  0653 11/01/23  0000 01/09/24  1132 02/07/24  0000 07/25/24  0554 08/07/24  1400 03/05/25  1400 04/02/25  1400 05/07/25  1400   Cholesterol 113  --  153  --   --   --   --   --   --   --   --    Triglycerides 144  --  79  --   --   --   --   --   --   --   --    HDL 31  --  52  --   --   --   --   --   --   --   --    LDL Cholesterol 53  --  85.2  --   --   --   --   --   --   --   --    Non-HDL Cholesterol  --   --  101  --   --   --   --   --   --   --   --    AST  --    < > 17  --  7 L  --  8 L  --   --   --   --    ALT 9   < > 7 L   < > 7 L   < > 10   < > 10 12 10    < > = values in this interval not displayed.     No results found for: "TSH"      Cardiovascular Testing:  Echo 1/16/25 (Ao root 4.2cm)    Left Ventricle: The left ventricle is normal in size. Normal wall thickness. There is normal systolic function with a visually estimated ejection fraction of 55 - 60%. Quantitated ejection fraction is 55%. " Global longitudinal strain is -14.0%. There is normal diastolic function.    Right Ventricle: Normal right ventricular cavity size. Wall thickness is normal. Systolic function is normal.    Tricuspid Valve: There is mild regurgitation.    Pulmonary Artery: The estimated pulmonary artery systolic pressure is 21 mmHg.    IVC/SVC: Normal venous pressure at 3 mmHg.    L MPI 10/24/23    Normal myocardial perfusion scan. There is no evidence of myocardial ischemia or infarction.    The gated perfusion images showed an ejection fraction of 57% at rest. The gated perfusion images showed an ejection fraction of 61% post stress. Normal ejection fraction is greater than 47%.    There is normal wall motion at rest and post stress.    LV cavity size is normal at rest and normal at stress.    The ECG portion of the study is negative for ischemia.    The patient reported chest pain during the stress test.    There were no arrhythmias during stress.    There are no prior studies for comparison.      ASSESSMENT:   # palps, in SR/ST during exam today (with mild sxs), no LOC.  # abnl EKG with IPLMI pattern.  Neg echo 1/2025, neg MPI 10/2023.  No anginal/CHF sxs.  # ESRD on HD.  Hx PCKD.  # FH premat CAD (F MI 30s)  # Tob abuse, in smoking cessation program  # BMI 33  # hx cerebral aneurysmal disease  # Ao root dil, 4.2cm (echo 1/2025)    PLAN:   Cont med rx  Holter, EVM as contingency  Start atorva 20mg qhs  Quit smoking  RTC 3 months with lipids/LFT and surveillance echo (Aug 2025)    The above documents medical care services that are part of ongoing care related to this patient's serious/complex condition (Code ). (ESRD/Ao root dil/dyslipidemia)        Valentin Carreon MD, FACC         [1]   Social History  Socioeconomic History    Marital status: Single   Tobacco Use    Smoking status: Every Day     Current packs/day: 1.00     Average packs/day: 1 pack/day for 31.4 years (31.4 ttl pk-yrs)     Types: Cigarettes, Cigars      Start date: 1994     Passive exposure: Current    Smokeless tobacco: Never    Tobacco comments:     Smoke 3-5 black and mild cigars a day replaced cigarettes with cigars   Substance and Sexual Activity    Alcohol use: Not Currently    Drug use: Yes     Frequency: 7.0 times per week     Types: Marijuana     Comment: thc smoking and gummies    Sexual activity: Not Currently   Social History Narrative    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     Social Drivers of Health     Financial Resource Strain: High Risk (2/10/2025)    Overall Financial Resource Strain (CARDIA)     Difficulty of Paying Living Expenses: Very hard   Food Insecurity: Food Insecurity Present (2/10/2025)    Hunger Vital Sign     Worried About Running Out of Food in the Last Year: Often true     Ran Out of Food in the Last Year: Sometimes true   Transportation Needs: No Transportation Needs (12/19/2023)    PRAPARE - Transportation     Lack of Transportation (Medical): No     Lack of Transportation (Non-Medical): No   Physical Activity: Inactive (2/10/2025)    Exercise Vital Sign     Days of Exercise per Week: 0 days     Minutes of Exercise per Session: 0 min   Stress: Stress Concern Present (2/10/2025)    [employer] [place] [employer] - Occupational Stress Questionnaire     Feeling of Stress : Rather much   Housing Stability: Low Risk  (12/19/2023)    Housing Stability Vital Sign     Unable to Pay for Housing in the Last Year: No     Number of Places Lived in the Last Year: 2     Unstable Housing in the Last Year: No

## 2025-05-15 NOTE — ANESTHESIA POSTPROCEDURE EVALUATION
Anesthesia Post Evaluation    Patient: Ade Silva    Procedure(s) Performed: Procedure(s) (LRB):  PTA, AV FISTULA (Right)    Final Anesthesia Type: MAC      Patient location during evaluation: PACU  Patient participation: Yes- Able to Participate  Level of consciousness: awake and alert  Post-procedure vital signs: reviewed and stable  Pain management: adequate  Airway patency: patent    PONV status at discharge: No PONV  Anesthetic complications: no      Cardiovascular status: blood pressure returned to baseline and stable  Respiratory status: unassisted and nasal cannula  Hydration status: euvolemic  Follow-up not needed.              Vitals Value Taken Time   BP 99/59 05/13/25 09:17   Temp 36.7 °C (98.1 °F) 05/13/25 09:04   Pulse 88 05/13/25 09:21   Resp 42 05/13/25 09:21   SpO2 95 % 05/13/25 09:19   Vitals shown include unfiled device data.      No case tracking events are documented in the log.      Pain/Geovanna Score: No data recorded

## 2025-05-16 NOTE — OP NOTE
VASCULAR SURGERY OP NOTE    Date of Operation/Procedure: 5/13/25    Pre-operative Diagnosis: right arm AV fistula stenosis, initial encounter    Post-operative Diagnosis: same    Anesthesia: local/MAC    Operation/Procedure Performed:   Right arm fistulogram  PTA mid fistula stenosis (9x40 [place])  PTA inflow stenosis (5x60 Ultraverse)    Attending Surgeon: MELISSA Loera II, MD    Resident/Fellow: Damir Middleton MD PGY3    Indications: 46yo F with difficulty with cannulation of her right arm AV fistula with prior infiltrations.    Procedure in Detail:   After informed consent was obtained the patient was taken to the operating room in supine position. Appropriate hemodynamic monitors were put in place by the nurse. The right arm was prepped and draped in normal sterile fashion. A time-out was performed to confirm the appropriate patient, procedure, and laterality.  Using ultrasound guidance we obtained percutaneous retrograde access the right arm AV fistula using a micropuncture access kit. The micropuncture sheath was advanced over the wire and a fistulogram was performed through the sheath. Fistologram showed stenosis adjacent to where we had accessed. WE then left the microsheath in place and accessed again farther up the fistula. Again using a micropuncture then sending a stiff angled glide wire and exchanging for a short 6F sheath. 3000 units of heparin was administered. We advanced a glide cath over the glide wire into the brachial artery. We performed a fistulogram through the catheter. This showed 70% stenosis at the arterial anastomosis. The proximal fistula was aneurysmal. There are stents in place just beyond the aneurysm. There is 80% stenosis in the mid fistula at an area of tortuosity. A glide wire was then advanced through the catheter and the catheter removed. A 5x60 ultraverse was advanced across the arterial anastomosis and inflated to nominal pressure with no waste.  The balloon was then  deflated and removed over the wire.  We then advanced a 9 x40 [place] balloon across the area of stenosis in the mid fistula.  This is inflated to nominal pressure with no waste.  We then performed a repeat fistulogram which showed widely patent arterial anastomosis and widely patent fistula.  There was about 30% residual stenosis at the area of stenosis in the mid fistula, but we did not feel this required any additional intervention.  The outflow veins were all widely patent with no evidence of stenosis in the central venous circulation.  Satisfied with our result we then removed the sheath and closed the access site with a 4-0 Monocryl U-stitch.  The micropuncture sheath was removed and we held manual pressure at the access site.  Both access sites were hemostatic.  Both access sites were dressed with 4 x 4 gauze and Tegaderm.  The patient tolerated the procedure well was transported to the postoperative area for recovery.    Estimated Blood loss: 10ml    Complications: none    MELISSA Loera II, MD, RPVI  Vascular Surgeon  [facility]

## 2025-05-20 ENCOUNTER — TELEPHONE (OUTPATIENT)
Dept: NEUROSURGERY | Facility: CLINIC | Age: 45
End: 2025-05-20
Payer: MEDICARE

## 2025-05-20 ENCOUNTER — PATIENT MESSAGE (OUTPATIENT)
Dept: NEUROSURGERY | Facility: CLINIC | Age: 45
End: 2025-05-20

## 2025-05-20 ENCOUNTER — OFFICE VISIT (OUTPATIENT)
Dept: NEUROSURGERY | Facility: CLINIC | Age: 45
End: 2025-05-20
Payer: MEDICARE

## 2025-05-20 VITALS — SYSTOLIC BLOOD PRESSURE: 83 MMHG | HEART RATE: 94 BPM | DIASTOLIC BLOOD PRESSURE: 56 MMHG

## 2025-05-20 DIAGNOSIS — I72.5 BASILAR ARTERY ANEURYSM: Primary | ICD-10-CM

## 2025-05-20 DIAGNOSIS — I67.1 CEREBRAL ANEURYSM, NONRUPTURED: Primary | ICD-10-CM

## 2025-05-20 PROCEDURE — 3066F NEPHROPATHY DOC TX: CPT | Mod: CPTII,NTX,S$GLB, | Performed by: NEUROLOGICAL SURGERY

## 2025-05-20 PROCEDURE — 99215 OFFICE O/P EST HI 40 MIN: CPT | Mod: NTX,S$GLB,, | Performed by: NEUROLOGICAL SURGERY

## 2025-05-20 PROCEDURE — 3078F DIAST BP <80 MM HG: CPT | Mod: CPTII,NTX,S$GLB, | Performed by: NEUROLOGICAL SURGERY

## 2025-05-20 PROCEDURE — 99999 PR PBB SHADOW E&M-EST. PATIENT-LVL III: CPT | Mod: PBBFAC,TXP,, | Performed by: NEUROLOGICAL SURGERY

## 2025-05-20 PROCEDURE — 3074F SYST BP LT 130 MM HG: CPT | Mod: CPTII,NTX,S$GLB, | Performed by: NEUROLOGICAL SURGERY

## 2025-05-20 PROCEDURE — 3044F HG A1C LEVEL LT 7.0%: CPT | Mod: CPTII,NTX,S$GLB, | Performed by: NEUROLOGICAL SURGERY

## 2025-05-21 ENCOUNTER — TELEPHONE (OUTPATIENT)
Dept: NEUROSURGERY | Facility: CLINIC | Age: 45
End: 2025-05-21
Payer: MEDICARE

## 2025-05-29 ENCOUNTER — HOSPITAL ENCOUNTER (OUTPATIENT)
Dept: VASCULAR SURGERY | Facility: CLINIC | Age: 45
Discharge: HOME | End: 2025-05-29
Attending: SURGERY
Payer: MEDICARE

## 2025-05-29 ENCOUNTER — OFFICE VISIT (OUTPATIENT)
Dept: VASCULAR SURGERY | Facility: CLINIC | Age: 45
End: 2025-05-29
Attending: SURGERY
Payer: MEDICARE

## 2025-05-29 VITALS
SYSTOLIC BLOOD PRESSURE: 105 MMHG | HEIGHT: 71 IN | WEIGHT: 238.13 LBS | HEART RATE: 87 BPM | BODY MASS INDEX: 33.34 KG/M2 | TEMPERATURE: 98 F | DIASTOLIC BLOOD PRESSURE: 72 MMHG

## 2025-05-29 DIAGNOSIS — N18.6 ESRD ON HEMODIALYSIS: ICD-10-CM

## 2025-05-29 DIAGNOSIS — M79.601 RIGHT ARM PAIN: ICD-10-CM

## 2025-05-29 DIAGNOSIS — M54.12 CERVICAL RADICULOPATHY: ICD-10-CM

## 2025-05-29 DIAGNOSIS — M79.2 NERVE PAIN: ICD-10-CM

## 2025-05-29 DIAGNOSIS — Z99.2 ESRD ON HEMODIALYSIS: ICD-10-CM

## 2025-05-29 DIAGNOSIS — N18.6 ESRD ON HEMODIALYSIS: Primary | ICD-10-CM

## 2025-05-29 DIAGNOSIS — Z99.2 ESRD ON HEMODIALYSIS: Primary | ICD-10-CM

## 2025-05-29 PROCEDURE — 99999 PR PBB SHADOW E&M-EST. PATIENT-LVL IV: CPT | Mod: PBBFAC,TXP,, | Performed by: SURGERY

## 2025-05-29 PROCEDURE — 93990 DOPPLER FLOW TESTING: CPT | Mod: S$GLB,TXP,, | Performed by: SURGERY

## 2025-05-29 RX ORDER — GABAPENTIN 300 MG/1
CAPSULE ORAL
Qty: 36 CAPSULE | Refills: 2 | Status: SHIPPED | OUTPATIENT
Start: 2025-05-29

## 2025-05-29 RX ORDER — GABAPENTIN 300 MG/1
CAPSULE ORAL
Qty: 36 CAPSULE | Refills: 2 | Status: CANCELLED | OUTPATIENT
Start: 2025-05-29

## 2025-05-29 NOTE — PROGRESS NOTES
"VASCULAR SURGERY CLINIC NOTE    Patient ID: Ade Silva is a 45 y.o. female.    I. HISTORY     Chief Complaint: AV ACCESS    HPI: 46 yo F with ESRD secondary to AD PKD. Patient reports cannulation complication with arterial pressure being low and clots seen after cannulation. She says the dialysis center can cannulate the distal ACF adjacent to the AC fossa. She has stents placed by Dr. Morfin in the proximal AVF. She had a nerve block to help with her nerve pain. No pain from access. Patient has gained weight and the proximal part of the AVF is 15 mm deep. The dialysis center is having a hard time cannulating the Proximal part of the AVF.  Denies any steal symtpoms.    5/29/2025: ESRD MWF comes for a follow up s/p Fistulogram with PTA of the mid fistula on 5/13/2025. She has stents placed by Dr. Morfin in the proximal AVF. She had a nerve block to help with her nerve pain. She reports low arterial pressures during dialysis still however states there have been no clots. The flow has been better than last time as well however minimally. She has not lost any weight and pending for [place]. She also reports swelling since the procedure which has now gotten better.       Past Medical History:   Diagnosis Date    Anxiety     Asthma     last used 11/2025  smoking related last attack 23 y/o    Autosomal dominant polycystic kidney disease     Brain aneurysm     Depression     Encounter for blood transfusion     GERD (gastroesophageal reflux disease)     Hypertension     low since diaylsis    Seizures     as a child (age 7-8); " stress -related"    Stroke     Teeth missing     reports several loose teeth    TIA (transient ischemic attack)     Wears prescription eyeglasses     not using        Past Surgical History:   Procedure Laterality Date    AV FISTULA PLACEMENT Right 09/14/2023    Procedure: CREATION, AV FISTULA, radial cephalic;  Surgeon: MELISSA Loera II, MD;  Location: [facility] OR [address] [place] [address];  Service: " Vascular;  Laterality: Right;    AV FISTULA PLACEMENT Right 10/05/2023    Procedure: CREATION, AV FISTULA - brachial;  Surgeon: MELISSA Loera II, MD;  Location: [facility] OR 2ND FLR;  Service: Vascular;  Laterality: Right;  confirmed placement with doppler    BARTHOLIN GLAND CYST EXCISION N/A 2/6/2025    Procedure: EXCISION, CYST, BARTHOLIN'S GLAND;  Surgeon: Elis Jacobs MD;  Location: [facility] OR;  Service: Gynecology Oncology;  Laterality: N/A;    BIOPSY OF VAGINA  12/10/2024    Procedure: BIOPSY, UTERUS;  Surgeon: Elis Jacobs MD;  [facility];  Service: Gynecology Oncology;;    CEREBRAL ANEURYSM REPAIR      coiling    CERVICAL BIOPSY N/A 12/10/2024    Procedure: BIOPSY, CERVIX;  Surgeon: Elis Jacobs MD;  [facility];  Service: Gynecology Oncology;  Laterality: N/A;    COLD KNIFE CONIZATION OF CERVIX N/A 2/6/2025    Procedure: CONE BIOPSY, CERVIX, USING COLD KNIFE;  Surgeon: Elis Jacobs MD;  Location: [facility] OR;  Service: Gynecology Oncology;  Laterality: N/A;  2 hr case    COLONOSCOPY      CURETTAGE, ENDOCERVICAL N/A 12/10/2024    Procedure: CURETTAGE, ENDOCERVICAL;  Surgeon: Elis Jacobs MD;  [facility];  Service: Gynecology Oncology;  Laterality: N/A;    CYSTOSCOPY      age 13    EMBOLIZATION Right 04/23/2024    Procedure: EMBOLIZATION, BLOOD VESSEL FISTULA;  Surgeon: MELISSA Loera II, MD;  [facility];  Service: Vascular;  Laterality: Right;    EPIDURAL STEROID INJECTION INTO CERVICAL SPINE N/A 11/12/2024    Procedure: C7-T1 KIMBERLEE;  Surgeon: Presley Hough DO;  Location: [facility];  Service: Pain Management;  Laterality: N/A;  15 mins no AC    FISTULOGRAM Right 04/23/2024    Procedure: Fistulogram;  Surgeon: MELISSA Loera II, MD;  Location: [facility] OR 2ND FLR;  Service: Vascular;  Laterality: Right;  CONTRAST: 68ML, FLUORO TIME: 17.9, mGy: 45.11, Gycm2: 5.9548    HYSTEROSCOPY N/A 07/25/2024    Procedure:  HYSTEROSCOPY;  Surgeon: Jazz Amador MD;  Location: [facility];  Service: OB/GYN;  Laterality: N/A;    INCISION OF VULVA N/A 07/25/2024    Procedure: INCISION, VULVA;  Surgeon: Jazz Amador MD;  Location: [facility] OR;  Service: OB/GYN;  Laterality: N/A;    INJECTION OF ANESTHETIC AGENT AROUND STELLATE GANGLION Right 01/09/2025    Procedure: Right stellate ganglion block;  Surgeon: Victor Hugo Ingram MD;  Location: [facility];  Service: Pain Management;  Laterality: Right;  no AC    INSERTION OF TUNNELED CENTRAL VENOUS HEMODIALYSIS CATHETER Right 05/03/2023    Procedure: Insertion, Catheter, Central Venous, Hemodialysis;  Surgeon: Priya Grimm MD;  Location: [facility] CATH LAB;  Service: Interventional Nephrology;  Laterality: Right;    LAPAROSCOPIC ROBOT-ASSISTED SURGICAL REMOVAL OF KIDNEY USING DA GLORIA XI Left 09/19/2024    Procedure: XI ROBOTIC NEPHRECTOMY;  Surgeon: Valentin Maldonado MD;  Location: [address];  Service: Urology;  Laterality: Left;    LOOP ELECTROSURGICAL EXCISION PROCEDURE (LEEP) N/A 07/25/2024    Procedure: LEEP (LOOP ELECTROSURGICAL EXCISION PROCEDURE);  Surgeon: Jazz Amador MD;  Location: [facility];  Service: OB/GYN;  Laterality: N/A;    PERCUTANEOUS TRANSLUMINAL ANGIOPLASTY OF ARTERIOVENOUS FISTULA Right 5/13/2025    Procedure: PTA, AV FISTULA;  Surgeon: MELISSA Loera II, MD;  [address]  Service: Vascular;  Laterality: Right;  5.8 min  33.99 mGy  6.1132 Gy.cm  20ml Dye    REMOVAL OF HEMODIALYSIS CATHETER  05/03/2023    Procedure: REMOVAL, CATHETER, HEMODIALYSIS;  Surgeon: Priya Grimm MD;  Location: [facility] CATH LAB;  Service: Interventional Nephrology;;    SURGICAL EXCISION OF ANAL LESION N/A 12/10/2024    Procedure: EXCISION, LESION, ANUS;  Surgeon: Son Mojica MD;  [address]  Service: Colon and Rectal;  Laterality: N/A;    THERMAL ABLATION OF ENDOMETRIUM N/A 07/25/2024    Procedure: ABLATION, ENDOMETRIUM,  THERMAL;  Surgeon: Jazz Amador MD;  Location: [facility];  Service: OB/GYN;  Laterality: N/A;       Social History     Occupational History    Not on file   Tobacco Use    Smoking status: Every Day     Current packs/day: 1.00     Average packs/day: 1 pack/day for 31.4 years (31.4 ttl pk-yrs)     Types: Cigarettes, Cigars     Start date: 1994     Passive exposure: Current    Smokeless tobacco: Never    Tobacco comments:     Smoke 3-5 black and mild cigars a day replaced cigarettes with cigars   Substance and Sexual Activity    Alcohol use: Not Currently    Drug use: Yes     Frequency: 7.0 times per week     Types: Marijuana     Comment: thc smoking and gummies    Sexual activity: Not Currently       Review of Systems   Constitutional: Negative for weight loss.   HENT:  Negative for ear pain and nosebleeds.    Eyes:  Negative for discharge and pain.   Cardiovascular:  Negative for chest pain and palpitations.   Respiratory:  Negative for cough, shortness of breath and wheezing.    Endocrine: Negative for cold intolerance, heat intolerance and polyphagia.   Hematologic/Lymphatic: Negative for adenopathy. Does not bruise/bleed easily.   Skin:  Negative for itching and rash.   Musculoskeletal:  Negative for joint swelling and muscle cramps.   Gastrointestinal:  Negative for abdominal pain, diarrhea, nausea and vomiting.   Genitourinary:  Negative for dysuria and flank pain.   Neurological:  Negative for numbness and seizures.       Current Medications Reviewed    II. PHYSICAL EXAM     Physical Exam  Constitutional:       General: She is not in acute distress.     Appearance: Normal appearance. She is normal weight. She is not ill-appearing or diaphoretic.   Cardiovascular:      Rate and Rhythm: Normal rate.      Comments: 2+ right radial pulse, continuous palpable thrill over right AC fossa and distal AV fistula. Unable to palpate thrill as easily in upper arm  Pulmonary:      Effort: Pulmonary effort is normal.    Musculoskeletal:      Right lower leg: No edema.      Left lower leg: No edema.   Skin:     General: Skin is warm and dry.   Neurological:      General: No focal deficit present.      Mental Status: She is alert and oriented to person, place, and time. Mental status is at baseline.   Psychiatric:         Mood and Affect: Mood normal.         Behavior: Behavior normal.       III. ASSESSMENT & PLAN (MEDICAL DECISION MAKING)       Imaging Results: (I have personally reviewed the images/studies and provided my interpretation below)    RUE AVF u/s 5/9/24: Patent AVF with volume flow 2.2L/min. Aneurysmal proximal segment of the fistula less than 3cm. Stents in place in proximal AVG. No evidence of stenosis.       RUE AVF u/s 6/6/24: volume flow > 1.5L/min. No evidence of stenosis.     RUE AVF US 5/8/2025: Volume flow 2.5 L. Hematoma which is resolving. PSV 786cm/s would be suggestive of stenosis from velocity standpoint although visually it does not appear stenotic.  AVF proximal 15 mm deep.       Assessment/Diagnosis and Plan:    45 y.o. female s/p Fistulogram with PTA of the mid fistula on 5/13/2025. The proximal part of the AVF is deep 9 mm which has decreased from 15 mm.  It was also reported the arterial pressure have decreased after cannulation but the flow has been better minimally than previous. Patient would like to monitor for now and continue HD because she feels like she has had too many procedures. IF she is unable to consistently dialyze I would recommend graft interposition. She would like to re-visit the option of surgery later.     -instructed to cannulate in mid/upper arm AVG away from her stents/aneurysm  -will continue to monitor  -follow up 4 months with AV access Ultrasound    MELISSA Loera II, MD, [facility]  Vascular Surgery  [facility]

## 2025-06-02 DIAGNOSIS — Z99.2 ESRD ON HEMODIALYSIS: Primary | ICD-10-CM

## 2025-06-02 DIAGNOSIS — N18.6 ESRD ON HEMODIALYSIS: Primary | ICD-10-CM

## 2025-06-05 ENCOUNTER — HOSPITAL ENCOUNTER (OUTPATIENT)
Dept: CARDIOLOGY | Facility: HOSPITAL | Age: 45
Discharge: HOME | End: 2025-06-05
Attending: INTERNAL MEDICINE
Payer: MEDICARE

## 2025-06-05 DIAGNOSIS — R00.2 HEART PALPITATIONS: ICD-10-CM

## 2025-06-05 PROCEDURE — 93227 XTRNL ECG REC<48 HR R&I: CPT | Mod: NTX,,, | Performed by: INTERNAL MEDICINE

## 2025-06-05 PROCEDURE — 93225 XTRNL ECG REC<48 HRS REC: CPT | Mod: NTX

## 2025-06-07 ENCOUNTER — PATIENT MESSAGE (OUTPATIENT)
Dept: NEUROSURGERY | Facility: CLINIC | Age: 45
End: 2025-06-07
Payer: MEDICARE

## 2025-06-09 ENCOUNTER — TELEPHONE (OUTPATIENT)
Dept: NEUROSURGERY | Facility: CLINIC | Age: 45
End: 2025-06-09
Payer: MEDICARE

## 2025-06-09 ENCOUNTER — PATIENT MESSAGE (OUTPATIENT)
Dept: TRANSPLANT | Facility: CLINIC | Age: 45
End: 2025-06-09

## 2025-06-09 DIAGNOSIS — I67.1 CEREBRAL ANEURYSM, NONRUPTURED: Primary | ICD-10-CM

## 2025-06-09 LAB
OHS CV EVENT MONITOR DAY: 1
OHS CV HOLTER LENGTH DECIMAL HOURS: 48
OHS CV HOLTER LENGTH HOURS: 24
OHS CV HOLTER LENGTH MINUTES: 0
OHS CV HOLTER SINUS AVERAGE HR: 92
OHS CV HOLTER SINUS MAX HR: 145
OHS CV HOLTER SINUS MIN HR: 62

## 2025-06-10 ENCOUNTER — OFFICE VISIT (OUTPATIENT)
Dept: PRIMARY CARE CLINIC | Facility: CLINIC | Age: 45
End: 2025-06-10
Payer: MEDICARE

## 2025-06-10 ENCOUNTER — RESULTS FOLLOW-UP (OUTPATIENT)
Dept: CARDIOLOGY | Facility: HOSPITAL | Age: 45
End: 2025-06-10

## 2025-06-10 ENCOUNTER — OFFICE VISIT (OUTPATIENT)
Dept: PAIN MEDICINE | Facility: CLINIC | Age: 45
End: 2025-06-10
Payer: MEDICARE

## 2025-06-10 VITALS
SYSTOLIC BLOOD PRESSURE: 116 MMHG | WEIGHT: 242.75 LBS | HEART RATE: 106 BPM | BODY MASS INDEX: 33.98 KG/M2 | HEIGHT: 71 IN | DIASTOLIC BLOOD PRESSURE: 73 MMHG

## 2025-06-10 DIAGNOSIS — M54.12 CERVICAL RADICULOPATHY: ICD-10-CM

## 2025-06-10 DIAGNOSIS — E66.811 CLASS 1 OBESITY DUE TO EXCESS CALORIES WITH SERIOUS COMORBIDITY AND BODY MASS INDEX (BMI) OF 30.0 TO 30.9 IN ADULT: Primary | ICD-10-CM

## 2025-06-10 DIAGNOSIS — M79.601 RIGHT ARM PAIN: ICD-10-CM

## 2025-06-10 DIAGNOSIS — E66.09 CLASS 1 OBESITY DUE TO EXCESS CALORIES WITH SERIOUS COMORBIDITY AND BODY MASS INDEX (BMI) OF 30.0 TO 30.9 IN ADULT: Primary | ICD-10-CM

## 2025-06-10 DIAGNOSIS — M79.2 NERVE PAIN: ICD-10-CM

## 2025-06-10 PROCEDURE — 3078F DIAST BP <80 MM HG: CPT | Mod: CPTII,S$GLB,, | Performed by: NURSE PRACTITIONER

## 2025-06-10 PROCEDURE — 99214 OFFICE O/P EST MOD 30 MIN: CPT | Mod: S$GLB,,, | Performed by: NURSE PRACTITIONER

## 2025-06-10 PROCEDURE — 3074F SYST BP LT 130 MM HG: CPT | Mod: CPTII,S$GLB,, | Performed by: NURSE PRACTITIONER

## 2025-06-10 PROCEDURE — 3066F NEPHROPATHY DOC TX: CPT | Mod: CPTII,S$GLB,, | Performed by: NURSE PRACTITIONER

## 2025-06-10 PROCEDURE — 1160F RVW MEDS BY RX/DR IN RCRD: CPT | Mod: CPTII,S$GLB,, | Performed by: NURSE PRACTITIONER

## 2025-06-10 PROCEDURE — G2211 COMPLEX E/M VISIT ADD ON: HCPCS | Mod: S$GLB,,, | Performed by: NURSE PRACTITIONER

## 2025-06-10 PROCEDURE — 3008F BODY MASS INDEX DOCD: CPT | Mod: CPTII,S$GLB,, | Performed by: NURSE PRACTITIONER

## 2025-06-10 PROCEDURE — 3044F HG A1C LEVEL LT 7.0%: CPT | Mod: CPTII,S$GLB,, | Performed by: NURSE PRACTITIONER

## 2025-06-10 PROCEDURE — 99999 PR PBB SHADOW E&M-EST. PATIENT-LVL IV: CPT | Mod: PBBFAC,,, | Performed by: NURSE PRACTITIONER

## 2025-06-10 PROCEDURE — 1159F MED LIST DOCD IN RCRD: CPT | Mod: CPTII,S$GLB,, | Performed by: NURSE PRACTITIONER

## 2025-06-10 RX ORDER — GABAPENTIN 300 MG/1
CAPSULE ORAL
Qty: 36 CAPSULE | Refills: 2 | Status: SHIPPED | OUTPATIENT
Start: 2025-06-10

## 2025-06-10 NOTE — PROGRESS NOTES
[facility]  Patient Evaluation    Referred by: Dr. Okeefe   Reason for referral: * No diagnoses found *     CC:   Chief Complaint   Patient presents with    Arm Pain         6/10/2025     1:18 PM 7/16/2024     2:01 PM   Last 3 PDI Scores   Pain Disability Index (PDI) 21 48       Interval history 06/10/2025:  45-year-old female that presents today for a follow-up appointment she is requesting a medication refill of her gabapentin.  Previously provided a right stellate ganglion block by Dr. Deleon which she reports has continued to reduce her right arm pain significantly.  She does report that gabapentin is effective she takes 300 mg 3 times a week after dialysis.  Her pain score today is 0/10 patient did report she may be moving to [place] to live with her brother.  Today she denies any bowel bladder dysfunction saddle anesthesia denies profound weakness denies any new pain.    Interval History 12/17/2024:  43 y/o female presents virtually she is s/p a Cervical KIMBERLEE targeting C7/T1 done by Presley Hough DO she is reporting 60% relief of relief for 2 months. She is now reporting pain in her right arm that began after a recent dialysis treatment and access.  Pain score today is 3/10.  She reports being unable to afford compound cream that was gone to include gabapentin, lidocaine and probably cane  so she refused the medication.  Today she denies any profound weakness in her right arm denies any recent incident or trauma denies dropping things denies any inability to write with a pen.  She has continues to take gabapentin following dialysis 300 mg 3 times per week max dose.  She does report relief with gabapentin and denies any adverse side effects.        Interval history 09/03/2024:  44-year-old female that presents virtually for a follow-up appointment to discuss her cervical MRI she continues to complain of right arm pain described as tingling, numb  and sharp and electric.  Her pain continues to remain in the C5-C6 dermatome distribution of the her right arm.  She denies any new pain denies profound weakness denies any bowel or bladder dysfunction at this time.    Subjective 07/16/2024:   Ade Silva is a 45 y.o. female with end-stage renal disease on hemodialysis (Monday, Wednesday, Friday) who presents complaining of right arm pain described as tingling, numb, sharp and electric.  She notes the pain over her dialysis graft site.  She notes numbness and tingling that radiates into the hand.  Numbness and tingling over the entirety of the hand which is intermittent.  She reports pain worsened after dialysis access was placed, however she does report pain prior to that as well.  Denies any neck pain.  No history of neck surgeries.  She was undergone multiple AV fistula surgeries on the right.  Takes Tylenol which only helps a little.    Initial Pain Assessment:  Location: right arm  Onset: 3 years  Current Pain Score: 7/10  Daily Pain of Range: 8-9/10  Quality: Tingling, Numb, Sharp, and Electric  Radiation: arm to right hand  Worsened by: extension and flexion  Improved by: heat     Patient denies night fever/night sweats, urinary incontinence, bowel incontinence, significant weight loss, significant motor weakness, and loss of sensations.      Previous Interventions:  -01/09/2025 Diagnostic/Therapeutic Right Stellate Block significant relief   -11/12/2024 cervical KIMBERLEE targeting C7-T1 60% relief for 30 days    Previous Therapies:  PT/OT: no   Chiropractor:   HEP:   Relevant Surgery: yes   Multiple AV fistula surgeries on the right    Previous Medications:   - Tylenol or NSAIDS: Tylenol  - Muscle Relaxants:    - TCAs:   - SNRIs:   - Topicals:   - Anticonvulsants:    - Opioids:   - Adjuvants:     Current Pain Medications:  Tylenol     Review of Systems:  Review of Systems   Musculoskeletal:  Positive for neck pain.       GENERAL:  No weight loss, malaise  or fevers.  HEENT:   No recent changes in vision or hearing  NECK:  No difficulty with swallowing. No stridor.   RESPIRATORY:  Negative for cough, wheezing or shortness of breath, patient denies any recent URI.  CARDIOVASCULAR:  Negative for chest pain, leg swelling or palpitations.  GI:  Negative for abdominal discomfort, blood in stools or black stools or change in bowel habits.  MUSCULOSKELETAL:  See HPI.  SKIN:  Negative for lesions, rash, and itching.  PSYCH:  No mood disorder or recent psychosocial stressors.    HEMATOLOGY/LYMPHOLOGY:  Negative for prolonged bleeding, bruising easily or swollen nodes.  Patient is not currently taking any anti-coagulants  NEURO:   No history of headaches, syncope, paralysis, seizures or tremors.  All other reviewed and negative other than HPI.    History:  Current medications, allergies, medical history, surgical history,   family history, and social history were reviewed in the chart as marked.    Full Medication List:    Current Outpatient Medications:     acetaminophen (TYLENOL) 650 MG TbSR, Take 1 tablet (650 mg total) by mouth every 6 (six) hours., Disp: 30 tablet, Rfl: 0    allopurinoL (ZYLOPRIM) 100 MG tablet, Take 1 tablet (100 mg total) by mouth 3 (three) times a week. Take 3 times weekly after dialysis, Disp: 12 tablet, Rfl: 4    atorvastatin (LIPITOR) 20 MG tablet, Take 1 tablet (20 mg total) by mouth every evening., Disp: 90 tablet, Rfl: 3    B complex-vitamin C-folic acid (DENIS-CHANTAL) 0.8 mg Tab, Take 1 tablet (0.8 mg total) by mouth once daily., Disp: 90 tablet, Rfl: 3    cinacalcet (SENSIPAR) 30 MG Tab, Take 30 mg by mouth every Mon, Wed, Fri., Disp: , Rfl:     clindamycin (CLEOCIN T) 1 % lotion, Apply topically 2 (two) times daily. Use to areas prone to boils, Disp: 60 mL, Rfl: 6    conjugated estrogens (PREMARIN) vaginal cream, Place 1 g vaginally once daily for 14 days, THEN 1 g 3 (three) times a week., Disp: 30 g, Rfl: 4    docusate sodium (COLACE) 100 MG  capsule, Take 2 capsules (200 mg total) by mouth 2 (two) times daily., Disp: 90 capsule, Rfl: 3    esomeprazole (NEXIUM) 20 MG capsule, Take 20 mg by mouth before breakfast., Disp: , Rfl:     hydrOXYzine HCL (ATARAX) 10 MG Tab, Take 1 tablet (10 mg total) by mouth 2 (two) times daily as needed (itching)., Disp: 60 tablet, Rfl: 5    loratadine (CLARITIN ORAL), Take 1 tablet by mouth daily as needed (Allergies)., Disp: , Rfl:     medroxyPROGESTERone (PROVERA) 10 MG tablet, Take 1 tablet (10 mg total) by mouth once daily., Disp: 30 tablet, Rfl: 11    midodrine (PROAMATINE) 10 MG tablet, Take 1 tablet (10 mg total) by mouth 2 (two) times daily as needed (As needed on dialysis days for hypotension)., Disp: 90 tablet, Rfl: 2    nicotine (NICODERM CQ) 21 mg/24 hr, Place 1 patch onto the skin once daily. Please dispense only clear patches , patient has a tape allergy., Disp: 28 patch, Rfl: 0    ondansetron (ZOFRAN) 4 MG tablet, Take 1 tablet (4 mg total) by mouth 2 (two) times daily., Disp: 30 tablet, Rfl: 3    OPW TEST CLAIM - DO NOT FILL, OPW test claim. Do not fill., Disp: 1 each, Rfl: 0    sodium zirconium cyclosilicate (LOKELMA) 10 gram packet, Take 1 packet (10 g total) by mouth 2 (two) times a day. Mix entire contents of packet(s) into drinking glass containing 3 tablespoons of water; stir well and drink immediately. Add water and repeat until no powder remains to receive entire dose., Disp: 30 packet, Rfl: 3    sucroferric oxyhydroxide (VELPHORO) 500 mg Chew, Break or dissolve 1 tablet by mouth with each meal and snack. Do not exceed 6 tablets per day., Disp: 180 tablet, Rfl: 11    tirzepatide, weight loss, (ZEPBOUND) 2.5 mg/0.5 mL PnIj, Inject 2.5 mg into the skin every 7 days., Disp: 2 mL, Rfl: 2    triamcinolone acetonide 0.1% (KENALOG) 0.1 % cream, Apply topically 2 (two) times daily. Use to affected areas for up to 2 weeks then take a 1 week break or decrease to 3 times weekly. Do not apply to groin or face.  Use to itchy areas, Disp: 454 g, Rfl: 1    gabapentin (NEURONTIN) 300 MG capsule, Take 1 capsule (300 mg total) by mouth three times per week after dialysis, Disp: 36 capsule, Rfl: 2    oxyCODONE (ROXICODONE) 5 MG immediate release tablet, Take 1 tablet (5 mg total) by mouth every 4 (four) hours as needed for Pain. (Patient not taking: Reported on 6/10/2025), Disp: 10 tablet, Rfl: 0    Current Facility-Administered Medications:     tuberculin injection 5 Units, 5 Units, Intradermal, 1 time in Clinic/HOD, Mihir Chang Jr., MD    Facility-Administered Medications Ordered in Other Visits:     0.9%  NaCl infusion, , Intravenous, Continuous, Mel Calvillo MD     Allergies:  Penicillins; Adhesive; Aspirin; Butalbital-acetaminophen-caff; Latex, natural rubber; Nickel; and Tomato     Medical History:   has a past medical history of Anxiety, Asthma, Autosomal dominant polycystic kidney disease, Brain aneurysm, Depression, Encounter for blood transfusion, GERD (gastroesophageal reflux disease), Hypertension, Seizures, Stroke, Teeth missing, TIA (transient ischemic attack), and Wears prescription eyeglasses.    Surgical History:   has a past surgical history that includes Insertion of tunneled central venous hemodialysis catheter (Right, 05/03/2023); Removal of hemodialysis catheter (05/03/2023); AV fistula placement (Right, 09/14/2023); AV fistula placement (Right, 10/05/2023); Cerebral aneurysm repair; Fistulogram (Right, 04/23/2024); Embolization (Right, 04/23/2024); Thermal ablation of endometrium (N/A, 07/25/2024); Loop electrosurgical excision procedure (LEEP) (N/A, 07/25/2024); Hysteroscopy (N/A, 07/25/2024); Incision of vulva (N/A, 07/25/2024); Cystoscopy; Laparoscopic robot-assisted surgical removal of kidney using da Sheyla Xi (Left, 09/19/2024); Epidural steroid injection into cervical spine (N/A, 11/12/2024); Surgical excision of anal lesion (N/A, 12/10/2024); curettage, endocervical (N/A, 12/10/2024);  "Cervical biopsy (N/A, 12/10/2024); Biopsy of vagina (12/10/2024); Injection of anesthetic agent around stellate ganglion (Right, 01/09/2025); Colonoscopy; Cold knife conization of cervix (N/A, 2/6/2025); Bartholin gland cyst excision (N/A, 2/6/2025); and Percutaneous transluminal angioplasty of arteriovenous fistula (Right, 5/13/2025).    Family History:  family history includes Diabetes in her brother; Fibromyalgia in her mother; Heart disease in her father; Polycystic kidney disease in her father, sister, and sister; Stroke in her father.    Social History:   reports that she has been smoking cigarettes and cigars. She started smoking about 31 years ago. She has a 31.4 pack-year smoking history. She has been exposed to tobacco smoke. She has never used smokeless tobacco. She reports that she does not currently use alcohol. She reports current drug use. Frequency: 7.00 times per week. Drug: Marijuana.    Physical Exam:  Vitals:    06/10/25 1321   BP: 116/73   Pulse: 106   Weight: 110.1 kg (242 lb 11.6 oz)   Height: 5' 11" (1.803 m)   PainSc: 0-No pain   PainLoc: Arm     There was no PE done for this virtual visit.     GENERAL: Well appearing, in no acute distress, alert and oriented x3.  PSYCH:  Mood and affect appropriate.  SKIN: Skin color, texture, turgor normal, no rashes or lesions.  HEAD/FACE:  Normocephalic, atraumatic. Cranial nerves grossly intact.  NECK: Normal ROM. Supple.  No pain to palpation over the cervical paraspinous muscles. Spurling Negative. No pain with neck flexion, extension, or lateral rotation.   CV: + palpable thrill over the right AV fistula.  RRR with palpation of the radial artery.  PULM: No evidence of respiratory difficulty, symmetric chest rise.  GI:  Soft and non-distended.  MSK:  Mild tenderness to palpation over the right AV fistula.  No atrophy or tone abnormalities are noted.   NEURO: Bilateral upper and lower extremity coordination and strength is symmetric.  No loss of " sensation is noted.  MENTAL STATUS: A x O x 3, good concentration, speech is fluent and goal directed  MOTOR: 5/5 in bilateral upper extremity muscle groups  GAIT: Normal. Ambulates unassisted.    Imaging:  -End Stage Renal Disease on Dialysis         Lumbar MRI  FINDINGS:  Alignment: Trace retrolisthesis of C5 on C6.  Normal sagittal alignment is otherwise maintained.     Vertebrae: Normal marrow signal. No fracture.     Discs: Mild disc height loss at C5-C6.     Cord: Normal.     Skull base and craniocervical junction: Normal.     Degenerative findings:     C2-C3: Small posterior disc osteophyte complex mildly asymmetric to the left resulting in mild left neural foraminal narrowing.     C3-C4: No spinal canal stenosis or neural foraminal narrowing.     C4-C5: No spinal canal stenosis or neural foraminal narrowing.     C5-C6: Posterior disc osteophyte complex results in moderate central canal stenosis with deformity of the cord but no cord signal changes.  Uncovertebral spurring and posterior disc osteophyte complex results in moderate left and mild right neural foraminal narrowing.     C6-C7: Small posterior disc bulge minimally effaces the ventral thecal sac and results in mild right neural foraminal narrowing.     C7-T1: No spinal canal stenosis or neural foraminal narrowing.     Paraspinal muscles & soft tissues: Unremarkable.        Impression:     1. Mild degenerative change of the cervical spine as detailed above, most notable at C5-C6 where a posterior disc osteophyte complex and uncovertebral spurring result in moderate central canal stenosis and moderate left and mild right neural foraminal narrowing.  History   ======     General History   Other: -Pain during Dialysis     Dialysis Access   =============     Dialysis access location:  Right Arm   Type of AV access: Brachiocephalic AVF   Rt inflow A PSV    243 cm/s   Rt at anastomosis PSV  413 cm/s   Rt A distal anastomosis PSV    236 cm/s   Rt fistula  prox PSV    235 cm/s   Rt fistula mid PSV 83 cm/s   Rt fistula mid flow volume 1,559.0 ml/min   Rt fistula distal PSV  202 cm/s   Rt outflow V prox PSV  85 cm/s   Rt outflow V mid PSV   182 cm/s   Rt outflow V distal PSV    252 cm/s     Impression   =========     Color flow duplex imaging reveals a patent right Brachiocephalic AV fistula and proximal stent with no evidence of a focal   hemodynamically significant stenosis. The volume flow at the mid bicep measures 1559 mL/min.     DATE OF SERVICE: 06/06/2024                                                       Sonographer: Nikko Mckeon   Electronically Signed by: INA Herndon M.D. at 06/06/2024-11:31     Labs:  BMP  Lab Results   Component Value Date     06/04/2025    K 3.7 06/04/2025    CL 97 06/04/2025    CO2 28 02/06/2025    BUN 39 (H) 02/06/2025    CREATININE 13.01 (H) 06/04/2025    CALCIUM 10.1 06/04/2025    ANIONGAP 12 02/06/2025    EGFRNORACEVR 5 (A) 02/06/2025     Lab Results   Component Value Date    ALT 7 06/04/2025    AST 8 (L) 07/25/2024    ALKPHOS 101 06/04/2025    BILITOT 0.5 07/25/2024     Lab Results   Component Value Date    WBC 10.97 (H) 06/04/2025    HGB 12.5 06/04/2025    HCT 38.4 06/04/2025     (H) 06/04/2025     06/04/2025           Assessment:  Problem List Items Addressed This Visit    None  Visit Diagnoses         Cervical radiculopathy        Relevant Medications    gabapentin (NEURONTIN) 300 MG capsule      Right arm pain        Relevant Medications    gabapentin (NEURONTIN) 300 MG capsule      Nerve pain        Relevant Medications    gabapentin (NEURONTIN) 300 MG capsule            07/16/2024 - Ade Silva is a 45 y.o. female who  has a past medical history of Anxiety, Asthma, Autosomal dominant polycystic kidney disease, Brain aneurysm, Depression, Encounter for blood transfusion, GERD (gastroesophageal reflux disease), Hypertension, Seizures, Stroke, Teeth missing, TIA (transient ischemic attack),  and Wears prescription eyeglasses.  By history and examination this patient has chronic right upper extremity pain that appears to be neuropathic in nature, likely due to nerve damage/entrapment at the AV fistula site versus ischemic neuropathy versus cervical radiculopathy.  AV fistula patent on  ultrasound.  I will obtain imaging of her neck to rule out cervical stenosis.  We discussed the underlying diagnoses and multiple treatment options including non-opioid medications and interventional procedures.  I will start the patient on gabapentin 100 mg 3 times weekly after dialysis.  This may be increased up to 300 mg 3 times weekly after dialysis.  She may benefit from a cervical epidural steroid injection vs stellate ganglion block.  The risks and benefits of each treatment option were discussed and all questions were answered.        09/03/[id number]-ascl-old female that presents virtually for a follow-up appointment to discuss cervical MRI she has a history of chronic right upper extremity pain that appears to be neuropathic in nature.  Her pain is in the distribution of C5-6 in the past they did consider that her pain may be related to nerve damage/entrapment at the AV fistula sites however she confirms that this has been cleared via multiple assessments of her fistula.  Her cervical MRI did show at C5-6 posterior disc osteophyte complex that results in moderate central canal stenosis of the forming the core but not cord signal changes.  She would have spurring and a posterior disc osteophyte complex and moderate left and mild right neural foraminal narrowing which could be the source of her current symptoms.  She is scheduled for an upcoming nephrectomy with Dr. Maldonado on 09/15 her and I discussed considering her for a cervical KIMBERLEE targeting C7-T1 used as a diagnostic and therapeutic injection.  Discussed with the patient that I will reach out to Dr. Maldonado and his team to see if this epidural can be done prior to  surgery if not we will have to consider this following once she has recovered.      12/17/[id number]-ojav-old female that presents virtually for a follow-up appointment she has a history of chronic right upper extremity pain that appears to be neuropathic in nature.  Her pain is in the distribution of C5-6 in the past they did consider that her pain may be related to nerve damage/entrapment at the AV fistula sites however she confirms that this has been cleared via multiple assessments of her fistula.  Her cervical MRI did show at C5-6 posterior disc osteophyte complex that results in moderate central canal stenosis of the forming the core but not cord signal changes.  Recently provided a cervical KIMBERLEE targeting C7-T1 that provided her 60% relief for 30 days and then she reports the pain returned immediately following a dialysis treatment in which access was difficult.  Her pain score today was 3/10 she did feel like the cervical KIMBERLEE helped with her radicular symptoms.  See plan agreed upon below.    6/10/2025- Ade Silva is a 45 y.o. female who  has a past medical history of Anxiety, Asthma, Autosomal dominant polycystic kidney disease, Brain aneurysm, Depression, Encounter for blood transfusion, GERD (gastroesophageal reflux disease), Hypertension, Seizures, Stroke, Teeth missing, TIA (transient ischemic attack), and Wears prescription eyeglasses.  By history and examination this patient has chronic  right upper extremity pain that appears to be neuropathic in nature.  Her pain is in the distribution of C5-6 in the past they did consider that her pain may be related to nerve damage/entrapment at the AV fistula sites however she confirms that this has been cleared via multiple assessments of her fistula. The risks and benefits of each treatment option were discussed and all questions were answered.        Treatment Plan:   Procedures:  none at this time.  PT/OT/HEP: I have stressed the importance of physical  activity and a home exercise plan to help with pain and improve health.  Medications:    -  Continue and refill Gabapentin  300 mg 3 times weekly following dialysis.12 tablets total ( [facility])    -  Reviewed and consistent with medication use as prescribed.  Imaging:  Reviewed and discussed in detail using images from patient's chart.  Follow Up: 3-4 months or sooner if needed for continued care.     RAZA SmithC  [facility]    Disclaimer: This note was partly generated using dictation software which may occasionally result in transcription errors.

## 2025-06-11 NOTE — PROGRESS NOTES
The patient location is: [place]  The chief complaint leading to consultation is: weight    Visit type: audiovisual    Face to Face time with patient: 30 minutes of total time spent on the encounter, which includes face to face time and non-face to face time preparing to see the patient (eg, review of tests), Obtaining and/or reviewing separately obtained history, Documenting clinical information in the electronic or other health record, Independently interpreting results (not separately reported) and communicating results to the patient/family/caregiver, or Care coordination (not separately reported).         Each patient to whom he or she provides medical services by telemedicine is:  (1) informed of the relationship between the physician and patient and the respective role of any other health care provider with respect to management of the patient; and (2) notified that he or she may decline to receive medical services by telemedicine and may withdraw from such care at any time.    Notes:                 Clinic Note  6/11/2025      Subjective:       Patient ID:  Ade is a 45 y.o. female being seen for:      History of Present Illness    CHIEF COMPLAINT:  Ade presents today for weight loss management to improve AV fistula access for dialysis    AV FISTULA ACCESS:  She reports difficulty with AV fistula access due to weight gain causing the top portion to be too deep for cannulation. The bottom portion is complicated by nerve damage resulting in arterial pressure drops during access. She recently underwent three balloon procedures in the arm. While vascular surgery has offered graft placement requiring port placement, she is currently declining additional surgical intervention.    MEDICAL HISTORY:  She has an aneurysm and is at risk for congestive heart failure but denies having the condition. She also reports insomnia and low blood sugar.    SURGICAL HISTORY:  She has undergone multiple surgeries  including nephrectomy and six surgeries on her arm. She expresses reluctance to undergo additional surgical procedures at this time.    WEIGHT MANAGEMENT:  She previously attempted keto diet with some success in weight loss, primarily consuming fruits, vegetables, and meat.    SOCIAL HISTORY:  She is currently attempting smoking cessation.      ROS:  General: -fever, -chills, -fatigue, -weight gain, -weight loss  Eyes: -vision changes, -redness, -discharge  ENT: -ear pain, -nasal congestion, -sore throat  Cardiovascular: -chest pain, -palpitations, -lower extremity edema  Respiratory: -cough, -shortness of breath  Gastrointestinal: -abdominal pain, -nausea, -vomiting, -diarrhea, -constipation, -blood in stool  Genitourinary: -dysuria, -hematuria, -frequency  Musculoskeletal: -joint pain, -muscle pain  Skin: -rash, -lesion  Neurological: -headache, -dizziness, -numbness, -tingling, +nerve pain  Psychiatric: -anxiety, -depression, +sleep difficulty           Medication List with Changes/Refills   Current Medications    ACETAMINOPHEN (TYLENOL) 650 MG TBSR    Take 1 tablet (650 mg total) by mouth every 6 (six) hours.    ALLOPURINOL (ZYLOPRIM) 100 MG TABLET    Take 1 tablet (100 mg total) by mouth 3 (three) times a week. Take 3 times weekly after dialysis    ATORVASTATIN (LIPITOR) 20 MG TABLET    Take 1 tablet (20 mg total) by mouth every evening.    B COMPLEX-VITAMIN C-FOLIC ACID (DENIS-CHANTAL) 0.8 MG TAB    Take 1 tablet (0.8 mg total) by mouth once daily.    CINACALCET (SENSIPAR) 30 MG TAB    Take 30 mg by mouth every Mon, Wed, Fri.    CLINDAMYCIN (CLEOCIN T) 1 % LOTION    Apply topically 2 (two) times daily. Use to areas prone to boils    CONJUGATED ESTROGENS (PREMARIN) VAGINAL CREAM    Place 1 g vaginally once daily for 14 days, THEN 1 g 3 (three) times a week.    DOCUSATE SODIUM (COLACE) 100 MG CAPSULE    Take 2 capsules (200 mg total) by mouth 2 (two) times daily.    ESOMEPRAZOLE (NEXIUM) 20 MG CAPSULE    Take 20 mg  by mouth before breakfast.    GABAPENTIN (NEURONTIN) 300 MG CAPSULE    Take 1 capsule (300 mg total) by mouth three times per week after dialysis    HYDROXYZINE HCL (ATARAX) 10 MG TAB    Take 1 tablet (10 mg total) by mouth 2 (two) times daily as needed (itching).    LORATADINE (CLARITIN ORAL)    Take 1 tablet by mouth daily as needed (Allergies).    MEDROXYPROGESTERONE (PROVERA) 10 MG TABLET    Take 1 tablet (10 mg total) by mouth once daily.    MIDODRINE (PROAMATINE) 10 MG TABLET    Take 1 tablet (10 mg total) by mouth 2 (two) times daily as needed (As needed on dialysis days for hypotension).    NICOTINE (NICODERM CQ) 21 MG/24 HR    Place 1 patch onto the skin once daily. Please dispense only clear patches , patient has a tape allergy.    ONDANSETRON (ZOFRAN) 4 MG TABLET    Take 1 tablet (4 mg total) by mouth 2 (two) times daily.    OPW TEST CLAIM - DO NOT FILL    OPW test claim. Do not fill.    OXYCODONE (ROXICODONE) 5 MG IMMEDIATE RELEASE TABLET    Take 1 tablet (5 mg total) by mouth every 4 (four) hours as needed for Pain.    SODIUM ZIRCONIUM CYCLOSILICATE (LOKELMA) 10 GRAM PACKET    Take 1 packet (10 g total) by mouth 2 (two) times a day. Mix entire contents of packet(s) into drinking glass containing 3 tablespoons of water; stir well and drink immediately. Add water and repeat until no powder remains to receive entire dose.    SUCROFERRIC OXYHYDROXIDE (VELPHORO) 500 MG CHEW    Break or dissolve 1 tablet by mouth with each meal and snack. Do not exceed 6 tablets per day.    TIRZEPATIDE, WEIGHT LOSS, (ZEPBOUND) 2.5 MG/0.5 ML PNIJ    Inject 2.5 mg into the skin every 7 days.    TRIAMCINOLONE ACETONIDE 0.1% (KENALOG) 0.1 % CREAM    Apply topically 2 (two) times daily. Use to affected areas for up to 2 weeks then take a 1 week break or decrease to 3 times weekly. Do not apply to groin or face. Use to itchy areas       Problem List[1]        Objective:      There were no vitals taken for this visit.      Physical  Exam    General: No acute distress. Well-developed. Well-nourished.  Eyes: EOMI. Sclerae anicteric.  HENT: Normocephalic. Atraumatic.   Respiratory: Normal respiratory effort.   Neurological: Alert & oriented x3. No slurred speech.   Psychiatric: Normal mood. Normal affect. Good insight. Good judgment.           Assessment and Plan:     Assessment & Plan    - Evaluated Zepbound (tirzepatide) for weight loss, but concerned about potential for hypoglycemia given already low blood sugars. Explained difference between Zepbound and Mounjaro: same active ingredient (tirzepatide) but branded differently for weight loss vs diabetes.  - Ruled out Adipex (phentermine) due to cardiovascular risks.  - Explored OTC options like Ovidio (orlistat) as potential alternative.  - Recommend dietary modifications as most cost-effective and safest approach for weight loss.  - Awaiting response from pharmacist regarding alternative diagnoses for Zepbound coverage.    ## VASCULAR DIALYSIS CATHETER COMPLICATIONS:  - Monitored the patient's difficulty with dialysis sessions due to issues with AV fistula access, including nerve damage in arms and arterial pressures bottoming out.  - Three balloon angioplasties have been performed on the arm to improve access, without success.  - Discussed with vascular surgery team about the importance of weight reduction to improve access to AV fistula and advised the patient accordingly.  - Vascular surgery offered to place a graft which would involve reinsertion of a port, but the patient declined further surgical intervention at this time.    ## ACQUIRED ABSENCE OF KIDNEY:  - Documented the patient's history of nephrectomy.    ## ANEURYSM:  - Noted the patient's history of aneurysm, which impacts medication options for weight management.    ## INSOMNIA:  - Documented the patient's report of insomnia, not sleep apnea.    ## HYPOGLYCEMIA:  - Monitored the patient's glucose levels, which are running low on  average.  - Considered Zepbound for weight management but noted caution is required due to potential for hypoglycemia.    ## NICOTINE DEPENDENCE:  - Noted the patient is continuing efforts to stop tobacco use.    ## OVERWEIGHT AND WEIGHT MANAGEMENT:  - Discussed comprehensive weight management options including pharmacotherapy and dietary modifications.  - Recommend starting Ovidio (orlistat), an OTC medication, and explained its mechanism of action: blocks fat absorption, which may lead to oily stools.  - Educated the patient on principles of low-carbohydrate/ketogenic diet for weight loss, explaining that the body utilizes fat for energy when carbohydrates are restricted and that protein plays an important role in satiety and slower digestion compared to carbohydrates.    ## PERSONAL HISTORY OF OTHER SPECIFIED CONDITIONS:  - Documented the patient's history of multiple surgical procedures, including 6 on the arm and nephrectomy.         Follow Up:   No follow-ups on file.    Other Orders Placed This Visit:         Jordan Christiansen MD        This note is dictated on [employer] word recognition program and This note was generated with the assistance of ambient listening technology. Verbal consent was obtained by the patient and accompanying visitor(s) for the recording of patient appointment to facilitate this note. I attest to having reviewed and edited the generated note for accuracy, though some syntax or spelling errors may persist. Please contact the author of this note for any clarification.  .  There are word recognition mistakes that are occasionally missed on review.              Answers submitted by the patient for this visit:  Review of Systems Questionnaire (Submitted on 6/10/2025)  activity change: No  unexpected weight change: Yes  neck pain: No  hearing loss: No  rhinorrhea: No  trouble swallowing: No  eye discharge: No  visual disturbance: No  chest tightness: No  wheezing: Yes  chest pain: No  palpitations:  Yes  blood in stool: No  constipation: No  vomiting: No  diarrhea: No  polydipsia: No  polyuria: No  difficulty urinating: No  hematuria: No  menstrual problem: No  dysuria: No  joint swelling: No  arthralgias: No  headaches: Yes  weakness: Yes  confusion: Yes  dysphoric mood: No         [1]   Patient Active Problem List  Diagnosis    Polycystic kidney disease    Chronic kidney disease-mineral and bone disorder    Anemia    Autosomal dominant polycystic kidney disease    Benign hypertension with ESRD (end-stage renal disease)    Basilar artery aneurysm    Gross hematuria    Hematuria    S/P arteriovenous (AV) fistula creation    ESRD (end stage renal disease) on dialysis    s/p hysteroscopy, endometrial ablation, LEEP & vulvectomy    Renal mass    Mild intermittent asthma without complication    GERD (gastroesophageal reflux disease)    Snoring    Anxiety and depression    Papular rash    Macrocytosis without anemia    S/P EUA/Colposcopy/Cervcal Biopsy, ECC, EMBx - 12/10/24    Dysplasia of cervix, high grade TAYLOR 2    Hyperparathyroidism due to ESRD on dialysis    Class 1 obesity due to excess calories with serious comorbidity and body mass index (BMI) of 30.0 to 30.9 in adult    Status post CKC & Bartholins Gland Excision 2/6/25    ESRD on hemodialysis    Arteriovenous fistula occlusion

## 2025-06-16 ENCOUNTER — PATIENT MESSAGE (OUTPATIENT)
Dept: DERMATOLOGY | Facility: CLINIC | Age: 45
End: 2025-06-16
Payer: MEDICARE

## 2025-06-23 PROCEDURE — 84480 ASSAY TRIIODOTHYRONINE (T3): CPT | Mod: TXP | Performed by: NURSE PRACTITIONER

## 2025-06-23 PROCEDURE — 84443 ASSAY THYROID STIM HORMONE: CPT | Mod: NTX | Performed by: NURSE PRACTITIONER

## 2025-06-23 PROCEDURE — 84436 ASSAY OF TOTAL THYROXINE: CPT | Mod: NTX | Performed by: NURSE PRACTITIONER

## 2025-06-25 ENCOUNTER — PATIENT MESSAGE (OUTPATIENT)
Dept: NEUROSURGERY | Facility: CLINIC | Age: 45
End: 2025-06-25
Payer: MEDICARE

## 2025-07-01 ENCOUNTER — PATIENT MESSAGE (OUTPATIENT)
Dept: NEUROSURGERY | Facility: CLINIC | Age: 45
End: 2025-07-01
Payer: MEDICARE

## 2025-07-02 NOTE — PROGRESS NOTES
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 Recently, she has been having dizziness, disorientation, and an episode of loss of consciousness for approximately a minute, though she remained aware of the event.    In 2016, she had a persistent headache for about 2 weeks that was unresponsive to treatment, along with increased vomiting. The headaches progressively worsened, leading to an emergency room visit. Between the ER visit and her treatment at [facility] (presumably a medical facility), she had a few episodes of syncope.    Approximately 3 weeks post-coiling procedure in 2016, she had temporary blindness in her right eye lasting 10-20 minutes, with some numbness. A similar, less severe episode occurred months later. She was evaluated at the emergency room after the first incident and was told she had a TIA (Transient Ischemic Attack).    Her headaches have significantly improved since the surgery. She reports recent weight gain, which has made it difficult to determine her dry weight for dialysis, potentially contributing to her recent symptoms.    Her history of kidney disease is noted to increase her risk for aneurysm formation and recurrence.    She denies rupture of the aneurysm.    Ade has a history of kidney disease. She was diagnosed with an aneurysm in 2016, located at the top of the basilar artery/basilar apex. Following the coiling procedure, she experienced a Transient Ischemic Attack (TIA).    In 2016, the patient underwent an aneurysm coiling procedure to treat her basilar artery aneurysm. The procedure was initially successful,  "resulting in complete obliteration of the aneurysm. However, during the current visit, a recurrence of the aneurysm was noted.    Ade underwent an MRI Brain in 2023, where a regular MRI was performed instead of an MRA with vessel wall imaging. This scan revealed residual aneurysm filling, but showed no enlargement of fluid spaces or blockage of the third ventricle. A previous MRI Brain in 2016 demonstrated complete obliteration of the aneurysm following the coiling procedure.    Ade is allergic to aspirin, which causes emesis when taken.    Smoking: History of smoking      ROS:  Constitutional: +dizziness  Head: -headaches  Eyes: +loss of vision  Gastrointestinal: +vomiting  Neurological: +numbness, +confusion or disorientation, +syncope         Review of patient's allergies indicates:   Allergen Reactions    Penicillins Hives and Edema     Throat swelling    Adhesive Rash     Adhesive/silk tape (type found on band aides). Can only use "Paper Tape"    Aspirin Hives and Nausea And Vomiting     And vomiting    Butalbital-acetaminophen-caff Nausea And Vomiting and Other (See Comments)     Dizziness (made pt feel sick)    Latex, natural rubber Rash    Nickel Rash    Tomato Nausea And Vomiting       Current Medications[1]    Past Medical History:   Diagnosis Date    Anxiety     Asthma     last used 11/2025  smoking related last attack 21 y/o    Autosomal dominant polycystic kidney disease     Brain aneurysm     Depression     Encounter for blood transfusion     GERD (gastroesophageal reflux disease)     Hypertension     low since diaylsis    Seizures     as a child (age 7-8); " stress -related"    Stroke     Teeth missing     reports several loose teeth    TIA (transient ischemic attack)     Wears prescription eyeglasses     not using     Past Surgical History:   Procedure Laterality Date    AV FISTULA PLACEMENT Right 09/14/2023    Procedure: CREATION, AV FISTULA, radial cephalic;  Surgeon: MELISSA Loera " MD CORAZON;  Location: [facility] OR 2ND FLR;  Service: Vascular;  Laterality: Right;    AV FISTULA PLACEMENT Right 10/05/2023    Procedure: CREATION, AV FISTULA - brachial;  Surgeon: MELISSA Loera II, MD;  Location: [facility] OR 2ND FLR;  Service: Vascular;  Laterality: Right;  confirmed placement with doppler    BARTHOLIN GLAND CYST EXCISION N/A 2/6/2025    Procedure: EXCISION, CYST, BARTHOLIN'S GLAND;  Surgeon: Elis Jacobs MD;  Location: [facility] OR;  Service: Gynecology Oncology;  Laterality: N/A;    BIOPSY OF VAGINA  12/10/2024    Procedure: BIOPSY, UTERUS;  Surgeon: Elis Jacobs MD;  [facility];  Service: Gynecology Oncology;;    CEREBRAL ANEURYSM REPAIR      coiling    CERVICAL BIOPSY N/A 12/10/2024    Procedure: BIOPSY, CERVIX;  Surgeon: Elis Jacobs MD;  [facility];  Service: Gynecology Oncology;  Laterality: N/A;    COLD KNIFE CONIZATION OF CERVIX N/A 2/6/2025    Procedure: CONE BIOPSY, CERVIX, USING COLD KNIFE;  Surgeon: Elis Jacobs MD;  Location: [facility] OR;  Service: Gynecology Oncology;  Laterality: N/A;  2 hr case    COLONOSCOPY      CURETTAGE, ENDOCERVICAL N/A 12/10/2024    Procedure: CURETTAGE, ENDOCERVICAL;  Surgeon: Elis Jacobs MD;  [facility];  Service: Gynecology Oncology;  Laterality: N/A;    CYSTOSCOPY      age 13    EMBOLIZATION Right 04/23/2024    Procedure: EMBOLIZATION, BLOOD VESSEL FISTULA;  Surgeon: MELISSA Loera II, MD;  [facility];  Service: Vascular;  Laterality: Right;    EPIDURAL STEROID INJECTION INTO CERVICAL SPINE N/A 11/12/2024    Procedure: C7-T1 KIMBERLEE;  Surgeon: Presley Hough DO;  Location: [facility] PAIN MANAGEMENT;  Service: Pain Management;  Laterality: N/A;  15 mins no AC    FISTULOGRAM Right 04/23/2024    Procedure: Fistulogram;  Surgeon: MELISSA Loera II, MD;  Location: [facility] OR 2ND FLR;  Service: Vascular;  Laterality: Right;  CONTRAST: 68ML, FLUORO TIME: 17.9, mGy: 45.11, Gycm2: 5.9548     HYSTEROSCOPY N/A 07/25/2024    Procedure: HYSTEROSCOPY;  Surgeon: Jazz Amador MD;  Location: [facility] OR;  Service: OB/GYN;  Laterality: N/A;    INCISION OF VULVA N/A 07/25/2024    Procedure: INCISION, VULVA;  Surgeon: Jazz Amador MD;  Location: [facility] OR;  Service: OB/GYN;  Laterality: N/A;    INJECTION OF ANESTHETIC AGENT AROUND STELLATE GANGLION Right 01/09/2025    Procedure: Right stellate ganglion block;  Surgeon: Victor Hugo Ingram MD;  Location: [facility] PAIN MANAGEMENT;  Service: Pain Management;  Laterality: Right;  no AC    INSERTION OF TUNNELED CENTRAL VENOUS HEMODIALYSIS CATHETER Right 05/03/2023    Procedure: Insertion, Catheter, Central Venous, Hemodialysis;  Surgeon: Priya Grimm MD;  Location: [facility] CATH LAB;  Service: Interventional Nephrology;  Laterality: Right;    LAPAROSCOPIC ROBOT-ASSISTED SURGICAL REMOVAL OF KIDNEY USING DA GLORIA XI Left 09/19/2024    Procedure: XI ROBOTIC NEPHRECTOMY;  Surgeon: Valentin Maldonado MD;  [address];  Service: Urology;  Laterality: Left;    LOOP ELECTROSURGICAL EXCISION PROCEDURE (LEEP) N/A 07/25/2024    Procedure: LEEP (LOOP ELECTROSURGICAL EXCISION PROCEDURE);  Surgeon: Jazz Amador MD;  Location: [facility];  Service: OB/GYN;  Laterality: N/A;    PERCUTANEOUS TRANSLUMINAL ANGIOPLASTY OF ARTERIOVENOUS FISTULA Right 5/13/2025    Procedure: PTA, AV FISTULA;  Surgeon: MELISSA Loera II, MD;  Location: [address] FLR;  Service: Vascular;  Laterality: Right;  5.8 min  33.99 mGy  6.1132 Gy.cm  20ml Dye    REMOVAL OF HEMODIALYSIS CATHETER  05/03/2023    Procedure: REMOVAL, CATHETER, HEMODIALYSIS;  Surgeon: Priya Grimm MD;  Location: [facility] CATH LAB;  Service: Interventional Nephrology;;    SURGICAL EXCISION OF ANAL LESION N/A 12/10/2024    Procedure: EXCISION, LESION, ANUS;  Surgeon: Son Mojica MD;  Location: [facility] OR 2ND FLR;  Service: Colon and Rectal;  Laterality: N/A;    THERMAL ABLATION OF ENDOMETRIUM N/A 07/25/2024     Procedure: ABLATION, ENDOMETRIUM, THERMAL;  Surgeon: Jazz Amador MD;  Location: [facility];  Service: OB/GYN;  Laterality: N/A;     Family History       Problem Relation (Age of Onset)    Diabetes Brother    Fibromyalgia Mother    Heart disease Father    Polycystic kidney disease Father, Sister, Sister    Stroke Father          Social History     Socioeconomic History    Marital status: Single   Tobacco Use    Smoking status: Every Day     Current packs/day: 1.00     Average packs/day: 1 pack/day for 31.5 years (31.5 ttl pk-yrs)     Types: Cigarettes, Cigars     Start date: 1994     Passive exposure: Current    Smokeless tobacco: Never    Tobacco comments:     Smoke 3-5 black and mild cigars a day replaced cigarettes with cigars   Substance and Sexual Activity    Alcohol use: Not Currently    Drug use: Yes     Frequency: 7.0 times per week     Types: Marijuana     Comment: thc smoking and gummies    Sexual activity: Not Currently   Social History Narrative    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     Social Drivers of Health     Financial Resource Strain: High Risk (2/10/2025)    Overall Financial Resource Strain (CARDIA)     Difficulty of Paying Living Expenses: Very hard   Food Insecurity: Food Insecurity Present (2/10/2025)    Hunger Vital Sign     Worried About Running Out of Food in the Last Year: Often true     Ran Out of Food in the Last Year: Sometimes true   Transportation Needs: No Transportation Needs (12/19/2023)    PRAPARE - Transportation     Lack of Transportation (Medical): No     Lack of Transportation (Non-Medical): No   Physical Activity: Inactive (2/10/2025)    Exercise Vital Sign     Days of Exercise per Week: 0 days     Minutes of Exercise per Session: 0 min   Stress: Stress Concern Present (2/10/2025)    [employer] [place] [employer] - Occupational Stress Questionnaire     Feeling of Stress : Rather much   Housing Stability: Low Risk  (12/19/2023)    Housing Stability Vital Sign     Unable  to Pay for Housing in the Last Year: No     Number of Places Lived in the Last Year: 2     Unstable Housing in the Last Year: No       Review of Systems    OBJECTIVE:     Vital Signs  Pulse: 94  BP: (!) 83/56  Pain Score: 0-No pain  There is no height or weight on file to calculate BMI.    Neurosurgery Physical Exam    Physical Exam    General: No acute distress.  Head: Nontraumatic. Normocephalic.  Eyes: Pupils equal. EOMI.  Neck: Supple. Normal ROM. No tenderness to palpation.  CVS: Normal rate and rhythm. Distal pulses present.  Pulm: Symmetric expansion. No respiratory distress.  GI: Abdomen nondistended. Nontender.  MSK: Moves all extremities without restriction. Atraumatic.  Skin: Dry. Intact.  Psych: Normal thought content. Normal cognition.  Neuro: Awake. Alert and oriented to self, place, and time.  Language: Speech is fluent and goal-directed without dysarthria or aphasia.         Diagnostic Results:       ASSESSMENT/PLAN:        Assessment & Plan    I67.848 Other cerebrovascular vasospasm and vasoconstriction  N18.6 End stage renal disease  Z99.2 Dependence on renal dialysis  R55 Syncope and collapse  R42 Dizziness and giddiness  H53.129 Transient visual loss, unspecified eye  T45.515A Adverse effect of anticoagulants, initial encounter  Z86.73 Personal history of TIA, and cerebral infarction without residual deficits  Z87.891 Personal history of nicotine dependence  G97.84 Intracranial hypotension following other procedure  Z88.6 Allergy status to analgesic agent    CEREBRAL ANEURYSM:  - Reviewed previous imaging from 2016 showing well-treated aneurysm with coiling, now with significant recurrence.  - Considered retreatment options, leaning towards endovascular approach due to deep location in brain.  - Endovascular treatment with stent and coils likely needed for more definitive treatment, but unable to take aspirin.  - Open surgery generally more definitive but higher risk in this location due to  perforating vessels.  - Explained risks of untreated aneurysms, including potential for rupture and associated morbidity/mortality.  - Discussed differences between endovascular and open surgical approaches for aneurysm treatment.  - Explained need for antiplatelet therapy with stent placement to prevent clot formation.  - Ordered MRA with vessel wall imaging of the brain to further evaluate aneurysm and determine optimal treatment approach.    END STAGE RENAL DISEASE:  - Young age, smoking history, and renal disease increase risk of aneurysm recurrence.    PERSONAL HISTORY OF NICOTINE DEPENDENCE:  - Young age, smoking history, and renal disease increase risk of aneurysm recurrence.    FOLLOW-UP:  - Follow up to review MRA results and discuss treatment plan.             Note dictated with voice recognition software, please excuse any grammatical errors.  This note was generated with the assistance of ambient listening technology. Verbal consent was obtained by the patient and accompanying visitor(s) for the recording of patient appointment to facilitate this note. I attest to having reviewed and edited the generated note for accuracy, though some syntax or spelling errors may persist. Please contact the author of this note for any clarification.           [1]   Current Outpatient Medications   Medication Sig Dispense Refill    acetaminophen (TYLENOL) 650 MG TbSR Take 1 tablet (650 mg total) by mouth every 6 (six) hours. 30 tablet 0    allopurinoL (ZYLOPRIM) 100 MG tablet Take 1 tablet (100 mg total) by mouth 3 (three) times a week. Take 3 times weekly after dialysis 12 tablet 4    atorvastatin (LIPITOR) 20 MG tablet Take 1 tablet (20 mg total) by mouth every evening. 90 tablet 3    B complex-vitamin C-folic acid (DENIS-CHANTAL) 0.8 mg Tab Take 1 tablet (0.8 mg total) by mouth once daily. 90 tablet 3    cinacalcet (SENSIPAR) 30 MG Tab Take 30 mg by mouth every Mon, Wed, Fri.      clindamycin (CLEOCIN T) 1 % lotion Apply  topically 2 (two) times daily. Use to areas prone to boils 60 mL 6    conjugated estrogens (PREMARIN) vaginal cream Place 1 g vaginally once daily for 14 days, THEN 1 g 3 (three) times a week. 30 g 4    docusate sodium (COLACE) 100 MG capsule Take 2 capsules (200 mg total) by mouth 2 (two) times daily. 90 capsule 3    esomeprazole (NEXIUM) 20 MG capsule Take 20 mg by mouth before breakfast.      gabapentin (NEURONTIN) 300 MG capsule Take 1 capsule (300 mg total) by mouth three times per week after dialysis 36 capsule 2    hydrOXYzine HCL (ATARAX) 10 MG Tab Take 1 tablet (10 mg total) by mouth 2 (two) times daily as needed (itching). 60 tablet 5    loratadine (CLARITIN ORAL) Take 1 tablet by mouth daily as needed (Allergies).      medroxyPROGESTERone (PROVERA) 10 MG tablet Take 1 tablet (10 mg total) by mouth once daily. 30 tablet 11    midodrine (PROAMATINE) 10 MG tablet Take 1 tablet (10 mg total) by mouth 2 (two) times daily as needed (As needed on dialysis days for hypotension). 90 tablet 2    nicotine (NICODERM CQ) 21 mg/24 hr Place 1 patch onto the skin once daily. Please dispense only clear patches , patient has a tape allergy. 28 patch 0    ondansetron (ZOFRAN) 4 MG tablet Take 1 tablet (4 mg total) by mouth 2 (two) times daily. 30 tablet 3    OPW TEST CLAIM - DO NOT FILL OPW test claim. Do not fill. 1 each 0    oxyCODONE (ROXICODONE) 5 MG immediate release tablet Take 1 tablet (5 mg total) by mouth every 4 (four) hours as needed for Pain. (Patient not taking: Reported on 6/10/2025) 10 tablet 0    sodium zirconium cyclosilicate (LOKELMA) 10 gram packet Take 1 packet (10 g total) by mouth 2 (two) times a day. Mix entire contents of packet(s) into drinking glass containing 3 tablespoons of water; stir well and drink immediately. Add water and repeat until no powder remains to receive entire dose. 30 packet 3    sucroferric oxyhydroxide (VELPHORO) 500 mg Chew Break or dissolve 1 tablet by mouth with each meal  and snack. Do not exceed 6 tablets per day. 180 tablet 11    tirzepatide, weight loss, (ZEPBOUND) 2.5 mg/0.5 mL PnIj Inject 2.5 mg into the skin every 7 days. 2 mL 2    triamcinolone acetonide 0.1% (KENALOG) 0.1 % cream Apply topically 2 (two) times daily. Use to affected areas for up to 2 weeks then take a 1 week break or decrease to 3 times weekly. Do not apply to groin or face. Use to itchy areas 454 g 1     Current Facility-Administered Medications   Medication Dose Route Frequency Provider Last Rate Last Admin    tuberculin injection 5 Units  5 Units Intradermal 1 time in Clinic/HOD Mihir Chang Jr., MD         Facility-Administered Medications Ordered in Other Visits   Medication Dose Route Frequency Provider Last Rate Last Admin    0.9%  NaCl infusion   Intravenous Continuous Mel Calvillo MD        heparin (porcine) injection 1,000 Units  1,000 Units Intravenous Continuous Mihir Chang Jr., MD        heparin (porcine) injection 3,000 Units  3,000 Units Intravenous Once Mihir Chang Jr., MD

## 2025-07-08 ENCOUNTER — TELEPHONE (OUTPATIENT)
Dept: GYNECOLOGIC ONCOLOGY | Facility: CLINIC | Age: 45
End: 2025-07-08
Payer: MEDICARE

## 2025-07-16 ENCOUNTER — TELEPHONE (OUTPATIENT)
Dept: GYNECOLOGIC ONCOLOGY | Facility: CLINIC | Age: 45
End: 2025-07-16
Payer: MEDICARE

## 2025-07-17 ENCOUNTER — OFFICE VISIT (OUTPATIENT)
Dept: GYNECOLOGIC ONCOLOGY | Facility: CLINIC | Age: 45
End: 2025-07-17
Payer: MEDICARE

## 2025-07-17 VITALS
BODY MASS INDEX: 35 KG/M2 | HEIGHT: 71 IN | WEIGHT: 250 LBS | DIASTOLIC BLOOD PRESSURE: 57 MMHG | SYSTOLIC BLOOD PRESSURE: 108 MMHG

## 2025-07-17 DIAGNOSIS — Z87.410 HISTORY OF CERVICAL DYSPLASIA: Primary | ICD-10-CM

## 2025-07-17 DIAGNOSIS — D06.9 CIN III WITH SEVERE DYSPLASIA: ICD-10-CM

## 2025-07-17 PROCEDURE — 99999 PR PBB SHADOW E&M-EST. PATIENT-LVL IV: CPT | Mod: PBBFAC,,, | Performed by: OBSTETRICS & GYNECOLOGY

## 2025-07-17 PROCEDURE — 87624 HPV HI-RISK TYP POOLED RSLT: CPT | Performed by: OBSTETRICS & GYNECOLOGY

## 2025-07-17 NOTE — PROCEDURES
"Ade Silva is a 45 y.o. female patient.    Pulse: (P) 95 (07/17/25 0948)  BP: (!) 108/57 (07/17/25 0948)  Weight: 113.4 kg (250 lb) (07/17/25 0948)  Height: 5' 11" (180.3 cm) (07/17/25 0948)       Colposcopy    Date/Time: 7/17/2025 10:08 AM    Performed by: Elis Jacobs MD  Authorized by: Elis Jacobs MD    Consent obatined:  Prior to procedure the appropriate consent was completed and verified  Timeout:Immediately prior to procedure a time out was called to verify the correct patient, procedure, equipment, support staff and site/side marked as required  Prep:Patient was prepped and draped in the usual sterile fashion    Colposcopy Site:  Cervix  Position:  Supine  Acrowhite Lesion: No    Atypical Vessels: No    Transformation Zone Adequate?: Yes    Biopsy?: No    ECC Performed?: Yes    LEEP Performed?: No    Estimated blood loss (cc):  5   Patient tolerated the procedure well with no immediate complications.     45 y.o. here for colposcopy.   Pap history below.   Data reviewed:  12/7/2023: pap smear: ASC-H, + HR HPV 16  3/7/2024: Pelvic US: 8 cm uterus, ES 8.6 mm, normal ovaries, no FF. 0.9 cm hypoechoic nodule within the endometrial cavity which appears vascular. Finding is suggestive of an endometrial polyp.   3/12/2024: colposcopy: TAYLOR 2-3 endometrial biopsy: no atypical hyperplasia or malignancy identified  4/9/2024: vulvar biopsy: condyloma acuminatum   7/25/2024: hysteroscopy D&C: complex hyperplasia without atypia arising in a polyp. Endometrial ablation peformed (POLA Amador)  LEEP/ECC: TAYLOR 2-3, High-grade dysplasia involves an inked and cauterized edge of this LEEP cone biopsy   warty rectal lesion : AIN 3  9/19/2024: robotic left nephrectomy for kidney mass. Negative for malignancy.     12/10/24: EUA biopsies joint case with Dr. Mojica; Cervix Biopsy=TAYLOR 2  2/6/25: CKC by Reynaldo: TAYLOR 1 (one positive margin), Bartholin's gland (benign)     No abnormal lesions noted. Follow up papsmear " and ECC collected.   Ultimate plan pending results.       7/17/2025

## 2025-07-18 ENCOUNTER — RESULTS FOLLOW-UP (OUTPATIENT)
Dept: GYNECOLOGIC ONCOLOGY | Facility: CLINIC | Age: 45
End: 2025-07-18
Payer: MEDICARE

## 2025-07-18 DIAGNOSIS — N85.01 COMPLEX ENDOMETRIAL HYPERPLASIA WITHOUT ATYPIA: ICD-10-CM

## 2025-07-18 DIAGNOSIS — D06.9 CIN III WITH SEVERE DYSPLASIA: ICD-10-CM

## 2025-07-18 DIAGNOSIS — Z87.410 HISTORY OF CERVICAL DYSPLASIA: ICD-10-CM

## 2025-07-18 DIAGNOSIS — N85.01 COMPLEX ENDOMETRIAL HYPERPLASIA WITHOUT ATYPIA: Primary | ICD-10-CM

## 2025-07-18 DIAGNOSIS — Z87.410 HISTORY OF CERVICAL DYSPLASIA: Primary | ICD-10-CM

## 2025-07-21 ENCOUNTER — CLINICAL SUPPORT (OUTPATIENT)
Dept: GYNECOLOGIC ONCOLOGY | Facility: CLINIC | Age: 45
End: 2025-07-21
Payer: MEDICARE

## 2025-07-21 DIAGNOSIS — D06.9 CIN III WITH SEVERE DYSPLASIA: ICD-10-CM

## 2025-07-21 DIAGNOSIS — N85.01 COMPLEX ENDOMETRIAL HYPERPLASIA WITHOUT ATYPIA: Primary | ICD-10-CM

## 2025-07-21 DIAGNOSIS — Z87.410 HISTORY OF CERVICAL DYSPLASIA: ICD-10-CM

## 2025-07-21 DIAGNOSIS — N85.01 COMPLEX ENDOMETRIAL HYPERPLASIA WITHOUT ATYPIA: ICD-10-CM

## 2025-07-21 PROCEDURE — 88305 TISSUE EXAM BY PATHOLOGIST: CPT | Mod: TC | Performed by: OBSTETRICS & GYNECOLOGY

## 2025-07-23 ENCOUNTER — PATIENT MESSAGE (OUTPATIENT)
Dept: NEUROSURGERY | Facility: CLINIC | Age: 45
End: 2025-07-23
Payer: MEDICARE

## 2025-07-23 ENCOUNTER — TELEPHONE (OUTPATIENT)
Dept: GYNECOLOGIC ONCOLOGY | Facility: CLINIC | Age: 45
End: 2025-07-23
Payer: MEDICARE

## 2025-07-23 LAB
DHEA SERPL-MCNC: NORMAL
ESTROGEN SERPL-MCNC: NORMAL PG/ML
INSULIN SERPL-ACNC: NORMAL U[IU]/ML
LAB AP GROSS DESCRIPTION: NORMAL
LAB AP PERFORMING LOCATION(S): NORMAL
LAB AP REPORT FOOTNOTES: NORMAL

## 2025-07-23 NOTE — TELEPHONE ENCOUNTER
----- Message from Elis Jacobs MD sent at 7/23/2025  2:08 PM CDT -----  Regarding: FW:  Team- please let her know that Pap smear was normal and then endocervix curettage just showed some low-grade changes. She should return to Dr. Jazz Amador for annual GYN care. After notifying her also please send a message to Jazz Amador's pool so that they can schedule her for her annual. Thank you.     Elis  ----- Message -----  From: Lab, Background User  Sent: 7/23/2025   1:21 PM CDT  To: Elis Jacobs MD

## 2025-07-29 ENCOUNTER — OFFICE VISIT (OUTPATIENT)
Dept: DERMATOLOGY | Facility: CLINIC | Age: 45
End: 2025-07-29
Payer: MEDICARE

## 2025-07-29 DIAGNOSIS — L87.1 REACTIVE PERFORATING COLLAGENOSIS: Primary | ICD-10-CM

## 2025-07-29 DIAGNOSIS — L29.89 UREMIC PRURITUS: ICD-10-CM

## 2025-07-29 PROCEDURE — G2211 COMPLEX E/M VISIT ADD ON: HCPCS | Mod: S$GLB,,, | Performed by: STUDENT IN AN ORGANIZED HEALTH CARE EDUCATION/TRAINING PROGRAM

## 2025-07-29 PROCEDURE — 3044F HG A1C LEVEL LT 7.0%: CPT | Mod: CPTII,S$GLB,, | Performed by: STUDENT IN AN ORGANIZED HEALTH CARE EDUCATION/TRAINING PROGRAM

## 2025-07-29 PROCEDURE — 1159F MED LIST DOCD IN RCRD: CPT | Mod: CPTII,S$GLB,, | Performed by: STUDENT IN AN ORGANIZED HEALTH CARE EDUCATION/TRAINING PROGRAM

## 2025-07-29 PROCEDURE — 3066F NEPHROPATHY DOC TX: CPT | Mod: CPTII,S$GLB,, | Performed by: STUDENT IN AN ORGANIZED HEALTH CARE EDUCATION/TRAINING PROGRAM

## 2025-07-29 PROCEDURE — 99999 PR PBB SHADOW E&M-EST. PATIENT-LVL III: CPT | Mod: PBBFAC,,, | Performed by: STUDENT IN AN ORGANIZED HEALTH CARE EDUCATION/TRAINING PROGRAM

## 2025-07-29 PROCEDURE — 1160F RVW MEDS BY RX/DR IN RCRD: CPT | Mod: CPTII,S$GLB,, | Performed by: STUDENT IN AN ORGANIZED HEALTH CARE EDUCATION/TRAINING PROGRAM

## 2025-07-29 PROCEDURE — 99214 OFFICE O/P EST MOD 30 MIN: CPT | Mod: S$GLB,,, | Performed by: STUDENT IN AN ORGANIZED HEALTH CARE EDUCATION/TRAINING PROGRAM

## 2025-07-29 RX ORDER — HYDROXYZINE HYDROCHLORIDE 10 MG/1
10 TABLET, FILM COATED ORAL 2 TIMES DAILY PRN
Qty: 60 TABLET | Refills: 5 | Status: SHIPPED | OUTPATIENT
Start: 2025-07-29

## 2025-07-29 RX ORDER — CLINDAMYCIN AND BENZOYL PEROXIDE 10; 50 MG/G; MG/G
GEL TOPICAL 2 TIMES DAILY
Qty: 50 G | Refills: 6 | Status: SHIPPED | OUTPATIENT
Start: 2025-07-29 | End: 2026-07-29

## 2025-07-29 RX ORDER — ALLOPURINOL 100 MG/1
100 TABLET ORAL
Qty: 12 TABLET | Refills: 6 | Status: SHIPPED | OUTPATIENT
Start: 2025-07-30 | End: 2025-12-17

## 2025-07-29 RX ORDER — TAZAROTENE 1 MG/G
CREAM TOPICAL NIGHTLY
Qty: 60 G | Refills: 6 | Status: SHIPPED | OUTPATIENT
Start: 2025-07-29 | End: 2026-07-29

## 2025-07-29 NOTE — PROGRESS NOTES
Subjective:      Patient ID:  Ade Silva is a 45 y.o. female who presents for No chief complaint on file.    Pt here for rash f/u     Pt last seen on 4/03/2025 for bx of L lower abodmen- benign.     Pt has been using TAC cream PRN and taking Atarax BID.     Pt using head and shoulders bare shampoo.       Has been followed for RPC    She has ESRD on HD. Initially thought to be uremic pruritus and prurigo nodularis. Biopsy was more suggestive of RPC    Failed treatments: Triamcinolone    Currently taking allopurinol 100 tiw and atarax 10 mg qhs prn. Her itching is much better with hydroxyzine. She continues to get new lesions on the skin. Unsure if allopurinol helped    Review of Systems    Objective:   Physical Exam   Constitutional: She appears well-developed and well-nourished.   Neurological: She is alert and oriented to person, place, and time.   Psychiatric: She has a normal mood and affect.   Skin:   Areas Examined (abnormalities noted in diagram):   Neck Inspection Performed  Abdomen Inspection Performed  Back Inspection Performed  RUE Inspected  LUE Inspection Performed  RLE Inspected  LLE Inspection Performed            Diagram Legend     Erythematous scaling macule/papule c/w actinic keratosis       Vascular papule c/w angioma      Pigmented verrucoid papule/plaque c/w seborrheic keratosis      Yellow umbilicated papule c/w sebaceous hyperplasia      Irregularly shaped tan macule c/w lentigo     1-2 mm smooth white papules consistent with Milia      Movable subcutaneous cyst with punctum c/w epidermal inclusion cyst      Subcutaneous movable cyst c/w pilar cyst      Firm pink to brown papule c/w dermatofibroma      Pedunculated fleshy papule(s) c/w skin tag(s)      Evenly pigmented macule c/w junctional nevus     Mildly variegated pigmented, slightly irregular-bordered macule c/w mildly atypical nevus      Flesh colored to evenly pigmented papule c/w intradermal nevus       Pink pearly  papule/plaque c/w basal cell carcinoma      Erythematous hyperkeratotic cursted plaque c/w SCC      Surgical scar with no sign of skin cancer recurrence      Open and closed comedones      Inflammatory papules and pustules      Verrucoid papule consistent consistent with wart     Erythematous eczematous patches and plaques     Dystrophic onycholytic nail with subungual debris c/w onychomycosis     Umbilicated papule    Erythematous-base heme-crusted tan verrucoid plaque consistent with inflamed seborrheic keratosis     Erythematous Silvery Scaling Plaque c/w Psoriasis     See annotation      Assessment / Plan:        Reactive perforating collagenosis  -     tazarotene (AVAGE) 0.1 % cream; Apply topically every evening. Start 2-3 times weekly then increase up to every night after 2-3 weeks if skin is not too dry. Apply pea sized amount per area. Apply to abdomen and legs where scaly bumps appear  Dispense: 60 g; Refill: 6  -     clindamycin-benzoyl peroxide (BENZACLIN) gel; Apply topically 2 (two) times daily. Apply to abdomen and legs where scaly bumps appear  Dispense: 50 g; Refill: 6  -     allopurinoL (ZYLOPRIM) 100 MG tablet; Take 1 tablet (100 mg total) by mouth 3 (three) times a week. Take 3 times weekly after dialysis  Dispense: 12 tablet; Refill: 6    Uremic pruritus  -     hydrOXYzine HCL (ATARAX) 10 MG Tab; Take 1 tablet (10 mg total) by mouth 2 (two) times daily as needed (itching).  Dispense: 60 tablet; Refill: 5      Uremic pruritus  Reactive perforating collagenosis  Itching diffusely including areas without rash but most pruritic on lower abdomen and inner thigh with monomorphous erythematous papules with central dell or keratotic core--biopsy confirmed RPC  - c/w atarax 10 mg bid prn--it does not maker her drowsy and does temporarily alleviate the itching  - c/w allopurinol renally dosed 100 mg tiw after HD  - start topicals as above--tazarotene 0.1% and clinda-BP 1-5  - future possibly treatments  dupixent, Zandra, etc           Follow up in about 4 months (around 11/29/2025).

## 2025-08-04 ENCOUNTER — CLINICAL SUPPORT (OUTPATIENT)
Dept: SMOKING CESSATION | Facility: CLINIC | Age: 45
End: 2025-08-04
Payer: MEDICARE

## 2025-08-04 DIAGNOSIS — F17.200 NICOTINE DEPENDENCE, UNCOMPLICATED: Primary | ICD-10-CM

## 2025-08-04 PROCEDURE — 99406 BEHAV CHNG SMOKING 3-10 MIN: CPT | Mod: S$GLB,TXP,, | Performed by: FAMILY MEDICINE

## 2025-08-04 PROCEDURE — 99999 PR PBB SHADOW E&M-EST. PATIENT-LVL I: CPT | Mod: PBBFAC,TXP,, | Performed by: GENERAL PRACTICE

## 2025-08-04 NOTE — PROGRESS NOTES
Spoke with patient today in regard to smoking cessation progress for 12 month follow up. She states she is not tobacco free. Patient was not interested in scheduling an appointment as she said the program does not work for her. She knows what she needs to do to quit, but is not ready. Informed patient of benefit period, future follow ups and contact information if any further help is needed. Will resolve smart form for 12 month follow up for Quit # 1. Patient has used allotted Medicaid visits for 2025.

## 2025-08-07 ENCOUNTER — HOSPITAL ENCOUNTER (OUTPATIENT)
Dept: CARDIOLOGY | Facility: HOSPITAL | Age: 45
Discharge: HOME | End: 2025-08-07
Attending: INTERNAL MEDICINE
Payer: MEDICARE

## 2025-08-07 DIAGNOSIS — I77.810 AORTIC ROOT DILATATION: ICD-10-CM

## 2025-08-07 LAB
ASCENDING AORTA: 2.9 CM
AV INDEX (PROSTH): 0.7
AV MEAN GRADIENT: 3 MMHG
AV PEAK GRADIENT: 5 MMHG
AV VALVE AREA BY VELOCITY RATIO: 2.6 CM²
AV VALVE AREA: 2.2 CM²
AV VELOCITY RATIO: 0.82
CV ECHO LV RWT: 0.48 CM
DOP CALC AO PEAK VEL: 1.1 M/S
DOP CALC AO VTI: 23.1 CM
DOP CALC LVOT AREA: 3.1 CM2
DOP CALC LVOT DIAMETER: 2 CM
DOP CALC LVOT PEAK VEL: 0.9 M/S
DOP CALC MV VTI: 18.8 CM
DOP CALCLVOT PEAK VEL VTI: 16.1 CM
E WAVE DECELERATION TIME: 241 MSEC
E/A RATIO: 1.11
E/E' RATIO: 6 M/S
ECHO LV POSTERIOR WALL: 1.1 CM (ref 0.6–1.1)
FRACTIONAL SHORTENING: 34.8 % (ref 28–44)
INTERVENTRICULAR SEPTUM: 1.1 CM (ref 0.6–1.1)
IVC DIAMETER: 1.69 CM
LA MAJOR: 4.9 CM
LA MINOR: 4.8 CM
LA WIDTH: 3.4 CM
LEFT ATRIUM SIZE: 3.4 CM
LEFT ATRIUM VOLUME: 48 CM3
LEFT INTERNAL DIMENSION IN SYSTOLE: 3 CM (ref 2.1–4)
LEFT VENTRICLE DIASTOLIC VOLUME: 95 ML
LEFT VENTRICLE SYSTOLIC VOLUME: 34 ML
LEFT VENTRICULAR INTERNAL DIMENSION IN DIASTOLE: 4.6 CM (ref 3.5–6)
LEFT VENTRICULAR MASS: 181.2 G
LV LATERAL E/E' RATIO: 6.2 M/S
LV SEPTAL E/E' RATIO: 6.8 M/S
LVED V (TEICH): 95.15 ML
LVES V (TEICH): 34.1 ML
LVOT MG: 1.89 MMHG
LVOT MV: 0.63 CM/S
MV MEAN GRADIENT: 1 MMHG
MV PEAK A VEL: 0.61 M/S
MV PEAK E VEL: 0.68 M/S
MV PEAK GRADIENT: 2 MMHG
MV STENOSIS PRESSURE HALF TIME: 69.95 MS
MV VALVE AREA BY CONTINUITY EQUATION: 2.69 CM2
MV VALVE AREA P 1/2 METHOD: 3.15 CM2
OHS CV RV/LV RATIO: 0.67 CM
PISA TR MAX VEL: 2.1 M/S
PULM VEIN S/D RATIO: 0.97
PV PEAK D VEL: 0.6 M/S
PV PEAK GRADIENT: 3 MMHG
PV PEAK S VEL: 0.58 M/S
PV PEAK VELOCITY: 0.93 M/S
RA MAJOR: 4.34 CM
RA PRESSURE ESTIMATED: 3 MMHG
RA WIDTH: 3.1 CM
RIGHT VENTRICLE DIASTOLIC BASEL DIMENSION: 3.1 CM
RIGHT VENTRICULAR END-DIASTOLIC DIMENSION: 3.06 CM
RV TB RVSP: 5 MMHG
RV TISSUE DOPPLER FREE WALL SYSTOLIC VELOCITY 1 (APICAL 4 CHAMBER VIEW): 14.46 CM/S
SINUS: 3.3 CM
STJ: 3 CM
TDI LATERAL: 0.11 M/S
TDI SEPTAL: 0.1 M/S
TDI: 0.11 M/S
TR MAX PG: 18 MMHG
TRICUSPID ANNULAR PLANE SYSTOLIC EXCURSION: 2.3 CM
TV REST PULMONARY ARTERY PRESSURE: 21 MMHG

## 2025-08-07 PROCEDURE — 93306 TTE W/DOPPLER COMPLETE: CPT | Mod: TXP

## 2025-08-21 ENCOUNTER — OFFICE VISIT (OUTPATIENT)
Dept: PRIMARY CARE CLINIC | Facility: CLINIC | Age: 45
End: 2025-08-21
Payer: MEDICARE

## 2025-08-21 VITALS
OXYGEN SATURATION: 96 % | SYSTOLIC BLOOD PRESSURE: 80 MMHG | HEART RATE: 97 BPM | BODY MASS INDEX: 34.77 KG/M2 | WEIGHT: 248.38 LBS | HEIGHT: 71 IN | DIASTOLIC BLOOD PRESSURE: 64 MMHG

## 2025-08-21 DIAGNOSIS — R06.83 SNORING: ICD-10-CM

## 2025-08-21 DIAGNOSIS — Z00.00 ENCOUNTER FOR SCREENING AND PREVENTATIVE CARE: Primary | ICD-10-CM

## 2025-08-21 DIAGNOSIS — N18.6 HYPERPARATHYROIDISM DUE TO ESRD ON DIALYSIS: ICD-10-CM

## 2025-08-21 DIAGNOSIS — L73.2 HIDRADENITIS SUPPURATIVA: ICD-10-CM

## 2025-08-21 DIAGNOSIS — E83.9 CHRONIC KIDNEY DISEASE-MINERAL AND BONE DISORDER: ICD-10-CM

## 2025-08-21 DIAGNOSIS — Z00.00 ENCOUNTER FOR MEDICARE ANNUAL WELLNESS EXAM: ICD-10-CM

## 2025-08-21 DIAGNOSIS — Z98.890 STATUS POST CONE BIOPSY OF CERVIX: ICD-10-CM

## 2025-08-21 DIAGNOSIS — N87.1 DYSPLASIA OF CERVIX, HIGH GRADE CIN 2: ICD-10-CM

## 2025-08-21 DIAGNOSIS — M89.9 CHRONIC KIDNEY DISEASE-MINERAL AND BONE DISORDER: ICD-10-CM

## 2025-08-21 DIAGNOSIS — I12.0 BENIGN HYPERTENSION WITH ESRD (END-STAGE RENAL DISEASE): ICD-10-CM

## 2025-08-21 DIAGNOSIS — F41.9 ANXIETY AND DEPRESSION: ICD-10-CM

## 2025-08-21 DIAGNOSIS — K83.09 RECURRENT PYOGENIC CHOLANGITIS: ICD-10-CM

## 2025-08-21 DIAGNOSIS — D75.89 MACROCYTOSIS WITHOUT ANEMIA: ICD-10-CM

## 2025-08-21 DIAGNOSIS — T82.898D ARTERIOVENOUS FISTULA OCCLUSION, SUBSEQUENT ENCOUNTER: ICD-10-CM

## 2025-08-21 DIAGNOSIS — Q61.2 AUTOSOMAL DOMINANT POLYCYSTIC KIDNEY DISEASE: ICD-10-CM

## 2025-08-21 DIAGNOSIS — N25.81 HYPERPARATHYROIDISM DUE TO ESRD ON DIALYSIS: ICD-10-CM

## 2025-08-21 DIAGNOSIS — J30.2 CHRONIC SEASONAL ALLERGIC RHINITIS: ICD-10-CM

## 2025-08-21 DIAGNOSIS — J45.20 MILD INTERMITTENT ASTHMA WITHOUT COMPLICATION: ICD-10-CM

## 2025-08-21 DIAGNOSIS — N18.9 CHRONIC KIDNEY DISEASE-MINERAL AND BONE DISORDER: ICD-10-CM

## 2025-08-21 DIAGNOSIS — Z99.2 HYPERPARATHYROIDISM DUE TO ESRD ON DIALYSIS: ICD-10-CM

## 2025-08-21 DIAGNOSIS — F32.A ANXIETY AND DEPRESSION: ICD-10-CM

## 2025-08-21 DIAGNOSIS — N18.6 BENIGN HYPERTENSION WITH ESRD (END-STAGE RENAL DISEASE): ICD-10-CM

## 2025-08-21 DIAGNOSIS — K08.9 POOR DENTITION: ICD-10-CM

## 2025-08-21 DIAGNOSIS — Z99.2 ESRD ON HEMODIALYSIS: ICD-10-CM

## 2025-08-21 DIAGNOSIS — K21.9 GASTROESOPHAGEAL REFLUX DISEASE WITHOUT ESOPHAGITIS: ICD-10-CM

## 2025-08-21 DIAGNOSIS — N18.6 ESRD ON HEMODIALYSIS: ICD-10-CM

## 2025-08-21 PROBLEM — D64.9 ANEMIA: Status: RESOLVED | Noted: 2023-04-28 | Resolved: 2025-08-21

## 2025-08-21 PROBLEM — R31.0 GROSS HEMATURIA: Status: RESOLVED | Noted: 2023-04-29 | Resolved: 2025-08-21

## 2025-08-21 PROBLEM — E66.09 CLASS 1 OBESITY DUE TO EXCESS CALORIES WITH SERIOUS COMORBIDITY AND BODY MASS INDEX (BMI) OF 30.0 TO 30.9 IN ADULT: Status: RESOLVED | Noted: 2025-01-16 | Resolved: 2025-08-21

## 2025-08-21 PROBLEM — R21 PAPULAR RASH: Status: RESOLVED | Noted: 2024-09-10 | Resolved: 2025-08-21

## 2025-08-21 PROBLEM — Q61.3 POLYCYSTIC KIDNEY DISEASE: Chronic | Status: RESOLVED | Noted: 2023-04-28 | Resolved: 2025-08-21

## 2025-08-21 PROBLEM — E66.811 CLASS 1 OBESITY DUE TO EXCESS CALORIES WITH SERIOUS COMORBIDITY AND BODY MASS INDEX (BMI) OF 30.0 TO 30.9 IN ADULT: Status: RESOLVED | Noted: 2025-01-16 | Resolved: 2025-08-21

## 2025-08-21 PROBLEM — N28.89 RENAL MASS: Status: RESOLVED | Noted: 2024-09-09 | Resolved: 2025-08-21

## 2025-08-21 PROBLEM — R31.9 HEMATURIA: Status: RESOLVED | Noted: 2023-04-29 | Resolved: 2025-08-21

## 2025-08-21 PROCEDURE — 99999 PR PBB SHADOW E&M-EST. PATIENT-LVL V: CPT | Mod: PBBFAC,TXP,, | Performed by: NURSE PRACTITIONER

## 2025-08-28 ENCOUNTER — OFFICE VISIT (OUTPATIENT)
Dept: CARDIOLOGY | Facility: CLINIC | Age: 45
End: 2025-08-28
Payer: MEDICARE

## 2025-08-28 VITALS
SYSTOLIC BLOOD PRESSURE: 90 MMHG | DIASTOLIC BLOOD PRESSURE: 54 MMHG | RESPIRATION RATE: 17 BRPM | HEIGHT: 71 IN | HEART RATE: 96 BPM | WEIGHT: 248 LBS | OXYGEN SATURATION: 99 % | BODY MASS INDEX: 34.72 KG/M2

## 2025-08-28 DIAGNOSIS — E66.01 SEVERE OBESITY (BMI 35.0-35.9 WITH COMORBIDITY): ICD-10-CM

## 2025-08-28 DIAGNOSIS — Z99.2 ESRD (END STAGE RENAL DISEASE) ON DIALYSIS: ICD-10-CM

## 2025-08-28 DIAGNOSIS — I95.3 HEMODIALYSIS-ASSOCIATED HYPOTENSION: ICD-10-CM

## 2025-08-28 DIAGNOSIS — N18.6 ESRD (END STAGE RENAL DISEASE) ON DIALYSIS: ICD-10-CM

## 2025-08-28 DIAGNOSIS — R00.2 HEART PALPITATIONS: Primary | ICD-10-CM

## 2025-08-28 DIAGNOSIS — Z72.0 TOBACCO ABUSE: ICD-10-CM

## 2025-08-28 DIAGNOSIS — Z82.49 FH: CAD (CORONARY ARTERY DISEASE): ICD-10-CM

## 2025-08-28 PROCEDURE — 99999 PR PBB SHADOW E&M-EST. PATIENT-LVL IV: CPT | Mod: PBBFAC,TXP,, | Performed by: INTERNAL MEDICINE

## 2025-08-30 LAB
OHS QRS DURATION: 74 MS
OHS QTC CALCULATION: 469 MS

## (undated) DEVICE — DRESSING TEGADERM 4.4X5IN

## (undated) DEVICE — SUT PROLENE 5-0 24 C-1 BL

## (undated) DEVICE — BELLOW CANN HEMOBLAST 1.65GR

## (undated) DEVICE — SPONGE LAP 18X18 PREWASHED

## (undated) DEVICE — STAPLE TRI-STAPLE 2.0 CRV TIP

## (undated) DEVICE — ELECTRODE BALL RED 5MM

## (undated) DEVICE — SEAL LENS SCOPE MYOSURE

## (undated) DEVICE — STOPCOCK MED PRSS 3-WAY

## (undated) DEVICE — TOWEL OR DISP STRL BLUE 4/PK

## (undated) DEVICE — BLADE SURG STAINLESS STEEL #11

## (undated) DEVICE — PACK AV VASCULAR ACCESS OMC

## (undated) DEVICE — SOL ELECTROLUBE ANTI-STIC

## (undated) DEVICE — Device

## (undated) DEVICE — SOL POVIDONE PREP IODINE 4 OZ

## (undated) DEVICE — CATH ULTRAVERSE 035 9X60X75

## (undated) DEVICE — SUT PDS II O CTX MONO 60

## (undated) DEVICE — TUBING NEPTUNE 2 SMOKE 10IN

## (undated) DEVICE — CATH GLIDE ANGLED 5FR 65CM

## (undated) DEVICE — ELECTRODE LOOP 20MMX12MM

## (undated) DEVICE — TRAY GENERAL SURGERY OMC

## (undated) DEVICE — BLADE TONGUE DEPRESSOR STRL

## (undated) DEVICE — CATH URETH INTMIT FEM 14FR 6IN

## (undated) DEVICE — ELECTRODE REM PLYHSV RETURN 9

## (undated) DEVICE — SUT 2/0 30IN SILK BLK BRAI

## (undated) DEVICE — DRAPE PED LAP SURG 108X77IN

## (undated) DEVICE — CATH ULTRAVERSE 035 5X40X75

## (undated) DEVICE — SUT EASE CROSSBOW CLSR SYS

## (undated) DEVICE — APPLICATOR CHLORAPREP ORN 26ML

## (undated) DEVICE — SUT 0 54IN COATED VICRYL U

## (undated) DEVICE — TUBING SUC UNIV W/CONN 12FT

## (undated) DEVICE — SYS LABEL CORRECT MED

## (undated) DEVICE — DRESSING SURGILAST RET SM/MED

## (undated) DEVICE — COVERS PROBE NR-48 STERILE

## (undated) DEVICE — PENCIL ROCKER SWITCH 10FT CORD

## (undated) DEVICE — SUT PROLENE 4-0 RB-1 BL MO

## (undated) DEVICE — PENCIL ELECTROSURG HOLST W/BLD

## (undated) DEVICE — SYR IRRIGATION BULB STER 60ML

## (undated) DEVICE — TUBING SUCTION STERILE

## (undated) DEVICE — SOL NS 1000CC

## (undated) DEVICE — NDL INSUF ULTRA VERESS 120MM

## (undated) DEVICE — BAND TR WITH INFLATOR

## (undated) DEVICE — DRAPE INCISE IOBAN 2 23X17IN

## (undated) DEVICE — SUT 2-0 12-18IN SILK

## (undated) DEVICE — SPONGE GAUZE 16PLY 4X4

## (undated) DEVICE — DRAPE LAP T SHT W/ INSTR PAD

## (undated) DEVICE — GAUZE SPONGE 4X4 12PLY

## (undated) DEVICE — KIT INTRO MICRO NIT VSI 4FR

## (undated) DEVICE — ENDOCATCH 15MM

## (undated) DEVICE — GOWN SMART IMP BREATHABLE XXLG

## (undated) DEVICE — BLADE SURG STAINLESS STEEL #15

## (undated) DEVICE — SET MICROPUNCT 5FRMPIS-501

## (undated) DEVICE — DECANTER FLUID TRNSF WHITE 9IN

## (undated) DEVICE — SUT SILK 2-0 STRANDS 30IN

## (undated) DEVICE — GUIDEWIRE EMERALD 150CM PTFE

## (undated) DEVICE — COVER LIGHT HANDLE 80/CA

## (undated) DEVICE — TIP YANKAUERS BULB NO VENT

## (undated) DEVICE — KIT PROCEDURE STER INLET CLOSU

## (undated) DEVICE — DRAPE SCOPE PILLOW WARMER

## (undated) DEVICE — SOL POVIDONE SCRUB IODINE 4 OZ

## (undated) DEVICE — JELLY SURGILUBE 5GR

## (undated) DEVICE — TRAY SKIN SCRUB WET PREMIUM

## (undated) DEVICE — DRAPE COLUMN DAVINCI XI

## (undated) DEVICE — GLIDE CATH ANGLED 4FR 65CM

## (undated) DEVICE — COVER TIP CURVED SCISSORS XI

## (undated) DEVICE — SUT VICRYL + 2-0 36IN CP-1

## (undated) DEVICE — SOL NACL IRR 3000ML

## (undated) DEVICE — GLOVE SENSICARE PI GRN 7.5

## (undated) DEVICE — DRAPE HALF SURGICAL 40X58IN

## (undated) DEVICE — SYS SEE SHARP SCP ANTIFG LNG

## (undated) DEVICE — KIT NOVASURE V5 ENDOMET ABLAT

## (undated) DEVICE — SYR ONLY LUER LOCK 20CC

## (undated) DEVICE — SUT MONOCRYL 3-0 SH U/D

## (undated) DEVICE — SUT 4-0 12-18IN SILK BLACK

## (undated) DEVICE — APPLICATOR HEMOBLAST LAPSCP

## (undated) DEVICE — GLIDESHEATH SLENDER SS 5FR10CM

## (undated) DEVICE — PAD CURAD NONADH 3X4IN

## (undated) DEVICE — VALVE CONTROL COPILOT

## (undated) DEVICE — SUT 0 VICRYL PLUS CT-1 27IN

## (undated) DEVICE — CATH ULTRAVERSE 035 7X60X75

## (undated) DEVICE — SUT 3-0 12-18IN SILK

## (undated) DEVICE — GOWN SURGICAL X-LARGE

## (undated) DEVICE — PACK LITHOTOMY I ECLIPSE

## (undated) DEVICE — SUT VICRYL 2-0 36 CT-1

## (undated) DEVICE — DRAPE ARM DAVINCI XI

## (undated) DEVICE — CLOSURE SKIN STERI STRIP 1/2X4

## (undated) DEVICE — CATH SEEKER .035 X65CM

## (undated) DEVICE — SET TRI-LUMEN FILTERED TUBE

## (undated) DEVICE — SEAL UNIVERSAL 5MM-8MM XI

## (undated) DEVICE — VISE RADIFOCUS MULTI TORQUE

## (undated) DEVICE — SUT 2/0 54IN COATED VICRYL

## (undated) DEVICE — DRESSING ADH ISLAND 3.6 X 14

## (undated) DEVICE — GUIDEWIRE STF .035X180CM ANG

## (undated) DEVICE — CATH URETHRAL RED 16FR

## (undated) DEVICE — SUT SILK 2-0 SH 18IN BLACK

## (undated) DEVICE — SHEATH PINNACLE 6FR HIFLO

## (undated) DEVICE — CLIP HEMO-LOK MLX LARGE LF

## (undated) DEVICE — MASK SILVER SURGICAL

## (undated) DEVICE — SOL IRR SOD CHL .9% POUR

## (undated) DEVICE — TRAY DRY SKIN SCRUB PREP

## (undated) DEVICE — SYR SLIP TIP 20CC

## (undated) DEVICE — COVER INSTR ELASTIC BAND 40X20

## (undated) DEVICE — BOWL STERILE LARGE 32OZ

## (undated) DEVICE — INFLATOR ENCORE

## (undated) DEVICE — PORT AIRSEAL 12/120MM LPI

## (undated) DEVICE — SOL NACL 0.9% IV INJ 1000ML

## (undated) DEVICE — GLOVE SENSICARE PI SURG 7

## (undated) DEVICE — SET BASIN 48X48IN 6000ML RING

## (undated) DEVICE — DEVICE MYOSURE REACH

## (undated) DEVICE — TAPE SILK 3IN

## (undated) DEVICE — SUT MCRYL PLUS 4-0 PS2 27IN

## (undated) DEVICE — SPONGE COTTON TRAY 4X4IN

## (undated) DEVICE — CONNECTOR TUBING STR 5 IN 1

## (undated) DEVICE — TRAY CATH 1-LYR URIMTR 16FR

## (undated) DEVICE — TRAY CATH LAB OMC

## (undated) DEVICE — TRAY MINOR GEN SURG OMC

## (undated) DEVICE — DRESSING TELFA N ADH 3X8

## (undated) DEVICE — SUT PROLENE 6-0 C-1 30IN BL

## (undated) DEVICE — SUT SILK 0 STRANDS 30IN BLK

## (undated) DEVICE — CLIPPER BLADE MOD 4406 (CAREF)

## (undated) DEVICE — SWAB PROCTO 16 X 1 1/2 ST 2/PK

## (undated) DEVICE — SUT VICRYL PLUS 3-0 SH 18IN

## (undated) DEVICE — SOL WATER STRL IRR 1000ML

## (undated) DEVICE — SYR 30CC LUER LOCK

## (undated) DEVICE — LOOP VESSEL YELLOW MAXI

## (undated) DEVICE — ADHESIVE DERMABOND ADVANCED

## (undated) DEVICE — IRRIGATOR ENDOSCOPY DISP.

## (undated) DEVICE — SUT VICRYL PLUS 2-0 CT1 18

## (undated) DEVICE — CANISTER FILTER DISP.

## (undated) DEVICE — PACK FLUENT DISPOSABLE

## (undated) DEVICE — COUNT NDL FOAM MAGNET 40COUNT

## (undated) DEVICE — WIRE BENTSON 035/180

## (undated) DEVICE — SYR 10CC LUER LOCK

## (undated) DEVICE — SUT 2/0 27IN PDS II VIO MO

## (undated) DEVICE — CONTAINER SPEC OR STRL 4.5OZ

## (undated) DEVICE — TUBING FILTER

## (undated) DEVICE — SUT ETHILON 3-0 PS2 18 BLK

## (undated) DEVICE — NDL SPINAL 20GX3.5 HUB

## (undated) DEVICE — KIT PROBE COVER WITH GEL

## (undated) DEVICE — LUBRICANT SURGILUBE 2 OZ

## (undated) DEVICE — COVER LIGHT HANDLE

## (undated) DEVICE — DRAPE ABDOMINAL TIBURON 14X11

## (undated) DEVICE — CANNULA REDUCER 12-8MM

## (undated) DEVICE — GUIDEWIRE ADVAN 25CM.014 180CM

## (undated) DEVICE — STAPLER HANDLE XL 26 CM

## (undated) DEVICE — CONTAINER SPECIMEN OR STER 4OZ